# Patient Record
Sex: FEMALE | Race: WHITE | Employment: OTHER | URBAN - METROPOLITAN AREA
[De-identification: names, ages, dates, MRNs, and addresses within clinical notes are randomized per-mention and may not be internally consistent; named-entity substitution may affect disease eponyms.]

---

## 2017-10-23 PROBLEM — M54.40 BILATERAL LOW BACK PAIN WITH SCIATICA: Status: ACTIVE | Noted: 2017-10-23

## 2017-11-02 ENCOUNTER — HOSPITAL ENCOUNTER (OUTPATIENT)
Dept: PHYSICAL THERAPY | Age: 57
Discharge: HOME | End: 2017-11-02
Payer: COMMERCIAL

## 2017-11-02 DIAGNOSIS — M54.40 BILATERAL LOW BACK PAIN WITH SCIATICA, SCIATICA LATERALITY UNSPECIFIED, UNSPECIFIED CHRONICITY: ICD-10-CM

## 2017-11-02 PROCEDURE — 97110 THERAPEUTIC EXERCISES: CPT

## 2017-11-02 PROCEDURE — 97162 PT EVAL MOD COMPLEX 30 MIN: CPT

## 2017-11-02 NOTE — PROGRESS NOTES
Ambulatory/Rehab Services H2 Model Falls Risk Assessment    Risk Factor Pts. ·   Confusion/Disorientation/Impulsivity  []    4 ·   Symptomatic Depression  []   2 ·   Altered Elimination  []   1 ·   Dizziness/Vertigo  []   1 ·   Gender (Male)  []   1 ·   Any administered antiepileptics (anticonvulsants):  []   2 ·   Any administered benzodiazepines:  []   1 ·   Visual Impairment (specify):  []   1 ·   Portable Oxygen Use  []   1 ·   Orthostatic ? BP  []   1 ·   History of Recent Falls (within 3 mos.)  []   5     Ability to Rise from Chair (choose one) Pts. ·   Ability to rise in a single movement  [x]   0 ·   Pushes up, successful in one attempt  []   1 ·   Multiple attempts, but successful  []   3 ·   Unable to rise without assistance  []   4   Total: (5 or greater = High Risk) 0     Falls Prevention Plan:   []                Physical Limitations to Exercise (specify):   []                Mobility Assistance Device (type):   []                Exercise/Equipment Adaptation (specify):    ©2010 [facility] [address] [place] [address] Patent #[id number].  Federal Law prohibits the replication, distribution or use without written permission from [facility] [employer]

## 2017-11-02 NOTE — PROGRESS NOTES
[facility] [place] [address] [facility]   [address] [place] [address], [address]  [phone]   [phone]    Brock Prader  : 1960       OUTPATIENT PHYSICAL THERAPY:Initial Assessment 2017    ICD-10: Treatment Diagnosis: back pain M54.5  Precautions/Allergies:   Biaxin [clarithromycin] and Cortisone   Fall Risk Score: 0 (? 5 = High Risk)  MD Orders: Eval and treat MEDICAL/REFERRING DIAGNOSIS:  Bilateral low back pain with sciatica, sciatica laterality unspecified, unspecified chronicity [M54.40]   DATE OF ONSET: 10/21/17  REFERRING PHYSICIAN: Lluvia Guadalupe MD  RETURN PHYSICIAN APPOINTMENT: 17     INITIAL ASSESSMENT:  Ms. Lars Serrano presents with increased lower back and bilateral leg pain that began on 10/21/17. She can not recall any specific injury, but began to have increased pain and it traveled down both of her legs. She had increased difficulty with sitting and lying down. She was placed on medications and is now feeling better overall, but there is still some lingering pain. She has continued soreness on the lower left side at [place] [address] region and area of sciatic nerve. Pt has not returned to work yet due to her pain. She will be seeing MD next week for possible return to work. PROBLEM LIST (Impacting functional limitations):  1. Decreased Strength  2. Decreased ADL/Functional Activities  3. Increased Pain  4. Decreased Activity Tolerance  5. Decreased Flexibility/Joint Mobility INTERVENTIONS PLANNED:  1. Electrical Stimulation  2. Heat  3. Home Exercise Program (HEP)  4. Manual Therapy  5. Range of Motion (ROM)  6. Therapeutic Exercise/Strengthening  7. Ultrasound (US)   TREATMENT PLAN:  Effective Dates: 17 TO 18. Frequency/Duration: 2 times a week for 10 weeks and upon reassessment,  will adjust frequency and duration as progress indicates.       GOALS: (Goals have been discussed and agreed upon with patient.)  Short-Term Functional Goals: Time Frame: 5weeks  1. Establish independent HEP with no cueing. 2. Pt will be able to return to work. 3. Pt will be able to report no increased pain with walking or sitting for any length of time. Discharge Goals: Time Frame: 10 weeks  1. Improve score on Oswestry by at least 5 points for improved ADL function. 2. Pt will exhibit proper lifting mechanics in order to avoid stressing her lower back at work. 3. Pt will be able to report return to full work and home activities. Rehabilitation Potential For Stated Goals: Good  Regarding Brooke Mcmahan's therapy, I certify that the treatment plan above will be carried out by a therapist or under their direction. Thank you for this referral,  Jhon Newton PT     Referring Physician Signature: Toi David MD              Date                    The information in this section was collected on 11/2/17 (except where otherwise noted). HISTORY:   History of Present Injury/Illness (Reason for Referral):  Ms. Elias Coley, a 59year old female, presents with increased lower back and bilateral leg pain that began on 10/21/17. She can not recall any specific injury, but began to have increased pain and it traveled down both of her legs. She had increased difficulty with sitting and lying down. She was placed on medications and is now feeling better overall, but there is still some lingering pain. She has continued soreness on the lower left side at [facility] region and area of sciatic nerve. Pt has not returned to work yet due to her pain. She will be seeing MD next week for possible return to work. Past Medical History/Comorbidities:   Ms. Elias Coley  has a past medical history of Diabetes ([facility], [address] and Environmental allergies (9/12/2013). Ms. Elias Coley  has a past surgical history that includes cholecystectomy.   Social History/Living Environment:     pt lives alone with son very close by  Prior Level of Function/Work/Activity:  Pt works at a manufacturing plant and must sit, stand and lift at times  Dominant Side:         RIGHT    Current Medications:       Current Outpatient Prescriptions:     predniSONE (STERAPRED) 5 mg dose pack, See administration instruction per 5mg dose pack, Disp: 21 Tab, Rfl: 0    tiZANidine (ZANAFLEX) 4 mg capsule, Take 1 Cap by mouth three (3) times daily. Indications: Muscle Spasm, Disp: 30 Cap, Rfl: 2    traMADol (ULTRAM) 50 mg tablet, Take 1 Tab by mouth every six (6) hours as needed for Pain. Max Daily Amount: 200 mg., Disp: 30 Tab, Rfl: 2    atorvastatin (LIPITOR) 20 mg tablet, Take 1 Tab by mouth daily. , Disp: 90 Tab, Rfl: 1    insulin glargine (LANTUS SOLOSTAR) 100 unit/mL (3 mL) inpn, 24 u qhs  Indications: type 2 diabetes mellitus, Disp: 10 Pen, Rfl: 1    metFORMIN (GLUCOPHAGE) 500 mg tablet, Take 1 Tab by mouth two (2) times daily (with meals). , Disp: 60 Tab, Rfl: 5    glucose blood VI test strips (ACCU-CHEK LILLY PLUS TEST STRP) strip, Use twice daily for management of Type 2 DM, Uncontrolled (250.02), Disp: 200 Strip, Rfl: 3    Lancets (ACCU-CHEK SOFTCLIX LANCETS) misc, Use twice daily for management of Type 2 DM uncontrolled (250.02), Disp: 200 Each, Rfl: 3    Insulin Needles, Disposable, 32 gauge x 1/4\" ndle, 1 Each by Does Not Apply route daily. , Disp: 100 Pen Needle, Rfl: 3    Blood-Glucose Meter (ACCU-CHEK LILLY PLUS METER) misc, 1 Kit by Does Not Apply route two (2) times a day.  For management of Type 2 DM Uncontrolled (250.02), Disp: 1 Each, Rfl: 3    ACETAMINOPHEN (TYLENOL 8 HOUR PO), Take  by mouth.  , Disp: , Rfl:    Date Last Reviewed:  11/2/2017     Number of Personal Factors/Comorbidities that affect the Plan of Care: 1-2: MODERATE COMPLEXITY   EXAMINATION:     ROM:     bilateral lower back ROM WFL                                       Strength:     bilaterally LE 4+/5  Core strength is fair                     Special Tests: negative SLR, negative LLD, mild pain noted with prone hip ER and IR  Neurological Screen: pt has not had any numbness or tingling  Balance:  good   Body Structures Involved:  1. Nerves  2. Bones  3. Joints  4. Muscles  5. Ligaments Body Functions Affected:  1. Sensory/Pain  2. Neuromusculoskeletal  3. Movement Related Activities and Participation Affected:  1. Mobility  2. Self Care  3. Domestic Life  4. Interpersonal Interactions and Relationships  5. Community, Social and Oral [place]   Number of elements (examined above) that affect the Plan of Care: 4+: HIGH COMPLEXITY   CLINICAL PRESENTATION:   Presentation: Evolving clinical presentation with changing clinical characteristics: MODERATE COMPLEXITY   CLINICAL DECISION MAKING:   Outcome Measure: Tool Used: Modified Oswestry Low Back Pain Questionnaire  Score:  Initial: 12/50  Most Recent: X/50 (Date: -- )   Interpretation of Score: Each section is scored on a 0-5 scale, 5 representing the greatest disability. The scores of each section are added together for a total score of 50. Score 0 1-10 11-20 21-30 31-40 41-49 50   Modifier CH CI CJ CK CL CM CN     ? Changing and Maintaining Body Position:     - CURRENT STATUS: CJ - 20%-39% impaired, limited or restricted    - GOAL STATUS: CI - 1%-19% impaired, limited or restricted    - D/C STATUS:  ---------------To be determined---------------      Medical Necessity:   · Patient is expected to demonstrate progress in strength to improve her overall ability to lift objects and return to work without difficulty. Reason for Services/Other Comments:  · Patient continues to require skilled intervention due to weakened core and increased lower back pain preventing her from standing, lifting and walking.    Use of outcome tool(s) and clinical judgement create a POC that gives a: Questionable prediction of patient's progress: MODERATE COMPLEXITY            TREATMENT:   (In addition to Assessment/Re-Assessment sessions the following treatments were rendered)  Pre-treatment Symptoms/Complaints:  Pt reporting some mild left lower back pain around SI joint. Pain: Initial: Pain Intensity 1: 6  Post Session:  6/10     THERAPEUTIC EXERCISE: (25 minutes):  Exercises per grid below to improve mobility and strength. Required minimal verbal cues to promote proper body mechanics. Progressed resistance, range and repetitions as indicated. Nu-step seat 8 x 8 min  Bridging x 10  LTR x 10  Piriformis stretching manually with contract relax x 5 hold 10 sec  Bilateral hip extension 37.5# x 20  Evaluation (  xx   ):    Manual Therapy (  na    ): na    Therapeutic Modalities:na    HEP: As above; handouts given to patient for all exercises. Treatment/Session Assessment:    · Response to Treatment:  Pt responded well to treatment today. She is off prednisone, but is still taking Tramadol. She still has very low level pain noted on the lower left side of her back and she reports she is not ready to begin lifting objects as she still feels the pain. She will return for core strengthening as well stretching and any modalities as needed. · Compliance with Program/Exercises: Will assess as treatment progresses. · Recommendations/Intent for next treatment session: \"Next visit will focus on advancements to more challenging activities\".   Total Treatment Duration: 60 min  PT Patient Time In/Time Out  Time In: 0900  Time Out: 1000  Treatment number 1901 Pauline Tesfaye PT

## 2017-11-06 ENCOUNTER — HOSPITAL ENCOUNTER (OUTPATIENT)
Dept: PHYSICAL THERAPY | Age: 57
Discharge: HOME | End: 2017-11-06
Payer: COMMERCIAL

## 2017-11-06 PROCEDURE — 97110 THERAPEUTIC EXERCISES: CPT

## 2017-11-06 NOTE — PROGRESS NOTES
[facility]rapy Center at [facility] Therapy   [address], [address]  [phone]   [phone]    Kita Graves  : 1960       OUTPATIENT PHYSICAL THERAPY:Daily Note 2017    ICD-10: Treatment Diagnosis: back pain M54.5  Precautions/Allergies:   Biaxin [clarithromycin] and Cortisone   Fall Risk Score: 0 (? 5 = High Risk)  MD Orders: Eval and treat MEDICAL/REFERRING DIAGNOSIS:  Lumbago with sciatica, unspecified side [M54.40]   DATE OF ONSET: 10/21/17  REFERRING PHYSICIAN: Sima Russo MD  RETURN PHYSICIAN APPOINTMENT: 17     INITIAL ASSESSMENT:  Ms. Antionette Ortiz presents with increased lower back and bilateral leg pain that began on 10/21/17. She can not recall any specific injury, but began to have increased pain and it traveled down both of her legs. She had increased difficulty with sitting and lying down. She was placed on medications and is now feeling better overall, but there is still some lingering pain. She has continued soreness on the lower left side at [place] [address] region and area of sciatic nerve. Pt has not returned to work yet due to her pain. She will be seeing MD next week for possible return to work. PROBLEM LIST (Impacting functional limitations):  1. Decreased Strength  2. Decreased ADL/Functional Activities  3. Increased Pain  4. Decreased Activity Tolerance  5. Decreased Flexibility/Joint Mobility INTERVENTIONS PLANNED:  1. Electrical Stimulation  2. Heat  3. Home Exercise Program (HEP)  4. Manual Therapy  5. Range of Motion (ROM)  6. Therapeutic Exercise/Strengthening  7. Ultrasound (US)   TREATMENT PLAN:  Effective Dates: 17 TO 18. Frequency/Duration: 2 times a week for 10 weeks and upon reassessment,  will adjust frequency and duration as progress indicates. GOALS: (Goals have been discussed and agreed upon with patient.)  Short-Term Functional Goals: Time Frame: 5weeks  1.  Establish independent HEP with no cueing. 2. Pt will be able to return to work. 3. Pt will be able to report no increased pain with walking or sitting for any length of time. Discharge Goals: Time Frame: 10 weeks  1. Improve score on Oswestry by at least 5 points for improved ADL function. 2. Pt will exhibit proper lifting mechanics in order to avoid stressing her lower back at work. 3. Pt will be able to report return to full work and home activities. Rehabilitation Potential For Stated Goals: Good  Regarding Brooke Wallaceurston's therapy, I certify that the treatment plan above will be carried out by a therapist or under their direction. Thank you for this referral,              The information in this section was collected on 11/2/17 (except where otherwise noted). HISTORY:   History of Present Injury/Illness (Reason for Referral):  Ms. Nemo Bonds, a 59year old female, presents with increased lower back and bilateral leg pain that began on 10/21/17. She can not recall any specific injury, but began to have increased pain and it traveled down both of her legs. She had increased difficulty with sitting and lying down. She was placed on medications and is now feeling better overall, but there is still some lingering pain. She has continued soreness on the lower left side at [facility] region and area of sciatic nerve. Pt has not returned to work yet due to her pain. She will be seeing MD next week for possible return to work. Past Medical History/Comorbidities:   Ms. Nemo Bonds  has a past medical history of Diabetes ([address] and Environmental allergies (9/12/2013). Ms. Nemo Bonds  has a past surgical history that includes cholecystectomy.   Social History/Living Environment:     pt lives alone with son very close by  Prior Level of Function/Work/Activity:  Pt works at a manufacturing plant and must sit, stand and lift at times  Dominant Side:         RIGHT    Current Medications:       Current Outpatient Prescriptions:     predniSONE (STERAPRED) 5 mg dose pack, See administration instruction per 5mg dose pack, Disp: 21 Tab, Rfl: 0    tiZANidine (ZANAFLEX) 4 mg capsule, Take 1 Cap by mouth three (3) times daily. Indications: Muscle Spasm, Disp: 30 Cap, Rfl: 2    traMADol (ULTRAM) 50 mg tablet, Take 1 Tab by mouth every six (6) hours as needed for Pain. Max Daily Amount: 200 mg., Disp: 30 Tab, Rfl: 2    atorvastatin (LIPITOR) 20 mg tablet, Take 1 Tab by mouth daily. , Disp: 90 Tab, Rfl: 1    insulin glargine (LANTUS SOLOSTAR) 100 unit/mL (3 mL) inpn, 24 u qhs  Indications: type 2 diabetes mellitus, Disp: 10 Pen, Rfl: 1    metFORMIN (GLUCOPHAGE) 500 mg tablet, Take 1 Tab by mouth two (2) times daily (with meals). , Disp: 60 Tab, Rfl: 5    glucose blood VI test strips (ACCU-CHEK LILLY PLUS TEST STRP) strip, Use twice daily for management of Type 2 DM, Uncontrolled (250.02), Disp: 200 Strip, Rfl: 3    Lancets (ACCU-CHEK SOFTCLIX LANCETS) misc, Use twice daily for management of Type 2 DM uncontrolled (250.02), Disp: 200 Each, Rfl: 3    Insulin Needles, Disposable, 32 gauge x 1/4\" ndle, 1 Each by Does Not Apply route daily. , Disp: 100 Pen Needle, Rfl: 3    Blood-Glucose Meter (ACCU-CHEK LILLY PLUS METER) misc, 1 Kit by Does Not Apply route two (2) times a day. For management of Type 2 DM Uncontrolled (250.02), Disp: 1 Each, Rfl: 3    ACETAMINOPHEN (TYLENOL 8 HOUR PO), Take  by mouth.  , Disp: , Rfl:    Date Last Reviewed:  11/6/2017     Number of Personal Factors/Comorbidities that affect the Plan of Care: 1-2: MODERATE COMPLEXITY   EXAMINATION:     ROM:     bilateral lower back ROM WFL                                       Strength:     bilaterally LE 4+/5  Core strength is fair                     Special Tests: negative SLR, negative LLD, mild pain noted with prone hip ER and IR  Neurological Screen: pt has not had any numbness or tingling  Balance:  good   Body Structures Involved:  1. Nerves  2. Bones  3. Joints  4. Muscles  5.  Ligaments Body Functions Affected:  1. Sensory/Pain  2. Neuromusculoskeletal  3. Movement Related Activities and Participation Affected:  1. Mobility  2. Self Care  3. Domestic Life  4. Interpersonal Interactions and Relationships  5. Community, Social and Distant [place]   Number of elements (examined above) that affect the Plan of Care: 4+: HIGH COMPLEXITY   CLINICAL PRESENTATION:   Presentation: Evolving clinical presentation with changing clinical characteristics: MODERATE COMPLEXITY   CLINICAL DECISION MAKING:   Outcome Measure: Tool Used: Modified Oswestry Low Back Pain Questionnaire  Score:  Initial: 12/50  Most Recent: X/50 (Date: -- )   Interpretation of Score: Each section is scored on a 0-5 scale, 5 representing the greatest disability. The scores of each section are added together for a total score of 50. Score 0 1-10 11-20 21-30 31-40 41-49 50   Modifier CH CI CJ CK CL CM CN     ? Changing and Maintaining Body Position:     - CURRENT STATUS: CJ - 20%-39% impaired, limited or restricted    - GOAL STATUS: CI - 1%-19% impaired, limited or restricted    - D/C STATUS:  ---------------To be determined---------------      Medical Necessity:   · Patient is expected to demonstrate progress in strength to improve her overall ability to lift objects and return to work without difficulty. Reason for Services/Other Comments:  · Patient continues to require skilled intervention due to weakened core and increased lower back pain preventing her from standing, lifting and walking. Use of outcome tool(s) and clinical judgement create a POC that gives a: Questionable prediction of patient's progress: MODERATE COMPLEXITY            TREATMENT:   (In addition to Assessment/Re-Assessment sessions the following treatments were rendered)  Pre-treatment Symptoms/Complaints:  Pt reports back feels a little better, but most of her pain now in her glut and down the outside of her leg.   Pain: Initial: Pain Intensity 1: 4  Post Session: 3/10     THERAPEUTIC EXERCISE: (60  minutes):  Exercises per grid below to improve mobility and strength. Required minimal verbal cues to promote proper body mechanics. Progressed resistance, range and repetitions as indicated. Nu-step seat 8 x 10 min  Bridging 2 x 10  LTR x 10  Piriformis stretching manually with contract relax x 5 hold 10 sec  Bilateral hip extension 37.5# x 20  Clams with red t band 2 x 10  Hip abd side lying 2 x 10  piriformis stretching   Hamstring stretching   SLR 2 x 10  Knee to chest   Standing trunk ext 2 x 10        Evaluation :    Manual Therapy (  na    ): na    Therapeutic Modalities:na    HEP: As directed. Treatment/Session Assessment:    · Response to Treatment:  Pt responded well to treatment today. Patient seemed pleased that her pain level has decreased tremendously and that she is able to do all of the exercises with out much difficulty. Patient indicates she would like to join a gym once she completes therapy. Patient needs to continue to work on core strength. Good compliance with home exercise. · Compliance with Program/Exercises: Will assess as treatment progresses. · Recommendations/Intent for next treatment session: \"Next visit will focus on advancements to more challenging activities\".   Total Treatment Duration: 60 min  PT Patient Time In/Time Out  Time In: 1100  Time Out: 1200  Treatment number Mesfin Hoyt PTA

## 2017-11-09 ENCOUNTER — HOSPITAL ENCOUNTER (OUTPATIENT)
Dept: PHYSICAL THERAPY | Age: 57
Discharge: HOME | End: 2017-11-09
Payer: COMMERCIAL

## 2017-11-10 NOTE — PROGRESS NOTES
Saqib Ely  : 1960  Primary: [employer]  Secondary:  [address] at [facility] Therapy  [address], [facility] [address]  [phone]   [phone]      OUTPATIENT DAILY NOTE    NAME/AGE/GENDER: Saqib Ely is a 59 y.o. female. DATE: 11/10/2017    Patient cancelled  appointment today due to therapist sickness. Will plan to follow up on next scheduled visit.     Gloria Garcia, PTA

## 2017-11-13 ENCOUNTER — HOSPITAL ENCOUNTER (OUTPATIENT)
Dept: PHYSICAL THERAPY | Age: 57
Discharge: HOME | End: 2017-11-13
Payer: COMMERCIAL

## 2017-11-13 PROCEDURE — 97110 THERAPEUTIC EXERCISES: CPT

## 2017-11-13 NOTE — PROGRESS NOTES
[facility]   [address], [address]  [phone]   [phone]    Angelic Waldrop  : 1960       OUTPATIENT PHYSICAL THERAPY:Daily Note 2017    ICD-10: Treatment Diagnosis: back pain M54.5  Precautions/Allergies:   Biaxin [clarithromycin] and Cortisone   Fall Risk Score: 0 (? 5 = High Risk)  MD Orders: Eval and treat MEDICAL/REFERRING DIAGNOSIS:  Lumbago with sciatica, unspecified side [M54.40]   DATE OF ONSET: 10/21/17  REFERRING PHYSICIAN: Mercy Mckinley MD  RETURN PHYSICIAN APPOINTMENT: 17     INITIAL ASSESSMENT:  Ms. Vj Bell presents with increased lower back and bilateral leg pain that began on 10/21/17. She can not recall any specific injury, but began to have increased pain and it traveled down both of her legs. She had increased difficulty with sitting and lying down. She was placed on medications and is now feeling better overall, but there is still some lingering pain. She has continued soreness on the lower left side at [facility] region and area of sciatic nerve. Pt has not returned to work yet due to her pain. She will be seeing MD next week for possible return to work. PROBLEM LIST (Impacting functional limitations):  1. Decreased Strength  2. Decreased ADL/Functional Activities  3. Increased Pain  4. Decreased Activity Tolerance  5. Decreased Flexibility/Joint Mobility INTERVENTIONS PLANNED:  1. Electrical Stimulation  2. Heat  3. Home Exercise Program (HEP)  4. Manual Therapy  5. Range of Motion (ROM)  6. Therapeutic Exercise/Strengthening  7. Ultrasound (US)   TREATMENT PLAN:  Effective Dates: 17 TO 18. Frequency/Duration: 2 times a week for 10 weeks and upon reassessment,  will adjust frequency and duration as progress indicates. GOALS: (Goals have been discussed and agreed upon with patient.)  Short-Term Functional Goals: Time Frame: 5weeks  1.  Establish independent HEP with no cueing. 2. Pt will be able to return to work. 3. Pt will be able to report no increased pain with walking or sitting for any length of time. Discharge Goals: Time Frame: 10 weeks  1. Improve score on Oswestry by at least 5 points for improved ADL function. 2. Pt will exhibit proper lifting mechanics in order to avoid stressing her lower back at work. 3. Pt will be able to report return to full work and home activities. Rehabilitation Potential For Stated Goals: Good  Regarding Brooke Mcmahan's therapy, I certify that the treatment plan above will be carried out by a therapist or under their direction. Thank you for this referral,              The information in this section was collected on 11/2/17 (except where otherwise noted). HISTORY:   History of Present Injury/Illness (Reason for Referral):  Ms. Vj Bell, a 59year old female, presents with increased lower back and bilateral leg pain that began on 10/21/17. She can not recall any specific injury, but began to have increased pain and it traveled down both of her legs. She had increased difficulty with sitting and lying down. She was placed on medications and is now feeling better overall, but there is still some lingering pain. She has continued soreness on the lower left side at [facility] region and area of sciatic nerve. Pt has not returned to work yet due to her pain. She will be seeing MD next week for possible return to work. Past Medical History/Comorbidities:   Ms. Vj Bell  has a past medical history of Diabetes ([address] and Environmental allergies (9/12/2013). Ms. Vj Bell  has a past surgical history that includes cholecystectomy.   Social History/Living Environment:     pt lives alone with son very close by  Prior Level of Function/Work/Activity:  Pt works at a manufacturing plant and must sit, stand and lift at times  Dominant Side:         RIGHT    Current Medications:       Current Outpatient Prescriptions:     predniSONE (STERAPRED) 5 mg dose pack, See administration instruction per 5mg dose pack, Disp: 21 Tab, Rfl: 0    tiZANidine (ZANAFLEX) 4 mg capsule, Take 1 Cap by mouth three (3) times daily. Indications: Muscle Spasm, Disp: 30 Cap, Rfl: 2    traMADol (ULTRAM) 50 mg tablet, Take 1 Tab by mouth every six (6) hours as needed for Pain. Max Daily Amount: 200 mg., Disp: 30 Tab, Rfl: 2    atorvastatin (LIPITOR) 20 mg tablet, Take 1 Tab by mouth daily. , Disp: 90 Tab, Rfl: 1    insulin glargine (LANTUS SOLOSTAR) 100 unit/mL (3 mL) inpn, 24 u qhs  Indications: type 2 diabetes mellitus, Disp: 10 Pen, Rfl: 1    metFORMIN (GLUCOPHAGE) 500 mg tablet, Take 1 Tab by mouth two (2) times daily (with meals). , Disp: 60 Tab, Rfl: 5    glucose blood VI test strips (ACCU-CHEK LILLY PLUS TEST STRP) strip, Use twice daily for management of Type 2 DM, Uncontrolled (250.02), Disp: 200 Strip, Rfl: 3    Lancets (ACCU-CHEK SOFTCLIX LANCETS) misc, Use twice daily for management of Type 2 DM uncontrolled (250.02), Disp: 200 Each, Rfl: 3    Insulin Needles, Disposable, 32 gauge x 1/4\" ndle, 1 Each by Does Not Apply route daily. , Disp: 100 Pen Needle, Rfl: 3    Blood-Glucose Meter (ACCU-CHEK LILLY PLUS METER) misc, 1 Kit by Does Not Apply route two (2) times a day. For management of Type 2 DM Uncontrolled (250.02), Disp: 1 Each, Rfl: 3    ACETAMINOPHEN (TYLENOL 8 HOUR PO), Take  by mouth.  , Disp: , Rfl:    Date Last Reviewed:  11/13/2017     Number of Personal Factors/Comorbidities that affect the Plan of Care: 1-2: MODERATE COMPLEXITY   EXAMINATION:     ROM:     bilateral lower back ROM WFL                                       Strength:     bilaterally LE 4+/5  Core strength is fair                     Special Tests: negative SLR, negative LLD, mild pain noted with prone hip ER and IR  Neurological Screen: pt has not had any numbness or tingling  Balance:  good   Body Structures Involved:  1. Nerves  2. Bones  3. Joints  4. Muscles  5.  Ligaments Body Functions Affected:  1. Sensory/Pain  2. Neuromusculoskeletal  3. Movement Related Activities and Participation Affected:  1. Mobility  2. Self Care  3. Domestic Life  4. Interpersonal Interactions and Relationships  5. Community, Social and [place]   Number of elements (examined above) that affect the Plan of Care: 4+: HIGH COMPLEXITY   CLINICAL PRESENTATION:   Presentation: Evolving clinical presentation with changing clinical characteristics: MODERATE COMPLEXITY   CLINICAL DECISION MAKING:   Outcome Measure: Tool Used: Modified Oswestry Low Back Pain Questionnaire  Score:  Initial: 12/50  Most Recent: X/50 (Date: -- )   Interpretation of Score: Each section is scored on a 0-5 scale, 5 representing the greatest disability. The scores of each section are added together for a total score of 50. Score 0 1-10 11-20 21-30 31-40 41-49 50   Modifier CH CI CJ CK CL CM CN     ? Changing and Maintaining Body Position:     - CURRENT STATUS: CJ - 20%-39% impaired, limited or restricted    - GOAL STATUS: CI - 1%-19% impaired, limited or restricted    - D/C STATUS:  ---------------To be determined---------------      Medical Necessity:   · Patient is expected to demonstrate progress in strength to improve her overall ability to lift objects and return to work without difficulty. Reason for Services/Other Comments:  · Patient continues to require skilled intervention due to weakened core and increased lower back pain preventing her from standing, lifting and walking. Use of outcome tool(s) and clinical judgement create a POC that gives a: Questionable prediction of patient's progress: MODERATE COMPLEXITY            TREATMENT:   (In addition to Assessment/Re-Assessment sessions the following treatments were rendered)  Pre-treatment Symptoms/Complaints:  Pt reports back and leg feel better, but she still has once spot in her glut that still hurts with certain movements.   Pain: Initial: Pain Intensity 1: 3  Post Session:  2/10     THERAPEUTIC EXERCISE: (60  minutes):  Exercises per grid below to improve mobility and strength. Required minimal verbal cues to promote proper body mechanics. Progressed resistance, range and repetitions as indicated. Nu-step seat 8 x 10 min  Bridging 2 x 10  LTR x 10  Piriformis stretching manually with contract relax x 5 hold 10 sec  Bilateral hip extension 37.5# x 20  Clams with red t band 2 x 10  Hip abd side lying 2 x 10  piriformis stretching   Hamstring stretching   SLR 2 x 10  Knee to chest   Standing trunk ext 2 x 10  Step ups on 6 inch step x20      Evaluation :    Manual Therapy (  na    ): na    Therapeutic Modalities:na    HEP: As directed. Treatment/Session Assessment:    · Response to Treatment:  Pt responded well to treatment today. Patient continues to gain good relief following treatment and hopes that she will be able to join the gym in a couple of weeks. Patient needs to continue to work on core strength. Good compliance with home exercise and instructed to increase stretching to twice daily to help decrease glut discomfort. · Compliance with Program/Exercises: Will assess as treatment progresses. · Recommendations/Intent for next treatment session: \"Next visit will focus on advancements to more challenging activities\".   Total Treatment Duration: 60 min  PT Patient Time In/Time Out  Time In: 0400  Time Out: 0500  Treatment number [place], [place]

## 2017-11-16 ENCOUNTER — HOSPITAL ENCOUNTER (OUTPATIENT)
Dept: PHYSICAL THERAPY | Age: 57
Discharge: HOME | End: 2017-11-16
Payer: COMMERCIAL

## 2017-11-16 PROCEDURE — 97110 THERAPEUTIC EXERCISES: CPT

## 2017-11-16 NOTE — PROGRESS NOTES
[facility] Therapy   [address], [address]  [phone]   [phone]    Daysi Mcdaniel  : 1960       OUTPATIENT PHYSICAL THERAPY:Daily Note 2017    ICD-10: Treatment Diagnosis: back pain M54.5  Precautions/Allergies:   Biaxin [clarithromycin] and Cortisone   Fall Risk Score: 0 (? 5 = High Risk)  MD Orders: Eval and treat MEDICAL/REFERRING DIAGNOSIS:  Lumbago with sciatica, unspecified side [M54.40]   DATE OF ONSET: 10/21/17  REFERRING PHYSICIAN: Munira Gamboa MD  RETURN PHYSICIAN APPOINTMENT: 17     INITIAL ASSESSMENT:  Ms. Claudean Keen presents with increased lower back and bilateral leg pain that began on 10/21/17. She can not recall any specific injury, but began to have increased pain and it traveled down both of her legs. She had increased difficulty with sitting and lying down. She was placed on medications and is now feeling better overall, but there is still some lingering pain. She has continued soreness on the lower left side at [facility] region and area of sciatic nerve. Pt has not returned to work yet due to her pain. She will be seeing MD next week for possible return to work. PROBLEM LIST (Impacting functional limitations):  1. Decreased Strength  2. Decreased ADL/Functional Activities  3. Increased Pain  4. Decreased Activity Tolerance  5. Decreased Flexibility/Joint Mobility INTERVENTIONS PLANNED:  1. Electrical Stimulation  2. Heat  3. Home Exercise Program (HEP)  4. Manual Therapy  5. Range of Motion (ROM)  6. Therapeutic Exercise/Strengthening  7. Ultrasound (US)   TREATMENT PLAN:  Effective Dates: 17 TO 18. Frequency/Duration: 2 times a week for 10 weeks and upon reassessment,  will adjust frequency and duration as progress indicates. GOALS: (Goals have been discussed and agreed upon with patient.)  Short-Term Functional Goals: Time Frame: 5weeks  1.  Establish independent HEP with no cueing. 2. Pt will be able to return to work. 3. Pt will be able to report no increased pain with walking or sitting for any length of time. Discharge Goals: Time Frame: 10 weeks  1. Improve score on Oswestry by at least 5 points for improved ADL function. 2. Pt will exhibit proper lifting mechanics in order to avoid stressing her lower back at work. 3. Pt will be able to report return to full work and home activities. Rehabilitation Potential For Stated Goals: Good  Regarding Brooke Mcmahan's therapy, I certify that the treatment plan above will be carried out by a therapist or under their direction. Thank you for this referral,              The information in this section was collected on 11/2/17 (except where otherwise noted). HISTORY:   History of Present Injury/Illness (Reason for Referral):  Ms. Lars Serrano, a 59year old female, presents with increased lower back and bilateral leg pain that began on 10/21/17. She can not recall any specific injury, but began to have increased pain and it traveled down both of her legs. She had increased difficulty with sitting and lying down. She was placed on medications and is now feeling better overall, but there is still some lingering pain. She has continued soreness on the lower left side at [facility] region and area of sciatic nerve. Pt has not returned to work yet due to her pain. She will be seeing MD next week for possible return to work. Past Medical History/Comorbidities:   Ms. Lars Serrano  has a past medical history of Diabetes ([address] and Environmental allergies (9/12/2013). Ms. Lars Serrano  has a past surgical history that includes cholecystectomy.   Social History/Living Environment:     pt lives alone with son very close by  Prior Level of Function/Work/Activity:  Pt works at a manufacturing plant and must sit, stand and lift at times  Dominant Side:         RIGHT    Current Medications:       Current Outpatient Prescriptions:     predniSONE (STERAPRED) 5 mg dose pack, See administration instruction per 5mg dose pack, Disp: 21 Tab, Rfl: 0    tiZANidine (ZANAFLEX) 4 mg capsule, Take 1 Cap by mouth three (3) times daily. Indications: Muscle Spasm, Disp: 30 Cap, Rfl: 2    traMADol (ULTRAM) 50 mg tablet, Take 1 Tab by mouth every six (6) hours as needed for Pain. Max Daily Amount: 200 mg., Disp: 30 Tab, Rfl: 2    atorvastatin (LIPITOR) 20 mg tablet, Take 1 Tab by mouth daily. , Disp: 90 Tab, Rfl: 1    insulin glargine (LANTUS SOLOSTAR) 100 unit/mL (3 mL) inpn, 24 u qhs  Indications: type 2 diabetes mellitus, Disp: 10 Pen, Rfl: 1    metFORMIN (GLUCOPHAGE) 500 mg tablet, Take 1 Tab by mouth two (2) times daily (with meals). , Disp: 60 Tab, Rfl: 5    glucose blood VI test strips (ACCU-CHEK LILLY PLUS TEST STRP) strip, Use twice daily for management of Type 2 DM, Uncontrolled (250.02), Disp: 200 Strip, Rfl: 3    Lancets (ACCU-CHEK SOFTCLIX LANCETS) misc, Use twice daily for management of Type 2 DM uncontrolled (250.02), Disp: 200 Each, Rfl: 3    Insulin Needles, Disposable, 32 gauge x 1/4\" ndle, 1 Each by Does Not Apply route daily. , Disp: 100 Pen Needle, Rfl: 3    Blood-Glucose Meter (ACCU-CHEK LILLY PLUS METER) misc, 1 Kit by Does Not Apply route two (2) times a day. For management of Type 2 DM Uncontrolled (250.02), Disp: 1 Each, Rfl: 3    ACETAMINOPHEN (TYLENOL 8 HOUR PO), Take  by mouth.  , Disp: , Rfl:    Date Last Reviewed:  11/16/2017     Number of Personal Factors/Comorbidities that affect the Plan of Care: 1-2: MODERATE COMPLEXITY   EXAMINATION:     ROM:     bilateral lower back ROM WFL                                       Strength:     bilaterally LE 4+/5  Core strength is fair                     Special Tests: negative SLR, negative LLD, mild pain noted with prone hip ER and IR  Neurological Screen: pt has not had any numbness or tingling  Balance:  good   Body Structures Involved:  1. Nerves  2. Bones  3. Joints  4. Muscles  5.  Ligaments Body Functions Affected:  1. Sensory/Pain  2. Neuromusculoskeletal  3. Movement Related Activities and Participation Affected:  1. Mobility  2. Self Care  3. Domestic Life  4. Interpersonal Interactions and Relationships  5. Community, Social and [place]   Number of elements (examined above) that affect the Plan of Care: 4+: HIGH COMPLEXITY   CLINICAL PRESENTATION:   Presentation: Evolving clinical presentation with changing clinical characteristics: MODERATE COMPLEXITY   CLINICAL DECISION MAKING:   Outcome Measure: Tool Used: Modified Oswestry Low Back Pain Questionnaire  Score:  Initial: 12/50  Most Recent: X/50 (Date: -- )   Interpretation of Score: Each section is scored on a 0-5 scale, 5 representing the greatest disability. The scores of each section are added together for a total score of 50. Score 0 1-10 11-20 21-30 31-40 41-49 50   Modifier CH CI CJ CK CL CM CN     ? Changing and Maintaining Body Position:     - CURRENT STATUS: CJ - 20%-39% impaired, limited or restricted    - GOAL STATUS: CI - 1%-19% impaired, limited or restricted    - D/C STATUS:  ---------------To be determined---------------      Medical Necessity:   · Patient is expected to demonstrate progress in strength to improve her overall ability to lift objects and return to work without difficulty. Reason for Services/Other Comments:  · Patient continues to require skilled intervention due to weakened core and increased lower back pain preventing her from standing, lifting and walking. Use of outcome tool(s) and clinical judgement create a POC that gives a: Questionable prediction of patient's progress: MODERATE COMPLEXITY            TREATMENT:   (In addition to Assessment/Re-Assessment sessions the following treatments were rendered)  Pre-treatment Symptoms/Complaints:  Pt reports feeling a lot better since starting therapy.    Pain: Initial: Pain Intensity 1: 2  Post Session:  1/10     THERAPEUTIC EXERCISE: (60  minutes): Exercises per grid below to improve mobility and strength. Required minimal verbal cues to promote proper body mechanics. Progressed resistance, range and repetitions as indicated. Nu-step seat 8 x 10 min  Bridging 2 x 10  LTR x 10  Piriformis stretching manually with contract relax x 5 hold 10 sec  Bilateral hip extension 37.5# x 20  Clams with red t band 2 x 10  Hip abd side lying 2 x 10  piriformis stretching   Hamstring stretching   SLR 2 x 10  Knee to chest   Standing trunk ext 2 x 10  Step ups on 6 inch step x20  3 way hip machine # 15 flex/ abd x 20 ext #25 x 20    Evaluation :    Manual Therapy (  na    ): na    Therapeutic Modalities:na    HEP: As directed. Treatment/Session Assessment:    · Response to Treatment:  Pt responded well to treatment today. Patient continues to gain good relief following treatment and was pleased with her doctor's visit. Patient is scheduled to return to work Monday. Patient needs to continue to work on core strength. Good compliance with home exercise and instructed to increase stretching to twice daily to help decrease glut discomfort. · Compliance with Program/Exercises: Will assess as treatment progresses. · Recommendations/Intent for next treatment session: \"Next visit will focus on advancements to more challenging activities\".   Total Treatment Duration: 60 min  PT Patient Time In/Time Out  Time In: 0400  Time Out: 0500  Treatment number [address] [place] [address]

## 2017-11-20 ENCOUNTER — HOSPITAL ENCOUNTER (OUTPATIENT)
Dept: PHYSICAL THERAPY | Age: 57
End: 2017-11-20
Payer: COMMERCIAL

## 2018-02-09 NOTE — PROGRESS NOTES
[facility] at [facility] Therapy   [address], [address]  [phone]   [phone]    Orirma Nett  : 1960       OUTPATIENT PHYSICAL THERAPY:Discontinuation Summary 2018    ICD-10: Treatment Diagnosis: back pain M54.5  Precautions/Allergies:   Biaxin [clarithromycin] and Cortisone   Fall Risk Score: 0 (? 5 = High Risk)  MD Orders: Eval and treat MEDICAL/REFERRING DIAGNOSIS:  Lumbago with sciatica, unspecified side [M54.40]   DATE OF ONSET: 10/21/17  REFERRING PHYSICIAN: Fredy Carmen MD  RETURN PHYSICIAN APPOINTMENT: 17     INITIAL ASSESSMENT:  Ms. Peterson Mack presents with increased lower back and bilateral leg pain that began on 10/21/17. She can not recall any specific injury, but began to have increased pain and it traveled down both of her legs. She had increased difficulty with sitting and lying down. She was placed on medications and is now feeling better overall, but there is still some lingering pain. She has continued soreness on the lower left side at [facility] region and area of sciatic nerve. Pt has not returned to work yet due to her pain. She will be seeing MD next week for possible return to work. PROBLEM LIST (Impacting functional limitations):  1. Decreased Strength  2. Decreased ADL/Functional Activities  3. Increased Pain  4. Decreased Activity Tolerance  5. Decreased Flexibility/Joint Mobility INTERVENTIONS PLANNED:  1. Electrical Stimulation  2. Heat  3. Home Exercise Program (HEP)  4. Manual Therapy  5. Range of Motion (ROM)  6. Therapeutic Exercise/Strengthening  7. Ultrasound (US)   TREATMENT PLAN:  Effective Dates: 17 TO 18. Frequency/Duration: 2 times a week for 10 weeks and upon reassessment,  will adjust frequency and duration as progress indicates. GOALS: (Goals have been discussed and agreed upon with patient.)  Short-Term Functional Goals: Time Frame: 5weeks  1.  Establish independent HEP with no cueing.-met  2. Pt will be able to return to work.-met  3. Pt will be able to report no increased pain with walking or sitting for any length of time.-in progress  Discharge Goals: Time Frame: 10 weeks  1. Improve score on Oswestry by at least 5 points for improved ADL function.-in progress  2. Pt will exhibit proper lifting mechanics in order to avoid stressing her lower back at work.-in progress  3. Pt will be able to report return to full work and home activities.-in progress  Rehabilitation Potential For Stated Goals: [address] [facility] therapy, I certify that the treatment plan above will be carried out by a therapist or under their direction. Thank you for this referral,              Michelle Iniguez has been seen in physical therapy from 11/2/17 to 11/16/17 for four visits. Treatment has been discontinued at this time due to patient failing to return for additional treatment. She was able to meet return to work and independence with HEP. She was working towards all other goals set at evaluation. Pt reported feeling better overall with therapy. It is my assumption, she returned to work and was unable to make therapy appointments.    Thank you for this referral.       Shlomo White DPT

## 2018-02-21 PROBLEM — Z79.4 CONTROLLED TYPE 2 DIABETES MELLITUS WITHOUT COMPLICATION, WITH LONG-TERM CURRENT USE OF INSULIN (HCC): Status: ACTIVE | Noted: 2018-02-21

## 2018-02-21 PROBLEM — E11.9 CONTROLLED TYPE 2 DIABETES MELLITUS WITHOUT COMPLICATION, WITH LONG-TERM CURRENT USE OF INSULIN (HCC): Status: ACTIVE | Noted: 2018-02-21

## 2018-02-21 PROBLEM — E11.40 TYPE 2 DIABETES MELLITUS WITH DIABETIC NEUROPATHY (HCC): Status: ACTIVE | Noted: 2018-02-21

## 2018-03-26 ENCOUNTER — APPOINTMENT (OUTPATIENT)
Dept: CT IMAGING | Age: 58
End: 2018-03-26
Attending: EMERGENCY MEDICINE
Payer: COMMERCIAL

## 2018-03-26 ENCOUNTER — HOSPITAL ENCOUNTER (INPATIENT)
Dept: INTERVENTIONAL RADIOLOGY/VASCULAR | Age: 58
LOS: 4 days | Discharge: HOME | DRG: 176 | End: 2018-03-30
Attending: INTERNAL MEDICINE | Admitting: INTERNAL MEDICINE
Payer: COMMERCIAL

## 2018-03-26 ENCOUNTER — HOSPITAL ENCOUNTER (EMERGENCY)
Age: 58
Discharge: HOME | End: 2018-03-26
Attending: EMERGENCY MEDICINE
Payer: COMMERCIAL

## 2018-03-26 ENCOUNTER — APPOINTMENT (OUTPATIENT)
Dept: GENERAL RADIOLOGY | Age: 58
End: 2018-03-26
Attending: EMERGENCY MEDICINE
Payer: COMMERCIAL

## 2018-03-26 VITALS
OXYGEN SATURATION: 96 % | WEIGHT: 230 LBS | HEART RATE: 144 BPM | RESPIRATION RATE: 20 BRPM | TEMPERATURE: 97.4 F | DIASTOLIC BLOOD PRESSURE: 95 MMHG | HEIGHT: 63 IN | BODY MASS INDEX: 40.75 KG/M2 | SYSTOLIC BLOOD PRESSURE: 222 MMHG

## 2018-03-26 DIAGNOSIS — I26.99 PE (PULMONARY THROMBOEMBOLISM) (HCC): ICD-10-CM

## 2018-03-26 DIAGNOSIS — K86.89 PANCREATIC MASS: ICD-10-CM

## 2018-03-26 DIAGNOSIS — Z79.4 TYPE 2 DIABETES MELLITUS WITH DIABETIC NEUROPATHY, WITH LONG-TERM CURRENT USE OF INSULIN (HCC): ICD-10-CM

## 2018-03-26 DIAGNOSIS — R22.31 AXILLARY MASS, RIGHT: ICD-10-CM

## 2018-03-26 DIAGNOSIS — E11.40 TYPE 2 DIABETES MELLITUS WITH DIABETIC NEUROPATHY, WITH LONG-TERM CURRENT USE OF INSULIN (HCC): ICD-10-CM

## 2018-03-26 DIAGNOSIS — I26.09 OTHER ACUTE PULMONARY EMBOLISM WITH ACUTE COR PULMONALE (HCC): Primary | ICD-10-CM

## 2018-03-26 DIAGNOSIS — Z79.4 CONTROLLED TYPE 2 DIABETES MELLITUS WITHOUT COMPLICATION, WITH LONG-TERM CURRENT USE OF INSULIN (HCC): ICD-10-CM

## 2018-03-26 DIAGNOSIS — I26.99 PULMONARY EMBOLISM (HCC): ICD-10-CM

## 2018-03-26 DIAGNOSIS — I26.99 OTHER PULMONARY EMBOLISM WITHOUT ACUTE COR PULMONALE, UNSPECIFIED CHRONICITY (HCC): ICD-10-CM

## 2018-03-26 DIAGNOSIS — E11.9 CONTROLLED TYPE 2 DIABETES MELLITUS WITHOUT COMPLICATION, WITH LONG-TERM CURRENT USE OF INSULIN (HCC): ICD-10-CM

## 2018-03-26 LAB
ALBUMIN SERPL-MCNC: 3.2 G/DL (ref 3.5–5)
ALBUMIN/GLOB SERPL: 0.8 {RATIO} (ref 1.2–3.5)
ALP SERPL-CCNC: 110 U/L (ref 50–136)
ALT SERPL-CCNC: 28 U/L (ref 12–65)
ANION GAP SERPL CALC-SCNC: 11 MMOL/L (ref 7–16)
APTT PPP: 26.4 SEC (ref 23.2–35.3)
AST SERPL-CCNC: 31 U/L (ref 15–37)
ATRIAL RATE: 143 BPM
BASOPHILS # BLD: 0 K/UL (ref 0–0.2)
BASOPHILS NFR BLD: 0 % (ref 0–2)
BILIRUB SERPL-MCNC: 0.8 MG/DL (ref 0.2–1.1)
BNP SERPL-MCNC: 305 PG/ML
BUN SERPL-MCNC: 15 MG/DL (ref 6–23)
CALCIUM SERPL-MCNC: 9 MG/DL (ref 8.3–10.4)
CALCULATED P AXIS, ECG09: 62 DEGREES
CALCULATED R AXIS, ECG10: 64 DEGREES
CALCULATED T AXIS, ECG11: 31 DEGREES
CHLORIDE SERPL-SCNC: 104 MMOL/L (ref 98–107)
CO2 SERPL-SCNC: 25 MMOL/L (ref 21–32)
CREAT SERPL-MCNC: 0.96 MG/DL (ref 0.6–1)
D DIMER PPP FEU-MCNC: 17.19 UG/ML(FEU)
DIAGNOSIS, 93000: NORMAL
DIFFERENTIAL METHOD BLD: ABNORMAL
EOSINOPHIL # BLD: 0 K/UL (ref 0–0.8)
EOSINOPHIL NFR BLD: 0 % (ref 0.5–7.8)
ERYTHROCYTE [DISTWIDTH] IN BLOOD BY AUTOMATED COUNT: 16 % (ref 11.9–14.6)
ERYTHROCYTE [DISTWIDTH] IN BLOOD BY AUTOMATED COUNT: 16.4 % (ref 11.9–14.6)
FIBRINOGEN PPP-MCNC: 502 MG/DL (ref 190–501)
GLOBULIN SER CALC-MCNC: 3.8 G/DL (ref 2.3–3.5)
GLUCOSE BLD STRIP.AUTO-MCNC: 108 MG/DL (ref 65–100)
GLUCOSE SERPL-MCNC: 260 MG/DL (ref 65–100)
HCT VFR BLD AUTO: 34 % (ref 35.8–46.3)
HCT VFR BLD AUTO: 36.7 % (ref 35.8–46.3)
HGB BLD-MCNC: 11.4 G/DL (ref 11.7–15.4)
HGB BLD-MCNC: 12 G/DL (ref 11.7–15.4)
IMM GRANULOCYTES # BLD: 0 K/UL (ref 0–0.5)
IMM GRANULOCYTES NFR BLD AUTO: 0 % (ref 0–5)
INR PPP: 1
LYMPHOCYTES # BLD: 1.8 K/UL (ref 0.5–4.6)
LYMPHOCYTES NFR BLD: 10 % (ref 13–44)
MCH RBC QN AUTO: 27.6 PG (ref 26.1–32.9)
MCH RBC QN AUTO: 28.5 PG (ref 26.1–32.9)
MCHC RBC AUTO-ENTMCNC: 32.7 G/DL (ref 31.4–35)
MCHC RBC AUTO-ENTMCNC: 33.5 G/DL (ref 31.4–35)
MCV RBC AUTO: 84.4 FL (ref 79.6–97.8)
MCV RBC AUTO: 85 FL (ref 79.6–97.8)
MONOCYTES # BLD: 1.2 K/UL (ref 0.1–1.3)
MONOCYTES NFR BLD: 7 % (ref 4–12)
NEUTS SEG # BLD: 15.2 K/UL (ref 1.7–8.2)
NEUTS SEG NFR BLD: 83 % (ref 43–78)
P-R INTERVAL, ECG05: 130 MS
PLATELET # BLD AUTO: 249 K/UL (ref 150–450)
PLATELET # BLD AUTO: 294 K/UL (ref 150–450)
PMV BLD AUTO: 10.1 FL (ref 10.8–14.1)
PMV BLD AUTO: 10.4 FL (ref 10.8–14.1)
POTASSIUM SERPL-SCNC: 3.4 MMOL/L (ref 3.5–5.1)
PROT SERPL-MCNC: 7 G/DL (ref 6.3–8.2)
PROTHROMBIN TIME: 12.8 SEC (ref 11.5–14.5)
Q-T INTERVAL, ECG07: 278 MS
QRS DURATION, ECG06: 82 MS
QTC CALCULATION (BEZET), ECG08: 429 MS
RBC # BLD AUTO: 4 M/UL (ref 4.05–5.25)
RBC # BLD AUTO: 4.35 M/UL (ref 4.05–5.25)
SODIUM SERPL-SCNC: 140 MMOL/L (ref 136–145)
TROPONIN I BLD-MCNC: 0.58 NG/ML (ref 0.02–0.05)
TROPONIN I SERPL-MCNC: 0.88 NG/ML (ref 0.02–0.05)
VENTRICULAR RATE, ECG03: 143 BPM
WBC # BLD AUTO: 12.2 K/UL (ref 4.3–11.1)
WBC # BLD AUTO: 18.2 K/UL (ref 4.3–11.1)

## 2018-03-26 PROCEDURE — 74011250637 HC RX REV CODE- 250/637: Performed by: RADIOLOGY

## 2018-03-26 PROCEDURE — 82962 GLUCOSE BLOOD TEST: CPT

## 2018-03-26 PROCEDURE — 77010033678 HC OXYGEN DAILY

## 2018-03-26 PROCEDURE — 83880 ASSAY OF NATRIURETIC PEPTIDE: CPT | Performed by: EMERGENCY MEDICINE

## 2018-03-26 PROCEDURE — 96365 THER/PROPH/DIAG IV INF INIT: CPT | Performed by: EMERGENCY MEDICINE

## 2018-03-26 PROCEDURE — 36014 PLACE CATHETER IN ARTERY: CPT

## 2018-03-26 PROCEDURE — C1894 INTRO/SHEATH, NON-LASER: HCPCS

## 2018-03-26 PROCEDURE — C1769 GUIDE WIRE: HCPCS

## 2018-03-26 PROCEDURE — 74011250636 HC RX REV CODE- 250/636

## 2018-03-26 PROCEDURE — C1751 CATH, INF, PER/CENT/MIDLINE: HCPCS

## 2018-03-26 PROCEDURE — 77030004521 HC CATH ANGI DX COOK -B

## 2018-03-26 PROCEDURE — 75820 VEIN X-RAY ARM/LEG: CPT

## 2018-03-26 PROCEDURE — 85025 COMPLETE CBC W/AUTO DIFF WBC: CPT | Performed by: EMERGENCY MEDICINE

## 2018-03-26 PROCEDURE — 99223 1ST HOSP IP/OBS HIGH 75: CPT | Performed by: INTERNAL MEDICINE

## 2018-03-26 PROCEDURE — 75825 VEIN X-RAY TRUNK: CPT

## 2018-03-26 PROCEDURE — 77030019605

## 2018-03-26 PROCEDURE — 75746 ARTERY X-RAYS LUNG: CPT

## 2018-03-26 PROCEDURE — 77030004566 HC CATH ANGI DX TORCON COOK -B

## 2018-03-26 PROCEDURE — 74011636320 HC RX REV CODE- 636/320: Performed by: RADIOLOGY

## 2018-03-26 PROCEDURE — 71046 X-RAY EXAM CHEST 2 VIEWS: CPT

## 2018-03-26 PROCEDURE — 76937 US GUIDE VASCULAR ACCESS: CPT

## 2018-03-26 PROCEDURE — 85730 THROMBOPLASTIN TIME PARTIAL: CPT | Performed by: EMERGENCY MEDICINE

## 2018-03-26 PROCEDURE — 74011636320 HC RX REV CODE- 636/320: Performed by: EMERGENCY MEDICINE

## 2018-03-26 PROCEDURE — 85027 COMPLETE CBC AUTOMATED: CPT | Performed by: RADIOLOGY

## 2018-03-26 PROCEDURE — 85384 FIBRINOGEN ACTIVITY: CPT | Performed by: RADIOLOGY

## 2018-03-26 PROCEDURE — 74011000258 HC RX REV CODE- 258: Performed by: EMERGENCY MEDICINE

## 2018-03-26 PROCEDURE — 77030002996 HC SUT SLK J&J -A

## 2018-03-26 PROCEDURE — 80053 COMPREHEN METABOLIC PANEL: CPT | Performed by: EMERGENCY MEDICINE

## 2018-03-26 PROCEDURE — 74011250636 HC RX REV CODE- 250/636: Performed by: EMERGENCY MEDICINE

## 2018-03-26 PROCEDURE — 93005 ELECTROCARDIOGRAM TRACING: CPT | Performed by: EMERGENCY MEDICINE

## 2018-03-26 PROCEDURE — 99285 EMERGENCY DEPT VISIT HI MDM: CPT | Performed by: EMERGENCY MEDICINE

## 2018-03-26 PROCEDURE — 77030021668 HC NEB PREFIL KT VYRM -A

## 2018-03-26 PROCEDURE — 74011000250 HC RX REV CODE- 250: Performed by: RADIOLOGY

## 2018-03-26 PROCEDURE — 77030013794 HC KT TRNSDUC BLD EDWD -B

## 2018-03-26 PROCEDURE — 85610 PROTHROMBIN TIME: CPT | Performed by: EMERGENCY MEDICINE

## 2018-03-26 PROCEDURE — 85379 FIBRIN DEGRADATION QUANT: CPT | Performed by: EMERGENCY MEDICINE

## 2018-03-26 PROCEDURE — 71260 CT THORAX DX C+: CPT

## 2018-03-26 PROCEDURE — 96366 THER/PROPH/DIAG IV INF ADDON: CPT | Performed by: EMERGENCY MEDICINE

## 2018-03-26 PROCEDURE — 84484 ASSAY OF TROPONIN QUANT: CPT | Performed by: EMERGENCY MEDICINE

## 2018-03-26 PROCEDURE — 74011250636 HC RX REV CODE- 250/636: Performed by: RADIOLOGY

## 2018-03-26 PROCEDURE — 3E05317 INTRODUCTION OF OTHER THROMBOLYTIC INTO PERIPHERAL ARTERY, PERCUTANEOUS APPROACH: ICD-10-PCS | Performed by: RADIOLOGY

## 2018-03-26 PROCEDURE — 65610000001 HC ROOM ICU GENERAL

## 2018-03-26 RX ORDER — LIDOCAINE HYDROCHLORIDE 20 MG/ML
20-200 INJECTION, SOLUTION INFILTRATION; PERINEURAL ONCE
Status: COMPLETED | OUTPATIENT
Start: 2018-03-26 | End: 2018-03-26

## 2018-03-26 RX ORDER — HYDROCODONE BITARTRATE AND ACETAMINOPHEN 7.5; 325 MG/1; MG/1
1 TABLET ORAL
Status: DISCONTINUED | OUTPATIENT
Start: 2018-03-26 | End: 2018-03-30 | Stop reason: HOSPADM

## 2018-03-26 RX ORDER — HEPARIN SODIUM 200 [USP'U]/100ML
1000 INJECTION, SOLUTION INTRAVENOUS ONCE
Status: DISCONTINUED | OUTPATIENT
Start: 2018-03-26 | End: 2018-03-26 | Stop reason: ALTCHOICE

## 2018-03-26 RX ORDER — FACIAL-BODY WIPES
10 EACH TOPICAL DAILY PRN
Status: DISCONTINUED | OUTPATIENT
Start: 2018-03-26 | End: 2018-03-30 | Stop reason: HOSPADM

## 2018-03-26 RX ORDER — HEPARIN SODIUM 5000 [USP'U]/100ML
18-36 INJECTION, SOLUTION INTRAVENOUS
Status: DISCONTINUED | OUTPATIENT
Start: 2018-03-26 | End: 2018-03-26 | Stop reason: HOSPADM

## 2018-03-26 RX ORDER — FENTANYL CITRATE 50 UG/ML
12.5-1 INJECTION, SOLUTION INTRAMUSCULAR; INTRAVENOUS
Status: DISCONTINUED | OUTPATIENT
Start: 2018-03-26 | End: 2018-03-26 | Stop reason: ALTCHOICE

## 2018-03-26 RX ORDER — DIPHENHYDRAMINE HYDROCHLORIDE 50 MG/ML
25-50 INJECTION, SOLUTION INTRAMUSCULAR; INTRAVENOUS ONCE
Status: DISCONTINUED | OUTPATIENT
Start: 2018-03-26 | End: 2018-03-26 | Stop reason: ALTCHOICE

## 2018-03-26 RX ORDER — HEPARIN SODIUM 5000 [USP'U]/100ML
400 INJECTION, SOLUTION INTRAVENOUS CONTINUOUS
Status: DISCONTINUED | OUTPATIENT
Start: 2018-03-26 | End: 2018-03-27 | Stop reason: ALTCHOICE

## 2018-03-26 RX ORDER — SODIUM CHLORIDE 0.9 % (FLUSH) 0.9 %
5-10 SYRINGE (ML) INJECTION EVERY 8 HOURS
Status: DISCONTINUED | OUTPATIENT
Start: 2018-03-26 | End: 2018-03-30 | Stop reason: HOSPADM

## 2018-03-26 RX ORDER — MIDAZOLAM HYDROCHLORIDE 1 MG/ML
.5-2 INJECTION, SOLUTION INTRAMUSCULAR; INTRAVENOUS
Status: DISCONTINUED | OUTPATIENT
Start: 2018-03-26 | End: 2018-03-26 | Stop reason: ALTCHOICE

## 2018-03-26 RX ORDER — NALOXONE HYDROCHLORIDE 0.4 MG/ML
0.4 INJECTION, SOLUTION INTRAMUSCULAR; INTRAVENOUS; SUBCUTANEOUS AS NEEDED
Status: DISCONTINUED | OUTPATIENT
Start: 2018-03-26 | End: 2018-03-28

## 2018-03-26 RX ORDER — SODIUM CHLORIDE 9 MG/ML
35 INJECTION, SOLUTION INTRAVENOUS CONTINUOUS
Status: DISCONTINUED | OUTPATIENT
Start: 2018-03-26 | End: 2018-03-30 | Stop reason: HOSPADM

## 2018-03-26 RX ORDER — SODIUM CHLORIDE 0.9 % (FLUSH) 0.9 %
10 SYRINGE (ML) INJECTION
Status: COMPLETED | OUTPATIENT
Start: 2018-03-26 | End: 2018-03-26

## 2018-03-26 RX ORDER — SODIUM CHLORIDE 9 MG/ML
35 INJECTION, SOLUTION INTRAVENOUS CONTINUOUS
Status: DISCONTINUED | OUTPATIENT
Start: 2018-03-26 | End: 2018-03-27 | Stop reason: ALTCHOICE

## 2018-03-26 RX ORDER — ONDANSETRON 2 MG/ML
4 INJECTION INTRAMUSCULAR; INTRAVENOUS
Status: DISCONTINUED | OUTPATIENT
Start: 2018-03-26 | End: 2018-03-30 | Stop reason: HOSPADM

## 2018-03-26 RX ORDER — SODIUM CHLORIDE 0.9 % (FLUSH) 0.9 %
5-10 SYRINGE (ML) INJECTION AS NEEDED
Status: DISCONTINUED | OUTPATIENT
Start: 2018-03-26 | End: 2018-03-30 | Stop reason: HOSPADM

## 2018-03-26 RX ORDER — HEPARIN SODIUM 200 [USP'U]/100ML
1000 INJECTION, SOLUTION INTRAVENOUS CONTINUOUS
Status: DISCONTINUED | OUTPATIENT
Start: 2018-03-26 | End: 2018-03-28

## 2018-03-26 RX ORDER — HEPARIN SODIUM 5000 [USP'U]/ML
80 INJECTION, SOLUTION INTRAVENOUS; SUBCUTANEOUS ONCE
Status: COMPLETED | OUTPATIENT
Start: 2018-03-26 | End: 2018-03-26

## 2018-03-26 RX ORDER — INSULIN LISPRO 100 [IU]/ML
INJECTION, SOLUTION INTRAVENOUS; SUBCUTANEOUS 4 TIMES DAILY
Status: DISCONTINUED | OUTPATIENT
Start: 2018-03-26 | End: 2018-03-27

## 2018-03-26 RX ORDER — HYDROCODONE BITARTRATE AND ACETAMINOPHEN 5; 325 MG/1; MG/1
1 TABLET ORAL
Status: DISCONTINUED | OUTPATIENT
Start: 2018-03-26 | End: 2018-03-30 | Stop reason: HOSPADM

## 2018-03-26 RX ORDER — HEPARIN SODIUM 200 [USP'U]/100ML
1000 INJECTION, SOLUTION INTRAVENOUS
Status: DISCONTINUED | OUTPATIENT
Start: 2018-03-26 | End: 2018-03-26 | Stop reason: ALTCHOICE

## 2018-03-26 RX ORDER — SODIUM CHLORIDE 9 MG/ML
25 INJECTION, SOLUTION INTRAVENOUS ONCE
Status: DISCONTINUED | OUTPATIENT
Start: 2018-03-26 | End: 2018-03-26 | Stop reason: ALTCHOICE

## 2018-03-26 RX ADMIN — HEPARIN SODIUM AND DEXTROSE 18 UNITS/KG/HR: 5000; 5 INJECTION INTRAVENOUS at 10:02

## 2018-03-26 RX ADMIN — IOPAMIDOL 20 ML: 612 INJECTION, SOLUTION INTRAVENOUS at 15:10

## 2018-03-26 RX ADMIN — IOPAMIDOL 100 ML: 755 INJECTION, SOLUTION INTRAVENOUS at 10:13

## 2018-03-26 RX ADMIN — HYDROCODONE BITARTRATE AND ACETAMINOPHEN 1 TABLET: 5; 325 TABLET ORAL at 22:21

## 2018-03-26 RX ADMIN — LIDOCAINE HYDROCHLORIDE 200 MG: 20 INJECTION, SOLUTION INFILTRATION; PERINEURAL at 14:50

## 2018-03-26 RX ADMIN — SODIUM CHLORIDE 35 ML/HR: 900 INJECTION, SOLUTION INTRAVENOUS at 15:54

## 2018-03-26 RX ADMIN — Medication 10 ML: at 22:21

## 2018-03-26 RX ADMIN — HEPARIN SODIUM 1000 UNITS: 200 INJECTION, SOLUTION INTRAVENOUS at 15:26

## 2018-03-26 RX ADMIN — HEPARIN SODIUM 8350 UNITS: 5000 INJECTION, SOLUTION INTRAVENOUS; SUBCUTANEOUS at 10:02

## 2018-03-26 RX ADMIN — ALTEPLASE 0.5 MG/HR: KIT at 15:53

## 2018-03-26 RX ADMIN — ALTEPLASE 0.5 MG/HR: KIT at 15:54

## 2018-03-26 RX ADMIN — HEPARIN SODIUM 1000 UNITS: 200 INJECTION, SOLUTION INTRAVENOUS at 14:55

## 2018-03-26 RX ADMIN — SODIUM CHLORIDE 35 ML/HR: 900 INJECTION, SOLUTION INTRAVENOUS at 15:55

## 2018-03-26 RX ADMIN — HEPARIN SODIUM 1000 UNITS: 200 INJECTION, SOLUTION INTRAVENOUS at 15:00

## 2018-03-26 RX ADMIN — SODIUM BICARBONATE 2 ML: 0.2 INJECTION, SOLUTION INTRAVENOUS at 14:50

## 2018-03-26 RX ADMIN — SODIUM CHLORIDE 100 ML: 900 INJECTION, SOLUTION INTRAVENOUS at 10:13

## 2018-03-26 RX ADMIN — HEPARIN SODIUM 1000 UNITS: 200 INJECTION, SOLUTION INTRAVENOUS at 16:06

## 2018-03-26 RX ADMIN — HEPARIN SODIUM AND DEXTROSE 400 UNITS/HR: 5000; 5 INJECTION INTRAVENOUS at 15:04

## 2018-03-26 RX ADMIN — Medication 10 ML: at 10:13

## 2018-03-26 NOTE — H&P
HISTORY AND PHYSICAL      [facility]    [facility]    Date of Admission:  3/26/2018    The patient's chart is reviewed and the patient is discussed with the staff. Subjective: The patient is a 59 y.o.  female with a hx of DM and a prior cholecystectomy who presented to the [facility] ER with dyspnea and RLE swelling. Evaluation revealed a high D-dimer, BNP and a CT revealed extensive thrombosis involving both lung with a very large clot burden with evidence of R heart strain. She was also noted to have abnormal density in the R axilla and a mass lesion involving the head of the pancreas of unknown etiology. She was transferred to IR from the ER and underwent EKOS catheter thrombolysis by Dr. Marry Cardenas. The pt reports having had a R axillary fullness for some time. 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 She reports the axillary process has not changed and has not been evaluated with a biopsy at any point. She also has had back pain for several months and was evaluated by Dr. Michelle Jenkins at [facility]. She had an MRI done at [address] [place] [address] and was told she had 4 pinched nerves but she was never told of any pancreatic lesion. She was subsequently sent for a pain injection about 3 weeks ago with some improvement in the pain. Today she became acutely dyspneic and lightheaded attempting to go to work and was seen at the [facility] ER as above. She is now s/p EKOS and is breathing comfortably. She remains tachycardic at rest and is getting TPA through bilateral PA catheters and heparin via a peripheral IV.        Review of Systems    Denies: fevers, chills, sweats, fatigue, malaise, anorexia, weight loss   Denies: blurry vision, loss of vision, eye pain, photophobia  Denies: hearing loss, ringing in the ears, earache, epistaxis  Denies: chest pain, palpitations, syncope, orthopnea, paroxysmal nocturnal dyspnea, claudication  Denies: dysphagia, odynophagia, nausea, vomiting, diarrhea, constipation, abdominal pain, jaundice, melena   Denies: frequency, dysuria, nocturia, urinary incontinence, stones, hematuria  Denies: polydipsia/polyuria, skin changes, temperature intolerance, unexpected weight gain  Denies: back pain, joint pain, joint swelling, muscle pain, muscle weakness  Denies: bleeding problems, blood transfusions, bruising, pallor, swollen lymph nodes  Denies: headache, dysarthria, blurred vision, diplopia,seizure, focal deficits. Admits to: dyspnea, weight loss, chronic R axillary mass and recent RLE edema        Patient Active Problem List   Diagnosis Code    Environmental allergies Z91.09    Bilateral low back pain with sciatica M54.40    Class 2 obesity due to excess calories with serious comorbidity in adult E66.09    Type 2 diabetes mellitus with diabetic neuropathy ([facility]) E11.40    Controlled type 2 diabetes mellitus without complication, with long-term current use of insulin ([facility]) E11.9, Z79.4    PE [facility]pulmonary throm[facility]embolism) [facility] I26.99    Axillary mass, right R22.31    Pancreatic mass K86.9       Prior to Admission Medications   Prescriptions Last Dose Informant Patient Reported? Taking? ACETAMINOPHEN (TYLENOL 8 HOUR PO) 2018 at Unknown time  Yes Yes   Sig: Take  by mouth. Blood-Glucose Meter (ACCU-CHEK LILLY PLUS METER) misc   No No   Si Kit by Does Not Apply route two (2) times a day. For management of Type 2 DM Uncontrolled (250.02)   Insulin Needles, Disposable, 32 gauge x 1/4\" ndle   No No   Si Each by Does Not Apply route daily. Lancets (ACCU-CHEK SOFTCLIX LANCETS) misc   No No   Sig: Use twice daily for management of Type 2 DM uncontrolled (250.02)   atorvastatin (LIPITOR) 20 mg tablet Not Taking at Unknown time  No No   Sig: Take 1 Tab by mouth daily.    glucose blood VI test strips (ACCU-CHEK LILLY PLUS TEST STRP) strip   No No   Sig: Use twice daily for management of Type 2 DM, Uncontrolled (250.02)   insulin glargine (LANTUS SOLOSTAR U-100 INSULIN) 100 unit/mL (3 mL) inpn 3/25/2018 at Unknown time  No Yes   Si u qhs  Indications: type 2 diabetes mellitus   metFORMIN (GLUCOPHAGE) 500 mg tablet 3/26/2018 at Unknown time  No Yes   Sig: Take 1 Tab by mouth two (2) times daily (with meals). pregabalin (LYRICA) 50 mg capsule Not Taking at Unknown time  No No   Sig: Take 1 Cap by mouth three (3) times daily. Max Daily Amount: 150 mg.   tiZANidine (ZANAFLEX) 4 mg capsule Not Taking at Unknown time  No No   Sig: Take 1 Cap by mouth three (3) times daily. Indications: Muscle Spasm   traMADol (ULTRAM) 50 mg tablet Not Taking at Unknown time  No No   Sig: Take 1 Tab by mouth every six (6) hours as needed for Pain. Max Daily Amount: 200 mg. Facility-Administered Medications: None       Past Medical History:   Diagnosis Date    Diabetes ([address]     Environmental allergies 2013     Past Surgical History:   Procedure Laterality Date    HX CHOLECYSTECTOMY       Social History     Social History    Marital status: SINGLE     Spouse name: N/A    Number of children: N/A    Years of education: N/A     Occupational History    Not on file. Social History Main Topics    Smoking status: Never Smoker    Smokeless tobacco: Never Used    Alcohol use No    Drug use: Not on file    Sexual activity: Not Currently     Other Topics Concern    Not on file     Social History Narrative     Family History   Problem Relation Age of Onset    No Known Problems Mother     Dementia Father     Heart Disease Father      Allergies   Allergen Reactions    Biaxin [Clarithromycin] Unknown (comments)    Cortisone Other (comments)     Per pt, leg numbness.        Current Facility-Administered Medications   Medication Dose Route Frequency    heparin 25,000 units in dextrose 500 mL infusion  400 Units/hr IntraVENous CONTINUOUS    alteplase (ACTIVASE) 25 mg in 0.9% sodium chloride 500 mL infusion  0.5 mg/hr IntraCATHeter- ARTerial CONTINUOUS    alteplase (ACTIVASE) 25 mg in 0.9% sodium chloride 500 mL infusion  0.5 mg/hr IntraCATHeter- ARTerial CONTINUOUS    0.9% sodium chloride infusion  35 mL/hr IntraCATHeter- VENous CONTINUOUS    0.9% sodium chloride infusion  35 mL/hr IntraCATHeter- VENous CONTINUOUS    heparinized saline 2 units/mL infusion 1,000 Units  1,000 Units IntraSHEAth CONTINUOUS    HYDROcodone-acetaminophen (NORCO) 5-325 mg per tablet 1 Tab  1 Tab Oral Q4H PRN    ondansetron (ZOFRAN) injection 4 mg  4 mg IntraVENous Q6H PRN           Objective:     Vitals:    03/26/18 1740 03/26/18 1802 03/26/18 1809 03/26/18 1816   BP: (!) 128/116 99/58  91/70   Pulse: (!) 126 (!) 125  (!) 124   Resp: 26 23 22   Temp:       SpO2: 97% 97% 97% 98%   Weight:       Height:           PHYSICAL EXAM     Constitutional:  the patient is obese and in no acute distress on 4L NC  EENMT:  Sclera clear, pupils equal, oral mucosa moist  Respiratory: clear bilaterally  Cardiovascular:  Tachy RRR without M,G,R  Gastrointestinal: soft and non-tender; with positive bowel sounds. Musculoskeletal: warm without cyanosis. There is trace R lower leg edema. Skin:  no jaundice or rashes  Neurologic: no gross neuro deficits     Psychiatric:  alert and oriented x 3    Chest CT: IMPRESSION:    1. Extensive bilateral pulmonary emboli as described above.      2. Abnormal changes in the right axilla. Specifically, there are asymmetrically  enlarged lymph nodes and abnormal density. A neoplastic process cannot be  excluded. Further evaluation with clinical exam, bilateral diagnostic mammogram,  and diagnostic breast ultrasound if indicated would be recommended.     3. 4.5 cm x 3.8 cm cystic mass arising from the pancreatic tail. This is  incompletely characterized on this exam with cystic pancreatic neoplasm not  excluded. This would be best further assessed with a pancreatic protocol MRI or  CT.       Recent Labs      03/26/18   1654  03/26/18   0853   WBC 12.2*  18.2*   HGB  11.4*  12.0   HCT  34.0*  36.7   PLT  249  294   INR   --   1.0     Recent Labs      03/26/18   0853   NA  140   K  3.4*   CL  104   GLU  260*   CO2  25   BUN  15   CREA  0.96   CA  9.0   TROIQ  0.88*   ALB  3.2*   TBILI  0.8   ALT  28   SGOT  31     No results for input(s): PH, PCO2, PO2, HCO3 in the last 72 hours. No results for input(s): LCAD, LAC in the last 72 hours. Assessment:  (Medical Decision Making)     Hospital Problems  Date Reviewed: 3/26/2018          Codes Class Noted POA    Class 2 obesity due to excess calories with serious comorbidity in adult ICD-10-CM: E66.09  ICD-9-CM: 278.00  3/26/2018 Yes    Major weight loss needed. Controlled type 2 diabetes mellitus without complication, with long-term current use of insulin ([facility] ICD-10-CM: E11.9, Z79.4  ICD-9-CM: 250.00, V58.67  3/26/2018 Yes    On insulin at home. Resume at lower dose and supplement with SSI. * (Principal)PE (pulmonary thromboembolism) ([facility] ICD-10-CM: I26.99  ICD-9-CM: 415.19  3/26/2018 Unknown    Massive embolism and now s/p EKOS. Bilateral TPA infusions persist with ongoing tachycardia. No overt dyspnea at present. Axillary mass, right ICD-10-CM: R22.31  ICD-9-CM: 782.2  3/26/2018 Unknown    Etiology unclear. Will need further eval with a likely biopsy when safe from pulmonary embolism standpoint. Pancreatic mass ICD-10-CM: K86.9  ICD-9-CM: 577.9  3/26/2018 Unknown    Etiology unclear. Will attempt to get images from MRI of back from [address] Plan:  (Medical Decision Making)     --Will admit for further medical management  --Supplemental O2 as needed. --Continue TPA infusions per IR. --Heparin per IR   --Radiology recommending mammograms when able. --Obtain images from [address]. Family can  disc. --Repeat angiography planned for tomorrow. --Likely will require biopsies of R axillary mass and pancreatic mass at some point.        More than 50% of the time documented was spent in face-to-face contact with the patient and in the care of the patient on the floor/unit where the patient is located.     Anna Sommer MD

## 2018-03-26 NOTE — PROGRESS NOTES
TRANSFER - OUT REPORT:    Verbal report given to BETH Chambers (name) on [place] [facility]  being transferred to ICU (unit) for routine progression of care       Report consisted of patients Situation, Background, Assessment and   Recommendations(SBAR). Information from the following report(s) Procedure Summary and MAR was reviewed with the receiving nurse. Lines:       Opportunity for questions and clarification was provided.       Patient transported with:   Monitor  O2 @ 4 liters  Registered Nurse

## 2018-03-26 NOTE — PROGRESS NOTES
Bedside shift report from Tremayne Girard, [facility]. Gtts verified. Right access site assessed and c/d/i with no hematoma. Will continue to monitor.

## 2018-03-26 NOTE — ED NOTES
TRANSFER - OUT REPORT:    Verbal report given to BETH Toledo(name) on Joy Lewis  being transferred to IR(unit) for routine progression of care       Report consisted of patients Situation, Background, Assessment and   Recommendations(SBAR). Information from the following report(s) ED Summary was reviewed with the receiving nurse. Lines:   Peripheral IV 03/26/18 Right Antecubital (Active)        Opportunity for questions and clarification was provided.       Patient transported with:   Monitor/IV/O2

## 2018-03-26 NOTE — PROGRESS NOTES
Dual skin assessment performed with Jennifer Vargas RN. Pt with right groin venous sheath. No bleeding, hematoma. Dressing clean, dry, intact. Sacrum intact. Allevyn in place. Skin warm, dry, intact. Pulses palpable in all extremities. Breath sounds diminished. S1S2. Abdomen soft, intact, active bowel sounds. Pt alert and oriented. Pt denies pain. Pt with SOB when talking but states \"so much better. \" Pt on 4L NC. Gtts verified with RN nurse.

## 2018-03-26 NOTE — PROGRESS NOTES
TRANSFER - IN REPORT:    Verbal report received from Jose Daniel RN(name) on [facility]  being received from IR(unit) for routine progression of care      Report consisted of patients Situation, Background, Assessment and   Recommendations(SBAR). Information from the following report(s) SBAR, Kardex, ED Summary, Procedure Summary, Intake/Output, MAR, Recent Results and Cardiac Rhythm ST was reviewed with the receiving nurse. Opportunity for questions and clarification was provided. Assessment completed upon patients arrival to unit and care assumed.

## 2018-03-26 NOTE — ED TRIAGE NOTES
Pt c/o shortness of breath primarily with exertion. Pt states this has been worsening for over a week.

## 2018-03-26 NOTE — ED NOTES
TRANSFER - OUT REPORT:    Verbal report given to BETH Shrestha(name) on Joy Lewis  being transferred to IR and ED(unit) for routine progression of care       Report consisted of patients Situation, Background, Assessment and   Recommendations(SBAR). Information from the following report(s) ED Summary was reviewed with the receiving nurse. Lines:   Peripheral IV 03/26/18 Right Antecubital (Active)        Opportunity for questions and clarification was provided.       Patient transported with:  EMS/Monitor/O2/IV

## 2018-03-26 NOTE — IP AVS SNAPSHOT
[address] 
[phone] Patient: Clover Salazar MRN: [id number] TLA:1/0/7901 A check cisco indicates which time of day the medication should be taken. My Medications START taking these medications Instructions Each Dose to Equal  
 Morning Noon Evening Bedtime  
 apixaban 5 mg tablet Commonly known as:  Jules Guerra Your last dose was:  03/30/18 am  
Your next dose is:  03/30/18 9pm  
   
 Take 2 Tabs by mouth every twelve (12) hours. Then begin 5mg twice a day on 4/3/18 with evening dose. 10 mg CONTINUE taking these medications Instructions Each Dose to Equal  
 Morning Noon Evening Bedtime  
 atorvastatin 20 mg tablet Commonly known as:  LIPITOR Take 1 Tab by mouth daily. 20 mg Blood-Glucose Meter Misc Commonly known as:  ACCU-CHEK LILLY PLUS METER  
   
 1 Kit by Does Not Apply route two (2) times a day. For management of Type 2 DM Uncontrolled (250.02) 1 Kit  
    
   
   
   
  
 glucose blood VI test strips strip Commonly known as:  ACCU-CHEK LILLY PLUS TEST STRP Use twice daily for management of Type 2 DM, Uncontrolled (250.02)  
     
   
   
   
  
 insulin glargine 100 unit/mL (3 mL) Inpn Commonly known as:  LANTUS SOLOSTAR U-100 INSULIN Your next dose is:  Resume home schedule 24 u qhs  Indications: type 2 diabetes mellitus Insulin Needles (Disposable) 32 gauge x 1/4\" Ndle 1 Each by Does Not Apply route daily. 1 Each Lancets Misc Commonly known as:  ACCU-CHEK SOFTCLIX LANCETS Use twice daily for management of Type 2 DM uncontrolled (250.02)  
     
   
   
   
  
 metFORMIN 500 mg tablet Commonly known as:  GLUCOPHAGE Your next dose is:  Resume home schedule Take 1 Tab by mouth two (2) times daily (with meals). 500 mg  
    
  
   
   
  
   
  
 pregabalin 50 mg capsule Commonly known as:  Laz Peres Your next dose is:  Resume home schedule Take 1 Cap by mouth three (3) times daily. Max Daily Amount: 150 mg.  
 50 mg  
    
  
   
   
  
   
  
  
 tiZANidine 4 mg capsule Commonly known as:  Laurel Regalado Your next dose is:  Resume home schedule Take 1 Cap by mouth three (3) times daily. Indications: Muscle Spasm 4 mg  
    
  
   
   
  
   
  
  
 traMADol 50 mg tablet Commonly known as:  ULTRAM  
Your next dose is:  Per as needed schedule Take 1 Tab by mouth every six (6) hours as needed for Pain. Max Daily Amount: 200 mg.  
 50 mg  
    
   
   
   
  
 TYLENOL 8 HOUR PO Take  by mouth. Where to Get Your Medications Information on where to get these meds will be given to you by the nurse or doctor. ! Ask your nurse or doctor about these medications  
  apixaban 5 mg tablet

## 2018-03-26 NOTE — ED NOTES
Patient being transported at this time to IR at [facility] by [employer] ambulance

## 2018-03-26 NOTE — PROGRESS NOTES
Continuous hepain gtt infusing from [facility] [address] [place] [address] was placed on hold at [phone] for procedure.

## 2018-03-26 NOTE — IP AVS SNAPSHOT
303 [facility] 
 
 
 145 [facility] 322 [facility] 
[phone] Patient: Luisana Rome MRN: [id number] DFI:[birth year] About your hospitalization You were admitted on:  March 26, 2018 You last received care in the:  [facility] 8 MED SURG You were discharged on:  March 30, 2018 Why you were hospitalized Your primary diagnosis was:  Pe (Pulmonary Thromboembolism) (Hcc) Your diagnoses also included:  Axillary Mass, Right, Pancreatic Mass, Controlled Type 2 Diabetes Mellitus Without Complication, With Long-Term Current Use Of Insulin (Hcc), Class 2 Obesity Due To Excess Calories With Serious Comorbidity In Adult Follow-up Information Follow up With Details Comments Contact Info Janet Estevez MD On 4/6/2018 9:45  The [address] [facility] [address] 
[phone] SHIV Johansen On 4/27/2018 8:50 AM 550[address] [facility] 
[address] [facility] [address] [phone] Renee Kaye MD On 4/13/2018 1:30 PM [address] [facility] [id number] [phone] Ashlee Terry MD  Office will call with follow up information 1101 [facility] [address] [facility] [address] [phone] Your Scheduled Appointments Friday April 06, 2018 10:00 AM EDT Office Visit with Janet Estevez MD  
[facility] ([facility] 85 [facility] 650 [facility] [address] [phone] Friday April 13, 2018  1:35 PM EDT  
LAB with [address]  
2230 [facility] OUTREACH INSURANCE [address]) Katia Fernandez [address] [place] [address]  
[phone] Friday April 13, 2018  1:45 PM EDT New Patient with MD Winnie Anguiano Hematology and Oncology [facility]) ABDIRASHID/ Alex Wilson 33 [facility] [address]  
[phone] Friday April 27, 2018  9:20 AM EDT  
(Arrive by 8:50 AM) HOSPITAL with [facility] ([facility]) [address] [facility]  
[phone] Discharge Orders None A check cisco indicates which time of day the medication should be taken. My Medications START taking these medications Instructions Each Dose to Equal  
 Morning Noon Evening Bedtime  
 apixaban 5 mg tablet Commonly known as:  Sofie Estimable Your last dose was:  03/30/18 am  
Your next dose is:  03/30/18 9pm  
   
 Take 2 Tabs by mouth every twelve (12) hours. Then begin 5mg twice a day on 4/3/18 with evening dose. 10 mg CONTINUE taking these medications Instructions Each Dose to Equal  
 Morning Noon Evening Bedtime  
 atorvastatin 20 mg tablet Commonly known as:  LIPITOR Take 1 Tab by mouth daily. 20 mg Blood-Glucose Meter Misc Commonly known as:  ACCU-CHEK LILLY PLUS METER  
   
 1 Kit by Does Not Apply route two (2) times a day. For management of Type 2 DM Uncontrolled (250.02) 1 Kit  
    
   
   
   
  
 glucose blood VI test strips strip Commonly known as:  ACCU-CHEK LILLY PLUS TEST STRP Use twice daily for management of Type 2 DM, Uncontrolled (250.02)  
     
   
   
   
  
 insulin glargine 100 unit/mL (3 mL) Inpn Commonly known as:  LANTUS SOLOSTAR U-100 INSULIN Your next dose is:  Resume home schedule 24 u qhs  Indications: type 2 diabetes mellitus Insulin Needles (Disposable) 32 gauge x 1/4\" Ndle 1 Each by Does Not Apply route daily. 1 Each Lancets Misc Commonly known as:  ACCU-CHEK SOFTCLIX LANCETS Use twice daily for management of Type 2 DM uncontrolled (250.02)  
     
   
   
   
  
 metFORMIN 500 mg tablet Commonly known as:  GLUCOPHAGE Your next dose is:  Resume home schedule Take 1 Tab by mouth two (2) times daily (with meals). 500 mg  
    
  
   
   
  
   
  
 pregabalin 50 mg capsule Commonly known as:  Griselda Alvarez Your next dose is:  Resume home schedule Take 1 Cap by mouth three (3) times daily. Max Daily Amount: 150 mg.  
 50 mg  
    
  
   
   
  
   
  
  
 tiZANidine 4 mg capsule Commonly known as:  Bryant Perez Your next dose is:  Resume home schedule Take 1 Cap by mouth three (3) times daily. Indications: Muscle Spasm 4 mg  
    
  
   
   
  
   
  
  
 traMADol 50 mg tablet Commonly known as:  ULTRAM  
Your next dose is:  Per as needed schedule Take 1 Tab by mouth every six (6) hours as needed for Pain. Max Daily Amount: 200 mg.  
 50 mg  
    
   
   
   
  
 TYLENOL 8 HOUR PO Take  by mouth. Where to Get Your Medications Information on where to get these meds will be given to you by the nurse or doctor. ! Ask your nurse or doctor about these medications  
  apixaban 5 mg tablet Opioid Education Prescription Opioids: What You Need to Know: 
 
Prescription opioids can be used to help relieve moderate-to-severe pain and are often prescribed following a surgery or injury, or for certain health conditions. These medications can be an important part of treatment but also come with serious risks. Opioids are strong pain medicines. Examples include hydrocodone, oxycodone, fentanyl, and morphine. Heroin is an example of an illegal opioid. It is important to work with your health care provider to make sure you are getting the safest, most effective care. WHAT ARE THE RISKS AND SIDE EFFECTS OF OPIOID USE? Prescription opioids carry serious risks of addiction and overdose, especially with prolonged use. An opioid overdose, often marked by slow breathing, can cause sudden death. The use of prescription opioids can have a number of side effects as well, even when taken as directed. · Tolerance-meaning you might need to take more of a medication for the same pain relief · Physical dependence-meaning you have symptoms of withdrawal when the medication is stopped. Withdrawal symptoms can include nausea, sweating, chills, diarrhea, stomach cramps, and muscle aches. Withdrawal can last up to several weeks, depending on which drug you took and how long you took it. · Increased sensitivity to pain · Constipation · Nausea, vomiting, and dry mouth · Sleepiness and dizziness · Confusion · Depression · Low levels of testosterone that can result in lower sex drive, energy, and strength · Itching and sweating RISKS ARE GREATER WITH:      
· History of drug misuse, substance use disorder, or overdose · Mental health conditions (such as depression or anxiety) · Sleep apnea · Older age (72 years or older) · Pregnancy Avoid alcohol while taking prescription opioids. Also, unless specifically advised by your health care provider, medications to avoid include: · Benzodiazepines (such as Xanax or Valium) · Muscle relaxants (such as Soma or Flexeril) · Hypnotics (such as Ambien or Lunesta) · Other prescription opioids KNOW YOUR OPTIONS Talk to your health care provider about ways to manage your pain that don't involve prescription opioids. Some of these options may actually work better and have fewer risks and side effects. Options may include: 
· Pain relievers such as acetaminophen, ibuprofen, and naproxen · Some medications that are also used for depression or seizures · Physical therapy and exercise · Counseling to help patients learn how to cope better with triggers of pain and stress. · Application of heat or cold compress · Massage therapy · Relaxation techniques Be Informed Make sure you know the name of your medication, how much and how often to take it, and its potential risks & side effects.  
 
IF YOU ARE PRESCRIBED OPIOIDS FOR PAIN: 
 · Never take opioids in greater amounts or more often than prescribed. Remember the goal is not to be pain-free but to manage your pain at a tolerable level. · Follow up with your primary care provider to: · Work together to create a plan on how to manage your pain. · Talk about ways to help manage your pain that don't involve prescription opioids. · Talk about any and all concerns and side effects. · Help prevent misuse and abuse. · Never sell or share prescription opioids · Help prevent misuse and abuse. · Store prescription opioids in a secure place and out of reach of others (this may include visitors, children, friends, and family). · Safely dispose of unused/unwanted prescription opioids: Find your community drug take-back program or your pharmacy mail-back program, or flush them down the toilet, following guidance from the Food and Drug Administration (www.fda.gov/Drugs/ResourcesForYou). · Visit www.cdc.gov/drugoverdose to learn about the risks of opioid abuse and overdose. · If you believe you may be struggling with addiction, tell your health care provider and ask for guidance or call [address] [place], [place] [address]dotSyntax at 2-737-758-MMPN. Discharge Instructions DISCHARGE SUMMARY from Nurse PATIENT INSTRUCTIONS: 
 
After general anesthesia or intravenous sedation, for 24 hours or while taking prescription Narcotics: · Limit your activities · Do not drive and operate hazardous machinery · Do not make important personal or business decisions · Do  not drink alcoholic beverages · If you have not urinated within 8 hours after discharge, please contact your surgeon on call. Report the following to your surgeon: 
· Excessive pain, swelling, redness or odor of or around the surgical area · Temperature over 100.5 · Nausea and vomiting lasting longer than 4 hours or if unable to take medications · Any signs of decreased circulation or nerve impairment to extremity: change in color, persistent  numbness, tingling, coldness or increase pain · Any questions What to do at Home: 
Recommended activity: Activity as tolerated. If you experience any of the following symptoms temp > 101.5, unrelieved pain, nausea or vomiting, shortness of breath or fatigue not relieved with rest, please follow up with MD. 
 
*  Please give a list of your current medications to your Primary Care Provider. *  Please update this list whenever your medications are discontinued, doses are 
    changed, or new medications (including over-the-counter products) are added. *  Please carry medication information at all times in case of emergency situations. These are general instructions for a healthy lifestyle: No smoking/ No tobacco products/ Avoid exposure to second hand smoke Surgeon General's Warning:  Quitting smoking now greatly reduces serious risk to your health. Obesity, smoking, and sedentary lifestyle greatly increases your risk for illness A healthy diet, regular physical exercise & weight monitoring are important for maintaining a healthy lifestyle You may be retaining fluid if you have a history of heart failure or if you experience any of the following symptoms:  Weight gain of 3 pounds or more overnight or 5 pounds in a week, increased swelling in our hands or feet or shortness of breath while lying flat in bed. Please call your doctor as soon as you notice any of these symptoms; do not wait until your next office visit. Recognize signs and symptoms of STROKE: 
 
F-face looks uneven A-arms unable to move or move unevenly S-speech slurred or non-existent T-time-call 911 as soon as signs and symptoms begin-DO NOT go Back to bed or wait to see if you get better-TIME IS BRAIN. Warning Signs of HEART ATTACK Call 911 if you have these symptoms: ? Chest discomfort. Most heart attacks involve discomfort in the center of the chest that lasts more than a few minutes, or that goes away and comes back. It can feel like uncomfortable pressure, squeezing, fullness, or pain. ? Discomfort in other areas of the upper body. Symptoms can include pain or discomfort in one or both arms, the back, neck, jaw, or stomach. ? Shortness of breath with or without chest discomfort. ? Other signs may include breaking out in a cold sweat, nausea, or lightheadedness. Don't wait more than five minutes to call [address]! Fast action can save your life. Calling 911 is almost always the fastest way to get lifesaving treatment. Emergency Medical Services staff can begin treatment when they arrive  up to an hour sooner than if someone gets to the hospital by car. The discharge information has been reviewed with the patient. The patient verbalized understanding. Discharge medications reviewed with the patient and appropriate educational materials and side effects teaching were provided. Pulmonary Embolism: Care Instructions Your Care Instructions Pulmonary embolism is the sudden blockage of an artery in the lung. Blood clots in the deep veins of the leg or pelvis (deep vein thrombosis, or DVT) are the most common cause. These blood clots can travel to the lungs. Pulmonary embolism can be very serious. Because you have had one pulmonary embolism, you are at greater risk for having another one. But you can take steps to prevent another pulmonary embolism by following your doctor's instructions. You will probably take a prescription blood-thinning medicine to prevent blood clots. A blood thinner can stop a blood clot from growing larger and prevent new clots from forming. Follow-up care is a key part of your treatment and safety.  Be sure to make and go to all appointments, and call your doctor if you are having problems. It's also a good idea to know your test results and keep a list of the medicines you take. How can you care for yourself at home? · Take your medicines exactly as prescribed. Call your doctor if you think you are having a problem with your medicine. You will get more details on the specific medicines your doctor prescribes. · If you are taking a blood thinner, be sure you get instructions about how to take your medicine safely. Blood thinners can cause serious bleeding problems. Preventing future pulmonary embolisms · Exercise. Keep blood moving in your legs to keep clots from forming. If you are traveling by car, stop every hour or so. Get out and walk around for a few minutes. If you are traveling by bus, train, or plane, get out of your seat and walk up and down the aisles every hour or so. You also can do leg exercises while you are seated. Pump your feet up and down by pulling your toes up toward your knees then pointing them down. · Get up out of bed as soon as possible after an illness or surgery. · Do not smoke. If you need help quitting, talk to your doctor about stop-smoking programs and medicines. These can increase your chances of quitting for good. · Check with your doctor before taking hormone or birth control pills. These may increase your risk of blot clots. · Ask your doctor about wearing compression stockings to help prevent blood clots in your legs. There are different types of stockings, and they need to fit right. So your doctor will recommend what you need. When should you call for help? Call 911 anytime you think you may need emergency care. For example, call if: 
? · You have shortness of breath. ? · You have chest pain. ? · You passed out (lost consciousness). ? · You cough up blood. ?Call your doctor now or seek immediate medical care if: 
? · You have new or worsening pain or swelling in your leg. ?Watch closely for changes in your health, and be sure to contact your doctor if: 
? · You do not get better as expected. Where can you learn more? Go to http://shaila-stuart.info/. Enter C677 in the search box to learn more about \"Pulmonary Embolism: Care Instructions. \" Current as of: March 20, 2017 Content Version: 11.4 © 0755-8126 [employer]. Care instructions adapted under license by [employer] (which disclaims liability or warranty for this information). If you have questions about a medical condition or this instruction, always ask your healthcare professional. [address] any warranty or liability for your use of this information. ___________________________________________________________________________________________________________________________________ POPSUGARhart Announcement We are excited to announce that we are making your provider's discharge notes available to you in Syncapse. You will see these notes when they are completed and signed by the physician that discharged you from your recent hospital stay. If you have any questions or concerns about any information you see in Syncapse, please call the Health Information Department where you were seen or reach out to your Primary Care Provider for more information about your plan of care. Introducing [facility]! Arley Unger introduces Syncapse patient portal. Now you can access parts of your medical record, email your doctor's office, and request medication refills online. 1. In your internet browser, go to [email or web address] 2. Click on the First Time User? Click Here link in the Sign In box. You will see the New Member Sign Up page. 3. Enter your Syncapse Access Code exactly as it appears below. You will not need to use this code after youve completed the sign-up process.  If you do not sign up before the expiration date, you must request a new code. · [employer] Access Code: [email or web address] Expires: 6/24/2018  8:33 AM 
 
4. Enter the last four digits of your Social Security Number (xxxx) and Date of Birth (mm/dd/yyyy) as indicated and click Submit. You will be taken to the next sign-up page. 5. Create a [employer] ID. This will be your [employer] login ID and cannot be changed, so think of one that is secure and easy to remember. 6. Create a [employer] password. You can change your password at any time. 7. Enter your Password Reset Question and Answer. This can be used at a later time if you forget your password. 8. Enter your e-mail address. You will receive e-mail notification when new information is available in [address] 9. Click Sign Up. You can now view and download portions of your medical record. 10. Click the Download Summary menu link to download a portable copy of your medical information. If you have questions, please visit the Frequently Asked Questions section of the [employer] website. Remember, [employer] is NOT to be used for urgent needs. For medical emergencies, dial 911. Now available from your iPhone and Android! Introducing Parmjit Milligan As a [facility] patient, I wanted to make you aware of our electronic visit tool called Parmjit Milligan. [employer] 24/7 allows you to connect within minutes with a medical provider 24 hours a day, seven days a week via a mobile device or tablet or logging into a secure website from your computer. You can access Parmjit Milligan from anywhere in the [place].  
 
A virtual visit might be right for you when you have a simple condition and feel like you just dont want to get out of bed, or cant get away from work for an appointment, when your regular [employer] provider is not available (evenings, weekends or holidays), or when youre out of town and need minor care. Electronic visits cost only $49 and if the [employer] provider determines a prescription is needed to treat your condition, one can be electronically transmitted to a nearby pharmacy*. Please take a moment to enroll today if you have not already done so. The enrollment process is free and takes just a few minutes. To enroll, please download the [employer] sukumar to your tablet or phone, or visit [email or web address] to enroll on your computer. And, as an [address] [place] [address] patient with a Videostrip account, the results of your visits will be scanned into your electronic medical record and your primary care provider will be able to view the scanned results. We urge you to continue to see your [facility] for your ongoing medical care. And while your primary care provider may not be the one available when you seek a Up [employer] Net virtual visit, the peace of mind you get from getting a real diagnosis real time can be priceless. For more information on [employer] Net, view our Frequently Asked Questions (FAQs) at [email or web address]. Sincerely, 
 
Laurence Young MD 
Chief Medical Officer Christos8 Tiera Gottlieb *:  certain medications cannot be prescribed via Up [employer] Net Unresulted Labs-Please follow up with your PCP about these lab tests Order Current Status IGG SUBCLASS 4 In process MTHFR MUTATION DETECTION In process FACTOR V LEIDEN MUTATION Preliminary result PROTHROMBIN [id number] MUTATION Preliminary result Providers Seen During Your Hospitalization Provider Specialty Primary office phone Minda Ng MD Pulmonary Disease [phone] Your Primary Care Physician (PCP) Primary Care Physician Office Phone Office Fax [address] [facility] 320 [phone] You are allergic to the following Allergen Reactions Biaxin (Clarithromycin) Unknown (comments) Cortisone Other (comments) Per pt, leg numbness. Recent Documentation Height Weight BMI OB Status Smoking Status 1.6 m 104.7 kg 40.9 kg/m2 Postmenopausal Never Smoker Emergency Contacts Name Discharge Info Relation Home Work Mobile Kirsten Jones  Child [2] [phone] Chiara Mcmahan  Other Relative [6]   [phone] Patient Belongings The following personal items are in your possession at time of discharge: 
  Dental Appliances: None         Home Medications: None   Jewelry: Earrings  Clothing: None    Other Valuables: None Please provide this summary of care documentation to your next provider. Signatures-by signing, you are acknowledging that this After Visit Summary has been reviewed with you and you have received a copy. Patient Signature:  ____________________________________________________________ Date:  ____________________________________________________________  
  
[facility] Payor Provider Signature:  ____________________________________________________________ Date:  ____________________________________________________________

## 2018-03-26 NOTE — H&P
Department of Interventional Radiology  [phone]    History and Physical    Patient:  Alessia Botello MRN:  [id number]  SSN:  [id number]    YOB: 1960  Age:  59 y.o. Sex:  female      Primary Care Provider:  Augustin Lee MD  Referring Physician:  Michelle Nix MD    Subjective:     Chief Complaint: PE    History of the Present Illness: The patient is a 59 y.o. female who [facility] for PA thrombolysis. Pt presented to the ED earlier today with c/o SOB. CT scan reveals extensive bilateral PE and evidence of right heart strain. Also with tachycardia (>155 with any activity), JASSO, elevated cardiac enzymes. Right calf pain and swelling x 1 week. No prior hx DVT. No bleeding history. She is here with her daughter in law. Son is on the telephone. Heparin is infusing. Past Medical History:   Diagnosis Date    Diabetes ([address]     Environmental allergies 9/12/2013     Past Surgical History:   Procedure Laterality Date    HX CHOLECYSTECTOMY          Review of Systems:    Pertinent items are noted in the History of Present Illness. Current Outpatient Prescriptions   Medication Sig    atorvastatin (LIPITOR) 20 mg tablet Take 1 Tab by mouth daily.  insulin glargine (LANTUS SOLOSTAR U-100 INSULIN) 100 unit/mL (3 mL) inpn 24 u qhs  Indications: type 2 diabetes mellitus    metFORMIN (GLUCOPHAGE) 500 mg tablet Take 1 Tab by mouth two (2) times daily (with meals).  traMADol (ULTRAM) 50 mg tablet Take 1 Tab by mouth every six (6) hours as needed for Pain. Max Daily Amount: 200 mg.  tiZANidine (ZANAFLEX) 4 mg capsule Take 1 Cap by mouth three (3) times daily. Indications: Muscle Spasm    pregabalin (LYRICA) 50 mg capsule Take 1 Cap by mouth three (3) times daily.  Max Daily Amount: 150 mg.    glucose blood VI test strips (ACCU-CHEK LILLY PLUS TEST STRP) strip Use twice daily for management of Type 2 DM, Uncontrolled (250.02)    Lancets (ACCU-CHEK SOFTCLIX LANCETS) misc Use twice daily for management of Type 2 DM uncontrolled (250.02)    Insulin Needles, Disposable, 32 gauge x 1/4\" ndle 1 Each by Does Not Apply route daily.  Blood-Glucose Meter (ACCU-CHEK LILLY PLUS METER) misc 1 Kit by Does Not Apply route two (2) times a day. For management of Type 2 DM Uncontrolled (250.02)    ACETAMINOPHEN (TYLENOL 8 HOUR PO) Take  by mouth. Current Facility-Administered Medications   Medication Dose Route Frequency    sodium bicarbonate (4%) (NEUT) injection 1-2 mL  1-2 mL SubCUTAneous ONCE    lidocaine (XYLOCAINE) 20 mg/mL (2 %) injection  mg   mg SubCUTAneous ONCE    0.9% sodium chloride infusion  25 mL/hr IntraVENous ONCE    fentaNYL citrate (PF) injection 12.5-100 mcg  12.5-100 mcg IntraVENous Multiple    diphenhydrAMINE (BENADRYL) injection 25-50 mg  25-50 mg IntraVENous ONCE    midazolam (VERSED) injection 0.5-2 mg  0.5-2 mg IntraVENous Multiple    alteplase (CATHFLO) 10 mg in 0.9% sodium chloride 500 mL infusion  0.5-2 mg/hr IntraCATHeter- ARTerial CONTINUOUS    heparin 25,000 units in dextrose 500 mL infusion  400 Units/hr IntraVENous CONTINUOUS    heparin (PF) 2 units/ml in NS infusion 2,000 Units  1,000 mL Irrigation ONCE        Allergies   Allergen Reactions    Biaxin [Clarithromycin] Unknown (comments)    Cortisone Other (comments)     Per pt, leg numbness.        Family History   Problem Relation Age of Onset    No Known Problems Mother     Dementia Father     Heart Disease Father      Social History   Substance Use Topics    Smoking status: Never Smoker    Smokeless tobacco: Never Used    Alcohol use No        Objective:       Physical Examination:    Vitals:    03/26/18 1330 03/26/18 1416 03/26/18 1433 03/26/18 1438   BP: 115/87 140/80 148/64 119/85   Pulse: (!) 132 (!) 134 (!) 128 (!) 126   Resp: 20 24 16 19   Temp:       SpO2: 98% 97% 99% 99%   Weight:       Height:         Gen: NAD  Cor: regular, tachy  Lungs: clr  Abd: soft, obese  Ext: warm, obese, RLE slightly larger    Laboratory:     Lab Results   Component Value Date/Time    Sodium 140 03/26/2018 08:53 AM    Sodium 141 02/21/2018 12:35 PM    Potassium 3.4 (L) 03/26/2018 08:53 AM    Potassium 4.2 02/21/2018 12:35 PM    Chloride 104 03/26/2018 08:53 AM    Chloride 100 02/21/2018 12:35 PM    CO2 25 03/26/2018 08:53 AM    CO2 22 02/21/2018 12:35 PM    Anion gap 11 03/26/2018 08:53 AM    Glucose 260 (H) 03/26/2018 08:53 AM    Glucose 110 (H) 02/21/2018 12:35 PM    BUN 15 03/26/2018 08:53 AM    BUN 16 02/21/2018 12:35 PM    Creatinine 0.96 03/26/2018 08:53 AM    Creatinine 0.67 02/21/2018 12:35 PM    GFR est AA >60 03/26/2018 08:53 AM    GFR est  02/21/2018 12:35 PM    GFR est non-AA >60 03/26/2018 08:53 AM    GFR est non-AA 97 02/21/2018 12:35 PM    Calcium 9.0 03/26/2018 08:53 AM    Calcium 9.8 02/21/2018 12:35 PM    Albumin 3.2 (L) 03/26/2018 08:53 AM    Albumin 4.2 02/21/2018 12:35 PM    Protein, total 7.0 03/26/2018 08:53 AM    Protein, total 7.1 02/21/2018 12:35 PM    Globulin 3.8 (H) 03/26/2018 08:53 AM    A-G Ratio 0.8 (L) 03/26/2018 08:53 AM    A-G Ratio 1.4 02/21/2018 12:35 PM    AST (SGOT) 31 03/26/2018 08:53 AM    AST (SGOT) 22 02/21/2018 12:35 PM    ALT (SGPT) 28 03/26/2018 08:53 AM    ALT (SGPT) 23 02/21/2018 12:35 PM     Lab Results   Component Value Date/Time    WBC 18.2 (H) 03/26/2018 08:53 AM    HGB 12.0 03/26/2018 08:53 AM    HCT 36.7 03/26/2018 08:53 AM    PLATELET 354 41/80/9234 08:53 AM     Lab Results   Component Value Date/Time    aPTT 26.4 03/26/2018 08:53 AM    Prothrombin time 12.8 03/26/2018 08:53 AM    INR 1.0 03/26/2018 08:53 AM       Assessment:     Extensive bilateral PE with SOB, evidence of right heart strain    Plan:     Planned Procedure:  PA thrombolysis. Dr. Rhoda Velázquez had a long discussion with the patient, her son (via telephone) and daughter in law regarding the risks, benefits and alternatives.   She wishes to proceed    Risks, benefits, and alternatives reviewed with patient and she agrees to proceed with the procedure.       Signed By: Marie Crowell PA-C     March 26, 2018

## 2018-03-26 NOTE — PROCEDURES
Date of Procedure: 3/26/2018    Pre-Procedure Diagnosis: Acute pulmonary embolus    Post-Procedure Diagnosis: SAME    Procedure(s): Pulmonary artery catheterization and catheter directed thrombolysis    Brief Description of Procedure: Same    Performed By: Alexy Call MD     Assistants: None    Anesthesia: Local    Estimated Blood Loss: Minimal    Specimens: None    Implants: None    Findings: Elevated pulmonary arterial pressure    Complications: None    Recommendations: Overnight catheter directed thrombolysis bilaterally with intensive care unit monitoring    Follow Up: Tomorrow

## 2018-03-26 NOTE — ED PROVIDER NOTES
Patient is a 59 y.o. female presenting with shortness of breath. The history is provided by the patient. Shortness of Breath   This is a new problem. The problem occurs intermittently. The problem has been gradually worsening. Associated symptoms include leg swelling. Pertinent negatives include no fever, no headaches, no coryza, no rhinorrhea, no sore throat, no cough, no chest pain, no vomiting, no abdominal pain and no leg pain. Associated medical issues do not include COPD, PE, CAD or DVT. Past Medical History:   Diagnosis Date    Diabetes ([address]     Environmental allergies 9/12/2013       Past Surgical History:   Procedure Laterality Date    HX CHOLECYSTECTOMY           Family History:   Problem Relation Age of Onset    No Known Problems Mother     Dementia Father     Heart Disease Father        Social History     Social History    Marital status: SINGLE     Spouse name: N/A    Number of children: N/A    Years of education: N/A     Occupational History    Not on file. Social History Main Topics    Smoking status: Never Smoker    Smokeless tobacco: Never Used    Alcohol use No    Drug use: Not on file    Sexual activity: Not Currently     Other Topics Concern    Not on file     Social History Narrative         ALLERGIES: Biaxin [clarithromycin] and Cortisone    Review of Systems   Constitutional: Negative for chills and fever. HENT: Negative for congestion, rhinorrhea and sore throat. Eyes: Negative for photophobia and redness. Respiratory: Positive for shortness of breath. Negative for cough. Cardiovascular: Positive for leg swelling. Negative for chest pain. Gastrointestinal: Negative for abdominal pain, diarrhea, nausea and vomiting. Endocrine: Negative for polydipsia and polyuria. Genitourinary: Negative for dysuria. Musculoskeletal: Negative for back pain and myalgias. Neurological: Negative for weakness, numbness and headaches.        Vitals:    03/26/18 0830 BP: 163/79   Pulse: (!) 140   Resp: 28   Temp: 97.4 °F (36.3 °C)   SpO2: (!) 88%   Weight: 104.3 kg (230 lb)   Height: 5' 3\" (1.6 m)            Physical Exam   Constitutional: She is oriented to person, place, and time. She appears well-developed and well-nourished. Eyes: Conjunctivae are normal. Pupils are equal, round, and reactive to light. Neck: Normal range of motion. Neck supple. Cardiovascular: Normal rate, regular rhythm and normal heart sounds. No murmur heard. Pulmonary/Chest: Breath sounds normal. No respiratory distress. She has no wheezes. She has no rales. Abdominal: Soft. She exhibits no distension. There is no tenderness. There is no rebound and no guarding. Musculoskeletal: Normal range of motion. She exhibits edema (right lower leg below the knee is swollen compared to the left. No warmth erythema or tenderness. ). She exhibits no tenderness. Neurological: She is alert and oriented to person, place, and time. She has normal strength. No sensory deficit. Skin: Skin is warm and dry. Nursing note and vitals reviewed. MDM  Number of Diagnoses or Management Options  Other acute pulmonary embolism with acute cor pulmonale [facility]):   Diagnosis management comments: Patient with unilateral leg swelling and shortness of breath with hypoxia and sinus tachycardia. EKG to evaluate for arrhythmia versus sinus tachycardia. Begin heparin for suspected pulmonary embolism. No recent surgery or inciting event to explain DVT and PE.    10:50 AM  CT confirms bilateral pulmonary emboli and a near saddle embolism with near complete occlusion of the pulmonary artery on the right. There is evidence of right heart strain on CT and she has an elevated BNP and troponin as well also confirming this. Heparin previously started. Discussed with Dr. Kei Cee pulmonologist who will admit the patient to the ICU.   I will discuss with the on-call interventional radiologist regarding intravascular TPA or ECO.  Patient reexamined and there is a mass in the right axilla. No breast mass on palpation and patient reports a normal mammogram 6 months ago. The mass under her right arm has been present for quite some time and unchanged. She had a lipoma removed removed from her right arm in the past and was monitoring the right axilla area. 11:41 AM  Patient has a leukocytosis from blood clot in the lungs. There is no sign of pneumonia or infection on the chest x-ray or CT. Patient has had no symptoms of infection. There is no sign of sepsis clinically.        Amount and/or Complexity of Data Reviewed  Clinical lab tests: ordered and reviewed (Results for orders placed or performed during the hospital encounter of 03/26/18  -CBC WITH AUTOMATED DIFF       Result                                            Value                         Ref Range                       WBC                                               18.2 (H)                      4.3 - 11.1 K/uL                 RBC                                               4.35                          4.05 - 5.25 M/uL                HGB                                               12.0                          11.7 - 15.4 g/dL                HCT                                               36.7                          35.8 - 46.3 %                   MCV                                               84.4                          79.6 - 97.8 FL                  MCH                                               27.6                          26.1 - 32.9 PG                  MCHC                                              32.7                          31.4 - 35.0 g/dL                RDW                                               16.0 (H)                      11.9 - 14.6 %                   PLATELET                                          294                           150 - 450 K/uL                  MPV                                               10.1 (L) 10.8 - 14.1 FL                  DF                                                AUTOMATED                                                     NEUTROPHILS                                       83 (H)                        43 - 78 %                       LYMPHOCYTES                                       10 (L)                        13 - 44 %                       MONOCYTES                                         7                             4.0 - 12.0 %                    EOSINOPHILS                                       0 (L)                         0.5 - 7.8 %                     BASOPHILS                                         0                             0.0 - 2.0 %                     IMMATURE GRANULOCYTES                             0                             0.0 - 5.0 %                     ABS. NEUTROPHILS                                  15.2 (H)                      1.7 - 8.2 K/UL                  ABS. LYMPHOCYTES                                  1.8                           0.5 - 4.6 K/UL                  ABS. MONOCYTES                                    1.2                           0.1 - 1.3 K/UL                  ABS. EOSINOPHILS                                  0.0                           0.0 - 0.8 K/UL                  ABS. BASOPHILS                                    0.0                           0.0 - 0.2 K/UL                  ABS. IMM.  GRANS.                                  0.0                           0.0 - 0.5 K/UL             -METABOLIC PANEL, COMPREHENSIVE       Result                                            Value                         Ref Range                       Sodium                                            140                           136 - 145 mmol/L                Potassium                                         3.4 (L)                       3.5 - 5.1 mmol/L                Chloride                                          104 98 - 107 mmol/L                 CO2                                               25                            21 - 32 mmol/L                  Anion gap                                         11                            7 - 16 mmol/L                   Glucose                                           260 (H)                       65 - 100 mg/dL                  BUN                                               15                            6 - 23 MG/DL                    Creatinine                                        0.96                          0.6 - 1.0 MG/DL                 GFR est AA                                        >60                           >60 ml/min/1.73m2               GFR est non-AA                                    >60                           >60 ml/min/1.73m2               Calcium                                           9.0                           8.3 - 10.4 MG/DL                Bilirubin, total                                  0.8                           0.2 - 1.1 MG/DL                 ALT (SGPT)                                        28                            12 - 65 U/L                     AST (SGOT)                                        31                            15 - 37 U/L                     Alk. phosphatase                                  110                           50 - 136 U/L                    Protein, total                                    7.0                           6.3 - 8.2 g/dL                  Albumin                                           3.2 (L)                       3.5 - 5.0 g/dL                  Globulin                                          3.8 (H)                       2.3 - 3.5 g/dL                  A-G Ratio                                         0.8 (L)                       1.2 - 3.5                  -D DIMER       Result                                            Value                         Ref Range                       D DIMER                                           17.19 ()                    <0.56 ug/ml(FEU)           -BNP       Result                                            Value                         Ref Range                       BNP                                               305                           pg/mL                      -TROPONIN I       Result                                            Value                         Ref Range                       Troponin-I, Qt.                                   0.88 ()                     0.02 - 0.05 NG/ML          -PROTHROMBIN TIME + INR       Result                                            Value                         Ref Range                       Prothrombin time                                  12.8                          11.5 - 14.5 sec                 INR                                               1.0                                                      -PTT       Result                                            Value                         Ref Range                       aPTT                                              26.4                          23.2 - 35.3 SEC            -POC TROPONIN-I       Result                                            Value                         Ref Range                       Troponin-I (POC)                                  0.58 (H)                      0.02 - 0.05 ng/ml          -EKG, 12 LEAD, INITIAL       Result                                            Value                         Ref Range                       Ventricular Rate                                  143                           BPM                             Atrial Rate                                       143                           BPM                             P-R Interval                                      130                           ms                              QRS Duration                                      82 ms                              Q-T Interval                                      278                           ms                              QTC Calculation (Bezet)                           429                           ms                              Calculated P Axis                                 62                            degrees                         Calculated R Axis                                 64                            degrees                         Calculated T Axis                                 31                            degrees                         Diagnosis                                                                                                   !! AGE AND GENDER SPECIFIC ECG ANALYSIS !! Sinus tachycardia   Cannot rule out Anterior infarct , age undetermined   Abnormal ECG   When compared with ECG of 17-NOV-2014 22:11,   Vent. rate has increased BY  67 BPM   Questionable change in QRS axis   ST now depressed in Inferior leads     )  Tests in the radiology section of CPT®: ordered and reviewed (Xr Chest Pa Lat    Result Date: 3/26/2018  History: dyspnea, severe shortness of breath, one day duration Two views chest Findings: The lungs are well expanded and clear. The cardiac silhouette, and mediastinal contour, and hilar shadows are normal. Degenerative change of the thoracic spine noted. Impression: Unremarkable two-view chest.     Ct Chest W Cont    Result Date: 3/26/2018  CT OF THE CHEST WITH INTRAVENOUS CONTRAST, 3/26/2018 Indication: Shortness of breath primarily with exertion worsening over the past week. Comparison: None Technique:   2.5 mm axial scans from above the aortic arch to the lung bases following the uneventful administration of 70 mL of Isovue-370. Intravenous contrast was given to evaluate for pulmonary embolism.  All CT scans performed at this facility use one or all of the following: Automated exposure control, adjustment of the mA and/or kVp according to patient's size, iterative reconstruction. Findings: The base of the neck is unremarkable in appearance. Prominent right axillary lymph nodes are seen measuring up to 11 mm short axis. In addition, abnormal density is seen in the right axillary tail best appreciated on image 36 where an irregular density measuring 2.9 cm x 1.5 cm in size is seen. These changes are incompletely characterized by CT with a potential neoplastic process not excluded. The thoracic aorta is normal in caliber. Opacification of the pulmonary arteries is adequate. Extensive bilateral pulmonary bullae are seen. These are seen in the right main pulmonary artery extending into the central right upper, middle and lower lobe pulmonary arteries. On the left, this extends from the most distal left pulmonary artery into the central left upper and lower lobe pulmonary arteries. The right ventricle appears at least equal if not larger than the left ventricle. There is flattening of the interventricular septum with mild paradoxical bowing best appreciated on image 69. These can be an indicators for right heart strain. Evaluation with lung windows demonstrates no acute infiltrates. No pleural effusion is seen. Lungs are expanded without evidence for pneumothorax. No acute osseous abnormality is seen. Limited evaluation of the upper abdomen demonstrates an abnormal cystic mass which appears to arise from the pancreatic tail seen on image 69 measuring 4.5 cm x 3.8 cm in size. The patient is status post cholecystectomy. IMPRESSION:  1. Extensive bilateral pulmonary emboli as described above. 2.  Abnormal changes in the right axilla. Specifically, there are asymmetrically enlarged lymph nodes and abnormal density. A neoplastic process cannot be excluded. Further evaluation with clinical exam, bilateral diagnostic mammogram, and diagnostic breast ultrasound if indicated would be recommended.  3. 4.5 cm x 3.8 cm cystic mass arising from the pancreatic tail. This is incompletely characterized on this exam with cystic pancreatic neoplasm not excluded. This would be best further assessed with a pancreatic protocol MRI or CT. The critical finding of bilateral pulmonary emboli was discussed with Dr. Han Minaya by myself personally at 10:30 AM on 3/26/2018. DC5    )  Discuss the patient with other providers: yes    Critical Care  Total time providing critical care: (===================================================================  This patient is critically ill and there is a high probability of of imminent or life threatening deterioration in the patient's condition without immediate management. The nature of the patient's clinical problem is: pulmonary embolism    I have spent 30 minutes in direct patient care, documentation, review of labs/xrays/old records, discussion with Staff, patient, family . The time involved in the performance of separately reportable procedures was not counted toward critical care time.      Oleg Burch MD; 3/26/2018 @10:52 AM  ===================================================================    )        ED Course       Procedures

## 2018-03-26 NOTE — PROGRESS NOTES
[facility] notified of admission and pt HR. No orders received. MD notified of pt being diabetic. Orders for QID sliding scale.

## 2018-03-27 ENCOUNTER — APPOINTMENT (OUTPATIENT)
Dept: INTERVENTIONAL RADIOLOGY/VASCULAR | Age: 58
DRG: 176 | End: 2018-03-27
Attending: INTERNAL MEDICINE
Payer: COMMERCIAL

## 2018-03-27 ENCOUNTER — APPOINTMENT (OUTPATIENT)
Dept: GENERAL RADIOLOGY | Age: 58
DRG: 176 | End: 2018-03-27
Attending: INTERNAL MEDICINE
Payer: COMMERCIAL

## 2018-03-27 LAB
CANCER AG19-9 SERPL-ACNC: 4.7 U/ML (ref 2–37)
CEA SERPL-MCNC: 0.5 NG/ML (ref 0–3)
ERYTHROCYTE [DISTWIDTH] IN BLOOD BY AUTOMATED COUNT: 16.4 % (ref 11.9–14.6)
ERYTHROCYTE [DISTWIDTH] IN BLOOD BY AUTOMATED COUNT: 16.7 % (ref 11.9–14.6)
ERYTHROCYTE [DISTWIDTH] IN BLOOD BY AUTOMATED COUNT: 16.8 % (ref 11.9–14.6)
FIBRINOGEN PPP-MCNC: 361 MG/DL (ref 190–501)
FIBRINOGEN PPP-MCNC: 400 MG/DL (ref 190–501)
FIBRINOGEN PPP-MCNC: 451 MG/DL (ref 190–501)
GLUCOSE BLD STRIP.AUTO-MCNC: 113 MG/DL (ref 65–100)
GLUCOSE BLD STRIP.AUTO-MCNC: 122 MG/DL (ref 65–100)
GLUCOSE BLD STRIP.AUTO-MCNC: 128 MG/DL (ref 65–100)
GLUCOSE BLD STRIP.AUTO-MCNC: 145 MG/DL (ref 65–100)
HCT VFR BLD AUTO: 32.9 % (ref 35.8–46.3)
HCT VFR BLD AUTO: 33 % (ref 35.8–46.3)
HCT VFR BLD AUTO: 33.2 % (ref 35.8–46.3)
HGB BLD-MCNC: 10.7 G/DL (ref 11.7–15.4)
HGB BLD-MCNC: 10.8 G/DL (ref 11.7–15.4)
HGB BLD-MCNC: 10.9 G/DL (ref 11.7–15.4)
INR PPP: 1.2
MCH RBC QN AUTO: 27.5 PG (ref 26.1–32.9)
MCH RBC QN AUTO: 27.9 PG (ref 26.1–32.9)
MCH RBC QN AUTO: 28.1 PG (ref 26.1–32.9)
MCHC RBC AUTO-ENTMCNC: 32.2 G/DL (ref 31.4–35)
MCHC RBC AUTO-ENTMCNC: 32.7 G/DL (ref 31.4–35)
MCHC RBC AUTO-ENTMCNC: 33.1 G/DL (ref 31.4–35)
MCV RBC AUTO: 84.8 FL (ref 79.6–97.8)
MCV RBC AUTO: 85.3 FL (ref 79.6–97.8)
MCV RBC AUTO: 85.3 FL (ref 79.6–97.8)
PLATELET # BLD AUTO: 165 K/UL (ref 150–450)
PLATELET # BLD AUTO: 177 K/UL (ref 150–450)
PLATELET # BLD AUTO: 195 K/UL (ref 150–450)
PMV BLD AUTO: 10.2 FL (ref 10.8–14.1)
PMV BLD AUTO: 10.3 FL (ref 10.8–14.1)
PMV BLD AUTO: 9.9 FL (ref 10.8–14.1)
PROTHROMBIN TIME: 14.7 SEC (ref 11.5–14.5)
RBC # BLD AUTO: 3.87 M/UL (ref 4.05–5.25)
RBC # BLD AUTO: 3.88 M/UL (ref 4.05–5.25)
RBC # BLD AUTO: 3.89 M/UL (ref 4.05–5.25)
TROPONIN I SERPL-MCNC: 0.42 NG/ML (ref 0.02–0.05)
TROPONIN I SERPL-MCNC: 0.59 NG/ML (ref 0.02–0.05)
WBC # BLD AUTO: 12.7 K/UL (ref 4.3–11.1)
WBC # BLD AUTO: 8.3 K/UL (ref 4.3–11.1)
WBC # BLD AUTO: 9.3 K/UL (ref 4.3–11.1)

## 2018-03-27 PROCEDURE — 74011250637 HC RX REV CODE- 250/637: Performed by: INTERNAL MEDICINE

## 2018-03-27 PROCEDURE — 99233 SBSQ HOSP IP/OBS HIGH 50: CPT | Performed by: INTERNAL MEDICINE

## 2018-03-27 PROCEDURE — C1769 GUIDE WIRE: HCPCS

## 2018-03-27 PROCEDURE — 74011250637 HC RX REV CODE- 250/637: Performed by: RADIOLOGY

## 2018-03-27 PROCEDURE — 86301 IMMUNOASSAY TUMOR CA 19-9: CPT | Performed by: NURSE PRACTITIONER

## 2018-03-27 PROCEDURE — 85384 FIBRINOGEN ACTIVITY: CPT | Performed by: INTERNAL MEDICINE

## 2018-03-27 PROCEDURE — 82378 CARCINOEMBRYONIC ANTIGEN: CPT | Performed by: NURSE PRACTITIONER

## 2018-03-27 PROCEDURE — B31T1ZZ FLUOROSCOPY OF LEFT PULMONARY ARTERY USING LOW OSMOLAR CONTRAST: ICD-10-PCS | Performed by: RADIOLOGY

## 2018-03-27 PROCEDURE — 85027 COMPLETE CBC AUTOMATED: CPT | Performed by: INTERNAL MEDICINE

## 2018-03-27 PROCEDURE — B31S1ZZ FLUOROSCOPY OF RIGHT PULMONARY ARTERY USING LOW OSMOLAR CONTRAST: ICD-10-PCS | Performed by: RADIOLOGY

## 2018-03-27 PROCEDURE — 74011250636 HC RX REV CODE- 250/636

## 2018-03-27 PROCEDURE — 77030027138 HC INCENT SPIROMETER -A

## 2018-03-27 PROCEDURE — 65610000001 HC ROOM ICU GENERAL

## 2018-03-27 PROCEDURE — 74011250636 HC RX REV CODE- 250/636: Performed by: RADIOLOGY

## 2018-03-27 PROCEDURE — 82962 GLUCOSE BLOOD TEST: CPT

## 2018-03-27 PROCEDURE — 74011000250 HC RX REV CODE- 250: Performed by: RADIOLOGY

## 2018-03-27 PROCEDURE — 77030013794 HC KT TRNSDUC BLD EDWD -B

## 2018-03-27 PROCEDURE — 84484 ASSAY OF TROPONIN QUANT: CPT | Performed by: INTERNAL MEDICINE

## 2018-03-27 PROCEDURE — 36415 COLL VENOUS BLD VENIPUNCTURE: CPT | Performed by: INTERNAL MEDICINE

## 2018-03-27 PROCEDURE — 77030025126 HC CATH ANGI DX SUPR TORQ CARD -B

## 2018-03-27 PROCEDURE — 71045 X-RAY EXAM CHEST 1 VIEW: CPT

## 2018-03-27 PROCEDURE — 37214 CESSJ THERAPY CATH REMOVAL: CPT

## 2018-03-27 PROCEDURE — 85610 PROTHROMBIN TIME: CPT | Performed by: INTERNAL MEDICINE

## 2018-03-27 RX ORDER — ACETAMINOPHEN 325 MG/1
650 TABLET ORAL
Status: DISCONTINUED | OUTPATIENT
Start: 2018-03-27 | End: 2018-03-30 | Stop reason: HOSPADM

## 2018-03-27 RX ORDER — SODIUM CHLORIDE 9 MG/ML
500 INJECTION, SOLUTION INTRAVENOUS CONTINUOUS
Status: DISCONTINUED | OUTPATIENT
Start: 2018-03-27 | End: 2018-03-27 | Stop reason: ALTCHOICE

## 2018-03-27 RX ORDER — INSULIN LISPRO 100 [IU]/ML
INJECTION, SOLUTION INTRAVENOUS; SUBCUTANEOUS
Status: DISCONTINUED | OUTPATIENT
Start: 2018-03-27 | End: 2018-03-30 | Stop reason: HOSPADM

## 2018-03-27 RX ORDER — POTASSIUM CHLORIDE 20MEQ/15ML
40 LIQUID (ML) ORAL 2 TIMES DAILY
Status: COMPLETED | OUTPATIENT
Start: 2018-03-27 | End: 2018-03-27

## 2018-03-27 RX ORDER — MIDAZOLAM HYDROCHLORIDE 1 MG/ML
.5-2 INJECTION, SOLUTION INTRAMUSCULAR; INTRAVENOUS
Status: DISCONTINUED | OUTPATIENT
Start: 2018-03-27 | End: 2018-03-27 | Stop reason: ALTCHOICE

## 2018-03-27 RX ORDER — LIDOCAINE HYDROCHLORIDE 20 MG/ML
20-200 INJECTION, SOLUTION INFILTRATION; PERINEURAL ONCE
Status: COMPLETED | OUTPATIENT
Start: 2018-03-27 | End: 2018-03-27

## 2018-03-27 RX ORDER — FENTANYL CITRATE 50 UG/ML
25-50 INJECTION, SOLUTION INTRAMUSCULAR; INTRAVENOUS
Status: DISCONTINUED | OUTPATIENT
Start: 2018-03-27 | End: 2018-03-27 | Stop reason: ALTCHOICE

## 2018-03-27 RX ORDER — HEPARIN SODIUM 200 [USP'U]/100ML
1000 INJECTION, SOLUTION INTRAVENOUS ONCE
Status: DISCONTINUED | OUTPATIENT
Start: 2018-03-27 | End: 2018-03-27 | Stop reason: HOSPADM

## 2018-03-27 RX ADMIN — MIDAZOLAM HYDROCHLORIDE 1 MG: 1 INJECTION, SOLUTION INTRAMUSCULAR; INTRAVENOUS at 15:58

## 2018-03-27 RX ADMIN — Medication 10 ML: at 21:56

## 2018-03-27 RX ADMIN — APIXABAN 10 MG: 5 TABLET, FILM COATED ORAL at 20:37

## 2018-03-27 RX ADMIN — FENTANYL CITRATE 50 MCG: 50 INJECTION, SOLUTION INTRAMUSCULAR; INTRAVENOUS at 16:14

## 2018-03-27 RX ADMIN — LIDOCAINE HYDROCHLORIDE 100 MG: 20 INJECTION, SOLUTION INFILTRATION; PERINEURAL at 16:00

## 2018-03-27 RX ADMIN — Medication 10 ML: at 13:32

## 2018-03-27 RX ADMIN — FENTANYL CITRATE 50 MCG: 50 INJECTION, SOLUTION INTRAMUSCULAR; INTRAVENOUS at 15:58

## 2018-03-27 RX ADMIN — Medication 10 ML: at 06:17

## 2018-03-27 RX ADMIN — SODIUM CHLORIDE 500 ML: 900 INJECTION, SOLUTION INTRAVENOUS at 16:00

## 2018-03-27 RX ADMIN — MIDAZOLAM HYDROCHLORIDE 1 MG: 1 INJECTION, SOLUTION INTRAMUSCULAR; INTRAVENOUS at 16:14

## 2018-03-27 RX ADMIN — HYDROCODONE BITARTRATE AND ACETAMINOPHEN 1 TABLET: 5; 325 TABLET ORAL at 09:31

## 2018-03-27 RX ADMIN — POTASSIUM CHLORIDE 40 MEQ: 20 SOLUTION ORAL at 11:08

## 2018-03-27 RX ADMIN — POTASSIUM CHLORIDE 40 MEQ: 20 SOLUTION ORAL at 18:10

## 2018-03-27 NOTE — PROGRESS NOTES
Critical Care Daily Progress Note: 3/27/2018    Diego Mata   Admission Date: 3/26/2018         The patient's chart is reviewed and the patient is discussed with the staff. The patient is a 59 y.o.  female with a hx of DM and a prior cholecystectomy who presented to the [facility] ER with dyspnea and RLE swelling. Evaluation revealed a high D-dimer, BNP and a CT revealed extensive thrombosis involving both lung with a very large clot burden with evidence of R heart strain. She was also noted to have abnormal density in the R axilla and a mass lesion involving the head of the pancreas of unknown etiology. She was transferred to IR from the ER and underwent EKOS catheter thrombolysis by Dr. Stef Santillan.      The pt reports having had a R axillary fullness for some time. 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 She reports the axillary process has not changed and has not been evaluated with a biopsy at any point. She also has had back pain for several months and was evaluated by Dr. Rajinder Eaton at [facility]. She had an MRI done at [address] [place] [address] and was told she had 4 pinched nerves but she was never told of any pancreatic lesion. She was subsequently sent for a pain injection about 3 weeks ago with some improvement in the pain.      Today she became acutely dyspneic and lightheaded attempting to go to work and was seen at the [facility] ER as above. She is now s/p EKOS and is breathing comfortably. She remains tachycardic at rest and is getting TPA through bilateral PA catheters and heparin via a peripheral IV. Subjective:     Feels better.   Less SOB    Current Facility-Administered Medications   Medication Dose Route Frequency    heparin 25,000 units in dextrose 500 mL infusion  400 Units/hr IntraVENous CONTINUOUS    alteplase (ACTIVASE) 25 mg in 0.9% sodium chloride 500 mL infusion  0.5 mg/hr IntraCATHeter- ARTerial CONTINUOUS    alteplase (ACTIVASE) 25 mg in 0.9% sodium chloride 500 mL infusion  0.5 mg/hr IntraCATHeter- ARTerial CONTINUOUS    0.9% sodium chloride infusion  35 mL/hr IntraCATHeter- VENous CONTINUOUS    0.9% sodium chloride infusion  35 mL/hr IntraCATHeter- VENous CONTINUOUS    heparinized saline 2 units/mL infusion 1,000 Units  1,000 Units IntraSHEAth CONTINUOUS    HYDROcodone-acetaminophen (NORCO) 5-325 mg per tablet 1 Tab  1 Tab Oral Q4H PRN    ondansetron (ZOFRAN) injection 4 mg  4 mg IntraVENous Q6H PRN    insulin lispro (HUMALOG) injection   SubCUTAneous QID    sodium chloride (NS) flush 5-10 mL  5-10 mL IntraVENous Q8H    sodium chloride (NS) flush 5-10 mL  5-10 mL IntraVENous PRN    naloxone (NARCAN) injection 0.4 mg  0.4 mg IntraVENous PRN    HYDROcodone-acetaminophen (NORCO) 7.5-325 mg per tablet 1 Tab  1 Tab Oral Q4H PRN    bisacodyl (DULCOLAX) suppository 10 mg  10 mg Rectal DAILY PRN       Objective:     Vitals:    03/27/18 0747 03/27/18 0810 03/27/18 0829 03/27/18 0856   BP: 135/80 151/59 111/68 117/68   Pulse: (!) 115 (!) 117 (!) 115 (!) 114   Resp: 13 30 27 30   Temp:       SpO2: 98% 98% 98% 98%   Weight:       Height:           Intake and Output:   03/25 1901 - 03/27 0700  In: 1406 [I.V.:1827]  Out: 1050 [Urine:1050]       Physical Exam:          Gen:  the patient is well developed and in no acute distress  HEENT:  Sclera clear, pupils equal, oral mucosa moist  Lungs: CTA  Heart:  RRR without M,G,R  Abd: soft and non-tender; with positive bowel sounds. Ext: warm without cyanosis. There is R>L MIMI.   Skin:  no jaundice or rashes, no wounds   Neuro: no gross neuro deficits     Psychiatric:  alert and oriented x 3        DRIPS:   TPA with EKOS    CHEST XRAY:             LAB  Recent Labs      03/27/18   0434  03/27/18   0015  03/26/18   1654  03/26/18   0853   WBC  9.3  12.7*  12.2*  18.2*   HGB  10.7*  10.9*  11.4*  12.0   HCT  33.2*  32.9*  34.0*  36.7   PLT  177  195  249  294   INR  1.2   --    --   1.0     Recent Labs      03/26/18   0853   NA  140   K 3.4*   CL  104   CO2  25   GLU  260*   BUN  15   CREA  0.96   CA  9.0   ALB  3.2*   SGOT  31     No results for input(s): PH, PCO2, PO2, HCO3 in the last 72 hours. No results for input(s): LCAD, LAC in the last 72 hours. Assessment:  (Medical Decision Making)     Patient Active Problem List   Diagnosis Code    Environmental allergies Z91.09    Bilateral low back pain with sciatica M54.40    Class 2 obesity due to excess calories with serious comorbidity in adult E66.09    Type 2 diabetes mellitus with diabetic neuropathy (HCC) E11.40    Controlled type 2 diabetes mellitus without complication, with long-term current use of insulin (HCC) E11.9, Z79.4    PE (pulmonary thromboembolism) ([facility]) I26.99    Axillary mass, right R22.31    Pancreatic mass K86.9    Pulmonary embolism (HCC) I26.99         Plan:  (Medical Decision Making)     Hospital Problems  Date Reviewed: 3/26/2018          Codes Class Noted POA    Class 2 obesity due to excess calories with serious comorbidity in adult ICD-10-CM: E66.09  ICD-9-CM: 278.00  3/26/2018 Yes        Controlled type 2 diabetes mellitus without complication, with long-term current use of insulin (HCC) ICD-10-CM: E11.9, Z79.4  ICD-9-CM: 250.00, V58.67  3/26/2018 Yes        * (Principal)[facility]embol[facility] ICD-10-CM: I26.99  ICD-9-CM: 415.19  3/26/2018 Unknown    On EKOS directed thrombolysis- for CT later today    Axillary mass, right ICD-10-CM: R22.31  ICD-9-CM: 782.2  3/26/2018 Unknown    May need biopsy, could be a lipoma, once PE addressed will likely need bilateral mammogram and may need biopsy      Pancreatic mass ICD-10-CM: K86.9  ICD-9-CM: 577.9  3/26/2018 Unknown    Ca19-9 ordered- will need pancreatic protocol MRI once PE dealt with. Pulmonary embolism [facility]) ICD-10-CM: I26.99  ICD-9-CM: 415.19  3/26/2018 Unknown    Will need to transition to oral [facility] after thrombolysis.               More than 50% of time documented was spent in face-to-face contact with the patient and in the care of the patient on the floor/unit where the patient is located. Zari Roman MD

## 2018-03-27 NOTE — PROCEDURES
Interventional Radiology Brief Procedure Note    Patient: iJllian Laughlin MRN: [id number]  SSN: [id number]    YOB: 1960  Age: 59 y.o. Sex: female      Date of Procedure: 3/27/2018    Pre-Procedure Diagnosis: Pulmonary embolus     Post-Procedure Diagnosis: SAME    Procedure(s): Pulmonary angiogram     Brief Description of Procedure: angiogram and pressure measurement. Performed By: Juany Hayden MD     Assistants: None    Anesthesia: Moderate Sedation    Estimated Blood Loss: None    Specimens: None    Implants: None    Findings: No main Pulmonary clot identified. PA pressure still elevated. Complications: None    Recommendations: Begin anticoagulation. Follow Up: With pulmonary medicine.      Signed By: Juany Hayden MD     March 27, 2018

## 2018-03-27 NOTE — PROGRESS NOTES
Bedside shift change report given to Lashaun Candelaria RN (oncoming nurse) by Duane Bosch, RN (offgoing nurse). Report included the following information SBAR, Kardex, ED Summary, Procedure Summary, Intake/Output, MAR, Recent Results and Cardiac Rhythm Sinus Tach. Skin assessed. Right groin site oozing, dsg changed, no s/s of hematoma noted.

## 2018-03-27 NOTE — INTERDISCIPLINARY ROUNDS
Interdisciplinary team rounds were held 3/27/2018 with the following team members:Care Management, Nursing, Nurse Practitioner, Nutrition, Palliative Care, Pastoral Care, Pharmacy, Physical Therapy, Physician, Respiratory Therapy and Clinical Coordinator. Plan of care discussed. See clinical pathway and/or care plan for interventions and desired outcomes.

## 2018-03-27 NOTE — PROGRESS NOTES
TRANSFER - OUT REPORT:    Bedside report given to Rocío Valdez RN (name) on Tammy Carson  being transferred to IR (unit) for ordered procedure       Report consisted of patients Situation, Background, Assessment and   Recommendations(SBAR). Information from the following report(s) SBAR, Kardex, ED Summary, Intake/Output, MAR, Recent Results and Cardiac Rhythm Sinus Tach was reviewed with the receiving nurse. Lines:   Peripheral IV 03/26/18 Right Antecubital (Active)   Site Assessment Clean, dry, & intact 3/27/2018 11:25 AM   Phlebitis Assessment 0 3/27/2018 11:25 AM   Infiltration Assessment 0 3/27/2018 11:25 AM   Dressing Status Clean, dry, & intact 3/27/2018 11:25 AM   Dressing Type Tape;Transparent 3/27/2018 11:25 AM   Hub Color/Line Status Infusing 3/27/2018 11:25 AM   Alcohol Cap Used No 3/27/2018 11:25 AM        Opportunity for questions and clarification was provided.       Patient transported with:   Monitor  O2 @ 3 liters  Registered Nurse

## 2018-03-27 NOTE — PROGRESS NOTES
[facility] Pt resting quietly, oriented x4. Follows commands appropriately. T 101.6, multiple blankets removed. ST 110s, normotensive, O2 sat 98%. Dyspnea on exertion. Pt using IS. Denies discomfort at this time. Pt on bedrest until 0000. R groin site from sheath with some oozing. No hematoma noted, palpable pulses RLE.

## 2018-03-27 NOTE — PROGRESS NOTES
Per Dr. Mills Talia do not start back systemic Heparin gtt. Start Eliquis 10 mg PO every 12 hours to start at [address]. Sheath to be pulled at 1800. Bedrest x 6 hours after sheath pulled then activity as tolerated.

## 2018-03-27 NOTE — PROGRESS NOTES
Shift assessment complete, chart reviewed. Patient is A/O x 4, follows commands. Sinus Tach on monitor, BP stable. Breath sounds clear, diminished in bases, tachypnea (high 20s-30s), dyspnea with exertion noted, SpO2 95% on NC at 3 L. IS given to patient and instructed on use, able to achieve 1000 with some SOB, will continue to encourage. Abdomen is soft, intact, bowel sounds active, passing flatus, NPO at this time for IR. Chandler catheter in place, draining clear, yellow urine. Moves all extremities, pulses palpable and regular, +1 non-pitting edema to RLE. Right groin sheath in place, TPA, Heparin, and Coolant infusing, no s/s of bleeding or hematoma noted. No c/o pain at this time. Stiffness from bedrest and being flat in bed. VSS. NAD.

## 2018-03-27 NOTE — ROUTINE PROCESS
TRANSFER - OUT REPORT:    Verbal report given to Justin Sanchez RN on Al  being transferred to [facility] for routine post - op       Report consisted of patients Situation, Background, Assessment and   Recommendations(SBAR). Information from the following report(s) SBAR, Procedure Summary and MAR was reviewed with the receiving nurse. Opportunity for questions and clarification was provided. Conscious Sedation:   100 Mcg of Fentanyl administered  2   Mg of Versed administered    Pt tolerated procedure well  .      Visit Vitals    /87    Pulse (!) 116    Temp 99.3 °F (37.4 °C)    Resp 23    Ht 5' 3\" (1.6 m)    Wt 105.4 kg (232 lb 4.8 oz)    SpO2 95%    BMI 41.15 kg/m2     Past Medical History:   Diagnosis Date    Diabetes ([facility]     Environmental allergies 9/12/2013     Peripheral IV 03/26/18 Right Antecubital (Active)   Site Assessment Clean, dry, & intact 3/27/2018 11:25 AM   Phlebitis Assessment 0 3/27/2018 11:25 AM   Infiltration Assessment 0 3/27/2018 11:25 AM   Dressing Status Clean, dry, & intact 3/27/2018 11:25 AM   Dressing Type Tape;Transparent 3/27/2018 11:25 AM   Hub Color/Line Status Infusing 3/27/2018 11:25 AM   Alcohol Cap Used No 3/27/2018 11:25 AM           Sheath 03/26/18 (Active)   Site Assessment Clean, dry, & intact 3/27/2018 11:25 AM   Dressing Status Clean, dry, & intact 3/27/2018 11:25 AM   Dressing Type 4 X 4;Transparent 3/27/2018 11:25 AM   Hub Color/Line Status Infusing 3/27/2018 11:25 AM   Hemostasis  Dressing applied during procedure 3/27/2018 11:25 AM

## 2018-03-27 NOTE — PROGRESS NOTES
TRANSFER - IN REPORT:    Verbal report received from Miranda peterson, [facility] (name) on Екатерина Epperson  being received from IR (unit) for routine progression of care      Report consisted of patients Situation, Background, Assessment and   Recommendations(SBAR). Information from the following report(s) Procedure Summary and Cardiac Rhythm Sinus tach was reviewed with the receiving nurse. Opportunity for questions and clarification was provided. Assessment completed upon patients arrival to unit and care assumed.

## 2018-03-27 NOTE — PROGRESS NOTES
Bedside shift change report given to Janina Pino RN (oncoming nurse) by Estefani Miranda RN (offgoing nurse). Report included the following information SBAR, Kardex, ED Summary, Procedure Summary, Intake/Output, MAR, Recent Results and Cardiac Rhythm Sinus Tach. TPA/Heparin rate verified. Right groin site assessed. No bleeding or s/s of hematoma noted. Vascular checks WDL. Skin assessed. Patient turned.

## 2018-03-27 NOTE — PROGRESS NOTES
Right groin sheath removed per order at 1800. Manual pressure held, hemostasis achieved. 4x4 and tegaderm applied. No bleeding or s/s of hematoma noted. Vascular checks WDL.

## 2018-03-27 NOTE — PROGRESS NOTES
Physical Therapy Note:    Participated in interdisciplinary rounds including Melbeta, Wound Care, Palliative Care NP, RN staff, MD, Respiratory therapy, and Nutrition. Patient at this time is desaturating with rolling and not appropriate for therapy. Will continue to participate in rounds to determine appropriateness of PT/OT.     Thank you,  Howie Chandler, PT, DPT

## 2018-03-27 NOTE — PROGRESS NOTES
Problem: Falls - Risk of  Goal: *Absence of Falls  Document Tamiko Fall Risk and appropriate interventions in the flowsheet.    Outcome: Progressing Towards Goal  Fall Risk Interventions:  Mobility Interventions: Assess mobility with egress test, Bed/chair exit alarm, Communicate number of staff needed for ambulation/transfer, Strengthening exercises (ROM-active/passive)         Medication Interventions: Bed/chair exit alarm, Evaluate medications/consider consulting pharmacy    Elimination Interventions: Call light in reach, Patient to call for help with toileting needs

## 2018-03-28 ENCOUNTER — APPOINTMENT (OUTPATIENT)
Dept: CT IMAGING | Age: 58
DRG: 176 | End: 2018-03-28
Attending: NURSE PRACTITIONER
Payer: COMMERCIAL

## 2018-03-28 LAB
AMYLASE SERPL-CCNC: 39 U/L (ref 25–115)
ANION GAP SERPL CALC-SCNC: 8 MMOL/L (ref 7–16)
ATRIAL RATE: 118 BPM
BUN SERPL-MCNC: 11 MG/DL (ref 6–23)
CALCIUM SERPL-MCNC: 8.8 MG/DL (ref 8.3–10.4)
CALCULATED P AXIS, ECG09: 48 DEGREES
CALCULATED R AXIS, ECG10: 4 DEGREES
CALCULATED T AXIS, ECG11: 20 DEGREES
CANCER AG15-3 SERPL-ACNC: 4.4 U/ML (ref 1–35)
CHLORIDE SERPL-SCNC: 111 MMOL/L (ref 98–107)
CO2 SERPL-SCNC: 23 MMOL/L (ref 21–32)
CREAT SERPL-MCNC: 0.64 MG/DL (ref 0.6–1)
DIAGNOSIS, 93000: NORMAL
ERYTHROCYTE [DISTWIDTH] IN BLOOD BY AUTOMATED COUNT: 17.2 % (ref 11.9–14.6)
FERRITIN SERPL-MCNC: 90 NG/ML (ref 8–388)
GLUCOSE BLD STRIP.AUTO-MCNC: 119 MG/DL (ref 65–100)
GLUCOSE BLD STRIP.AUTO-MCNC: 135 MG/DL (ref 65–100)
GLUCOSE BLD STRIP.AUTO-MCNC: 147 MG/DL (ref 65–100)
GLUCOSE BLD STRIP.AUTO-MCNC: 160 MG/DL (ref 65–100)
GLUCOSE SERPL-MCNC: 126 MG/DL (ref 65–100)
HCT VFR BLD AUTO: 33.2 % (ref 35.8–46.3)
HGB BLD-MCNC: 10.7 G/DL (ref 11.7–15.4)
IRON SATN MFR SERPL: 7 %
IRON SERPL-MCNC: 21 UG/DL (ref 35–150)
LIPASE SERPL-CCNC: 104 U/L (ref 73–393)
MAGNESIUM SERPL-MCNC: 2.1 MG/DL (ref 1.8–2.4)
MCH RBC QN AUTO: 27.8 PG (ref 26.1–32.9)
MCHC RBC AUTO-ENTMCNC: 32.2 G/DL (ref 31.4–35)
MCV RBC AUTO: 86.2 FL (ref 79.6–97.8)
P-R INTERVAL, ECG05: 128 MS
PHOSPHATE SERPL-MCNC: 2.3 MG/DL (ref 2.5–4.5)
PLATELET # BLD AUTO: 135 K/UL (ref 150–450)
PMV BLD AUTO: 10.3 FL (ref 10.8–14.1)
POTASSIUM SERPL-SCNC: 4 MMOL/L (ref 3.5–5.1)
Q-T INTERVAL, ECG07: 308 MS
QRS DURATION, ECG06: 94 MS
QTC CALCULATION (BEZET), ECG08: 431 MS
RBC # BLD AUTO: 3.85 M/UL (ref 4.05–5.25)
SODIUM SERPL-SCNC: 142 MMOL/L (ref 136–145)
TIBC SERPL-MCNC: 283 UG/DL (ref 250–450)
VENTRICULAR RATE, ECG03: 118 BPM
WBC # BLD AUTO: 7.9 K/UL (ref 4.3–11.1)

## 2018-03-28 PROCEDURE — 93005 ELECTROCARDIOGRAM TRACING: CPT | Performed by: NURSE PRACTITIONER

## 2018-03-28 PROCEDURE — 83540 ASSAY OF IRON: CPT | Performed by: NURSE PRACTITIONER

## 2018-03-28 PROCEDURE — 82150 ASSAY OF AMYLASE: CPT | Performed by: INTERNAL MEDICINE

## 2018-03-28 PROCEDURE — 74011250637 HC RX REV CODE- 250/637: Performed by: RADIOLOGY

## 2018-03-28 PROCEDURE — 86300 IMMUNOASSAY TUMOR CA 15-3: CPT | Performed by: NURSE PRACTITIONER

## 2018-03-28 PROCEDURE — 84100 ASSAY OF PHOSPHORUS: CPT | Performed by: INTERNAL MEDICINE

## 2018-03-28 PROCEDURE — 82728 ASSAY OF FERRITIN: CPT | Performed by: NURSE PRACTITIONER

## 2018-03-28 PROCEDURE — 74177 CT ABD & PELVIS W/CONTRAST: CPT

## 2018-03-28 PROCEDURE — 99232 SBSQ HOSP IP/OBS MODERATE 35: CPT | Performed by: INTERNAL MEDICINE

## 2018-03-28 PROCEDURE — 83690 ASSAY OF LIPASE: CPT | Performed by: INTERNAL MEDICINE

## 2018-03-28 PROCEDURE — 36415 COLL VENOUS BLD VENIPUNCTURE: CPT | Performed by: INTERNAL MEDICINE

## 2018-03-28 PROCEDURE — 81240 F2 GENE: CPT | Performed by: INTERNAL MEDICINE

## 2018-03-28 PROCEDURE — 82787 IGG 1 2 3 OR 4 EACH: CPT | Performed by: INTERNAL MEDICINE

## 2018-03-28 PROCEDURE — 86147 CARDIOLIPIN ANTIBODY EA IG: CPT | Performed by: INTERNAL MEDICINE

## 2018-03-28 PROCEDURE — 82962 GLUCOSE BLOOD TEST: CPT

## 2018-03-28 PROCEDURE — 65270000029 HC RM PRIVATE

## 2018-03-28 PROCEDURE — 74011000258 HC RX REV CODE- 258: Performed by: INTERNAL MEDICINE

## 2018-03-28 PROCEDURE — 83735 ASSAY OF MAGNESIUM: CPT | Performed by: INTERNAL MEDICINE

## 2018-03-28 PROCEDURE — 80048 BASIC METABOLIC PNL TOTAL CA: CPT | Performed by: INTERNAL MEDICINE

## 2018-03-28 PROCEDURE — 85027 COMPLETE CBC AUTOMATED: CPT | Performed by: INTERNAL MEDICINE

## 2018-03-28 PROCEDURE — 74011636320 HC RX REV CODE- 636/320: Performed by: INTERNAL MEDICINE

## 2018-03-28 PROCEDURE — 81241 F5 GENE: CPT | Performed by: INTERNAL MEDICINE

## 2018-03-28 PROCEDURE — 99255 IP/OBS CONSLTJ NEW/EST HI 80: CPT | Performed by: INTERNAL MEDICINE

## 2018-03-28 PROCEDURE — 81291 MTHFR GENE: CPT | Performed by: INTERNAL MEDICINE

## 2018-03-28 PROCEDURE — 74011636637 HC RX REV CODE- 636/637: Performed by: INTERNAL MEDICINE

## 2018-03-28 PROCEDURE — 83090 ASSAY OF HOMOCYSTEINE: CPT | Performed by: INTERNAL MEDICINE

## 2018-03-28 PROCEDURE — 97161 PT EVAL LOW COMPLEX 20 MIN: CPT

## 2018-03-28 RX ORDER — SODIUM CHLORIDE 0.9 % (FLUSH) 0.9 %
10 SYRINGE (ML) INJECTION
Status: COMPLETED | OUTPATIENT
Start: 2018-03-28 | End: 2018-03-28

## 2018-03-28 RX ADMIN — APIXABAN 10 MG: 5 TABLET, FILM COATED ORAL at 08:47

## 2018-03-28 RX ADMIN — Medication 10 ML: at 05:44

## 2018-03-28 RX ADMIN — IOPAMIDOL 100 ML: 755 INJECTION, SOLUTION INTRAVENOUS at 16:46

## 2018-03-28 RX ADMIN — Medication 10 ML: at 17:18

## 2018-03-28 RX ADMIN — Medication 10 ML: at 16:46

## 2018-03-28 RX ADMIN — APIXABAN 10 MG: 5 TABLET, FILM COATED ORAL at 22:31

## 2018-03-28 RX ADMIN — INSULIN LISPRO 2 UNITS: 100 INJECTION, SOLUTION INTRAVENOUS; SUBCUTANEOUS at 17:18

## 2018-03-28 RX ADMIN — Medication 5 ML: at 22:31

## 2018-03-28 RX ADMIN — SODIUM CHLORIDE 100 ML: 900 INJECTION, SOLUTION INTRAVENOUS at 16:46

## 2018-03-28 NOTE — PROGRESS NOTES
Bedside report received from [place], [facility]. Assessment completed. Pt is sitting in chair at this time. Pt A&O x 4. Respirations even and unlabored. No s/sx of acute pain or distress. Bed in low, locked position. Call light within reach. Pt instructed to call for assistance. Will monitor.

## 2018-03-28 NOTE — PROGRESS NOTES
Problem: Mobility Impaired (Adult and Pediatric)  Goal: *Acute Goals and Plan of Care (Insert Text)  STG:  (1.)Ms. Melissa Lui will move from supine to sit and sit to supine , scoot up and down and roll side to side with SUPERVISION within 3 treatment day(s). (2.)Ms. Melissa Lui will transfer from bed to chair and chair to bed with SUPERVISION using the least restrictive device within 3 treatment day(s). (3.)Ms. Melissa Lui will ambulate with SUPERVISION for 250 feet with the least restrictive device within 3 treatment day(s). (4.)Ms. Melissa Lui will perform standing static and dynamic balance activities x 15 minutes with SUPERVISION to improve safety within 3 day(s). (5.)Ms. Melissa Lui will maintain stable vital signs throughout all functional mobility within 3 days. LTG:  (1.)Ms. Melissa Lui will move from supine to sit and sit to supine , scoot up and down and roll side to side in bed with MODIFIED INDEPENDENCE within 7 treatment day(s). (2.)Ms. Melissa Lui will transfer from bed to chair and chair to bed with MODIFIED INDEPENDENCE using the least restrictive device within 7 treatment day(s). (3.)Ms. Melissa Lui will ambulate with MODIFIED INDEPENDENCE for 500 feet with the least restrictive device within 7 treatment day(s). (4.)Ms. Melissa Lui will perform standing static and dynamic balance activities x 25 minutes with MODIFIED INDEPENDENCE to improve safety within 7 day(s). (5.)Ms. Melissa Lui will ascend and descend 5 stairs using 1-2 hand rail(s) with MODIFIED INDEPENDENCE to improve functional mobility and safety within 7 day(s).   ________________________________________________________________________________________________      PHYSICAL THERAPY: Initial Assessment, AM 3/28/2018  INPATIENT: Hospital Day: 3  Payor: [employer] / Plan[place] [employer] / Product Type: Commerical /      NAME/AGE/GENDER: Sandip Muller is a 59 y.o. female   PRIMARY DIAGNOSIS: Pulmonary embolism (HCC) [I26.99] PE (pulmonary thromboembolism) ([facility] [address] PE (pulmonary thromboembolism) ([facility])     2 Days Post-Op  ICD-10: Treatment Diagnosis:    · Generalized Muscle Weakness (M62.81)  · Difficulty in walking, Not elsewhere classified (R26.2)   Precaution/Allergies:  Biaxin [clarithromycin] and Cortisone      ASSESSMENT:     Ms. Catrachito Jacobson is a 59 y.o. female admitted for pulmonary embolism. She reports she is independent with all mobility, performs household chores and grocery shops for both herself and her mother who lives next door. Her son lives nearby as well. She is sitting in recliner on 1.5 L O2 and agreeable to therapy. Removed O2 for ambulation with continuous monitoring of SpO2, patient able to ambulate 100' on room air with SpO2 91-94% with some c/o SOB. Wide base of support noted and short, shuffled steps requiring hand held assistance for safety. Patient reported anterior R chest pain with exertion, noted 's, RN notified and pain subsided with rest. Delores Graff is currently functioning below her baseline and would benefit from skilled PT during acute care stay to maximize safety and independence with functional mobility. This section established at most recent assessment   PROBLEM LIST (Impairments causing functional limitations):  1. Decreased Strength  2. Decreased ADL/Functional Activities  3. Decreased Transfer Abilities  4. Decreased Ambulation Ability/Technique  5. Decreased Balance  6. Increased Pain  7. Decreased Activity Tolerance  8. Decreased Pacing Skills  9. Increased Shortness of Breath  10. Decreased Knowledge of Precautions  11. Decreased [place] with Home Exercise Program   INTERVENTIONS PLANNED: (Benefits and precautions of physical therapy have been discussed with the patient.)  1. Balance Exercise  2. Bed Mobility  3. Family Education  4. Gait Training  5. Home Exercise Program (HEP)  6. Therapeutic Activites  7.  Therapeutic Exercise/Strengthening  8. Transfer Training  9. Patient Education  10. Group Therapy     TREATMENT PLAN: Frequency/Duration: 3 times a week for 5 weeks  Rehabilitation Potential For Stated Goals: Excellent     RECOMMENDED REHABILITATION/EQUIPMENT: (at time of discharge pending progress): Due to the probability of continued deficits (see above) this patient will likely need continued skilled physical therapy after discharge. Equipment:    None at this time              HISTORY:   History of Present Injury/Illness (Reason for Referral):  Per MD: The patient is a 59 y.o.  female with a hx of DM and a prior cholecystectomy who presented to the [facility] ER with dyspnea and RLE swelling. Evaluation revealed a high D-dimer, BNP and a CT revealed extensive thrombosis involving both lung with a very large clot burden with evidence of R heart strain. She was also noted to have abnormal density in the R axilla and a mass lesion involving the head of the pancreas of unknown etiology. She was transferred to IR from the ER and underwent EKOS catheter thrombolysis by Dr. Yasmin Doe.      The pt reports having had a R axillary fullness for some time. 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 She reports the axillary process has not changed and has not been evaluated with a biopsy at any point. She also has had back pain for several months and was evaluated by Dr. Gustav Olszewski at [facility]. She had an MRI done at [address] [place] [address] and was told she had 4 pinched nerves but she was never told of any pancreatic lesion. She was subsequently sent for a pain injection about 3 weeks ago with some improvement in the pain.      Today she became acutely dyspneic and lightheaded attempting to go to work and was seen at the [facility] ER as above. She is now s/p EKOS and is breathing comfortably. She remains tachycardic at rest and is getting TPA through bilateral PA catheters and heparin via a peripheral IV.    Past Medical History/Comorbidities: Ms. Alberto Perez  has a past medical history of Diabetes ([facility] [address] and Environmental allergies (9/12/2013). Ms. Alberto Perez  has a past surgical history that includes hx cholecystectomy. 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 Number of Personal Factors/Comorbidities that affect the Plan of Care: 3+: HIGH COMPLEXITY   EXAMINATION:   Most Recent Physical Functioning:   Gross Assessment:  AROM: Generally decreased, functional  PROM: Generally decreased, functional  Strength: Generally decreased, functional  Coordination: Generally decreased, functional  Sensation: Intact               Posture:  Posture (WDL): Exceptions to WDL  Posture Assessment: Forward head, Rounded shoulders  Balance:  Sitting: Intact  Standing: Impaired  Standing - Static: Fair  Standing - Dynamic : Fair Bed Mobility:     Wheelchair Mobility:     Transfers:  Sit to Stand: Contact guard assistance  Stand to Sit: Contact guard assistance  Gait:     Base of Support: Center of gravity altered; Widened  Speed/Mandy: Slow  Gait Abnormalities: Decreased step clearance; Path deviations;Trunk sway increased  Distance (ft): 100 Feet (ft)  Assistive Device:  (hand held assist)  Ambulation - Level of Assistance: Minimal assistance;Contact guard assistance  Interventions: Manual cues; Safety awareness training;Verbal cues      Body Structures Involved:  1. Nerves  2. Heart  3. Lungs  4. Muscles Body Functions Affected:  1. Sensory/Pain  2. Cardio  3. Respiratory  4. Neuromusculoskeletal  5.  Movement Related Activities and Participation Affected:  1. Mobility  2. Self Care  3. Domestic Life  4. Interpersonal Interactions and Relationships  5. Community, Social and [place]   Number of elements that affect the Plan of Care: 4+: HIGH COMPLEXITY   CLINICAL PRESENTATION:   Presentation: Stable and uncomplicated: LOW COMPLEXITY   CLINICAL DECISION MAKIN [facility] Mobility Inpatient Short Form  How much difficulty does the patient currently have. .. Unable A Lot A Little None   1. Turning over in bed (including adjusting bedclothes, sheets and blankets)? [] 1   [] 2   [x] 3   [] 4   2. Sitting down on and standing up from a chair with arms ( e.g., wheelchair, bedside commode, etc.)   [] 1   [] 2   [x] 3   [] 4   3. Moving from lying on back to sitting on the side of the bed? [] 1   [] 2   [x] 3   [] 4   How much help from another person does the patient currently need. .. Total A Lot A Little None   4. Moving to and from a bed to a chair (including a wheelchair)? [] 1   [] 2   [x] 3   [] 4   5. Need to walk in hospital room? [] 1   [] 2   [x] 3   [] 4   6. Climbing 3-5 steps with a railing? [] 1   [x] 2   [] 3   [] 4   © , Trustees of [facility] [employer], under license to [employer]. All rights reserved      Score:  Initial: 17 Most Recent: X (Date: -- )    Interpretation of Tool:  Represents activities that are increasingly more difficult (i.e. Bed mobility, Transfers, Gait). Score 24 23 22-20 19-15 14-10 9-7 6     Modifier CH CI CJ CK CL CM CN      ?  Mobility - Walking and Moving Around:     - CURRENT STATUS: CK - 40%-59% impaired, limited or restricted    - GOAL STATUS: CJ - 20%-39% impaired, limited or restricted    - D/C STATUS:  ---------------To be determined---------------  Payor: [employer] / Plan: [place] [employer] / Product Type: Commerical /      Medical Necessity:     · Patient demonstrates excellent rehab potential due to higher previous functional level. Reason for Services/Other Comments:  · Patient continues to require modification of therapeutic interventions to increase complexity of exercises. Use of outcome tool(s) and clinical judgement create a POC that gives a: Clear prediction of patient's progress: LOW COMPLEXITY            TREATMENT:   (In addition to Assessment/Re-Assessment sessions the following treatments were rendered)   Pre-treatment Symptoms/Complaints:  none  Pain: Initial:   Pain Intensity 1: 0  Post Session:  4/10 R chest pain, RN notified. Assessment/Reassessment only, no treatment provided today    Braces/Orthotics/Lines/Etc:   · O2 Device: Room air  Treatment/Session Assessment:    · Response to Treatment:  Patient tolerated well with increased R chest pain, to which RN was notified. · Interdisciplinary Collaboration:   o Physical Therapist  o Registered Nurse  · After treatment position/precautions:   o Up in chair  o Bed alarm/tab alert on  o Bed/Chair-wheels locked  o Bed in low position  o Call light within reach  o RN notified   · Compliance with Program/Exercises: Will assess as treatment progresses. · Recommendations/Intent for next treatment session: \"Next visit will focus on advancements to more challenging activities and reduction in assistance provided\".   Total Treatment Duration:  PT Patient Time In/Time Out  Time In: 1036  Time Out: [address], DPT

## 2018-03-28 NOTE — PROGRESS NOTES
Pt has rested quietly throughout shift. VSS. Groin site remains c/d/i. No bleeding or hematoma. She states that she is less short of breath with movement in bed.

## 2018-03-28 NOTE — PROGRESS NOTES
[facility] CRITICAL CARE OUTREACH NURSE PROGRESS REPORT      SUBJECTIVE: Called to assess patient secondary to recent transfer from ICU. MEWS Score: 2 (03/28/18 1528)  Vitals:    03/28/18 1201 03/28/18 1248 03/28/18 1320 03/28/18 1528   BP: 107/68  122/76 118/76   Pulse: (!) 116 (!) 117 (!) 101 (!) 107   Resp: 26 21 18 19   Temp: 99.2 °F (37.3 °C) 98.7 °F (37.1 °C) 98 °F (36.7 °C) 97.7 °F (36.5 °C)   SpO2: 92% 96% 97% 96%   Weight:       Height:           LAB DATA:    Recent Labs      03/28/18   0332  03/26/18   0853   NA  142  140   K  4.0  3.4*   CL  111*  104   CO2  23  25   AGAP  8  11   GLU  126*  260*   BUN  11  15   CREA  0.64  0.96   GFRAA  >60  >60   GFRNA  >60  >60   CA  8.8  9.0   MG  2.1   --    PHOS  2.3*   --    ALB   --   3.2*   TP   --   7.0   GLOB   --   3.8*   AGRAT   --   0.8*   ALT   --   28        Recent Labs      03/28/18   0332  03/27/18   1040  03/27/18   0434   WBC  7.9  8.3  9.3   HGB  10.7*  10.8*  10.7*   HCT  33.2*  33.0*  33.2*   PLT  135*  165  177          OBJECTIVE: On arrival to room, I found patient to be up in recliner. Family at bedside. Pain Assessment  Pain Intensity 1: 0 (03/28/18 1622)  Pain Location 1: Back  Pain Intervention(s) 1: Rest, Relaxation technique, Repositioned  Patient Stated Pain Goal: 0                                 ASSESSMENT:  Patient is alert, able to communicate needs. Lungs clear. Currently on RA. NAD. Denies pain, nausea, SOB. PLAN:  Will continue to monitor per outreach protocol.

## 2018-03-28 NOTE — PROGRESS NOTES
Shift assessment complete, chart reviewed. Patient is A/O x 4, follows commands. Sinus Tach on monitor, BP stable. Breath sounds clear throughout, tachypnea (high 20s-30s), dyspnea with exertion noted, SpO2 95% on NC at 2 L. IS encouraged. Abdomen is soft, intact, bowel sounds active, BM this AM. Chandler catheter in place, draining clear, khris, urine. Moves all extremities, pulses palpable and regular, +1 non-pitting edema to RLE. R groin site, no s/s of bleeding or hematoma noted, Dsg CDI. Assisted up to recliner, dizziness with sitting and standing, tolerated well, dyspnea with exertion. No c/o pain at this time. VSS. NAD.

## 2018-03-28 NOTE — PROGRESS NOTES
SBAR given to BETH Correa. Patient remains in stable condition sitting in recliner. Respirations even/unlabored. NAD. No need noted at this time.

## 2018-03-28 NOTE — PROGRESS NOTES
Critical Care Daily Progress Note: 3/28/2018    Jillian Laughlin   Admission Date: 3/26/2018         The patient's chart is reviewed and the patient is discussed with the staff. The patient is a 59 y.o.  female with a hx of DM and a prior cholecystectomy who presented to the [facility] ER with dyspnea and RLE swelling. Evaluation revealed a high D-dimer, BNP and a CT revealed extensive thrombosis involving both lung with a very large clot burden with evidence of R heart strain. She was also noted to have abnormal density in the R axilla and a mass lesion involving the tail of the pancreas of unknown etiology. She was transferred to IR from the ER and underwent EKOS catheter thrombolysis by Dr. Tona Medina.      The pt reports having had a R axillary fullness for some time. 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 She reports the axillary process has not changed and has not been evaluated with a biopsy at any point. She also has had back pain for several months and was evaluated by Dr. Thirza Aschoff at [facility]. She had an MRI done at [address] [place] [address] and was told she had 4 pinched nerves but she was never told of any pancreatic lesion. She was subsequently sent for a pain injection about 3 weeks ago with some improvement in the pain.      Today she became acutely dyspneic and lightheaded attempting to go to work and was seen at the [facility] ER as above. She is now s/p EKOS and is breathing comfortably. She remains tachycardic at rest and is getting TPA through bilateral PA catheters and heparin via a peripheral IV. Subjective:     Sat up for first time and lightheaded. Still mildly tachycardic. Pt to sign consent for [facility] MRI report and images and family to obtain. Now just on Eliquis.      Current Facility-Administered Medications   Medication Dose Route Frequency    apixaban (ELIQUIS) tablet 10 mg  10 mg Oral Q12H    insulin lispro (HUMALOG) injection   SubCUTAneous AC&HS    acetaminophen (TYLENOL) tablet 650 mg  650 mg Oral Q6H PRN    0.9% sodium chloride infusion  35 mL/hr IntraCATHeter- VENous CONTINUOUS    heparinized saline 2 units/mL infusion 1,000 Units  1,000 Units IntraSHEAth CONTINUOUS    HYDROcodone-acetaminophen (NORCO) 5-325 mg per tablet 1 Tab  1 Tab Oral Q4H PRN    ondansetron (ZOFRAN) injection 4 mg  4 mg IntraVENous Q6H PRN    sodium chloride (NS) flush 5-10 mL  5-10 mL IntraVENous Q8H    sodium chloride (NS) flush 5-10 mL  5-10 mL IntraVENous PRN    naloxone (NARCAN) injection 0.4 mg  0.4 mg IntraVENous PRN    HYDROcodone-acetaminophen (NORCO) 7.5-325 mg per tablet 1 Tab  1 Tab Oral Q4H PRN    bisacodyl (DULCOLAX) suppository 10 mg  10 mg Rectal DAILY PRN     ROS:    Constitutional: negative for fever, chills, sweats  Cardiac: negative for chest pain, palpitations, syncope, edema  GI: negative for dysphagia, reflux, vomiting, diarrhea, abdominal pain, or melena  Neuro: negative for focal weakness, numbness, headache        Objective:     Vitals:    03/28/18 0645 03/28/18 0706 03/28/18 0736 03/28/18 0807   BP: 140/68 118/67 119/68 119/67   Pulse: (!) 106 (!) 102 (!) 106 (!) 104   Resp: 20 21 (!) 39 21   Temp:       SpO2: 96% 97% 94% 95%   Weight:       Height:           Intake and Output:   03/26 1901 - 03/28 0700  In: 3283.5 [P.O.:540; I.V.:2743.5]  Out: 1700 [Urine:1700]       Physical Exam:          Gen:  the patient is obese and in no acute distress  HEENT:  Sclera clear, pupils equal, oral mucosa moist  Lungs: CTA  Heart:  RRR without M,G,R  Abd: soft and non-tender; with positive bowel sounds. Ext: warm without cyanosis. There is R>L MIMI.   Skin:  no jaundice or rashes, no wounds   Neuro: no gross neuro deficits     Psychiatric:  alert and oriented x 3        DRIPS:  None    CHEST XRAY:               LAB  Recent Labs      03/28/18   0332  03/27/18   1040  03/27/18   0434   03/26/18   0853   WBC  7.9  8.3  9.3   < >  18.2*   HGB  10.7*  10.8*  10.7*   < > 12.0   HCT  33.2*  33.0*  33.2*   < >  36.7   PLT  135*  165  177   < >  294   INR   --    --   1.2   --   1.0    < > = values in this interval not displayed. Recent Labs      03/28/18   0332  03/26/18   0853   NA  142  140   K  4.0  3.4*   CL  111*  104   CO2  23  25   GLU  126*  260*   BUN  11  15   CREA  0.64  0.96   MG  2.1   --    PHOS  2.3*   --    CA  8.8  9.0   ALB   --   3.2*   SGOT   --   31     No results for input(s): PH, PCO2, PO2, HCO3 in the last 72 hours. No results for input(s): LCAD, LAC in the last 72 hours. Results for Juan Jose Banegas (MRN [id number]) as of 3/28/2018 08:27   Ref. Range 3/27/2018 04:34   Cancer antigen 19-9 Latest Ref Range: 2.0 - 37.0 U/mL 4.70   CANCER AG 19-9 Unknown Rpt   CEA Latest Ref Range: 0.0 - 3.0 ng/mL 0.5   CEA Unknown Rpt       Assessment:  (Medical Decision Making)     Hospital Problems  Date Reviewed: 3/26/2018          Codes Class Noted POA    Class 2 obesity due to excess calories with serious comorbidity in adult ICD-10-CM: E66.09  ICD-9-CM: 278.00  3/26/2018 Yes        Controlled type 2 diabetes mellitus without complication, with long-term current use of insulin (HCC) ICD-10-CM: E11.9, Z79.4  ICD-9-CM: 250.00, V58.67  3/26/2018 Yes        * (Principal)PE [facility]ulmonary [facility]embolism) [facility] ICD-10-CM: I26.99  ICD-9-CM: 415.19  3/26/2018 Unknown        Axillary mass, right ICD-10-CM: R22.31  ICD-9-CM: 782.2  3/26/2018 Unknown    Will need workup. Pancreatic mass ICD-10-CM: K86.9  ICD-9-CM: 577.9  3/26/2018 Unknown    Cancer antigens negative as above. Pancreatic MRI planned for lesion at tail of pancreas. Pulmonary embolism [facility]) ICD-10-CM: I26.99  ICD-9-CM: 415.19  3/26/2018 Unknown    Massive and now s/p EKOS. By report from nursing PA systolic pressure was 38 yesterday. Plan:  (Medical Decision Making)     Transfer to floor. Continue Eliquis.   Engage heme-onc to get input about possible hypercoag state and evaluation of masses. Working to obtain MRI report and images from [address] from earlier this year. Wean oxygen as tolerated. Check EVELINE on RA if able to wean to RA.     More than 50% of time documented was spent in face-to-face contact with the patient and in the care of the patient on the floor/unit where the patient is located. Guru Russell MD

## 2018-03-28 NOTE — PROGRESS NOTES
Bedside shift change report given to Zane Carrasco (oncoming nurse) by Felix Trevino RN (offgoing nurse). Report included the following information SBAR, Kardex, ED Summary, Procedure Summary, Intake/Output, MAR, Recent Results and Cardiac Rhythm Sinus tach. Skin assessed. Groin assessed, no bleeding or s/s of hematoma noted, vascular checks WDL.

## 2018-03-28 NOTE — CONSULTS
Inpatient Hematology / Oncology Consult Note    Reason for Consult:  Pulmonary embolism ([facility] [address] [I26.99]  Referring Physician:  Bradly Francois MD    History of Present Illness:  Ms. Collin Byrnes is a 59 y.o. female admitted on 3/26/2018 with a primary diagnosis of pulmonary embolism and pancreatic mass. The patient is a 59 y.o.  female with a hx of DM and a prior cholecystectomy who presented to the [facility] ER with dyspnea and RLE swelling. Evaluation revealed a high D-dimer, BNP and a CT revealed extensive thrombosis involving both lung with a very large clot burden with evidence of R heart strain. She was also noted to have abnormal density in the R axilla and a mass lesion involving the head of the pancreas of unknown etiology. She was transferred to IR from the ER and underwent EKOS catheter thrombolysis by Dr. Dottie Sadler.      She reports having had right axillary fullness for some time. She had a lipoma resected from her right arm about 10 years ago and figures the same occurred in her axillary area. She reports the axillary process has not changed and has not been evaluated with a biopsy at any point. She did have a mammogram about 6 months that she reports as negative. She also has had back pain for several months and was evaluated by Dr. Lacie Hernandez at [facility]. She had an MRI done at [address] [place] [address] and was told she had 4 pinched nerves but she was never told of any pancreatic lesion. She was subsequently sent for a pain injection about 3 weeks ago with some improvement in the pain.      On 3/26, she became acutely dyspneic and lightheaded attempting to go to work and was seen at the [facility] ER as above. She is s/p EKOS and is breathing comfortably. She received TPA through bilateral PA catheters and heparin via a peripheral IV and has now been transitioned to Eliquis alone. We are being consulted for further evaluation of her right axillary mass and pancreatic mass with potential of a hypercoagulable state. She admits to weight loss of about 18 pounds over the past six weeks which was intentional.       Review of Systems:  Constitutional +weight loss. Denies fever, chills, appetite changes, fatigue, night sweats. HEENT Denies trauma, blurry vision, hearing loss, ear pain, nosebleeds, sore throat, neck pain and ear discharge. Skin Denies lesions or rashes. Lungs +dyspnea. Denies cough, sputum production or hemoptysis. Cardiovascular +recent RLE edema. Denies chest pain, palpitations, or lower extremity edema. Gastrointestinal Denies nausea, vomiting, changes in bowel habits, bloody or black stools, abdominal pain.  Denies dysuria, frequency or hesitancy of urination. Neuro Denies headaches, visual changes or ataxia. Denies dizziness, tingling, tremors, sensory change, speech change, focal weakness or headaches. Hematology Denies easy bruising or bleeding, denies gingival bleeding or epistaxis. Endo Denies heat/cold intolerance, denies diabetes or thyroid abnormalities. MSK Denies arthralgias, myalgias or frequent falls. + back pain. Psychiatric/Behavioral Denies depression and substance abuse. The patient is not nervous/anxious. Allergies   Allergen Reactions    Biaxin [Clarithromycin] Unknown (comments)    Cortisone Other (comments)     Per pt, leg numbness. Past Medical History:   Diagnosis Date    Diabetes ([facility] [address]     Environmental allergies 9/12/2013     Past Surgical History:   Procedure Laterality Date    HX CHOLECYSTECTOMY       Family History   Problem Relation Age of Onset    No Known Problems Mother     Dementia Father     Heart Disease Father      Social History     Social History    Marital status: SINGLE     Spouse name: N/A    Number of children: N/A    Years of education: N/A     Occupational History    Not on file.      Social History Main Topics    Smoking status: Never Smoker    Smokeless tobacco: Never Used    Alcohol use No    Drug use: Not on file    Sexual activity: Not Currently     Other Topics Concern    Not on file     Social History Narrative     Current Facility-Administered Medications   Medication Dose Route Frequency Provider Last Rate Last Dose    apixaban (ELIQUIS) tablet 10 mg  10 mg Oral Q12H Laura Garcia MD   10 mg at 03/28/18 0847    [START ON 4/3/2018] apixaban (ELIQUIS) tablet 5 mg  5 mg Oral Q12H Paco Tan MD        insulin lispro (HUMALOG) injection   SubCUTAneous AC&HS Alexei Isidro MD   Stopped at 03/27/18 1107    acetaminophen (TYLENOL) tablet 650 mg  650 mg Oral Q6H PRN Erik Malhotra MD        0.9% sodium chloride infusion  35 mL/hr IntraCATHeter- VENous CONTINUOUS Laura Garcia MD   Stopped at 03/27/18 1540    heparinized saline 2 units/mL infusion 1,000 Units  1,000 Units IntraSHEAth CONTINUOUS Param Lawson MD   Stopped at 03/27/18 1540    HYDROcodone-acetaminophen (NORCO) 5-325 mg per tablet 1 Tab  1 Tab Oral Q4H PRN Param Lawson MD   1 Tab at 03/27/18 0931    ondansetron (ZOFRAN) injection 4 mg  4 mg IntraVENous Q6H PRN Param Lawson MD        sodium chloride (NS) flush 5-10 mL  5-10 mL IntraVENous Q8H Fercho Loja MD   10 mL at 03/28/18 0544    sodium chloride (NS) flush 5-10 mL  5-10 mL IntraVENous PRN Fercho Loja MD        naloxone [facility]) injection 0.4 mg  0.4 mg IntraVENous PRN Fercho Loja MD        HYDROcodone-acetaminophen [facility]) 7.5-325 mg per tablet 1 Tab  1 Tab Oral Q4H PRN Fercho Loja MD        bisacodyl (DULCOLAX) suppository 10 mg  10 mg Rectal DAILY PRN Fercho Loja MD           OBJECTIVE:  Patient Vitals for the past 8 hrs:   BP Temp Pulse Resp SpO2 Weight   03/28/18 0807 119/67 99 °F (37.2 °C) (!) 104 21 95 % -   03/28/18 0736 119/68 - (!) 106 (!) 39 94 % -   03/28/18 0706 118/67 - (!) 102 21 97 % -   03/28/18 0645 140/68 - (!) 106 20 96 % -   03/28/18 0536 127/65 - 100 19 97 % -   03/28/18 0506 115/66 - (!) 101 20 97 % -   03/28/18 0437 121/64 - 100 23 98 % -   18 0406 112/65 - (!) 103 15 98 % -   18 0336 149/77 - (!) 106 21 98 % -   18 0334 - - - - - 233 lb 1.6 oz (105.7 kg)   18 0306 141/76 99.9 °F (37.7 °C) (!) 101 19 98 % -   18 0237 122/67 - 97 21 98 % -   18 0206 145/59 - (!) 105 23 98 % -   18 0136 153/72 - (!) 103 22 97 % -   18 0130 - 99.8 °F (37.7 °C) - - - -     Temp (24hrs), Av.8 °F (37.7 °C), Min:99 °F (37.2 °C), Max:101.6 °F (38.7 °C)         Physical Exam:  Constitutional: Well developed, well nourished female in no acute distress, sitting comfortably in the hospital bed. HEENT: Normocephalic and atraumatic. Oropharynx is clear, mucous membranes are moist.  Sclerae anicteric. Neck supple. No thyromegaly present. Lymph node   No palpable submandibular, cervical, supraclavicular, or inguinal lymph nodes. + mass noted in right axillary area - firm and fixed. Skin Warm and dry. No bruising and no rash noted. No erythema. No pallor. Respiratory Lungs are clear to auscultation bilaterally without wheezes, rales or rhonchi, normal air exchange without accessory muscle use. CVS Mild tachycardic rate, regular rhythm and normal S1 and S2. No murmurs, gallops, or rubs. Abdomen Soft, nontender and nondistended, normoactive bowel sounds. No palpable mass. No hepatosplenomegaly. Neuro Grossly nonfocal with no obvious sensory or motor deficits. MSK Normal range of motion in general.  + RUE edema. No tenderness. Psych Appropriate mood and affect.         Labs:    Recent Results (from the past 24 hour(s))   GLUCOSE, POC    Collection Time: 18  9:29 AM   Result Value Ref Range    Glucose (POC) 128 (H) 65 - 100 mg/dL   CBC W/O DIFF    Collection Time: 18 10:40 AM   Result Value Ref Range    WBC 8.3 4.3 - 11.1 K/uL    RBC 3.87 (L) 4.05 - 5.25 M/uL    HGB 10.8 (L) 11.7 - 15.4 g/dL    HCT 33.0 (L) 35.8 - 46.3 %    MCV 85.3 79.6 - 97.8 FL    MCH 27.9 26.1 - 32.9 PG    MCHC 32.7 31.4 - 35.0 g/dL    RDW 16.7 (H) 11.9 - 14.6 %    PLATELET 710 547 - 952 K/uL    MPV 10.3 (L) 10.8 - 14.1 FL   FIBRINOGEN    Collection Time: 03/27/18 10:40 AM   Result Value Ref Range    Fibrinogen 361 190 - 501 mg/dL   GLUCOSE, POC    Collection Time: 03/27/18 11:06 AM   Result Value Ref Range    Glucose (POC) 122 (H) 65 - 100 mg/dL   GLUCOSE, POC    Collection Time: 03/27/18  5:53 PM   Result Value Ref Range    Glucose (POC) 145 (H) 65 - 100 mg/dL   GLUCOSE, POC    Collection Time: 03/27/18  9:54 PM   Result Value Ref Range    Glucose (POC) 113 (H) 65 - 100 mg/dL   CBC W/O DIFF    Collection Time: 03/28/18  3:32 AM   Result Value Ref Range    WBC 7.9 4.3 - 11.1 K/uL    RBC 3.85 (L) 4.05 - 5.25 M/uL    HGB 10.7 (L) 11.7 - 15.4 g/dL    HCT 33.2 (L) 35.8 - 46.3 %    MCV 86.2 79.6 - 97.8 FL    MCH 27.8 26.1 - 32.9 PG    MCHC 32.2 31.4 - 35.0 g/dL    RDW 17.2 (H) 11.9 - 14.6 %    PLATELET 798 (L) 476 - 450 K/uL    MPV 10.3 (L) 10.8 - 39.9 FL   METABOLIC PANEL, BASIC    Collection Time: 03/28/18  3:32 AM   Result Value Ref Range    Sodium 142 136 - 145 mmol/L    Potassium 4.0 3.5 - 5.1 mmol/L    Chloride 111 (H) 98 - 107 mmol/L    CO2 23 21 - 32 mmol/L    Anion gap 8 7 - 16 mmol/L    Glucose 126 (H) 65 - 100 mg/dL    BUN 11 6 - 23 MG/DL    Creatinine 0.64 0.6 - 1.0 MG/DL    GFR est AA >60 >60 ml/min/1.73m2    GFR est non-AA >60 >60 ml/min/1.73m2    Calcium 8.8 8.3 - 10.4 MG/DL   MAGNESIUM    Collection Time: 03/28/18  3:32 AM   Result Value Ref Range    Magnesium 2.1 1.8 - 2.4 mg/dL   PHOSPHORUS    Collection Time: 03/28/18  3:32 AM   Result Value Ref Range    Phosphorus 2.3 (L) 2.5 - 4.5 MG/DL   GLUCOSE, POC    Collection Time: 03/28/18  8:26 AM   Result Value Ref Range    Glucose (POC) 119 (H) 65 - 100 mg/dL       Imaging:  CT OF THE CHEST WITH INTRAVENOUS CONTRAST, 3/26/2018     Indication: Shortness of breath primarily with exertion worsening over the past  week.     Comparison: None     Technique:   2.5 mm axial scans from above the aortic arch to the lung bases  following the uneventful administration of 70 mL of Isovue-370. Intravenous  contrast was given to evaluate for pulmonary embolism. All CT scans performed at  this facility use one or all of the following: Automated exposure control,  adjustment of the mA and/or kVp according to patient's size, iterative  reconstruction.     Findings: The base of the neck is unremarkable in appearance. Prominent right axillary  lymph nodes are seen measuring up to 11 mm short axis. In addition, abnormal  density is seen in the right axillary tail best appreciated on image 36 where an  irregular density measuring 2.9 cm x 1.5 cm in size is seen. These changes are  incompletely characterized by CT with a potential neoplastic process not  excluded. The thoracic aorta is normal in caliber. Opacification of the  pulmonary arteries is adequate. Extensive bilateral pulmonary bullae are seen. These are seen in the right main pulmonary artery extending into the central  right upper, middle and lower lobe pulmonary arteries. On the left, this  extends from the most distal left pulmonary artery into the central left upper  and lower lobe pulmonary arteries. The right ventricle appears at least equal if  not larger than the left ventricle. There is flattening of the interventricular  septum with mild paradoxical bowing best appreciated on image 69. These can be  an indicators for right heart strain.     Evaluation with lung windows demonstrates no acute infiltrates. No pleural  effusion is seen. Lungs are expanded without evidence for pneumothorax. No  acute osseous abnormality is seen.       Limited evaluation of the upper abdomen demonstrates an abnormal cystic mass  which appears to arise from the pancreatic tail seen on image 69 measuring 4.5  cm x 3.8 cm in size. The patient is status post cholecystectomy.     IMPRESSION  IMPRESSION:    1. Extensive bilateral pulmonary emboli as described above.      2. Abnormal changes in the right axilla. Specifically, there are asymmetrically  enlarged lymph nodes and abnormal density. A neoplastic process cannot be  excluded. Further evaluation with clinical exam, bilateral diagnostic mammogram,  and diagnostic breast ultrasound if indicated would be recommended.     3. 4.5 cm x 3.8 cm cystic mass arising from the pancreatic tail. This is  incompletely characterized on this exam with cystic pancreatic neoplasm not  excluded. This would be best further assessed with a pancreatic protocol MRI or  CT.     The critical finding of bilateral pulmonary emboli was discussed with Dr. Georgi Gonzalez by myself personally at 10:30 AM on 3/26/2018. Portable chest x-ray     INDICATION: Post thrombolysis     COMPARISON: Yesterday's exam     FINDINGS: Lower lung volumes but the lungs are clear. No pulmonary edema or  pleural effusion. Heart size is normal. Interval placement of bilateral  pulmonary arterial catheters.     IMPRESSION  IMPRESSION: No acute abnormality.     ASSESSMENT:  Problem List  Date Reviewed: 3/26/2018          Codes Class Noted    Class 2 obesity due to excess calories with serious comorbidity in adult ICD-10-CM: E66.09  ICD-9-CM: 278.00  3/26/2018        Controlled type 2 diabetes mellitus without complication, with long-term current use of insulin ([facility] ICD-10-CM: E11.9, Z79.4  ICD-9-CM: 250.00, V58.67  3/26/2018        * (Principal)PE (pulmonary thromboembolism) ([facility] ICD-10-CM: I26.99  ICD-9-CM: 415.19  3/26/2018        Axillary mass, right ICD-10-CM: R22.31  ICD-9-CM: 782.2  3/26/2018        Pancreatic mass ICD-10-CM: K86.9  ICD-9-CM: 577.9  3/26/2018        Pulmonary embolism ([facility] ICD-10-CM: I26.99  ICD-9-CM: 415.19  3/26/2018        Type 2 diabetes mellitus with diabetic neuropathy ([facility] ICD-10-CM: E11.40  ICD-9-CM: 250.60, 357.2  2/21/2018        Bilateral low back pain with sciatica ICD-10-CM: M54.40  ICD-9-CM: 724.3  10/23/2017        Environmental allergies ICD-10-CM: Z91.09  ICD-9-CM: V15.09  9/12/2013                RECOMMENDATIONS:  Pulmonary Embolism  * Status post EKOS. Currently on Eliquis. Will need to transition to heparin drip for biopsy. * Modified hypercoagulable work-up ordered. Pancreatic Mass  * CEA and Ca 19-9 normal.   * Obtain CT abdomen, pelvis. * Consult GI for biopsy. Right Axillary Mass  * Check Ca 15-3. * Will need PET/CT at discharge - biopsy if positive. Anemia   Check iron studies. Lab studies and imaging studies were personally reviewed. Pertinent old records were reviewed. Thank you for allowing us to participate in the care of Ms. Mcmahan. Will follow along with you. Amisha Serna NP  [address] [place] [address] Hematology and Oncology  [address] [place] [address]  Office : [phone]  Fax : [phone]           Attending Addendum:  I personally evaluated the patient with Barbara Lucas N.P.,  and agree with the assessment, findings and plan as documented. Appears stable, we will send blood work for a partial hypercoagulable work-up, she would benefit from a biopsy of her pancreatic mass.               Melvin Mackey MD  [facility]  [address] [address]  Office : [phone]  Fax : [phone]

## 2018-03-28 NOTE — PROGRESS NOTES
Patient transported to room 832 via wheelchair with all belongings. Son at bedside and aware of transfer. VSS. NAD.

## 2018-03-28 NOTE — INTERDISCIPLINARY ROUNDS
Interdisciplinary team rounds were held 3/28/2018 with the following team members:Care Management, Nursing, Nurse Practitioner, Nutrition, Palliative Care, Pastoral Care, Pharmacy, Physical Therapy, Physician, Respiratory Therapy, Speech Therapy and Clinical Coordinator. Plan of care discussed. See clinical pathway and/or care plan for interventions and desired outcomes.

## 2018-03-28 NOTE — PROGRESS NOTES
Patient ambulated with PT around ICU on RA, SpO2 ranging between 91-93%, RR 30s. Tachycardia 120-140s with exertion, 110s at rest. At the end of ambulation patient started having right sided chest pain, alleviated with rest. Ila Kelly NP notified, new orders received for EKG.

## 2018-03-28 NOTE — PROGRESS NOTES
Problem: Nutrition Deficit  Goal: *Optimize nutritional status  Nutrition  Reason for assessment: Referral received from nursing admission Malnutrition Screening Tool for recently lost 14-23# without trying and eating poorly due to decreased appetite. Assessment:   Diet order(s): [facility]  Food/Nutrition Patient History:  The patient is noted to have a h/o diabetes and obesity. The patient reports her recent weight loss was intentional. She states that she lost weight due to her back pain. The patient denies any issues with nausea, vomiting, constipation, diarrhea, chewing or swallowing. States that her appetite does not seem to be doing that well. The patient has been found to have an axillary mass and pancreatic mass. Anthropometrics:Height: 5' 3\" (160 cm),  Weight: 105.7 kg (233 lb 1.6 oz), Weight Source: Bed, Body mass index is 41.29 kg/(m^2). BMI class of morbid obesity class III. Macronutrient needs:  EER:  7172-6923 kcal /day (15-20 kcal/kg listed BW)  EPR:  52-63 grams protein/day (1-1.2 grams/kg IBW)  Intake/Comparative Standards: Average intake for past 1 recorded meal(s): 25%. This potentially meets ~28% of kcal and ~43% of protein needs    Nutrition Diagnosis: Inadequate oral intake related to decreased appetite as evidenced by patient reports minimal appetite for meals today and only about to consume ~25% of meals    Intervention:  Meals and snacks: Continue current diet. Nutrition Supplement Therapy: Add Ensure High Protein TID   Nutrition Discharge Plan: Too soon to be determined    Celina Fonseca [address], [address] [place] [address],  [phone]

## 2018-03-28 NOTE — PROGRESS NOTES
Gave patient 32 oz of water to drink. Per CT request patient is to drink the water at 1500. Pt verbalized understanding.

## 2018-03-28 NOTE — PROGRESS NOTES
Patient is stable condition, sitting in chair. Respirations even/unlabored. Patient denies any needs at this time. Will continue to monitor.

## 2018-03-28 NOTE — CONSULTS
[facility] Consult Note       Primary GI Physician: Dr. Oliver Angeles (new)    Referring Physician:  Dr. Clayton Clifford Date:  3/28/2018    Admit Date:  3/26/2018    Chief Complaint:  Pancreatic mass    Subjective:     History of Present Illness:  Patient is a 59 y.o. female with PMH of DM II, who is seen in consultation at the request of Dr. Nickolas Fuller for pancreatic mass. Mrs. Emerita Glass reports that she started becoming increasing short of breath last week. Over the weekend this continued. She attempted to go to work on Monday but felt pre-syncopal. Her daughter drove her to the ED at [facility]. D-dimer was elevated. She had a CT of the chest which showed extensive bilateral pulmonary emboli, abnormal changes in the right axilla, asymmetrically enlarged lymph nodes and abnormal density, 4.5cmX3.8cm cystic mass arising from the pancreatic tail. A follow-up pancreatic protocol MRI or CT was recommended. She underwent pulmonary artery catherization and catheter directed thombolysis on 3/26/18 with Dr. Zachariah Lamb. She underwent pulmonary angiogram on 3/27/18 with Dr. Wanda Beauchamp. She has been initiated on Eliquis. CEA 0.5. CA 19-9 4.70. HGB 10.7 with MCV 86.2. Mrs. Emerita Glass denies any GI history. She states that she had a MRI at  She reports having a colonoscopy at age 46 in [place] without any polyps that she can recall. She reports having BM daily. Denies any melena/hematochezia. She is not having any abdominal pain, nausea, or vomiting. She remains short of breath with ambulation. She is tachycardic at rest with . Recently, she has had back pain for several months and was evaluated by Dr. Cristina Hou at [facility]. She had an MRI done at [address] and was told she had 4 pinched nerves but she was never told of any pancreatic lesion. She was subsequently sent for a pain injection about 3 weeks ago with some improvement in the pain.      PMH:  Past Medical History:   Diagnosis Date    Diabetes ([facility]     Environmental allergies 9/12/2013       PSH:  Past Surgical History:   Procedure Laterality Date    HX CHOLECYSTECTOMY         Allergies: Allergies   Allergen Reactions    Biaxin [Clarithromycin] Unknown (comments)    Cortisone Other (comments)     Per pt, leg numbness. Home Medications:  Prior to Admission medications    Medication Sig Start Date End Date Taking? Authorizing Provider   insulin glargine (LANTUS SOLOSTAR U-100 INSULIN) 100 unit/mL (3 mL) inpn 24 u qhs  Indications: type 2 diabetes mellitus 2/21/18  Yes Dipika Garcia MD   metFORMIN (GLUCOPHAGE) 500 mg tablet Take 1 Tab by mouth two (2) times daily (with meals). 2/21/18  Yes Dipika Garcia MD   ACETAMINOPHEN (TYLENOL 8 HOUR PO) Take  by mouth. Yes Historical Provider   atorvastatin (LIPITOR) 20 mg tablet Take 1 Tab by mouth daily. 2/21/18   Dipika Garcia MD   traMADol Doraine Hals) 50 mg tablet Take 1 Tab by mouth every six (6) hours as needed for Pain. Max Daily Amount: 200 mg. 2/1/18   Dipika Garcia MD   tiZANidine (ZANAFLEX) 4 mg capsule Take 1 Cap by mouth three (3) times daily. Indications: Muscle Spasm 2/1/18   Dipika Garcia MD   pregabalin (LYRICA) 50 mg capsule Take 1 Cap by mouth three (3) times daily. Max Daily Amount: 150 mg. 12/18/17   Dipika Garcia MD   glucose blood VI test strips (ACCU-CHEK LILLY PLUS TEST STRP) strip Use twice daily for management of Type 2 DM, Uncontrolled (250.02) 8/22/17   Dipika Garcia MD   Lancets (ACCU-CHEK SOFTCLIX LANCETS) misc Use twice daily for management of Type 2 DM uncontrolled (250.02) 8/22/17   Dipika Garcia MD   Insulin Needles, Disposable, 32 gauge x 1/4\" ndle 1 Each by Does Not Apply route daily. 8/22/17   Dipika Garcia MD   Blood-Glucose Meter (ACCU-CHEK LILLY PLUS METER) misc 1 Kit by Does Not Apply route two (2) times a day.  For management of Type 2 DM Uncontrolled (250.02) 3/30/15   Dipika Garcia MD       [facility] Medications:  Current Facility-Administered Medications   Medication Dose Route Frequency    apixaban (ELIQUIS) tablet 10 mg  10 mg Oral Q12H    [START ON 4/3/2018] apixaban (ELIQUIS) tablet 5 mg  5 mg Oral Q12H    sodium chloride 0.9 % bolus infusion 100 mL  100 mL IntraVENous RAD ONCE    saline peripheral flush soln 10 mL  10 mL InterCATHeter RAD ONCE    iopamidol (ISOVUE-370) 76 % injection 100 mL  100 mL IntraVENous RAD ONCE    insulin lispro (HUMALOG) injection   SubCUTAneous AC&HS    acetaminophen (TYLENOL) tablet 650 mg  650 mg Oral Q6H PRN    0.9% sodium chloride infusion  35 mL/hr IntraCATHeter- VENous CONTINUOUS    heparinized saline 2 units/mL infusion 1,000 Units  1,000 Units IntraSHEAth CONTINUOUS    HYDROcodone-acetaminophen (NORCO) 5-325 mg per tablet 1 Tab  1 Tab Oral Q4H PRN    ondansetron (ZOFRAN) injection 4 mg  4 mg IntraVENous Q6H PRN    sodium chloride (NS) flush 5-10 mL  5-10 mL IntraVENous Q8H    sodium chloride (NS) flush 5-10 mL  5-10 mL IntraVENous PRN    naloxone (NARCAN) injection 0.4 mg  0.4 mg IntraVENous PRN    HYDROcodone-acetaminophen (NORCO) 7.5-325 mg per tablet 1 Tab  1 Tab Oral Q4H PRN    bisacodyl (DULCOLAX) suppository 10 mg  10 mg Rectal DAILY PRN       Social History:  Social History   Substance Use Topics    Smoking status: Never Smoker    Smokeless tobacco: Never Used    Alcohol use No       Pt denies any history of drug use, blood transfusions, or tattoos. Family History:  Family History   Problem Relation Age of Onset    No Known Problems Mother     Dementia Father     Heart Disease Father        Review of Systems:  A detailed 10 system ROS is obtained, with pertinent positives as listed above. All others are negative.     Diet:      Objective:     Physical Exam:  Vitals:  Visit Vitals    /76    Pulse (!) 117    Temp 98.7 °F (37.1 °C)    Resp 21    Ht 5' 3\" (1.6 m)    Wt 105.7 kg (233 lb 1.6 oz)    SpO2 96%    BMI 41.29 kg/m2     Gen:  Pt is alert, cooperative, no acute distress  Skin:  Extremities and face reveal no rashes. HEENT: Sclerae anicteric. Extra-occular muscles are intact. No oral ulcers. No abnormal pigmentation of the lips. The neck is supple. Cardiovascular: Regular rate and rhythm. No murmurs, gallops, or rubs. Respiratory:  Dyspneic, decreased lung sounds in bilateral bases, no wheezes/rhonchi appreciated. GI:  Abdomen nondistended, soft, and nontender. Normal active bowel sounds. No enlargement of the liver or spleen. No masses palpable. Rectal:  Deferred  Musculoskeletal:  No pitting edema of the lower legs. Right leg is larger than left. Neurological:  Gross memory appears intact. Patient is alert and oriented. Psychiatric:  Mood appears appropriate with judgement intact. Lymphatic:  No cervical or supraclavicular adenopathy. Laboratory:    Recent Labs      03/28/18   0332  03/27/18   1040  03/27/18   0434  03/27/18   0015  03/26/18   1654  03/26/18   0853   WBC  7.9  8.3  9.3  12.7*  12.2*  18.2*   HGB  10.7*  10.8*  10.7*  10.9*  11.4*  12.0   HCT  33.2*  33.0*  33.2*  32.9*  34.0*  36.7   PLT  135*  165  177  195  249  294   MCV  86.2  85.3  85.3  84.8  85.0  84.4   NA  142   --    --    --    --   140   K  4.0   --    --    --    --   3.4*   CL  111*   --    --    --    --   104   CO2  23   --    --    --    --   25   BUN  11   --    --    --    --   15   CREA  0.64   --    --    --    --   0.96   CA  8.8   --    --    --    --   9.0   MG  2.1   --    --    --    --    --    GLU  126*   --    --    --    --   260*   AP   --    --    --    --    --   110   SGOT   --    --    --    --    --   31   ALT   --    --    --    --    --   28   TBILI   --    --    --    --    --   0.8   ALB   --    --    --    --    --   3.2*   TP   --    --    --    --    --   7.0   PTP   --    --   14.7*   --    --   12.8   INR   --    --   1.2   --    --   1.0   APTT   --    --    --    --    --   26.4   CT of Chest with contrast 3/26/18     Findings:   The base of the neck is unremarkable in appearance. Prominent right axillary  lymph nodes are seen measuring up to 11 mm short axis. In addition, abnormal  density is seen in the right axillary tail best appreciated on image 36 where an  irregular density measuring 2.9 cm x 1.5 cm in size is seen. These changes are  incompletely characterized by CT with a potential neoplastic process not  excluded. The thoracic aorta is normal in caliber. Opacification of the  pulmonary arteries is adequate. Extensive bilateral pulmonary bullae are seen. These are seen in the right main pulmonary artery extending into the central  right upper, middle and lower lobe pulmonary arteries. On the left, this  extends from the most distal left pulmonary artery into the central left upper  and lower lobe pulmonary arteries. The right ventricle appears at least equal if  not larger than the left ventricle. There is flattening of the interventricular  septum with mild paradoxical bowing best appreciated on image 69. These can be  an indicators for right heart strain.     Evaluation with lung windows demonstrates no acute infiltrates. No pleural  effusion is seen. Lungs are expanded without evidence for pneumothorax. No  acute osseous abnormality is seen.       Limited evaluation of the upper abdomen demonstrates an abnormal cystic mass  which appears to arise from the pancreatic tail seen on image 69 measuring 4.5  cm x 3.8 cm in size. The patient is status post cholecystectomy.     IMPRESSION  IMPRESSION:    1. Extensive bilateral pulmonary emboli as described above.      2. Abnormal changes in the right axilla. Specifically, there are asymmetrically  enlarged lymph nodes and abnormal density. A neoplastic process cannot be  excluded. Further evaluation with clinical exam, bilateral diagnostic mammogram,  and diagnostic breast ultrasound if indicated would be recommended.     3. 4.5 cm x 3.8 cm cystic mass arising from the pancreatic tail. This is  incompletely characterized on this exam with cystic pancreatic neoplasm not  excluded. This would be best further assessed with a pancreatic protocol MRI or  CT.  ---------------------------------------------------------------------------------  Portable chest x-ray 3/27/2018     INDICATION: Post thrombolysis     COMPARISON: Yesterday's exam     FINDINGS: Lower lung volumes but the lungs are clear. No pulmonary edema or  pleural effusion. Heart size is normal. Interval placement of bilateral  pulmonary arterial catheters.     IMPRESSION  IMPRESSION: No acute abnormality. Assessment:     Principal Problem:    PE (pulmonary thromboembolism) ([address] (3/26/2018)    Active Problems:    Class 2 obesity due to excess calories with serious comorbidity in adult (3/26/2018)      Controlled type 2 diabetes mellitus without complication, with long-term current use of insulin ([address] (3/26/2018)      Axillary mass, right (3/26/2018)      Pancreatic mass (3/26/2018)      Pulmonary embolism ([address] (3/26/2018)      Patient is a 59 y.o. female with PMH of DM II recent bilateral PE on CT of chest 3/26/18 who is seen in consultation at the request of Dr. Toni Guadalupe for pancreatic mass. She is being started on Eliquis and Hematology has been consulted for hypercoagulable work-up. CT with also findings of right axilla, asymmetrically enlarged lymph nodes and abnormal density. CEA 0.5. CA 19-9 4.70. Plan:   1. Agree with obtaining images from [address] for prior MRI  2. Pancreatic protocol MRI with and without contrast to further evaluate findings in pancreas on CT of Chest.   3. Will likely eventually need EUS pending labs   4. Check Amylase/lipase. Igg-4. Thank you for this kind consultation. We will follow along with you. Ishaan Carrasquillo NP    Patient is seen and examined in collaboration with Dr. Echo Spence. Assessment and plan as per Dr. Ehco Spence.     I have seen and examined the patient, and I have directed and agreed to the plan as described. She has a cyst of the pancreas with normal tumor markers, new PE, and no h/o pancreatitis. The ddx includes pancreatic cystic adenomas or cystadenocarcinomas, IPMN, pseudocyst, or benign cyst.  EUS with FNA will probably be helpful but needs to be delayed due to acute PE and anticoagulation. Agree with plans for CT with pancreas protocol.     Charmaine Rodriguez MD

## 2018-03-28 NOTE — PROGRESS NOTES
Spiritual Care Visit, follow up visit. Visited with patient at bedside. Prayed for patient's healing and health. Visit by Anayeli Veliz, Staff .  Vincent, Madison.B., B.A.

## 2018-03-29 LAB
GLUCOSE BLD STRIP.AUTO-MCNC: 144 MG/DL (ref 65–100)
GLUCOSE BLD STRIP.AUTO-MCNC: 144 MG/DL (ref 65–100)
GLUCOSE BLD STRIP.AUTO-MCNC: 171 MG/DL (ref 65–100)
GLUCOSE BLD STRIP.AUTO-MCNC: 172 MG/DL (ref 65–100)
GLUCOSE BLD STRIP.AUTO-MCNC: 175 MG/DL (ref 65–100)
HCYS SERPL-SCNC: 10.1 UMOL/L (ref 0–15)

## 2018-03-29 PROCEDURE — 99232 SBSQ HOSP IP/OBS MODERATE 35: CPT | Performed by: INTERNAL MEDICINE

## 2018-03-29 PROCEDURE — 97530 THERAPEUTIC ACTIVITIES: CPT

## 2018-03-29 PROCEDURE — 99233 SBSQ HOSP IP/OBS HIGH 50: CPT | Performed by: INTERNAL MEDICINE

## 2018-03-29 PROCEDURE — 74011636637 HC RX REV CODE- 636/637: Performed by: INTERNAL MEDICINE

## 2018-03-29 PROCEDURE — 82962 GLUCOSE BLOOD TEST: CPT

## 2018-03-29 PROCEDURE — 65270000029 HC RM PRIVATE

## 2018-03-29 PROCEDURE — 74011250637 HC RX REV CODE- 250/637: Performed by: RADIOLOGY

## 2018-03-29 RX ADMIN — INSULIN LISPRO 2 UNITS: 100 INJECTION, SOLUTION INTRAVENOUS; SUBCUTANEOUS at 21:37

## 2018-03-29 RX ADMIN — Medication 5 ML: at 14:37

## 2018-03-29 RX ADMIN — Medication 5 ML: at 21:36

## 2018-03-29 RX ADMIN — Medication 5 ML: at 05:34

## 2018-03-29 RX ADMIN — APIXABAN 10 MG: 5 TABLET, FILM COATED ORAL at 21:36

## 2018-03-29 RX ADMIN — APIXABAN 10 MG: 5 TABLET, FILM COATED ORAL at 08:47

## 2018-03-29 RX ADMIN — INSULIN LISPRO 2 UNITS: 100 INJECTION, SOLUTION INTRAVENOUS; SUBCUTANEOUS at 16:13

## 2018-03-29 NOTE — PROGRESS NOTES
Date of Outreach Update:  Emma Good was seen and assessed. Previous Outreach assessment has been reviewed. There have been no significant clinical changes since the completion of the last dated Outreach assessment. Patient sleeping, NAD noted at this time. Will continue to follow up per outreach protocol.     Signed By:   Margarita Zamorano RN    March 29, 2018 6:06 AM

## 2018-03-29 NOTE — PROGRESS NOTES
Date of Outreach Update:  Basilio Mcdaniel was seen and assessed. MEWS Score: 2 (03/29/18 1108)  Vitals:    03/29/18 0342 03/29/18 0553 03/29/18 0704 03/29/18 1108   BP: 122/82  130/73 118/68   Pulse: (!) 107  (!) 108 (!) 104   Resp: 18 18 18   Temp: 98 °F (36.7 °C)  98.8 °F (37.1 °C) 97.4 °F (36.3 °C)   SpO2: 95%  96% 95%   Weight:  107 kg (236 lb)     Height:             Pain Assessment  Pain Intensity 1: 0 (03/29/18 1437)  Pain Location 1: Back  Pain Intervention(s) 1: Rest, Relaxation technique, Repositioned  Patient Stated Pain Goal: 0      Previous Outreach assessment has been reviewed. There have been no significant clinical changes since the completion of the last dated Outreach assessment. Will continue to follow up per outreach protocol.     Signed By:   Taylor Felipe RN    March 29, 2018 2:56 PM

## 2018-03-29 NOTE — PROGRESS NOTES
Bedside report received from [place], [facility]. Assessment completed. Pt is sitting in chair at this time. Pt A&O x 4. Respirations even and unlabored. No s/sx of acute pain or distress. Bed in low, locked position. Call light within reach. Pt instructed to call for assistance. Will monitor.

## 2018-03-29 NOTE — PROGRESS NOTES
GI DAILY PROGRESS NOTE    Admit Date:  3/26/2018    Today's Date:  3/29/2018    CC:  Pancreatic mass on CT    Subjective:     Patient sitting in chair this morning. She reports improved breathing. She tolerated breakfast. Denies any GI complaints. Medications:   Current Facility-Administered Medications   Medication Dose Route Frequency    apixaban (ELIQUIS) tablet 10 mg  10 mg Oral Q12H    [START ON 4/3/2018] apixaban (ELIQUIS) tablet 5 mg  5 mg Oral Q12H    insulin lispro (HUMALOG) injection   SubCUTAneous AC&HS    acetaminophen (TYLENOL) tablet 650 mg  650 mg Oral Q6H PRN    0.9% sodium chloride infusion  35 mL/hr IntraCATHeter- VENous CONTINUOUS    HYDROcodone-acetaminophen (NORCO) 5-325 mg per tablet 1 Tab  1 Tab Oral Q4H PRN    ondansetron (ZOFRAN) injection 4 mg  4 mg IntraVENous Q6H PRN    sodium chloride (NS) flush 5-10 mL  5-10 mL IntraVENous Q8H    sodium chloride (NS) flush 5-10 mL  5-10 mL IntraVENous PRN    HYDROcodone-acetaminophen (NORCO) 7.5-325 mg per tablet 1 Tab  1 Tab Oral Q4H PRN    bisacodyl (DULCOLAX) suppository 10 mg  10 mg Rectal DAILY PRN       Review of Systems:  ROS was obtained, with pertinent positives as listed above. No chest pain or SOB. Diet:      Objective:   Vitals:  Visit Vitals    /73 (BP 1 Location: Right arm, BP Patient Position: At rest)    Pulse (!) 108    Temp 98.8 °F (37.1 °C)    Resp 18    Ht 5' 3\" (1.6 m)    Wt 107 kg (236 lb)    SpO2 96%    BMI 41.81 kg/m2     Intake/Output:     03/27 1901 - 03/29 0700  In: 600 [P.O.:590; I.V.:10]  Out: 1029 [Urine:1029]  Exam:  General appearance: alert, cooperative, no distress  Lungs: clear to auscultation bilaterally anteriorly  Heart: regular rate and rhythm  Abdomen: soft, non-tender.  Bowel sounds normal. No masses, no organomegaly  Extremities: extremities normal, atraumatic, no cyanosis or edema  Neuro:  alert and oriented    Data Review (Labs):    Recent Labs      03/28/18   1455  03/28/18 6888  03/27/18   1040  03/27/18   0434  03/27/18   0015  03/26/18   1654  03/26/18   0853   WBC   --   7.9  8.3  9.3  12.7*  12.2*  18.2*   HGB   --   10.7*  10.8*  10.7*  10.9*  11.4*  12.0   HCT   --   33.2*  33.0*  33.2*  32.9*  34.0*  36.7   PLT   --   135*  165  177  195  249  294   MCV   --   86.2  85.3  85.3  84.8  85.0  84.4   NA   --   142   --    --    --    --   140   K   --   4.0   --    --    --    --   3.4*   CL   --   111*   --    --    --    --   104   CO2   --   23   --    --    --    --   25   BUN   --   11   --    --    --    --   15   CREA   --   0.64   --    --    --    --   0.96   CA   --   8.8   --    --    --    --   9.0   MG   --   2.1   --    --    --    --    --    GLU   --   126*   --    --    --    --   260*   AP   --    --    --    --    --    --   110   SGOT   --    --    --    --    --    --   31   ALT   --    --    --    --    --    --   28   TBILI   --    --    --    --    --    --   0.8   ALB   --    --    --    --    --    --   3.2*   TP   --    --    --    --    --    --   7.0   AML  39   --    --    --    --    --    --    LPSE  104   --    --    --    --    --    --    PTP   --    --    --   14.7*   --    --   12.8   INR   --    --    --   1.2   --    --   1.0   APTT   --    --    --    --    --    --   26.4     CT of Chest with contrast 3/26/18      Findings: The base of the neck is unremarkable in appearance.  Prominent right axillary  lymph nodes are seen measuring up to 11 mm short axis. In addition, abnormal  density is seen in the right axillary tail best appreciated on image 36 where an  irregular density measuring 2.9 cm x 1.5 cm in size is seen. These changes are  incompletely characterized by CT with a potential neoplastic process not  excluded.  The thoracic aorta is normal in caliber. Opacification of the  pulmonary arteries is adequate.  Extensive bilateral pulmonary bullae are seen.   These are seen in the right main pulmonary artery extending into the central  right upper, middle and lower lobe pulmonary arteries.  On the left, this  extends from the most distal left pulmonary artery into the central left upper  and lower lobe pulmonary arteries. The right ventricle appears at least equal if  not larger than the left ventricle. There is flattening of the interventricular  septum with mild paradoxical bowing best appreciated on image 69. These can be  an indicators for right heart strain.      Evaluation with lung windows demonstrates no acute infiltrates.  No pleural  effusion is seen.  Lungs are expanded without evidence for pneumothorax.  No  acute osseous abnormality is seen.        Limited evaluation of the upper abdomen demonstrates an abnormal cystic mass  which appears to arise from the pancreatic tail seen on image 69 measuring 4.5  cm x 3.8 cm in size. The patient is status post cholecystectomy.      IMPRESSION  IMPRESSION:    1.  Extensive bilateral pulmonary emboli as described above.       2.  Abnormal changes in the right axilla. Specifically, there are asymmetrically  enlarged lymph nodes and abnormal density. A neoplastic process cannot be  excluded. Further evaluation with clinical exam, bilateral diagnostic mammogram,  and diagnostic breast ultrasound if indicated would be recommended.      3. 4.5 cm x 3.8 cm cystic mass arising from the pancreatic tail. This is  incompletely characterized on this exam with cystic pancreatic neoplasm not  excluded. This would be best further assessed with a pancreatic protocol MRI or  CT.  ---------------------------------------------------------------------------------  Portable chest x-ray 3/27/2018      INDICATION: Post thrombolysis      COMPARISON: Yesterday's exam      FINDINGS: Lower lung volumes but the lungs are clear. No pulmonary edema or  pleural effusion. Heart size is normal. Interval placement of bilateral  pulmonary arterial catheters.      IMPRESSION  IMPRESSION: No acute abnormality.   Assessment: Principal Problem:    PE (pulmonary thromboembolism) ([address] (3/26/2018)    Active Problems:    Class 2 obesity due to excess calories with serious comorbidity in adult (3/26/2018)      Controlled type 2 diabetes mellitus without complication, with long-term current use of insulin ([address] (3/26/2018)      Axillary mass, right (3/26/2018)      Pancreatic mass (3/26/2018)      Pulmonary embolism ([address] (3/26/2018)      Patient is a 59 y.o. female with PMH of DM II recent bilateral PE on CT of chest 3/26/18 who is seen in consultation at the request of Dr. Jabari Bryant for pancreatic mass. She is being started on Eliquis and Hematology has been consulted for hypercoagulable work-up. CT with also findings of right axilla, asymmetrically enlarged lymph nodes and abnormal density. CEA 0.5. CA 19-9 4.70, CA 15-3: 4.40. [place] Amylase 39, Lipase 104. Plan:     Await reading of CT of A/P with pancreatic protocol yesterday. IGG subclass 4 pending  EUS with FNA will likely be needed at some point. However, needs to be delayed due to acute PE and anticoagulation. Sridevi Donnelly NP    Patient is seen and examined in collaboration with Dr. Bruce Butt. Assessment and plan as per Dr. Bruce Butt. I have seen and examined the patient, and I have directed and agreed to the plan as described. We have reviewed the CT report with the patient and her daughter. This is a cyst and is unlikely to be a cancer. She needs EUS with FNA, but this is not safe in the setting of acute PE. We will defer this a few weeks. She will most likely need a Lovenox bridge at that time because of need for anticoagulation with the recent clot. All of this can readily be arranged as an outpatient. From a GI perspective, she may go home at the discretion of the primary team.  Please contact us directly with questions or concerns.       Dio Reddy MD

## 2018-03-29 NOTE — PROGRESS NOTES
[facility] CRITICAL CARE OUTREACH NURSE PROGRESS REPORT      SUBJECTIVE: Called to assess patient secondary to recent transfer from ICU. MEWS Score: 2 (03/29/18 0704)  Vitals:    03/28/18 2350 03/29/18 0342 03/29/18 0553 03/29/18 0704   BP: 97/63 122/82  130/73   Pulse: (!) 108 (!) 107  (!) 108   Resp: 20 18 18   Temp: 99.2 °F (37.3 °C) 98 °F (36.7 °C)  98.8 °F (37.1 °C)   SpO2: 94% 95%  96%   Weight:   107 kg (236 lb)    Height:              LAB DATA:    Recent Labs      03/28/18   0332   NA  142   K  4.0   CL  111*   CO2  23   AGAP  8   GLU  126*   BUN  11   CREA  0.64   GFRAA  >60   GFRNA  >60   CA  8.8   MG  2.1   PHOS  2.3*        Recent Labs      03/28/18   0332  03/27/18   1040  03/27/18   0434   WBC  7.9  8.3  9.3   HGB  10.7*  10.8*  10.7*   HCT  33.2*  33.0*  33.2*   PLT  135*  165  177          OBJECTIVE: On arrival to room, I found patient to be up in recliner. Pain Assessment  Pain Intensity 1: 0 (03/29/18 0853)  Pain Location 1: Back  Pain Intervention(s) 1: Rest, Relaxation technique, Repositioned  Patient Stated Pain Goal: 0                                 ASSESSMENT:  Patient is alert, able to communicate needs. Lungs clear. Currently on RA. Denies SOB, pain, nausea. NAD. PLAN:  Will continue to monitor per outreach protocol.

## 2018-03-29 NOTE — PROGRESS NOTES
Care Management Interventions  PCP Verified by CM: Yes  Physical Therapy Consult: Yes  Occupational Therapy Consult: No  Current Support Network: Lives Alone  Confirm Follow Up Transport: Self  Plan discussed with Pt/Family/Caregiver: Yes  Freedom of Choice Offered: Yes  Discharge Location  Discharge Placement: Home  Pt lives alone. Her son and daughter in law live behind her. Independent with ambulation and ADLs. Drives. No history of HH. PT consulted. CM following for dc needs.

## 2018-03-29 NOTE — PROGRESS NOTES
[facility] Hematology & Oncology        Inpatient Hematology / Oncology Progress Note      Admission Date: 3/26/2018  4:35 PM  Reason for Admission/Hospital Course: Pulmonary embolism (HCC) [I26.99]      24 Hour Events: On eliquis  Transferred to floor  On room air, dyspnea improved  In good spirits  No complaints    ROS:  Constitutional: Positive for fatigue; negative for fever, chills, weakness, malaise, fatigue. CV: Negative for chest pain, palpitations, edema. Respiratory: Negative for dyspnea, cough, wheezing. GI: Negative for nausea, abdominal pain, diarrhea. 10 point review of systems is otherwise negative with the exception of the elements mentioned above in the HPI. Allergies   Allergen Reactions    Biaxin [Clarithromycin] Unknown (comments)    Cortisone Other (comments)     Per pt, leg numbness. OBJECTIVE:  Patient Vitals for the past 8 hrs:   BP Temp Pulse Resp SpO2   18 1503 115/70 98.1 °F (36.7 °C) (!) 101 18 98 %   18 1108 118/68 97.4 °F (36.3 °C) (!) 104 18 95 %     Temp (24hrs), Av.4 °F (36.9 °C), Min:97.4 °F (36.3 °C), Max:99.2 °F (37.3 °C)     0701 -  1900  In: 480 [P.O.:480]  Out: -     Physical Exam:  Constitutional: Well developed, well nourished female in no acute distress, sitting comfortably in the hospital bed. HEENT: Normocephalic and atraumatic. Oropharynx is clear, mucous membranes are moist.  Neck supple    Lymph node   Deferred   Skin Warm and dry. No bruising and no rash noted. No erythema. No pallor. Respiratory Lungs are clear to auscultation bilaterally without wheezes, rales or rhonchi, normal air exchange without accessory muscle use. CVS Normal rate, regular rhythm and normal S1 and S2. No murmurs, gallops, or rubs. Abdomen Soft, nontender and nondistended, normoactive bowel sounds. No palpable mass. No hepatosplenomegaly. Neuro Grossly nonfocal with no obvious sensory or motor deficits.    MSK Normal range of motion in general.  No edema and no tenderness. Psych Appropriate mood and affect. Labs:    Recent Labs      03/28/18   0332  03/27/18   1040  03/27/18   0434   WBC  7.9  8.3  9.3   RBC  3.85*  3.87*  3.89*   HGB  10.7*  10.8*  10.7*   HCT  33.2*  33.0*  33.2*   MCV  86.2  85.3  85.3   MCH  27.8  27.9  27.5   MCHC  32.2  32.7  32.2   RDW  17.2*  16.7*  16.8*   PLT  135*  165  177      Recent Labs      03/28/18   0332   NA  142   K  4.0   CL  111*   CO2  23   AGAP  8   GLU  126*   BUN  11   CREA  0.64   GFRAA  >60   GFRNA  >60   CA  8.8   MG  2.1   PHOS  2.3*     Imaging:  CT ABD PELV W CONT [[id number]] Collected: 03/29/18 1530      Order Status: Completed Updated: 03/29/18 1542     Narrative:       CT abdomen and pelvis with contrast     Comparison:  Chest CT 3/26/2018. Indication:  Pancreatic cyst evaluation. Technique:  CT imaging was performed of the abdomen and pelvis following the  uncomplicated administration of intravenous contrast (Isovue 370, 100 mL). Abdomen portion of the study was performed using pancreas protocol. Iodinated  contrast was used due to the indications for the examination.   If IV contrast  material had not been administered, the likelihood of detecting abnormalities  relevant to the patients condition would have been substantially decreased. Radiation dose reduction techniques were used for this study:  Our CT scanners  use one or all of the following: Automated exposure control, adjustment of the  mA and/or kVp according to patient's size, iterative reconstruction. Findings:   ABDOMEN CT:  Limited views of the lung bases demonstrate no significant pulmonary opacities. There are no pleural effusions. The liver is normal in appearance, with no focal abnormalities. Gallbladder  surgically absent. Abutting the pancreatic tail, there is a low density  rim-enhancing ovoid pancreatic cyst measuring 4.9 x 3.6 cm without nodular  enhancement or discrete septation. Minimal adjacent calcification present. Remainder the pancreas normal. No pseudocyst.    The spleen, adrenal glands, and kidneys are normal.  There is no intra or  extrahepatic biliary ductal dilatation. PELVIS CT:  The bowel is normal in caliber, and there is no focal or diffuse bowel wall  thickening. There is no free air or free fluid in the abdomen or pelvis.      There is no significant retroperitoneal, pelvic, mesenteric, or inguinal  lymphadenopathy.  The bladder is unremarkable. There are no aggressive appearing osseous lesions.       Impression:       IMPRESSION:  1. Indeterminate cystic lesion abutting the pancreatic tail. Suspect pancreatic  origin. Recommend CT abdomen using pancreatic protocol in 6 months to assure  stability of size. Alternatively, FNA/EUS.             MRI ABD W WO CONT [[id number]]      Order Status: Canceled      IR REMOVE Denita Villela INF Nancy Quant [[id number]] Collected: 03/27/18 1818     Order Status: Completed Updated: 03/27/18 1851     Narrative:       Bilateral pulmonary angiogram and main pulmonary artery and right ventricular  pressure measurements. 3/27/2018. INDICATION: Follow-up of from a pulmonary arterial lysis that was initiated  yesterday. This was done for large volume pulmonary emboli. : Mervin Celestin. Radiation dose: 5693 mGym2 Dose Area Product      MEDICATIONS: IV fentanyl and Versed. Contrast: 20 cc Isovue-300. PROCEDURE AND FINDINGS: Informed consent was first obtained. Benefits risk and  alternatives were discussed prior to the procedure. The patient was placed on the angiographic table in the groins prepped and  draped in a sterile fashion. The right groin was anesthetized with 2% lidocaine. The patient was placed under moderate conscious sedation by the intravenous  administration of Versed and fentanyl for approximately 30 minutes.     Initially the left infusion catheter was accessed and a 7 [employer] multipurpose  catheter placed in the left pulmonary artery. Contrast injection showed the good  flow. No large amount of clot was identified. The    The right pulmonary artery was then selected. Contrast injection does show  relatively good flow but there is some moderate amount of clot seen within the  right upper lobe branch. Pressures were also obtained the main pulmonary artery pressure 67/22 with a  mean of 38 mmHg. The right ventricular pressure was 58/7 with a mean of 32. The catheters were removed. The sheath was left in place. Dr. Tata Culp was contacted. Results were discussed, the decision was made to  place the patient on [facility] close at this time. The patient tolerated the procedure well.       Impression:       IMPRESSION: Relatively decreased amount of clot within the pulmonary arteries. The pulmonary artery pressures are still elevated.     IR THROMBOLYSIS TRANSCATH NON CORONARY OR INTRACRANIAL [[id number]]      Order Status: Canceled      XR CHEST PORT [[id number]] Collected: 03/27/18 0604     Order Status: Completed Updated: 03/27/18 0607     Narrative:       Portable chest x-ray    INDICATION: Post thrombolysis    COMPARISON: Yesterday's exam    FINDINGS: Lower lung volumes but the lungs are clear. No pulmonary edema or  pleural effusion. Heart size is normal. Interval placement of bilateral  pulmonary arterial catheters.       Impression:       IMPRESSION: No acute abnormality.     IR THER TXCATH VEIN INF Charlene Mcallister IMAGE [[id number]] Collected: 03/26/18 1606     Order Status: Completed Updated: 03/26/18 1616     Narrative:       History: Acute pulmonary embolus with shortness of breath and elevated troponin. PROCEDURE:    Consent obtained after discussion of risks and benefits with the patient, her  son, and her daughter in law. They're questions were addressed. In the routine  fashion ultrasound was performed of the right common femoral vein and this  demonstrated to be patent.  The left common femoral vein was also examined and  found to be patent with no thrombosis. With ultrasound and fluoroscopic guidance  a right common femoral vein was punctured. A venogram of the right iliac vein  and inferior vena cava was performed and demonstrates to be widely patent. A  guidewire was then advanced through the sheath. A pulmonary arterial catheter  was then utilized to selectively catheterize the left pulmonary artery. Pressure  measurements were obtained yielding a pressure of 47/12 with a mean of 27 mmHg. Hand injection of a small amount contrast demonstrated satisfactory position. This was followed by placement of a multiside hole ultrasound assisted  thrombolysis infusion catheter into the left pulmonary artery. Attention was  turned to the right side, utilizing the dual femoral vein sheath, over a  guidewire the pulmonary arterial catheter was then advanced into the right  pulmonary artery. This was exchanged for a 5 [place] catheter which was utilized  to advance the guidewire into the lower lobe branches of the right pulmonary  artery. Over the guidewire, the ultrasound assisted thrombolysis infusion  catheter was then positioned into the right pulmonary artery. The sheath was  secured at the skin at the groin site. Tissue plasminogen activator infusion  prescribed at 0.5 mg per hour per catheter. This provides a total of 1 mg of TPA  per hour. Heparin 400 units per hour infusion via a peripheral IV. Tolerated  well with no immediate complications. Total fluoroscopy of 2918.6 uGym2 Dose  Area Product.       Impression:       IMPRESSION:    Bilateral pulmonary artery catheterization from a right femoral vein approach  with ultrasound assisted multiside hole infusion catheters bilaterally.     Plan return tomorrow for follow-up on the pulmonary arterial pressures and  removal of the infusion catheters.             ASSESSMENT:    Problem List  Date Reviewed: 3/29/2018          Codes Class Noted    Class 2 obesity due to excess calories with serious comorbidity in adult ICD-10-CM: E66.09  ICD-9-CM: 278.00  3/26/2018        Controlled type 2 diabetes mellitus without complication, with long-term current use of insulin (HCC) ICD-10-CM: E11.9, Z79.4  ICD-9-CM: 250.00, V58.67  3/26/2018        * (Principal)PE (pulmonary thromboembolism) ([facility] ICD-10-CM: I26.99  ICD-9-CM: 415.19  3/26/2018        Axillary mass, right ICD-10-CM: R22.31  ICD-9-CM: 782.2  3/26/2018        Pancreatic mass ICD-10-CM: K86.9  ICD-9-CM: 577.9  3/26/2018        Type 2 diabetes mellitus with diabetic neuropathy ([facility].) ICD-10-CM: E11.40  ICD-9-CM: 250.60, 357.2  2/21/2018        Bilateral low back pain with sciatica ICD-10-CM: M54.40  ICD-9-CM: 724.3  10/23/2017        Environmental allergies ICD-10-CM: Z91.09  ICD-9-CM: V15.09  9/12/2013            Ms. Nava Pina is a 59 y.o. female admitted on 3/26/2018 with a primary diagnosis of pulmonary embolism and pancreatic mass.      The patient is a 59 y.o.  female with a hx of DM and a prior cholecystectomy who presented to the [facility] ER with dyspnea and RLE swelling. Evaluation revealed a high D-dimer, BNP and a CT revealed extensive thrombosis involving both lung with a very large clot burden with evidence of R heart strain. She was also noted to have abnormal density in the R axilla and a mass lesion involving the head of the pancreas of unknown etiology. She was transferred to IR from the ER and underwent EKOS catheter thrombolysis by Dr. Tona Medina.   Eloisa Carr  She reports having had right axillary fullness for some time. She had a lipoma resected from her right arm about 10 years ago and figures the same occurred in her axillary area. She reports the axillary process has not changed and has not been evaluated with a biopsy at any point. She did have a mammogram about 6 months that she reports as negative. She also has had back pain for several months and was evaluated by Dr. Thirza Aschoff at [facility].  She had an MRI done at [address] and was told she had 4 pinched nerves but she was never told of any pancreatic lesion. She was subsequently sent for a pain injection about 3 weeks ago with some improvement in the pain.       On 3/26, she became acutely dyspneic and lightheaded attempting to go to work and was seen at the [facility] ER as above. She is s/p EKOS and is breathing comfortably. She received TPA through bilateral PA catheters and heparin via a peripheral IV and has now been transitioned to Eliquis alone. We are being consulted for further evaluation of her right axillary mass and pancreatic mass with potential of a hypercoagulable state.      She admits to weight loss of about 18 pounds over the past six weeks which was intentional.        PLAN:  Pulmonary Embolism  * Status post EKOS. Currently on Eliquis. Will need to transition to heparin drip for biopsy. * Modified hypercoagulable work-up ordered. 3/29 S/p EKOG with decrease in clot burden. On room air with improved dyspnea. Transitioned to eliquis. Continue outpatient     Pancreatic Mass  * CEA and Ca 19-9 normal.   * Obtain CT abdomen, pelvis. * Consult GI for biopsy. 3/29 Pancreatic protocol CT reviewed with GI. Appears to be a cyst. GI planning to arrange EUS outpatient in a few weeks (only perform procedure on Wednesdays and prefer to be more stable from cardio-pulmonary standpoint prior to procedure).      Right Axillary Mass  * Check Ca 15-3: normal 4.5  * Will need PET/CT at discharge - biopsy if positive.       Anemia   Check iron studies  3/29 No clear iron deficiency. Due for repeat screening colonscopy - last done at age 46.  Can be done outpatient to eval for any occult blood loss                   Deanne Amos NP   [facility] Hematology & Oncology  [address] [address]  Office : [phone]  Fax : [phone]         Attending Addendum:  I personally evaluated the patient with Rajesh Mckoy N.P.,  and agree with the assessment, findings and plan as documented. Appears stable, we will arrange for her to follow-up with Dr. Willam Ireland as an outpatient.               Guillermina Mccabe MD  [address] [place] [address]  Office : [phone]  Fax : [phone]

## 2018-03-29 NOTE — PROGRESS NOTES
Patient remains in stable condition, sitting in recliner. Respirations are even/unlabored. No acute distress noted. Patient denies any needs at this time. Will continue to monitor and hand off to oncoming shift.

## 2018-03-29 NOTE — PROGRESS NOTES
MARYAMAR received from [address]. Patient remains in stable condition. Respirations even/unlabored. No acute distress noted and no needs voiced. Will continue to monitor.

## 2018-03-29 NOTE — PROGRESS NOTES
Problem: Mobility Impaired (Adult and Pediatric)  Goal: *Acute Goals and Plan of Care (Insert Text)  STG:  (1.)Ms. Laquita Quinteros will move from supine to sit and sit to supine , scoot up and down and roll side to side with SUPERVISION within 3 treatment day(s). (2.)Ms. Laquita Quinteros will transfer from bed to chair and chair to bed with SUPERVISION using the least restrictive device within 3 treatment day(s). (3.)Ms. Laquita Quinteros will ambulate with SUPERVISION for 250 feet with the least restrictive device within 3 treatment day(s). (4.)Ms. Laquita Quinteros will perform standing static and dynamic balance activities x 15 minutes with SUPERVISION to improve safety within 3 day(s). (5.)Ms. Laquita Quinteros will maintain stable vital signs throughout all functional mobility within 3 days. LTG:  (1.)Ms. Lauqita Quinteros will move from supine to sit and sit to supine , scoot up and down and roll side to side in bed with MODIFIED INDEPENDENCE within 7 treatment day(s). (2.)Ms. Laquita Quinteros will transfer from bed to chair and chair to bed with MODIFIED INDEPENDENCE using the least restrictive device within 7 treatment day(s). (3.)Ms. Laquita Quinteros will ambulate with MODIFIED INDEPENDENCE for 500 feet with the least restrictive device within 7 treatment day(s). (4.)Ms. Laquita Quinteros will perform standing static and dynamic balance activities x 25 minutes with MODIFIED INDEPENDENCE to improve safety within 7 day(s). (5.)Ms. Laquita Quinteros will ascend and descend 5 stairs using 1-2 hand rail(s) with MODIFIED INDEPENDENCE to improve functional mobility and safety within 7 day(s).   ________________________________________________________________________________________________      PHYSICAL THERAPY: Daily Note, Treatment Day: 1st, AM 3/29/2018  INPATIENT: Hospital Day: 4  Payor: [employer] / Plan: [place] [employer] / Product Type: Commerical /      NAME/AGE/GENDER: Luisana Rome is a 59 y.o. female   PRIMARY DIAGNOSIS: Pulmonary embolism (HCC) [I26.99] PE (pulmonary thromboembolism) ([facility] [address] PE (pulmonary thromboembolism) ([facility])     3 Days Post-Op  ICD-10: Treatment Diagnosis:    · Generalized Muscle Weakness (M62.81)  · Difficulty in walking, Not elsewhere classified (R26.2)   Precaution/Allergies:  Biaxin [clarithromycin] and Cortisone      ASSESSMENT:     Ms. Nelda Lopes is a 59 y.o. female admitted for pulmonary embolism. She reports she is independent with all mobility, performs household chores and grocery shops for both herself and her mother who lives next door. Her son lives nearby as well. Sitting in recliner and agreeable to therapy. Able to increase ambulation distance to 500' with rolling walker for initial 250', no assistive device and stand by assist required for last 250'. SpO2 >92% throughout ambulation on room air, no c/o chest pain. Good progress with physical therapy with increased ambulation distance, improved activity tolerance. Basilio Mcdaniel is currently functioning below her baseline and would benefit from skilled PT during acute care stay to maximize safety and independence with functional mobility. This section established at most recent assessment   PROBLEM LIST (Impairments causing functional limitations):  1. Decreased Strength  2. Decreased ADL/Functional Activities  3. Decreased Transfer Abilities  4. Decreased Ambulation Ability/Technique  5. Decreased Balance  6. Increased Pain  7. Decreased Activity Tolerance  8. Decreased Pacing Skills  9. Increased Shortness of Breath  10. Decreased Knowledge of Precautions  11. Decreased Craig with Home Exercise Program   INTERVENTIONS PLANNED: (Benefits and precautions of physical therapy have been discussed with the patient.)  1. Balance Exercise  2. Bed Mobility  3. Family Education  4. Gait Training  5. Home Exercise Program (HEP)  6. Therapeutic Activites  7. Therapeutic Exercise/Strengthening  8. Transfer Training  9.  Patient Education  10. Group Therapy     TREATMENT PLAN: Frequency/Duration: 3 times a week for 5 weeks  Rehabilitation Potential For Stated Goals: Excellent     RECOMMENDED REHABILITATION/EQUIPMENT: (at time of discharge pending progress): Due to the probability of continued deficits (see above) this patient will likely need continued skilled physical therapy after discharge. Equipment:    None at this time              HISTORY:   History of Present Injury/Illness (Reason for Referral):  Per MD: The patient is a 59 y.o.  female with a hx of DM and a prior cholecystectomy who presented to the [facility] ER with dyspnea and RLE swelling. Evaluation revealed a high D-dimer, BNP and a CT revealed extensive thrombosis involving both lung with a very large clot burden with evidence of R heart strain. She was also noted to have abnormal density in the R axilla and a mass lesion involving the head of the pancreas of unknown etiology. She was transferred to IR from the ER and underwent EKOS catheter thrombolysis by Dr. Destin Mello.      The pt reports having had a R axillary fullness for some time. 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 She reports the axillary process has not changed and has not been evaluated with a biopsy at any point. She also has had back pain for several months and was evaluated by Dr. Saint Pastor at [facility]. She had an MRI done at [address] [place] [address] and was told she had 4 pinched nerves but she was never told of any pancreatic lesion. She was subsequently sent for a pain injection about 3 weeks ago with some improvement in the pain.      Today she became acutely dyspneic and lightheaded attempting to go to work and was seen at the [facility] ER as above. She is now s/p EKOS and is breathing comfortably. She remains tachycardic at rest and is getting TPA through bilateral PA catheters and heparin via a peripheral IV.    Past Medical History/Comorbidities:   Ms. Elena Ruiz  has a past medical history of Diabetes [facility]) and Environmental allergies (9/12/2013). Ms. Gregorio Ryan  has a past surgical history that includes hx cholecystectomy. 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 Number of Personal Factors/Comorbidities that affect the Plan of Care: 3+: HIGH COMPLEXITY   EXAMINATION:   Most Recent Physical Functioning:   Gross Assessment:  AROM: Generally decreased, functional  PROM: Generally decreased, functional  Strength: Generally decreased, functional  Coordination: Generally decreased, functional  Sensation: Intact               Posture:  Posture (WDL): Exceptions to WDL  Posture Assessment: Forward head, Rounded shoulders  Balance:  Sitting: Intact  Standing: Impaired  Standing - Static: Good  Standing - Dynamic : Fair Bed Mobility:     Wheelchair Mobility:     Transfers:  Sit to Stand: Stand-by assistance  Stand to Sit: Stand-by assistance  Interventions: Safety awareness training;Verbal cues  Gait:     Base of Support: Widened  Speed/Mandy: Slow  Gait Abnormalities: Decreased step clearance  Distance (ft): 500 Feet (ft)  Assistive Device: Walker, rolling  Ambulation - Level of Assistance: Stand-by assistance  Interventions: Safety awareness training;Verbal cues      Body Structures Involved:  1. Nerves  2. Heart  3. Lungs  4. Muscles Body Functions Affected:  1. Sensory/Pain  2. Cardio  3. Respiratory  4. Neuromusculoskeletal  5. Movement Related Activities and Participation Affected:  1. Mobility  2. Self Care  3. Domestic Life  4. Interpersonal Interactions and Relationships  5.  Community, Social and [place]   Number of elements that affect the Plan of Care: 4+: HIGH COMPLEXITY   CLINICAL PRESENTATION:   Presentation: Stable and uncomplicated: LOW COMPLEXITY   CLINICAL DECISION MAKIN [facility] Mobility Inpatient Short Form  How much difficulty does the patient currently have. .. Unable A Lot A Little None   1. Turning over in bed (including adjusting bedclothes, sheets and blankets)? [] 1   [] 2   [x] 3   [] 4   2. Sitting down on and standing up from a chair with arms ( e.g., wheelchair, bedside commode, etc.)   [] 1   [] 2   [x] 3   [] 4   3. Moving from lying on back to sitting on the side of the bed? [] 1   [] 2   [x] 3   [] 4   How much help from another person does the patient currently need. .. Total A Lot A Little None   4. Moving to and from a bed to a chair (including a wheelchair)? [] 1   [] 2   [x] 3   [] 4   5. Need to walk in hospital room? [] 1   [] 2   [x] 3   [] 4   6. Climbing 3-5 steps with a railing? [] 1   [x] 2   [] 3   [] 4   © , Trustees of [address] [place] [address], under license to [employer]. All rights reserved      Score:  Initial: 17 Most Recent: X (Date: -- )    Interpretation of Tool:  Represents activities that are increasingly more difficult (i.e. Bed mobility, Transfers, Gait). Score 24 23 22-20 19-15 14-10 9-7 6     Modifier CH CI CJ CK CL CM CN      ? Mobility - Walking and Moving Around:     - CURRENT STATUS: CK - 40%-59% impaired, limited or restricted    - GOAL STATUS: CJ - 20%-39% impaired, limited or restricted    - D/C STATUS:  ---------------To be determined---------------  Payor: [employer] / Plan: [place] [employer] / Product Type: Commerical /      Medical Necessity:     · Patient demonstrates excellent rehab potential due to higher previous functional level.   Reason for Services/Other Comments:  · Patient continues to require modification of therapeutic interventions to increase complexity of exercises. Use of outcome tool(s) and clinical judgement create a POC that gives a: Clear prediction of patient's progress: LOW COMPLEXITY            TREATMENT:   (In addition to Assessment/Re-Assessment sessions the following treatments were rendered)   Pre-treatment Symptoms/Complaints:  none  Pain: Initial:   Pain Intensity 1: 0  Post Session:  4/10 R chest pain, RN notified. Therapeutic Activity: (    24 minutes): Therapeutic activities including Chair transfers and Ambulation on level ground to improve mobility, strength, balance and activity tolerance. Required minimal Safety awareness training;Verbal cues to promote static and dynamic balance in standing. Braces/Orthotics/Lines/Etc:   · O2 Device: Room air  Treatment/Session Assessment:    · Response to Treatment:  Patient tolerated well. · Interdisciplinary Collaboration:   o Physical Therapist  o Registered Nurse  · After treatment position/precautions:   o Up in chair  o Bed alarm/tab alert on  o Bed/Chair-wheels locked  o Bed in low position  o Call light within reach  o RN notified  o Family at bedside   · Compliance with Program/Exercises: Will assess as treatment progresses. · Recommendations/Intent for next treatment session: \"Next visit will focus on advancements to more challenging activities and reduction in assistance provided\".   Total Treatment Duration:  PT Patient Time In/Time Out  Time In: 1118  Time Out: [address] DPT

## 2018-03-29 NOTE — PROGRESS NOTES
[facility] CRITICAL CARE OUTREACH NURSE PROGRESS REPORT      SUBJECTIVE: Called to assess patient secondary to recent transfer from ICU. MEWS Score: 3 (03/28/18 1930)  Vitals:    03/28/18 1320 03/28/18 1528 03/28/18 1930 03/28/18 2112   BP: 122/76 118/76 113/67    Pulse: (!) 101 (!) 107 (!) 121 (!) 115   Resp: 18 19 20    Temp: 98 °F (36.7 °C) 97.7 °F (36.5 °C) 99 °F (37.2 °C)    SpO2: 97% 96% 94% 95%   Weight:       Height:            LAB DATA:    Recent Labs      03/28/18   0332  03/26/18   0853   NA  142  140   K  4.0  3.4*   CL  111*  104   CO2  23  25   AGAP  8  11   GLU  126*  260*   BUN  11  15   CREA  0.64  0.96   GFRAA  >60  >60   GFRNA  >60  >60   CA  8.8  9.0   MG  2.1   --    PHOS  2.3*   --    ALB   --   3.2*   TP   --   7.0   GLOB   --   3.8*   AGRAT   --   0.8*   ALT   --   28        Recent Labs      03/28/18   0332  03/27/18   1040  03/27/18   0434   WBC  7.9  8.3  9.3   HGB  10.7*  10.8*  10.7*   HCT  33.2*  33.0*  33.2*   PLT  135*  165  177          OBJECTIVE: On arrival to room, I found patient to be sitting in recliner, legs elevated. Pain Assessment  Pain Intensity 1: 0 (03/28/18 1900)  Pain Location 1: Back  Pain Intervention(s) 1: Rest, Relaxation technique, Repositioned  Patient Stated Pain Goal: 0       ASSESSMENT: Patient is alert and oriented x4, , O2 95% on room air. States back pain improved after getting out of bed to recliner. Groin site intact, no bleeding or hematoma. Patient states that she remains with SOB with exertion, but has improved greatly compared to when she came to the hospital. NAD noted at this time, no complaints. PLAN:  Will continue to monitor per outreach protocol.

## 2018-03-29 NOTE — PROGRESS NOTES
Prince Henao  Admission Date: 3/26/2018             Daily Progress Note: 3/29/2018    The patient's chart is reviewed and the patient is discussed with the staff.    59 y.o. CF with a hx of DM and a prior cholecystectomy. She presented to E-ER with dyspnea and RLE swelling. D-dimer elevated 17.19, . Chest CT revealed extensive thrombosis involving both lungs with a very large clot burden with evidence of R heart strain. She was also noted to have abnormal density in the R axilla and a mass lesion involving the tail of the pancreas of unknown etiology. She was transferred to IR from the ER and underwent EKOS catheter thrombolysis by Dr. Raj Samaniego.   Subhash Hernandes  The pt reports having had a R axillary fullness for some time. 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 She reports the axillary process has not changed and has not been evaluated with a biopsy at any point. She also has had back pain for several months and was evaluated by Dr. Tiffani Wade at [facility]. She had an MRI done at [address] [place] [address] and was told she had 4 pinched nerves but she was never told of any pancreatic lesion. She was subsequently sent for a pain injection about 3 weeks ago with some improvement in the pain. Subjective:     Sitting up in chair, on room air and denies shortness of breath at rest.  Has some exertional shortness of breath. No sputum production and no hemoptysis. Daughter at the bedside.     Current Facility-Administered Medications   Medication Dose Route Frequency    apixaban (ELIQUIS) tablet 10 mg  10 mg Oral Q12H    [START ON 4/3/2018] apixaban (ELIQUIS) tablet 5 mg  5 mg Oral Q12H    insulin lispro (HUMALOG) injection   SubCUTAneous AC&HS    acetaminophen (TYLENOL) tablet 650 mg  650 mg Oral Q6H PRN    0.9% sodium chloride infusion  35 mL/hr IntraCATHeter- VENous CONTINUOUS    HYDROcodone-acetaminophen (NORCO) 5-325 mg per tablet 1 Tab  1 Tab Oral Q4H PRN    ondansetron (ZOFRAN) injection 4 mg  4 mg IntraVENous Q6H PRN    sodium chloride (NS) flush 5-10 mL  5-10 mL IntraVENous Q8H    sodium chloride (NS) flush 5-10 mL  5-10 mL IntraVENous PRN    HYDROcodone-acetaminophen (NORCO) 7.5-325 mg per tablet 1 Tab  1 Tab Oral Q4H PRN    bisacodyl (DULCOLAX) suppository 10 mg  10 mg Rectal DAILY PRN       Review of Systems  Constitutional: negative for fever, chills, sweats  Cardiovascular: trace LE edema, negative for chest pain, palpitations, syncope  Gastrointestinal:  negative for dysphagia, reflux, vomiting, diarrhea, abdominal pain, or melena  Neurologic:  negative for focal weakness, numbness, headache    Objective:     Vitals:    03/28/18 2350 03/29/18 0342 03/29/18 0553 03/29/18 0704   BP: 97/63 122/82  130/73   Pulse: (!) 108 (!) 107  (!) 108   Resp: 20 18 18   Temp: 99.2 °F (37.3 °C) 98 °F (36.7 °C)  98.8 °F (37.1 °C)   SpO2: 94% 95%  96%   Weight:   236 lb (107 kg)    Height:         Intake and Output:   03/27 1901 - 03/29 0700  In: 600 [P.O.:590; I.V.:10]  Out: 1029 [Urine:1029]       Physical Exam:   Constitution:  the patient is well developed and in no acute distress, room air sat 96%  EENMT:  Sclera clear, pupils equal, oral mucosa moist  Respiratory: clear anterior, no wheezing, no cough or sputum production  Cardiovascular:  RRR without M,G,R  Gastrointestinal: soft and non-tender; with positive bowel sounds. Musculoskeletal: warm without cyanosis. There is trace lower leg edema.   Skin:  no jaundice or rashes, no open wounds   Neurologic: no gross neuro deficits     Psychiatric:  alert and oriented x 3    CXR: none today    CT abd/pelvis:  pending      LAB  Recent Labs      03/29/18   0531  03/28/18   2125  03/28/18   1531  03/28/18   1159  03/28/18   0826   GLUCPOC  144*  147*  160*  135*  119*      Recent Labs      03/28/18   0332  03/27/18   1040  03/27/18   0434  03/27/18   0015   WBC  7.9  8.3  9.3  12.7*   HGB  10.7*  10.8*  10.7*  10.9* HCT  33.2*  33.0*  33.2*  32.9*   PLT  135*  165  177  195   INR   --    --   1.2   --      Recent Labs      03/28/18   0332  03/27/18   0434  03/27/18   0015   NA  142   --    --    K  4.0   --    --    CL  111*   --    --    CO2  23   --    --    GLU  126*   --    --    BUN  11   --    --    CREA  0.64   --    --    MG  2.1   --    --    CA  8.8   --    --    PHOS  2.3*   --    --    TROIQ   --   0.42*  0.59*     No results for input(s): PH, PCO2, PO2, HCO3 in the last 72 hours. No results for input(s): LCAD, LAC in the last 72 hours. Assessment:  (Medical Decision Making)     Hospital Problems  Date Reviewed: 3/29/2018          Codes Class Noted POA    Class 2 obesity due to excess calories with serious comorbidity in adult ICD-10-CM: E66.09  ICD-9-CM: 278.00  3/26/2018 Yes    chronic    Controlled type 2 diabetes mellitus without complication, with long-term current use of insulin (HCC) ICD-10-CM: E11.9, Z79.4  ICD-9-CM: 250.00, V58.67  3/26/2018 Yes    Chronic--ranges 119-160    * (Principal[facility] ICD-10-CM: I26.99  ICD-9-CM: 415.19  3/26/2018 Yes    On Eliquis--S/P EKOS    Axillary mass, right ICD-10-CM: R22.31  ICD-9-CM: 782.2  3/26/2018 Yes    Oncology work up in progress    Pancreatic mass ICD-10-CM: K86.9  ICD-9-CM: 577.9  3/26/2018 Yes    Planning biopsy in the future          Plan:  (Medical Decision Making)     --Cleopatra Proctor reports are not on the chart--will ask again for them to send. --Eliquis  --Heme/Oncology consulted:  hypercoag work up in progress. Would benefit from biopsy of pancreatic mass and GI consulted for work up when over acute PE episode.   --Was weaned to room air and EVELINE with no desat.   --CT abdomen/pelvis:  pending today  --Possible discharge this weekend if OK with consultants    More than 50% of the time documented was spent in face-to-face contact with the patient and in the care of the patient on the floor/unit where the patient is located. Raul Guevara NP    Lungs:  clear  Heart:  RRR with no Murmur/Rubs/Gallops    Additional Comments:  Check ct of abd results- will probably need bx at some point     I have spoken with and examined the patient. I agree with the above assessment and plan as documented.     Susan Heck MD

## 2018-03-29 NOTE — PROGRESS NOTES
MD at the bedside discussing CT results and treatment options. MD states plan for patient to go home on 3/30. Patient remains in stable condition, good spirits. Will continue to [facility].

## 2018-03-29 NOTE — PROGRESS NOTES
Date of Outreach Update:  Joel Siddiqi was seen and assessed. Previous Outreach assessment has been reviewed. There have been no significant clinical changes since the completion of the last dated Outreach assessment. Patient alert, resting in bed, O2 sat 95% on continuous monitoring on room air, . Respirations even and unlabored at this time. Will continue to follow up per outreach protocol.     Signed By:   Anai Mcfarland RN    March 29, 2018 2:33 AM

## 2018-03-30 VITALS
TEMPERATURE: 98.2 F | BODY MASS INDEX: 40.91 KG/M2 | HEART RATE: 100 BPM | SYSTOLIC BLOOD PRESSURE: 128 MMHG | WEIGHT: 230.9 LBS | HEIGHT: 63 IN | RESPIRATION RATE: 20 BRPM | OXYGEN SATURATION: 95 % | DIASTOLIC BLOOD PRESSURE: 85 MMHG

## 2018-03-30 LAB
CARDIOLIPIN IGA SER IA-ACNC: <9 APL U/ML (ref 0–11)
CARDIOLIPIN IGG SER IA-ACNC: <9 GPL U/ML (ref 0–14)
CARDIOLIPIN IGM SER IA-ACNC: <9 MPL U/ML (ref 0–12)
GLUCOSE BLD STRIP.AUTO-MCNC: 156 MG/DL (ref 65–100)
IGG4 SER-MCNC: 8 MG/DL (ref 2–96)

## 2018-03-30 PROCEDURE — 74011636637 HC RX REV CODE- 636/637: Performed by: INTERNAL MEDICINE

## 2018-03-30 PROCEDURE — 74011250637 HC RX REV CODE- 250/637: Performed by: RADIOLOGY

## 2018-03-30 PROCEDURE — 82962 GLUCOSE BLOOD TEST: CPT

## 2018-03-30 PROCEDURE — 99239 HOSP IP/OBS DSCHRG MGMT >30: CPT | Performed by: INTERNAL MEDICINE

## 2018-03-30 RX ADMIN — INSULIN LISPRO 2 UNITS: 100 INJECTION, SOLUTION INTRAVENOUS; SUBCUTANEOUS at 05:44

## 2018-03-30 RX ADMIN — Medication 5 ML: at 08:27

## 2018-03-30 RX ADMIN — APIXABAN 10 MG: 5 TABLET, FILM COATED ORAL at 08:22

## 2018-03-30 NOTE — PROGRESS NOTES
Discharge instructions, follow up information, medication list, prescriptions, and medication side effects sheet provided and explained to the pt. IV removed by primary RN, no remote telemetry on. Opportunity for questions provided. Patient has called ride. Instructed to call once ready to leave.

## 2018-03-30 NOTE — PROGRESS NOTES
GI DAILY PROGRESS NOTE    Admit Date:  3/26/2018    Today's Date:  3/30/2018    CC: Abnormal CT of Pancreas    Subjective:     Patient sitting in chair this morning. She is wanting to go home. Denies any abdominal pain, nausea, or vomiting. Tolerating PO intake. Medications:   Current Facility-Administered Medications   Medication Dose Route Frequency    apixaban (ELIQUIS) tablet 10 mg  10 mg Oral Q12H    [START ON 4/3/2018] apixaban (ELIQUIS) tablet 5 mg  5 mg Oral Q12H    insulin lispro (HUMALOG) injection   SubCUTAneous AC&HS    acetaminophen (TYLENOL) tablet 650 mg  650 mg Oral Q6H PRN    0.9% sodium chloride infusion  35 mL/hr IntraCATHeter- VENous CONTINUOUS    HYDROcodone-acetaminophen (NORCO) 5-325 mg per tablet 1 Tab  1 Tab Oral Q4H PRN    ondansetron (ZOFRAN) injection 4 mg  4 mg IntraVENous Q6H PRN    sodium chloride (NS) flush 5-10 mL  5-10 mL IntraVENous Q8H    sodium chloride (NS) flush 5-10 mL  5-10 mL IntraVENous PRN    HYDROcodone-acetaminophen (NORCO) 7.5-325 mg per tablet 1 Tab  1 Tab Oral Q4H PRN    bisacodyl (DULCOLAX) suppository 10 mg  10 mg Rectal DAILY PRN       Review of Systems:  ROS was obtained, with pertinent positives as listed above. No chest pain or SOB. Diet:      Objective:   Vitals:  Visit Vitals    /85 (BP 1 Location: Right arm, BP Patient Position: Sitting)    Pulse 100    Temp 98.2 °F (36.8 °C)    Resp 20    Ht 5' 3\" (1.6 m)    Wt 104.7 kg (230 lb 14.4 oz)    SpO2 95%    BMI 40.9 kg/m2     Intake/Output:  03/30 0701 - 03/30 1900  In: 360 [P.O.:360]  Out: -   03/28 1901 - 03/30 0700  In: 800 [P.O.:780; I.V.:20]  Out: -   Exam:  General appearance: alert, cooperative, no distress  Lungs: clear to auscultation bilaterally anteriorly  Heart: regular rate and rhythm  Abdomen: soft, non-tender.  Bowel sounds normal. No masses, no organomegaly  Extremities: extremities normal, atraumatic, no cyanosis or edema  Neuro:  alert and oriented    Data Review (Labs):    Recent Labs      03/28/18   1455  03/28/18   0332  03/27/18   1040   WBC   --   7.9  8.3   HGB   --   10.7*  10.8*   HCT   --   33.2*  33.0*   PLT   --   135*  165   MCV   --   86.2  85.3   NA   --   142   --    K   --   4.0   --    CL   --   111*   --    CO2   --   23   --    BUN   --   11   --    CREA   --   0.64   --    CA   --   8.8   --    MG   --   2.1   --    GLU   --   126*   --    AML  39   --    --    LPSE  104   --    --      CT of Chest with contrast 3/26/18       Findings: The base of the neck is unremarkable in appearance.  Prominent right axillary  lymph nodes are seen measuring up to 11 mm short axis. In addition, abnormal  density is seen in the right axillary tail best appreciated on image 36 where an  irregular density measuring 2.9 cm x 1.5 cm in size is seen. These changes are  incompletely characterized by CT with a potential neoplastic process not  excluded.  The thoracic aorta is normal in caliber. Opacification of the  pulmonary arteries is adequate.  Extensive bilateral pulmonary bullae are seen. These are seen in the right main pulmonary artery extending into the central  right upper, middle and lower lobe pulmonary arteries.  On the left, this  extends from the most distal left pulmonary artery into the central left upper  and lower lobe pulmonary arteries. The right ventricle appears at least equal if  not larger than the left ventricle. There is flattening of the interventricular  septum with mild paradoxical bowing best appreciated on image 69. These can be  an indicators for right heart strain.      Evaluation with lung windows demonstrates no acute infiltrates.  No pleural  effusion is seen.  Lungs are expanded without evidence for pneumothorax.  No  acute osseous abnormality is seen.        Limited evaluation of the upper abdomen demonstrates an abnormal cystic mass  which appears to arise from the pancreatic tail seen on image 69 measuring 4.5  cm x 3.8 cm in size.  The patient is status post cholecystectomy.      IMPRESSION  IMPRESSION:    1.  Extensive bilateral pulmonary emboli as described above.       2.  Abnormal changes in the right axilla. Specifically, there are asymmetrically  enlarged lymph nodes and abnormal density. A neoplastic process cannot be  excluded. Further evaluation with clinical exam, bilateral diagnostic mammogram,  and diagnostic breast ultrasound if indicated would be recommended.      3. 4.5 cm x 3.8 cm cystic mass arising from the pancreatic tail. This is  incompletely characterized on this exam with cystic pancreatic neoplasm not  excluded. This would be best further assessed with a pancreatic protocol MRI or  CT.  -----------------------------------------------------------------------    Findings:   ABDOMEN CT:  Limited views of the lung bases demonstrate no significant pulmonary opacities. There are no pleural effusions.     The liver is normal in appearance, with no focal abnormalities. Gallbladder  surgically absent. Abutting the pancreatic tail, there is a low density  rim-enhancing ovoid pancreatic cyst measuring 4.9 x 3.6 cm without nodular  enhancement or discrete septation. Minimal adjacent calcification present. Remainder the pancreas normal. No pseudocyst.     The spleen, adrenal glands, and kidneys are normal.  There is no intra or  extrahepatic biliary ductal dilatation.     PELVIS CT:  The bowel is normal in caliber, and there is no focal or diffuse bowel wall  thickening.     There is no free air or free fluid in the abdomen or pelvis.       There is no significant retroperitoneal, pelvic, mesenteric, or inguinal  lymphadenopathy. The bladder is unremarkable.      There are no aggressive appearing osseous lesions.     IMPRESSION  IMPRESSION:  1. Indeterminate cystic lesion abutting the pancreatic tail. Suspect pancreatic  origin. Recommend CT abdomen using pancreatic protocol in 6 months to assure  stability of size.  Alternatively, FNA/EUS. Assessment:     Principal Problem:    PE (pulmonary thromboembolism) ([address] (3/26/2018)    Active Problems:    Class 2 obesity due to excess calories with serious comorbidity in adult (3/26/2018)      Controlled type 2 diabetes mellitus without complication, with long-term current use of insulin ([address] (3/26/2018)      Axillary mass, right (3/26/2018)      Pancreatic mass (3/26/2018)         Patient is a 59 y.o. female with PMH of DM II recent bilateral PE on CT of chest 3/26/18 who is seen in consultation at the request of Dr. Fazal Anand pancreatic mass. She is being started on Eliquis and Hematology has been consulted for hypercoagulable work-up. CT with also findings of right axilla, asymmetrically enlarged lymph nodes and abnormal density. CEA 0.5. CA 19-9 4.70, CA 15-3: 4.40. Talia Glee Amylase 39, Lipase 104. CT with Panc Protocol with findings of likely pancreatic cyst.   Plan:     IGG subclass 4 pending    This is a cyst and is unlikely to be a cancer. She needs EUS with FNA, but this is not safe in the setting of acute PE. We will defer this a few weeks. She will most likely need a Lovenox bridge at that time because of need for anticoagulation with the recent clot. All of this can readily be arranged as an outpatient. We will arrange follow-up in our office and EUS as outpatient. I will confirm timing of this with Dr. Kadeem Orellana and our office will take care of this.        Sarai Davis NP    Patient is seen and examined in collaboration with Dr. Kadeem Orellana. Assessment and plan as per Dr. Kadeem Orellana. ADDENDUM: I discussed timing of EUS with Dr. Kadeem Orellana. He would like this to be done in 2-4 weeks. Our office will contact patient to arrange this. Her Eliquis will need to be held for 48 hours. She will likely need to be bridged with Lovenox. This will need to be arranged with Dr. Quynh Rose office. She will also need an outpatient follow-up as well. Her last colonoscopy was normal at age 46.  She will be coming up due for screening. I have reviewed the above assessment and plan with NP and agree. We will arrange outpatient EUS at [facility] in 2-4 weeks in order to allow for remodeling of emboli with reduction of pulmonary arterial pressure, descreasing the risks of cardiopulmonary complications of anesthesia. We will also coordinate a Lovenox bridge. Screening colonoscopy will be arrange after the EUS and may be deferred until the end of anticoagulant treatment if this will be temporary, e.g. 6 months.     Yohana Marrero MD

## 2018-03-30 NOTE — PROGRESS NOTES
Patient sitting in recliner with no complaints at this time. Patient is alert and orientated with no distress noted. IV intact and patent with no s/s of infection noted. Respirations even and unlabored with heart rate regular. Patient able to ambulate independently without assistance. Bed in low locked position with call light within reach. Patient instructed to call if assistance is needed. Will continue to monitor.

## 2018-03-30 NOTE — DISCHARGE SUMMARY
Discharge Note    Basilio Mcdaniel  Admission date:  3/26/2018  Discharge date:  3/30/2018     Admitting Diagnosis:  Pulmonary embolism [facility]) [I26.99]    Discharge Diagnoses:   Hospital Problems  Date Reviewed: 3/30/2018          Codes Class Noted POA    Class 2 obesity due to excess calories with serious comorbidity in adult ICD-10-CM: E66.09  ICD-9-CM: 278.00  3/26/2018 Yes        Controlled type 2 diabetes mellitus without complication, with long-term current use of insulin (HCC) ICD-10-CM: E11.9, Z79.4  ICD-9-CM: 250.00, V58.67  3/26/2018 Yes        * (Principal)PE (pulmonary thromboembolism) [address] [address] ICD-10-CM: I26.99  ICD-9-CM: 415.19  3/26/2018 Yes        Axillary mass, right ICD-10-CM: R22.31  ICD-9-CM: 782.2  3/26/2018 Yes        Pancreatic mass ICD-10-CM: K86.9  ICD-9-CM: 577.9  3/26/2018 Yes              Consultants:  IR  GI  Heme/onc  PT    Studies/Procedures:  Chest CT 3/26/18:  1. Extensive bilateral pulmonary emboli as described above. 2.  Abnormal changes in the right axilla. Specifically, there are asymmetrically enlarged lymph nodes and abnormal density. A neoplastic process cannot be excluded. Further evaluation with clinical exam, bilateral diagnostic mammogram, and diagnostic breast ultrasound if indicated would be recommended. 3. 4.5 cm x 3.8 cm cystic mass arising from the pancreatic tail. This is incompletely characterized on this exam with cystic pancreatic neoplasm not excluded. This would be best further assessed with a pancreatic protocol MRI or CT. EKOS 3/26/18    CT abdomen/pelvis 3/28/18:    1. Indeterminate cystic lesion abutting the pancreatic tail. Suspect pancreatic origin. Recommend CT abdomen using pancreatic protocol in 6 months to assure stability of size. Alternatively, FNA/EUS. Condition on Discharge:  Stable    Disposition:  Home with home health/PT      Presenting Illness:  59 y.o. CF with a hx of DM and a prior cholecystectomy.   She presented to [facility] with dyspnea and RLE swelling. D-dimer elevated 17.19, . Chest CT revealed extensive thrombosis involving both lungs with a very large clot burden with evidence of R heart strain. She was also noted to have abnormal density in the R axilla and a mass lesion involving the tail of the pancreas of unknown etiology. She was transferred to [facility] ER and then to IR from the ER for EKOS catheter thrombolysis. Hospital course:  EKOS performed in IR 3/26 and was transferred to ICU post cathter insertion. Was taken back to IR 3/27 for catheter removal.  Chest CT had revealed R axillary fullness. 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 She reports the axillary process has not changed and has not been evaluated with a biopsy at any point. She also has had back pain for several months and was evaluated by Dr. Von Booker at [facility]. She had an MRI done at [address] [place] [address] and was told she had 4 pinched nerves but she was never told of any pancreatic lesion. She was subsequently sent for a pain injection about 3 weeks ago with some improvement in the pain. Shortness of breath improved and was comfortable on NC. Was moved to the floor. Heme/onc was consulted for hypercoag work up and evaluation of right axillary mass and pancreatic mass and tumor markers were normal.  GI was consulted regarding pancreatic mass and recommending EUS with FNA if felt indicated but would need to be delayed based on anticoagulation and acute PE.  CT abdomen/pelvis with 4.9 x 3.6 cm pancreatic cystis lesion abutting the pancreatic tail. Overnight oximetry performed on room air with no desat and was weaned to room air during the day. Was continued on Eliquis for anticoagulation. She is ready for discharge home and will follow up with our office in 1 month, with GI in 2 weeks and with heme/onc.         Physical Exam:   Constitution:  the patient is well developed and in no acute distress, room air sat 96%  EENMT:  Sclera clear, pupils equal, oral mucosa moist  Respiratory: clear anterior, no wheezing, no cough or sputum production  Cardiovascular:  RRR without M,G,R  Gastrointestinal: soft and non-tender; with positive bowel sounds. Musculoskeletal: warm without cyanosis. There is trace lower leg edema. Skin:  no jaundice or rashes, no open wounds   Neurologic: no gross neuro deficits     Psychiatric:  alert and oriented x 3      LAB  Recent Labs      03/28/18   0332  03/27/18   1040   WBC  7.9  8.3   HGB  10.7*  10.8*   HCT  33.2*  33.0*   PLT  135*  165     Recent Labs      03/28/18   0332   NA  142   K  4.0   CL  111*   CO2  23   BUN  11   CREA  0.64   MG  2.1   PHOS  2.3*     No results for input(s): PH, PCO2, PO2, HCO3 in the last 72 hours. Discharge Medications:   Current Discharge Medication List      START taking these medications    Details   apixaban (ELIQUIS) 5 mg tablet Take 2 Tabs by mouth every twelve (12) hours. Then begin 5mg twice a day on 4/3/18 with evening dose. Qty: 60 Tab, Refills: 7         CONTINUE these medications which have NOT CHANGED    Details   insulin glargine (LANTUS SOLOSTAR U-100 INSULIN) 100 unit/mL (3 mL) inpn 24 u qhs  Indications: type 2 diabetes mellitus  Qty: 10 Pen, Refills: 1    Associated Diagnoses: Controlled type 2 diabetes mellitus without complication, with long-term current use of insulin ([facility])      metFORMIN (GLUCOPHAGE) 500 mg tablet Take 1 Tab by mouth two (2) times daily (with meals). Qty: 60 Tab, Refills: 5    Associated Diagnoses: Controlled type 2 diabetes mellitus without complication, with long-term current use of insulin ([facility])      ACETAMINOPHEN (TYLENOL 8 HOUR PO) Take  by mouth. atorvastatin (LIPITOR) 20 mg tablet Take 1 Tab by mouth daily. Qty: 90 Tab, Refills: 1    Associated Diagnoses: Controlled type 2 diabetes mellitus without complication, with long-term current use of insulin ([facility] [address]  Hyperlipidemia associated with type 2 diabetes mellitus (HCC)      traMADol (ULTRAM) 50 mg tablet Take 1 Tab by mouth every six (6) hours as needed for Pain. Max Daily Amount: 200 mg. Qty: 30 Tab, Refills: 2    Associated Diagnoses: Bilateral low back pain with sciatica, sciatica laterality unspecified, unspecified chronicity      tiZANidine (ZANAFLEX) 4 mg capsule Take 1 Cap by mouth three (3) times daily. Indications: Muscle Spasm  Qty: 30 Cap, Refills: 2    Associated Diagnoses: Bilateral low back pain with sciatica, sciatica laterality unspecified, unspecified chronicity      pregabalin (LYRICA) 50 mg capsule Take 1 Cap by mouth three (3) times daily. Max Daily Amount: 150 mg.  Qty: 42 Cap, Refills: 0    Associated Diagnoses: Bilateral low back pain with sciatica, sciatica laterality unspecified, unspecified chronicity      glucose blood VI test strips (ACCU-CHEK LILLY PLUS TEST STRP) strip Use twice daily for management of Type 2 DM, Uncontrolled (250.02)  Qty: 200 Strip, Refills: 3    Associated Diagnoses: Type 2 diabetes mellitus without complication, with long-term current use of insulin ([address] Controlled type 2 diabetes mellitus without complication, with long-term current use of insulin (HCC)      Lancets (ACCU-CHEK SOFTCLIX LANCETS) misc Use twice daily for management of Type 2 DM uncontrolled (250.02)  Qty: 200 Each, Refills: 3    Associated Diagnoses: Controlled type 2 diabetes mellitus without complication, with long-term current use of insulin ([address] Type 2 diabetes mellitus without complication, with long-term current use of insulin (HCC)      Insulin Needles, Disposable, 32 gauge x 1/4\" ndle 1 Each by Does Not Apply route daily. Qty: 100 Pen Needle, Refills: 3    Associated Diagnoses: Controlled type 2 diabetes mellitus without complication, with long-term current use of insulin ([address]  Type 2 diabetes mellitus without complication, with long-term current use of insulin (HCC)      Blood-Glucose Meter (ACCU-CHEK LILLY PLUS METER) misc 1 Kit by Does Not Apply route two (2) times a day. For management of Type 2 DM Uncontrolled (250.02)  Qty: 1 Each, Refills: 3    Associated Diagnoses: Type II or unspecified type diabetes mellitus without mention of complication, uncontrolled               Followup/Outpt Studies:  --Follow up appointment with [facility] in 4 weeks. --Continue Eliquis 10mg BID for 7 day total and then begin Eliquis 5mg BID for minimum 6 months. --Follow up with GI in about 2 weeks and scheduling for EUS with FNA. Would use Lovenox bridge while off Eliquis for procedure. --Follow up with heme/onc. Arranging appointment with Dr. [place]. GI planning EUS with FNA--consider PET scan if it would be helpful with treatment. --Will need follow up CT abdomen using pancreatic protocol in 6 months. --Total discharge greater than 30 minutes in duration. More than 50% of the time documented was spent in face-to-face contact with the patient and in the care of the patient on the floor/unit where the patient is located. Ovi Vitale NP  Lungs:  clear  Heart:  RRR with no Murmur/Rubs/Gallops    Additional Comments: Follow up in office, probably unprovoked pe,  Just noticed ultrasound of legs was not done  -may need this later if considering to stop anticoagulants    I have spoken with and examined the patient. I agree with the above assessment and plan as documented.     Brock Doe MD

## 2018-03-30 NOTE — DISCHARGE INSTRUCTIONS
DISCHARGE SUMMARY from Nurse    PATIENT INSTRUCTIONS:    After general anesthesia or intravenous sedation, for 24 hours or while taking prescription Narcotics:  · Limit your activities  · Do not drive and operate hazardous machinery  · Do not make important personal or business decisions  · Do  not drink alcoholic beverages  · If you have not urinated within 8 hours after discharge, please contact your surgeon on call. Report the following to your surgeon:  · Excessive pain, swelling, redness or odor of or around the surgical area  · Temperature over 100.5  · Nausea and vomiting lasting longer than 4 hours or if unable to take medications  · Any signs of decreased circulation or nerve impairment to extremity: change in color, persistent  numbness, tingling, coldness or increase pain  · Any questions    What to do at Home:  Recommended activity: Activity as tolerated. If you experience any of the following symptoms temp > 101.5, unrelieved pain, nausea or vomiting, shortness of breath or fatigue not relieved with rest, please follow up with MD.    *  Please give a list of your current medications to your Primary Care Provider. *  Please update this list whenever your medications are discontinued, doses are      changed, or new medications (including over-the-counter products) are added. *  Please carry medication information at all times in case of emergency situations. These are general instructions for a healthy lifestyle:    No smoking/ No tobacco products/ Avoid exposure to second hand smoke  Surgeon General's Warning:  Quitting smoking now greatly reduces serious risk to your health.     Obesity, smoking, and sedentary lifestyle greatly increases your risk for illness    A healthy diet, regular physical exercise & weight monitoring are important for maintaining a healthy lifestyle    You may be retaining fluid if you have a history of heart failure or if you experience any of the following symptoms:  Weight gain of 3 pounds or more overnight or 5 pounds in a week, increased swelling in our hands or feet or shortness of breath while lying flat in bed. Please call your doctor as soon as you notice any of these symptoms; do not wait until your next office visit. Recognize signs and symptoms of STROKE:    F-face looks uneven    A-arms unable to move or move unevenly    S-speech slurred or non-existent    T-time-call 911 as soon as signs and symptoms begin-DO NOT go       Back to bed or wait to see if you get better-TIME IS BRAIN. Warning Signs of HEART ATTACK     Call 911 if you have these symptoms:   Chest discomfort. Most heart attacks involve discomfort in the center of the chest that lasts more than a few minutes, or that goes away and comes back. It can feel like uncomfortable pressure, squeezing, fullness, or pain.  Discomfort in other areas of the upper body. Symptoms can include pain or discomfort in one or both arms, the back, neck, jaw, or stomach.  Shortness of breath with or without chest discomfort.  Other signs may include breaking out in a cold sweat, nausea, or lightheadedness. Don't wait more than five minutes to call 911 - MINUTES MATTER! Fast action can save your life. Calling 911 is almost always the fastest way to get lifesaving treatment. Emergency Medical Services staff can begin treatment when they arrive -- up to an hour sooner than if someone gets to the hospital by car. The discharge information has been reviewed with the patient. The patient verbalized understanding. Discharge medications reviewed with the patient and appropriate educational materials and side effects teaching were provided. Pulmonary Embolism: Care Instructions  Your Care Instructions    Pulmonary embolism is the sudden blockage of an artery in the lung. Blood clots in the deep veins of the leg or pelvis (deep vein thrombosis, or DVT) are the most common cause.  These blood clots can travel to the lungs. Pulmonary embolism can be very serious. Because you have had one pulmonary embolism, you are at greater risk for having another one. But you can take steps to prevent another pulmonary embolism by following your doctor's instructions. You will probably take a prescription blood-thinning medicine to prevent blood clots. A blood thinner can stop a blood clot from growing larger and prevent new clots from forming. Follow-up care is a key part of your treatment and safety. Be sure to make and go to all appointments, and call your doctor if you are having problems. It's also a good idea to know your test results and keep a list of the medicines you take. How can you care for yourself at home? · Take your medicines exactly as prescribed. Call your doctor if you think you are having a problem with your medicine. You will get more details on the specific medicines your doctor prescribes. · If you are taking a blood thinner, be sure you get instructions about how to take your medicine safely. Blood thinners can cause serious bleeding problems. Preventing future pulmonary embolisms  · Exercise. Keep blood moving in your legs to keep clots from forming. If you are traveling by car, stop every hour or so. Get out and walk around for a few minutes. If you are traveling by bus, train, or plane, get out of your seat and walk up and down the aisles every hour or so. You also can do leg exercises while you are seated. Pump your feet up and down by pulling your toes up toward your knees then pointing them down. · Get up out of bed as soon as possible after an illness or surgery. · Do not smoke. If you need help quitting, talk to your doctor about stop-smoking programs and medicines. These can increase your chances of quitting for good. · Check with your doctor before taking hormone or birth control pills. These may increase your risk of blot clots.   · Ask your doctor about wearing compression stockings to help prevent blood clots in your legs. There are different types of stockings, and they need to fit right. So your doctor will recommend what you need. When should you call for help? Call 911 anytime you think you may need emergency care. For example, call if:  ? · You have shortness of breath. ? · You have chest pain. ? · You passed out (lost consciousness). ? · You cough up blood. ?Call your doctor now or seek immediate medical care if:  ? · You have new or worsening pain or swelling in your leg. ? Watch closely for changes in your health, and be sure to contact your doctor if:  ? · You do not get better as expected. Where can you learn more? Go to http://shaila-stuart.info/. Enter H136 in the search box to learn more about \"Pulmonary Embolism: Care Instructions. \"  Current as of: March 20, 2017  Content Version: 11.4  © 7126-5570 [employer]. Care instructions adapted under license by [employer] (which disclaims liability or warranty for this information). If you have questions about a medical condition or this instruction, always ask your healthcare professional. [address] any warranty or liability for your use of this information.     ___________________________________________________________________________________________________________________________________

## 2018-03-30 NOTE — PROGRESS NOTES
Date of Outreach Update:  Clover Salazar was seen and assessed. Previous Outreach assessment has been reviewed. There have been no significant clinical changes since the completion of the last dated Outreach assessment. Will continue to follow up per outreach protocol.     Signed By:   Yoan Del Valle RN    March 30, 2018 6:06 AM

## 2018-03-30 NOTE — ROUTINE PROCESS
[facility] CRITICAL CARE OUTREACH NURSE PROGRESS REPORT      SUBJECTIVE: Called to assess patient secondary to recent transfer from ICU. MEWS Score: 1 (03/30/18 0357)  Vitals:    03/29/18 1503 03/29/18 1930 03/29/18 2305 03/30/18 0357   BP: 115/70 137/61 128/62 120/59   Pulse: (!) 101 (!) 114 100 97   Resp: 18 20 20 20   Temp: 98.1 °F (36.7 °C) 97.7 °F (36.5 °C) 98.7 °F (37.1 °C) 99.1 °F (37.3 °C)   SpO2: 98% 96% 93% 93%   Weight:       Height:          LAB DATA:    Recent Labs      03/28/18 0332   NA  142   K  4.0   CL  111*   CO2  23   AGAP  8   GLU  126*   BUN  11   CREA  0.64   GFRAA  >60   GFRNA  >60   CA  8.8   MG  2.1   PHOS  2.3*        Recent Labs      03/28/18   0332  03/27/18   1040   WBC  7.9  8.3   HGB  10.7*  10.8*   HCT  33.2*  33.0*   PLT  135*  165          OBJECTIVE: On arrival to room, I found patient to be resting in bed. Pain Assessment  Pain Intensity 1: 0 (03/30/18 0255)  Pain Location 1: Back  Pain Intervention(s) 1: Rest, Relaxation technique, Repositioned  Patient Stated Pain Goal: 0    ASSESSMENT:  Patient resting quietly in bed. Respirations even and unlabored. Awakens to voice, alert and oriented x4. Denies needs at this time. PLAN:  Will continue to follow per outreach protocol.

## 2018-04-02 LAB
FACTOR V LEIDEN MUTATION, XMF5LT: NORMAL %
PROTHROMBIN G2021A MUTATION, XMPTMT: NORMAL

## 2018-04-04 LAB — MTHFR GENE MUT ANL BLD/T: NORMAL

## 2018-04-05 PROBLEM — E66.01 OBESITY, MORBID (HCC): Status: ACTIVE | Noted: 2018-04-05

## 2018-04-13 ENCOUNTER — HOSPITAL ENCOUNTER (OUTPATIENT)
Dept: LAB | Age: 58
Discharge: HOME | End: 2018-04-13
Payer: COMMERCIAL

## 2018-04-13 DIAGNOSIS — I26.99 PE (PULMONARY THROMBOEMBOLISM) (HCC): ICD-10-CM

## 2018-04-13 LAB
BASOPHILS # BLD: 0.1 K/UL (ref 0–0.2)
BASOPHILS NFR BLD: 1 % (ref 0–2)
DIFFERENTIAL METHOD BLD: ABNORMAL
EOSINOPHIL # BLD: 0.2 K/UL (ref 0–0.8)
EOSINOPHIL NFR BLD: 2 % (ref 0.5–7.8)
ERYTHROCYTE [DISTWIDTH] IN BLOOD BY AUTOMATED COUNT: 15.6 % (ref 11.9–14.6)
HCT VFR BLD AUTO: 31.9 % (ref 35.8–46.3)
HGB BLD-MCNC: 10.2 G/DL (ref 11.7–15.4)
LYMPHOCYTES # BLD: 2.6 K/UL (ref 0.5–4.6)
LYMPHOCYTES NFR BLD: 31 % (ref 13–44)
MCH RBC QN AUTO: 27.3 PG (ref 26.1–32.9)
MCHC RBC AUTO-ENTMCNC: 32 G/DL (ref 31.4–35)
MCV RBC AUTO: 85.5 FL (ref 79.6–97.8)
MONOCYTES # BLD: 0.8 K/UL (ref 0.1–1.3)
MONOCYTES NFR BLD: 9 % (ref 4–12)
NEUTS SEG # BLD: 4.7 K/UL (ref 1.7–8.2)
NEUTS SEG NFR BLD: 57 % (ref 43–78)
NRBC # BLD: 0 K/UL (ref 0–0.2)
PLATELET # BLD AUTO: 409 K/UL (ref 150–450)
PMV BLD AUTO: 9.7 FL (ref 10.8–14.1)
RBC # BLD AUTO: 3.73 M/UL (ref 4.05–5.25)
WBC # BLD AUTO: 8.3 K/UL (ref 4.3–11.1)

## 2018-04-13 PROCEDURE — 85025 COMPLETE CBC W/AUTO DIFF WBC: CPT | Performed by: INTERNAL MEDICINE

## 2018-04-13 PROCEDURE — 36415 COLL VENOUS BLD VENIPUNCTURE: CPT | Performed by: INTERNAL MEDICINE

## 2018-05-09 ENCOUNTER — HOSPITAL ENCOUNTER (OUTPATIENT)
Age: 58
Setting detail: OUTPATIENT SURGERY
Discharge: HOME | End: 2018-05-09
Attending: INTERNAL MEDICINE | Admitting: INTERNAL MEDICINE
Payer: COMMERCIAL

## 2018-05-09 ENCOUNTER — ANESTHESIA (OUTPATIENT)
Dept: ENDOSCOPY | Age: 58
End: 2018-05-09
Payer: COMMERCIAL

## 2018-05-09 ENCOUNTER — ANESTHESIA EVENT (OUTPATIENT)
Dept: ENDOSCOPY | Age: 58
End: 2018-05-09
Payer: COMMERCIAL

## 2018-05-09 VITALS
BODY MASS INDEX: 40.57 KG/M2 | WEIGHT: 229 LBS | DIASTOLIC BLOOD PRESSURE: 58 MMHG | HEIGHT: 63 IN | RESPIRATION RATE: 18 BRPM | OXYGEN SATURATION: 100 % | TEMPERATURE: 98.3 F | HEART RATE: 93 BPM | SYSTOLIC BLOOD PRESSURE: 118 MMHG

## 2018-05-09 LAB — GLUCOSE BLD STRIP.AUTO-MCNC: 107 MG/DL (ref 65–100)

## 2018-05-09 PROCEDURE — 82378 CARCINOEMBRYONIC ANTIGEN: CPT

## 2018-05-09 PROCEDURE — 76040000025: Performed by: INTERNAL MEDICINE

## 2018-05-09 PROCEDURE — 88305 TISSUE EXAM BY PATHOLOGIST: CPT | Performed by: INTERNAL MEDICINE

## 2018-05-09 PROCEDURE — 77030038984 HC NDL ASPIR ULTRSND BSC -F: Performed by: INTERNAL MEDICINE

## 2018-05-09 PROCEDURE — 82150 ASSAY OF AMYLASE: CPT

## 2018-05-09 PROCEDURE — 88173 CYTOPATH EVAL FNA REPORT: CPT | Performed by: INTERNAL MEDICINE

## 2018-05-09 PROCEDURE — 82962 GLUCOSE BLOOD TEST: CPT

## 2018-05-09 PROCEDURE — 74011250636 HC RX REV CODE- 250/636

## 2018-05-09 PROCEDURE — 74011250636 HC RX REV CODE- 250/636: Performed by: INTERNAL MEDICINE

## 2018-05-09 PROCEDURE — 76060000032 HC ANESTHESIA 0.5 TO 1 HR: Performed by: INTERNAL MEDICINE

## 2018-05-09 PROCEDURE — 77030008969: Performed by: INTERNAL MEDICINE

## 2018-05-09 RX ORDER — PROPOFOL 10 MG/ML
INJECTION, EMULSION INTRAVENOUS
Status: DISCONTINUED | OUTPATIENT
Start: 2018-05-09 | End: 2018-05-09 | Stop reason: HOSPADM

## 2018-05-09 RX ORDER — PROPOFOL 10 MG/ML
INJECTION, EMULSION INTRAVENOUS AS NEEDED
Status: DISCONTINUED | OUTPATIENT
Start: 2018-05-09 | End: 2018-05-09 | Stop reason: HOSPADM

## 2018-05-09 RX ORDER — LEVOFLOXACIN 5 MG/ML
500 INJECTION, SOLUTION INTRAVENOUS
Status: COMPLETED | OUTPATIENT
Start: 2018-05-09 | End: 2018-05-09

## 2018-05-09 RX ORDER — SODIUM CHLORIDE, SODIUM LACTATE, POTASSIUM CHLORIDE, CALCIUM CHLORIDE 600; 310; 30; 20 MG/100ML; MG/100ML; MG/100ML; MG/100ML
1000 INJECTION, SOLUTION INTRAVENOUS CONTINUOUS
Status: DISCONTINUED | OUTPATIENT
Start: 2018-05-09 | End: 2018-05-09 | Stop reason: HOSPADM

## 2018-05-09 RX ADMIN — PROPOFOL 30 MG: 10 INJECTION, EMULSION INTRAVENOUS at 14:06

## 2018-05-09 RX ADMIN — PROPOFOL 160 MCG/KG/MIN: 10 INJECTION, EMULSION INTRAVENOUS at 14:06

## 2018-05-09 RX ADMIN — LEVOFLOXACIN 500 MG: 5 INJECTION, SOLUTION INTRAVENOUS at 15:00

## 2018-05-09 RX ADMIN — SODIUM CHLORIDE, SODIUM LACTATE, POTASSIUM CHLORIDE, AND CALCIUM CHLORIDE 1000 ML: 600; 310; 30; 20 INJECTION, SOLUTION INTRAVENOUS at 13:32

## 2018-05-09 NOTE — H&P
History and Physical for Endoscopic Ultrasound             Date: 5/9/2018     History of Present Illness:  Patient presents to undergo endoscopic ultrasound for evaluation of a pancreatic cyst. Patient denies any dysphagia, diarrhea, constipation, tenesmus, or GI bleeding. Past Medical History:   Diagnosis Date    Diabetes ([address]     Environmental allergies 9/12/2013     Past Surgical History:   Procedure Laterality Date    HX CHOLECYSTECTOMY        Family History   Problem Relation Age of Onset    No Known Problems Mother     Dementia Father     Heart Disease Father      Social History   Substance Use Topics    Smoking status: Never Smoker    Smokeless tobacco: Never Used    Alcohol use No        Allergies   Allergen Reactions    Biaxin [Clarithromycin] Unknown (comments)    Cortisone Other (comments)     Per pt, leg numbness. No current facility-administered medications for this encounter. Review of Systems:  A detailed 10 organ review of systems is obtained with pertinent positives as listed in the History of Present Illness. All others are negative. Objective:     Physical Exam:  There were no vitals taken for this visit. General:  Alert and oriented. Heart: Regular rate and rhythm  Lungs:  Clear to auscultation bilaterally  Abdomen: Soft, nontender, nondistended    Impression/Plan:     Proceed with EUS as planned. I have discussed with the patient the technique, benefits, alternatives, and risks of the procedure, including medication reaction, immediate or delayed bleeding, or perforation of the gastrointestinal tract.     Signed By: Afsaneh Valle MD     May 9, 2018

## 2018-05-09 NOTE — PROGRESS NOTES
Per Dr. Emily Rowley, restart Eliquis in 2 days. Discharge instructions and discharge med list given to pt's friend Anai Fishman, voiced understanding.

## 2018-05-09 NOTE — IP AVS SNAPSHOT
[facility] 
 
 
 [address] [facility] [address] [facility] 
[phone] Patient: Jie Neil MRN: [id number] [id number] About your hospitalization You were admitted on:  May 9, 2018 You last received care in the:  [facility] You were discharged on:  May 9, 2018 Why you were hospitalized Your primary diagnosis was:  Not on File Follow-up Information None Your Scheduled Appointments Monday May 14, 2018  3:45 PM EDT Follow Up with Tanisha Longoria MD  
[facility] Hematology and Oncology [facility] ABDIRASHID/ Alex Wilson [address] [facility] [address]  
[phone] Wednesday May 30, 2018  3:30 PM EDT  
ECHOCARDIOGRAM with ECHO [address] [facility] CARDIOLOGY [place] OFFICE ([address] [facility]) [address] 
[address]  
[phone] Discharge Orders None A check cisco indicates which time of day the medication should be taken. My Medications ASK your doctor about these medications Instructions Each Dose to Equal  
 Morning Noon Evening Bedtime  
 apixaban 5 mg tablet Commonly known as:  Gwenetta [place] Your last dose was: Your next dose is: Take 2 Tabs by mouth every twelve (12) hours. Then begin 5mg twice a day on 4/3/18 with evening dose. 10 mg  
    
   
   
   
  
 atorvastatin 20 mg tablet Commonly known as:  LIPITOR Your last dose was: Your next dose is: Take 1 Tab by mouth daily. 20 mg Blood-Glucose Meter Misc Commonly known as:  ACCU-CHEK LILLY PLUS METER Your last dose was: Your next dose is:    
   
   
 1 Kit by Does Not Apply route two (2) times a day. For management of Type 2 DM Uncontrolled (250.02) 1 Kit  
    
   
   
   
  
 glucose blood VI test strips strip Commonly known as:  ACCU-CHEK LILLY PLUS TEST STRP  
   
 Your last dose was: Your next dose is:    
   
   
 Use twice daily for management of Type 2 DM, Uncontrolled (250.02)  
     
   
   
   
  
 insulin glargine 100 unit/mL (3 mL) Inpn Commonly known as:  LANTUS SOLOSTAR U-100 INSULIN Your last dose was: Your next dose is:    
   
   
 24 u qhs  Indications: type 2 diabetes mellitus Insulin Needles (Disposable) 32 gauge x 1/4\" Ndle Your last dose was: Your next dose is:    
   
   
 1 Each by Does Not Apply route daily. 1 Each Lancets Misc Commonly known as:  ACCU-CHEK SOFTCLIX LANCETS Your last dose was: Your next dose is:    
   
   
 Use twice daily for management of Type 2 DM uncontrolled (250.02)  
     
   
   
   
  
 metFORMIN 500 mg tablet Commonly known as:  GLUCOPHAGE Your last dose was: Your next dose is: Take 1 Tab by mouth two (2) times daily (with meals). 500 mg  
    
   
   
   
  
 pregabalin 50 mg capsule Commonly known as:  Lorrene Shenaini Your last dose was: Your next dose is: Take 1 Cap by mouth three (3) times daily. Max Daily Amount: 150 mg.  
 50 mg  
    
   
   
   
  
 tiZANidine 4 mg capsule Commonly known as:  Issa Tee Your last dose was: Your next dose is: Take 1 Cap by mouth three (3) times daily. Indications: Muscle Spasm 4 mg  
    
   
   
   
  
 traMADol 50 mg tablet Commonly known as:  ULTRAM  
   
Your last dose was: Your next dose is: Take 1 Tab by mouth every six (6) hours as needed for Pain. Max Daily Amount: 200 mg.  
 50 mg  
    
   
   
   
  
 TYLENOL 8 HOUR PO Your last dose was: Your next dose is: Take  by mouth. Opioid Education  Prescription Opioids: What You Need to Know: 
 
Prescription opioids can be used to help relieve moderate-to-severe pain and are often prescribed following a surgery or injury, or for certain health conditions. These medications can be an important part of treatment but also come with serious risks. Opioids are strong pain medicines. Examples include hydrocodone, oxycodone, fentanyl, and morphine. Heroin is an example of an illegal opioid. It is important to work with your health care provider to make sure you are getting the safest, most effective care. WHAT ARE THE RISKS AND SIDE EFFECTS OF OPIOID USE? Prescription opioids carry serious risks of addiction and overdose, especially with prolonged use. An opioid overdose, often marked by slow breathing, can cause sudden death. The use of prescription opioids can have a number of side effects as well, even when taken as directed. · Tolerance-meaning you might need to take more of a medication for the same pain relief · Physical dependence-meaning you have symptoms of withdrawal when the medication is stopped. Withdrawal symptoms can include nausea, sweating, chills, diarrhea, stomach cramps, and muscle aches. Withdrawal can last up to several weeks, depending on which drug you took and how long you took it. · Increased sensitivity to pain · Constipation · Nausea, vomiting, and dry mouth · Sleepiness and dizziness · Confusion · Depression · Low levels of testosterone that can result in lower sex drive, energy, and strength · Itching and sweating RISKS ARE GREATER WITH:      
· History of drug misuse, substance use disorder, or overdose · Mental health conditions (such as depression or anxiety) · Sleep apnea · Older age (72 years or older) · Pregnancy Avoid alcohol while taking prescription opioids. Also, unless specifically advised by your health care provider, medications to avoid include: · Benzodiazepines (such as Xanax or Valium) · Muscle relaxants (such as Soma or Flexeril) · Hypnotics (such as Ambien or Lunesta) · Other prescription opioids KNOW YOUR OPTIONS Talk to your health care provider about ways to manage your pain that don't involve prescription opioids. Some of these options may actually work better and have fewer risks and side effects. Options may include: 
· Pain relievers such as acetaminophen, ibuprofen, and naproxen · Some medications that are also used for depression or seizures · Physical therapy and exercise · Counseling to help patients learn how to cope better with triggers of pain and stress. · Application of heat or cold compress · Massage therapy · Relaxation techniques Be Informed Make sure you know the name of your medication, how much and how often to take it, and its potential risks & side effects. IF YOU ARE PRESCRIBED OPIOIDS FOR PAIN: 
· Never take opioids in greater amounts or more often than prescribed. Remember the goal is not to be pain-free but to manage your pain at a tolerable level. · Follow up with your primary care provider to: · Work together to create a plan on how to manage your pain. · Talk about ways to help manage your pain that don't involve prescription opioids. · Talk about any and all concerns and side effects. · Help prevent misuse and abuse. · Never sell or share prescription opioids · Help prevent misuse and abuse. · Store prescription opioids in a secure place and out of reach of others (this may include visitors, children, friends, and family). · Safely dispose of unused/unwanted prescription opioids: Find your community drug take-back program or your pharmacy mail-back program, or flush them down the toilet, following guidance from the Food and Drug Administration (www.fda.gov/Drugs/ResourcesForYou). · Visit www.cdc.gov/drugoverdose to learn about the risks of opioid abuse and overdose. · If you believe you may be struggling with addiction, tell your health care provider and ask for guidance or call [facility] at 7-504-481-RFMN. Discharge Instructions Gastrointestinal Esophagogastroduodenoscopy (EGD) - Upper Exam Discharge Instructions EUS-Endoscopic Ultrasound 1. Call Dr. Sebastian Solares at [phone] for any problems or questions. 2. Contact the doctor's office for follow up appointment as directed. 3. Medication may cause drowsiness for several hours, therefore, do not drive or operate machinery for remainder of the day. 4. No alcohol today. 5. Ordinarily, you may resume regular diet and activity after exam unless otherwise specified by your physician. 6. For mild soreness in your throat you may use Cepacol throat lozenges or warm salt-water gargles as needed. Any additional instructions:   
 
Plan: 1. Follow up results of cytology and biochemical analysis. 2. Avoid NSAIDs for 48 hours. 3. Further recommendations will be based on pathology results. Instructions given to Luz Maria Medicus and other family members. Instructions given by:  Margit Scheuermann, RN Introducing [facility] & HEALTH SERVICES! [facility] introduces [employer] patient portal. Now you can access parts of your medical record, email your doctor's office, and request medication refills online. 1. In your internet browser, go to [email or web address] 2. Click on the First Time User? Click Here link in the Sign In box. You will see the New Member Sign Up page. 3. Enter your [employer] Access Code exactly as it appears below. You will not need to use this code after youve completed the sign-up process. If you do not sign up before the expiration date, you must request a new code. · [employer] Access Code: [email or web address] Expires: 6/24/2018  8:33 AM 
 
4. Enter the last four digits of your Social Security Number (xxxx) and Date of Birth (mm/dd/yyyy) as indicated and click Submit. You will be taken to the next sign-up page. 5. Create a Pcsso ID. This will be your [employer] login ID and cannot be changed, so think of one that is secure and easy to remember. 6. Create a [employer] password. You can change your password at any time. 7. Enter your Password Reset Question and Answer. This can be used at a later time if you forget your password. 8. Enter your e-mail address. You will receive e-mail notification when new information is available in [facility] 9. Click Sign Up. You can now view and download portions of your medical record. 10. Click the Download Summary menu link to download a portable copy of your medical information. If you have questions, please visit the Frequently Asked Questions section of the [employer] website. Remember, [employer] is NOT to be used for urgent needs. For medical emergencies, dial 911. Now available from your iPhone and Android! Introducing Parmjit Milligan As a [facility] patient, I wanted to make you aware of our electronic visit tool called Parmjit Elíasclaudiaamol. [facility] 24/7 allows you to connect within minutes with a medical provider 24 hours a day, seven days a week via a mobile device or tablet or logging into a secure website from your computer. You can access Parmjit Milligan from anywhere in the [place]. A virtual visit might be right for you when you have a simple condition and feel like you just dont want to get out of bed, or cant get away from work for an appointment, when your regular [facility] provider is not available (evenings, weekends or holidays), or when youre out of town and need minor care. Electronic visits cost only $49 and if the [facility] 24/7 provider determines a prescription is needed to treat your condition, one can be electronically transmitted to a nearby pharmacy*. Please take a moment to enroll today if you have not already done so. The enrollment process is free and takes just a few minutes.   To enroll, please download the [employer] 24/7 sukumar to your tablet or phone, or visit [email or web address] to enroll on your computer. And, as an [address] [place] [address] patient with a [employer] account, the results of your visits will be scanned into your electronic medical record and your primary care provider will be able to view the scanned results. We urge you to continue to see your regular [employer] provider for your ongoing medical care. And while your primary care provider may not be the one available when you seek a Simple Admit virtual visit, the peace of mind you get from getting a real diagnosis real time can be priceless. For more information on Simple Admit, view our Frequently Asked Questions (FAQs) at [email or web address]. Sincerely, 
 
Alex Bhagat MD 
Chief Medical Officer [facility] Tiera Bull *:  certain medications cannot be prescribed via Simple Admit Providers Seen During Your Hospitalization Provider Specialty Primary office phone Lynetta Buerger, MD Gastroenterology [phone] Your Primary Care Physician (PCP) Primary Care Physician Office Phone Office Fax [address] [facility] [address] [phone] You are allergic to the following Allergen Reactions Biaxin (Clarithromycin) Unknown (comments) Cortisone Other (comments) Per pt, leg numbness. Recent Documentation Height Weight BMI OB Status Smoking Status 1.6 m 103.9 kg 40.57 kg/m2 Postmenopausal Never Smoker Emergency Contacts Name Discharge Info Relation Home Work Mobile Jon Tillman  Child [2] [phone] Chiara Mcmahan  Other Relative [6]   [phone] Patient Belongings The following personal items are in your possession at time of discharge: 
  Dental Appliances: None  Visual Aid: Magnifying glass Please provide this summary of care documentation to your next provider. Signatures-by signing, you are acknowledging that this After Visit Summary has been reviewed with you and you have received a copy. Patient Signature:  ____________________________________________________________ Date:  ____________________________________________________________  
  
[facility] Provider Signature:  ____________________________________________________________ Date:  ____________________________________________________________

## 2018-05-09 NOTE — OP NOTES
Gastroenterology Procedure Note              Procedure:  Endoscopic ultrasound with Doppler and Fine needle aspiration    Date of Procedure:  5/9/2018    Patient:  Renata Alvarado     1960    Indication:  Pancreatic cyst    Sedation:  MAC    Pre-Procedure Physical Exam:    Mental status:  alert and oriented  Airway:  normal oropharyngeal airway and neck mobility  CV:  regular rate and rhythm  Respiratory:  clear to auscultation    Procedure:  A History and Physical has been performed, and patient medication allergies have been  reviewed. Risks of perforation, hemorrhage, adverse drug reaction, and aspiration were discussed. Informed consent was obtained for the procedure, including sedation. The patient was placed in the left lateral decubitus position. The heart rate, oxygen saturations, blood pressure, and response to care were monitored throughout the procedure. The linear echoendoscope was passed through the mouth and advanced under direct vision to the distal duodenum. As the scope was slowly withdrawn, detailed endoscopic images were obtained from the surrounding organs. Fine needle aspiration was performed using a transgastric approach. The patient tolerated the procedure well. Findings:     ENDOSCOPIC FINDINGS:  Limited views of the mucosa with the echoendoscope reveals no gross abnormalities of the stomach or proximal duodenum. The ampulla is well-visualized endoscopically and appears normal.    PANCREAS:  The pancreas is well-visualized from head to tail. There is no evidence of chronic pancreatitis. There is a simple cyst adjacent to the tail of the pancreas measuring 44 x 35 mm. It is round, with a thin wall, and without septations or mural nodules. There is clear communication with the pancreatic duct. After IV Levaquin was given, fine needle was performed using a 19-gauge [employer] needle. A single pass was performed, yielding 18 ml clear yellow fluid.  A second smaller [id number] cyst measuring 9 x 9 mm is seen in the uncinate process. The main pancreatic duct is of normal caliber with a smooth, regular course, measuring up to 2 mm in the head, 2 mm in the body and 1 mm in the tail. Pancreas divisum is not seen. BILIARY TREE: The gallbladder is absent. The common bile duct is well-visualized from its insertion in the ampulla to the bifurcation of right and left hepatic ducts. It is non-dilated, measuring up to 7 mm maximally with a gradual taper down to the level of the ampulla. There are no intraductal stones, sludge or debris. The ampulla appears normal endosonographically. OTHER ORGANS:  Views of the left lobe of the liver demonstrate no solid mass lesions. There is no ascites in the upper abdomen. The left adrenal gland appears normal. The major vascular structures including the portal vein, splenic vessels and celiac artery appear unremarkable. There are no pathologically enlarged posterior mediastinal or upper abdominal lymph nodes. Specimen:  Yes    Estimated Blood Loss:  3 ml    Implant:  None           Impression:    1. Pancreatic cysts. These are suspicious for pseudocysts. FNA was performed. Plan:  1. Follow up results of cytology and biochemical analysis. 2. Avoid NSAIDs for 48 hours. 3. Further recommendations will be based on pathology results.     Signed:  Aster Cooper MD  5/9/2018  2:25 PM

## 2018-05-09 NOTE — ANESTHESIA PREPROCEDURE EVALUATION
Anesthetic History   No history of anesthetic complications            Review of Systems / Medical History  Patient summary reviewed and pertinent labs reviewed    Pulmonary  Within defined limits              Comments: DVT to PE 3/18   Neuro/Psych   Within defined limits           Cardiovascular                  Exercise tolerance: <4 METS     GI/Hepatic/Renal                Endo/Other    Diabetes: type 2, using insulin    Morbid obesity     Other Findings   Comments: Apparent pancreatic mass  On Lovenox bridge at present previously on Eliquis maintenance         Physical Exam    Airway  Mallampati: II  TM Distance: > 6 cm  Neck ROM: normal range of motion   Mouth opening: Normal     Cardiovascular    Rhythm: regular           Dental         Pulmonary                 Abdominal  GI exam deferred       Other Findings            Anesthetic Plan    ASA: 3  Anesthesia type: total IV anesthesia          Induction: Intravenous  Anesthetic plan and risks discussed with: Patient

## 2018-05-09 NOTE — ANESTHESIA POSTPROCEDURE EVALUATION
Post-Anesthesia Evaluation and Assessment    Patient: Jensen Means MRN: [id number]  SSN: [id number]    YOB: 1960  Age: 59 y.o. Sex: female       Cardiovascular Function/Vital Signs  Visit Vitals    BP 90/60    Pulse 93    Temp 36.8 °C (98.3 °F)    Resp 16    Ht 5' 3\" (1.6 m)    Wt 103.9 kg (229 lb)    SpO2 97%    BMI 40.57 kg/m2       Patient is status post total IV anesthesia anesthesia for Procedure(s):  ENDOSCOPIC ULTRASOUND (EUS)  BMI 34  FINE NEEDLE ASPIRATION. Nausea/Vomiting: None    Postoperative hydration reviewed and adequate. Pain:  Pain Scale 1: Visual (05/09/18 1429)  Pain Intensity 1: 0 (05/09/18 1429)   Managed    Neurological Status: At baseline    Mental Status and Level of Consciousness: Arousable    Pulmonary Status:   O2 Device: CO2 nasal cannula (05/09/18 1422)   Adequate oxygenation and airway patent    Complications related to anesthesia: None    Post-anesthesia assessment completed.  No concerns    Signed By: Yadiel Winston MD     May 9, 2018

## 2018-05-09 NOTE — DISCHARGE INSTRUCTIONS
Gastrointestinal Esophagogastroduodenoscopy (EGD) - Upper Exam Discharge Instructions  EUS-Endoscopic Ultrasound  1. Call Dr. Emily Rowley at [phone] for any problems or questions. 2. Contact the doctor's office for follow up appointment as directed. 3. Medication may cause drowsiness for several hours, therefore, do not drive or operate machinery for remainder of the day. 4. No alcohol today. 5. Ordinarily, you may resume regular diet and activity after exam unless otherwise specified by your physician. 6. For mild soreness in your throat you may use Cepacol throat lozenges or warm salt-water gargles as needed. Any additional instructions:    Restart Eliquis in 2 days. Plan:  1. Follow up results of cytology and biochemical analysis. 2. Avoid NSAIDs for 48 hours. 3. Further recommendations will be based on pathology results. Instructions given to Elliott Madrid and other family members.   Instructions given by:  Jarvis Resendiz RN

## 2018-05-21 LAB
Lab: NORMAL
REFERENCE LAB,REFLB: NORMAL
TEST DESCRIPTION:,ATST: NORMAL

## 2018-08-29 ENCOUNTER — HOSPITAL ENCOUNTER (OUTPATIENT)
Dept: CT IMAGING | Age: 58
Discharge: HOME | End: 2018-08-29
Attending: INTERNAL MEDICINE
Payer: COMMERCIAL

## 2018-08-29 DIAGNOSIS — K86.3 PANCREATIC PSEUDOCYST: ICD-10-CM

## 2018-08-29 LAB — CREAT BLD-MCNC: 0.6 MG/DL (ref 0.8–1.5)

## 2018-08-29 PROCEDURE — 74160 CT ABDOMEN W/CONTRAST: CPT

## 2018-08-29 PROCEDURE — 74011636320 HC RX REV CODE- 636/320: Performed by: INTERNAL MEDICINE

## 2018-08-29 PROCEDURE — 82565 ASSAY OF CREATININE: CPT

## 2018-08-29 PROCEDURE — 74011000258 HC RX REV CODE- 258: Performed by: INTERNAL MEDICINE

## 2018-08-29 RX ORDER — SODIUM CHLORIDE 0.9 % (FLUSH) 0.9 %
10 SYRINGE (ML) INJECTION
Status: COMPLETED | OUTPATIENT
Start: 2018-08-29 | End: 2018-08-29

## 2018-08-29 RX ADMIN — Medication 10 ML: at 10:59

## 2018-08-29 RX ADMIN — SODIUM CHLORIDE 100 ML: 900 INJECTION, SOLUTION INTRAVENOUS at 10:59

## 2018-08-29 RX ADMIN — IOPAMIDOL 100 ML: 755 INJECTION, SOLUTION INTRAVENOUS at 10:59

## 2018-09-07 ENCOUNTER — HOSPITAL ENCOUNTER (OUTPATIENT)
Dept: INFUSION THERAPY | Age: 58
Discharge: HOME | End: 2018-09-07
Payer: COMMERCIAL

## 2018-09-07 VITALS
SYSTOLIC BLOOD PRESSURE: 136 MMHG | DIASTOLIC BLOOD PRESSURE: 66 MMHG | HEART RATE: 93 BPM | RESPIRATION RATE: 17 BRPM | TEMPERATURE: 98.5 F | OXYGEN SATURATION: 98 %

## 2018-09-07 PROCEDURE — P9016 RBC LEUKOCYTES REDUCED: HCPCS

## 2018-09-07 PROCEDURE — 86901 BLOOD TYPING SEROLOGIC RH(D): CPT

## 2018-09-07 PROCEDURE — 86923 COMPATIBILITY TEST ELECTRIC: CPT

## 2018-09-07 PROCEDURE — 36430 TRANSFUSION BLD/BLD COMPNT: CPT

## 2018-09-07 PROCEDURE — 74011250636 HC RX REV CODE- 250/636: Performed by: NURSE PRACTITIONER

## 2018-09-07 PROCEDURE — 77030018667 ADMN ST IV BLD FENW -A

## 2018-09-07 PROCEDURE — 74011250637 HC RX REV CODE- 250/637: Performed by: NURSE PRACTITIONER

## 2018-09-07 RX ORDER — SODIUM CHLORIDE 0.9 % (FLUSH) 0.9 %
10 SYRINGE (ML) INJECTION AS NEEDED
Status: DISCONTINUED | OUTPATIENT
Start: 2018-09-07 | End: 2018-09-11 | Stop reason: HOSPADM

## 2018-09-07 RX ORDER — DIPHENHYDRAMINE HCL 25 MG
25 CAPSULE ORAL ONCE
Status: COMPLETED | OUTPATIENT
Start: 2018-09-07 | End: 2018-09-07

## 2018-09-07 RX ORDER — SODIUM CHLORIDE 9 MG/ML
250 INJECTION, SOLUTION INTRAVENOUS AS NEEDED
Status: DISCONTINUED | OUTPATIENT
Start: 2018-09-07 | End: 2018-09-11 | Stop reason: HOSPADM

## 2018-09-07 RX ORDER — ACETAMINOPHEN 325 MG/1
650 TABLET ORAL ONCE
Status: COMPLETED | OUTPATIENT
Start: 2018-09-07 | End: 2018-09-07

## 2018-09-07 RX ADMIN — Medication 10 ML: at 07:10

## 2018-09-07 RX ADMIN — ACETAMINOPHEN 650 MG: 325 TABLET, FILM COATED ORAL at 09:28

## 2018-09-07 RX ADMIN — DIPHENHYDRAMINE HYDROCHLORIDE 25 MG: 25 CAPSULE ORAL at 09:28

## 2018-09-07 RX ADMIN — SODIUM CHLORIDE 250 ML: 900 INJECTION, SOLUTION INTRAVENOUS at 09:40

## 2018-09-07 RX ADMIN — Medication 10 ML: at 13:21

## 2018-09-07 NOTE — PROGRESS NOTES
Arrived to the [facility]. Assessment complete. Two units of PRBC's completed. Patient tolerated without problems. Any issues or concerns during appointment: None. Patient to follow up with MD.  Discharged ambulatory.

## 2018-09-08 LAB
ABO + RH BLD: NORMAL
BLD PROD TYP BPU: NORMAL
BLD PROD TYP BPU: NORMAL
BLOOD GROUP ANTIBODIES SERPL: NORMAL
BPU ID: NORMAL
BPU ID: NORMAL
CROSSMATCH RESULT,%XM: NORMAL
CROSSMATCH RESULT,%XM: NORMAL
SPECIMEN EXP DATE BLD: NORMAL
STATUS OF UNIT,%ST: NORMAL
STATUS OF UNIT,%ST: NORMAL
UNIT DIVISION, %UDIV: 0
UNIT DIVISION, %UDIV: 0

## 2018-09-10 ENCOUNTER — HOSPITAL ENCOUNTER (OUTPATIENT)
Dept: LAB | Age: 58
Discharge: HOME | End: 2018-09-10

## 2018-09-10 PROCEDURE — 88305 TISSUE EXAM BY PATHOLOGIST: CPT

## 2018-09-10 PROCEDURE — 88312 SPECIAL STAINS GROUP 1: CPT

## 2018-09-18 PROBLEM — C18.3 MALIGNANT NEOPLASM OF HEPATIC FLEXURE (HCC): Status: ACTIVE | Noted: 2018-09-18

## 2018-09-18 PROBLEM — Z79.01 CURRENT USE OF ANTICOAGULANT THERAPY: Status: ACTIVE | Noted: 2018-09-18

## 2018-09-21 ENCOUNTER — HOSPITAL ENCOUNTER (OUTPATIENT)
Dept: LAB | Age: 58
Discharge: HOME | End: 2018-09-21
Payer: COMMERCIAL

## 2018-09-21 DIAGNOSIS — Z86.718 HISTORY OF BLOOD CLOTS: ICD-10-CM

## 2018-09-21 LAB
ANION GAP SERPL CALC-SCNC: 7 MMOL/L (ref 7–16)
APTT PPP: 28.1 SEC (ref 23.2–35.3)
BUN SERPL-MCNC: 15 MG/DL (ref 8–23)
CALCIUM SERPL-MCNC: 9 MG/DL (ref 8.3–10.4)
CHLORIDE SERPL-SCNC: 106 MMOL/L (ref 98–107)
CO2 SERPL-SCNC: 27 MMOL/L (ref 21–32)
CREAT SERPL-MCNC: 0.69 MG/DL (ref 0.6–1)
GLUCOSE SERPL-MCNC: 141 MG/DL (ref 65–100)
INR PPP: 1
POTASSIUM SERPL-SCNC: 4.2 MMOL/L (ref 3.5–5.1)
PROTHROMBIN TIME: 13 SEC (ref 11.5–14.5)
SODIUM SERPL-SCNC: 140 MMOL/L (ref 136–145)

## 2018-09-21 PROCEDURE — 36415 COLL VENOUS BLD VENIPUNCTURE: CPT

## 2018-09-21 PROCEDURE — 85730 THROMBOPLASTIN TIME PARTIAL: CPT

## 2018-09-21 PROCEDURE — 80048 BASIC METABOLIC PNL TOTAL CA: CPT

## 2018-09-21 PROCEDURE — 85610 PROTHROMBIN TIME: CPT

## 2018-09-27 ENCOUNTER — HOSPITAL ENCOUNTER (OUTPATIENT)
Dept: INTERVENTIONAL RADIOLOGY/VASCULAR | Age: 58
Discharge: HOME | End: 2018-09-27
Attending: PHYSICIAN ASSISTANT
Payer: COMMERCIAL

## 2018-09-27 VITALS
SYSTOLIC BLOOD PRESSURE: 138 MMHG | DIASTOLIC BLOOD PRESSURE: 55 MMHG | HEART RATE: 79 BPM | OXYGEN SATURATION: 98 % | RESPIRATION RATE: 18 BRPM | TEMPERATURE: 98.4 F

## 2018-09-27 DIAGNOSIS — I27.82 OTHER CHRONIC PULMONARY EMBOLISM WITHOUT ACUTE COR PULMONALE (HCC): ICD-10-CM

## 2018-09-27 LAB — GLUCOSE BLD STRIP.AUTO-MCNC: 118 MG/DL (ref 65–100)

## 2018-09-27 PROCEDURE — 74011636320 HC RX REV CODE- 636/320: Performed by: RADIOLOGY

## 2018-09-27 PROCEDURE — C1769 GUIDE WIRE: HCPCS

## 2018-09-27 PROCEDURE — 77030004629 HC CATH ANGI SZ AUR COOK -B

## 2018-09-27 PROCEDURE — 74011000250 HC RX REV CODE- 250: Performed by: RADIOLOGY

## 2018-09-27 PROCEDURE — C1894 INTRO/SHEATH, NON-LASER: HCPCS

## 2018-09-27 PROCEDURE — 82962 GLUCOSE BLOOD TEST: CPT

## 2018-09-27 PROCEDURE — 74011250636 HC RX REV CODE- 250/636

## 2018-09-27 PROCEDURE — 99152 MOD SED SAME PHYS/QHP 5/>YRS: CPT

## 2018-09-27 PROCEDURE — C1880 VENA CAVA FILTER: HCPCS

## 2018-09-27 PROCEDURE — 74011250636 HC RX REV CODE- 250/636: Performed by: RADIOLOGY

## 2018-09-27 RX ORDER — SODIUM CHLORIDE 9 MG/ML
25 INJECTION, SOLUTION INTRAVENOUS ONCE
Status: ACTIVE | OUTPATIENT
Start: 2018-09-27 | End: 2018-09-28

## 2018-09-27 RX ORDER — HEPARIN SODIUM 200 [USP'U]/100ML
1000 INJECTION, SOLUTION INTRAVENOUS CONTINUOUS
Status: DISCONTINUED | OUTPATIENT
Start: 2018-09-27 | End: 2018-10-01 | Stop reason: HOSPADM

## 2018-09-27 RX ORDER — FENTANYL CITRATE 50 UG/ML
25-50 INJECTION, SOLUTION INTRAMUSCULAR; INTRAVENOUS
Status: DISCONTINUED | OUTPATIENT
Start: 2018-09-27 | End: 2018-10-01 | Stop reason: HOSPADM

## 2018-09-27 RX ORDER — MIDAZOLAM HYDROCHLORIDE 1 MG/ML
.5-2 INJECTION, SOLUTION INTRAMUSCULAR; INTRAVENOUS
Status: DISCONTINUED | OUTPATIENT
Start: 2018-09-27 | End: 2018-10-01 | Stop reason: HOSPADM

## 2018-09-27 RX ORDER — LIDOCAINE HYDROCHLORIDE 10 MG/ML
2-10 INJECTION INFILTRATION; PERINEURAL ONCE
Status: COMPLETED | OUTPATIENT
Start: 2018-09-27 | End: 2018-09-27

## 2018-09-27 RX ADMIN — LIDOCAINE HYDROCHLORIDE 8 ML: 10 INJECTION, SOLUTION INFILTRATION; PERINEURAL at 15:48

## 2018-09-27 RX ADMIN — MIDAZOLAM HYDROCHLORIDE 1 MG: 1 INJECTION, SOLUTION INTRAMUSCULAR; INTRAVENOUS at 15:37

## 2018-09-27 RX ADMIN — MIDAZOLAM HYDROCHLORIDE 1 MG: 1 INJECTION, SOLUTION INTRAMUSCULAR; INTRAVENOUS at 15:41

## 2018-09-27 RX ADMIN — FENTANYL CITRATE 50 MCG: 50 INJECTION, SOLUTION INTRAMUSCULAR; INTRAVENOUS at 15:41

## 2018-09-27 RX ADMIN — SODIUM BICARBONATE 2 ML: 0.2 INJECTION, SOLUTION INTRAVENOUS at 15:48

## 2018-09-27 RX ADMIN — IOPAMIDOL 32 ML: 612 INJECTION, SOLUTION INTRAVENOUS at 15:57

## 2018-09-27 RX ADMIN — FENTANYL CITRATE 50 MCG: 50 INJECTION, SOLUTION INTRAMUSCULAR; INTRAVENOUS at 15:37

## 2018-09-27 NOTE — H&P
Department of Interventional Radiology  [phone]    History and Physical    Patient:  Adela Harrell MRN:  [id number]  SSN:  [id number]    YOB: 1960  Age:  59 y.o. Sex:  female      Primary Care Provider:  Reta Alex MD  Referring Physician:  SHIV Martin    Subjective:     Chief Complaint: DVT    History of the Present Illness: The patient is a 59 y.o. female who presents for IVC filter placement. Hx significant for RLE DVT and extensive PE 4/2018. PE was treated with thrombolysis. She has been maintained on Eliquis since discharge. She will be scheduled in the near future for colon resection for hepatic flexure malignancy and will be on Lovenox perioperatively. Eliquis has been held. NPO. RLE swelling has resolved. Feels well. Past Medical History:   Diagnosis Date    Diabetes ([facility] [address]     Environmental allergies 9/12/2013    Thromboembolus ([facility] [address] 04/2018    right leg     Past Surgical History:   Procedure Laterality Date    HX CHOLECYSTECTOMY      HX OTHER SURGICAL      filter placed to right groin        Review of Systems:    Pertinent items are noted in the History of Present Illness. Current Outpatient Prescriptions   Medication Sig    ascorbic acid, vitamin C, 500 mg cpER Take 500 mg by mouth daily.  ferrous sulfate (IRON) 325 mg (65 mg iron) EC tablet Take 65 mg by mouth two (2) times a day.  insulin glargine (LANTUS SOLOSTAR U-100 INSULIN) 100 unit/mL (3 mL) inpn 24 u qhs  Indications: type 2 diabetes mellitus    metFORMIN (GLUCOPHAGE) 500 mg tablet Take 1 Tab by mouth two (2) times daily (with meals).  apixaban (ELIQUIS) 5 mg tablet Take 1 Tab by mouth two (2) times a day. Then begin 5mg twice a day on 4/3/18 with evening dose.  atorvastatin (LIPITOR) 20 mg tablet Take 1 Tab by mouth daily.  traMADol (ULTRAM) 50 mg tablet Take 1 Tab by mouth every six (6) hours as needed for Pain.  Max Daily Amount: 200 mg.   Laci tiZANidine (ZANAFLEX) 4 mg capsule Take 1 Cap by mouth three (3) times daily. Indications: Muscle Spasm    glucose blood VI test strips (ACCU-CHEK LILLY PLUS TEST STRP) strip Use twice daily for management of Type 2 DM, Uncontrolled (250.02)    Lancets (ACCU-CHEK SOFTCLIX LANCETS) misc Use twice daily for management of Type 2 DM uncontrolled (250.02)    Insulin Needles, Disposable, 32 gauge x 1/4\" ndle 1 Each by Does Not Apply route daily.  Blood-Glucose Meter (ACCU-CHEK LILLY PLUS METER) misc 1 Kit by Does Not Apply route two (2) times a day. For management of Type 2 DM Uncontrolled (250.02)    ACETAMINOPHEN (TYLENOL 8 HOUR PO) Take  by mouth. No current facility-administered medications for this encounter. Allergies   Allergen Reactions    Biaxin [Clarithromycin] Unknown (comments)    Cortisone Other (comments)     Per pt, leg numbness. Family History   Problem Relation Age of Onset    No Known Problems Mother     Dementia Father     Heart Disease Father      Social History   Substance Use Topics    Smoking status: Never Smoker    Smokeless tobacco: Never Used    Alcohol use No        Objective:       Physical Examination:    Vitals:    09/27/18 1400   BP: 161/72   Pulse: 93   Resp: 22   Temp: 98.4 °F (36.9 °C)   SpO2: 100%     Blood pressure 161/72, pulse 93, temperature 98.4 °F (36.9 °C), resp. rate 22, SpO2 100 %, not currently breastfeeding.   HEART: regular rate and rhythm  LUNG: clear to auscultation bilaterally  ABDOMEN: normal findings: soft, non-tender  EXTREMITIES: normal strength, tone, and muscle mass, no deformities, no erythema, induration, or nodules    Laboratory:     Lab Results   Component Value Date/Time    Sodium 140 09/21/2018 09:28 AM    Sodium 138 08/29/2018 04:30 PM    Potassium 4.2 09/21/2018 09:28 AM    Potassium 4.0 08/29/2018 04:30 PM    Chloride 106 09/21/2018 09:28 AM    Chloride 101 08/29/2018 04:30 PM    CO2 27 09/21/2018 09:28 AM    CO2 19 (L) 08/29/2018 04:30 PM    Anion gap 7 09/21/2018 09:28 AM    Anion gap 8 03/28/2018 03:32 AM    Glucose 141 (H) 09/21/2018 09:28 AM    Glucose 131 (H) 08/29/2018 04:30 PM    BUN 15 09/21/2018 09:28 AM    BUN 12 08/29/2018 04:30 PM    Creatinine 0.69 09/21/2018 09:28 AM    Creatinine 0.79 08/29/2018 04:30 PM    GFR est AA >60 09/21/2018 09:28 AM    GFR est AA 94 08/29/2018 04:30 PM    GFR est non-AA >60 09/21/2018 09:28 AM    GFR est non-AA 82 08/29/2018 04:30 PM    Calcium 9.0 09/21/2018 09:28 AM    Calcium 8.8 08/29/2018 04:30 PM    Magnesium 2.1 03/28/2018 03:32 AM    Phosphorus 2.3 (L) 03/28/2018 03:32 AM    Albumin 3.4 (L) 08/29/2018 04:30 PM    Albumin 3.2 (L) 03/26/2018 08:53 AM    Protein, total 5.6 (L) 08/29/2018 04:30 PM    Protein, total 7.0 03/26/2018 08:53 AM    Globulin 3.8 (H) 03/26/2018 08:53 AM    A-G Ratio 1.5 08/29/2018 04:30 PM    A-G Ratio 0.8 (L) 03/26/2018 08:53 AM    AST (SGOT) 18 08/29/2018 04:30 PM    AST (SGOT) 31 03/26/2018 08:53 AM    ALT (SGPT) 18 08/29/2018 04:30 PM    ALT (SGPT) 28 03/26/2018 08:53 AM     Lab Results   Component Value Date/Time    WBC 10.0 09/13/2018 03:05 PM    WBC 8.9 08/29/2018 04:30 PM    HGB 9.4 (L) 09/13/2018 03:05 PM    HGB 6.1 (LL) 08/29/2018 04:30 PM    HCT 32.1 (L) 09/13/2018 03:05 PM    HCT 22.2 (L) 08/29/2018 04:30 PM    PLATELET 356 (H) 33/28/5587 03:05 PM    PLATELET 330 (H) 88/11/9611 04:30 PM     Lab Results   Component Value Date/Time    aPTT 28.1 09/21/2018 09:28 AM    aPTT 26.4 03/26/2018 08:53 AM    Prothrombin time 13.0 09/21/2018 09:28 AM    Prothrombin time 14.7 (H) 03/27/2018 04:34 AM    INR 1.0 09/21/2018 09:28 AM    INR 1.2 03/27/2018 04:34 AM       Assessment:     DVT/PE, colon cancer    Plan:     Planned Procedure:  IVC filter placement. Plan to retrieve postoperatively    Risks, benefits, and alternatives reviewed with patient and she agrees to proceed with the procedure.       Signed By: Lexus Wilkerson PA-C     September 27, 2018

## 2018-09-27 NOTE — PROGRESS NOTES
TRANSFER - OUT REPORT:    Verbal report given to Gretta Monreal on Yonny's  being transferred to IR Recovery for routine post - op       Report consisted of patients Situation, Background, Assessment and   Recommendations(SBAR). Information from the following report(s) SBAR, Kardex, Procedure Summary and MAR was reviewed with the receiving nurse. Lines:   Peripheral IV 09/27/18 Left Wrist (Active)        Opportunity for questions and clarification was provided.

## 2018-09-27 NOTE — IP AVS SNAPSHOT
Alexy Pock 
 
 
 [address]  [address] [facility] 
[phone] Patient: Jhonathan Zambrano MRN: [id number] DII:[birth year] About your hospitalization You were admitted on:  September 27, 2018 You last received care in the:  [facility] Radiology Specials You were discharged on:  September 27, 2018 Why you were hospitalized Your primary diagnosis was:  Not on File Follow-up Information None Your Scheduled Appointments Friday September 28, 2018  9:30 AM EDT Follow Up with Patricia Fortune MD  
[facility] Kevin Ashton [address] [facility]  
[phone] Discharge Orders None A check cisco indicates which time of day the medication should be taken. My Medications ASK your doctor about these medications Instructions Each Dose to Equal  
 Morning Noon Evening Bedtime  
 apixaban 5 mg tablet Commonly known as:  Wojciech Vasquez Your last dose was: Your next dose is: Take 1 Tab by mouth two (2) times a day. Then begin 5mg twice a day on 4/3/18 with evening dose. 5 mg  
    
   
   
   
  
 ascorbic acid (vitamin C) 500 mg Cper Your last dose was: Your next dose is: Take 500 mg by mouth daily. 500 mg  
    
   
   
   
  
 atorvastatin 20 mg tablet Commonly known as:  LIPITOR Your last dose was: Your next dose is: Take 1 Tab by mouth daily. 20 mg Blood-Glucose Meter Misc Commonly known as:  ACCU-CHEK LILLY PLUS METER Your last dose was: Your next dose is:    
   
   
 1 Kit by Does Not Apply route two (2) times a day. For management of Type 2 DM Uncontrolled (250.02) 1 Kit  
    
   
   
   
  
 ferrous sulfate 325 mg (65 mg iron) EC tablet Commonly known as:  IRON Your last dose was: Your next dose is: Take 65 mg by mouth two (2) times a day. 65 mg  
    
   
   
   
  
 glucose blood VI test strips strip Commonly known as:  ACCU-CHEK LILLY PLUS TEST STRP Your last dose was: Your next dose is:    
   
   
 Use twice daily for management of Type 2 DM, Uncontrolled (250.02)  
     
   
   
   
  
 insulin glargine 100 unit/mL (3 mL) Inpn Commonly known as:  LANTUS SOLOSTAR U-100 INSULIN Your last dose was: Your next dose is:    
   
   
 24 u qhs  Indications: type 2 diabetes mellitus Insulin Needles (Disposable) 32 gauge x 1/4\" Ndle Your last dose was: Your next dose is:    
   
   
 1 Each by Does Not Apply route daily. 1 Each Lancets Misc Commonly known as:  ACCU-CHEK SOFTCLIX LANCETS Your last dose was: Your next dose is:    
   
   
 Use twice daily for management of Type 2 DM uncontrolled (250.02)  
     
   
   
   
  
 metFORMIN 500 mg tablet Commonly known as:  GLUCOPHAGE Your last dose was: Your next dose is: Take 1 Tab by mouth two (2) times daily (with meals). 500 mg  
    
   
   
   
  
 tiZANidine 4 mg capsule Commonly known as:  [place] Edwin Your last dose was: Your next dose is: Take 1 Cap by mouth three (3) times daily. Indications: Muscle Spasm 4 mg  
    
   
   
   
  
 traMADol 50 mg tablet Commonly known as:  ULTRAM  
   
Your last dose was: Your next dose is: Take 1 Tab by mouth every six (6) hours as needed for Pain. Max Daily Amount: 200 mg.  
 50 mg  
    
   
   
   
  
 TYLENOL 8 HOUR PO Your last dose was: Your next dose is: Take  by mouth. Opioid Education  Prescription Opioids: What You Need to Know: 
 
Prescription opioids can be used to help relieve moderate-to-severe pain and are often prescribed following a surgery or injury, or for certain health conditions. These medications can be an important part of treatment but also come with serious risks. Opioids are strong pain medicines. Examples include hydrocodone, oxycodone, fentanyl, and morphine. Heroin is an example of an illegal opioid. It is important to work with your health care provider to make sure you are getting the safest, most effective care. WHAT ARE THE RISKS AND SIDE EFFECTS OF OPIOID USE? Prescription opioids carry serious risks of addiction and overdose, especially with prolonged use. An opioid overdose, often marked by slow breathing, can cause sudden death. The use of prescription opioids can have a number of side effects as well, even when taken as directed. · Tolerance-meaning you might need to take more of a medication for the same pain relief · Physical dependence-meaning you have symptoms of withdrawal when the medication is stopped. Withdrawal symptoms can include nausea, sweating, chills, diarrhea, stomach cramps, and muscle aches. Withdrawal can last up to several weeks, depending on which drug you took and how long you took it. · Increased sensitivity to pain · Constipation · Nausea, vomiting, and dry mouth · Sleepiness and dizziness · Confusion · Depression · Low levels of testosterone that can result in lower sex drive, energy, and strength · Itching and sweating RISKS ARE GREATER WITH:      
· History of drug misuse, substance use disorder, or overdose · Mental health conditions (such as depression or anxiety) · Sleep apnea · Older age (72 years or older) · Pregnancy Avoid alcohol while taking prescription opioids. Also, unless specifically advised by your health care provider, medications to avoid include: · Benzodiazepines (such as Xanax or Valium) · Muscle relaxants (such as Soma or Flexeril) · Hypnotics (such as Ambien or Lunesta) · Other prescription opioids KNOW YOUR OPTIONS Talk to your health care provider about ways to manage your pain that don't involve prescription opioids. Some of these options may actually work better and have fewer risks and side effects. Consult your physician before adding or stopping any medications, treatments, or physical activity. Options may include: 
· Pain relievers such as acetaminophen, ibuprofen, and naproxen · Some medications that are also used for depression or seizures · Physical therapy and exercise · Counseling to help patients learn how to cope better with triggers of pain and stress. · Application of heat or cold compress · Massage therapy · Relaxation techniques Be Informed Make sure you know the name of your medication, how much and how often to take it, and its potential risks & side effects. IF YOU ARE PRESCRIBED OPIOIDS FOR PAIN: 
· Never take opioids in greater amounts or more often than prescribed. Remember the goal is not to be pain-free but to manage your pain at a tolerable level. · Follow up with your primary care provider to: · Work together to create a plan on how to manage your pain. · Talk about ways to help manage your pain that don't involve prescription opioids. · Talk about any and all concerns and side effects. · Help prevent misuse and abuse. · Never sell or share prescription opioids · Help prevent misuse and abuse. · Store prescription opioids in a secure place and out of reach of others (this may include visitors, children, friends, and family). · Safely dispose of unused/unwanted prescription opioids: Find your community drug take-back program or your pharmacy mail-back program, or flush them down the toilet, following guidance from the Food and Drug Administration (www.fda.gov/Drugs/ResourcesForYou). · Visit www.cdc.gov/drugoverdose to learn about the risks of opioid abuse and overdose.  
· If you believe you may be struggling with addiction, tell your health care provider and ask for guidance or call Ericka Osorio at [phone]. Discharge Instructions [address] [facility] Department of Interventional Radiology [address] [place] [address] [address] [facility] 
[phone] Office 
[phone] Fax INFERIOR VENA CAVA FILTER DISCHARGE INSTRUCTIONS General Information: 
    Your doctor has asked us to put a filter in your inferior vena cava (the largest vein in your abdomen) to catch blood clots from moving from your legs into your lungs, heart, and brain. The filters are fixed in the vessel, and in most cases are permanent. Your doctor may also put you on blood thinners to prevent the formation of blood clots. This filter is sometimes placed prior to a major surgery if you have a history of blood clots. Home Care Instructions: You can resume your regular diet and medication regimen. Do not drink alcohol, drive, or make any important legal decisions in the next 24 hours. Do not lift anything heavier than a gallon of milk, or do anything strenuous for the next 24 hours. You will notice a dressing on your neck or groin. This was the site of the insertion for the procedure. Keep the site covered until the puncture site has totally healed. You make take a shower with the bandage, but do cover it with plastic wrap. Do not immerse yourself in water until the wound has totally healed. After your puncture wound heals, there is no special care that is needed. You will receive a card for your wallet that has information on it about the filter. You will need to show this to any physician that takes care of you. Keep it with your 's license and insurance cards. It is important for your doctor to know what kind of filter you have, as there are several brands. You may resume your normal level of activity slowly, after about one week of light activity. Call If: 
   You should call your Physician and/or the Radiology Nurse if you have any bleeding other than a small spot on your bandage. Call if you have any signs of infection, fever, swelling, or increased pain at the site. Call if you have any questions of how to take care of your wound. Follow-Up Instructions: Please see your ordering doctor as he/she has requested. To Reach Us: If you have any questions about your procedure, please call the Interventional Radiology department at [phone]. After business hours (5pm) and weekends, call the answering service at [phone] and ask for the Radiologist on call to be paged. Si tiene Preguntas acerca del procedimiento, por favor llame al departamento de Radiología Intervencional al [phone]. Después de horas de oficina (5 pm) y los fines de [place], llamar al [facility] de llamadas al [phone] y pregunte por el Radiologo de [place]. Patient Signature: 
Date: 9/27/2018 Discharging Nurse: Alexis Mendosa RN Introducing [facility]! [facility] introduces Vaioni patient portal. Now you can access parts of your medical record, email your doctor's office, and request medication refills online. 1. In your internet browser, go to [email or web address] 2. Click on the First Time User? Click Here link in the Sign In box. You will see the New Member Sign Up page. 3. Enter your Vaioni Access Code exactly as it appears below. You will not need to use this code after youve completed the sign-up process. If you do not sign up before the expiration date, you must request a new code. · Vaioni Access Code: [email or web address] Expires: 9/30/2018  7:58 AM 
 
4. Enter the last four digits of your Social Security Number (xxxx) and Date of Birth (mm/dd/yyyy) as indicated and click Submit. You will be taken to the next sign-up page. 5. Create a Vaioni ID.  This will be your Vaioni login ID and cannot be changed, so think of one that is secure and easy to remember. 6. Create a combionic password. You can change your password at any time. 7. Enter your Password Reset Question and Answer. This can be used at a later time if you forget your password. 8. Enter your e-mail address. You will receive e-mail notification when new information is available in [address] 9. Click Sign Up. You can now view and download portions of your medical record. 10. Click the Download Summary menu link to download a portable copy of your medical information. If you have questions, please visit the Frequently Asked Questions section of the combionic website. Remember, combionic is NOT to be used for urgent needs. For medical emergencies, dial 911. Now available from your iPhone and Android! Introducing Parmjit Milligan As a [employer] [facility] patient, I wanted to make you aware of our electronic visit tool called Parmjit Milligan. [employer] allows you to connect within minutes with a medical provider 24 hours a day, seven days a week via a mobile device or tablet or logging into a secure website from your computer. You can access Parmjit Milligan from anywhere in the [place]. A virtual visit might be right for you when you have a simple condition and feel like you just dont want to get out of bed, or cant get away from work for an appointment, when your regular [facility] [facility] provider is not available (evenings, weekends or holidays), or when youre out of town and need minor care. Electronic visits cost only $49 and if the [employer] provider determines a prescription is needed to treat your condition, one can be electronically transmitted to a nearby pharmacy*. Please take a moment to enroll today if you have not already done so. The enrollment process is free and takes just a few minutes.   To enroll, please download the [place] sukumar to your tablet or phone, or visit [email or web address] to enroll on your computer. And, as an [address] [place] [address] patient with a [employer] account, the results of your visits will be scanned into your electronic medical record and your primary care provider will be able to view the scanned results. We urge you to continue to see your regular [facility] provider for your ongoing medical care. And while your primary care provider may not be the one available when you seek a [employer] virtual visit, the peace of mind you get from getting a real diagnosis real time can be priceless. For more information on [employer], view our Frequently Asked Questions (FAQs) at [email or web address]. Sincerely, 
 
Mya Portillo MD 
Chief Medical Officer [facility] Tiera Bull *:  certain medications cannot be prescribed via [employer] Providers Seen During Your Hospitalization Provider Specialty Primary office phone Ryley Haji, [address] Pulmonary Disease [phone] Your Primary Care Physician (PCP) Primary Care Physician Office Phone Office Fax [address] [address] [phone] You are allergic to the following Allergen Reactions Biaxin (Clarithromycin) Unknown (comments) Cortisone Other (comments) Per pt, leg numbness. Recent Documentation Breastfeeding? OB Status Smoking Status No Postmenopausal Never Smoker Emergency Contacts Name Discharge Info Relation Home Work Mobile [address] CAREGIVER [3] Son [22] [phone] Chiara Mcmahan DISCHARGE CAREGIVER [3] Other Relative [6] [phone] Patient Belongings The following personal items are in your possession at time of discharge: 
     Visual Aid: None Please provide this summary of care documentation to your next provider. Signatures-by signing, you are acknowledging that this After Visit Summary has been reviewed with you and you have received a copy. Patient Signature:  ____________________________________________________________ Date:  ____________________________________________________________  
  
Eugenio Meuse Provider Signature:  ____________________________________________________________ Date:  ____________________________________________________________

## 2018-09-27 NOTE — DISCHARGE INSTRUCTIONS
[address]  Department of Interventional Radiology  [facility]  [phone] Office  [phone] Fax    INFERIOR VENA CAVA FILTER DISCHARGE INSTRUCTIONS    General Information:      Your doctor has asked us to put a filter in your inferior vena cava (the largest vein in your abdomen) to catch blood clots from moving from your legs into your lungs, heart, and brain. The filters are fixed in the vessel, and in most cases are permanent. Your doctor may also put you on blood thinners to prevent the formation of blood clots. This filter is sometimes placed prior to a major surgery if you have a history of blood clots. Home Care Instructions: You can resume your regular diet and medication regimen. Do not drink alcohol, drive, or make any important legal decisions in the next 24 hours. Do not lift anything heavier than a gallon of milk, or do anything strenuous for the next 24 hours. You will notice a dressing on your neck or groin. This was the site of the insertion for the procedure. Keep the site covered until the puncture site has totally healed. You make take a shower with the bandage, but do cover it with plastic wrap. Do not immerse yourself in water until the wound has totally healed. After your puncture wound heals, there is no special care that is needed. You will receive a card for your wallet that has information on it about the filter. You will need to show this to any physician that takes care of you. Keep it with your 's license and insurance cards. It is important for your doctor to know what kind of filter you have, as there are several brands. You may resume your normal level of activity slowly, after about one week of light activity. Call If:     You should call your Physician and/or the Radiology Nurse if you have any bleeding other than a small spot on your bandage.  Call if you have any signs of infection, fever, swelling, or increased pain at the site. Call if you have any questions of how to take care of your wound. Follow-Up Instructions: Please see your ordering doctor as he/she has requested. To Reach Us: If you have any questions about your procedure, please call the Interventional Radiology department at [phone]. After business hours (5pm) and weekends, call the answering service at [phone] and ask for the Radiologist on call to be paged. Si tiene Preguntas acerca del procedimiento, por favor llame al departamento de Radiología Intervencional al [phone]. Después de horas de oficina (5 pm) y los fines de [place], llamar al [facility] de llamadas al [phone] y pregunte por el Radiologo de [facility].        Patient Signature:  Date: 9/27/2018  Discharging Nurse: Liset Beebe RN

## 2018-09-27 NOTE — PROCEDURES
Department of Interventional Radiology  [phone]        Interventional Radiology Brief Procedure Note    Patient: Portillo Cronin MRN: [id number]  SSN: [id number]    YOB: 1960  Age: 59 y.o. Sex: female      Date of Procedure: 9/27/2018    Pre-Procedure Diagnosis: DVT and PE    Post-Procedure Diagnosis: SAME    Procedure(s): IVC filter placement    Brief Description of Procedure: Image guided IVC filter placement    Performed By: Mary Cueto MD     Assistants: None    Anesthesia:Moderate Sedation    Estimated Blood Loss: Less than 10ml    Specimens: None    Implants: Celect IVC filter    Findings: No caval thrombus.  Celect filter placed below the renals    Complications: None    Recommendations: Routine post care     Follow Up: Removal after post op period    Signed By: Mary Cueto MD     September 27, 2018

## 2018-09-28 PROBLEM — C18.2 MALIGNANT NEOPLASM OF ASCENDING COLON (HCC): Status: ACTIVE | Noted: 2018-09-28

## 2018-09-28 PROBLEM — C18.3 MALIGNANT NEOPLASM OF HEPATIC FLEXURE (HCC): Status: RESOLVED | Noted: 2018-09-18 | Resolved: 2018-09-28

## 2018-10-01 NOTE — PERIOP NOTES
Spoke with patient by phone. States she was dx'd with diabetes approx 1.5 years ago. She is a non-smoker and is fairly active, working in the yard 2-3 times a week. Overview of the ERAS program discussed. Patient eager to participate in this program.  She is waiting to hear from [address] for instructions on her Lovenox bridge. If she has not heard from [address] by this afternoon, she will call their office in the morning.

## 2018-10-02 ENCOUNTER — HOSPITAL ENCOUNTER (OUTPATIENT)
Dept: SURGERY | Age: 58
Discharge: HOME | End: 2018-10-02
Payer: COMMERCIAL

## 2018-10-02 VITALS
BODY MASS INDEX: 37.27 KG/M2 | TEMPERATURE: 98.2 F | OXYGEN SATURATION: 100 % | RESPIRATION RATE: 18 BRPM | SYSTOLIC BLOOD PRESSURE: 147 MMHG | DIASTOLIC BLOOD PRESSURE: 77 MMHG | WEIGHT: 218.3 LBS | HEIGHT: 64 IN | HEART RATE: 95 BPM

## 2018-10-02 LAB
ATRIAL RATE: 67 BPM
CALCULATED P AXIS, ECG09: 47 DEGREES
CALCULATED R AXIS, ECG10: 16 DEGREES
CALCULATED T AXIS, ECG11: 27 DEGREES
DIAGNOSIS, 93000: NORMAL
ERYTHROCYTE [DISTWIDTH] IN BLOOD BY AUTOMATED COUNT: 28.4 %
GLUCOSE BLD STRIP.AUTO-MCNC: 141 MG/DL (ref 65–100)
HCT VFR BLD AUTO: 34.3 % (ref 35.8–46.3)
HGB BLD-MCNC: 10.1 G/DL (ref 11.7–15.4)
MCH RBC QN AUTO: 23.5 PG (ref 26.1–32.9)
MCHC RBC AUTO-ENTMCNC: 29.4 G/DL (ref 31.4–35)
MCV RBC AUTO: 79.8 FL (ref 79.6–97.8)
NRBC # BLD: 0 K/UL (ref 0–0.2)
P-R INTERVAL, ECG05: 132 MS
PLATELET # BLD AUTO: 333 K/UL (ref 150–450)
PMV BLD AUTO: 10.1 FL (ref 9.4–12.3)
Q-T INTERVAL, ECG07: 410 MS
QRS DURATION, ECG06: 88 MS
QTC CALCULATION (BEZET), ECG08: 433 MS
RBC # BLD AUTO: 4.3 M/UL (ref 4.05–5.2)
VENTRICULAR RATE, ECG03: 67 BPM
WBC # BLD AUTO: 8.6 K/UL (ref 4.3–11.1)

## 2018-10-02 PROCEDURE — 82962 GLUCOSE BLOOD TEST: CPT

## 2018-10-02 PROCEDURE — 85027 COMPLETE CBC AUTOMATED: CPT

## 2018-10-02 PROCEDURE — 93005 ELECTROCARDIOGRAM TRACING: CPT | Performed by: ANESTHESIOLOGY

## 2018-10-02 NOTE — PERIOP NOTES
Recent Results (from the past 12 hour(s))   CBC W/O DIFF    Collection Time: 10/02/18 10:28 AM   Result Value Ref Range    WBC 8.6 4.3 - 11.1 K/uL    RBC 4.30 4.05 - 5.2 M/uL    HGB 10.1 (L) 11.7 - 15.4 g/dL    HCT 34.3 (L) 35.8 - 46.3 %    MCV 79.8 79.6 - 97.8 FL    MCH 23.5 (L) 26.1 - 32.9 PG    MCHC 29.4 (L) 31.4 - 35.0 g/dL    RDW 28.4 %    PLATELET 090 163 - 959 K/uL    MPV 10.1 9.4 - 12.3 FL    ABSOLUTE NRBC 0.00 0.0 - 0.2 K/uL   GLUCOSE, POC    Collection Time: 10/02/18 10:39 AM   Result Value Ref Range    Glucose (POC) 141 (H) 65 - 100 mg/dL     Labs reviewed. No new action required.

## 2018-10-02 NOTE — PROGRESS NOTES
Type 3 surgery,  assessment complete. Labs per surgeon: none  Labs per anesthesia protocol: CBC (abnormal 9/13/2018) redrawn today, BMP 9/21/18 WDL, T&S DOS signed and held  EKG: 10/2/2018 acceptable per anesthesia protocol  Glucose: 141    Dr Ysabel Augustin notified ALEXSANDER patient in PAT. No new orders received. Pt to be seen SHEILA. Aimee Taveras, RN ERAS nurse navigator in to provide patient on preop instructions per Dr. Li Barraza. Provided patient with written instructions including clear liquids 2 days prior to procedure along with medication instructions provided by Dr. Li Barraza. Patient verbalizes understanding to call Aimee Taveras with any pre-op questions. Received call from Freddie Sanchez RN (nurse navigator for Dr. Li Barraza) with the following instructions for patient.:   -Stop Eliquis 72 hrs(3 days) prior to surgery                   *take last dose on Friday, Oct 5, 2018  Start Lovenox 40 mg daily on 6-8 Oct 2018                   -Do not take lovenox the day of surgery (Last dose must be taken at least 24 hour before surgery- No lovenox after 0730 am on 8 Oct 2018)      Hibiclens and instructions given per hospital policy. Patient provided with and instructed on educational handouts including Guide to Surgery, Pain Management, Hand Hygiene, Blood Transfusion Education, and [place] [facility] Brochure. Patient answered medical/surgical history questions at their best of ability. All prior to admission medications documented in [facility]. Patient medication list completed from patient memory. Patient verbalizes understanding to call this RN back with any changes. Copy of med rec provided to the patient. Patient instructed to hold all vitamins 7 days prior to surgery and NSAIDS 5 days prior to surgery, patient verbalized understanding.  Medications to be held: Eliquis 3 days prior with Lovenox bridge    Patient instructed to continue previous medications as prescribed prior to surgery and to take the following medications the day of surgery according to anesthesia guidelines with a small sip of water: none. Patient verbalizes understanding to take 19 units of Lantus (80% of normal dose) 10/8/18 pm dose per anesthesia protocol. Patient instructed on the following:  Arrive at [facility], time of arrival to be called the day before by 1700  NPO after midnight including gum, mints, and ice chips. Responsible adult must drive patient to the hospital, stay during surgery, and patient will require supervision 24 hours after anesthesia. Use hibiclens in shower the night before surgery and on the morning of surgery. Leave all valuables (money and jewelry) at home but bring insurance card and ID on       DOS. Do not wear make-up, nail polish, lotions, cologne, perfumes, powders, or oil on skin. Patient teach back successful and patient demonstrates knowledge of instruction.

## 2018-10-08 NOTE — H&P
[facility]   [address] . [address]  [phone]    H&P Note   Benjaman Fabry   MRN: [id number]     : 1960        HPI: Benjaman Fabry is a 59 y.o. female who returns the office for continued planning for her newly diagnosed right-sided colon cancer. Since her last visit, she was evaluated by pulmonary who would like to keep her anticoagulated. She will have her Eliquis held an appropriate time preoperatively and maintained on a Lovenox bridge until the day prior to surgery. She was also set up for vena cava filter which was placed on . She did well with this procedure. Review of her CT scan with radiology confirmed my finding of the mass being visible on CT though not mentioned in the report from [facility]. The mass is in the mid a sending colon. This will be amenable to right hemicolectomy. She does have a history of open cholecystectomy via right subcostal incision. Her right leg swelling has almost completely resolved. She was referred by Dr. Luc Rubio for hepatic flexure colon cancer. Patient had an EGD and colonoscopy on 9/10/2018 for evaluation of anemia. Her hemoglobin had dropped to 6.1. She had been on Eliquis since April after diagnosis of bilateral pulmonary embolus with right heart strain. This started with a right lower extremity DVT. She was treated with catheter thrombolysis. She had been doing well but became short of breath and was noted to be anemic when her workup moved to endoscopy. A large hepatic flexure lesion was identified. Distal tattoo was placed. No other polyps were identified. On biopsy, this was found to be adenocarcinoma. She is referred for surgical management. She has been receiving blood transfusions and iron replacement. She remains on Eliquis. She is followed [facility]. She has also been seen by Dr. Soniya Haque for hematology workup.   She has no family history of colon cancer. She had not had a prior colonoscopy. She had not seen any blood per rectum. No significant findings were found at EGD. She is also being followed for a pancreatic tail lesion. This has been extensively evaluated including EUS with biopsy prior to her PE. This is felt to be a benign lesion and is being followed up. It is most likely to be considered a pseudocyst though CT at [facility] as part of her anemia workup felt it could represent a mucinous neoplasm. She has a history of open cholecystectomy in 1982 via subcostal incision. She reports a significant illness at that time. Past Medical History:   Diagnosis Date    Anemia 08/2018    Cancer of ascending colon ([facility] 2018    Diabetes ([facility]     insulin and oral agents; Last A1C 6.9 (8/29/2018); avg     Environmental allergies 9/12/2013    Thromboembolus ([facility] 04/2018    right leg     Past Surgical History:   Procedure Laterality Date    HX CHOLECYSTECTOMY  1980's    HX LIPOMA RESECTION Right 1980's    HX OTHER SURGICAL  09/27/2018    filter placed to right groin     No current facility-administered medications for this encounter. Current Outpatient Prescriptions   Medication Sig    metoclopramide HCl (REGLAN) 10 mg tablet 1 tab by mouth at 11AM, 5PM and 10PM on day prior to surgery  Indications: ERAS    enoxaparin (LOVENOX) 40 mg/0.4 mL 0.4 mL by SubCUTAneous route daily for 3 days. Start Lovenox on October 6, 2018. Last dose should be at least 24 hour before surgery    ascorbic acid, vitamin C, 500 mg cpER Take 500 mg by mouth daily.  ferrous sulfate (IRON) 325 mg (65 mg iron) EC tablet Take 65 mg by mouth two (2) times a day.  apixaban (ELIQUIS) 5 mg tablet Take 1 Tab by mouth two (2) times a day. Then begin 5mg twice a day on 4/3/18 with evening dose. (Patient taking differently: Take 5 mg by mouth two (2) times a day.  Hold 3 days prior to surgery (last dose10/5/18)--Lovenox bridge begin10/6/18)    insulin glargine (LANTUS SOLOSTAR U-100 INSULIN) 100 unit/mL (3 mL) inpn 24 u qhs  Indications: type 2 diabetes mellitus    metFORMIN (GLUCOPHAGE) 500 mg tablet Take 1 Tab by mouth two (2) times daily (with meals).  glucose blood VI test strips (ACCU-CHEK LILLY PLUS TEST STRP) strip Use twice daily for management of Type 2 DM, Uncontrolled (250.02)    Lancets (ACCU-CHEK SOFTCLIX LANCETS) misc Use twice daily for management of Type 2 DM uncontrolled (250.02)    Insulin Needles, Disposable, 32 gauge x 1/4\" ndle 1 Each by Does Not Apply route daily.  Blood-Glucose Meter (ACCU-CHEK LILLY PLUS METER) misc 1 Kit by Does Not Apply route two (2) times a day. For management of Type 2 DM Uncontrolled (250.02)    ACETAMINOPHEN (TYLENOL 8 HOUR PO) Take  by mouth. ALLERGIES:  Biaxin [clarithromycin] and Cortisone    Social History     Social History    Marital status: SINGLE     Spouse name: N/A    Number of children: N/A    Years of education: N/A     Social History Main Topics    Smoking status: Never Smoker    Smokeless tobacco: Never Used    Alcohol use No    Drug use: No    Sexual activity: Not Currently     Other Topics Concern    Not on file     Social History Narrative    Pt lives alone. Her son lives in a house behind hers. She works as a  . She has one indoor dog. History   Smoking Status    Never Smoker   Smokeless Tobacco    Never Used     Family History   Problem Relation Age of Onset    No Known Problems Mother     Dementia Father     Heart Disease Father        ROS: The patient has no difficulty with chest pain or shortness of breath. No fever or chills. The patient denies any family history of abnormal clotting or bleeding. Comprehensive review of systems was otherwise unremarkable except as noted above. Physical Exam:   Constitutional: Alert oriented cooperative patient in no acute distress.    Visit Vitals    /80 (BP 1 Location: Left arm, BP Patient Position: Sitting)    Pulse (!) 112    Resp 19    Ht 5' 1.5\" (1.562 m)    Wt 215 lb 8 oz (97.8 kg)    SpO2 99%    BMI 40.06 kg/m2       Eyes:Sclera are clear without icterus. ENMT: no obvious neck masses, no ear or lip lesions  CV: RRR. Normal perfusion  Resp: No JVD. Breathing is  non-labored. GI: obese, well healed R subcostal scar, soft and non-distended     Musculoskeletal: unremarkable with normal function. Neuro:  No obvious focal deficits  Psychiatric: normal affect and mood, no memory impairment    Lab Results   Component Value Date/Time    WBC 8.6 10/02/2018 10:28 AM    HGB 10.1 (L) 10/02/2018 10:28 AM    HCT 34.3 (L) 10/02/2018 10:28 AM    PLATELET 283 10/59/2307 10:28 AM    MCV 79.8 10/02/2018 10:28 AM       Lab Results   Component Value Date/Time    Sodium 140 09/21/2018 09:28 AM    Potassium 4.2 09/21/2018 09:28 AM    Chloride 106 09/21/2018 09:28 AM    CO2 27 09/21/2018 09:28 AM    BUN 15 09/21/2018 09:28 AM    Creatinine 0.69 09/21/2018 09:28 AM    Glucose 141 (H) 09/21/2018 09:28 AM    INR 1.0 09/21/2018 09:28 AM    aPTT 28.1 09/21/2018 09:28 AM    Bilirubin, total 0.4 08/29/2018 04:30 PM    AST (SGOT) 18 08/29/2018 04:30 PM    Alk. phosphatase 75 08/29/2018 04:30 PM    Amylase 39 03/28/2018 02:55 PM    Lipase 104 03/28/2018 02:55 PM     Lab Results   Component Value Date/Time    ALT (SGPT) 18 08/29/2018 04:30 PM     Lab Results   Component Value Date/Time    CEA 0.5 03/27/2018 04:34 AM       CT CHEST ABDOMEN PELVIS: 09/11/2018  COMPARISON: None  HISTORY:  Iron deficiency anemia  TECHNIQUE:  Axial images were obtained through the chest abdomen pelvis using oral and intravenous contrast with a dose of 100 mL Omnipaque 350. Janet Gracia Coronal re-formations were performed. MIP re-formations were also performed through the lungs. Janet Gracia     FINDINGS:   Questionable low density area in the right side of the thyroid may be artifactual.  Does appear to be smaller than 1.5 cm  No abnormalities seen in the lungs.  Mediastinum is unremarkable. A 2 cm rounded node is present in the right axilla and there is extensive surrounding inflammatory fat stranding. To other nodular densities present which may represent low density nodes versus small pockets of fluid or even developing abscess. There also appears to be some degree of myositis and adjacent chest wall muscles. Clinical correlation for symptoms in this area is recommended. Abdomen:  Liver and spleen are unremarkable. Gallbladder clips are noted. A 4.4 x 3.7 cm cystic area is present in the left upper quadrant. This abuts the superior aspect of the pancreatic tail and posterior wall of the stomach. It elevates and displaces the splenic artery and therefore most likely arises from the pancreas. There is a small peripheral calcification which is nodular in appearance. Overall the appearance is most suggestive of a mucinous cystic neoplasm. A nonobstructing calcification is seen in the right kidney. Kidneys are otherwise unremarkable. Both adrenals are within normal limits. There is dehiscence of the rectus sheath. No bowel findings seen. The appendix is normal.  In the pelvis no mass or adenopathy is seen. There is no free fluid. Considerable degenerative changes noted in the spine. IMPRESSION:  1. Considerable abnormality in the right axilla with lymphadenopathy, extensive inflammatory change and some nodular areas that could represent developing fluid collection or abscess. Clinical correlation is recommended. 2.  Cystic mass in the left upper quadrant which appears to arise from the pancreas and measures up to 4.4 cm. Please see above for description. 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   Further evaluation with MRI or surgical consultation is recommended      My review shows area in ascending colon consistent with apple core lesion    Assessment/Plan:     Vipul Candelaria is a 59 y.o. female who presents with biopsy confirmed adenocarcinoma in the region of the hepatic flexure. CT of the chest abdomen and pelvis is negative for metastatic disease. The lesion was not identified by the evaluating radiologist, but on my review I can identify a lesion in the ascending colon that is consistent with the colon mass. This was confirmed with our radiologist.  This provides additional comfort for localization at the time of surgery. I was able to review these images after her visit and this provides more clarity that her resection will be a right hemicolectomy. Unfortunately, she has significant history of thromboembolic disease. Her PE and DVT from this past spring are being treated with Eliquis. [facility] pulmonary would like to maintain anticoagulation with Lovenox bridging preoperatively and resumption of anticoagulation as soon as possible postoperatively. On 9/27 she had placement of preoperative IVC filter. We discussed proceeding with laparoscopic possible open right hemicolectomy with anastomosis. We will enroll her in our ERAS protocol which will begin with some pre-rehabilitation. I will not do a full bowel prep. I will have her on liquids for the 2 days prior to surgery and will prescribe oral antibiotics on the day prior to surgery. Pulmonary pulmonary will set up her anticoagulation bridging. We discussed her increased morbidities related to her obesity, diabetes, anticoagulation needs, and history of DVT/PE. We discussed in light of those morbidities that we would not address any biopsies of her pancreatic lesion. She also requests no additional biopsy of the lesion. She also requests no further evaluation of her right axilla as it has been stable for greater than 20 years. I discussed the patient's condition and treatment options with the patient.   I discussed risks of surgery in language the patient could understand including bleeding, infection, need for further surgery, abscess, fistula, SBO, DVT, PE, heart attack, stroke, renal failure, respiratory failure, ventilatory dependence, and death. The patient voiced understanding of all this and all questions were answered. Alternatives to surgery were discussed also and risks of the alternatives. The patient requested that we proceed with surgery. Informed consent was obtained.        Problem List  Date Reviewed: 9/28/2018          Codes Class Noted    Malignant neoplasm of ascending colon [facility]) ICD-10-CM: C18.2  ICD-9-CM: 153.6  9/28/2018        Current use of anticoagulant therapy ICD-10-CM: Z79.01  ICD-9-CM: V58.61  9/18/2018        Obesity, morbid ([facility] ICD-10-CM: E66.01  ICD-9-CM: 278.01  4/5/2018        Controlled type 2 diabetes mellitus without complication, with long-term current use of insulin ([facility] ICD-10-CM: E11.9, Z79.4  ICD-9-CM: 250.00, V58.67  3/26/2018        PE (pulmonary thromboembolism) ([facility] ICD-10-CM: I26.99  ICD-9-CM: 415.19  3/26/2018        Axillary mass, right ICD-10-CM: R22.31  ICD-9-CM: 782.2  3/26/2018        Pancreatic mass ICD-10-CM: K86.9  ICD-9-CM: 577.9  3/26/2018        Type 2 diabetes mellitus with diabetic neuropathy ([facility] ICD-10-CM: E11.40  ICD-9-CM: 250.60, 357.2  2/21/2018        Bilateral low back pain with sciatica ICD-10-CM: M54.40  ICD-9-CM: 724.3  10/23/2017        Environmental allergies ICD-10-CM: Z91.09  ICD-9-CM: V15.09  9/12/2013                Rachid Lozano MD,  FACS

## 2018-10-08 NOTE — PERIOP NOTES
Your arrival time for surgery is 0530. You should have stopped drinking the glucerna  after your second bottle yesterday. IF you were given the Pre-Surgical Drink at your Pre-Assessment appointment, drink two bottles tonight before going to bed. You may drink clear liquids until midnight. When you are in route to the hospital, drink one bottle of the Pre-Surgery drink over 5-10 minutes when you are close to arriving to the hospital.  Nothing by mouth after you complete the Pre-Surgery drink the morning of surgery. If you were instructed to take in medication the morning of surgery, do so before or with your Pre-Surgical drink.

## 2018-10-09 ENCOUNTER — HOSPITAL ENCOUNTER (INPATIENT)
Age: 58
LOS: 3 days | Discharge: HOME | DRG: 330 | End: 2018-10-12
Attending: SURGERY | Admitting: SURGERY
Payer: COMMERCIAL

## 2018-10-09 ENCOUNTER — ANESTHESIA (OUTPATIENT)
Dept: SURGERY | Age: 58
DRG: 330 | End: 2018-10-09
Payer: COMMERCIAL

## 2018-10-09 ENCOUNTER — ANESTHESIA EVENT (OUTPATIENT)
Dept: SURGERY | Age: 58
DRG: 330 | End: 2018-10-09
Payer: COMMERCIAL

## 2018-10-09 PROBLEM — C18.2 PRIMARY ADENOCARCINOMA OF ASCENDING COLON (HCC): Status: ACTIVE | Noted: 2018-10-09

## 2018-10-09 LAB
ABO + RH BLD: NORMAL
BLOOD GROUP ANTIBODIES SERPL: NORMAL
GLUCOSE BLD STRIP.AUTO-MCNC: 138 MG/DL (ref 65–100)
GLUCOSE BLD STRIP.AUTO-MCNC: 202 MG/DL (ref 65–100)
GLUCOSE BLD STRIP.AUTO-MCNC: 210 MG/DL (ref 65–100)
GLUCOSE BLD STRIP.AUTO-MCNC: 212 MG/DL (ref 65–100)
GLUCOSE BLD STRIP.AUTO-MCNC: 239 MG/DL (ref 65–100)
SPECIMEN EXP DATE BLD: NORMAL

## 2018-10-09 PROCEDURE — 77030034850: Performed by: SURGERY

## 2018-10-09 PROCEDURE — 0DTF4ZZ RESECTION OF RIGHT LARGE INTESTINE, PERCUTANEOUS ENDOSCOPIC APPROACH: ICD-10-PCS | Performed by: SURGERY

## 2018-10-09 PROCEDURE — 77030000038 HC TIP SCIS LAPSCP SURI -B: Performed by: SURGERY

## 2018-10-09 PROCEDURE — 77030010296 HC STPLR INT COVD -B: Performed by: SURGERY

## 2018-10-09 PROCEDURE — 77030035220 HC TRCR ENDOSC BLNTPRT ANCHR COVD -B: Performed by: SURGERY

## 2018-10-09 PROCEDURE — 77030019940 HC BLNKT HYPOTHRM STRY -B: Performed by: NURSE ANESTHETIST, CERTIFIED REGISTERED

## 2018-10-09 PROCEDURE — 77030008703 HC TU ET UNCUF COVD -A: Performed by: NURSE ANESTHETIST, CERTIFIED REGISTERED

## 2018-10-09 PROCEDURE — 77010033678 HC OXYGEN DAILY

## 2018-10-09 PROCEDURE — 74011250636 HC RX REV CODE- 250/636: Performed by: SURGERY

## 2018-10-09 PROCEDURE — 77030020782 HC GWN BAIR PAWS FLX 3M -B: Performed by: NURSE ANESTHETIST, CERTIFIED REGISTERED

## 2018-10-09 PROCEDURE — 77030020255 HC SOL INJ LR 1000ML BG

## 2018-10-09 PROCEDURE — 77030035048 HC TRCR ENDOSC OPTCL COVD -B: Performed by: SURGERY

## 2018-10-09 PROCEDURE — 74011250637 HC RX REV CODE- 250/637: Performed by: SURGERY

## 2018-10-09 PROCEDURE — 76060000037 HC ANESTHESIA 3 TO 3.5 HR: Performed by: SURGERY

## 2018-10-09 PROCEDURE — 77030008771 HC TU NG SALEM SUMP -A: Performed by: SURGERY

## 2018-10-09 PROCEDURE — 65270000029 HC RM PRIVATE

## 2018-10-09 PROCEDURE — 77030012935 HC DRSG AQUACEL BMS -B: Performed by: SURGERY

## 2018-10-09 PROCEDURE — 74011000250 HC RX REV CODE- 250

## 2018-10-09 PROCEDURE — 77030008522 HC TBNG INSUF LAPRO STRY -B: Performed by: SURGERY

## 2018-10-09 PROCEDURE — 74011250637 HC RX REV CODE- 250/637: Performed by: ANESTHESIOLOGY

## 2018-10-09 PROCEDURE — 77030020407 HC IV BLD WRMR ST 3M -A: Performed by: NURSE ANESTHETIST, CERTIFIED REGISTERED

## 2018-10-09 PROCEDURE — 77030003602 HC NDL NRV BLK BBMI -B: Performed by: ANESTHESIOLOGY

## 2018-10-09 PROCEDURE — 77030039425 HC BLD LARYNG TRULITE DISP TELE -A: Performed by: NURSE ANESTHETIST, CERTIFIED REGISTERED

## 2018-10-09 PROCEDURE — 77030018673: Performed by: SURGERY

## 2018-10-09 PROCEDURE — 77030008756 HC TU IRR SUC STRY -B: Performed by: SURGERY

## 2018-10-09 PROCEDURE — 88307 TISSUE EXAM BY PATHOLOGIST: CPT

## 2018-10-09 PROCEDURE — 77030002996 HC SUT SLK J&J -A: Performed by: SURGERY

## 2018-10-09 PROCEDURE — 74011636637 HC RX REV CODE- 636/637: Performed by: ANESTHESIOLOGY

## 2018-10-09 PROCEDURE — 77030035051: Performed by: SURGERY

## 2018-10-09 PROCEDURE — 82962 GLUCOSE BLOOD TEST: CPT

## 2018-10-09 PROCEDURE — 77030016151 HC PROTCTR LNS DFOG COVD -B: Performed by: SURGERY

## 2018-10-09 PROCEDURE — 77030018836 HC SOL IRR NACL ICUM -A: Performed by: SURGERY

## 2018-10-09 PROCEDURE — 77030008467 HC STPLR SKN COVD -B: Performed by: SURGERY

## 2018-10-09 PROCEDURE — 77030018521 HC STPLR ENDOSCOPIC J&J -C: Performed by: SURGERY

## 2018-10-09 PROCEDURE — 77030011278 HC ELECTRD LIG IMPT COVD -F: Performed by: SURGERY

## 2018-10-09 PROCEDURE — 76010000173 HC OR TIME 3 TO 3.5 HR INTENSV-TIER 1: Performed by: SURGERY

## 2018-10-09 PROCEDURE — 76210000006 HC OR PH I REC 0.5 TO 1 HR: Performed by: SURGERY

## 2018-10-09 PROCEDURE — 74011250636 HC RX REV CODE- 250/636

## 2018-10-09 PROCEDURE — 77030022703 HC LIGASURE  BLNT LAPSCP SEAL COVD -E: Performed by: SURGERY

## 2018-10-09 PROCEDURE — 77030019908 HC STETH ESOPH SIMS -A: Performed by: NURSE ANESTHETIST, CERTIFIED REGISTERED

## 2018-10-09 PROCEDURE — 74011250636 HC RX REV CODE- 250/636: Performed by: ANESTHESIOLOGY

## 2018-10-09 PROCEDURE — 77030009527 HC GEL PRT SYS AMR -E: Performed by: SURGERY

## 2018-10-09 PROCEDURE — 77030032490 HC SLV COMPR SCD KNE COVD -B: Performed by: SURGERY

## 2018-10-09 PROCEDURE — 77030031139 HC SUT VCRL2 J&J -A: Performed by: SURGERY

## 2018-10-09 PROCEDURE — 74011636637 HC RX REV CODE- 636/637: Performed by: SURGERY

## 2018-10-09 PROCEDURE — 77030002966 HC SUT PDS J&J -A: Performed by: SURGERY

## 2018-10-09 PROCEDURE — 86901 BLOOD TYPING SEROLOGIC RH(D): CPT

## 2018-10-09 PROCEDURE — 74011000250 HC RX REV CODE- 250: Performed by: SURGERY

## 2018-10-09 PROCEDURE — 77030008477 HC STYL SATN SLP COVD -A: Performed by: NURSE ANESTHETIST, CERTIFIED REGISTERED

## 2018-10-09 PROCEDURE — 77030009978 HC RELD STPLR TCR J&J -B: Performed by: SURGERY

## 2018-10-09 RX ORDER — ACETAMINOPHEN 500 MG
1000 TABLET ORAL EVERY 6 HOURS
Status: DISCONTINUED | OUTPATIENT
Start: 2018-10-09 | End: 2018-10-12 | Stop reason: HOSPADM

## 2018-10-09 RX ORDER — KETAMINE HYDROCHLORIDE 100 MG/ML
INJECTION, SOLUTION INTRAMUSCULAR; INTRAVENOUS AS NEEDED
Status: DISCONTINUED | OUTPATIENT
Start: 2018-10-09 | End: 2018-10-09 | Stop reason: HOSPADM

## 2018-10-09 RX ORDER — NALOXONE HYDROCHLORIDE 0.4 MG/ML
0.1 INJECTION, SOLUTION INTRAMUSCULAR; INTRAVENOUS; SUBCUTANEOUS AS NEEDED
Status: DISCONTINUED | OUTPATIENT
Start: 2018-10-09 | End: 2018-10-09 | Stop reason: HOSPADM

## 2018-10-09 RX ORDER — PROPOFOL 10 MG/ML
INJECTION, EMULSION INTRAVENOUS AS NEEDED
Status: DISCONTINUED | OUTPATIENT
Start: 2018-10-09 | End: 2018-10-09 | Stop reason: HOSPADM

## 2018-10-09 RX ORDER — ONDANSETRON 2 MG/ML
INJECTION INTRAMUSCULAR; INTRAVENOUS AS NEEDED
Status: DISCONTINUED | OUTPATIENT
Start: 2018-10-09 | End: 2018-10-09 | Stop reason: HOSPADM

## 2018-10-09 RX ORDER — SODIUM CHLORIDE, SODIUM LACTATE, POTASSIUM CHLORIDE, CALCIUM CHLORIDE 600; 310; 30; 20 MG/100ML; MG/100ML; MG/100ML; MG/100ML
25 INJECTION, SOLUTION INTRAVENOUS CONTINUOUS
Status: DISPENSED | OUTPATIENT
Start: 2018-10-09 | End: 2018-10-10

## 2018-10-09 RX ORDER — FAMOTIDINE 10 MG/ML
20 INJECTION INTRAVENOUS EVERY 12 HOURS
Status: DISCONTINUED | OUTPATIENT
Start: 2018-10-09 | End: 2018-10-09

## 2018-10-09 RX ORDER — CEFAZOLIN SODIUM/WATER 2 G/20 ML
2 SYRINGE (ML) INTRAVENOUS ONCE
Status: COMPLETED | OUTPATIENT
Start: 2018-10-09 | End: 2018-10-09

## 2018-10-09 RX ORDER — NALOXONE HYDROCHLORIDE 0.4 MG/ML
0.4 INJECTION, SOLUTION INTRAMUSCULAR; INTRAVENOUS; SUBCUTANEOUS AS NEEDED
Status: DISCONTINUED | OUTPATIENT
Start: 2018-10-09 | End: 2018-10-12 | Stop reason: HOSPADM

## 2018-10-09 RX ORDER — LIDOCAINE HYDROCHLORIDE ANHYDROUS AND DEXTROSE MONOHYDRATE .4; 5 G/100ML; G/100ML
1.5 INJECTION, SOLUTION INTRAVENOUS CONTINUOUS
Status: DISCONTINUED | OUTPATIENT
Start: 2018-10-09 | End: 2018-10-10

## 2018-10-09 RX ORDER — SODIUM CHLORIDE 0.9 % (FLUSH) 0.9 %
5-10 SYRINGE (ML) INJECTION AS NEEDED
Status: DISCONTINUED | OUTPATIENT
Start: 2018-10-09 | End: 2018-10-09 | Stop reason: HOSPADM

## 2018-10-09 RX ORDER — CELECOXIB 100 MG/1
100 CAPSULE ORAL 2 TIMES DAILY
Status: DISCONTINUED | OUTPATIENT
Start: 2018-10-10 | End: 2018-10-12 | Stop reason: HOSPADM

## 2018-10-09 RX ORDER — METRONIDAZOLE 500 MG/100ML
500 INJECTION, SOLUTION INTRAVENOUS
Status: COMPLETED | OUTPATIENT
Start: 2018-10-09 | End: 2018-10-09

## 2018-10-09 RX ORDER — EPHEDRINE SULFATE 50 MG/ML
INJECTION, SOLUTION INTRAVENOUS AS NEEDED
Status: DISCONTINUED | OUTPATIENT
Start: 2018-10-09 | End: 2018-10-09 | Stop reason: HOSPADM

## 2018-10-09 RX ORDER — OXYCODONE HYDROCHLORIDE 5 MG/1
5 TABLET ORAL
Status: DISCONTINUED | OUTPATIENT
Start: 2018-10-09 | End: 2018-10-12 | Stop reason: HOSPADM

## 2018-10-09 RX ORDER — SCOLOPAMINE TRANSDERMAL SYSTEM 1 MG/1
1 PATCH, EXTENDED RELEASE TRANSDERMAL ONCE
Status: COMPLETED | OUTPATIENT
Start: 2018-10-09 | End: 2018-10-12

## 2018-10-09 RX ORDER — MIDAZOLAM HYDROCHLORIDE 1 MG/ML
2 INJECTION, SOLUTION INTRAMUSCULAR; INTRAVENOUS
Status: COMPLETED | OUTPATIENT
Start: 2018-10-09 | End: 2018-10-09

## 2018-10-09 RX ORDER — ONDANSETRON 4 MG/1
4 TABLET, ORALLY DISINTEGRATING ORAL
Status: DISCONTINUED | OUTPATIENT
Start: 2018-10-09 | End: 2018-10-12 | Stop reason: HOSPADM

## 2018-10-09 RX ORDER — LIDOCAINE HYDROCHLORIDE 10 MG/ML
0.1 INJECTION INFILTRATION; PERINEURAL AS NEEDED
Status: DISCONTINUED | OUTPATIENT
Start: 2018-10-09 | End: 2018-10-09 | Stop reason: HOSPADM

## 2018-10-09 RX ORDER — HYDROMORPHONE HYDROCHLORIDE 1 MG/ML
0.5 INJECTION, SOLUTION INTRAMUSCULAR; INTRAVENOUS; SUBCUTANEOUS
Status: DISCONTINUED | OUTPATIENT
Start: 2018-10-09 | End: 2018-10-12 | Stop reason: HOSPADM

## 2018-10-09 RX ORDER — GLYCOPYRROLATE 0.2 MG/ML
INJECTION INTRAMUSCULAR; INTRAVENOUS AS NEEDED
Status: DISCONTINUED | OUTPATIENT
Start: 2018-10-09 | End: 2018-10-09 | Stop reason: HOSPADM

## 2018-10-09 RX ORDER — BUPIVACAINE HYDROCHLORIDE AND EPINEPHRINE 2.5; 5 MG/ML; UG/ML
INJECTION, SOLUTION EPIDURAL; INFILTRATION; INTRACAUDAL; PERINEURAL AS NEEDED
Status: DISCONTINUED | OUTPATIENT
Start: 2018-10-09 | End: 2018-10-09 | Stop reason: HOSPADM

## 2018-10-09 RX ORDER — ALVIMOPAN 12 MG/1
12 CAPSULE ORAL 2 TIMES DAILY
Status: DISCONTINUED | OUTPATIENT
Start: 2018-10-10 | End: 2018-10-12 | Stop reason: HOSPADM

## 2018-10-09 RX ORDER — LIDOCAINE HYDROCHLORIDE ANHYDROUS AND DEXTROSE MONOHYDRATE .4; 5 G/100ML; G/100ML
1 INJECTION, SOLUTION INTRAVENOUS CONTINUOUS
Status: DISCONTINUED | OUTPATIENT
Start: 2018-10-09 | End: 2018-10-09

## 2018-10-09 RX ORDER — GABAPENTIN 300 MG/1
300 CAPSULE ORAL 3 TIMES DAILY
Status: DISCONTINUED | OUTPATIENT
Start: 2018-10-10 | End: 2018-10-10

## 2018-10-09 RX ORDER — ENOXAPARIN SODIUM 100 MG/ML
40 INJECTION SUBCUTANEOUS EVERY 24 HOURS
Status: DISCONTINUED | OUTPATIENT
Start: 2018-10-10 | End: 2018-10-12 | Stop reason: HOSPADM

## 2018-10-09 RX ORDER — ACETAMINOPHEN 500 MG
1000 TABLET ORAL ONCE
Status: COMPLETED | OUTPATIENT
Start: 2018-10-09 | End: 2018-10-09

## 2018-10-09 RX ORDER — ROCURONIUM BROMIDE 10 MG/ML
INJECTION, SOLUTION INTRAVENOUS AS NEEDED
Status: DISCONTINUED | OUTPATIENT
Start: 2018-10-09 | End: 2018-10-09 | Stop reason: HOSPADM

## 2018-10-09 RX ORDER — ALVIMOPAN 12 MG/1
12 CAPSULE ORAL
Status: COMPLETED | OUTPATIENT
Start: 2018-10-09 | End: 2018-10-09

## 2018-10-09 RX ORDER — KETOROLAC TROMETHAMINE 30 MG/ML
30 INJECTION, SOLUTION INTRAMUSCULAR; INTRAVENOUS AS NEEDED
Status: DISCONTINUED | OUTPATIENT
Start: 2018-10-09 | End: 2018-10-09 | Stop reason: HOSPADM

## 2018-10-09 RX ORDER — NEOSTIGMINE METHYLSULFATE 1 MG/ML
INJECTION INTRAVENOUS AS NEEDED
Status: DISCONTINUED | OUTPATIENT
Start: 2018-10-09 | End: 2018-10-09 | Stop reason: HOSPADM

## 2018-10-09 RX ORDER — SODIUM CHLORIDE 0.9 % (FLUSH) 0.9 %
5-10 SYRINGE (ML) INJECTION EVERY 8 HOURS
Status: DISCONTINUED | OUTPATIENT
Start: 2018-10-09 | End: 2018-10-09 | Stop reason: HOSPADM

## 2018-10-09 RX ORDER — SODIUM CHLORIDE, SODIUM LACTATE, POTASSIUM CHLORIDE, CALCIUM CHLORIDE 600; 310; 30; 20 MG/100ML; MG/100ML; MG/100ML; MG/100ML
INJECTION, SOLUTION INTRAVENOUS
Status: DISCONTINUED | OUTPATIENT
Start: 2018-10-09 | End: 2018-10-09 | Stop reason: HOSPADM

## 2018-10-09 RX ORDER — LIDOCAINE HYDROCHLORIDE ANHYDROUS AND DEXTROSE MONOHYDRATE .4; 5 G/100ML; G/100ML
INJECTION, SOLUTION INTRAVENOUS
Status: DISCONTINUED | OUTPATIENT
Start: 2018-10-09 | End: 2018-10-09 | Stop reason: HOSPADM

## 2018-10-09 RX ORDER — LIDOCAINE HYDROCHLORIDE 20 MG/ML
INJECTION, SOLUTION EPIDURAL; INFILTRATION; INTRACAUDAL; PERINEURAL AS NEEDED
Status: DISCONTINUED | OUTPATIENT
Start: 2018-10-09 | End: 2018-10-09 | Stop reason: HOSPADM

## 2018-10-09 RX ORDER — KETOROLAC TROMETHAMINE 15 MG/ML
15 INJECTION, SOLUTION INTRAMUSCULAR; INTRAVENOUS EVERY 6 HOURS
Status: COMPLETED | OUTPATIENT
Start: 2018-10-09 | End: 2018-10-10

## 2018-10-09 RX ORDER — SODIUM CHLORIDE, SODIUM LACTATE, POTASSIUM CHLORIDE, CALCIUM CHLORIDE 600; 310; 30; 20 MG/100ML; MG/100ML; MG/100ML; MG/100ML
100 INJECTION, SOLUTION INTRAVENOUS CONTINUOUS
Status: DISCONTINUED | OUTPATIENT
Start: 2018-10-09 | End: 2018-10-09 | Stop reason: HOSPADM

## 2018-10-09 RX ORDER — GABAPENTIN 300 MG/1
600 CAPSULE ORAL ONCE
Status: COMPLETED | OUTPATIENT
Start: 2018-10-09 | End: 2018-10-09

## 2018-10-09 RX ORDER — SODIUM CHLORIDE, SODIUM LACTATE, POTASSIUM CHLORIDE, CALCIUM CHLORIDE 600; 310; 30; 20 MG/100ML; MG/100ML; MG/100ML; MG/100ML
125 INJECTION, SOLUTION INTRAVENOUS CONTINUOUS
Status: DISCONTINUED | OUTPATIENT
Start: 2018-10-09 | End: 2018-10-09 | Stop reason: HOSPADM

## 2018-10-09 RX ORDER — DIPHENHYDRAMINE HCL 25 MG
25 CAPSULE ORAL
Status: DISCONTINUED | OUTPATIENT
Start: 2018-10-09 | End: 2018-10-12 | Stop reason: HOSPADM

## 2018-10-09 RX ADMIN — ROCURONIUM BROMIDE 20 MG: 10 INJECTION, SOLUTION INTRAVENOUS at 08:08

## 2018-10-09 RX ADMIN — INSULIN HUMAN 5 UNITS: 100 INJECTION, SOLUTION PARENTERAL at 10:47

## 2018-10-09 RX ADMIN — PROPOFOL 160 MG: 10 INJECTION, EMULSION INTRAVENOUS at 07:26

## 2018-10-09 RX ADMIN — BUPIVACAINE HYDROCHLORIDE AND EPINEPHRINE 30 ML: 2.5; 5 INJECTION, SOLUTION EPIDURAL; INFILTRATION; INTRACAUDAL; PERINEURAL at 09:55

## 2018-10-09 RX ADMIN — KETAMINE HYDROCHLORIDE 29.1 MG: 100 INJECTION, SOLUTION INTRAMUSCULAR; INTRAVENOUS at 09:29

## 2018-10-09 RX ADMIN — ONDANSETRON 4 MG: 2 INJECTION INTRAMUSCULAR; INTRAVENOUS at 10:00

## 2018-10-09 RX ADMIN — NEOSTIGMINE METHYLSULFATE 3 MG: 1 INJECTION INTRAVENOUS at 10:00

## 2018-10-09 RX ADMIN — ROCURONIUM BROMIDE 10 MG: 10 INJECTION, SOLUTION INTRAVENOUS at 08:50

## 2018-10-09 RX ADMIN — ROCURONIUM BROMIDE 50 MG: 10 INJECTION, SOLUTION INTRAVENOUS at 07:26

## 2018-10-09 RX ADMIN — ACETAMINOPHEN 1000 MG: 500 TABLET, FILM COATED ORAL at 18:32

## 2018-10-09 RX ADMIN — ROCURONIUM BROMIDE 10 MG: 10 INJECTION, SOLUTION INTRAVENOUS at 09:00

## 2018-10-09 RX ADMIN — SODIUM CHLORIDE, SODIUM LACTATE, POTASSIUM CHLORIDE, CALCIUM CHLORIDE: 600; 310; 30; 20 INJECTION, SOLUTION INTRAVENOUS at 07:34

## 2018-10-09 RX ADMIN — GLYCOPYRROLATE 0.4 MG: 0.2 INJECTION INTRAMUSCULAR; INTRAVENOUS at 10:00

## 2018-10-09 RX ADMIN — FAMOTIDINE 20 MG: 10 INJECTION, SOLUTION INTRAVENOUS at 13:56

## 2018-10-09 RX ADMIN — MIDAZOLAM HYDROCHLORIDE 2 MG: 2 INJECTION, SOLUTION INTRAMUSCULAR; INTRAVENOUS at 06:58

## 2018-10-09 RX ADMIN — LIDOCAINE HYDROCHLORIDE 80 MG: 20 INJECTION, SOLUTION EPIDURAL; INFILTRATION; INTRACAUDAL; PERINEURAL at 07:26

## 2018-10-09 RX ADMIN — EPHEDRINE SULFATE 5 MG: 50 INJECTION, SOLUTION INTRAVENOUS at 08:04

## 2018-10-09 RX ADMIN — KETOROLAC TROMETHAMINE 15 MG: 15 INJECTION, SOLUTION INTRAMUSCULAR; INTRAVENOUS at 18:32

## 2018-10-09 RX ADMIN — KETAMINE HYDROCHLORIDE 4.37 MG: 100 INJECTION, SOLUTION INTRAMUSCULAR; INTRAVENOUS at 09:39

## 2018-10-09 RX ADMIN — ALVIMOPAN 12 MG: 12 CAPSULE ORAL at 06:21

## 2018-10-09 RX ADMIN — INSULIN HUMAN 3 UNITS: 100 INJECTION, SOLUTION PARENTERAL at 21:51

## 2018-10-09 RX ADMIN — ACETAMINOPHEN 1000 MG: 500 TABLET, FILM COATED ORAL at 06:21

## 2018-10-09 RX ADMIN — SODIUM CHLORIDE, SODIUM LACTATE, POTASSIUM CHLORIDE, AND CALCIUM CHLORIDE 500 ML: 600; 310; 30; 20 INJECTION, SOLUTION INTRAVENOUS at 20:10

## 2018-10-09 RX ADMIN — BUPIVACAINE HYDROCHLORIDE AND EPINEPHRINE 30 ML: 2.5; 5 INJECTION, SOLUTION EPIDURAL; INFILTRATION; INTRACAUDAL; PERINEURAL at 10:01

## 2018-10-09 RX ADMIN — SODIUM CHLORIDE, SODIUM LACTATE, POTASSIUM CHLORIDE, AND CALCIUM CHLORIDE 25 ML/HR: 600; 310; 30; 20 INJECTION, SOLUTION INTRAVENOUS at 06:04

## 2018-10-09 RX ADMIN — KETAMINE HYDROCHLORIDE 50 MG: 100 INJECTION, SOLUTION INTRAMUSCULAR; INTRAVENOUS at 07:26

## 2018-10-09 RX ADMIN — LIDOCAINE HYDROCHLORIDE 1.5 MG/MIN: 4 INJECTION, SOLUTION INTRAVENOUS at 10:20

## 2018-10-09 RX ADMIN — KETOROLAC TROMETHAMINE 15 MG: 15 INJECTION, SOLUTION INTRAMUSCULAR; INTRAVENOUS at 13:56

## 2018-10-09 RX ADMIN — SODIUM CHLORIDE, SODIUM LACTATE, POTASSIUM CHLORIDE, AND CALCIUM CHLORIDE 25 ML/HR: 600; 310; 30; 20 INJECTION, SOLUTION INTRAVENOUS at 13:56

## 2018-10-09 RX ADMIN — METRONIDAZOLE 500 MG: 500 INJECTION, SOLUTION INTRAVENOUS at 06:28

## 2018-10-09 RX ADMIN — EPHEDRINE SULFATE 10 MG: 50 INJECTION, SOLUTION INTRAVENOUS at 08:02

## 2018-10-09 RX ADMIN — FAMOTIDINE 20 MG: 10 INJECTION, SOLUTION INTRAVENOUS at 20:10

## 2018-10-09 RX ADMIN — KETAMINE HYDROCHLORIDE 29.1 MG: 100 INJECTION, SOLUTION INTRAMUSCULAR; INTRAVENOUS at 08:30

## 2018-10-09 RX ADMIN — LIDOCAINE HYDROCHLORIDE ANHYDROUS AND DEXTROSE MONOHYDRATE 1.5 MG/MIN: .4; 5 INJECTION, SOLUTION INTRAVENOUS at 07:31

## 2018-10-09 RX ADMIN — GABAPENTIN 600 MG: 300 CAPSULE ORAL at 06:21

## 2018-10-09 RX ADMIN — Medication 2 G: at 07:38

## 2018-10-09 NOTE — PROGRESS NOTES
Pt experiencing no signs of lidocaine toxicity. Lidocaine dosage checked and set at 1.5mg/min. Pump locked. Reported off to nightshift RN.

## 2018-10-09 NOTE — PROGRESS NOTES
10/09/18 1230   Dual Skin Pressure Injury Assessment   Dual Skin Pressure Injury Assessment WDL  (mid abd incision with surgical mepilex.  2 trochar sites w/dr)   Second Care Provider (Based on [address] [place] [address]) Malgorzata Rodgers RN

## 2018-10-09 NOTE — PROGRESS NOTES
Patient finally waking up. Drowsy but making accurate conversation and answering appropriately. Denies any pain. Daughter at bedside.

## 2018-10-09 NOTE — IP AVS SNAPSHOT
Oralia Jude 
 
 
 [address] 
[phone] Patient: Tomas Perez MRN: [id number] OIQ:[id number][id number] About your hospitalization You were admitted on:  October 9, 2018 You last received care in the:  [facility] 2 SURGICAL You were discharged on:  October 12, 2018 Why you were hospitalized Your primary diagnosis was:  Primary Adenocarcinoma Of Ascending Colon (Hcc) Your diagnoses also included: Malignant Neoplasm Of Ascending Colon (Hcc), Type 2 Diabetes Mellitus With Diabetic Neuropathy (Hcc), Current Use Of Anticoagulant Therapy Follow-up Information Follow up With Details Comments Contact Info Ketty Olivia MD   [address] [place] [id number] [phone] Víctor Garcia MD On 10/26/2018 10:15 [address] [address] 
[phone] Your Scheduled Appointments Friday October 26, 2018 10:15 AM EDT Global Post Op with Víctor Garcia MD  
[facility] ([facility] [address]  
[phone] Discharge Orders None A check cisco indicates which time of day the medication should be taken. My Medications START taking these medications Instructions Each Dose to Equal  
 Morning Noon Evening Bedtime  
 celecoxib 100 mg capsule Commonly known as:  CELEBREX Your last dose was: Your next dose is: Take 1 Cap by mouth two (2) times a day for 14 days. 100 mg CHANGE how you take these medications Instructions Each Dose to Equal  
 Morning Noon Evening Bedtime  
 apixaban 5 mg tablet Commonly known as:  Mirian Rivera What changed:  additional instructions Your last dose was: Your next dose is: Take 1 Tab by mouth two (2) times a day. Then begin 5mg twice a day on 4/3/18 with evening dose. 5 mg enoxaparin 40 mg/0.4 mL Commonly known as:  LOVENOX What changed:   
- when to take this 
- additional instructions Your last dose was: Your next dose is: 0.4 mL by SubCUTAneous route every twenty-four (24) hours for 5 days. 40 mg CONTINUE taking these medications Instructions Each Dose to Equal  
 Morning Noon Evening Bedtime  
 ascorbic acid (vitamin C) 500 mg Cper Your last dose was: Your next dose is: Take 500 mg by mouth daily. 500 mg Blood-Glucose Meter Misc Commonly known as:  ACCU-CHEK LILLY PLUS METER Your last dose was: Your next dose is:    
   
   
 1 Kit by Does Not Apply route two (2) times a day. For management of Type 2 DM Uncontrolled (250.02) 1 Kit  
    
   
   
   
  
 ferrous sulfate 325 mg (65 mg iron) EC tablet Commonly known as:  IRON Your last dose was: Your next dose is: Take 65 mg by mouth two (2) times a day. 65 mg  
    
   
   
   
  
 glucose blood VI test strips strip Commonly known as:  ACCU-CHEK LILLY PLUS TEST STRP Your last dose was: Your next dose is:    
   
   
 Use twice daily for management of Type 2 DM, Uncontrolled (250.02)  
     
   
   
   
  
 insulin glargine 100 unit/mL (3 mL) Inpn Commonly known as:  LANTUS SOLOSTAR U-100 INSULIN Your last dose was: Your next dose is:    
   
   
 24 u qhs  Indications: type 2 diabetes mellitus Insulin Needles (Disposable) 32 gauge x 1/4\" Ndle Your last dose was: Your next dose is:    
   
   
 1 Each by Does Not Apply route daily. 1 Each Lancets Misc Commonly known as:  ACCU-CHEK SOFTCLIX LANCETS Your last dose was: Your next dose is:    
   
   
 Use twice daily for management of Type 2 DM uncontrolled (250.02) metFORMIN 500 mg tablet Commonly known as:  GLUCOPHAGE Your last dose was: Your next dose is: Take 1 Tab by mouth two (2) times daily (with meals). 500 mg  
    
   
   
   
  
 metoclopramide HCl 10 mg tablet Commonly known as:  REGLAN Your last dose was: Your next dose is:    
   
   
 1 tab by mouth at 11AM, 5PM and 10PM on day prior to surgery  Indications: ERAS  
     
   
   
   
  
 TYLENOL 8 HOUR PO Your last dose was: Your next dose is: Take  by mouth. Where to Get Your Medications Information on where to get these meds will be given to you by the nurse or doctor. ! Ask your nurse or doctor about these medications  
  celecoxib 100 mg capsule  
 enoxaparin 40 mg/0.4 mL Discharge Instructions DISCHARGE SUMMARY from Nurse PATIENT INSTRUCTIONS: 
 
After general anesthesia or intravenous sedation, for 24 hours or while taking prescription Narcotics: · Limit your activities · Do not drive and operate hazardous machinery · Do not make important personal or business decisions · Do  not drink alcoholic beverages · If you have not urinated within 8 hours after discharge, please contact your surgeon on call. Report the following to your surgeon: 
· Excessive pain, swelling, redness or odor of or around the surgical area · Temperature over 100.5 · Nausea and vomiting lasting longer than 4 hours or if unable to take medications · Any signs of decreased circulation or nerve impairment to extremity: change in color, persistent  numbness, tingling, coldness or increase pain · Any questions What to do at Home: 
Recommended activity: Activity as tolerated, no lifting more than 10 pounds.  
 
If you experience any of the following symptoms Fever greater than 101,pain unrelieved by pain medication,  please follow up with  
 
 *  Please give a list of your current medications to your Primary Care Provider. *  Please update this list whenever your medications are discontinued, doses are 
    changed, or new medications (including over-the-counter products) are added. *  Please carry medication information at all times in case of emergency situations. These are general instructions for a healthy lifestyle: No smoking/ No tobacco products/ Avoid exposure to second hand smoke Surgeon General's Warning:  Quitting smoking now greatly reduces serious risk to your health. Obesity, smoking, and sedentary lifestyle greatly increases your risk for illness A healthy diet, regular physical exercise & weight monitoring are important for maintaining a healthy lifestyle You may be retaining fluid if you have a history of heart failure or if you experience any of the following symptoms:  Weight gain of 3 pounds or more overnight or 5 pounds in a week, increased swelling in our hands or feet or shortness of breath while lying flat in bed. Please call your doctor as soon as you notice any of these symptoms; do not wait until your next office visit. Recognize signs and symptoms of STROKE: 
 
F-face looks uneven A-arms unable to move or move unevenly S-speech slurred or non-existent T-time-call 911 as soon as signs and symptoms begin-DO NOT go Back to bed or wait to see if you get better-TIME IS BRAIN. Warning Signs of HEART ATTACK Call 911 if you have these symptoms: 
? Chest discomfort. Most heart attacks involve discomfort in the center of the chest that lasts more than a few minutes, or that goes away and comes back. It can feel like uncomfortable pressure, squeezing, fullness, or pain. ? Discomfort in other areas of the upper body. Symptoms can include pain or discomfort in one or both arms, the back, neck, jaw, or stomach. ? Shortness of breath with or without chest discomfort. ? Other signs may include breaking out in a cold sweat, nausea, or lightheadedness. Don't wait more than five minutes to call [address]! Fast action can save your life. Calling 911 is almost always the fastest way to get lifesaving treatment. Emergency Medical Services staff can begin treatment when they arrive  up to an hour sooner than if someone gets to the hospital by car. The discharge information has been reviewed with the patient. The patient verbalized understanding. Discharge medications reviewed with the patient and appropriate educational materials and side effects teaching were provided. ___________________________________________________________________________________________________________________________________ NERIhart Announcement We are excited to announce that we are making your provider's discharge notes available to you in Spendji. You will see these notes when they are completed and signed by the physician that discharged you from your recent hospital stay. If you have any questions or concerns about any information you see in Spendji, please call the Health Information Department where you were seen or reach out to your Primary Care Provider for more information about your plan of care. Introducing [facility]! [facility] introduces Spendji patient portal. Now you can access parts of your medical record, email your doctor's office, and request medication refills online. 1. In your internet browser, go to [email or web address] 2. Click on the First Time User? Click Here link in the Sign In box. You will see the New Member Sign Up page. 3. Enter your Spendji Access Code exactly as it appears below. You will not need to use this code after youve completed the sign-up process. If you do not sign up before the expiration date, you must request a new code. · Spendji Access Code: [email or web address] Expires: 12/30/2018  9:43 AM 
 
4. Enter the last four digits of your Social Security Number (xxxx) and Date of Birth (mm/dd/yyyy) as indicated and click Submit. You will be taken to the next sign-up page. 5. Create a iCopyrightt ID. This will be your Spotie login ID and cannot be changed, so think of one that is secure and easy to remember. 6. Create a Spotie password. You can change your password at any time. 7. Enter your Password Reset Question and Answer. This can be used at a later time if you forget your password. 8. Enter your e-mail address. You will receive e-mail notification when new information is available in [address] 9. Click Sign Up. You can now view and download portions of your medical record. 10. Click the Download Summary menu link to download a portable copy of your medical information. If you have questions, please visit the Frequently Asked Questions section of the Spotie website. Remember, Spotie is NOT to be used for urgent needs. For medical emergencies, dial 911. Now available from your iPhone and Android! Introducing Parmjit Milligan As a [facility] patient, I wanted to make you aware of our electronic visit tool called Parmjit Milligan. [facility] 24/7 allows you to connect within minutes with a medical provider 24 hours a day, seven days a week via a mobile device or tablet or logging into a secure website from your computer. You can access Parmjit Milligan from anywhere in the [place]. A virtual visit might be right for you when you have a simple condition and feel like you just dont want to get out of bed, or cant get away from work for an appointment, when your regular [facility] provider is not available (evenings, weekends or holidays), or when youre out of town and need minor care.   Electronic visits cost only $49 and if the Parmjit Milligan provider determines a prescription is needed to treat your condition, one can be electronically transmitted to a nearby pharmacy*. Please take a moment to enroll today if you have not already done so. The enrollment process is free and takes just a few minutes. To enroll, please download the [employer] 24/7 sukumar to your tablet or phone, or visit [email or web address] to enroll on your computer. And, as an [address] [place] [address] patient with a Shoptimise account, the results of your visits will be scanned into your electronic medical record and your primary care provider will be able to view the scanned results. We urge you to continue to see your regular [employer] provider for your ongoing medical care. And while your primary care provider may not be the one available when you seek a [facility] virtual visit, the peace of mind you get from getting a real diagnosis real time can be priceless. For more information on [facility], view our Frequently Asked Questions (FAQs) at [email or web address]. Sincerely, 
 
Connor Arthur MD 
Chief Medical Officer [employer] *:  certain medications cannot be prescribed via [facility] Providers Seen During Your Hospitalization Provider Specialty Primary office phone Víctor Garcia MD General Surgery [phone] Immunizations Administered for This Admission Name Date Influenza Vaccine (Quad) PF  Deferred () Your Primary Care Physician (PCP) Primary Care Physician Office Phone Office Fax [address] [facility] 320 [phone] You are allergic to the following Allergen Reactions Biaxin (Clarithromycin) Unknown (comments) Cortisone Other (comments) NUMBNESS IN THE LEG (SITE OF INJECTION) Per pt, leg numbness. Recent Documentation Height Weight BMI OB Status Smoking Status 1.613 m 96.8 kg 37.2 kg/m2 Postmenopausal Never Smoker Emergency Contacts Name Discharge Info Relation Home Work Mobile [address] CAREGIVER [3] Son [22] [phone] Chiara Mcmahan DISCHARGE CAREGIVER [3] Other Relative [6] [phone] Patient Belongings The following personal items are in your possession at time of discharge: 
  Dental Appliances: None  Visual Aid: Glasses, With patient          Jewelry: Earrings  Clothing: Footwear, Shirt, Undergarments, Pants    Other Valuables: Eyeglasses Discharge Instructions Attachments/References BOWEL RESECTION: OPEN: POST-OP (ENGLISH) Patient Handouts Open Bowel Resection: What to Expect at Home Your Recovery You are likely to have pain that comes and goes for the next few days after bowel surgery. You may have bowel cramps, and your cut (incision) may hurt. You may also feel like you have the flu. You may have a low fever and feel tired and nauseated. This is common. You should feel better after a week and will probably be back to normal in 2 to 3 weeks. This care sheet gives you a general idea about how long it will take for you to recover. But each person recovers at a different pace. Follow the steps below to get better as quickly as possible. How can you care for yourself at home? Activity 
  · Rest when you feel tired. Getting enough sleep will help you recover.  
  · Try to walk each day. Start by walking a little more than you did the day before. Bit by bit, increase the amount you walk. Walking boosts blood flow and helps prevent pneumonia and constipation.  
  · Avoid strenuous activities, such as biking, jogging, weight lifting, or aerobic exercise, until your doctor says it is okay.  
  · Ask your doctor when you can drive again.  
  · You will probably need to take 3 to 4 weeks off from work. It depends on the type of work you do and how you feel. You may need to take off 4 to 6 weeks if you lift heavy objects in your job.   · You may shower 24 to 48 hours after surgery, if your doctor says it is okay. Pat the cut (incision) dry. Do not take a bath for the first 2 weeks, or until your doctor tells you it is okay.  
  · Ask your doctor when it is okay for you to have sex. Diet 
  · You may not have much appetite after the surgery. But try to eat a healthy diet. Your doctor will tell you about any foods you should not eat.  
  · Eat a low-fiber diet for several weeks after surgery. Eat many small meals throughout the day. Add high-fiber foods a little at a time.  
  · Eat yogurt. It puts good bacteria into your colon and helps prevent diarrhea.  
  · Try to avoid nuts, seeds, and corn for a while. They may be hard to digest.  
  · You may need to take vitamins that contain sodium and potassium. Ask your doctor.  
  · Drink plenty of fluids to avoid becoming dehydrated. Medicines 
  · Your doctor will tell you if and when you can restart your medicines. He or she will also give you instructions about taking any new medicines.  
  · If you take blood thinners, such as warfarin (Coumadin), clopidogrel (Plavix), or aspirin, be sure to talk to your doctor. He or she will tell you if and when to start taking those medicines again. Make sure that you understand exactly what your doctor wants you to do.  
  · Take pain medicines exactly as directed. ¨ If the doctor gave you a prescription medicine for pain, take it as prescribed. ¨ If you are not taking a prescription pain medicine, ask your doctor if you can take an over-the-counter medicine. ¨ Do not take two or more pain medicines at the same time unless the doctor told you to. Many pain medicines have acetaminophen, which is Tylenol. Too much acetaminophen (Tylenol) can be harmful.  
  · If you think your pain medicine is making you sick to your stomach: 
¨ Take your medicine after meals (unless your doctor tells you not to). ¨ Ask your doctor for a different pain medicine.   · If your doctor prescribed antibiotics, take them as directed. Do not stop taking them just because you feel better. You need to take the full course of antibiotics.  
  · You may need to take some medicines in a different form. You will be told whether to crush pills or take a liquid form of the medicine.  
  · If your doctor gives you a stool softener, take it as directed. Incision care 
  · If you have strips of tape on the incision, leave the tape on for a week or until it falls off.  
  · Wash the area daily with warm, soapy water, and pat it dry. Follow-up care is a key part of your treatment and safety. Be sure to make and go to all appointments, and call your doctor if you are having problems. It's also a good idea to know your test results and keep a list of the medicines you take. When should you call for help? Call 911 anytime you think you may need emergency care. For example, call if: 
  · You passed out (lost consciousness).  
  · You are short of breath.  
 Call your doctor now or seek immediate medical care if: 
  · You are sick to your stomach and cannot drink fluids or keep them down.  
  · You have signs of a blood clot in your leg (called a deep vein thrombosis), such as: 
¨ Pain in your calf, back of the knee, thigh, or groin. ¨ Redness and swelling in your leg or groin.  
  · You have signs of infection, such as: 
¨ Increased pain, swelling, warmth, or redness. ¨ Red streaks leading from the incision. ¨ Pus draining from the incision. ¨ A fever.  
  · You have pain that does not get better after you take pain medicine.  
  · You have loose stitches, or your incision comes open.  
  · Bright red blood has soaked through the bandage.  
  · You cannot pass stools or gas.  
 Watch closely for any changes in your health, and be sure to contact your doctor if you have any problems. Where can you learn more? Go to http://shaila-stuart.info/. Enter 739 [id number] in the search box to learn more about \"Open Bowel Resection: What to Expect at Home. \" Current as of: March 28, 2018 Content Version: 11.8 © 2006-2018 Healthwise, Incorporated. Care instructions adapted under license by [employer] (which disclaims liability or warranty for this information). If you have questions about a medical condition or this instruction, always ask your healthcare professional. [address] any warranty or liability for your use of this information. Please provide this summary of care documentation to your next provider. Signatures-by signing, you are acknowledging that this After Visit Summary has been reviewed with you and you have received a copy. Patient Signature:  ____________________________________________________________ Date:  ____________________________________________________________  
  
[facility] Provider Signature:  ____________________________________________________________ Date:  ____________________________________________________________

## 2018-10-09 NOTE — PROGRESS NOTES
Assisted patient to side of the bed. Patient reports severe lightheadeness and diziness and is unable to stand still. Left to rest in that position for about 15 minutes and when assisted to chair she reported same symptoms. Denies any numbness or tingling to tongue, mouth or lips, denies any visual disturbances, alert and oriented x4 and was able to tolerate clear liquids dinner. Chele Parham NP called and was notified of patient status. No orders received but to continue current care and to continue monitor for lidocaine toxicity symptoms.

## 2018-10-09 NOTE — ANESTHESIA PROCEDURE NOTES
Peripheral Block Start time: 10/9/2018 9:55 AM 
End time: 10/9/2018 9:58 AM 
Performed by: Nora Benjamin Authorized by: Nora Benjamin Pre-procedure: Indications: at surgeon's request, post-op pain management and procedure for pain Preanesthetic Checklist: patient identified, risks and benefits discussed, site marked, timeout performed, anesthesia consent given and patient being monitored Timeout Time: 09:55 Block Type:  
Block Type:  TAP Laterality:  Left Monitoring:  Standard ASA monitoring, continuous pulse ox, frequent vital sign checks, heart rate, responsive to questions and oxygen Injection Technique:  Single shot Procedures: ultrasound guided Patient Position: supine Prep: chlorhexidine Location:  Abdominal 
Needle Type:  Stimuplex Needle Gauge:  21 G Needle Localization:  Ultrasound guidance and anatomical landmarks Medication Injected:  0.25% 
bupivacaine Adds:  Epi 1:200K Volume (mL):  30 
 
Assessment: 
Number of attempts:  1 Injection Assessment:  Incremental injection every 5 mL, local visualized surrounding nerve on ultrasound, negative aspiration for blood, no paresthesia, no intravascular symptoms and ultrasound image on chart Patient tolerance:  Patient tolerated the procedure well with no immediate complications

## 2018-10-09 NOTE — ANESTHESIA POSTPROCEDURE EVALUATION
Post-Anesthesia Evaluation and Assessment Patient: Benjamin Amaya MRN: [id number]  SSN: [id number] YOB: 1960  Age: 59 y.o. Sex: female Cardiovascular Function/Vital Signs Visit Vitals  /58  Pulse (!) 102  Temp 37.1 °C (98.8 °F)  Resp 16  
 Ht 5' 3.5\" (1.613 m)  Wt 96.8 kg (213 lb 6 oz)  SpO2 98%  BMI 37.2 kg/m2 Patient is status post general anesthesia for Procedure(s): LAPAROSCOPIC RIGHT KENN COLECTOMY. Nausea/Vomiting: None Postoperative hydration reviewed and adequate. Pain: 
Pain Scale 1: Numeric (0 - 10) (10/09/18 1101) Pain Intensity 1: 0 (10/09/18 1101) Managed Neurological Status:  
Neuro (WDL): Within Defined Limits (10/09/18 1101) At baseline Mental Status and Level of Consciousness: Arousable Pulmonary Status:  
O2 Device: Nasal cannula (10/09/18 1101) Adequate oxygenation and airway patent Complications related to anesthesia: None Post-anesthesia assessment completed. No concerns Signed By: Xuan Nielsen MD   
 October 9, 2018

## 2018-10-09 NOTE — PERIOP NOTES
TRANSFER - OUT REPORT:    Verbal report given to [facility] RN on Al  being transferred to [facility] for routine post - op       Report consisted of patients Situation, Background, Assessment and   Recommendations(SBAR). Information from the following report(s) SBAR, OR Summary, Procedure Summary, Intake/Output and MAR was reviewed with the receiving nurse. Lines:   Peripheral IV 10/09/18 Left Hand (Active)   Site Assessment Clean, dry, & intact 10/9/2018 11:01 AM   Phlebitis Assessment 0 10/9/2018 11:01 AM   Infiltration Assessment 0 10/9/2018 11:01 AM   Dressing Status Clean, dry, & intact; Occlusive 10/9/2018 11:01 AM   Dressing Type Transparent;Tape 10/9/2018 11:01 AM   Hub Color/Line Status Pink; Infusing 10/9/2018 11:01 AM   Alcohol Cap Used No 10/9/2018 11:01 AM       Peripheral IV 10/09/18 Right Hand (Active)   Site Assessment Clean, dry, & intact 10/9/2018 11:01 AM   Phlebitis Assessment 0 10/9/2018 11:01 AM   Infiltration Assessment 0 10/9/2018 11:01 AM   Dressing Status Clean, dry, & intact; Occlusive 10/9/2018 11:01 AM   Dressing Type Transparent;Tape 10/9/2018 11:01 AM   Hub Color/Line Status Green; Infusing 10/9/2018 11:01 AM   Alcohol Cap Used No 10/9/2018 11:01 AM        Opportunity for questions and clarification was provided. Patient transported with:   O2 @ 3 liters    VTE prophylaxis orders have been written for Al. Patient and family given floor number and nurses name. Family updated re: pt status after security code verified.

## 2018-10-09 NOTE — ANESTHESIA PROCEDURE NOTES
Peripheral Block Start time: 10/9/2018 9:59 AM 
End time: 10/9/2018 10:01 AM 
Performed by: Solange Celestin Authorized by: Solange Celestin Pre-procedure: Indications: at surgeon's request, post-op pain management and procedure for pain Preanesthetic Checklist: patient identified, risks and benefits discussed, site marked, timeout performed, anesthesia consent given and patient being monitored Timeout Time: 09:59 Block Type:  
Block Type:  TAP Laterality:  Right Monitoring:  Standard ASA monitoring, continuous pulse ox, frequent vital sign checks, heart rate, responsive to questions and oxygen Injection Technique:  Single shot Procedures: ultrasound guided Patient Position: supine Prep: chlorhexidine Location:  Abdominal 
Needle Type:  Stimuplex Needle Gauge:  21 G Needle Localization:  Ultrasound guidance and anatomical landmarks Medication Injected:  0.25% 
bupivacaine Adds:  Epi 1:200K Volume (mL):  30 
 
Assessment: 
Number of attempts:  1 Injection Assessment:  Incremental injection every 5 mL, local visualized surrounding nerve on ultrasound, negative aspiration for blood, no paresthesia, no intravascular symptoms and ultrasound image on chart Patient tolerance:  Patient tolerated the procedure well with no immediate complications

## 2018-10-09 NOTE — PROGRESS NOTES
TRANSFER - IN REPORT:    Verbal report received from Stephanie Boothe RN(name) on Al  being received from PACU(unit) for routine progression of care      Report consisted of patients Situation, Background, Assessment and  Recommendations(SBAR). Information from the following report(s) SBAR, Kardex, OR Summary, Intake/Output and MAR was reviewed with the receiving nurse. Opportunity for questions and clarification was provided. Assessment completed upon patients arrival to unit and care assumed.

## 2018-10-09 NOTE — PROGRESS NOTES
Patient resting in bed with eyes closed. Woke up and mumble words but too sedated to stay awake. Family at bedside. abd incision with mepilex and 2 puncture sites with dry dressing and tegaderm. Lidocaine drip set at 1.5mg/min @ 22.5 rate.

## 2018-10-09 NOTE — ANESTHESIA PREPROCEDURE EVALUATION
Anesthetic History Review of Systems / Medical History Patient summary reviewed, nursing notes reviewed and pertinent labs reviewed Pulmonary Neuro/Psych Cardiovascular Exercise tolerance: >4 METS Comments: 6 mos s/p DVT; IVC filter placed 2 wks ago GI/Hepatic/Renal 
  
 
 
 
 
 
 Endo/Other Diabetes: well controlled, type 2, using insulin Obesity and cancer (colon) Other Findings Physical Exam 
 
Airway Mallampati: I 
TM Distance: 4 - 6 cm Neck ROM: normal range of motion Mouth opening: Normal 
 
 Cardiovascular Regular rate and rhythm,  S1 and S2 normal,  no murmur, click, rub, or gallop Dental 
No notable dental hx Pulmonary Breath sounds clear to auscultation Abdominal 
GI exam deferred Other Findings Anesthetic Plan ASA: 3 Anesthesia type: general 
 
 
 
 
 
Anesthetic plan and risks discussed with: Patient Daughter present ERAS protocol deployed

## 2018-10-09 NOTE — PROGRESS NOTES
made initial visit. Pt was alert and verbal.  Pt appeared to be comfortable with no pain level expressed or observed.  welcomed pt to SFDT and shared information about Spiritual Care.  provided spiritual care through presence, pastoral conversation, and assurance of prayer.

## 2018-10-09 NOTE — BRIEF OP NOTE
BRIEF OPERATIVE NOTE    Date of Procedure: 10/9/2018   Preoperative Diagnosis: Cancer of ascending colon ([facility] [C18.2]  Postoperative Diagnosis: Cancer of ascending colon ([facility] [C18.2]  ERAS protocol patient   Procedure(s):  LAPAROSCOPIC RIGHT KENN COLECTOMY  Surgeon(s) and Role:     * Amandeep Graham MD - Primary         Surgical Assistant: Lanette Barnhart    Surgical Staff:  Circ-1: Vamsi Lewis RN  Circ-Relief: Nahomy Bernal RN  Scrub Tech-1: Lanette Curiel  Scrub Tech-2: Idalia Acosta  Event Time In   Incision Start 0058   Incision Close 5384     Anesthesia: General   Estimated Blood Loss: <60 cc  Specimens:   ID Type Source Tests Collected by Time Destination   1 : right hemicolectomy Fresh Colon, Right  Amandeep Graham MD 10/9/2018 4199 Pathology      Findings: hand port + 2 x 5 mm trocars, tattoo evident, colon mass with signs of extension through colon wall, no peritoneal mets or ascites, adhesiolysis performed in RUQ secondary to prior scotty, mobile cecum and TI, freed attachments and medialized, extracorporeal division of distal ileum and T colon and mesentery, area of tumor invasion into T colon appendiceal epiploica divide with Ligasure, side-to-side ileotransverse colostomy. Anesthesia placed TAP and rectus sheath blocks. ERAS protocol.   Complications: none apparent  Implants: * No implants in log *    Amandeep Graham MD

## 2018-10-10 LAB
ANION GAP SERPL CALC-SCNC: 8 MMOL/L (ref 7–16)
BUN SERPL-MCNC: 10 MG/DL (ref 8–23)
CALCIUM SERPL-MCNC: 8.4 MG/DL (ref 8.3–10.4)
CHLORIDE SERPL-SCNC: 106 MMOL/L (ref 98–107)
CO2 SERPL-SCNC: 26 MMOL/L (ref 21–32)
CREAT SERPL-MCNC: 0.88 MG/DL (ref 0.6–1)
GLUCOSE BLD STRIP.AUTO-MCNC: 114 MG/DL (ref 65–100)
GLUCOSE BLD STRIP.AUTO-MCNC: 118 MG/DL (ref 65–100)
GLUCOSE BLD STRIP.AUTO-MCNC: 120 MG/DL (ref 65–100)
GLUCOSE BLD STRIP.AUTO-MCNC: 141 MG/DL (ref 65–100)
GLUCOSE BLD STRIP.AUTO-MCNC: 149 MG/DL (ref 65–100)
GLUCOSE SERPL-MCNC: 121 MG/DL (ref 65–100)
HCT VFR BLD AUTO: 30.3 % (ref 35.8–46.3)
HGB BLD-MCNC: 9 G/DL (ref 11.7–15.4)
MAGNESIUM SERPL-MCNC: 2.4 MG/DL (ref 1.8–2.4)
MCH RBC QN AUTO: 24.6 PG (ref 26.1–32.9)
MCHC RBC AUTO-ENTMCNC: 29.7 G/DL (ref 31.4–35)
MCV RBC AUTO: 82.8 FL (ref 79.6–97.8)
NRBC # BLD: 0 K/UL (ref 0–0.2)
PHOSPHATE SERPL-MCNC: 3.5 MG/DL (ref 2.3–3.7)
PLATELET # BLD AUTO: 259 K/UL (ref 150–450)
PMV BLD AUTO: 9.7 FL (ref 9.4–12.3)
POTASSIUM SERPL-SCNC: 3.3 MMOL/L (ref 3.5–5.1)
RBC # BLD AUTO: 3.66 M/UL (ref 4.05–5.2)
SODIUM SERPL-SCNC: 140 MMOL/L (ref 136–145)
WBC # BLD AUTO: 9.1 K/UL (ref 4.3–11.1)

## 2018-10-10 PROCEDURE — 74011250636 HC RX REV CODE- 250/636: Performed by: ANESTHESIOLOGY

## 2018-10-10 PROCEDURE — 74011000250 HC RX REV CODE- 250: Performed by: SURGERY

## 2018-10-10 PROCEDURE — 83735 ASSAY OF MAGNESIUM: CPT

## 2018-10-10 PROCEDURE — 82962 GLUCOSE BLOOD TEST: CPT

## 2018-10-10 PROCEDURE — 74011250636 HC RX REV CODE- 250/636: Performed by: SURGERY

## 2018-10-10 PROCEDURE — 84100 ASSAY OF PHOSPHORUS: CPT

## 2018-10-10 PROCEDURE — 74011250637 HC RX REV CODE- 250/637: Performed by: SURGERY

## 2018-10-10 PROCEDURE — 36415 COLL VENOUS BLD VENIPUNCTURE: CPT

## 2018-10-10 PROCEDURE — 65270000029 HC RM PRIVATE

## 2018-10-10 PROCEDURE — 80048 BASIC METABOLIC PNL TOTAL CA: CPT

## 2018-10-10 PROCEDURE — 85027 COMPLETE CBC AUTOMATED: CPT

## 2018-10-10 RX ORDER — POTASSIUM CHLORIDE 1.5 G/1.77G
40 POWDER, FOR SOLUTION ORAL
Status: COMPLETED | OUTPATIENT
Start: 2018-10-10 | End: 2018-10-10

## 2018-10-10 RX ADMIN — KETOROLAC TROMETHAMINE 15 MG: 15 INJECTION, SOLUTION INTRAMUSCULAR; INTRAVENOUS at 00:56

## 2018-10-10 RX ADMIN — ACETAMINOPHEN 1000 MG: 500 TABLET, FILM COATED ORAL at 19:59

## 2018-10-10 RX ADMIN — ACETAMINOPHEN 1000 MG: 500 TABLET, FILM COATED ORAL at 00:55

## 2018-10-10 RX ADMIN — KETOROLAC TROMETHAMINE 15 MG: 15 INJECTION, SOLUTION INTRAMUSCULAR; INTRAVENOUS at 06:26

## 2018-10-10 RX ADMIN — SODIUM CHLORIDE, SODIUM LACTATE, POTASSIUM CHLORIDE, AND CALCIUM CHLORIDE 500 ML: 600; 310; 30; 20 INJECTION, SOLUTION INTRAVENOUS at 13:48

## 2018-10-10 RX ADMIN — CELECOXIB 100 MG: 100 CAPSULE ORAL at 08:08

## 2018-10-10 RX ADMIN — ALVIMOPAN 12 MG: 12 CAPSULE ORAL at 18:00

## 2018-10-10 RX ADMIN — POTASSIUM CHLORIDE 40 MEQ: 1.5 POWDER, FOR SOLUTION ORAL at 08:08

## 2018-10-10 RX ADMIN — CELECOXIB 100 MG: 100 CAPSULE ORAL at 17:59

## 2018-10-10 RX ADMIN — ALVIMOPAN 12 MG: 12 CAPSULE ORAL at 08:21

## 2018-10-10 RX ADMIN — ENOXAPARIN SODIUM 40 MG: 40 INJECTION, SOLUTION INTRAVENOUS; SUBCUTANEOUS at 11:37

## 2018-10-10 RX ADMIN — POTASSIUM CHLORIDE 40 MEQ: 1.5 POWDER, FOR SOLUTION ORAL at 17:59

## 2018-10-10 RX ADMIN — FAMOTIDINE 20 MG: 10 INJECTION, SOLUTION INTRAVENOUS at 22:13

## 2018-10-10 RX ADMIN — ACETAMINOPHEN 1000 MG: 500 TABLET, FILM COATED ORAL at 14:33

## 2018-10-10 RX ADMIN — GABAPENTIN 300 MG: 300 CAPSULE ORAL at 08:08

## 2018-10-10 RX ADMIN — ACETAMINOPHEN 1000 MG: 500 TABLET, FILM COATED ORAL at 06:26

## 2018-10-10 RX ADMIN — POTASSIUM CHLORIDE 40 MEQ: 1.5 POWDER, FOR SOLUTION ORAL at 11:37

## 2018-10-10 RX ADMIN — FAMOTIDINE 20 MG: 10 INJECTION, SOLUTION INTRAVENOUS at 08:07

## 2018-10-10 RX ADMIN — LIDOCAINE HYDROCHLORIDE 1.5 MG/MIN: 4 INJECTION, SOLUTION INTRAVENOUS at 06:29

## 2018-10-10 NOTE — PROGRESS NOTES
Pt experiencing no s/sx of lidocaine toxicity. Lidocaine dosage chaecked and is set at 1.5mg/kg.   Pt up in  with no needs at this time

## 2018-10-10 NOTE — PROGRESS NOTES
Urinary output since 1645 has only been 60 ml cloudy khris urine. Placed call to Dr. Xuan Pop, on call for Dr. Bharti Edwards. Orders received for 500 ml LR bolus.

## 2018-10-10 NOTE — PROGRESS NOTES
Assisted back to bed during bedside report with Anitra Sun RN. Reports sitting in chair for 2 hours. Up with assist without difficulty. Reports that lightheadedness felt earlier when initially got up out of bed to chair has gone away. Denies tongue numbness, peripheral numbness/tingling, metalic tases Arabella-oral numbness and tinnitus. Denies visual and auditory disturbances. Is alert and oriented x 4. Denies pain. Will continue to monitor and assess per ERAS protocol.

## 2018-10-10 NOTE — PROGRESS NOTES
Up to ambulate in hallway. Very unsteady on feet. Spoke with Kalyani Smart NP.   PT ordered and scopolamine patch removed

## 2018-10-10 NOTE — PROGRESS NOTES
END OF SHIFT NOTE:    NO SIGNS OF LIDOCAINE TOXICITY     INTAKE/OUTPUT  10/09 0701 - 10/10 0700  In: 6506 [I.V.:1339]  Out: 2381 [Urine:1360]  Voiding: YES  Catheter: NO  DISCONTINUED AT 0600:             Flatus: Patient does not have flatus present. Stool:  0 occurrences. Characteristics:       Emesis: 0 occurrences. Characteristics:        VITAL SIGNS  Patient Vitals for the past 12 hrs:   Temp Pulse Resp BP SpO2   10/10/18 0342 97.8 °F (36.6 °C) 82 16 102/59 100 %   10/09/18 2345 98.2 °F (36.8 °C) 83 16 116/77 100 %   10/09/18 1945 97.8 °F (36.6 °C) 84 16 108/73 100 %       Pain Assessment  Pain Intensity 1: 0 (10/10/18 0000)     Pain Intervention(s) 1: Emotional support  Patient Stated Pain Goal: 2    Ambulating YES  HAS BEEN UP IN CHAIR TWICE FOR TOTAL OF 5 HOURS AND HAS WALKED IN ROOM. DENIED PAIN THROUGHOUT NIGHT. PAIN CONTROLLED WITH ERAS PATIENT PROTOCOLS  Shift report given to oncoming nurse at the bedside.     Dc Champion RN

## 2018-10-10 NOTE — PROGRESS NOTES
[facility] [address]  [phone]    H&P Note   Vipul Candelaria   MRN: [id number]     : 1960        HPI: Vipul Candelaria is a 59 y.o. female who returns the office for continued planning for her newly diagnosed right-sided colon cancer. Since her last visit, she was evaluated by pulmonary who would like to keep her anticoagulated. She will have her Eliquis held an appropriate time preoperatively and maintained on a Lovenox bridge until the day prior to surgery. She was also set up for vena cava filter which was placed on . She did well with this procedure. Review of her CT scan with radiology confirmed my finding of the mass being visible on CT though not mentioned in the report from [facility]. The mass is in the mid a sending colon. This will be amenable to right hemicolectomy. She does have a history of open cholecystectomy via right subcostal incision. Her right leg swelling has almost completely resolved. She was referred by Dr. Twin Spann for hepatic flexure colon cancer. Patient had an EGD and colonoscopy on 9/10/2018 for evaluation of anemia. Her hemoglobin had dropped to 6.1. She had been on Eliquis since April after diagnosis of bilateral pulmonary embolus with right heart strain. This started with a right lower extremity DVT. She was treated with catheter thrombolysis. She had been doing well but became short of breath and was noted to be anemic when her workup moved to endoscopy. A large hepatic flexure lesion was identified. Distal tattoo was placed. No other polyps were identified. On biopsy, this was found to be adenocarcinoma. She is referred for surgical management. She has been receiving blood transfusions and iron replacement. She remains on Eliquis. She is followed by [facility]. She has also been seen by Dr. Dwight Zaidi for hematology workup.   She has no family history of colon cancer. She had not had a prior colonoscopy. She had not seen any blood per rectum. No significant findings were found at EGD. She is also being followed for a pancreatic tail lesion. This has been extensively evaluated including EUS with biopsy prior to her PE. This is felt to be a benign lesion and is being followed up. It is most likely to be considered a pseudocyst though CT at [facility] as part of her anemia workup felt it could represent a mucinous neoplasm. She has a history of open cholecystectomy in 1982 via subcostal incision. She reports a significant illness at that time. 10/10/18:  POD 1 s/p right hemicolectomy, pain controlled, has been up oob, K+3.3, H./H 9.0/30.3, AF, VSS, -flatus. [place] out. Past Medical History:   Diagnosis Date    Anemia 08/2018    Cancer of ascending colon ([facility] 2018    Diabetes ([facility]     insulin and oral agents; Last A1C 6.9 (8/29/2018); avg     Environmental allergies 9/12/2013    Thromboembolus ([facility] 04/2018    right leg     Past Surgical History:   Procedure Laterality Date    HX CHOLECYSTECTOMY  1980's    HX LIPOMA RESECTION Right 1980's    HX OTHER SURGICAL  09/27/2018    filter placed to right groin     Current Facility-Administered Medications   Medication Dose Route Frequency    potassium chloride (KLOR-CON) packet 40 mEq  40 mEq Oral TID WITH MEALS    scopolamine (TRANSDERM-SCOP) 1 mg over 3 days 1 Patch  1 Patch TransDERmal ONCE    lactated Ringers infusion  25 mL/hr IntraVENous CONTINUOUS    acetaminophen (TYLENOL) tablet 1,000 mg  1,000 mg Oral Q6H    alvimopan (ENTEREG) capsule 12 mg  12 mg Oral BID    celecoxib (CELEBREX) capsule 100 mg  100 mg Oral BID    naloxone (NARCAN) injection 0.4 mg  0.4 mg IntraVENous PRN    oxyCODONE IR (ROXICODONE) tablet 5 mg  5 mg Oral Q4H PRN    ondansetron (ZOFRAN ODT) tablet 4 mg  4 mg Oral Q4H PRN    diphenhydrAMINE (BENADRYL) capsule 25 mg  25 mg Oral Q6H PRN    HYDROmorphone (PF) (DILAUDID) injection 0.5 mg  0.5 mg IntraVENous Q4H PRN    gabapentin (NEURONTIN) capsule 300 mg  300 mg Oral TID    enoxaparin (LOVENOX) injection 40 mg  40 mg SubCUTAneous Q24H    insulin regular (NOVOLIN R, HUMULIN R) injection   SubCUTAneous AC&HS    lidocaine in D5W 4 mg/mL (0.4 %) infusion  1.5 mg/min IntraVENous CONTINUOUS    influenza vaccine 2018-19 (6 mos+)(PF) (FLUARIX QUAD/FLULAVAL QUAD) injection 0.5 mL  0.5 mL IntraMUSCular PRIOR TO DISCHARGE    famotidine (PF) (PEPCID) 20 mg in sodium chloride 0.9% 10 mL injection  20 mg IntraVENous Q12H     ALLERGIES:  Biaxin [clarithromycin] and Cortisone    Social History     Social History    Marital status: SINGLE     Spouse name: N/A    Number of children: N/A    Years of education: N/A     Social History Main Topics    Smoking status: Never Smoker    Smokeless tobacco: Never Used    Alcohol use No    Drug use: No    Sexual activity: Not Currently     Other Topics Concern    None     Social History Narrative    Pt lives alone. Her son lives in a house behind hers. She works as a  . She has one indoor dog. History   Smoking Status    Never Smoker   Smokeless Tobacco    Never Used     Family History   Problem Relation Age of Onset    No Known Problems Mother     Dementia Father     Heart Disease Father        ROS: The patient has no difficulty with chest pain or shortness of breath. No fever or chills. The patient denies any family history of abnormal clotting or bleeding. Comprehensive review of systems was otherwise unremarkable except as noted above. Physical Exam:   Constitutional: Alert oriented cooperative patient in no acute distress. Visit Vitals    /80 (BP 1 Location: Left arm, BP Patient Position: Sitting)    Pulse (!) 112    Resp 19    Ht 5' 1.5\" (1.562 m)    Wt 215 lb 8 oz (97.8 kg)    SpO2 99%    BMI 40.06 kg/m2       Eyes:Sclera are clear without icterus.    ENMT: no obvious neck masses, no ear or lip lesions  CV: RRR. Normal perfusion  Resp: No JVD. Breathing is  non-labored. GI: obese, well healed R subcostal scar, soft and non-distended, surgical dressings c/d/i    Musculoskeletal: unremarkable with normal function. Neuro:  No obvious focal deficits  Psychiatric: normal affect and mood, no memory impairment    Lab Results   Component Value Date/Time    WBC 9.1 10/10/2018 03:51 AM    HGB 9.0 (L) 10/10/2018 03:51 AM    HCT 30.3 (L) 10/10/2018 03:51 AM    PLATELET 673 30/10/3690 03:51 AM    MCV 82.8 10/10/2018 03:51 AM       Lab Results   Component Value Date/Time    Sodium 140 10/10/2018 03:51 AM    Potassium 3.3 (L) 10/10/2018 03:51 AM    Chloride 106 10/10/2018 03:51 AM    CO2 26 10/10/2018 03:51 AM    BUN 10 10/10/2018 03:51 AM    Creatinine 0.88 10/10/2018 03:51 AM    Glucose 121 (H) 10/10/2018 03:51 AM    INR 1.0 09/21/2018 09:28 AM    aPTT 28.1 09/21/2018 09:28 AM    Bilirubin, total 0.4 08/29/2018 04:30 PM    AST (SGOT) 18 08/29/2018 04:30 PM    Alk. phosphatase 75 08/29/2018 04:30 PM    Amylase 39 03/28/2018 02:55 PM    Lipase 104 03/28/2018 02:55 PM     Lab Results   Component Value Date/Time    ALT (SGPT) 18 08/29/2018 04:30 PM     Lab Results   Component Value Date/Time    CEA 0.5 03/27/2018 04:34 AM       CT CHEST ABDOMEN PELVIS: 09/11/2018  COMPARISON: None  HISTORY:  Iron deficiency anemia  TECHNIQUE:  Axial images were obtained through the chest abdomen pelvis using oral and intravenous contrast with a dose of 100 mL Omnipaque 350. [employer] Coronal re-formations were performed. MIP re-formations were also performed through the lungs. [employer] FINDINGS:   Questionable low density area in the right side of the thyroid may be artifactual.  Does appear to be smaller than 1.5 cm  No abnormalities seen in the lungs. Mediastinum is unremarkable. A 2 cm rounded node is present in the right axilla and there is extensive surrounding inflammatory fat stranding.   To other nodular densities present which may represent low density nodes versus small pockets of fluid or even developing abscess. There also appears to be some degree of myositis and adjacent chest wall muscles. Clinical correlation for symptoms in this area is recommended. Abdomen:  Liver and spleen are unremarkable. Gallbladder clips are noted. A 4.4 x 3.7 cm cystic area is present in the left upper quadrant. This abuts the superior aspect of the pancreatic tail and posterior wall of the stomach. It elevates and displaces the splenic artery and therefore most likely arises from the pancreas. There is a small peripheral calcification which is nodular in appearance. Overall the appearance is most suggestive of a mucinous cystic neoplasm. A nonobstructing calcification is seen in the right kidney. Kidneys are otherwise unremarkable. Both adrenals are within normal limits. There is dehiscence of the rectus sheath. No bowel findings seen. The appendix is normal.  In the pelvis no mass or adenopathy is seen. There is no free fluid. Considerable degenerative changes noted in the spine. IMPRESSION:  1. Considerable abnormality in the right axilla with lymphadenopathy, extensive inflammatory change and some nodular areas that could represent developing fluid collection or abscess. Clinical correlation is recommended. 2.  Cystic mass in the left upper quadrant which appears to arise from the pancreas and measures up to 4.4 cm. Please see above for description. 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 Further evaluation with MRI or surgical consultation is recommended      My review shows area in ascending colon consistent with apple core lesion    Assessment/Plan:     Kavin Lo is a 59 y.o. female who presents with biopsy confirmed adenocarcinoma in the region of the hepatic flexure. CT of the chest abdomen and pelvis is negative for metastatic disease.   The lesion was not identified by the evaluating radiologist, but on my review I can identify a lesion in the ascending colon that is consistent with the colon mass. This was confirmed with our radiologist.  This provides additional comfort for localization at the time of surgery. I was able to review these images after her visit and this provides more clarity that her resection will be a right hemicolectomy. Unfortunately, she has significant history of thromboembolic disease. Her PE and DVT from this past spring are being treated with Eliquis. [facility] would like to maintain anticoagulation with Lovenox bridging preoperatively and resumption of anticoagulation as soon as possible postoperatively. On 9/27 she had placement of preoperative IVC filter. We discussed proceeding with laparoscopic possible open right hemicolectomy with anastomosis. We will enroll her in our ERAS protocol which will begin with some pre-rehabilitation. I will not do a full bowel prep. I will have her on liquids for the 2 days prior to surgery and will prescribe oral antibiotics on the day prior to surgery. Pulmonary pulmonary will set up her anticoagulation bridging. We discussed her increased morbidities related to her obesity, diabetes, anticoagulation needs, and history of DVT/PE. We discussed in light of those morbidities that we would not address any biopsies of her pancreatic lesion. She also requests no additional biopsy of the lesion. She also requests no further evaluation of her right axilla as it has been stable for greater than 20 years. I discussed the patient's condition and treatment options with the patient. I discussed risks of surgery in language the patient could understand including bleeding, infection, need for further surgery, abscess, fistula, SBO, DVT, PE, heart attack, stroke, renal failure, respiratory failure, ventilatory dependence, and death. The patient voiced understanding of all this and all questions were answered. Alternatives to surgery were discussed also and risks of the alternatives. The patient requested that we proceed with surgery. Informed consent was obtained.        Problem List  Date Reviewed: 9/28/2018          Codes Class Noted    * (Principal)Primary adenocarcinoma of ascending colon ([facility] ICD-10-CM: C18.2  ICD-9-CM: 153.6  10/9/2018        Malignant neoplasm of ascending colon ([facility] ICD-10-CM: C18.2  ICD-9-CM: 153.6  9/28/2018        Current use of anticoagulant therapy ICD-10-CM: Z79.01  ICD-9-CM: V58.61  9/18/2018        Obesity, morbid ([facility] ICD-10-CM: E66.01  ICD-9-CM: 278.01  4/5/2018        Controlled type 2 diabetes mellitus without complication, with long-term current use of insulin ([facility] ICD-10-CM: E11.9, Z79.4  ICD-9-CM: 250.00, V58.67  3/26/2018        PE (pulmonary thromboembolism) ([facility] ICD-10-CM: I26.99  ICD-9-CM: 415.19  3/26/2018        Axillary mass, right ICD-10-CM: R22.31  ICD-9-CM: 782.2  3/26/2018        Pancreatic mass ICD-10-CM: K86.9  ICD-9-CM: 577.9  3/26/2018        Type 2 diabetes mellitus with diabetic neuropathy ([facility] ICD-10-CM: E11.40  ICD-9-CM: 250.60, 357.2  2/21/2018        Bilateral low back pain with sciatica ICD-10-CM: M54.40  ICD-9-CM: 724.3  10/23/2017        Environmental allergies ICD-10-CM: Z91.09  ICD-9-CM: V15.09  9/12/2013            Ambulate 50 ft TID  OOB for all meals  Daily weight  D/C kyle  SCD/I.S./Pepcid/Lovenox  Diet Clear liquids  Nutritional supplements  CBC, BMP in AM  Mag/Phos in AM  [email or web address]  Scheduled Tylenol/Celebrex/Neurontin  Toradol every 6 hours for 24 hours  Entereg daily for 7 days  PRN narcotics (use sparingly)  Lidocaine per protocol  Nausea control  Replace K+      Laquita Howell, NP

## 2018-10-10 NOTE — PROGRESS NOTES
Spoke to Ms. Yadi Mayorga in room 222 about Case Management and discharge planning. She lives in [place], [place] by herself, but her son lives behind her and her mother next door. Ms. Yadi Mayorga was independent with ADLs, and works at [employer], with plans to return to work later November 2018. No discharge needs anticipated. Care Management Interventions  Plan discussed with Pt/Family/Caregiver:  Yes

## 2018-10-10 NOTE — PROGRESS NOTES
Pt assisted to BR. Very lightheaded and dizzy. Voided 100cc of dark yellow urine. BP= 126/83 sitting 125/80 standing. BS= 141. Lidocaine drip stopped. Denies any other symptoms. Dr Gage Ross called.   Lidocaine drip d/c'd and LR bolus ordered

## 2018-10-10 NOTE — PROGRESS NOTES
Pt experiencing no sign of Lidocaine toxicity. Lidocaine dosage checked and is set at 1.5mg/kg.   Pt remains up in recliner with no needs at this time

## 2018-10-10 NOTE — PROGRESS NOTES
ERAS follow up:  Patient sitting up in chair drinking clear liquids. A & O x 3. Denies any pain or nausea. States she walked in her room several times and was up to chair twice last night.

## 2018-10-10 NOTE — PROGRESS NOTES
Reassessment. Eating lunch and tolerating well. Has not voided since catheter removed. Informed we would get her up to the BR by 1330 to attempt to void.   Verbalized understanding

## 2018-10-10 NOTE — PROGRESS NOTES
Up to ambulate in hallway. Very unsteady on feet. Spoke with Arthur Servin NP. PT ordered and scopolamine patch removed

## 2018-10-10 NOTE — PROGRESS NOTES
After spending some time in conversation with pt, pt noted to have some situational confusion. Remains up in chair. Call light within reach. No needs at this time.   Instructed to call with needs

## 2018-10-10 NOTE — PROGRESS NOTES
Assisted up from bed to sit in recliner without difficulty. Denies any symptoms of Lidocaine toxicity. Call light within reach.

## 2018-10-10 NOTE — PROGRESS NOTES
Updated by primary RN  Patient with continued dizziness. Lidocaine gtt stopped and Lr bolus ordered by anesthesia. I stopped scop patch this AM.  Will stop Neurontin to see if symptoms improve. VSS.     Melissa Chowdary NP

## 2018-10-11 LAB
ANION GAP SERPL CALC-SCNC: 8 MMOL/L (ref 7–16)
BUN SERPL-MCNC: 7 MG/DL (ref 8–23)
CALCIUM SERPL-MCNC: 8.6 MG/DL (ref 8.3–10.4)
CHLORIDE SERPL-SCNC: 111 MMOL/L (ref 98–107)
CO2 SERPL-SCNC: 24 MMOL/L (ref 21–32)
CREAT SERPL-MCNC: 0.64 MG/DL (ref 0.6–1)
GLUCOSE BLD STRIP.AUTO-MCNC: 131 MG/DL (ref 65–100)
GLUCOSE BLD STRIP.AUTO-MCNC: 139 MG/DL (ref 65–100)
GLUCOSE BLD STRIP.AUTO-MCNC: 93 MG/DL (ref 65–100)
GLUCOSE BLD STRIP.AUTO-MCNC: 99 MG/DL (ref 65–100)
GLUCOSE SERPL-MCNC: 113 MG/DL (ref 65–100)
HCT VFR BLD AUTO: 29.7 % (ref 35.8–46.3)
HGB BLD-MCNC: 8.9 G/DL (ref 11.7–15.4)
MAGNESIUM SERPL-MCNC: 2.3 MG/DL (ref 1.8–2.4)
MCH RBC QN AUTO: 24.9 PG (ref 26.1–32.9)
MCHC RBC AUTO-ENTMCNC: 30 G/DL (ref 31.4–35)
MCV RBC AUTO: 83 FL (ref 79.6–97.8)
NRBC # BLD: 0 K/UL (ref 0–0.2)
PHOSPHATE SERPL-MCNC: 2.6 MG/DL (ref 2.3–3.7)
PLATELET # BLD AUTO: 264 K/UL (ref 150–450)
PMV BLD AUTO: 10 FL (ref 9.4–12.3)
POTASSIUM SERPL-SCNC: 4 MMOL/L (ref 3.5–5.1)
RBC # BLD AUTO: 3.58 M/UL (ref 4.05–5.2)
SODIUM SERPL-SCNC: 143 MMOL/L (ref 136–145)
WBC # BLD AUTO: 7.6 K/UL (ref 4.3–11.1)

## 2018-10-11 PROCEDURE — 82962 GLUCOSE BLOOD TEST: CPT

## 2018-10-11 PROCEDURE — 83735 ASSAY OF MAGNESIUM: CPT

## 2018-10-11 PROCEDURE — 74011250637 HC RX REV CODE- 250/637: Performed by: SURGERY

## 2018-10-11 PROCEDURE — 65270000029 HC RM PRIVATE

## 2018-10-11 PROCEDURE — 97161 PT EVAL LOW COMPLEX 20 MIN: CPT

## 2018-10-11 PROCEDURE — 74011250636 HC RX REV CODE- 250/636: Performed by: SURGERY

## 2018-10-11 PROCEDURE — 80048 BASIC METABOLIC PNL TOTAL CA: CPT

## 2018-10-11 PROCEDURE — 74011000250 HC RX REV CODE- 250: Performed by: SURGERY

## 2018-10-11 PROCEDURE — 84100 ASSAY OF PHOSPHORUS: CPT

## 2018-10-11 PROCEDURE — 85027 COMPLETE CBC AUTOMATED: CPT

## 2018-10-11 PROCEDURE — 36415 COLL VENOUS BLD VENIPUNCTURE: CPT

## 2018-10-11 RX ADMIN — ACETAMINOPHEN 1000 MG: 500 TABLET, FILM COATED ORAL at 07:33

## 2018-10-11 RX ADMIN — FAMOTIDINE 20 MG: 10 INJECTION, SOLUTION INTRAVENOUS at 08:19

## 2018-10-11 RX ADMIN — ACETAMINOPHEN 1000 MG: 500 TABLET, FILM COATED ORAL at 01:12

## 2018-10-11 RX ADMIN — CELECOXIB 100 MG: 100 CAPSULE ORAL at 17:24

## 2018-10-11 RX ADMIN — FAMOTIDINE 20 MG: 10 INJECTION, SOLUTION INTRAVENOUS at 22:39

## 2018-10-11 RX ADMIN — ALVIMOPAN 12 MG: 12 CAPSULE ORAL at 12:25

## 2018-10-11 RX ADMIN — ALVIMOPAN 12 MG: 12 CAPSULE ORAL at 17:24

## 2018-10-11 RX ADMIN — ENOXAPARIN SODIUM 40 MG: 40 INJECTION, SOLUTION INTRAVENOUS; SUBCUTANEOUS at 11:39

## 2018-10-11 RX ADMIN — CELECOXIB 100 MG: 100 CAPSULE ORAL at 08:18

## 2018-10-11 NOTE — PROGRESS NOTES
Problem: Falls - Risk of  Goal: *Absence of Falls  Document Tamiko Fall Risk and appropriate interventions in the flowsheet.    Outcome: Progressing Towards Goal  Fall Risk Interventions:  Mobility Interventions: Communicate number of staff needed for ambulation/transfer, Patient to call before getting OOB         Medication Interventions: Patient to call before getting OOB, Teach patient to arise slowly    Elimination Interventions: Call light in reach, Patient to call for help with toileting needs

## 2018-10-11 NOTE — PROGRESS NOTES
END OF SHIFT NOTE:    No confusion during night, no dizziness nor unsteady gait. No signs of lidocaine toxicity    INTAKE/OUTPUT  10/10 0701 - 10/11 0700  In: 490 [P.O.:490]  Out: 2050 [Urine:2050]  Voiding: YES  Catheter: NO  Drain:              Flatus: Patient does have flatus present. Stool:  0 occurrences. Characteristics:       Emesis: 0 occurrences. Characteristics:        VITAL SIGNS  Patient Vitals for the past 12 hrs:   Temp Pulse Resp BP SpO2   10/11/18 0353 97.7 °F (36.5 °C) 74 17 104/67 96 %   10/10/18 2351 97.6 °F (36.4 °C) 76 17 118/63 99 %   10/10/18 1928 97.6 °F (36.4 °C) 98 17 133/51 98 %       Pain Assessment  Pain Intensity 1: 0 (10/11/18 0115)     Pain Intervention(s) 1: Emotional support  Patient Stated Pain Goal: 0    Ambulating  Yes    Shift report given to oncoming nurse at the bedside.     Pushpa Barbosa RN

## 2018-10-11 NOTE — PROGRESS NOTES
[facility]   3 . [address]  [phone]    H&P Note   Kirit Ewing   MRN: [id number]     : 1960        HPI: Kirit Ewing is a 59 y.o. female who returns the office for continued planning for her newly diagnosed right-sided colon cancer. Since her last visit, she was evaluated by pulmonary who would like to keep her anticoagulated. She will have her Eliquis held an appropriate time preoperatively and maintained on a Lovenox bridge until the day prior to surgery. She was also set up for vena cava filter which was placed on . She did well with this procedure. Review of her CT scan with radiology confirmed my finding of the mass being visible on CT though not mentioned in the report from [address]. The mass is in the mid a sending colon. This will be amenable to right hemicolectomy. She does have a history of open cholecystectomy via right subcostal incision. Her right leg swelling has almost completely resolved. She was referred by Dr. Mirtha Greene for hepatic flexure colon cancer. Patient had an EGD and colonoscopy on 9/10/2018 for evaluation of anemia. Her hemoglobin had dropped to 6.1. She had been on Eliquis since April after diagnosis of bilateral pulmonary embolus with right heart strain. This started with a right lower extremity DVT. She was treated with catheter thrombolysis. She had been doing well but became short of breath and was noted to be anemic when her workup moved to endoscopy. A large hepatic flexure lesion was identified. Distal tattoo was placed. No other polyps were identified. On biopsy, this was found to be adenocarcinoma. She is referred for surgical management. She has been receiving blood transfusions and iron replacement. She remains on Eliquis. She is followed by [facility]. She has also been seen by Dr. Alex Griffin for hematology workup.   She has no family history of colon cancer. She had not had a prior colonoscopy. She had not seen any blood per rectum. No significant findings were found at EGD. She is also being followed for a pancreatic tail lesion. This has been extensively evaluated including EUS with biopsy prior to her PE. This is felt to be a benign lesion and is being followed up. It is most likely to be considered a pseudocyst though CT at [facility] as part of her anemia workup felt it could represent a mucinous neoplasm. She has a history of open cholecystectomy in 1982 via subcostal incision. She reports a significant illness at that time. 10/10/18:  POD 1 s/p right hemicolectomy, pain controlled, has been up oob, K+3.3, H./H 9.0/30.3, AF, VSS, -flatus. [place] out. 10/11/18:  POD 2 s/p right hemicolectomy, pain controlled, has been up oob, labs stable, AF, VSS, +flatus. Voiding without difficulty. Past Medical History:   Diagnosis Date    Anemia 08/2018    Cancer of ascending colon ([address] 2018    Diabetes ([address]     insulin and oral agents; Last A1C 6.9 (8/29/2018); avg     Environmental allergies 9/12/2013    [facility]emb[facility] 04/2018    right leg     Past Surgical History:   Procedure Laterality Date    HX CHOLECYSTECTOMY  1980's    HX LIPOMA RESECTION Right 1980's    HX OTHER SURGICAL  09/27/2018    filter placed to right groin     Current Facility-Administered Medications   Medication Dose Route Frequency    scopolamine (TRANSDERM-SCOP) 1 mg over 3 days 1 Patch  1 Patch TransDERmal ONCE    acetaminophen (TYLENOL) tablet 1,000 mg  1,000 mg Oral Q6H    alvimopan (ENTEREG) capsule 12 mg  12 mg Oral BID    celecoxib (CELEBREX) capsule 100 mg  100 mg Oral BID    naloxone (NARCAN) injection 0.4 mg  0.4 mg IntraVENous PRN    oxyCODONE IR (ROXICODONE) tablet 5 mg  5 mg Oral Q4H PRN    ondansetron (ZOFRAN ODT) tablet 4 mg  4 mg Oral Q4H PRN    diphenhydrAMINE (BENADRYL) capsule 25 mg  25 mg Oral Q6H PRN    HYDROmorphone (PF) (DILAUDID) injection 0.5 mg  0.5 mg IntraVENous Q4H PRN    enoxaparin (LOVENOX) injection 40 mg  40 mg SubCUTAneous Q24H    insulin regular (NOVOLIN R, HUMULIN R) injection   SubCUTAneous AC&HS    influenza vaccine 2018-19 (6 mos+)(PF) (FLUARIX QUAD/FLULAVAL QUAD) injection 0.5 mL  0.5 mL IntraMUSCular PRIOR TO DISCHARGE    famotidine (PF) (PEPCID) 20 mg in sodium chloride 0.9% 10 mL injection  20 mg IntraVENous Q12H     ALLERGIES:  Biaxin [clarithromycin] and Cortisone    Social History     Social History    Marital status: SINGLE     Spouse name: N/A    Number of children: N/A    Years of education: N/A     Social History Main Topics    Smoking status: Never Smoker    Smokeless tobacco: Never Used    Alcohol use No    Drug use: No    Sexual activity: Not Currently     Other Topics Concern    None     Social History Narrative    Pt lives alone. Her son lives in a house behind hers. She works as a  . She has one indoor dog. History   Smoking Status    Never Smoker   Smokeless Tobacco    Never Used     Family History   Problem Relation Age of Onset    No Known Problems Mother     Dementia Father     Heart Disease Father        ROS: The patient has no difficulty with chest pain or shortness of breath. No fever or chills. The patient denies any family history of abnormal clotting or bleeding. Comprehensive review of systems was otherwise unremarkable except as noted above. Physical Exam:   Constitutional: Alert oriented cooperative patient in no acute distress. Visit Vitals    /80 (BP 1 Location: Left arm, BP Patient Position: Sitting)    Pulse (!) 112    Resp 19    Ht 5' 1.5\" (1.562 m)    Wt 215 lb 8 oz (97.8 kg)    SpO2 99%    BMI 40.06 kg/m2       Eyes:Sclera are clear without icterus. ENMT: no obvious neck masses, no ear or lip lesions  CV: RRR. Normal perfusion  Resp: No JVD. Breathing is  non-labored.     GI: obese, well healed R subcostal scar, soft and non-distended, surgical dressings c/d/i    Musculoskeletal: unremarkable with normal function. Neuro:  No obvious focal deficits  Psychiatric: normal affect and mood, no memory impairment    Lab Results   Component Value Date/Time    WBC 7.6 10/11/2018 04:30 AM    HGB 8.9 (L) 10/11/2018 04:30 AM    HCT 29.7 (L) 10/11/2018 04:30 AM    PLATELET 268 62/23/8407 04:30 AM    MCV 83.0 10/11/2018 04:30 AM       Lab Results   Component Value Date/Time    Sodium 143 10/11/2018 04:30 AM    Potassium 4.0 10/11/2018 04:30 AM    Chloride 111 (H) 10/11/2018 04:30 AM    CO2 24 10/11/2018 04:30 AM    BUN 7 (L) 10/11/2018 04:30 AM    Creatinine 0.64 10/11/2018 04:30 AM    Glucose 113 (H) 10/11/2018 04:30 AM    INR 1.0 09/21/2018 09:28 AM    aPTT 28.1 09/21/2018 09:28 AM    Bilirubin, total 0.4 08/29/2018 04:30 PM    AST (SGOT) 18 08/29/2018 04:30 PM    Alk. phosphatase 75 08/29/2018 04:30 PM    Amylase 39 03/28/2018 02:55 PM    Lipase 104 03/28/2018 02:55 PM     Lab Results   Component Value Date/Time    ALT (SGPT) 18 08/29/2018 04:30 PM     Lab Results   Component Value Date/Time    CEA 0.5 03/27/2018 04:34 AM       CT CHEST ABDOMEN PELVIS: 09/11/2018  COMPARISON: None  HISTORY:  Iron deficiency anemia  TECHNIQUE:  Axial images were obtained through the chest abdomen pelvis using oral and intravenous contrast with a dose of 100 mL Omnipaque 350. Isaac Quiet Coronal re-formations were performed. MIP re-formations were also performed through the lungs. Isaac Quiet FINDINGS:   Questionable low density area in the right side of the thyroid may be artifactual.  Does appear to be smaller than 1.5 cm  No abnormalities seen in the lungs. Mediastinum is unremarkable. A 2 cm rounded node is present in the right axilla and there is extensive surrounding inflammatory fat stranding. To other nodular densities present which may represent low density nodes versus small pockets of fluid or even developing abscess.   There also appears to be some degree of myositis and adjacent chest wall muscles. Clinical correlation for symptoms in this area is recommended. Abdomen:  Liver and spleen are unremarkable. Gallbladder clips are noted. A 4.4 x 3.7 cm cystic area is present in the left upper quadrant. This abuts the superior aspect of the pancreatic tail and posterior wall of the stomach. It elevates and displaces the splenic artery and therefore most likely arises from the pancreas. There is a small peripheral calcification which is nodular in appearance. Overall the appearance is most suggestive of a mucinous cystic neoplasm. A nonobstructing calcification is seen in the right kidney. Kidneys are otherwise unremarkable. Both adrenals are within normal limits. There is dehiscence of the rectus sheath. No bowel findings seen. The appendix is normal.  In the pelvis no mass or adenopathy is seen. There is no free fluid. Considerable degenerative changes noted in the spine. IMPRESSION:  1. Considerable abnormality in the right axilla with lymphadenopathy, extensive inflammatory change and some nodular areas that could represent developing fluid collection or abscess. Clinical correlation is recommended. 2.  Cystic mass in the left upper quadrant which appears to arise from the pancreas and measures up to 4.4 cm. Please see above for description. 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 Further evaluation with MRI or surgical consultation is recommended      My review shows area in ascending colon consistent with apple core lesion    Assessment/Plan:     Basim Benitez is a 59 y.o. female who presents with biopsy confirmed adenocarcinoma in the region of the hepatic flexure. CT of the chest abdomen and pelvis is negative for metastatic disease. The lesion was not identified by the evaluating radiologist, but on my review I can identify a lesion in the ascending colon that is consistent with the colon mass.   This was confirmed with our radiologist.  This provides additional comfort for localization at the time of surgery. I was able to review these images after her visit and this provides more clarity that her resection will be a right hemicolectomy. Unfortunately, she has significant history of thromboembolic disease. Her PE and DVT from this past spring are being treated with Eliquis. [facility] would like to maintain anticoagulation with Lovenox bridging preoperatively and resumption of anticoagulation as soon as possible postoperatively. On 9/27 she had placement of preoperative IVC filter. We discussed proceeding with laparoscopic possible open right hemicolectomy with anastomosis. We will enroll her in our ERAS protocol which will begin with some pre-rehabilitation. I will not do a full bowel prep. I will have her on liquids for the 2 days prior to surgery and will prescribe oral antibiotics on the day prior to surgery. Pulmonary pulmonary will set up her anticoagulation bridging. We discussed her increased morbidities related to her obesity, diabetes, anticoagulation needs, and history of DVT/PE. We discussed in light of those morbidities that we would not address any biopsies of her pancreatic lesion. She also requests no additional biopsy of the lesion. She also requests no further evaluation of her right axilla as it has been stable for greater than 20 years. I discussed the patient's condition and treatment options with the patient. I discussed risks of surgery in language the patient could understand including bleeding, infection, need for further surgery, abscess, fistula, SBO, DVT, PE, heart attack, stroke, renal failure, respiratory failure, ventilatory dependence, and death. The patient voiced understanding of all this and all questions were answered. Alternatives to surgery were discussed also and risks of the alternatives. The patient requested that we proceed with surgery.   Informed consent was obtained.        Problem List  Date Reviewed: 9/28/2018          Codes Class Noted    * (Principal)Primary adenocarcinoma of ascending colon ([place] [id number] ICD-10-CM: C18.2  ICD-9-CM: 153.6  10/9/2018        Malignant neoplasm of ascending colon ([place] [id number] ICD-10-CM: C18.2  ICD-9-CM: 153.6  9/28/2018        Current use of anticoagulant therapy ICD-10-CM: Z79.01  ICD-9-CM: V58.61  9/18/2018        Obesity, morbid ([place] [id number] ICD-10-CM: E66.01  ICD-9-CM: 278.01  4/5/2018        Controlled type 2 diabetes mellitus without complication, with long-term current use of insulin ([place] [id number] ICD-10-CM: E11.9, Z79.4  ICD-9-CM: 250.00, V58.67  3/26/2018        PE (pulmonary thromboembolism) ([place] [id number] ICD-10-CM: I26.99  ICD-9-CM: 415.19  3/26/2018        Axillary mass, right ICD-10-CM: R22.31  ICD-9-CM: 782.2  3/26/2018        Pancreatic mass ICD-10-CM: K86.9  ICD-9-CM: 577.9  3/26/2018        Type 2 diabetes mellitus with diabetic neuropathy ([place] [id number] ICD-10-CM: E11.40  ICD-9-CM: 250.60, 357.2  2/21/2018        Bilateral low back pain with sciatica ICD-10-CM: M54.40  ICD-9-CM: 724.3  10/23/2017        Environmental allergies ICD-10-CM: Z91.09  ICD-9-CM: V15.09  9/12/2013            Ambulate 50 ft TID  OOB for all meals  Daily weight  D/C kyle  SCD/I.S./Pepcid/Lovenox  Diet Clear liquids  Nutritional supplements  CBC, BMP in AM  Mag/Phos in AM  [email or web address]  Scheduled Tylenol/Celebrex  Neurontin stopped  Toradol every 6 hours for 24 hours  Entereg daily for 7 days  PRN narcotics (use sparingly)  Lidocaine is off  Nausea control  Stop Scopolamine patch      Jeff Callejas, NP

## 2018-10-12 VITALS
OXYGEN SATURATION: 98 % | BODY MASS INDEX: 36.43 KG/M2 | HEIGHT: 64 IN | SYSTOLIC BLOOD PRESSURE: 120 MMHG | WEIGHT: 213.38 LBS | TEMPERATURE: 97.7 F | DIASTOLIC BLOOD PRESSURE: 77 MMHG | HEART RATE: 101 BPM | RESPIRATION RATE: 18 BRPM

## 2018-10-12 LAB
ANION GAP SERPL CALC-SCNC: 8 MMOL/L (ref 7–16)
BUN SERPL-MCNC: 5 MG/DL (ref 8–23)
CALCIUM SERPL-MCNC: 8.3 MG/DL (ref 8.3–10.4)
CHLORIDE SERPL-SCNC: 112 MMOL/L (ref 98–107)
CO2 SERPL-SCNC: 25 MMOL/L (ref 21–32)
CREAT SERPL-MCNC: 0.53 MG/DL (ref 0.6–1)
GLUCOSE BLD STRIP.AUTO-MCNC: 137 MG/DL (ref 65–100)
GLUCOSE BLD STRIP.AUTO-MCNC: 144 MG/DL (ref 65–100)
GLUCOSE SERPL-MCNC: 111 MG/DL (ref 65–100)
HCT VFR BLD AUTO: 30.7 % (ref 35.8–46.3)
HGB BLD-MCNC: 9.2 G/DL (ref 11.7–15.4)
MCH RBC QN AUTO: 24.9 PG (ref 26.1–32.9)
MCHC RBC AUTO-ENTMCNC: 30 G/DL (ref 31.4–35)
MCV RBC AUTO: 83.2 FL (ref 79.6–97.8)
NRBC # BLD: 0 K/UL (ref 0–0.2)
PLATELET # BLD AUTO: 267 K/UL (ref 150–450)
PMV BLD AUTO: 9.8 FL (ref 9.4–12.3)
POTASSIUM SERPL-SCNC: 3.6 MMOL/L (ref 3.5–5.1)
RBC # BLD AUTO: 3.69 M/UL (ref 4.05–5.2)
SODIUM SERPL-SCNC: 145 MMOL/L (ref 136–145)
WBC # BLD AUTO: 6.1 K/UL (ref 4.3–11.1)

## 2018-10-12 PROCEDURE — 82962 GLUCOSE BLOOD TEST: CPT

## 2018-10-12 PROCEDURE — 85027 COMPLETE CBC AUTOMATED: CPT

## 2018-10-12 PROCEDURE — 36415 COLL VENOUS BLD VENIPUNCTURE: CPT

## 2018-10-12 PROCEDURE — 74011250636 HC RX REV CODE- 250/636: Performed by: SURGERY

## 2018-10-12 PROCEDURE — 80048 BASIC METABOLIC PNL TOTAL CA: CPT

## 2018-10-12 PROCEDURE — 74011250637 HC RX REV CODE- 250/637: Performed by: SURGERY

## 2018-10-12 PROCEDURE — 74011000250 HC RX REV CODE- 250: Performed by: SURGERY

## 2018-10-12 RX ORDER — CELECOXIB 100 MG/1
100 CAPSULE ORAL 2 TIMES DAILY
Qty: 28 CAP | Refills: 0 | Status: SHIPPED | OUTPATIENT
Start: 2018-10-12 | End: 2018-10-26

## 2018-10-12 RX ORDER — ENOXAPARIN SODIUM 100 MG/ML
40 INJECTION SUBCUTANEOUS EVERY 24 HOURS
Qty: 5 SYRINGE | Refills: 0 | Status: SHIPPED | OUTPATIENT
Start: 2018-10-12 | End: 2018-10-17

## 2018-10-12 RX ADMIN — CELECOXIB 100 MG: 100 CAPSULE ORAL at 08:22

## 2018-10-12 RX ADMIN — ALVIMOPAN 12 MG: 12 CAPSULE ORAL at 08:22

## 2018-10-12 RX ADMIN — FAMOTIDINE 20 MG: 10 INJECTION, SOLUTION INTRAVENOUS at 08:22

## 2018-10-12 RX ADMIN — ENOXAPARIN SODIUM 40 MG: 40 INJECTION, SOLUTION INTRAVENOUS; SUBCUTANEOUS at 10:54

## 2018-10-12 NOTE — DISCHARGE SUMMARY
Physician Discharge Summary     Patient ID:  Lisandro Munguia  [id number]  59 y.o.  1960    Allergies: Biaxin [clarithromycin] and Cortisone    HPI: Lisandro Munguia is a 59 y.o. female who returns the office for continued planning for her newly diagnosed right-sided colon cancer. Since her last visit, she was evaluated by pulmonary who would like to keep her anticoagulated. She will have her Eliquis held an appropriate time preoperatively and maintained on a Lovenox bridge until the day prior to surgery. She was also set up for vena cava filter which was placed on 9/27. She did well with this procedure. Review of her CT scan with radiology confirmed my finding of the mass being visible on CT though not mentioned in the report from [facility]. The mass is in the mid a sending colon. This will be amenable to right hemicolectomy. She does have a history of open cholecystectomy via right subcostal incision. Her right leg swelling has almost completely resolved.     She was referred by Dr. Indigo Newton for hepatic flexure colon cancer. Patient had an EGD and colonoscopy on 9/10/2018 for evaluation of anemia. Her hemoglobin had dropped to 6.1. She had been on Eliquis since April after diagnosis of bilateral pulmonary embolus with right heart strain. This started with a right lower extremity DVT. She was treated with catheter thrombolysis. She had been doing well but became short of breath and was noted to be anemic when her workup moved to endoscopy. A large hepatic flexure lesion was identified. Distal tattoo was placed. No other polyps were identified. On biopsy, this was found to be adenocarcinoma. She is referred for surgical management. She has been receiving blood transfusions and iron replacement. She remains on Eliquis. She is followed by [facility]. She has also been seen by Dr. Gloria Barroso for hematology workup. She has no family history of colon cancer.   She had not had a prior colonoscopy. She had not seen any blood per rectum. No significant findings were found at EGD. She is also being followed for a pancreatic tail lesion. This has been extensively evaluated including EUS with biopsy prior to her PE. This is felt to be a benign lesion and is being followed up. It is most likely to be considered a pseudocyst though CT at [facility] as part of her anemia workup felt it could represent a mucinous neoplasm. Admit Date: 10/9/2018    Discharge Date: 10/12/2018    * Admission Diagnoses: Cancer of ascending colon ([facility] [C18.2]    * Discharge Diagnoses:    Hospital Problems as of 10/12/2018  Date Reviewed: 9/28/2018          Codes Class Noted - Resolved POA    * (Principal)Primary adenocarcinoma of ascending colon ([facility] ICD-10-CM: C18.2  ICD-9-CM: 153.6  10/9/2018 - Present Yes        Malignant neoplasm of ascending colon ([facility] ICD-10-CM: C18.2  ICD-9-CM: 153.6  9/28/2018 - Present Yes        Current use of anticoagulant therapy ICD-10-CM: Z79.01  ICD-9-CM: V58.61  9/18/2018 - Present Yes        Type 2 diabetes mellitus with diabetic neuropathy ([facility] ICD-10-CM: E11.40  ICD-9-CM: 250.60, 357.2  2/21/2018 - Present Yes               Admission Condition: Stable    * Discharge Condition: good    * Procedures: Procedure(s):  LAPAROSCOPIC RIGHT KENN COLECTOMY    * Hospital Course:   Normal hospital course for this procedure. Consults: None    Significant Diagnostic Studies: labs and radiology    * Disposition: Home    Discharge Medications:   Current Discharge Medication List      START taking these medications    Details   celecoxib (CELEBREX) 100 mg capsule Take 1 Cap by mouth two (2) times a day for 14 days. Qty: 28 Cap, Refills: 0         CONTINUE these medications which have CHANGED    Details   enoxaparin (LOVENOX) 40 mg/0.4 mL 0.4 mL by SubCUTAneous route every twenty-four (24) hours for 5 days.   Qty: 5 Syringe, Refills: 0         CONTINUE these medications which have NOT CHANGED Details   metoclopramide HCl (REGLAN) 10 mg tablet 1 tab by mouth at 11AM, 5PM and 10PM on day prior to surgery  Indications: ERAS  Qty: 3 Tab, Refills: 0      ascorbic acid, vitamin C, 500 mg cpER Take 500 mg by mouth daily. ferrous sulfate (IRON) 325 mg (65 mg iron) EC tablet Take 65 mg by mouth two (2) times a day. apixaban (ELIQUIS) 5 mg tablet Take 1 Tab by mouth two (2) times a day. Then begin 5mg twice a day on 4/3/18 with evening dose. Qty: 60 Tab, Refills: 11      insulin glargine (LANTUS SOLOSTAR U-100 INSULIN) 100 unit/mL (3 mL) inpn 24 u qhs  Indications: type 2 diabetes mellitus  Qty: 10 Pen, Refills: 1    Associated Diagnoses: Controlled type 2 diabetes mellitus without complication, with long-term current use of insulin ([facility])      metFORMIN (GLUCOPHAGE) 500 mg tablet Take 1 Tab by mouth two (2) times daily (with meals). Qty: 60 Tab, Refills: 5    Associated Diagnoses: Controlled type 2 diabetes mellitus without complication, with long-term current use of insulin ([facility])      glucose blood VI test strips (ACCU-CHEK LILLY PLUS TEST STRP) strip Use twice daily for management of Type 2 DM, Uncontrolled (250.02)  Qty: 200 Strip, Refills: 3    Associated Diagnoses: Controlled type 2 diabetes mellitus without complication, with long-term current use of insulin ([facility])      Lancets (ACCU-CHEK SOFTCLIX LANCETS) misc Use twice daily for management of Type 2 DM uncontrolled (250.02)  Qty: 200 Each, Refills: 3    Associated Diagnoses: Controlled type 2 diabetes mellitus without complication, with long-term current use of insulin ([facility])      Insulin Needles, Disposable, 32 gauge x 1/4\" ndle 1 Each by Does Not Apply route daily. Qty: 100 Pen Needle, Refills: 3    Associated Diagnoses: Controlled type 2 diabetes mellitus without complication, with long-term current use of insulin ([facility]  Type 2 diabetes mellitus without complication, with long-term current use of insulin ([facility])      Blood-Glucose Meter (ACCU-CHEK LILLY PLUS METER) misc 1 Kit by Does Not Apply route two (2) times a day. For management of Type 2 DM Uncontrolled (250.02)  Qty: 1 Each, Refills: 3    Associated Diagnoses: Type II or unspecified type diabetes mellitus without mention of complication, uncontrolled      ACETAMINOPHEN (TYLENOL 8 HOUR PO) Take  by mouth. * Follow-up Care/Patient Instructions: Activity: Activity as tolerated, no heavy lifting  Diet: Soft Diet  Wound Care: Keep wound clean and dry    Follow-up Information     Follow up With Details Comments Allison Cody MD   [id number] [facility]  [phone]          Discharge Instructions/Follow-up Plans:   MD Instructions:     Follow-up with Dr. Ruby Rodriguez in office on 10/26  Keep incisions clean and dry, may remain uncovered. Do not apply lotions, creams or ointments to incisions.     Diet - as tolerated - Soft foods diet. Activity - ambulate - as tolerated - no heavy lifting >10lb. May shower - no tub baths or soaking/submerging.     Lovenox 40 mg daily - Last dose on 10/16/18  Resume Eliquis, Vit C, and Iron 10/17/18  Follow up in office with Dr. Ruby Rodriguez 10/26/18.   Celebrex and Tylenol for pain    Resume other home medications.      If problems or questions arise, please call our office at [phone].     Greater than 30 minutes were spent discharging the patient          Signed:  SUDHIR Hull  10/12/2018  9:08 AM

## 2018-10-12 NOTE — OP NOTES
MARCOlee annmelinda 33, [address] [facility]  [phone]    OPERATVE REPORT    Name: Harsh Soliman     Date of Surgery: 10/9/2018  Med Record Number: [id number]   Age: 59 y.o. Sex: female   Preoperative Diagnosis: Cancer of ascending colon ([facility] [address] [C18.2]  Postoperative Diagnosis: Cancer of ascending colon ([facility] [address] [C18.2]  ERAS protocol patient   Procedure(s):  LAPAROSCOPIC RIGHT KENN COLECTOMY  Surgeon(s) and Role:     * Tiburcio Downs MD - Primary      Surgical Assistant: Sebastián Allen     Surgical Staff:  Circ-1: Jaimee Handley RN  Circ-Relief: Yoli Lorenzo RN  Scrub Tech-1: Alexis Rader  Scrub Tech-2: Jeny Chowdary  Event Time In   Incision Start 4620   Incision Close 6460      Anesthesia: General   Estimated Blood Loss: <60 cc  Specimens:   ID Type Source Tests Collected by Time Destination   1 : right hemicolectomy Fresh Colon, Right   Tiburcio Downs MD 10/9/2018 0907 Pathology       Findings: hand port + 2 x 5 mm trocars, tattoo evident, colon mass with signs of extension through colon wall, no peritoneal mets or ascites, adhesiolysis performed in RUQ secondary to prior scotty, mobile cecum and TI, freed attachments and medialized, extracorporeal division of distal ileum and T colon and mesentery, area of tumor invasion into T colon appendiceal epiploica divided with Ligasure, side-to-side ileotransverse colostomy. Anesthesia placed TAP and rectus sheath blocks. ERAS protocol. Complications: none apparent  Implants: * No implants in log *    Procedure Description:   The risks, benefits, potential complications, treatment options, and expected outcomes were discussed with the patient pre-operatively. The patient voiced understanding and gave informed consent preoperatively. The patient was taken to the Operating Room, and the [facility] time-out protocol checklist was followed.      After the induction of adequate anesthesia, the abdomen was prepped and draped in the usual sterile fashion. Preoperative antibiotics were given. The patient's left arm had been tucked. A supraumbilical incision for the hand port was created. The large wall of subcutaneous fat was divided using the monopolar electrosurgical energy device. Midline fascia was opened under direct vision and the peritoneal cavity was entered sharply under direct vision. A quick exploration revealed spattered black dots throughout the peritoneal cavity consistent with [place] ink. This was from the tattooing event. The mass in the ascending colon was visualized with a distal tattoo also evident. A laparotomy pad was placed in the abdomen and the hand port was placed. A 12 mm port was placed in the GelPort and pneumoperitoneum was insufflated to 50 mmHg. The 5 mm 30 degree laparoscope was used. Initial inspection revealed no evidence of ascites or obvious peritoneal disease. The only peritoneal abnormality was previously mentioned [place] ink spatter. A wall of omental adhesions were present in the right upper quadrant. 2 additional trochars were placed typical to accessory trochars for appendectomy. One was in the suprapubic region and one in the left lower quadrant lateral abdomen. Adhesion lysis of the omental adhesions in the right upper quadrant was carried out using the LigaSure device. The adhesions were only omentum and no bowel was involved. These were easily lysed. Additional adhesions were lysed between the transverse mesocolon and the gallbladder fossa and inferior surface of the liver. Attention was redirected to the cecum. The cecum was mobile and the cecum and terminal ileum had minimal lateral wall attachments. The appendix was grasped and the mesoappendix was ligated and divided using the 5 mm LigaSure device. Mobilization of the right colon continued along the lateral attachments.   This continued around the hepatic flexure with those attachments also being ligated and divided using LigaSure device. The inferior peritoneum of the terminal ileum was also divided to help medialize the right colon. The hepatic colic ligament along the proximal transverse colon was also divided to further free up the hepatic flexure to be medialized. Dissection continued to expose the duodenum and the mesocolon was  from the duodenum. There was excellent hemostasis. The right colon was now mobile and and was placed to the [facility]. The laparotomy pad was used to grab the right colon and the GelPort was removed allowing the specimen to be exteriorized. Laparoscopic instrumentation was placed on standby. The hand port Mariam Pineda was used as a wound protector. Additional towels were used to drape the area as well. With the right colon exteriorized, the omentum was released from the proximal transverse colon and the site for division of the transverse colon was selected. This area was just to the right of the middle colic vessels. The tumor was clearly full-thickness and extended through the wall of the colon. A tumor adhesion was present to the appendices epiploica of the transverse colon. This was divided several centimeters from the adhesion using LigaSure impact. A site in the terminal ileum was also identified and encircled. The SUMA 75 stapler was used to divide the ileum with the apex at the antimesenteric border. A second fire of the SUMA stapler was used to transect the transverse colon with the apex of the staple line at the tinea that would be used for the anastomosis. The ileum and transverse colon were aligned and a 3-0 silk suture was placed to prevent any twisting of the mesentery. The mesentery was then ligated and divided using the LigaSure impact device. The transection was carried high in the mesentery to perform a formal right hemicolectomy with adequate lymph node sampling/removal.  The ileocolic branch was skeletonized and doubly ligated using the LigaSure impact. Transection of the mesentery continued in a similar double ligation was performed on the right colic vessels. The specimen was then removed from the field. Gloves were changed. The abdomen was again explored. Nasogastric tube had been placed and was now in good position decompressing the stomach. It would be removed at the conclusion of the case. There is no evidence of metastatic disease to the surface of the liver or the diaphragms. The remaining small bowel was without abnormality. A side-to-side ileal transverse colon anastomosis was performed in the standard fashion. The antimesenteric apex of the ileum was aligned with the apex tinea of the transverse colon and a SUMA stapler used to perform the anastomosis. The staple line was hemostatic. The defect was then closed using the TA 60 stapler with blue cartridge. The apex of the staple line was reinforced with 3-0 silk sutures x2.  3-0 silk sutures were used at a few sites to obtain hemostasis. The defect in the mesentery was then closed with several 2-0 Vicryl running sutures. The omentum was draped over both the staple lines and the anastomosis was returned to the right upper quadrant. The pneumoperitoneum was re-instituted. Inspection revealed no evidence of bleeding or spillage. The trochars were removed. There is no evidence of bleeding. The GelPort was removed and then the Jocy portion of the GelPort was removed. The midline fascia was then closed using running 0 looped PDS suture. Subcutaneous tissues were copiously irrigated. The skin was closed using skin staples at the 2 trocar sites and the midline port site. An Aquacel Ag/DuoDERM combo dressing was used on the midline portion. 2 x 2's and Tegaderms were placed on the trocar sites. All sponge, instruments, and needle counts were correct. Anesthesia then placed bilateral abdominal wall blocks. The patient was taken to recovery in good condition.     Tk Jacobs MD, FACS

## 2018-10-12 NOTE — PROGRESS NOTES
Care Management Interventions  PCP Verified by CM:  Renata Toth MD)  Mode of Transport at Discharge:  (family)  Transition of Care Consult (CM Consult): Discharge Planning  Discharge Durable Medical Equipment: No  Physical Therapy Consult: Yes (per PT eval no follow up needed)  Occupational Therapy Consult: No  Speech Therapy Consult: No  Current Support Network: Own Home, Family Lives [place] (Son and DIL live next door. Pt is normally independent with ADL's.   Employed and insured with pharmacy benefits.  )  Confirm Follow Up Transport: Family  Plan discussed with Pt/Family/Caregiver: Yes  Discharge Location  Discharge Placement: Home

## 2018-10-12 NOTE — DISCHARGE INSTRUCTIONS
Discharge Instructions/Follow-up Plans:   MD Instructions:      Follow-up with Dr. Rambo Rogers in office on 10/26  Keep incisions clean and dry, may remain uncovered. Do not apply lotions, creams or ointments to incisions.      Diet - as tolerated - Soft foods diet. Activity - ambulate - as tolerated - no heavy lifting >10lb. May shower - no tub baths or soaking/submerging.      Lovenox 40 mg daily - Last dose on 10/16/18  Resume Eliquis, Vit C, and Iron 10/17/18  Follow up in office with Dr. Rambo Rogers 10/26/18. Celebrex and Tylenol for pain     Resume other home medications.       If problems or questions arise, please call our office at [phone]. DISCHARGE SUMMARY from Nurse    PATIENT INSTRUCTIONS:    After general anesthesia or intravenous sedation, for 24 hours or while taking prescription Narcotics:  · Limit your activities  · Do not drive and operate hazardous machinery  · Do not make important personal or business decisions  · Do  not drink alcoholic beverages  · If you have not urinated within 8 hours after discharge, please contact your surgeon on call. Report the following to your surgeon:  · Excessive pain, swelling, redness or odor of or around the surgical area  · Temperature over 100.5  · Nausea and vomiting lasting longer than 4 hours or if unable to take medications  · Any signs of decreased circulation or nerve impairment to extremity: change in color, persistent  numbness, tingling, coldness or increase pain  · Any questions    What to do at Home:  Recommended activity: Activity as tolerated, no lifting more than 10 pounds. If you experience any of the following symptoms Fever greater than 101,pain unrelieved by pain medication,  please follow up with Dr. Mary Snyder  Please give a list of your current medications to your Primary Care Provider.     *  Please update this list whenever your medications are discontinued, doses are      changed, or new medications (including over-the-counter products) are added. *  Please carry medication information at all times in case of emergency situations. These are general instructions for a healthy lifestyle:    No smoking/ No tobacco products/ Avoid exposure to second hand smoke  Surgeon General's Warning:  Quitting smoking now greatly reduces serious risk to your health. Obesity, smoking, and sedentary lifestyle greatly increases your risk for illness    A healthy diet, regular physical exercise & weight monitoring are important for maintaining a healthy lifestyle    You may be retaining fluid if you have a history of heart failure or if you experience any of the following symptoms:  Weight gain of 3 pounds or more overnight or 5 pounds in a week, increased swelling in our hands or feet or shortness of breath while lying flat in bed. Please call your doctor as soon as you notice any of these symptoms; do not wait until your next office visit. Recognize signs and symptoms of STROKE:    F-face looks uneven    A-arms unable to move or move unevenly    S-speech slurred or non-existent    T-time-call 911 as soon as signs and symptoms begin-DO NOT go       Back to bed or wait to see if you get better-TIME IS BRAIN. Warning Signs of HEART ATTACK     Call 911 if you have these symptoms:   Chest discomfort. Most heart attacks involve discomfort in the center of the chest that lasts more than a few minutes, or that goes away and comes back. It can feel like uncomfortable pressure, squeezing, fullness, or pain.  Discomfort in other areas of the upper body. Symptoms can include pain or discomfort in one or both arms, the back, neck, jaw, or stomach.  Shortness of breath with or without chest discomfort.  Other signs may include breaking out in a cold sweat, nausea, or lightheadedness. Don't wait more than five minutes to call 911 - MINUTES MATTER! Fast action can save your life.  Calling 911 is almost always the fastest way to get lifesaving treatment. Emergency Medical Services staff can begin treatment when they arrive -- up to an hour sooner than if someone gets to the hospital by car. The discharge information has been reviewed with the patient. The patient verbalized understanding. Discharge medications reviewed with the patient and appropriate educational materials and side effects teaching were provided.   ___________________________________________________________________________________________________________________________________

## 2018-10-12 NOTE — PROGRESS NOTES
END OF SHIFT NOTE:    INTAKE/OUTPUT  10/11 0701 - 10/12 0700  In: 500 [P.O.:500]  Out: 800 [Urine:800]  Voiding: YES  Catheter: NO  Drain:              Flatus: Patient does have flatus present. Stool:  1 occurrences. Characteristics:       Emesis: 0 occurrences. Characteristics:        VITAL SIGNS  Patient Vitals for the past 12 hrs:   Temp Pulse Resp BP SpO2   10/12/18 0652 97.7 °F (36.5 °C) (!) 106 17 143/82 98 %   10/12/18 0352 97.6 °F (36.4 °C) 75 16 111/72 97 %   10/12/18 0000 97.6 °F (36.4 °C) 76 16 135/76 98 %       Pain Assessment  Pain Intensity 1: 0 (10/11/18 2000)     Pain Intervention(s) 1: Emotional support  Patient Stated Pain Goal: 0    Ambulating  Yes    Shift report given to oncoming nurse at the bedside.     [address]

## 2018-10-12 NOTE — PROGRESS NOTES
Discharge instruction given to patient, verbalized understanding of instruction, gave two prescriptions, IV removed

## 2018-10-12 NOTE — PROGRESS NOTES
JOSE BARRIENTOS [address]  [phone]    H&P Note   Kathryn Velásquez   MRN: [id number]     : 1960        HPI: Kathryn Velásquez is a 59 y.o. female who returns the office for continued planning for her newly diagnosed right-sided colon cancer. Since her last visit, she was evaluated by pulmonary who would like to keep her anticoagulated. She will have her Eliquis held an appropriate time preoperatively and maintained on a Lovenox bridge until the day prior to surgery. She was also set up for vena cava filter which was placed on . She did well with this procedure. Review of her CT scan with radiology confirmed my finding of the mass being visible on CT though not mentioned in the report from [facility]. The mass is in the mid a sending colon. This will be amenable to right hemicolectomy. She does have a history of open cholecystectomy via right subcostal incision. Her right leg swelling has almost completely resolved. She was referred by Dr. Winifred Grover for hepatic flexure colon cancer. Patient had an EGD and colonoscopy on 9/10/2018 for evaluation of anemia. Her hemoglobin had dropped to 6.1. She had been on Eliquis since April after diagnosis of bilateral pulmonary embolus with right heart strain. This started with a right lower extremity DVT. She was treated with catheter thrombolysis. She had been doing well but became short of breath and was noted to be anemic when her workup moved to endoscopy. A large hepatic flexure lesion was identified. Distal tattoo was placed. No other polyps were identified. On biopsy, this was found to be adenocarcinoma. She is referred for surgical management. She has been receiving blood transfusions and iron replacement. She remains on Eliquis. She is followed by [facility]. She has also been seen by Dr. Cait Meeks for hematology workup.   She has no family history of colon cancer. She had not had a prior colonoscopy. She had not seen any blood per rectum. No significant findings were found at EGD. She is also being followed for a pancreatic tail lesion. This has been extensively evaluated including EUS with biopsy prior to her PE. This is felt to be a benign lesion and is being followed up. It is most likely to be considered a pseudocyst though CT at [facility] as part of her anemia workup felt it could represent a mucinous neoplasm. She has a history of open cholecystectomy in 1982 via subcostal incision. She reports a significant illness at that time. 10/10/18:  POD 1 s/p right hemicolectomy, pain controlled, has been up oob, K+3.3, H./H 9.0/30.3, AF, VSS, -flatus. [place] out. 10/11/18:  POD 2 s/p right hemicolectomy, pain controlled, has been up oob, labs stable, AF, VSS, +flatus. Voiding without difficulty. 10/12/18:  POD 3 s/p right hemicolectomy, pain controlled, has been up oob, labs stable, AF, VSS, +flatus/+BM (multiple). Voiding without difficulty. Past Medical History:   Diagnosis Date    Anemia 08/2018    Cancer of ascending colon ([address] 2018    Diabetes ([facility]     insulin and oral agents; Last A1C 6.9 (8/29/2018); avg     Environmental allergies 9/12/2013    Thromboembolus ([facility] 04/2018    right leg     Past Surgical History:   Procedure Laterality Date    HX CHOLECYSTECTOMY  1980's    HX LIPOMA RESECTION Right 1980's    HX OTHER SURGICAL  09/27/2018    filter placed to right groin     Current Facility-Administered Medications   Medication Dose Route Frequency    acetaminophen (TYLENOL) tablet 1,000 mg  1,000 mg Oral Q6H    alvimopan (ENTEREG) capsule 12 mg  12 mg Oral BID    celecoxib (CELEBREX) capsule 100 mg  100 mg Oral BID    naloxone (NARCAN) injection 0.4 mg  0.4 mg IntraVENous PRN    oxyCODONE IR (ROXICODONE) tablet 5 mg  5 mg Oral Q4H PRN    ondansetron (ZOFRAN ODT) tablet 4 mg  4 mg Oral Q4H PRN    diphenhydrAMINE (BENADRYL) capsule 25 mg  25 mg Oral Q6H PRN    HYDROmorphone (PF) (DILAUDID) injection 0.5 mg  0.5 mg IntraVENous Q4H PRN    enoxaparin (LOVENOX) injection 40 mg  40 mg SubCUTAneous Q24H    insulin regular (NOVOLIN R, HUMULIN R) injection   SubCUTAneous AC&HS    influenza vaccine 2018-19 (6 mos+)(PF) (FLUARIX QUAD/FLULAVAL QUAD) injection 0.5 mL  0.5 mL IntraMUSCular PRIOR TO DISCHARGE    famotidine (PF) (PEPCID) 20 mg in sodium chloride 0.9% 10 mL injection  20 mg IntraVENous Q12H     ALLERGIES:  Biaxin [clarithromycin] and Cortisone    Social History     Social History    Marital status: SINGLE     Spouse name: N/A    Number of children: N/A    Years of education: N/A     Social History Main Topics    Smoking status: Never Smoker    Smokeless tobacco: Never Used    Alcohol use No    Drug use: No    Sexual activity: Not Currently     Other Topics Concern    None     Social History Narrative    Pt lives alone. Her son lives in a house behind hers. She works as a  . She has one indoor dog. History   Smoking Status    Never Smoker   Smokeless Tobacco    Never Used     Family History   Problem Relation Age of Onset    No Known Problems Mother     Dementia Father     Heart Disease Father        ROS: The patient has no difficulty with chest pain or shortness of breath. No fever or chills. The patient denies any family history of abnormal clotting or bleeding. Comprehensive review of systems was otherwise unremarkable except as noted above. Physical Exam:   Constitutional: Alert oriented cooperative patient in no acute distress. Visit Vitals    /80 (BP 1 Location: Left arm, BP Patient Position: Sitting)    Pulse (!) 112    Resp 19    Ht 5' 1.5\" (1.562 m)    Wt 215 lb 8 oz (97.8 kg)    SpO2 99%    BMI 40.06 kg/m2       Eyes:Sclera are clear without icterus. ENMT: no obvious neck masses, no ear or lip lesions  CV: RRR. Normal perfusion  Resp: No JVD.   Breathing is non-labored. GI: obese, well healed R subcostal scar, soft and non-distended, surgical dressings removed. [place] c/d/i    Musculoskeletal: unremarkable with normal function. Neuro:  No obvious focal deficits  Psychiatric: normal affect and mood, no memory impairment    Lab Results   Component Value Date/Time    WBC 6.1 10/12/2018 04:03 AM    HGB 9.2 (L) 10/12/2018 04:03 AM    HCT 30.7 (L) 10/12/2018 04:03 AM    PLATELET 813 02/39/0051 04:03 AM    MCV 83.2 10/12/2018 04:03 AM       Lab Results   Component Value Date/Time    Sodium 145 10/12/2018 04:03 AM    Potassium 3.6 10/12/2018 04:03 AM    Chloride 112 (H) 10/12/2018 04:03 AM    CO2 25 10/12/2018 04:03 AM    BUN 5 (L) 10/12/2018 04:03 AM    Creatinine 0.53 (L) 10/12/2018 04:03 AM    Glucose 111 (H) 10/12/2018 04:03 AM    INR 1.0 09/21/2018 09:28 AM    aPTT 28.1 09/21/2018 09:28 AM    Bilirubin, total 0.4 08/29/2018 04:30 PM    AST (SGOT) 18 08/29/2018 04:30 PM    Alk. phosphatase 75 08/29/2018 04:30 PM    Amylase 39 03/28/2018 02:55 PM    Lipase 104 03/28/2018 02:55 PM     Lab Results   Component Value Date/Time    ALT (SGPT) 18 08/29/2018 04:30 PM     Lab Results   Component Value Date/Time    CEA 0.5 03/27/2018 04:34 AM       CT CHEST ABDOMEN PELVIS: 09/11/2018  COMPARISON: None  HISTORY:  Iron deficiency anemia  TECHNIQUE:  Axial images were obtained through the chest abdomen pelvis using oral and intravenous contrast with a dose of 100 mL Omnipaque 350. Elver Earing Coronal re-formations were performed. MIP re-formations were also performed through the lungs. Elver Earing FINDINGS:   Questionable low density area in the right side of the thyroid may be artifactual.  Does appear to be smaller than 1.5 cm  No abnormalities seen in the lungs. Mediastinum is unremarkable. A 2 cm rounded node is present in the right axilla and there is extensive surrounding inflammatory fat stranding.   To other nodular densities present which may represent low density nodes versus small pockets of fluid or even developing abscess. There also appears to be some degree of myositis and adjacent chest wall muscles. Clinical correlation for symptoms in this area is recommended. Abdomen:  Liver and spleen are unremarkable. Gallbladder clips are noted. A 4.4 x 3.7 cm cystic area is present in the left upper quadrant. This abuts the superior aspect of the pancreatic tail and posterior wall of the stomach. It elevates and displaces the splenic artery and therefore most likely arises from the pancreas. There is a small peripheral calcification which is nodular in appearance. Overall the appearance is most suggestive of a mucinous cystic neoplasm. A nonobstructing calcification is seen in the right kidney. Kidneys are otherwise unremarkable. Both adrenals are within normal limits. There is dehiscence of the rectus sheath. No bowel findings seen. The appendix is normal.  In the pelvis no mass or adenopathy is seen. There is no free fluid. Considerable degenerative changes noted in the spine. IMPRESSION:  1. Considerable abnormality in the right axilla with lymphadenopathy, extensive inflammatory change and some nodular areas that could represent developing fluid collection or abscess. Clinical correlation is recommended. 2.  Cystic mass in the left upper quadrant which appears to arise from the pancreas and measures up to 4.4 cm. Please see above for description. 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 Further evaluation with MRI or surgical consultation is recommended      My review shows area in ascending colon consistent with apple core lesion    Assessment/Plan:     Kathryn Velásquez is a 59 y.o. female who presents with biopsy confirmed adenocarcinoma in the region of the hepatic flexure. CT of the chest abdomen and pelvis is negative for metastatic disease.   The lesion was not identified by the evaluating radiologist, but on my review I can identify a lesion in the ascending colon that is consistent with the colon mass. This was confirmed with our radiologist.  This provides additional comfort for localization at the time of surgery. I was able to review these images after her visit and this provides more clarity that her resection will be a right hemicolectomy. Unfortunately, she has significant history of thromboembolic disease. Her PE and DVT from this past spring are being treated with Eliquis. [place] [facility] would like to maintain anticoagulation with Lovenox bridging preoperatively and resumption of anticoagulation as soon as possible postoperatively. On 9/27 she had placement of preoperative IVC filter. We discussed proceeding with laparoscopic possible open right hemicolectomy with anastomosis. We will enroll her in our ERAS protocol which will begin with some pre-rehabilitation. I will not do a full bowel prep. I will have her on liquids for the 2 days prior to surgery and will prescribe oral antibiotics on the day prior to surgery. Pulmonary pulmonary will set up her anticoagulation bridging. We discussed her increased morbidities related to her obesity, diabetes, anticoagulation needs, and history of DVT/PE. We discussed in light of those morbidities that we would not address any biopsies of her pancreatic lesion. She also requests no additional biopsy of the lesion. She also requests no further evaluation of her right axilla as it has been stable for greater than 20 years. I discussed the patient's condition and treatment options with the patient. I discussed risks of surgery in language the patient could understand including bleeding, infection, need for further surgery, abscess, fistula, SBO, DVT, PE, heart attack, stroke, renal failure, respiratory failure, ventilatory dependence, and death. The patient voiced understanding of all this and all questions were answered.   Alternatives to surgery were discussed also and risks of the alternatives. The patient requested that we proceed with surgery. Informed consent was obtained. Problem List  Date Reviewed: 9/28/2018          Codes Class Noted    * (Principal)Primary adenocarcinoma of ascending colon ([facility] ICD-10-CM: C18.2  ICD-9-CM: 153.6  10/9/2018        Malignant neoplasm of ascending colon ([facility] ICD-10-CM: C18.2  ICD-9-CM: 153.6  9/28/2018        Current use of anticoagulant therapy ICD-10-CM: Z79.01  ICD-9-CM: V58.61  9/18/2018        Obesity, morbid ([facility] ICD-10-CM: E66.01  ICD-9-CM: 278.01  4/5/2018        Controlled type 2 diabetes mellitus without complication, with long-term current use of insulin ([facility] ICD-10-CM: E11.9, Z79.4  ICD-9-CM: 250.00, V58.67  3/26/2018        PE (pulmonary thromboembolism) ([facility] ICD-10-CM: I26.99  ICD-9-CM: 415.19  3/26/2018        Axillary mass, right ICD-10-CM: R22.31  ICD-9-CM: 782.2  3/26/2018        Pancreatic mass ICD-10-CM: K86.9  ICD-9-CM: 577.9  3/26/2018        Type 2 diabetes mellitus with diabetic neuropathy ([facility] ICD-10-CM: E11.40  ICD-9-CM: 250.60, 357.2  2/21/2018        Bilateral low back pain with sciatica ICD-10-CM: M54.40  ICD-9-CM: 724.3  10/23/2017        Environmental allergies ICD-10-CM: Z91.09  ICD-9-CM: V15.09  9/12/2013            Plan:  GI Soft Diet  DC Home today  Lovenox end on 10/16/18  Resume Eliquis, Vit C, and Iron 10/17/18  Follow up in office with Dr. Martina Morales 10/26/18.   Celebrex and Tylenol for pain    Signed: ROBERTO Yen-BC

## 2018-11-02 ENCOUNTER — HOSPITAL ENCOUNTER (OUTPATIENT)
Dept: LAB | Age: 58
Discharge: HOME | End: 2018-11-02
Payer: COMMERCIAL

## 2018-11-02 DIAGNOSIS — C18.2 MALIGNANT NEOPLASM OF ASCENDING COLON (HCC): ICD-10-CM

## 2018-11-02 LAB
ALBUMIN SERPL-MCNC: 3.4 G/DL (ref 3.2–4.6)
ALBUMIN/GLOB SERPL: 0.8 {RATIO} (ref 1.2–3.5)
ALP SERPL-CCNC: 105 U/L (ref 50–136)
ALT SERPL-CCNC: 28 U/L (ref 12–65)
ANION GAP SERPL CALC-SCNC: ABNORMAL MMOL/L (ref 7–16)
AST SERPL-CCNC: 17 U/L (ref 15–37)
BASOPHILS # BLD: 0.1 K/UL (ref 0–0.2)
BASOPHILS NFR BLD: 1 % (ref 0–2)
BILIRUB SERPL-MCNC: 0.5 MG/DL (ref 0.2–1.1)
BUN SERPL-MCNC: 10 MG/DL (ref 8–23)
CALCIUM SERPL-MCNC: 9.3 MG/DL (ref 8.3–10.4)
CEA SERPL-MCNC: 1 NG/ML (ref 0–3)
CO2 SERPL-SCNC: 28 MMOL/L (ref 21–32)
CREAT SERPL-MCNC: 0.8 MG/DL (ref 0.6–1)
DIFFERENTIAL METHOD BLD: ABNORMAL
EOSINOPHIL # BLD: 0.2 K/UL (ref 0–0.8)
EOSINOPHIL NFR BLD: 2 % (ref 0.5–7.8)
ERYTHROCYTE [DISTWIDTH] IN BLOOD BY AUTOMATED COUNT: 23.1 % (ref 11.9–14.6)
GLOBULIN SER CALC-MCNC: 4.1 G/DL (ref 2.3–3.5)
GLUCOSE SERPL-MCNC: 152 MG/DL (ref 65–100)
HCT VFR BLD AUTO: 32.4 % (ref 35.8–46.3)
HGB BLD-MCNC: 10 G/DL (ref 11.7–15.4)
IMM GRANULOCYTES # BLD: 0 K/UL (ref 0–0.5)
IMM GRANULOCYTES NFR BLD AUTO: 0 % (ref 0–5)
LYMPHOCYTES # BLD: 2.5 K/UL (ref 0.5–4.6)
LYMPHOCYTES NFR BLD: 30 % (ref 13–44)
MCH RBC QN AUTO: 25.3 PG (ref 26.1–32.9)
MCHC RBC AUTO-ENTMCNC: 30.9 G/DL (ref 31.4–35)
MCV RBC AUTO: 81.8 FL (ref 79.6–97.8)
MONOCYTES # BLD: 0.8 K/UL (ref 0.1–1.3)
MONOCYTES NFR BLD: 9 % (ref 4–12)
NEUTS SEG # BLD: 4.8 K/UL (ref 1.7–8.2)
NEUTS SEG NFR BLD: 57 % (ref 43–78)
NRBC # BLD: 0 K/UL (ref 0–0.2)
PLATELET # BLD AUTO: 400 K/UL (ref 150–450)
PMV BLD AUTO: 10 FL (ref 9.4–12.3)
PROT SERPL-MCNC: 7.5 G/DL (ref 6.3–8.2)
RBC # BLD AUTO: 3.96 M/UL (ref 4.05–5.25)
WBC # BLD AUTO: 8.4 K/UL (ref 4.3–11.1)

## 2018-11-02 PROCEDURE — 85025 COMPLETE CBC W/AUTO DIFF WBC: CPT

## 2018-11-02 PROCEDURE — 80053 COMPREHEN METABOLIC PANEL: CPT

## 2018-11-02 PROCEDURE — 82378 CARCINOEMBRYONIC ANTIGEN: CPT

## 2018-11-02 PROCEDURE — 36415 COLL VENOUS BLD VENIPUNCTURE: CPT

## 2018-11-05 ENCOUNTER — PATIENT OUTREACH (OUTPATIENT)
Dept: CASE MANAGEMENT | Age: 58
End: 2018-11-05

## 2018-11-06 NOTE — PROGRESS NOTES
Ordered Oncotype colon assay with MMR testing and [employer] stated order had already been submitted on this patient and accession [id number].

## 2018-11-16 ENCOUNTER — HOSPITAL ENCOUNTER (OUTPATIENT)
Dept: CT IMAGING | Age: 58
Discharge: HOME | End: 2018-11-16
Attending: INTERNAL MEDICINE
Payer: COMMERCIAL

## 2018-11-16 DIAGNOSIS — C18.2 MALIGNANT NEOPLASM OF ASCENDING COLON (HCC): ICD-10-CM

## 2018-11-16 PROCEDURE — 74011636320 HC RX REV CODE- 636/320: Performed by: INTERNAL MEDICINE

## 2018-11-16 PROCEDURE — 74011000258 HC RX REV CODE- 258: Performed by: INTERNAL MEDICINE

## 2018-11-16 PROCEDURE — 71260 CT THORAX DX C+: CPT

## 2018-11-16 RX ORDER — SODIUM CHLORIDE 0.9 % (FLUSH) 0.9 %
10 SYRINGE (ML) INJECTION
Status: COMPLETED | OUTPATIENT
Start: 2018-11-16 | End: 2018-11-16

## 2018-11-16 RX ADMIN — SODIUM CHLORIDE 100 ML: 900 INJECTION, SOLUTION INTRAVENOUS at 08:09

## 2018-11-16 RX ADMIN — Medication 10 ML: at 08:09

## 2018-11-16 RX ADMIN — IOPAMIDOL 100 ML: 755 INJECTION, SOLUTION INTRAVENOUS at 08:09

## 2018-11-20 ENCOUNTER — HOSPITAL ENCOUNTER (OUTPATIENT)
Dept: LAB | Age: 58
Discharge: HOME | End: 2018-11-20
Payer: COMMERCIAL

## 2018-11-20 LAB
ANION GAP SERPL CALC-SCNC: 5 MMOL/L (ref 7–16)
APTT PPP: 26 SEC (ref 23.2–35.3)
BUN SERPL-MCNC: 13 MG/DL (ref 8–23)
CALCIUM SERPL-MCNC: 8.8 MG/DL (ref 8.3–10.4)
CHLORIDE SERPL-SCNC: 110 MMOL/L (ref 98–107)
CO2 SERPL-SCNC: 27 MMOL/L (ref 21–32)
CREAT SERPL-MCNC: 0.68 MG/DL (ref 0.6–1)
ERYTHROCYTE [DISTWIDTH] IN BLOOD BY AUTOMATED COUNT: 19.8 %
GLUCOSE SERPL-MCNC: 132 MG/DL (ref 65–100)
HCT VFR BLD AUTO: 31.8 % (ref 35.8–46.3)
HGB BLD-MCNC: 9.5 G/DL (ref 11.7–15.4)
INR PPP: 1
MCH RBC QN AUTO: 25.8 PG (ref 26.1–32.9)
MCHC RBC AUTO-ENTMCNC: 29.9 G/DL (ref 31.4–35)
MCV RBC AUTO: 86.4 FL (ref 79.6–97.8)
NRBC # BLD: 0 K/UL (ref 0–0.2)
PLATELET # BLD AUTO: 336 K/UL (ref 150–450)
PMV BLD AUTO: 10 FL (ref 9.4–12.3)
POTASSIUM SERPL-SCNC: 4.5 MMOL/L (ref 3.5–5.1)
PROTHROMBIN TIME: 12.5 SEC (ref 11.5–14.5)
RBC # BLD AUTO: 3.68 M/UL (ref 4.05–5.2)
SODIUM SERPL-SCNC: 142 MMOL/L (ref 136–145)
WBC # BLD AUTO: 7.4 K/UL (ref 4.3–11.1)

## 2018-11-20 PROCEDURE — 85730 THROMBOPLASTIN TIME PARTIAL: CPT

## 2018-11-20 PROCEDURE — 80048 BASIC METABOLIC PNL TOTAL CA: CPT

## 2018-11-20 PROCEDURE — 85027 COMPLETE CBC AUTOMATED: CPT

## 2018-11-20 PROCEDURE — 36415 COLL VENOUS BLD VENIPUNCTURE: CPT

## 2018-11-20 PROCEDURE — 85610 PROTHROMBIN TIME: CPT

## 2018-11-29 ENCOUNTER — HOSPITAL ENCOUNTER (OUTPATIENT)
Dept: INTERVENTIONAL RADIOLOGY/VASCULAR | Age: 58
Discharge: HOME | End: 2018-11-29
Attending: RADIOLOGY
Payer: COMMERCIAL

## 2018-11-29 VITALS
OXYGEN SATURATION: 100 % | HEIGHT: 64 IN | SYSTOLIC BLOOD PRESSURE: 129 MMHG | TEMPERATURE: 97.9 F | RESPIRATION RATE: 16 BRPM | DIASTOLIC BLOOD PRESSURE: 60 MMHG | BODY MASS INDEX: 35.85 KG/M2 | HEART RATE: 71 BPM | WEIGHT: 210 LBS

## 2018-11-29 DIAGNOSIS — Z95.828 S/P INSERTION OF IVC (INFERIOR VENA CAVAL) FILTER: ICD-10-CM

## 2018-11-29 PROCEDURE — C1773 RET DEV, INSERTABLE: HCPCS

## 2018-11-29 PROCEDURE — 74011250636 HC RX REV CODE- 250/636

## 2018-11-29 PROCEDURE — C1769 GUIDE WIRE: HCPCS

## 2018-11-29 PROCEDURE — C1894 INTRO/SHEATH, NON-LASER: HCPCS

## 2018-11-29 PROCEDURE — 74011250636 HC RX REV CODE- 250/636: Performed by: RADIOLOGY

## 2018-11-29 PROCEDURE — 77030004629 HC CATH ANGI SZ AUR COOK -B

## 2018-11-29 PROCEDURE — 99152 MOD SED SAME PHYS/QHP 5/>YRS: CPT

## 2018-11-29 RX ORDER — LIDOCAINE HYDROCHLORIDE 20 MG/ML
2-20 INJECTION, SOLUTION EPIDURAL; INFILTRATION; INTRACAUDAL; PERINEURAL ONCE
Status: COMPLETED | OUTPATIENT
Start: 2018-11-29 | End: 2018-11-29

## 2018-11-29 RX ORDER — FENTANYL CITRATE 50 UG/ML
25-50 INJECTION, SOLUTION INTRAMUSCULAR; INTRAVENOUS
Status: DISCONTINUED | OUTPATIENT
Start: 2018-11-29 | End: 2018-12-03 | Stop reason: HOSPADM

## 2018-11-29 RX ORDER — HEPARIN SODIUM 200 [USP'U]/100ML
1000 INJECTION, SOLUTION INTRAVENOUS AS NEEDED
Status: DISCONTINUED | OUTPATIENT
Start: 2018-11-29 | End: 2018-12-03 | Stop reason: HOSPADM

## 2018-11-29 RX ORDER — SODIUM CHLORIDE 9 MG/ML
25 INJECTION, SOLUTION INTRAVENOUS CONTINUOUS
Status: DISCONTINUED | OUTPATIENT
Start: 2018-11-29 | End: 2018-12-03 | Stop reason: HOSPADM

## 2018-11-29 RX ORDER — MIDAZOLAM HYDROCHLORIDE 1 MG/ML
.5-2 INJECTION, SOLUTION INTRAMUSCULAR; INTRAVENOUS
Status: DISCONTINUED | OUTPATIENT
Start: 2018-11-29 | End: 2018-12-03 | Stop reason: HOSPADM

## 2018-11-29 RX ADMIN — MIDAZOLAM HYDROCHLORIDE 0.5 MG: 1 INJECTION, SOLUTION INTRAMUSCULAR; INTRAVENOUS at 08:11

## 2018-11-29 RX ADMIN — FENTANYL CITRATE 25 MCG: 50 INJECTION, SOLUTION INTRAMUSCULAR; INTRAVENOUS at 08:18

## 2018-11-29 RX ADMIN — MIDAZOLAM HYDROCHLORIDE 0.5 MG: 1 INJECTION, SOLUTION INTRAMUSCULAR; INTRAVENOUS at 08:17

## 2018-11-29 RX ADMIN — HEPARIN SODIUM 2000 UNITS: 5000 INJECTION, SOLUTION INTRAVENOUS; SUBCUTANEOUS at 08:15

## 2018-11-29 RX ADMIN — MIDAZOLAM HYDROCHLORIDE 1 MG: 1 INJECTION, SOLUTION INTRAMUSCULAR; INTRAVENOUS at 08:21

## 2018-11-29 RX ADMIN — FENTANYL CITRATE 25 MCG: 50 INJECTION, SOLUTION INTRAMUSCULAR; INTRAVENOUS at 08:27

## 2018-11-29 RX ADMIN — FENTANYL CITRATE 25 MCG: 50 INJECTION, SOLUTION INTRAMUSCULAR; INTRAVENOUS at 08:11

## 2018-11-29 RX ADMIN — FENTANYL CITRATE 50 MCG: 50 INJECTION, SOLUTION INTRAMUSCULAR; INTRAVENOUS at 08:21

## 2018-11-29 RX ADMIN — SODIUM CHLORIDE 25 ML/HR: 900 INJECTION, SOLUTION INTRAVENOUS at 08:05

## 2018-11-29 RX ADMIN — LIDOCAINE HYDROCHLORIDE 200 MG: 20 INJECTION, SOLUTION EPIDURAL; INFILTRATION; INTRACAUDAL; PERINEURAL at 08:13

## 2018-11-29 RX ADMIN — MIDAZOLAM HYDROCHLORIDE 0.5 MG: 1 INJECTION, SOLUTION INTRAMUSCULAR; INTRAVENOUS at 08:27

## 2018-11-29 NOTE — PROCEDURES
Department of Interventional Radiology  [phone]        Interventional Radiology Brief Procedure Note    Patient: Anthony Benitez MRN: [id number]  SSN: [id number]    YOB: 1960  Age: 59 y.o. Sex: female      Date of Procedure: 11/29/2018    Pre-Procedure Diagnosis: DVT, PE    Post-Procedure Diagnosis: SAME    Procedure(s): Filter retrieval    Brief Description of Procedure: IVC venogram with filter retrieval    Performed By: Susan Perez MD     Assistants: None    Anesthesia:Moderate Sedation    Estimated Blood Loss: Less than 10ml    Specimens:  Celect IVC filter    Implants:  None    Findings: smalll amount of clot in filter apex, removed without difficulty    Complications: None    Recommendations: routine post care     Follow Up: continue eloquis.   With primary MD    Signed By: Susan Perez MD     November 29, 2018

## 2018-11-29 NOTE — H&P
Department of Interventional Radiology  [phone]    History and Physical    Patient:  Joni Calvillo MRN:  [id number]  SSN:  [id number]    YOB: 1960  Age:  59 y.o. Sex:  female      Primary Care Provider:  Agus Najera MD  Referring Physician:  Jesusita Davalos MD    Subjective:     Chief Complaint: filter retrieval    History of the Present Illness: The patient is a 59 y.o. female who presents for IVC filter retrieval. Hx DVT/PE 2/2018 on Eliquis. PE treated with thrombolysis as well. Pt had to be off anticoagulation for colon resection for CA. She was just told she is cancer free. No leg swelling. Last dx of Eliquis yesterday. NPO. Past Medical History:   Diagnosis Date    Anemia 08/2018    Cancer of ascending colon ([facility] 2018    Diabetes ([facility] [facility]     insulin and oral agents; Last A1C 6.9 (8/29/2018); avg     Environmental allergies 9/12/2013    Thromboembolus ([facility] 04/2018    right leg     Past Surgical History:   Procedure Laterality Date    HX CHOLECYSTECTOMY  1980's    HX LIPOMA RESECTION Right 1980's    HX OTHER SURGICAL  09/27/2018    filter placed to right groin        Review of Systems:    Pertinent items are noted in the History of Present Illness. Current Outpatient Medications   Medication Sig    ascorbic acid, vitamin C, 500 mg cpER Take 500 mg by mouth daily.  ferrous sulfate (IRON) 325 mg (65 mg iron) EC tablet Take 65 mg by mouth two (2) times a day.  apixaban (ELIQUIS) 5 mg tablet Take 1 Tab by mouth two (2) times a day. Then begin 5mg twice a day on 4/3/18 with evening dose. (Patient taking differently: Take 5 mg by mouth two (2) times a day. Hold 3 days prior to surgery (last dose10/5/18)--Lovenox bridge begin10/6/18)    metFORMIN (GLUCOPHAGE) 500 mg tablet Take 1 Tab by mouth two (2) times daily (with meals).     glucose blood VI test strips (ACCU-CHEK LILLY PLUS TEST STRP) strip Use twice daily for management of Type 2 DM, Uncontrolled (250.02)    Lancets (ACCU-CHEK SOFTCLIX LANCETS) misc Use twice daily for management of Type 2 DM uncontrolled (250.02)    Insulin Needles, Disposable, 32 gauge x 1/4\" ndle 1 Each by Does Not Apply route daily.  Blood-Glucose Meter (ACCU-CHEK LILLY PLUS METER) misc 1 Kit by Does Not Apply route two (2) times a day. For management of Type 2 DM Uncontrolled (250.02)    ACETAMINOPHEN (TYLENOL 8 HOUR PO) Take  by mouth. Current Facility-Administered Medications   Medication Dose Route Frequency    lidocaine (PF) (XYLOCAINE) 20 mg/mL (2 %) injection  mg  2-20 mL IntraDERMal ONCE    fentaNYL citrate (PF) injection 25-50 mcg  25-50 mcg IntraVENous Multiple    midazolam (VERSED) injection 0.5-2 mg  0.5-2 mg IntraVENous Multiple    heparin (PF) 2 units/ml in NS infusion 2,000 Units  1,000 mL Irrigation PRN    0.9% sodium chloride infusion  25 mL/hr IntraVENous CONTINUOUS        Allergies   Allergen Reactions    Biaxin [Clarithromycin] Unknown (comments)    Cortisone Other (comments)     NUMBNESS IN THE LEG (SITE OF INJECTION)  Per pt, leg numbness. Family History   Problem Relation Age of Onset    No Known Problems Mother     Dementia Father     Heart Disease Father      Social History     Tobacco Use    Smoking status: Never Smoker    Smokeless tobacco: Never Used   Substance Use Topics    Alcohol use: No        Objective:       Physical Examination:    Vitals:    11/29/18 0732 11/29/18 0737   BP:  136/67   Pulse:  82   Resp:  16   Temp:  97.9 °F (36.6 °C)   SpO2:  99%   Weight: 95.3 kg (210 lb)    Height: 5' 4\" (1.626 m)      Blood pressure 136/67, pulse 82, temperature 97.9 °F (36.6 °C), resp. rate 16, height 5' 4\" (1.626 m), weight 95.3 kg (210 lb), SpO2 99 %, not currently breastfeeding.   HEART: regular rate and rhythm  LUNG: clear to auscultation bilaterally  ABDOMEN: normal findings: soft, non-tender  EXTREMITIES: normal strength, tone, and muscle mass, no deformities, no erythema, induration, or nodules    Laboratory:     Lab Results   Component Value Date/Time    Sodium 142 11/20/2018 01:09 PM    Sodium 145 10/12/2018 04:03 AM    Potassium 4.5 11/20/2018 01:09 PM    Potassium 3.6 10/12/2018 04:03 AM    Chloride 110 (H) 11/20/2018 01:09 PM    Chloride 112 (H) 10/12/2018 04:03 AM    CO2 27 11/20/2018 01:09 PM    CO2 28 11/02/2018 02:53 PM    Anion gap 5 (L) 11/20/2018 01:09 PM    Anion gap Cannot be calculated 11/02/2018 02:53 PM    Glucose 132 (H) 11/20/2018 01:09 PM    Glucose 152 (H) 11/02/2018 02:53 PM    BUN 13 11/20/2018 01:09 PM    BUN 10 11/02/2018 02:53 PM    Creatinine 0.68 11/20/2018 01:09 PM    Creatinine 0.80 11/02/2018 02:53 PM    GFR est AA >60 11/20/2018 01:09 PM    GFR est AA >60 11/02/2018 02:53 PM    GFR est non-AA >60 11/20/2018 01:09 PM    GFR est non-AA >60 11/02/2018 02:53 PM    Calcium 8.8 11/20/2018 01:09 PM    Calcium 9.3 11/02/2018 02:53 PM    Magnesium 2.3 10/11/2018 04:30 AM    Magnesium 2.4 10/10/2018 03:51 AM    Phosphorus 2.6 10/11/2018 04:30 AM    Phosphorus 3.5 10/10/2018 03:51 AM    Albumin 3.4 11/02/2018 02:53 PM    Albumin 3.4 (L) 08/29/2018 04:30 PM    Protein, total 7.5 11/02/2018 02:53 PM    Protein, total 5.6 (L) 08/29/2018 04:30 PM    Globulin 4.1 (H) 11/02/2018 02:53 PM    Globulin 3.8 (H) 03/26/2018 08:53 AM    A-G Ratio 0.8 (L) 11/02/2018 02:53 PM    A-G Ratio 1.5 08/29/2018 04:30 PM    AST (SGOT) 17 11/02/2018 02:53 PM    AST (SGOT) 18 08/29/2018 04:30 PM    ALT (SGPT) 28 11/02/2018 02:53 PM    ALT (SGPT) 18 08/29/2018 04:30 PM     Lab Results   Component Value Date/Time    WBC 7.4 11/20/2018 01:09 PM    WBC 8.4 11/02/2018 02:53 PM    HGB 9.5 (L) 11/20/2018 01:09 PM    HGB 10.0 (L) 11/02/2018 02:53 PM    HCT 31.8 (L) 11/20/2018 01:09 PM    HCT 32.4 (L) 11/02/2018 02:53 PM    PLATELET 523 01/55/1882 01:09 PM    PLATELET 132 88/15/9643 02:53 PM     Lab Results   Component Value Date/Time    aPTT 26.0 11/20/2018 01:09 PM aPTT 28.1 09/21/2018 09:28 AM    Prothrombin time 12.5 11/20/2018 01:09 PM    Prothrombin time 13.0 09/21/2018 09:28 AM    INR 1.0 11/20/2018 01:09 PM    INR 1.0 09/21/2018 09:28 AM       Assessment:     Colon cancer, DVT-both treated. Plan:     Planned Procedure:  IVC filter retrieval.  She will discuss need for continuing Eliquis at her f/u with [facility] at her f/u in February. .    Risks, benefits, and alternatives reviewed with patient and she agrees to proceed with the procedure.       Signed By: Selvin Mo PA-C     November 29, 2018

## 2018-11-29 NOTE — PROGRESS NOTES
TRANSFER - OUT REPORT:    Verbal report given to [facility] on Alesha Tubbs  being transferred to IR prep room 1 for routine post - op       Report consisted of patients Situation, Background, Assessment and   Recommendations(SBAR). Information from the following report(s) SBAR, Kardex, Procedure Summary and MAR was reviewed with the receiving nurse. Puncture site to right IJ dry and intact with surgical glue present. No drainage noted.

## 2018-11-29 NOTE — DISCHARGE INSTRUCTIONS
[address]  Department of Interventional Radiology  [facility]  [phone] Office  [phone] Fax       Inferior Vena Cava Filter Removal Discharge Instructions    General Information:   Today you had an inferior vena cava filter removed. This was removed because it was determined that you no longer needed this filter. Home Care Instructions: You can resume your regular diet and medication regimen. Do not drink alcohol, drive, or make any important legal decisions in the next 24 hours. Do not lift anything heavier than a gallon of milk, or do anything strenuous for the next 24 hours. You will notice a dressing on your neck or groin. This was the insertion site for the retrieval of the device. Keep the site covered until the puncture site has totally healed. You make take a shower with the bandage, but do cover it with plastic wrap. Do not immerse yourself in water until the wound has totally healed. After your puncture wound heals, there is no special care that is needed. You may resume your normal level of activity slowly, after about one week of light activity. Call If:   You should call your Physician and/or the Radiology Nurse if you have any bleeding other than a small spot on your bandage. Please call if you have any signs of infection; fever, swelling, or increased pain at the site. Call if you have any questions of how to take care of your wound. Follow-Up Instructions: Please see your ordering doctor as he/she has requested. To Reach Us: If you have any questions about your procedure, please call the Interventional Radiology department at [phone]. After business hours (5pm) and weekends, call the answering service at [phone] and ask for the Radiologist on call to be paged.           Interventional Radiology General Nurse Discharge    After general anesthesia or intravenous sedation, for 24 hours or while taking prescription Narcotics:  · Limit your activities  · Do not drive and operate hazardous machinery  · Do not make important personal or business decisions  · Do  not drink alcoholic beverages  · If you have not urinated within 8 hours after discharge, please contact your surgeon on call. * Please give a list of your current medications to your Primary Care Provider. * Please update this list whenever your medications are discontinued, doses are     changed, or new medications (including over-the-counter products) are added. * Please carry medication information at all times in case of emergency situations. These are general instructions for a healthy lifestyle:    No smoking/ No tobacco products/ Avoid exposure to second hand smoke  Surgeon General's Warning:  Quitting smoking now greatly reduces serious risk to your health. Obesity, smoking, and sedentary lifestyle greatly increases your risk for illness  A healthy diet, regular physical exercise & weight monitoring are important for maintaining a healthy lifestyle    You may be retaining fluid if you have a history of heart failure or if you experience any of the following symptoms:  Weight gain of 3 pounds or more overnight or 5 pounds in a week, increased swelling in our hands or feet or shortness of breath while lying flat in bed. Please call your doctor as soon as you notice any of these symptoms; do not wait until your next office visit. Recognize signs and symptoms of STROKE:  F-face looks uneven    A-arms unable to move or move unevenly    S-speech slurred or non-existent    T-time-call 911 as soon as signs and symptoms begin-DO NOT go       Back to bed or wait to see if you get better-TIME IS BRAIN.       Patient Signature:  Date: 11/29/2018  Discharging Nurse: Sukhwinder Yoo RN

## 2018-11-29 NOTE — PROGRESS NOTES
11/29/2018      RE: Vipul Candelaria      To Whom it May Concern: This is to certify that Vipul Candelaria had a procedure on 11/29/18 and may may return to work on 11/30/18 with no heavy lifting. Please feel free to contact my office if you have any questions or concerns. Thank you for your assistance in this matter. Sincerely,      Vijaya Cole RN.

## 2018-11-29 NOTE — PROGRESS NOTES
IR Nurse Pre-Procedure Checklist Part 2          Consent signed: Yes    H&P complete:  Yes    Antibiotics: Not applicable    Airway/Mallampati Done: Yes    Shaved:  No    Pregnancy Form:No    Patient Position: Yes    MD Side: Yes     Biopsy Worksheet: Not applicable    Specimen Medium: Not applicable

## 2018-11-29 NOTE — PROGRESS NOTES
Discharged per MD orders. Right IJ site C,D,I. Alert, oriented mahnaz ll spheres. Verbalized understanding for discharge orders. Transported via wheelchair with aunt to drive home.

## 2018-12-02 ENCOUNTER — PATIENT OUTREACH (OUTPATIENT)
Dept: CASE MANAGEMENT | Age: 58
End: 2018-12-02

## 2018-12-02 NOTE — PROGRESS NOTES
Pt was seen by Dr Cheryl Delgado on 11-27-18. She received Oncotype results today and she will not be receiving chemotherapy. Navigation will not be following but will be here if needed in future.

## 2019-02-20 ENCOUNTER — PATIENT OUTREACH (OUTPATIENT)
Dept: CASE MANAGEMENT | Age: 59
End: 2019-02-20

## 2019-02-21 ENCOUNTER — PATIENT OUTREACH (OUTPATIENT)
Dept: CASE MANAGEMENT | Age: 59
End: 2019-02-21

## 2019-02-21 NOTE — PROGRESS NOTES
Called patient and left a message on her personal voicemail moving appt to 3-7-19 at 10:40am. Requested if she had questions to call office at [phone]. This was concerning Eliquis administration.

## 2019-02-21 NOTE — PROGRESS NOTES
Dr Keerthi Gloria reviewed patient chart at the request of Kavin Maxwell from [facility] and stated it is ok at this time for pt to remain on Eliquis as long as pt does not have active bleeding issues. Dr Keerthi Gloria did recommend pt appt be moved from 5/2019 to 3/2019.

## 2019-03-11 ENCOUNTER — HOSPITAL ENCOUNTER (OUTPATIENT)
Dept: LAB | Age: 59
Discharge: HOME | End: 2019-03-11
Payer: COMMERCIAL

## 2019-03-11 DIAGNOSIS — C18.2 MALIGNANT NEOPLASM OF ASCENDING COLON (HCC): ICD-10-CM

## 2019-03-11 DIAGNOSIS — D50.9 IRON DEFICIENCY ANEMIA, UNSPECIFIED IRON DEFICIENCY ANEMIA TYPE: ICD-10-CM

## 2019-03-11 LAB
ALBUMIN SERPL-MCNC: 3.4 G/DL (ref 3.2–4.6)
ALBUMIN/GLOB SERPL: 0.9 {RATIO} (ref 1.2–3.5)
ALP SERPL-CCNC: 101 U/L (ref 50–136)
ALT SERPL-CCNC: 22 U/L (ref 12–65)
ANION GAP SERPL CALC-SCNC: 5 MMOL/L (ref 7–16)
AST SERPL-CCNC: 11 U/L (ref 15–37)
BASOPHILS # BLD: 0.1 K/UL (ref 0–0.2)
BASOPHILS NFR BLD: 1 % (ref 0–2)
BILIRUB SERPL-MCNC: 0.5 MG/DL (ref 0.2–1.1)
BUN SERPL-MCNC: 11 MG/DL (ref 8–23)
CALCIUM SERPL-MCNC: 8.7 MG/DL (ref 8.3–10.4)
CEA SERPL-MCNC: 1.1 NG/ML (ref 0–3)
CHLORIDE SERPL-SCNC: 109 MMOL/L (ref 98–107)
CO2 SERPL-SCNC: 27 MMOL/L (ref 21–32)
CREAT SERPL-MCNC: 0.79 MG/DL (ref 0.6–1)
DIFFERENTIAL METHOD BLD: ABNORMAL
EOSINOPHIL # BLD: 0.2 K/UL (ref 0–0.8)
EOSINOPHIL NFR BLD: 2 % (ref 0.5–7.8)
ERYTHROCYTE [DISTWIDTH] IN BLOOD BY AUTOMATED COUNT: 17.1 % (ref 11.9–14.6)
FERRITIN SERPL-MCNC: 6 NG/ML (ref 8–388)
GLOBULIN SER CALC-MCNC: 3.8 G/DL (ref 2.3–3.5)
GLUCOSE SERPL-MCNC: 113 MG/DL (ref 65–100)
HCT VFR BLD AUTO: 31.8 % (ref 35.8–46.3)
HGB BLD-MCNC: 10 G/DL (ref 11.7–15.4)
IMM GRANULOCYTES # BLD AUTO: 0 K/UL (ref 0–0.5)
IMM GRANULOCYTES NFR BLD AUTO: 0 % (ref 0–5)
IRON SATN MFR SERPL: 5 %
IRON SERPL-MCNC: 17 UG/DL (ref 35–150)
LYMPHOCYTES # BLD: 2.9 K/UL (ref 0.5–4.6)
LYMPHOCYTES NFR BLD: 33 % (ref 13–44)
MCH RBC QN AUTO: 25.9 PG (ref 26.1–32.9)
MCHC RBC AUTO-ENTMCNC: 31.4 G/DL (ref 31.4–35)
MCV RBC AUTO: 82.4 FL (ref 79.6–97.8)
MONOCYTES # BLD: 0.8 K/UL (ref 0.1–1.3)
MONOCYTES NFR BLD: 9 % (ref 4–12)
NEUTS SEG # BLD: 4.9 K/UL (ref 1.7–8.2)
NEUTS SEG NFR BLD: 56 % (ref 43–78)
NRBC # BLD: 0 K/UL (ref 0–0.2)
PLATELET # BLD AUTO: 312 K/UL (ref 150–450)
PMV BLD AUTO: 8.9 FL (ref 9.4–12.3)
POTASSIUM SERPL-SCNC: 3.2 MMOL/L (ref 3.5–5.1)
PROT SERPL-MCNC: 7.2 G/DL (ref 6.3–8.2)
RBC # BLD AUTO: 3.86 M/UL (ref 4.05–5.25)
SODIUM SERPL-SCNC: 141 MMOL/L (ref 136–145)
TIBC SERPL-MCNC: 363 UG/DL (ref 250–450)
WBC # BLD AUTO: 8.8 K/UL (ref 4.3–11.1)

## 2019-03-11 PROCEDURE — 82378 CARCINOEMBRYONIC ANTIGEN: CPT

## 2019-03-11 PROCEDURE — 36415 COLL VENOUS BLD VENIPUNCTURE: CPT

## 2019-03-11 PROCEDURE — 80053 COMPREHEN METABOLIC PANEL: CPT

## 2019-03-11 PROCEDURE — 83540 ASSAY OF IRON: CPT

## 2019-03-11 PROCEDURE — 85025 COMPLETE CBC W/AUTO DIFF WBC: CPT

## 2019-03-11 PROCEDURE — 82728 ASSAY OF FERRITIN: CPT

## 2019-08-27 PROBLEM — E78.5 HYPERLIPIDEMIA ASSOCIATED WITH TYPE 2 DIABETES MELLITUS (HCC): Status: ACTIVE | Noted: 2019-08-27

## 2019-08-27 PROBLEM — E66.09 OBESITY DUE TO EXCESS CALORIES: Status: ACTIVE | Noted: 2018-03-26

## 2019-08-27 PROBLEM — D50.9 IRON DEFICIENCY ANEMIA: Status: ACTIVE | Noted: 2019-08-27

## 2019-08-27 PROBLEM — E11.69 HYPERLIPIDEMIA ASSOCIATED WITH TYPE 2 DIABETES MELLITUS (HCC): Status: ACTIVE | Noted: 2019-08-27

## 2019-09-09 PROBLEM — I26.99 PE (PULMONARY THROMBOEMBOLISM) (HCC): Status: RESOLVED | Noted: 2018-03-26 | Resolved: 2019-09-09

## 2019-09-09 PROBLEM — R30.0 DYSURIA: Status: ACTIVE | Noted: 2019-09-09

## 2019-09-10 ENCOUNTER — HOSPITAL ENCOUNTER (EMERGENCY)
Age: 59
Discharge: HOME | End: 2019-09-10
Payer: COMMERCIAL

## 2019-09-10 ENCOUNTER — APPOINTMENT (OUTPATIENT)
Dept: CT IMAGING | Age: 59
End: 2019-09-10
Payer: COMMERCIAL

## 2019-09-10 VITALS
WEIGHT: 195 LBS | DIASTOLIC BLOOD PRESSURE: 78 MMHG | BODY MASS INDEX: 33.29 KG/M2 | HEIGHT: 64 IN | RESPIRATION RATE: 16 BRPM | SYSTOLIC BLOOD PRESSURE: 171 MMHG | OXYGEN SATURATION: 99 % | TEMPERATURE: 97.9 F | HEART RATE: 87 BPM

## 2019-09-10 DIAGNOSIS — N20.1 CALCULUS OF URETER: ICD-10-CM

## 2019-09-10 DIAGNOSIS — R31.9 URINARY TRACT INFECTION WITH HEMATURIA, SITE UNSPECIFIED: Primary | ICD-10-CM

## 2019-09-10 DIAGNOSIS — N39.0 URINARY TRACT INFECTION WITH HEMATURIA, SITE UNSPECIFIED: Primary | ICD-10-CM

## 2019-09-10 LAB
ALBUMIN SERPL-MCNC: 3.3 G/DL (ref 3.2–4.6)
ALBUMIN/GLOB SERPL: 0.8 {RATIO} (ref 1.2–3.5)
ALP SERPL-CCNC: 114 U/L (ref 50–136)
ALT SERPL-CCNC: 25 U/L (ref 12–65)
ANION GAP SERPL CALC-SCNC: 8 MMOL/L (ref 7–16)
APPEARANCE UR: CLEAR
AST SERPL-CCNC: 11 U/L (ref 15–37)
BACTERIA URNS QL MICRO: 0 /HPF
BASOPHILS # BLD: 0 K/UL (ref 0–0.2)
BASOPHILS NFR BLD: 0 % (ref 0–2)
BILIRUB SERPL-MCNC: 1.2 MG/DL (ref 0.2–1.1)
BILIRUB UR QL: NEGATIVE
BUN SERPL-MCNC: 19 MG/DL (ref 8–23)
CALCIUM SERPL-MCNC: 9 MG/DL (ref 8.3–10.4)
CASTS URNS QL MICRO: ABNORMAL /LPF
CHLORIDE SERPL-SCNC: 103 MMOL/L (ref 98–107)
CO2 SERPL-SCNC: 25 MMOL/L (ref 21–32)
COLOR UR: YELLOW
CREAT SERPL-MCNC: 1.52 MG/DL (ref 0.6–1)
DIFFERENTIAL METHOD BLD: ABNORMAL
EOSINOPHIL # BLD: 0.1 K/UL (ref 0–0.8)
EOSINOPHIL NFR BLD: 1 % (ref 0.5–7.8)
EPI CELLS #/AREA URNS HPF: ABNORMAL /HPF
ERYTHROCYTE [DISTWIDTH] IN BLOOD BY AUTOMATED COUNT: 13.2 % (ref 11.9–14.6)
GLOBULIN SER CALC-MCNC: 4.1 G/DL (ref 2.3–3.5)
GLUCOSE SERPL-MCNC: 348 MG/DL (ref 65–100)
GLUCOSE UR STRIP.AUTO-MCNC: >1000 MG/DL
HCT VFR BLD AUTO: 41.4 % (ref 35.8–46.3)
HGB BLD-MCNC: 13.8 G/DL (ref 11.7–15.4)
HGB UR QL STRIP: ABNORMAL
IMM GRANULOCYTES # BLD AUTO: 0 K/UL (ref 0–0.5)
IMM GRANULOCYTES NFR BLD AUTO: 0 % (ref 0–5)
KETONES UR QL STRIP.AUTO: 15 MG/DL
LEUKOCYTE ESTERASE UR QL STRIP.AUTO: ABNORMAL
LYMPHOCYTES # BLD: 1.8 K/UL (ref 0.5–4.6)
LYMPHOCYTES NFR BLD: 16 % (ref 13–44)
MCH RBC QN AUTO: 31.1 PG (ref 26.1–32.9)
MCHC RBC AUTO-ENTMCNC: 33.3 G/DL (ref 31.4–35)
MCV RBC AUTO: 93.2 FL (ref 79.6–97.8)
MONOCYTES # BLD: 1 K/UL (ref 0.1–1.3)
MONOCYTES NFR BLD: 9 % (ref 4–12)
NEUTS SEG # BLD: 8.3 K/UL (ref 1.7–8.2)
NEUTS SEG NFR BLD: 74 % (ref 43–78)
NITRITE UR QL STRIP.AUTO: NEGATIVE
NRBC # BLD: 0 K/UL (ref 0–0.2)
PH UR STRIP: 5.5 [PH] (ref 5–9)
PLATELET # BLD AUTO: 262 K/UL (ref 150–450)
PMV BLD AUTO: 10.5 FL (ref 9.4–12.3)
POTASSIUM SERPL-SCNC: 3.5 MMOL/L (ref 3.5–5.1)
PROT SERPL-MCNC: 7.4 G/DL (ref 6.3–8.2)
PROT UR STRIP-MCNC: ABNORMAL MG/DL
RBC # BLD AUTO: 4.44 M/UL (ref 4.05–5.2)
RBC #/AREA URNS HPF: ABNORMAL /HPF
SODIUM SERPL-SCNC: 136 MMOL/L (ref 136–145)
SP GR UR REFRACTOMETRY: 1.04 (ref 1–1.02)
UROBILINOGEN UR QL STRIP.AUTO: 0.2 EU/DL (ref 0.2–1)
WBC # BLD AUTO: 11.3 K/UL (ref 4.3–11.1)
WBC URNS QL MICRO: ABNORMAL /HPF

## 2019-09-10 PROCEDURE — 96375 TX/PRO/DX INJ NEW DRUG ADDON: CPT

## 2019-09-10 PROCEDURE — 74176 CT ABD & PELVIS W/O CONTRAST: CPT

## 2019-09-10 PROCEDURE — 85025 COMPLETE CBC W/AUTO DIFF WBC: CPT

## 2019-09-10 PROCEDURE — 99283 EMERGENCY DEPT VISIT LOW MDM: CPT

## 2019-09-10 PROCEDURE — 74011250637 HC RX REV CODE- 250/637

## 2019-09-10 PROCEDURE — 80053 COMPREHEN METABOLIC PANEL: CPT

## 2019-09-10 PROCEDURE — 81001 URINALYSIS AUTO W/SCOPE: CPT

## 2019-09-10 PROCEDURE — 74011250636 HC RX REV CODE- 250/636

## 2019-09-10 PROCEDURE — 96374 THER/PROPH/DIAG INJ IV PUSH: CPT

## 2019-09-10 RX ORDER — KETOROLAC TROMETHAMINE 30 MG/ML
30 INJECTION, SOLUTION INTRAMUSCULAR; INTRAVENOUS
Status: COMPLETED | OUTPATIENT
Start: 2019-09-10 | End: 2019-09-10

## 2019-09-10 RX ORDER — SODIUM CHLORIDE 9 MG/ML
1000 INJECTION, SOLUTION INTRAVENOUS ONCE
Status: COMPLETED | OUTPATIENT
Start: 2019-09-10 | End: 2019-09-10

## 2019-09-10 RX ORDER — PHENAZOPYRIDINE HYDROCHLORIDE 95 MG/1
200 TABLET ORAL
Status: COMPLETED | OUTPATIENT
Start: 2019-09-10 | End: 2019-09-10

## 2019-09-10 RX ORDER — MORPHINE SULFATE 4 MG/ML
4 INJECTION, SOLUTION INTRAMUSCULAR; INTRAVENOUS ONCE
Status: DISCONTINUED | OUTPATIENT
Start: 2019-09-10 | End: 2019-09-10 | Stop reason: SDUPTHER

## 2019-09-10 RX ORDER — ONDANSETRON 2 MG/ML
4 INJECTION INTRAMUSCULAR; INTRAVENOUS
Status: COMPLETED | OUTPATIENT
Start: 2019-09-10 | End: 2019-09-10

## 2019-09-10 RX ORDER — MORPHINE SULFATE 4 MG/ML
4 INJECTION INTRAVENOUS ONCE
Status: COMPLETED | OUTPATIENT
Start: 2019-09-10 | End: 2019-09-10

## 2019-09-10 RX ORDER — TAMSULOSIN HYDROCHLORIDE 0.4 MG/1
0.4 CAPSULE ORAL DAILY
Qty: 15 CAP | Refills: 0 | Status: SHIPPED | OUTPATIENT
Start: 2019-09-10 | End: 2019-09-11

## 2019-09-10 RX ORDER — SODIUM CHLORIDE 9 MG/ML
1000 INJECTION, SOLUTION INTRAVENOUS ONCE
Status: DISCONTINUED | OUTPATIENT
Start: 2019-09-10 | End: 2019-09-10 | Stop reason: HOSPADM

## 2019-09-10 RX ORDER — HYDROCODONE BITARTRATE AND ACETAMINOPHEN 7.5; 325 MG/1; MG/1
1 TABLET ORAL 2 TIMES DAILY
Qty: 10 TAB | Refills: 0 | Status: SHIPPED | OUTPATIENT
Start: 2019-09-10 | End: 2019-09-11 | Stop reason: SDUPTHER

## 2019-09-10 RX ADMIN — ONDANSETRON 4 MG: 2 INJECTION INTRAMUSCULAR; INTRAVENOUS at 05:28

## 2019-09-10 RX ADMIN — URINARY PAIN RELIEF 190 MG: 95 TABLET ORAL at 03:36

## 2019-09-10 RX ADMIN — SODIUM CHLORIDE 1000 ML: 900 INJECTION, SOLUTION INTRAVENOUS at 03:36

## 2019-09-10 RX ADMIN — MORPHINE SULFATE 4 MG: 4 INJECTION INTRAVENOUS at 05:28

## 2019-09-10 RX ADMIN — KETOROLAC TROMETHAMINE 30 MG: 30 INJECTION, SOLUTION INTRAMUSCULAR at 05:28

## 2019-09-10 NOTE — DISCHARGE INSTRUCTIONS
Patient Education        Kidney Stone: Care Instructions  Your Care Instructions    Kidney stones are formed when salts, minerals, and other substances normally found in the urine clump together. They can be as small as grains of sand or, rarely, as large as golf balls. While the stone is traveling through the ureter, which is the tube that carries urine from the kidney to the bladder, you will probably feel pain. The pain may be mild or very severe. You may also have some blood in your urine. As soon as the stone reaches the bladder, any intense pain should go away. If a stone is too large to pass on its own, you may need a medical procedure to help you pass the stone. The doctor has checked you carefully, but problems can develop later. If you notice any problems or new symptoms, get medical treatment right away. Follow-up care is a key part of your treatment and safety. Be sure to make and go to all appointments, and call your doctor if you are having problems. It's also a good idea to know your test results and keep a list of the medicines you take. How can you care for yourself at home? · Drink plenty of fluids, enough so that your urine is light yellow or clear like water. If you have kidney, heart, or liver disease and have to limit fluids, talk with your doctor before you increase the amount of fluids you drink. · Take pain medicines exactly as directed. Call your doctor if you think you are having a problem with your medicine. ? If the doctor gave you a prescription medicine for pain, take it as prescribed. ? If you are not taking a prescription pain medicine, ask your doctor if you can take an over-the-counter medicine. Read and follow all instructions on the label. · Your doctor may ask you to strain your urine so that you can collect your kidney stone when it passes. You can use a kitchen strainer or a tea strainer to catch the stone.  Store it in a plastic bag until you see your doctor again.  Preventing future kidney stones  Some changes in your diet may help prevent kidney stones. Depending on the cause of your stones, your doctor may recommend that you:  · Drink plenty of fluids, enough so that your urine is light yellow or clear like water. If you have kidney, heart, or liver disease and have to limit fluids, talk with your doctor before you increase the amount of fluids you drink. · Limit coffee, tea, and alcohol. Also avoid grapefruit juice. · Do not take more than the recommended daily dose of vitamins C and D.  · Avoid antacids such as Gaviscon, Maalox, Mylanta, or Tums. · Limit the amount of salt (sodium) in your diet. · Eat a balanced diet that is not too high in protein. · Limit foods that are high in a substance called oxalate, which can cause kidney stones. These foods include dark green vegetables, rhubarb, chocolate, wheat bran, nuts, cranberries, and beans. When should you call for help? Call your doctor now or seek immediate medical care if:    · You cannot keep down fluids.     · Your pain gets worse.     · You have a fever or chills.     · You have new or worse pain in your back just below your rib cage (the flank area).     · You have new or more blood in your urine.    Watch closely for changes in your health, and be sure to contact your doctor if:    · You do not get better as expected. Where can you learn more? Go to http://shaila-stuart.info/. Enter P322 in the search box to learn more about \"Kidney Stone: Care Instructions. \"  Current as of: October 31, 2018  Content Version: 12.1  © 8537-0290 [employer]. Care instructions adapted under license by [employer] (which disclaims liability or warranty for this information).  If you have questions about a medical condition or this instruction, always ask your healthcare professional. [address] any warranty or liability for your use of this information. Patient Education        Learning About Diet for Kidney Stone Prevention  What are kidney stones? Kidney stones are made of salts and minerals in the urine that form small \"clarence. \" Stones can form in the kidneys and the ureters (the tubes that lead from the kidneys to the bladder). They can also form in the bladder. Stones may not cause a problem as long as they stay in the kidneys. But they can cause sudden, severe pain. Pain is most likely when the stones travel from the kidneys to the bladder. Kidney stones can cause bloody urine. Kidney stones often run in families. You are more likely to get them if you don't drink enough fluids, mainly water. Certain foods and drinks and some dietary supplements may also increase your risk for kidney stones if you consume too much of them. What can you do to prevent kidney stones? Changing what you eat may not prevent all types of kidney stones. But for people who have a history of certain kinds of kidney stones, some changes in diet may help. A dietitian can help you set up a meal plan that includes healthy, low-oxalate choices. Here are some general guidelines to get you started. Plan your meals and snacks around foods that are low in oxalate. These foods include:  · Corn, kale, parsnips, and squash,. · Beef, chicken, pork, turkey, and fish. · Milk, butter, cheese, and yogurt. You can eat certain foods that are medium-high in oxalate, but eat them only once in a while. These foods include:  · Bread. · Brown rice. · English muffins. · Figs. · Popcorn. · String beans. · Tomatoes. Limit very high-oxalate foods, including:  · Black tea. · Coffee. · Chocolate. · Dark green vegetables. · Nuts. Here are some other things you can do to help prevent kidney stones. · Drink plenty of fluids. If you have kidney, heart, or liver disease and have to limit fluids, talk with your doctor before you increase the amount of fluids you drink.   · Do not take more than the recommended daily dose of vitamins C and D.  · Limit the salt in your diet. · Eat a balanced diet that is not too high in protein. Follow-up care is a key part of your treatment and safety. Be sure to make and go to all appointments, and call your doctor if you are having problems. It's also a good idea to know your test results and keep a list of the medicines you take. Where can you learn more? Go to http://shaila-stuart.info/. Enter C138 in the search box to learn more about \"Learning About Diet for Kidney Stone Prevention. \"  Current as of: November 7, 2018  Content Version: 12.1  © 0047-1891 [employer]. Care instructions adapted under license by [employer] (which disclaims liability or warranty for this information). If you have questions about a medical condition or this instruction, always ask your healthcare professional. [address] any warranty or liability for your use of this information.

## 2019-09-10 NOTE — ED TRIAGE NOTES
Pt c/o urinary  frequency and left sided flank pain, pt was started on Cipro yesterday and has only taken 1 dose

## 2019-09-10 NOTE — ED PROVIDER NOTES
68-year-old female complaining of right flank pain and urinary frequency. She was seen by primary care physician yesterday and started on ciprofloxacin she is taken 1 pill. She comes in tonight because she still not feeling better. The history is provided by the patient. Urinary Frequency    This is a new problem. The current episode started yesterday. The problem occurs every urination. The problem has not changed since onset. The quality of the pain is described as burning. The pain is at a severity of 3/10. The pain is mild. There has been no fever. Associated symptoms include frequency and flank pain. She has tried antibiotics for the symptoms. Flank Pain           Past Medical History:   Diagnosis Date    Anemia 08/2018    Cancer of ascending colon ([facility] [address] 2018    Diabetes ([facility] [address]     insulin and oral agents; Last A1C 6.9 (8/29/2018); avg     Environmental allergies 9/12/2013    Thromboembolus ([facility] [address] 04/2018    right leg       Past Surgical History:   Procedure Laterality Date    HX CHOLECYSTECTOMY  1980's    HX LIPOMA RESECTION Right 1980's    HX OTHER SURGICAL  09/27/2018    filter placed to right groin         Family History:   Problem Relation Age of Onset    No Known Problems Mother     Dementia Father     Heart Disease Father        Social History     Socioeconomic History    Marital status:      Spouse name: Not on file    Number of children: Not on file    Years of education: Not on file    Highest education level: Not on file   Occupational History    Not on file   Social Needs    Financial resource strain: Not on file    Food insecurity:     Worry: Not on file     Inability: Not on file    Transportation needs:     Medical: Not on file     Non-medical: Not on file   Tobacco Use    Smoking status: Never Smoker    Smokeless tobacco: Never Used   Substance and Sexual Activity    Alcohol use: No    Drug use: No    Sexual activity: Not Currently   Lifestyle    Physical activity:     Days per week: Not on file     Minutes per session: Not on file    Stress: Not on file   Relationships    Social connections:     Talks on phone: Not on file     Gets together: Not on file     Attends Orthodox service: Not on file     Active member of club or organization: Not on file     Attends meetings of clubs or organizations: Not on file     Relationship status: Not on file    Intimate partner violence:     Fear of current or ex partner: Not on file     Emotionally abused: Not on file     Physically abused: Not on file     Forced sexual activity: Not on file   Other Topics Concern    Not on file   Social History Narrative    Pt lives alone. Her son lives in a house behind hers. She works as a  . She has one indoor dog. ALLERGIES: Biaxin [clarithromycin] and Cortisone    Review of Systems   Constitutional: Negative. Negative for activity change. HENT: Negative. Eyes: Negative. Respiratory: Negative. Cardiovascular: Negative. Gastrointestinal: Negative. Genitourinary: Positive for flank pain and frequency. Skin: Negative. Neurological: Negative. Psychiatric/Behavioral: Negative. All other systems reviewed and are negative. Vitals:    09/10/19 0214   BP: 128/68   Pulse: (!) 103   Resp: 16   Temp: 97.8 °F (36.6 °C)   SpO2: 98%   Weight: 88.5 kg (195 lb)   Height: 5' 3.5\" (1.613 m)            Physical Exam   Constitutional: She is oriented to person, place, and time. She appears well-developed and well-nourished. No distress. HENT:   Head: Normocephalic and atraumatic. Right Ear: External ear normal.   Left Ear: External ear normal.   Nose: Nose normal.   Mouth/Throat: Oropharynx is clear and moist. No oropharyngeal exudate. Eyes: Pupils are equal, round, and reactive to light. Conjunctivae and EOM are normal. Right eye exhibits no discharge. Left eye exhibits no discharge. No scleral icterus.    Neck: Normal range of motion. Neck supple. No JVD present. No tracheal deviation present. Cardiovascular: Normal rate, regular rhythm and intact distal pulses. Pulmonary/Chest: Effort normal and breath sounds normal. No stridor. No respiratory distress. She has no wheezes. She exhibits no tenderness. Abdominal: Soft. Bowel sounds are normal. She exhibits no distension and no mass. There is no tenderness. Musculoskeletal: Normal range of motion. She exhibits no edema or tenderness. Neurological: She is alert and oriented to person, place, and time. No cranial nerve deficit. Skin: Skin is warm and dry. No rash noted. She is not diaphoretic. No erythema. No pallor. Psychiatric: She has a normal mood and affect. Her behavior is normal. Thought content normal.   Nursing note and vitals reviewed. MDM  Number of Diagnoses or Management Options  Calculus of ureter: minor  Urinary tract infection with hematuria, site unspecified: minor  Diagnosis management comments: Patient has small punctate stones in her right kidney is some hydronephrosis on that side presumptive kidney stone in the ureter awaiting CT report. We will continue Cipro since she is only had one dose.        Amount and/or Complexity of Data Reviewed  Clinical lab tests: ordered and reviewed  Tests in the radiology section of CPT®: ordered and reviewed  Tests in the medicine section of CPT®: reviewed and ordered    Risk of Complications, Morbidity, and/or Mortality  Presenting problems: moderate  Diagnostic procedures: moderate  Management options: moderate           Procedures

## 2019-09-10 NOTE — LETTER
[address] [address] EMERGENCY DEPT 
[address] 
[facility] [address] [phone] Work/School Note Date: 9/10/2019 To Whom It May concern: 
 
Moris Toro was seen and treated today in the emergency room by the following provider(s): 
Attending Provider: Bay Morales MD.   
 
Moris Toro may return to work on 9/12/2019.  
 
Sincerely, 
 
 
 
 
Litzy Solis RN

## 2019-09-11 PROBLEM — E11.21 TYPE 2 DIABETES WITH NEPHROPATHY (HCC): Status: ACTIVE | Noted: 2019-09-11

## 2019-09-11 NOTE — H&P (VIEW-ONLY)
[facility]  [address]  [facility], [address]   [phone]    Wilfrid Onofre  : 1960    Chief Complaint   Patient presents with    New Patient    Kidney Stone          HPI     Wilfrid Onofre is a 62 y.o. female    Patient here today with complaints of new onset right flank pain. She reports the pain actually began over the weekend but came excruciating early yesterday morning. She had seen her primary care physician earlier in the week and they gave her an antibiotic for possible infection. She presented to the ER around 2 AM.  While in the ER she had a CT scan. CT revealed    IMPRESSION:  1. Right hydronephrosis, due to a 5 mm calculus at the ureteropelvic junction. 2. Negative for colitis, diverticulitis or bowel obstruction. 3. Fatty liver. Cholecystectomy. 4. Stable cystic lesion involving the pancreatic tail. She is here today to discuss CT findings and also intervention for the stone. She tells me she has not had previous stones. There is no family history of stone disease either. Looking over her medication she tells me she has been taking high doses of vitamin C and wonders if this might be the issue with her stones. She denies fever chills or gross hematuria. Her urine today is Pyridium stained. Past Medical History:   Diagnosis Date    Anemia 2018    Cancer of ascending colon ([address] 2018    Diabetes ([address]     insulin and oral agents; Last A1C 6.9 (2018); avg     Environmental allergies 2013    Thromboembolus ([address] 2018    right leg     Past Surgical History:   Procedure Laterality Date    HX CHOLECYSTECTOMY      HX LIPOMA RESECTION Right     HX OTHER SURGICAL  2018    filter placed to right groin    PART REMOVAL COLON W ANASTOMOSIS       Current Outpatient Medications   Medication Sig Dispense Refill    HYDROcodone-acetaminophen (NORCO) 7.5-325 mg per tablet Take 1 Tab by mouth two (2) times a day for 3 days. Max Daily Amount: 2 Tabs. 10 Tab 0    tamsulosin (FLOMAX) 0.4 mg capsule Take 1 Cap by mouth daily for 15 days. 15 Cap 0    ciprofloxacin HCl (CIPRO) 500 mg tablet Take 1 Tab by mouth two (2) times a day for 10 days. 20 Tab 0    metFORMIN (GLUCOPHAGE) 1,000 mg tablet Take 0.5 Tabs by mouth two (2) times daily (with meals). 60 Tab 2    glucose blood VI test strips (ACCU-CHEK LILLY PLUS TEST STRP) strip Use twice daily for management of Type 2 DM, Uncontrolled (250.02) 200 Strip 3    lancets (ACCU-CHEK SOFTCLIX LANCETS) misc Use twice daily for management of Type 2 DM uncontrolled (250.02) 200 Each 3    ascorbic acid, vitamin C, 500 mg cpER Take 500 mg by mouth daily.  ferrous sulfate (IRON) 325 mg (65 mg iron) EC tablet Take 325 mg by mouth daily. Indications: pt takes 1 tab PO daily      Insulin Needles, Disposable, 32 gauge x 1/4\" ndle 1 Each by Does Not Apply route daily. 100 Pen Needle 3    Blood-Glucose Meter (ACCU-CHEK LILLY PLUS METER) misc 1 Kit by Does Not Apply route two (2) times a day. For management of Type 2 DM Uncontrolled (250.02) 1 Each 3    ACETAMINOPHEN (TYLENOL 8 HOUR PO) Take  by mouth. Allergies   Allergen Reactions    Biaxin [Clarithromycin] Unknown (comments)    Cortisone Other (comments)     NUMBNESS IN THE LEG (SITE OF INJECTION)  Per pt, leg numbness.      Social History     Socioeconomic History    Marital status:      Spouse name: Not on file    Number of children: Not on file    Years of education: Not on file    Highest education level: Not on file   Occupational History    Not on file   Social Needs    Financial resource strain: Not on file    Food insecurity:     Worry: Not on file     Inability: Not on file    Transportation needs:     Medical: Not on file     Non-medical: Not on file   Tobacco Use    Smoking status: Never Smoker    Smokeless tobacco: Never Used   Substance and Sexual Activity    Alcohol use: No    Drug use: No    Sexual activity: Not Currently   Lifestyle    Physical activity:     Days per week: Not on file     Minutes per session: Not on file    Stress: Not on file   Relationships    Social connections:     Talks on phone: Not on file     Gets together: Not on file     Attends Mandaen service: Not on file     Active member of club or organization: Not on file     Attends meetings of clubs or organizations: Not on file     Relationship status: Not on file    Intimate partner violence:     Fear of current or ex partner: Not on file     Emotionally abused: Not on file     Physically abused: Not on file     Forced sexual activity: Not on file   Other Topics Concern    Not on file   Social History Narrative    Pt lives alone. Her son lives in a house behind hers. She works as a  . She has one indoor dog. Family History   Problem Relation Age of Onset    No Known Problems Mother     Dementia Father     Heart Disease Father     Hypertension Father     Kidney Disease Father        Review of Systems  Constitutional: Positive for appetite change and malaise/fatigue. Negative for fever, chills, headaches and weight loss. Skin:  Negative for skin lesions, rash and itching. Eyes:  Negative for visual disturbance, eye pain and eye discharge. ENT:  Negative for difficulty articulating words, pain swallowing, high frequency hearing loss and dry mouth. Respiratory:  Negative for cough, blood in sputum, shortness of breath and wheezing. Cardiovascular:  Negative for chest pain, hypertension, irregular heartbeat, leg pain, leg swelling, regular rate and rhythm and varicose veins. GI: Positive for nausea. Negative for vomiting, abdominal pain, blood in stool, constipation, diarrhea, indigestion and heartburn. Genitourinary: Positive for urinary burning, hematuria, flank pain, recurrent UTIs, straining and urgency.  Negative for history of urolithiasis, nocturia, slower stream, leakage w/ urge, frequent urination, incomplete emptying, sexually transmitted disease, menstrual problem, endometriosis, vaginal pain and hysterectomy. Number of pregnancies: 1. Number of births: 1. Musculoskeletal: Positive for back pain. Negative for bone pain, arthralgias, tenderness, muscle weakness and neck pain. Neurological:  Negative for dizziness, focal weakness, numbness, seizures and tremors. Psychological:  Negative for depression and psychiatric problem. Endocrine: Positive for excessive urination and fatigue. Negative for cold intolerance, thirst and heat intolerance. Hem/Lymphatic: Positive for frequent infections. Negative for easy bleeding and easy bruising. Urinalysis  UA - Dipstick  Results for orders placed or performed in visit on 09/11/19   AMB POC URINALYSIS DIP STICK AUTO W/O MICRO (PGU)     Status: None   Result Value Ref Range Status    Glucose (UA POC) 250  Negative mg/dL Final    Bilirubin (UA POC) Negative Negative Final    Ketones (UA POC) Negative Negative Final    Specific gravity (UA POC) 1.030 1.001 - 1.035 Final    Blood (UA POC) Moderate Negative Final    pH (UA POC) 5.0 4.6 - 8.0 Final    Protein (UA POC) 30  Negative Final    Urobilinogen (POC) 0.2 mg/dL  Final    Nitrites (UA POC) Positive Negative Final    Leukocyte esterase (UA POC) Small Negative Final       UA - Micro  WBC - 0  RBC - 0  Bacteria - 0  Epith - 0    PHYSICAL EXAM      Visit Vitals  /81   Pulse (!) 131   Temp 98.8 °F (37.1 °C) (Oral)   Ht 5' 3.5\" (1.613 m)   Wt 232 lb 6.4 oz (105.4 kg)   BMI 40.52 kg/m²       General appearance - normal,  in no distress  Mental status - alert, oriented to person, place, and time  Chest/Lung-  Heart sounds: regular rate and rhythm. Lungs clear to auscultation and normal respiratory effort. Abdomen - soft, nontender with out any rebound tenderness, BS present,  no masses. Musculoskeletal - normal gait and station.      Assessment and Plan    ICD-10-CM ICD-9-CM 1. Kidney stone N20.0 592.0 AMB POC URINALYSIS DIP STICK AUTO W/O MICRO (PGU)      AMB POC XRAY ABDOMEN 1 VIEW       KUB: Normal gas pattern. There are no calcifications seen within either kidney. There is a 5 to 6 mm calcification seen just below the right UPJ. Plan  KUB was discussed in detail. Patient would like to proceed with treatment options. We will schedule right ESWL next available this week. Treatment options with risk and benefits were discussed with patient in detail. Treatment options discussed including watchful waiting in hopes that the stone will pass, ESWL and ureteroscopy with laser lithotripsy. Plan is for right ESWL. Risk include but are not limited to bleeding, infection, renal injury requiring repair, risk of anesthesia and risk of incomplete fragmentation of the stone requiring further treatments such as ureteroscopy or repeat ESWL. Orders Placed This Encounter    AMB POC XRAY ABDOMEN 1 VIEW     Order Specific Question:   Reason for Exam     Answer:   kidney stones     Order Specific Question:   Is Patient Allergic to Contrast Dye? Answer:   Unknown    AMB POC URINALYSIS DIP STICK AUTO W/O MICRO (PGU)    HYDROcodone-acetaminophen (NORCO) 7.5-325 mg per tablet     Sig: Take 1 Tab by mouth every six (6) hours as needed for Pain for up to 5 days. Max Daily Amount: 4 Tabs. Dispense:  20 Tab     Refill:  0       SHAYNA Brooks Dr. is supervising physician today and he approves plan of care. Follow-up and Dispositions    · Return for after procedure. Elements of this note have been dictated using speech recognition software. Although reviewed, errors of speech recognition may have occurred.

## 2019-09-12 ENCOUNTER — ANESTHESIA EVENT (OUTPATIENT)
Dept: SURGERY | Age: 59
End: 2019-09-12
Payer: COMMERCIAL

## 2019-09-13 ENCOUNTER — ANESTHESIA (OUTPATIENT)
Dept: SURGERY | Age: 59
End: 2019-09-13
Payer: COMMERCIAL

## 2019-09-13 ENCOUNTER — HOSPITAL ENCOUNTER (OUTPATIENT)
Age: 59
Setting detail: OUTPATIENT SURGERY
Discharge: HOME | End: 2019-09-13
Attending: UROLOGY | Admitting: UROLOGY
Payer: COMMERCIAL

## 2019-09-13 VITALS
SYSTOLIC BLOOD PRESSURE: 130 MMHG | OXYGEN SATURATION: 95 % | RESPIRATION RATE: 14 BRPM | WEIGHT: 229 LBS | HEART RATE: 85 BPM | TEMPERATURE: 98 F | BODY MASS INDEX: 38.11 KG/M2 | DIASTOLIC BLOOD PRESSURE: 66 MMHG

## 2019-09-13 LAB
ANION GAP SERPL CALC-SCNC: 8 MMOL/L (ref 7–16)
BUN SERPL-MCNC: 15 MG/DL (ref 8–23)
CALCIUM SERPL-MCNC: 9 MG/DL (ref 8.3–10.4)
CHLORIDE SERPL-SCNC: 108 MMOL/L (ref 98–107)
CO2 SERPL-SCNC: 26 MMOL/L (ref 21–32)
CREAT SERPL-MCNC: 1.33 MG/DL (ref 0.6–1)
ERYTHROCYTE [DISTWIDTH] IN BLOOD BY AUTOMATED COUNT: 13.1 % (ref 11.9–14.6)
GLUCOSE BLD STRIP.AUTO-MCNC: 191 MG/DL (ref 65–100)
GLUCOSE SERPL-MCNC: 192 MG/DL (ref 65–100)
HCT VFR BLD AUTO: 36.2 % (ref 35.8–46.3)
HGB BLD-MCNC: 12.2 G/DL (ref 11.7–15.4)
MCH RBC QN AUTO: 31.2 PG (ref 26.1–32.9)
MCHC RBC AUTO-ENTMCNC: 33.7 G/DL (ref 31.4–35)
MCV RBC AUTO: 92.6 FL (ref 79.6–97.8)
NRBC # BLD: 0 K/UL (ref 0–0.2)
PLATELET # BLD AUTO: 245 K/UL (ref 150–450)
PMV BLD AUTO: 10.6 FL (ref 9.4–12.3)
POTASSIUM SERPL-SCNC: 3.5 MMOL/L (ref 3.5–5.1)
RBC # BLD AUTO: 3.91 M/UL (ref 4.05–5.2)
SODIUM SERPL-SCNC: 142 MMOL/L (ref 136–145)
WBC # BLD AUTO: 8.2 K/UL (ref 4.3–11.1)

## 2019-09-13 PROCEDURE — 80048 BASIC METABOLIC PNL TOTAL CA: CPT

## 2019-09-13 PROCEDURE — 77030010509 HC AIRWY LMA MSK TELE -A: Performed by: REGISTERED NURSE

## 2019-09-13 PROCEDURE — 74011250637 HC RX REV CODE- 250/637: Performed by: ANESTHESIOLOGY

## 2019-09-13 PROCEDURE — 82962 GLUCOSE BLOOD TEST: CPT

## 2019-09-13 PROCEDURE — 74011250636 HC RX REV CODE- 250/636: Performed by: UROLOGY

## 2019-09-13 PROCEDURE — 76060000033 HC ANESTHESIA 1 TO 1.5 HR: Performed by: UROLOGY

## 2019-09-13 PROCEDURE — 85027 COMPLETE CBC AUTOMATED: CPT

## 2019-09-13 PROCEDURE — 77030040361 HC SLV COMPR DVT MDII -B: Performed by: UROLOGY

## 2019-09-13 PROCEDURE — 74011250636 HC RX REV CODE- 250/636

## 2019-09-13 PROCEDURE — 50590 FRAGMENTING OF KIDNEY STONE: CPT | Performed by: UROLOGY

## 2019-09-13 PROCEDURE — 74011250636 HC RX REV CODE- 250/636: Performed by: ANESTHESIOLOGY

## 2019-09-13 PROCEDURE — 76210000063 HC OR PH I REC FIRST 0.5 HR: Performed by: UROLOGY

## 2019-09-13 PROCEDURE — 77030019908 HC STETH ESOPH SIMS -A: Performed by: REGISTERED NURSE

## 2019-09-13 PROCEDURE — 76010000149 HC OR TIME 1 TO 1.5 HR: Performed by: UROLOGY

## 2019-09-13 PROCEDURE — 76210000020 HC REC RM PH II FIRST 0.5 HR: Performed by: UROLOGY

## 2019-09-13 PROCEDURE — 74011000250 HC RX REV CODE- 250

## 2019-09-13 RX ORDER — MIDAZOLAM HYDROCHLORIDE 1 MG/ML
2 INJECTION, SOLUTION INTRAMUSCULAR; INTRAVENOUS ONCE
Status: DISCONTINUED | OUTPATIENT
Start: 2019-09-13 | End: 2019-09-13 | Stop reason: HOSPADM

## 2019-09-13 RX ORDER — SODIUM CHLORIDE, SODIUM LACTATE, POTASSIUM CHLORIDE, CALCIUM CHLORIDE 600; 310; 30; 20 MG/100ML; MG/100ML; MG/100ML; MG/100ML
75 INJECTION, SOLUTION INTRAVENOUS CONTINUOUS
Status: DISCONTINUED | OUTPATIENT
Start: 2019-09-13 | End: 2019-09-13 | Stop reason: HOSPADM

## 2019-09-13 RX ORDER — OXYCODONE HYDROCHLORIDE 5 MG/1
10 TABLET ORAL
Status: DISCONTINUED | OUTPATIENT
Start: 2019-09-13 | End: 2019-09-13 | Stop reason: HOSPADM

## 2019-09-13 RX ORDER — FENTANYL CITRATE 50 UG/ML
100 INJECTION, SOLUTION INTRAMUSCULAR; INTRAVENOUS ONCE
Status: DISCONTINUED | OUTPATIENT
Start: 2019-09-13 | End: 2019-09-13 | Stop reason: HOSPADM

## 2019-09-13 RX ORDER — OXYCODONE HYDROCHLORIDE 5 MG/1
5 TABLET ORAL
Status: DISCONTINUED | OUTPATIENT
Start: 2019-09-13 | End: 2019-09-13 | Stop reason: HOSPADM

## 2019-09-13 RX ORDER — LIDOCAINE HYDROCHLORIDE 20 MG/ML
INJECTION, SOLUTION EPIDURAL; INFILTRATION; INTRACAUDAL; PERINEURAL AS NEEDED
Status: DISCONTINUED | OUTPATIENT
Start: 2019-09-13 | End: 2019-09-13 | Stop reason: HOSPADM

## 2019-09-13 RX ORDER — ONDANSETRON 2 MG/ML
INJECTION INTRAMUSCULAR; INTRAVENOUS AS NEEDED
Status: DISCONTINUED | OUTPATIENT
Start: 2019-09-13 | End: 2019-09-13 | Stop reason: HOSPADM

## 2019-09-13 RX ORDER — CIPROFLOXACIN 2 MG/ML
400 INJECTION, SOLUTION INTRAVENOUS
Status: COMPLETED | OUTPATIENT
Start: 2019-09-13 | End: 2019-09-13

## 2019-09-13 RX ORDER — PROPOFOL 10 MG/ML
INJECTION, EMULSION INTRAVENOUS AS NEEDED
Status: DISCONTINUED | OUTPATIENT
Start: 2019-09-13 | End: 2019-09-13 | Stop reason: HOSPADM

## 2019-09-13 RX ORDER — HYDROMORPHONE HYDROCHLORIDE 2 MG/ML
0.5 INJECTION, SOLUTION INTRAMUSCULAR; INTRAVENOUS; SUBCUTANEOUS
Status: DISCONTINUED | OUTPATIENT
Start: 2019-09-13 | End: 2019-09-13 | Stop reason: HOSPADM

## 2019-09-13 RX ORDER — EPHEDRINE SULFATE 50 MG/ML
INJECTION, SOLUTION INTRAVENOUS AS NEEDED
Status: DISCONTINUED | OUTPATIENT
Start: 2019-09-13 | End: 2019-09-13 | Stop reason: HOSPADM

## 2019-09-13 RX ORDER — LIDOCAINE HYDROCHLORIDE 10 MG/ML
0.1 INJECTION INFILTRATION; PERINEURAL AS NEEDED
Status: DISCONTINUED | OUTPATIENT
Start: 2019-09-13 | End: 2019-09-13 | Stop reason: HOSPADM

## 2019-09-13 RX ORDER — FAMOTIDINE 20 MG/1
20 TABLET, FILM COATED ORAL ONCE
Status: COMPLETED | OUTPATIENT
Start: 2019-09-13 | End: 2019-09-13

## 2019-09-13 RX ORDER — DEXAMETHASONE SODIUM PHOSPHATE 4 MG/ML
INJECTION, SOLUTION INTRA-ARTICULAR; INTRALESIONAL; INTRAMUSCULAR; INTRAVENOUS; SOFT TISSUE AS NEEDED
Status: DISCONTINUED | OUTPATIENT
Start: 2019-09-13 | End: 2019-09-13 | Stop reason: HOSPADM

## 2019-09-13 RX ADMIN — EPHEDRINE SULFATE 10 MG: 50 INJECTION, SOLUTION INTRAVENOUS at 10:30

## 2019-09-13 RX ADMIN — ONDANSETRON 4 MG: 2 INJECTION INTRAMUSCULAR; INTRAVENOUS at 11:03

## 2019-09-13 RX ADMIN — CIPROFLOXACIN 400 MG: 2 INJECTION, SOLUTION INTRAVENOUS at 10:24

## 2019-09-13 RX ADMIN — LIDOCAINE HYDROCHLORIDE 100 MG: 20 INJECTION, SOLUTION EPIDURAL; INFILTRATION; INTRACAUDAL; PERINEURAL at 10:20

## 2019-09-13 RX ADMIN — FAMOTIDINE 20 MG: 20 TABLET ORAL at 09:29

## 2019-09-13 RX ADMIN — DEXAMETHASONE SODIUM PHOSPHATE 4 MG: 4 INJECTION, SOLUTION INTRA-ARTICULAR; INTRALESIONAL; INTRAMUSCULAR; INTRAVENOUS; SOFT TISSUE at 11:03

## 2019-09-13 RX ADMIN — EPHEDRINE SULFATE 10 MG: 50 INJECTION, SOLUTION INTRAVENOUS at 10:49

## 2019-09-13 RX ADMIN — SODIUM CHLORIDE, SODIUM LACTATE, POTASSIUM CHLORIDE, AND CALCIUM CHLORIDE 75 ML/HR: 600; 310; 30; 20 INJECTION, SOLUTION INTRAVENOUS at 09:30

## 2019-09-13 RX ADMIN — PROPOFOL 200 MG: 10 INJECTION, EMULSION INTRAVENOUS at 10:20

## 2019-09-13 NOTE — DISCHARGE INSTRUCTIONS
ACTIVITY  · As tolerated and as directed by your doctor. · Walking and mild exercise help to pass the stone fragments. DIET  · Clear liquids until no nausea and vomiting; then resume light diet for the first day  · Advance to regular diet on second day, unless your doctor orders otherwise. · If nauseated and/ or vomiting, call your doctor. · Drink at least 8 glasses of water a day (avoid caffeinated beverages). PAIN  · Take pain medication as directed. · Call your doctor if pain is NOT relieved by medication. · DO NOT take aspirin or blood thinners until directed by your doctor. CALL YOUR DOCTOR IF   · Expect blood-tinged urine. Call your doctor if it lasts more than 72 hours or if you cannot see through the urine. · Aches, chills, or fever of 101 degree F or above    Lithotripsy does not make your stone disappear. The treatment is meant to crush your stone so that the fragments can be passed. Strain your urine, save any fragments, and take them to your doctor. The fragments may not begin to pass for up to 1-2 months. You may experience slight bruising at the treated site. After general anesthesia or intravenous sedation, for 24 hours or while taking prescription Narcotics:  · Limit your activities  · A responsible adult needs to be with you for the next 24 hours  · Do not drive and operate hazardous machinery  · Do not make important personal or business decisions  · Do  not drink alcoholic beverages  · If you have not urinated within 8 hours after discharge, please contact your surgeon on call. · If you have sleep apnea and have a CPAP machine, please use it for all naps and sleeping. · Please use caution when taking narcotics and any of your home medications that may cause drowsiness. *  Please give a list of your current medications to your Primary Care Provider.     *  Please update this list whenever your medications are discontinued, doses are      changed, or new medications (including over-the-counter products) are added. *  Please carry medication information at all times in case of emergency situations. These are general instructions for a healthy lifestyle:    No smoking/ No tobacco products/ Avoid exposure to second hand smoke    Surgeon General's Warning:  Quitting smoking now greatly reduces serious risk to your health. Obesity, smoking, and sedentary lifestyle greatly increases your risk for illness    A healthy diet, regular physical exercise & weight monitoring are important for maintaining a healthy lifestyle    You may be retaining fluid if you have a history of heart failure or if you experience any of the following symptoms:  Weight gain of 3 pounds or more overnight or 5 pounds in a week, increased swelling in our hands or feet or shortness of breath while lying flat in bed. Please call your doctor as soon as you notice any of these symptoms; do not wait until your next office visit. Recognize signs and symptoms of STROKE:    F-face looks uneven    A-arms unable to move or move unevenly    S-speech slurred or non-existent    T-time-call 911 as soon as signs and symptoms begin-DO NOT go       Back to bed or wait to see if you get better-TIME IS BRAIN.

## 2019-09-13 NOTE — PERIOP NOTES
Preoperative flank skin condition: warm /dry/ intact  Lead shield: Yes  Patient ear protection: Yes   Gel applied to patient's flank and Lithotripsy head: Yes   Starting power level: 3  Shock start time (first  fluoroscopy): 1028  Shock stop time (last lithotripsy shock): 1114  Ending power level: 11  Total shocks: 4000  Total fluoroscopy time: 1.54 min  Postoperative flank skin condition: warm/ dry/ intact red

## 2019-09-13 NOTE — ANESTHESIA PREPROCEDURE EVALUATION
Anesthetic History               Review of Systems / Medical History  Patient summary reviewed, nursing notes reviewed and pertinent labs reviewed    Pulmonary                   Neuro/Psych              Cardiovascular                  Exercise tolerance: >4 METS     GI/Hepatic/Renal                Endo/Other    Diabetes: well controlled, type 2, using insulin    Obesity and cancer (colon)     Other Findings   Comments: Hx of DVT           Physical Exam    Airway  Mallampati: I  TM Distance: 4 - 6 cm  Neck ROM: normal range of motion   Mouth opening: Normal     Cardiovascular  Regular rate and rhythm,  S1 and S2 normal,  no murmur, click, rub, or gallop             Dental  No notable dental hx       Pulmonary  Breath sounds clear to auscultation               Abdominal  GI exam deferred       Other Findings            Anesthetic Plan    ASA: 3  Anesthesia type: general            Anesthetic plan and risks discussed with: Patient    Daughter present

## 2019-09-13 NOTE — ANESTHESIA POSTPROCEDURE EVALUATION
Procedure(s):  RIGHT LITHOTRIPSY EXTRACORPOREAL SHOCKWAVE ESWL. general    Anesthesia Post Evaluation      Multimodal analgesia: multimodal analgesia not used between 6 hours prior to anesthesia start to PACU discharge  Patient location during evaluation: PACU  Patient participation: complete - patient participated  Level of consciousness: awake and alert  Pain management: adequate  Airway patency: patent  Anesthetic complications: no  Cardiovascular status: hemodynamically stable  Respiratory status: acceptable  Hydration status: acceptable        Vitals Value Taken Time   /67 9/13/2019 11:39 AM   Temp 36.7 °C (98.1 °F) 9/13/2019 11:23 AM   Pulse 86 9/13/2019 11:40 AM   Resp 14 9/13/2019 11:39 AM   SpO2 94 % 9/13/2019 11:40 AM   Vitals shown include unvalidated device data.

## 2019-09-13 NOTE — OP NOTES
Operative Note                Patient: Allan Lundy, [id number]    Date of Surgery: 09/13/19    Preoperative Diagnosis: 5 mm R mid ureteral stone    Postoperative Diagnosis:  same    Surgeon(s) and Role:     * Nick Collier MD - Primary     Anesthesia:  General     Procedure: Procedure(s):  Right EXTRACORPORAL SHOCKWAVE LITHOTRIPSY (ESWL)     Indications:     Discussed the risk of surgery including infection, hematoma, bleeding, recurrence or persistence of symptoms or stone, and the risks of general anesthetic. The patient understands the risks, any and all questions were answered to the patient's satisfaction and they freely signed the consent for operation. Procedure in Detail:  Patient was taken to the lithotripsy suite. Anesthesia was induced via the anesthesiology service. Using flouroscopy for guidance, a total of 3000 shocks were delivered in a non-gaited fashion. No cardiac ectopy was experienced. Shock rate was begun at 60 shocks per minute and then increased to 90 shocks per minute. Energy level was begun at 3 and gradually increased to a maximum of 11. Findings: Patient tolerated the procedure well. At the end of the procedure, the stone appeared to have fractured.       Estimated Blood Loss:  none    Specimens: * No specimens in log *             Drains: none                 Implants: * No implants in log *           Signed By: Joe Fontaine MD

## 2019-09-13 NOTE — INTERVAL H&P NOTE
H&P Update:  Suman Ortiz was seen and examined. History and physical has been reviewed. The patient has been examined.  There have been no significant clinical changes since the completion of the originally dated History and Physical.

## 2019-09-24 ENCOUNTER — APPOINTMENT (OUTPATIENT)
Dept: ULTRASOUND IMAGING | Age: 59
End: 2019-09-24
Attending: EMERGENCY MEDICINE
Payer: COMMERCIAL

## 2019-09-24 ENCOUNTER — HOSPITAL ENCOUNTER (EMERGENCY)
Age: 59
Discharge: HOME | End: 2019-09-24
Attending: EMERGENCY MEDICINE
Payer: COMMERCIAL

## 2019-09-24 VITALS
OXYGEN SATURATION: 100 % | WEIGHT: 229 LBS | DIASTOLIC BLOOD PRESSURE: 85 MMHG | HEIGHT: 64 IN | TEMPERATURE: 98 F | BODY MASS INDEX: 39.09 KG/M2 | HEART RATE: 82 BPM | SYSTOLIC BLOOD PRESSURE: 142 MMHG | RESPIRATION RATE: 16 BRPM

## 2019-09-24 DIAGNOSIS — I82.4Y2 ACUTE DEEP VEIN THROMBOSIS (DVT) OF PROXIMAL VEIN OF LEFT LOWER EXTREMITY (HCC): Primary | ICD-10-CM

## 2019-09-24 LAB
ALBUMIN SERPL-MCNC: 3.4 G/DL (ref 3.2–4.6)
ALBUMIN/GLOB SERPL: 0.7 {RATIO} (ref 1.2–3.5)
ALP SERPL-CCNC: 123 U/L (ref 50–136)
ALT SERPL-CCNC: 31 U/L (ref 12–65)
ANION GAP SERPL CALC-SCNC: 6 MMOL/L (ref 7–16)
AST SERPL-CCNC: 27 U/L (ref 15–37)
BASOPHILS # BLD: 0.1 K/UL (ref 0–0.2)
BASOPHILS NFR BLD: 0 % (ref 0–2)
BILIRUB SERPL-MCNC: 1.1 MG/DL (ref 0.2–1.1)
BUN SERPL-MCNC: 10 MG/DL (ref 8–23)
CALCIUM SERPL-MCNC: 9.4 MG/DL (ref 8.3–10.4)
CHLORIDE SERPL-SCNC: 108 MMOL/L (ref 98–107)
CO2 SERPL-SCNC: 26 MMOL/L (ref 21–32)
CREAT SERPL-MCNC: 0.85 MG/DL (ref 0.6–1)
DIFFERENTIAL METHOD BLD: ABNORMAL
EOSINOPHIL # BLD: 0.2 K/UL (ref 0–0.8)
EOSINOPHIL NFR BLD: 1 % (ref 0.5–7.8)
ERYTHROCYTE [DISTWIDTH] IN BLOOD BY AUTOMATED COUNT: 13 % (ref 11.9–14.6)
GLOBULIN SER CALC-MCNC: 4.6 G/DL (ref 2.3–3.5)
GLUCOSE SERPL-MCNC: 244 MG/DL (ref 65–100)
HCT VFR BLD AUTO: 39.7 % (ref 35.8–46.3)
HGB BLD-MCNC: 13.3 G/DL (ref 11.7–15.4)
IMM GRANULOCYTES # BLD AUTO: 0.1 K/UL (ref 0–0.5)
IMM GRANULOCYTES NFR BLD AUTO: 1 % (ref 0–5)
LYMPHOCYTES # BLD: 2 K/UL (ref 0.5–4.6)
LYMPHOCYTES NFR BLD: 13 % (ref 13–44)
MCH RBC QN AUTO: 31.2 PG (ref 26.1–32.9)
MCHC RBC AUTO-ENTMCNC: 33.5 G/DL (ref 31.4–35)
MCV RBC AUTO: 93.2 FL (ref 79.6–97.8)
MONOCYTES # BLD: 1.2 K/UL (ref 0.1–1.3)
MONOCYTES NFR BLD: 8 % (ref 4–12)
NEUTS SEG # BLD: 11.5 K/UL (ref 1.7–8.2)
NEUTS SEG NFR BLD: 77 % (ref 43–78)
NRBC # BLD: 0 K/UL (ref 0–0.2)
PLATELET # BLD AUTO: 245 K/UL (ref 150–450)
PMV BLD AUTO: 11.3 FL (ref 9.4–12.3)
POTASSIUM SERPL-SCNC: 4.2 MMOL/L (ref 3.5–5.1)
PROT SERPL-MCNC: 8 G/DL (ref 6.3–8.2)
RBC # BLD AUTO: 4.26 M/UL (ref 4.05–5.2)
SODIUM SERPL-SCNC: 140 MMOL/L (ref 136–145)
TSH SERPL DL<=0.005 MIU/L-ACNC: 2.61 UIU/ML (ref 0.36–3.74)
WBC # BLD AUTO: 14.9 K/UL (ref 4.3–11.1)

## 2019-09-24 PROCEDURE — 85025 COMPLETE CBC W/AUTO DIFF WBC: CPT

## 2019-09-24 PROCEDURE — 96361 HYDRATE IV INFUSION ADD-ON: CPT | Performed by: EMERGENCY MEDICINE

## 2019-09-24 PROCEDURE — 80053 COMPREHEN METABOLIC PANEL: CPT

## 2019-09-24 PROCEDURE — 96375 TX/PRO/DX INJ NEW DRUG ADDON: CPT | Performed by: EMERGENCY MEDICINE

## 2019-09-24 PROCEDURE — 74011250637 HC RX REV CODE- 250/637: Performed by: EMERGENCY MEDICINE

## 2019-09-24 PROCEDURE — 99283 EMERGENCY DEPT VISIT LOW MDM: CPT | Performed by: EMERGENCY MEDICINE

## 2019-09-24 PROCEDURE — 74011250636 HC RX REV CODE- 250/636: Performed by: EMERGENCY MEDICINE

## 2019-09-24 PROCEDURE — 96374 THER/PROPH/DIAG INJ IV PUSH: CPT | Performed by: EMERGENCY MEDICINE

## 2019-09-24 PROCEDURE — 93971 EXTREMITY STUDY: CPT

## 2019-09-24 PROCEDURE — 84443 ASSAY THYROID STIM HORMONE: CPT

## 2019-09-24 RX ORDER — TRAMADOL HYDROCHLORIDE 50 MG/1
50 TABLET ORAL
Qty: 12 TAB | Refills: 0 | Status: SHIPPED | OUTPATIENT
Start: 2019-09-24 | End: 2019-09-27

## 2019-09-24 RX ORDER — MORPHINE SULFATE 2 MG/ML
2 INJECTION, SOLUTION INTRAMUSCULAR; INTRAVENOUS
Status: COMPLETED | OUTPATIENT
Start: 2019-09-24 | End: 2019-09-24

## 2019-09-24 RX ORDER — ONDANSETRON 2 MG/ML
4 INJECTION INTRAMUSCULAR; INTRAVENOUS
Status: COMPLETED | OUTPATIENT
Start: 2019-09-24 | End: 2019-09-24

## 2019-09-24 RX ADMIN — RIVAROXABAN 15 MG: 15 TABLET, FILM COATED ORAL at 10:08

## 2019-09-24 RX ADMIN — MORPHINE SULFATE 2 MG: 2 INJECTION, SOLUTION INTRAMUSCULAR; INTRAVENOUS at 08:00

## 2019-09-24 RX ADMIN — SODIUM CHLORIDE 1000 ML: 900 INJECTION, SOLUTION INTRAVENOUS at 07:59

## 2019-09-24 RX ADMIN — ONDANSETRON 4 MG: 2 INJECTION INTRAMUSCULAR; INTRAVENOUS at 07:59

## 2019-09-24 NOTE — ED NOTES
I have reviewed discharge instructions with the patient. The patient verbalized understanding. Patient left ED via Discharge Method: ambulatory to Home with self. Opportunity for questions and clarification provided. Patient given 2 scripts. To continue your aftercare when you leave the hospital, you may receive an automated call from our care team to check in on how you are doing. This is a free service and part of our promise to provide the best care and service to meet your aftercare needs.  If you have questions, or wish to unsubscribe from this service please call [phone]. Thank you for Choosing our [facility] Emergency Department.

## 2019-09-24 NOTE — PROGRESS NOTES
Unable to reach MD or Rn on phone. Prelim ultrasound result is postitive for DVT. Please see final imaging report for details.

## 2019-09-24 NOTE — ED TRIAGE NOTES
Patient reports left calf pain and swelling since yesterday. History of blood clot 1 year ago. Not currently taking blood thinners.  Tachycardic in triage, [phone]

## 2019-09-24 NOTE — ED PROVIDER NOTES
The history is provided by the patient and a friend. Leg Swelling    This is a new problem. The current episode started 12 to 24 hours ago. The problem occurs constantly. The problem has been gradually worsening. The pain is present in the left lower leg. The quality of the pain is described as aching. The pain is moderate. Associated symptoms include limited range of motion and stiffness. Pertinent negatives include no numbness, no tingling, no itching, no back pain and no neck pain. The symptoms are aggravated by palpation and standing. She has tried rest for the symptoms. The treatment provided mild relief. There has been no history of extremity trauma. Past Medical History:   Diagnosis Date    Anemia 08/2018    PO iron daily-no recent infusions    Cancer of ascending colon ([address] 2018    Environmental allergies 9/12/2013    History of colon cancer 2018    History of DVT (deep vein thrombosis) 04/2018    right lower ext.     Kidney stone     Type 2 diabetes mellitus (HCC)     oral agent only/AVG BS:/no s.s of hypoglycemia/Last A1c: not sure       Past Surgical History:   Procedure Laterality Date    HX CHOLECYSTECTOMY  18's    HX COLONOSCOPY      HX LIPOMA RESECTION Right 1980's    HX OTHER SURGICAL  09/27/2018    filter placed to right groin    HX WISDOM TEETH EXTRACTION      PART REMOVAL COLON W ANASTOMOSIS           Family History:   Problem Relation Age of Onset    No Known Problems Mother     Dementia Father     Heart Disease Father     Hypertension Father     Kidney Disease Father        Social History     Socioeconomic History    Marital status:      Spouse name: Not on file    Number of children: Not on file    Years of education: Not on file    Highest education level: Not on file   Occupational History    Not on file   Social Needs    Financial resource strain: Not on file    Food insecurity:     Worry: Not on file     Inability: Not on file   Radha Daugherty Transportation needs:     Medical: Not on file     Non-medical: Not on file   Tobacco Use    Smoking status: Never Smoker    Smokeless tobacco: Never Used   Substance and Sexual Activity    Alcohol use: No    Drug use: No    Sexual activity: Not Currently   Lifestyle    Physical activity:     Days per week: Not on file     Minutes per session: Not on file    Stress: Not on file   Relationships    Social connections:     Talks on phone: Not on file     Gets together: Not on file     Attends [employer] service: Not on file     Active member of club or organization: Not on file     Attends meetings of clubs or organizations: Not on file     Relationship status: Not on file    Intimate partner violence:     Fear of current or ex partner: Not on file     Emotionally abused: Not on file     Physically abused: Not on file     Forced sexual activity: Not on file   Other Topics Concern    Not on file   Social History Narrative    Pt lives alone. Her son lives in a house behind hers. She works as a  . She has one indoor dog. ALLERGIES: Biaxin [clarithromycin] and Cortisone    Review of Systems   Constitutional: Negative for chills and fever. Respiratory: Negative for chest tightness and shortness of breath. Cardiovascular: Positive for leg swelling. Negative for chest pain and palpitations. Gastrointestinal: Negative for abdominal pain, nausea and vomiting. Musculoskeletal: Positive for stiffness. Negative for back pain and neck pain. Skin: Negative for itching. Neurological: Negative for tingling and numbness. All other systems reviewed and are negative. Vitals:    09/24/19 0729   BP: 139/66   Pulse: (!) 119   Resp: 16   Temp: 98.2 °F (36.8 °C)   SpO2: 98%   Weight: 103.9 kg (229 lb)   Height: 5' 4\" (1.626 m)            Physical Exam   Constitutional: She is oriented to person, place, and time. She appears well-developed and well-nourished. No distress.    HENT: Head: Normocephalic and atraumatic. Right Ear: External ear normal.   Left Ear: External ear normal.   Mouth/Throat: Oropharynx is clear and moist.   Eyes: Pupils are equal, round, and reactive to light. Conjunctivae and EOM are normal.   Neck: Normal range of motion. Neck supple. Cardiovascular: Regular rhythm, normal heart sounds and intact distal pulses. Tachycardia present. Exam reveals no gallop and no friction rub. No murmur heard. Pulmonary/Chest: Effort normal and breath sounds normal.   Abdominal: Soft. Bowel sounds are normal. There is no tenderness. Musculoskeletal: She exhibits no edema. Left lower leg: She exhibits tenderness and swelling. She exhibits no edema, no deformity and no laceration. Neurological: She is alert and oriented to person, place, and time. Skin: Skin is warm and dry. Capillary refill takes less than 2 seconds. Psychiatric: She has a normal mood and affect. Nursing note and vitals reviewed. MDM  Number of Diagnoses or Management Options  Acute deep vein thrombosis (DVT) of proximal vein of left lower extremity ([address]): new and requires workup     Amount and/or Complexity of Data Reviewed  Clinical lab tests: ordered and reviewed  Tests in the radiology section of CPT®: ordered and reviewed  Review and summarize past medical records: yes (Palak Ahmadi NP  Nurse Practitioner  General Surgery  Discharge Summary  Attested  Date of Service:  10/12/18 0830          Attestation signed by Raj Ashby MD at 10/12/18 1235     I have seen and examined the patient with the Nurse Practitioner and agree with the above assessment and plan.     Michelle Robertson.  Fred Flores MD              Hide copied text    Hover for details    Physician Discharge Summary     Patient ID:  Anat Pimentel  [id number]  59 y.o.  1960     Allergies: Biaxin (clarithromycin) and Cortisone     HPI: Danial Mcmahan is G 42 y.o. female who returns the office for continued planning for her newly diagnosed right-sided colon cancer.  Since her last visit, she was evaluated by pulmonary who would like to keep her anticoagulated.  She will have her Eliquis held an appropriate time preoperatively and maintained on a Lovenox bridge until the day prior to surgery.  She was also set up for vena cava filter which was placed on 9/27.  She did well with this procedure. Cristal Pfeiffer of her CT scan with radiology confirmed my finding of the mass being visible on CT though not mentioned in the report from [facility].  The mass is in the mid a sending colon.  This will be amenable to right hemicolectomy. Hiren Benitez does have a history of open cholecystectomy via right subcostal incision.  Her right leg swelling has almost completely resolved.     She was referred by Dr. Kely Armenta for hepatic flexure colon cancer. Johnny Evans had an EGD and colonoscopy on 9/10/2018 for evaluation of anemia.  Her hemoglobin had dropped to 6. 1.  She had been on Eliquis since April after diagnosis of bilateral pulmonary embolus with right heart strain.  This started with a right lower extremity DVT.  She was treated with catheter thrombolysis.  She had been doing well but became short of breath and was noted to be anemic when her workup moved to endoscopy.  A large hepatic flexure lesion was identified.  Distal tattoo was placed.  No other polyps were identified.  On biopsy, this was found to be adenocarcinoma. Hiren Benitez is referred for surgical management.  She has been receiving blood transfusions and iron replacement.  She remains on Eliquis.  She is [facility] by [facility] pulmonary.  She has also been seen by Dr. Natacha Winston for hematology workup. Hiren Benitez has no family history of colon cancer.  She had not had a prior colonoscopy.  She had not seen any blood per rectum.  No significant findings were found at EGD. Hiren Benitez is also being followed for a pancreatic tail lesion.  This has been extensively evaluated including EUS with biopsy prior to her PE.  This is felt to be a benign lesion and is being followed up. Leona Fish is most likely to be considered a pseudocyst though CT at [facility] as part of her anemia workup felt it could represent a mucinous neoplasm.     Admit Date: 10/9/2018     Discharge Date: 10/12/2018      )           Procedures    The patient was observed in the ED. unclear whether the patient's DVT is secondary to her cystoscopy with lithotripsy last week, or related to her recent history of colon CA. According the patient she is scheduled for repeat CT in November, but they believe they got the entire cancer. Patient was instructed to follow-up with her hematologist/oncologist for further dosing of Xarelto or if needed/desired switching to Eliquis. Results Reviewed:      Recent Results (from the past 24 hour(s))   CBC WITH AUTOMATED DIFF    Collection Time: 09/24/19  7:33 AM   Result Value Ref Range    WBC 14.9 (H) 4.3 - 11.1 K/uL    RBC 4.26 4.05 - 5.2 M/uL    HGB 13.3 11.7 - 15.4 g/dL    HCT 39.7 35.8 - 46.3 %    MCV 93.2 79.6 - 97.8 FL    MCH 31.2 26.1 - 32.9 PG    MCHC 33.5 31.4 - 35.0 g/dL    RDW 13.0 11.9 - 14.6 %    PLATELET 263 505 - 050 K/uL    MPV 11.3 9.4 - 12.3 FL    ABSOLUTE NRBC 0.00 0.0 - 0.2 K/uL    DF AUTOMATED      NEUTROPHILS 77 43 - 78 %    LYMPHOCYTES 13 13 - 44 %    MONOCYTES 8 4.0 - 12.0 %    EOSINOPHILS 1 0.5 - 7.8 %    BASOPHILS 0 0.0 - 2.0 %    IMMATURE GRANULOCYTES 1 0.0 - 5.0 %    ABS. NEUTROPHILS 11.5 (H) 1.7 - 8.2 K/UL    ABS. LYMPHOCYTES 2.0 0.5 - 4.6 K/UL    ABS. MONOCYTES 1.2 0.1 - 1.3 K/UL    ABS. EOSINOPHILS 0.2 0.0 - 0.8 K/UL    ABS. BASOPHILS 0.1 0.0 - 0.2 K/UL    ABS. IMM.  GRANS. 0.1 0.0 - 0.5 K/UL   METABOLIC PANEL, COMPREHENSIVE    Collection Time: 09/24/19  7:33 AM   Result Value Ref Range    Sodium 140 136 - 145 mmol/L    Potassium 4.2 3.5 - 5.1 mmol/L    Chloride 108 (H) 98 - 107 mmol/L    CO2 26 21 - 32 mmol/L    Anion gap 6 (L) 7 - 16 mmol/L    Glucose 244 (H) 65 - 100 mg/dL    BUN 10 8 - 23 MG/DL    Creatinine 0.85 0.6 - 1.0 MG/DL    GFR est AA >60 >60 ml/min/1.73m2    GFR est non-AA >60 >60 ml/min/1.73m2    Calcium 9.4 8.3 - 10.4 MG/DL    Bilirubin, total 1.1 0.2 - 1.1 MG/DL    ALT (SGPT) 31 12 - 65 U/L    AST (SGOT) 27 15 - 37 U/L    Alk. phosphatase 123 50 - 136 U/L    Protein, total 8.0 6.3 - 8.2 g/dL    Albumin 3.4 3.2 - 4.6 g/dL    Globulin 4.6 (H) 2.3 - 3.5 g/dL    A-G Ratio 0.7 (L) 1.2 - 3.5       DUPLEX LOWER EXT VENOUS LEFT   Final Result   IMPRESSION: Positive left lower extremity DVT present as described. 689          I discussed the results of all labs, procedures, radiographs, and treatments with the patient and available family. Treatment plan is agreed upon and the patient is ready for discharge. All voiced understanding of the discharge plan and medication instructions or changes as appropriate. Questions about treatment in the ED were answered. All were encouraged to return should symptoms worsen or new problems develop.

## 2019-09-24 NOTE — DISCHARGE INSTRUCTIONS
Patient Education        Deep Vein Thrombosis: Care Instructions  Your Care Instructions    A deep vein thrombosis (DVT) is a blood clot in certain veins of the legs, pelvis, or arms. Blood clots in these veins need to be treated because they can get bigger, break loose, and travel through the bloodstream to the lungs. A blood clot in a lung can be life-threatening. The doctor may have given you a blood thinner (anticoagulant). A blood thinner can stop the blood clot from growing larger and prevent new clots from forming. You will need to take a blood thinner for 3 to 6 months or longer. The doctor has checked you carefully, but problems can develop later. If you notice any problems or new symptoms, get medical treatment right away. Follow-up care is a key part of your treatment and safety. Be sure to make and go to all appointments, and call your doctor if you are having problems. It's also a good idea to know your test results and keep a list of the medicines you take. How can you care for yourself at home? · Take your medicines exactly as prescribed. Call your doctor if you think you are having a problem with your medicine. · If you are taking a blood thinner, be sure you get instructions about how to take your medicine safely. Blood thinners can cause serious bleeding problems. · Wear compression stockings if your doctor recommends them. These stockings are tighter at the feet than on the legs. They may reduce pain and swelling in your legs. But there are different types of stockings, and they need to fit right. So your doctor will recommend what you need. · When you sit, use a pillow to raise the arm or leg that has the blood clot. Try to keep it above the level of your heart. When should you call for help? Call 911 anytime you think you may need emergency care.  For example, call if:    · You passed out (lost consciousness).     · You have symptoms of a blood clot in your lung (called a pulmonary embolism). These include:  ? Sudden chest pain. ? Trouble breathing. ? Coughing up blood.    Call your doctor now or seek immediate medical care if:    · You have new or worse trouble breathing.     · You are dizzy or lightheaded, or you feel like you may faint.     · You have symptoms of a blood clot in your arm or leg. These may include:  ? Pain in the arm, calf, back of the knee, thigh, or groin. ? Redness and swelling in the arm, leg, or groin.    Watch closely for changes in your health, and be sure to contact your doctor if:    · You do not get better as expected. Where can you learn more? Go to http://shaila-stuart.info/. Enter Z016 in the search box to learn more about \"Deep Vein Thrombosis: Care Instructions. \"  Current as of: September 26, 2018  Content Version: 12.2  © 7363-9869 [facility]. Care instructions adapted under license by [employer] (which disclaims liability or warranty for this information). If you have questions about a medical condition or this instruction, always ask your healthcare professional. [place] [address] any warranty or liability for your use of this information.

## 2019-12-06 ENCOUNTER — HOSPITAL ENCOUNTER (OUTPATIENT)
Dept: CT IMAGING | Age: 59
Discharge: HOME | End: 2019-12-06
Attending: INTERNAL MEDICINE
Payer: COMMERCIAL

## 2019-12-06 DIAGNOSIS — C18.2 MALIGNANT NEOPLASM OF ASCENDING COLON (HCC): ICD-10-CM

## 2019-12-06 LAB — CREAT BLD-MCNC: 0.7 MG/DL (ref 0.8–1.5)

## 2019-12-06 PROCEDURE — 74011000258 HC RX REV CODE- 258: Performed by: INTERNAL MEDICINE

## 2019-12-06 PROCEDURE — 82565 ASSAY OF CREATININE: CPT

## 2019-12-06 PROCEDURE — 74177 CT ABD & PELVIS W/CONTRAST: CPT

## 2019-12-06 PROCEDURE — 74011636320 HC RX REV CODE- 636/320: Performed by: INTERNAL MEDICINE

## 2019-12-06 RX ORDER — SODIUM CHLORIDE 0.9 % (FLUSH) 0.9 %
10 SYRINGE (ML) INJECTION
Status: COMPLETED | OUTPATIENT
Start: 2019-12-06 | End: 2019-12-06

## 2019-12-06 RX ADMIN — SODIUM CHLORIDE 100 ML: 900 INJECTION, SOLUTION INTRAVENOUS at 14:22

## 2019-12-06 RX ADMIN — DIATRIZOATE MEGLUMINE AND DIATRIZOATE SODIUM 15 ML: 660; 100 LIQUID ORAL; RECTAL at 14:22

## 2019-12-06 RX ADMIN — Medication 10 ML: at 14:22

## 2019-12-06 RX ADMIN — IOPAMIDOL 100 ML: 755 INJECTION, SOLUTION INTRAVENOUS at 14:22

## 2019-12-30 ENCOUNTER — HOSPITAL ENCOUNTER (OUTPATIENT)
Dept: LAB | Age: 59
Discharge: HOME | End: 2019-12-30
Payer: COMMERCIAL

## 2019-12-30 DIAGNOSIS — C18.2 MALIGNANT NEOPLASM OF ASCENDING COLON (HCC): ICD-10-CM

## 2019-12-30 DIAGNOSIS — D50.9 IRON DEFICIENCY ANEMIA, UNSPECIFIED IRON DEFICIENCY ANEMIA TYPE: ICD-10-CM

## 2019-12-30 LAB
ALBUMIN SERPL-MCNC: 3.2 G/DL (ref 3.2–4.6)
ALBUMIN/GLOB SERPL: 0.8 {RATIO} (ref 1.2–3.5)
ALP SERPL-CCNC: 103 U/L (ref 50–136)
ALT SERPL-CCNC: 26 U/L (ref 12–65)
ANION GAP SERPL CALC-SCNC: 7 MMOL/L (ref 7–16)
AST SERPL-CCNC: 14 U/L (ref 15–37)
BASOPHILS # BLD: 0.1 K/UL (ref 0–0.2)
BASOPHILS NFR BLD: 1 % (ref 0–2)
BILIRUB SERPL-MCNC: 0.7 MG/DL (ref 0.2–1.1)
BUN SERPL-MCNC: 13 MG/DL (ref 8–23)
CALCIUM SERPL-MCNC: 9.2 MG/DL (ref 8.3–10.4)
CEA SERPL-MCNC: 2.8 NG/ML (ref 0–3)
CHLORIDE SERPL-SCNC: 103 MMOL/L (ref 98–107)
CO2 SERPL-SCNC: 27 MMOL/L (ref 21–32)
CREAT SERPL-MCNC: 1.08 MG/DL (ref 0.6–1)
DIFFERENTIAL METHOD BLD: NORMAL
EOSINOPHIL # BLD: 0.1 K/UL (ref 0–0.8)
EOSINOPHIL NFR BLD: 1 % (ref 0.5–7.8)
ERYTHROCYTE [DISTWIDTH] IN BLOOD BY AUTOMATED COUNT: 12.7 % (ref 11.9–14.6)
FERRITIN SERPL-MCNC: 22 NG/ML (ref 8–388)
GLOBULIN SER CALC-MCNC: 4 G/DL (ref 2.3–3.5)
GLUCOSE SERPL-MCNC: 447 MG/DL (ref 65–100)
HCT VFR BLD AUTO: 40.3 % (ref 35.8–46.3)
HGB BLD-MCNC: 13.6 G/DL (ref 11.7–15.4)
IMM GRANULOCYTES # BLD AUTO: 0 K/UL (ref 0–0.5)
IMM GRANULOCYTES NFR BLD AUTO: 0 % (ref 0–5)
IRON SATN MFR SERPL: 21 %
IRON SERPL-MCNC: 61 UG/DL (ref 35–150)
LYMPHOCYTES # BLD: 2.4 K/UL (ref 0.5–4.6)
LYMPHOCYTES NFR BLD: 30 % (ref 13–44)
MCH RBC QN AUTO: 31.5 PG (ref 26.1–32.9)
MCHC RBC AUTO-ENTMCNC: 33.7 G/DL (ref 31.4–35)
MCV RBC AUTO: 93.3 FL (ref 79.6–97.8)
MONOCYTES # BLD: 0.7 K/UL (ref 0.1–1.3)
MONOCYTES NFR BLD: 8 % (ref 4–12)
NEUTS SEG # BLD: 4.8 K/UL (ref 1.7–8.2)
NEUTS SEG NFR BLD: 60 % (ref 43–78)
NRBC # BLD: 0 K/UL (ref 0–0.2)
PLATELET # BLD AUTO: 275 K/UL (ref 150–450)
PMV BLD AUTO: 10.5 FL (ref 9.4–12.3)
POTASSIUM SERPL-SCNC: 4.2 MMOL/L (ref 3.5–5.1)
PROT SERPL-MCNC: 7.2 G/DL (ref 6.3–8.2)
RBC # BLD AUTO: 4.32 M/UL (ref 4.05–5.25)
SODIUM SERPL-SCNC: 137 MMOL/L (ref 136–145)
TIBC SERPL-MCNC: 288 UG/DL (ref 250–450)
WBC # BLD AUTO: 8.1 K/UL (ref 4.3–11.1)

## 2019-12-30 PROCEDURE — 85025 COMPLETE CBC W/AUTO DIFF WBC: CPT

## 2019-12-30 PROCEDURE — 80053 COMPREHEN METABOLIC PANEL: CPT

## 2019-12-30 PROCEDURE — 82728 ASSAY OF FERRITIN: CPT

## 2019-12-30 PROCEDURE — 83540 ASSAY OF IRON: CPT

## 2019-12-30 PROCEDURE — 82378 CARCINOEMBRYONIC ANTIGEN: CPT

## 2019-12-30 PROCEDURE — 36415 COLL VENOUS BLD VENIPUNCTURE: CPT

## 2020-09-02 ENCOUNTER — TELEPHONE (OUTPATIENT)
Dept: NUTRITION | Age: 60
End: 2020-09-02

## 2020-09-02 NOTE — TELEPHONE ENCOUNTER
Nutrition Counseling: Contacted pt regarding referral. See notes documented in Nutrition Counseling Referral for details. No further follow-up contact from pt. Will close referral for this office.     [facility]  [phone]

## 2020-11-24 RX ORDER — SODIUM CHLORIDE 0.9 % (FLUSH) 0.9 %
5-40 SYRINGE (ML) INJECTION AS NEEDED
Status: CANCELLED | OUTPATIENT
Start: 2020-11-24

## 2020-11-24 RX ORDER — SODIUM CHLORIDE 0.9 % (FLUSH) 0.9 %
5-40 SYRINGE (ML) INJECTION EVERY 8 HOURS
Status: CANCELLED | OUTPATIENT
Start: 2020-11-24

## 2020-11-25 ENCOUNTER — HOSPITAL ENCOUNTER (OUTPATIENT)
Dept: LAB | Age: 60
Discharge: HOME | End: 2020-11-25
Payer: COMMERCIAL

## 2020-11-25 LAB — HGB BLD-MCNC: 10.8 G/DL (ref 11.7–15.4)

## 2020-11-25 PROCEDURE — 36415 COLL VENOUS BLD VENIPUNCTURE: CPT

## 2020-11-25 PROCEDURE — 85018 HEMOGLOBIN: CPT

## 2020-11-25 NOTE — PERIOP NOTES
Recent Results (from the past 12 hour(s))   HEMOGLOBIN    Collection Time: 11/25/20 10:47 AM   Result Value Ref Range    HGB 10.8 (L) 11.7 - 15.4 g/dL

## 2020-11-29 ENCOUNTER — ANESTHESIA EVENT (OUTPATIENT)
Dept: SURGERY | Age: 60
End: 2020-11-29
Payer: COMMERCIAL

## 2020-11-30 ENCOUNTER — ANESTHESIA (OUTPATIENT)
Dept: SURGERY | Age: 60
End: 2020-11-30
Payer: COMMERCIAL

## 2020-11-30 ENCOUNTER — HOSPITAL ENCOUNTER (OUTPATIENT)
Age: 60
Setting detail: OUTPATIENT SURGERY
Discharge: HOME | End: 2020-11-30
Attending: ORTHOPAEDIC SURGERY | Admitting: ORTHOPAEDIC SURGERY
Payer: COMMERCIAL

## 2020-11-30 VITALS
SYSTOLIC BLOOD PRESSURE: 130 MMHG | DIASTOLIC BLOOD PRESSURE: 61 MMHG | WEIGHT: 202 LBS | BODY MASS INDEX: 32.6 KG/M2 | HEART RATE: 89 BPM | OXYGEN SATURATION: 92 % | RESPIRATION RATE: 16 BRPM | TEMPERATURE: 97.6 F

## 2020-11-30 LAB
GLUCOSE BLD STRIP.AUTO-MCNC: 131 MG/DL (ref 65–100)
GLUCOSE BLD STRIP.AUTO-MCNC: 135 MG/DL (ref 65–100)

## 2020-11-30 PROCEDURE — A4565 SLINGS: HCPCS | Performed by: ORTHOPAEDIC SURGERY

## 2020-11-30 PROCEDURE — 77030040936 HC WND COBLATN S&N -C: Performed by: ORTHOPAEDIC SURGERY

## 2020-11-30 PROCEDURE — 74011250636 HC RX REV CODE- 250/636: Performed by: ORTHOPAEDIC SURGERY

## 2020-11-30 PROCEDURE — 74011000250 HC RX REV CODE- 250: Performed by: NURSE ANESTHETIST, CERTIFIED REGISTERED

## 2020-11-30 PROCEDURE — 82962 GLUCOSE BLOOD TEST: CPT

## 2020-11-30 PROCEDURE — 77030013367: Performed by: ORTHOPAEDIC SURGERY

## 2020-11-30 PROCEDURE — 77030040922 HC BLNKT HYPOTHRM STRY -A: Performed by: ANESTHESIOLOGY

## 2020-11-30 PROCEDURE — 74011250636 HC RX REV CODE- 250/636: Performed by: NURSE ANESTHETIST, CERTIFIED REGISTERED

## 2020-11-30 PROCEDURE — 76210000021 HC REC RM PH II 0.5 TO 1 HR: Performed by: ORTHOPAEDIC SURGERY

## 2020-11-30 PROCEDURE — 76010000161 HC OR TIME 1 TO 1.5 HR INTENSV-TIER 1: Performed by: ORTHOPAEDIC SURGERY

## 2020-11-30 PROCEDURE — 77030040361 HC SLV COMPR DVT MDII -B: Performed by: ORTHOPAEDIC SURGERY

## 2020-11-30 PROCEDURE — 77030006891 HC BLD SHV RESECT STRY -B: Performed by: ORTHOPAEDIC SURGERY

## 2020-11-30 PROCEDURE — 76210000063 HC OR PH I REC FIRST 0.5 HR: Performed by: ORTHOPAEDIC SURGERY

## 2020-11-30 PROCEDURE — 77030003666 HC NDL SPINAL BD -A: Performed by: ORTHOPAEDIC SURGERY

## 2020-11-30 PROCEDURE — 76060000033 HC ANESTHESIA 1 TO 1.5 HR: Performed by: ORTHOPAEDIC SURGERY

## 2020-11-30 PROCEDURE — 77030037088 HC TUBE ENDOTRACH ORAL NSL COVD-A: Performed by: ANESTHESIOLOGY

## 2020-11-30 PROCEDURE — 74011250636 HC RX REV CODE- 250/636: Performed by: ANESTHESIOLOGY

## 2020-11-30 PROCEDURE — 74011250637 HC RX REV CODE- 250/637: Performed by: ANESTHESIOLOGY

## 2020-11-30 PROCEDURE — 2709999900 HC NON-CHARGEABLE SUPPLY: Performed by: ORTHOPAEDIC SURGERY

## 2020-11-30 PROCEDURE — 77030004453 HC BUR SHV STRY -B: Performed by: ORTHOPAEDIC SURGERY

## 2020-11-30 PROCEDURE — 77030039425 HC BLD LARYNG TRULITE DISP TELE -A: Performed by: ANESTHESIOLOGY

## 2020-11-30 PROCEDURE — 77030019605: Performed by: ORTHOPAEDIC SURGERY

## 2020-11-30 PROCEDURE — 77030002933 HC SUT MCRYL J&J -A: Performed by: ORTHOPAEDIC SURGERY

## 2020-11-30 RX ORDER — ROCURONIUM BROMIDE 10 MG/ML
INJECTION, SOLUTION INTRAVENOUS AS NEEDED
Status: DISCONTINUED | OUTPATIENT
Start: 2020-11-30 | End: 2020-11-30 | Stop reason: HOSPADM

## 2020-11-30 RX ORDER — LIDOCAINE HYDROCHLORIDE 20 MG/ML
INJECTION, SOLUTION EPIDURAL; INFILTRATION; INTRACAUDAL; PERINEURAL AS NEEDED
Status: DISCONTINUED | OUTPATIENT
Start: 2020-11-30 | End: 2020-11-30 | Stop reason: HOSPADM

## 2020-11-30 RX ORDER — PROPOFOL 10 MG/ML
INJECTION, EMULSION INTRAVENOUS AS NEEDED
Status: DISCONTINUED | OUTPATIENT
Start: 2020-11-30 | End: 2020-11-30 | Stop reason: HOSPADM

## 2020-11-30 RX ORDER — SODIUM CHLORIDE 0.9 % (FLUSH) 0.9 %
5-40 SYRINGE (ML) INJECTION AS NEEDED
Status: DISCONTINUED | OUTPATIENT
Start: 2020-11-30 | End: 2020-11-30 | Stop reason: HOSPADM

## 2020-11-30 RX ORDER — SODIUM CHLORIDE, SODIUM LACTATE, POTASSIUM CHLORIDE, CALCIUM CHLORIDE 600; 310; 30; 20 MG/100ML; MG/100ML; MG/100ML; MG/100ML
75 INJECTION, SOLUTION INTRAVENOUS CONTINUOUS
Status: DISCONTINUED | OUTPATIENT
Start: 2020-11-30 | End: 2020-11-30 | Stop reason: HOSPADM

## 2020-11-30 RX ORDER — LIDOCAINE HYDROCHLORIDE 10 MG/ML
0.1 INJECTION INFILTRATION; PERINEURAL AS NEEDED
Status: DISCONTINUED | OUTPATIENT
Start: 2020-11-30 | End: 2020-11-30 | Stop reason: HOSPADM

## 2020-11-30 RX ORDER — NALOXONE HYDROCHLORIDE 0.4 MG/ML
0.2 INJECTION, SOLUTION INTRAMUSCULAR; INTRAVENOUS; SUBCUTANEOUS AS NEEDED
Status: DISCONTINUED | OUTPATIENT
Start: 2020-11-30 | End: 2020-11-30 | Stop reason: HOSPADM

## 2020-11-30 RX ORDER — SODIUM CHLORIDE 0.9 % (FLUSH) 0.9 %
5-40 SYRINGE (ML) INJECTION EVERY 8 HOURS
Status: DISCONTINUED | OUTPATIENT
Start: 2020-11-30 | End: 2020-11-30 | Stop reason: HOSPADM

## 2020-11-30 RX ORDER — HYDROMORPHONE HYDROCHLORIDE 1 MG/ML
INJECTION, SOLUTION INTRAMUSCULAR; INTRAVENOUS; SUBCUTANEOUS AS NEEDED
Status: DISCONTINUED | OUTPATIENT
Start: 2020-11-30 | End: 2020-11-30 | Stop reason: HOSPADM

## 2020-11-30 RX ORDER — HYDROMORPHONE HYDROCHLORIDE 2 MG/ML
0.5 INJECTION, SOLUTION INTRAMUSCULAR; INTRAVENOUS; SUBCUTANEOUS
Status: DISCONTINUED | OUTPATIENT
Start: 2020-11-30 | End: 2020-11-30 | Stop reason: HOSPADM

## 2020-11-30 RX ORDER — ONDANSETRON 2 MG/ML
INJECTION INTRAMUSCULAR; INTRAVENOUS AS NEEDED
Status: DISCONTINUED | OUTPATIENT
Start: 2020-11-30 | End: 2020-11-30 | Stop reason: HOSPADM

## 2020-11-30 RX ORDER — OXYCODONE AND ACETAMINOPHEN 5; 325 MG/1; MG/1
1 TABLET ORAL AS NEEDED
Status: DISCONTINUED | OUTPATIENT
Start: 2020-11-30 | End: 2020-11-30 | Stop reason: HOSPADM

## 2020-11-30 RX ORDER — EPINEPHRINE 1 MG/ML
INJECTION INTRAMUSCULAR; INTRAVENOUS; SUBCUTANEOUS AS NEEDED
Status: DISCONTINUED | OUTPATIENT
Start: 2020-11-30 | End: 2020-11-30 | Stop reason: HOSPADM

## 2020-11-30 RX ORDER — CEFAZOLIN SODIUM/WATER 2 G/20 ML
2 SYRINGE (ML) INTRAVENOUS ONCE
Status: COMPLETED | OUTPATIENT
Start: 2020-11-30 | End: 2020-11-30

## 2020-11-30 RX ORDER — FENTANYL CITRATE 50 UG/ML
INJECTION, SOLUTION INTRAMUSCULAR; INTRAVENOUS AS NEEDED
Status: DISCONTINUED | OUTPATIENT
Start: 2020-11-30 | End: 2020-11-30 | Stop reason: HOSPADM

## 2020-11-30 RX ORDER — MIDAZOLAM HYDROCHLORIDE 1 MG/ML
2 INJECTION, SOLUTION INTRAMUSCULAR; INTRAVENOUS
Status: COMPLETED | OUTPATIENT
Start: 2020-11-30 | End: 2020-11-30

## 2020-11-30 RX ADMIN — ROCURONIUM BROMIDE 10 MG: 10 INJECTION, SOLUTION INTRAVENOUS at 10:46

## 2020-11-30 RX ADMIN — Medication 2 G: at 10:40

## 2020-11-30 RX ADMIN — OXYCODONE HYDROCHLORIDE AND ACETAMINOPHEN 1 TABLET: 5; 325 TABLET ORAL at 12:18

## 2020-11-30 RX ADMIN — FENTANYL CITRATE 100 MCG: 50 INJECTION INTRAMUSCULAR; INTRAVENOUS at 10:46

## 2020-11-30 RX ADMIN — ONDANSETRON 4 MG: 2 INJECTION INTRAMUSCULAR; INTRAVENOUS at 11:18

## 2020-11-30 RX ADMIN — LIDOCAINE HYDROCHLORIDE 100 MG: 20 INJECTION, SOLUTION EPIDURAL; INFILTRATION; INTRACAUDAL; PERINEURAL at 10:46

## 2020-11-30 RX ADMIN — PROPOFOL 200 MG: 10 INJECTION, EMULSION INTRAVENOUS at 10:46

## 2020-11-30 RX ADMIN — HYDROMORPHONE HYDROCHLORIDE 0.4 MG: 1 INJECTION, SOLUTION INTRAMUSCULAR; INTRAVENOUS; SUBCUTANEOUS at 11:44

## 2020-11-30 RX ADMIN — HYDROMORPHONE HYDROCHLORIDE 0.8 MG: 1 INJECTION, SOLUTION INTRAMUSCULAR; INTRAVENOUS; SUBCUTANEOUS at 11:38

## 2020-11-30 RX ADMIN — HYDROMORPHONE HYDROCHLORIDE 0.5 MG: 2 INJECTION, SOLUTION INTRAMUSCULAR; INTRAVENOUS; SUBCUTANEOUS at 12:01

## 2020-11-30 RX ADMIN — MIDAZOLAM 2 MG: 1 INJECTION INTRAMUSCULAR; INTRAVENOUS at 09:55

## 2020-11-30 RX ADMIN — ROCURONIUM BROMIDE 20 MG: 10 INJECTION, SOLUTION INTRAVENOUS at 10:54

## 2020-11-30 RX ADMIN — PHENYLEPHRINE HYDROCHLORIDE 100 MCG: 10 INJECTION INTRAVENOUS at 11:09

## 2020-11-30 RX ADMIN — SODIUM CHLORIDE, SODIUM LACTATE, POTASSIUM CHLORIDE, AND CALCIUM CHLORIDE 75 ML/HR: 600; 310; 30; 20 INJECTION, SOLUTION INTRAVENOUS at 08:52

## 2020-11-30 RX ADMIN — PHENYLEPHRINE HYDROCHLORIDE 100 MCG: 10 INJECTION INTRAVENOUS at 11:14

## 2020-11-30 NOTE — OP NOTES
[address] [facility]  OPERATIVE REPORT    Name:  Macey Ballesteros  MR#:  [id number]  :  1960  ACCOUNT #:  [de-identified]  DATE OF SERVICE:  2020    PREOPERATIVE DIAGNOSIS:  Rotator cuff tear of the right shoulder with adhesive capsulitis. POSTOPERATIVE DIAGNOSES:  1.  Massive irreparable tear of the rotator cuff. 2.  Marked adhesive capsulitis of the right shoulder. 3.  Biceps tear. 4.  Acromioclavicular joint arthritis. 5.  Impingement of the right shoulder. PROCEDURE PERFORMED:  1. Arthroscopic resection of distal clavicle, 56937.  2.  Biceps tenotomy, 73606.  3.  Subacromial decompression, 99004.  4.  Capsular release and manipulation of the right shoulder. [id number]    SURGEON:  Ysabel Alexander MD    ASSISTANT:  None. ANESTHESIA:  General.    COMPLICATIONS:  None. SPECIMENS REMOVED:  None. IMPLANTS:  None. ESTIMATED BLOOD LOSS:  Minimal.    PROCEDURE:  After an adequate level of general anesthesia was obtained, the right shoulder was prepped and draped in the usual sterile fashion. She had marked limited range of motion of the shoulder and it was very difficult to achieving any abduction and external rotation. The arthroscope was introduced with a posterior approach after distention of the joint. It was evident she had a massive irreparable tear of the rotator cuff. She had very little capsule anteriorly and she did have a tear on her biceps and subluxation of the biceps and an anterior portal was made in the soft spot. A lateral portal was made as well. It was easy to slip the arthroscope into the subacromial space. She had some hooking of the acromion. A decompression was performed with the shaver and the bur some erosion on the superior part of the humeral head.   A biceps tenotomy was performed to the subluxated torn biceps through the lateral and anterior portal.  She had degenerative changes noted on the end of the distal clavicle with some osteophytes inferiorly and the large osteophytes removed with a bur through the lateral portal after circumferentially removing the soft tissue and sparing the anterior capsule. More of the superior osteophytes were removed on both sides of the joint. The biggest problem was her arthrofibrosis and there was very little anterior capsular release to achieve more external rotation. It was felt that the stiffness was more longstanding and I was only able to manipulate and regain some of the motion. The arthroscope was introduced again and there were no complications. It was felt most likely she may need a reverse shoulder. The wound was then copiously irrigated and Steri-Strips. Sterile dressings were applied. The arm was placed in a sling. She tolerated the procedure well.       Remington Prado MD      [id number]  D:  11/30/2020 11:39  T:  11/30/2020 12:14  JOB #:  [id number]  CC:  [id number] [facility]

## 2020-11-30 NOTE — ANESTHESIA POSTPROCEDURE EVALUATION
Procedure(s):  RIGHT SHOULDER ARTHROSCOPY/ BICEPS TENOTOMY/ DISTAL CLAVICLE RESECTION . general    Anesthesia Post Evaluation      Multimodal analgesia: multimodal analgesia used between 6 hours prior to anesthesia start to PACU discharge  Patient location during evaluation: PACU  Patient participation: complete - patient participated  Level of consciousness: awake and alert  Pain management: adequate  Airway patency: patent  Anesthetic complications: no  Cardiovascular status: acceptable  Respiratory status: acceptable  Hydration status: acceptable  Post anesthesia nausea and vomiting:  none  Final Post Anesthesia Temperature Assessment:  Normothermia (36.0-37.5 degrees C)      INITIAL Post-op Vital signs:   Vitals Value Taken Time   /61 11/30/2020 11:53 AM   Temp     Pulse 85 11/30/2020 11:53 AM   Resp     SpO2 98 % 11/30/2020 11:53 AM   Vitals shown include unvalidated device data.

## 2020-11-30 NOTE — PROGRESS NOTES
Dr. Hoffman Speaks and Dr. Julien Proffer unable to complete nerve block. Pt vss at this time, will continue to monitor pt.

## 2020-11-30 NOTE — DISCHARGE INSTRUCTIONS
Post-Operative Instructions   For  Shoulder Arthroscopy  Phone:  [phone]    1. Apply ice to the shoulder as needed. 2. You may shower after the arthroscopy. Keep dressings in place for the first three days and do not get them wet. After three days, you may remove the dressings and leave the steri-strip bandages in place; they will peel off naturally. 3. You may discontinue use of your sling and begin active range of motion of the elbow as tolerated by pain, unless you are instructed otherwise. Do not lift arm by your side for 4 weeks. 4. Begin therapy as ordered. 5. Use any pain medication as instructed. You should take your pain medication as soon as you feel the anesthetic wearing off. Do not wait until you are in severe pain to begin taking your pain medication. 6. You may have some side effects from your pain medication. If you have nausea, try taking your medication with food. For itching, you may take over the counter Benadryl. 7. You may have been given a prescription for Phenergan. This medication is used for nausea and vomiting. You do not need to get this prescription filled unless you have a problem. 8. If you have a problem, please call [address] [place] [address] at [phone]    [address] [address]         [address] [place] [address] Call your doctor if pain is NOT relieved by medication.   Excessive bleeding that does not stop after holding pressure over the area  · Temperature of 101 degrees F or above  · Excessive redness, swelling or bruising, and/ or green or yellow, smelly discharge from incision      TYPICAL SIDE EFFECTS OF PAIN MEDICATION:     Constipation: Drink lots of fluids. Over the counter stool softener if needed.    Nausea: Take pain medication with food. Call your doctor with persistent nausea. ACTIVITY  · As tolerated and as directed by your doctor. · Bathe or shower as directed by your doctor. DIET  · Day of surgery: Clear liquids until no nausea or vomiting; small portion, light diet [place] foods (ex: baked chicken, plain rice, grits, scrambled eggs, toast). Nothing greasy, fried or spicy today. · Advance to regular diet on second day, unless your doctor orders otherwise. · If nausea and vomiting continues, call your doctor. PAIN  · Take pain medication as directed by your doctor. · DO NOT take aspirin or blood thinners unless directed by your doctor. AFTER ANESTHESIA   · For the first 24 hours: DO NOT Drive, Drink alcoholic beverages, or Make important decisions. · Be aware of dizziness following anesthesia and while taking pain medication. DISCHARGE SUMMARY from Nurse    PATIENT INSTRUCTIONS:    After general anesthesia or intravenous sedation, for 24 hours or while taking prescription Narcotics:  · Limit your activities  · Do not drive and operate hazardous machinery  · Do not make important personal or business decisions  · Do  not drink alcoholic beverages  · If you have not urinated within 8 hours after discharge, please contact your surgeon on call. *  Please give a list of your current medications to your Primary Care Provider. *  Please update this list whenever your medications are discontinued, doses are      changed, or new medications (including over-the-counter products) are added. *  Please carry medication information at all times in case of emergency situations. Preventing Infection at Home  We care about preventing infection and avoiding the spread of germs - not only when you are in the hospital but also when you return home. When you return home from the hospital, its important to take the following steps to help prevent infection and avoid spreading germs that could infect you and others. Ask everyone in your home to follow these guidelines, too.     Clean Your Hands  · Clean your hands whenever your hands are visibly dirty, before you eat, before or after touching your mouth, nose or eyes, and before preparing food. Clean them after contact with body fluids, using the restroom, touching animals or changing diapers. · When washing hands, wet them with warm water and work up a lather. Rub hands for at least 15 seconds, then rinse them and pat them dry with a clean towel or paper towel. · When using hand sanitizers, it should take about 15 seconds to rub your hands dry. If not, you probably didnt apply enough . Cover Your Sneeze or Cough  Germs are released into the air whenever you sneeze or cough. To prevent the spread of infection:  · Turn away from other people before coughing or sneezing. · Cover your mouth or nose with a tissue when you cough or sneeze. Put the tissue in the trash. · If you dont have a tissue, cough or sneeze into your upper sleeve, not your hands. · Always clean your hands after coughing or sneezing. Care for Wounds  Your skin is your bodys first line of defense against germs, but an open wound leaves an easy way for germs to enter your body. To prevent infection:  · Clean your hands before and after changing wound dressings, and wear gloves to change dressings if recommended by your doctor. · Take special care with IV lines or other devices inserted into the body. If you must touch them, clean your hands first.  · Follow any specific instructions from your doctor to care for your wounds. Contact your doctor if you experience any signs of infection, such as fever or increased redness at the surgical or wound site. Keep a Clean Home  · Clean or wipe commonly touched hard surfaces like door handles, sinks, tabletops, phones and TV remotes. · Use products labeled disinfectant to kill harmful bacteria and viruses. · Use a clean cloth or paper towel to clean and dry surfaces. Wiping surfaces with a dirty dishcloth, sponge or towel will only spread germs.   · Never share toothbrushes, marcelo, drinking glasses, utensils, razor blades, face cloths or bath towels to avoid spreading germs. · Be sure that the linens that you sleep on are clean. · Keep pets away from wounds and wash your hands after touching pets, their toys or bedding. We care about you and your health. Remember, preventing infections is a team effort between you, your family, friends and health care providers. These are general instructions for a healthy lifestyle:    No smoking/ No tobacco products/ Avoid exposure to second hand smoke    Surgeon General's Warning:  Quitting smoking now greatly reduces serious risk to your health. Obesity, smoking, and sedentary lifestyle greatly increases your risk for illness    A healthy diet, regular physical exercise & weight monitoring are important for maintaining a healthy lifestyle    You may be retaining fluid if you have a history of heart failure or if you experience any of the following symptoms:  Weight gain of 3 pounds or more overnight or 5 pounds in a week, increased swelling in our hands or feet or shortness of breath while lying flat in bed. Please call your doctor as soon as you notice any of these symptoms; do not wait until your next office visit. Recognize signs and symptoms of STROKE:    F-face looks uneven    A-arms unable to move or move unevenly    S-speech slurred or non-existent    T-time-call 911 as soon as signs and symptoms begin-DO NOT go       Back to bed or wait to see if you get better-TIME IS BRAIN. Advance Care Planning  People with COVID-19 may have no symptoms, mild symptoms, such as fever, cough, and shortness of breath or they may have more severe illness, developing severe and fatal pneumonia.   As a result, Advance Care Planning with attention to naming a health care decision maker (someone you trust to make healthcare decisions for you if you could not speak for yourself) and sharing other health care preferences is important BEFORE a possible health crisis. Please contact your Primary Care Provider to discuss Advance Care Planning. Preventing the Spread of Coronavirus Disease 2019 in Homes and Residential Communities  For the most recent information go to [email or web address]    Prevention steps for People with confirmed or suspected COVID-19 (including persons under investigation) who do not need to be hospitalized  and   People with confirmed COVID-19 who were hospitalized and determined to be medically stable to go home    Your healthcare provider and public health staff will evaluate whether you can be cared for at home. If it is determined that you do not need to be hospitalized and can be isolated at home, you will be monitored by staff from your local or state health department. You should follow the prevention steps below until a healthcare provider or local or state health department says you can return to your normal activities. Stay home except to get medical care  People who are mildly ill with COVID-19 are able to isolate at home during their illness. You should restrict activities outside your home, except for getting medical care. Do not go to work, school, or public areas. Avoid using public transportation, ride-sharing, or taxis. Separate yourself from other people and animals in your home  People: As much as possible, you should stay in a specific room and away from other people in your home. Also, you should use a separate bathroom, if available. Animals: You should restrict contact with pets and other animals while you are sick with COVID-19, just like you would around other people. Although there have not been reports of pets or other animals becoming sick with COVID-19, it is still recommended that people sick with COVID-19 limit contact with animals until more information is known about the virus.  When possible, have another member of your household care for your animals while you are sick. If you are sick with COVID-19, avoid contact with your pet, including petting, snuggling, being kissed or licked, and sharing food. If you must care for your pet or be around animals while you are sick, wash your hands before and after you interact with pets and wear a facemask. Call ahead before visiting your doctor  If you have a medical appointment, call the healthcare provider and tell them that you have or may have COVID-19. This will help the healthcare providers office take steps to keep other people from getting infected or exposed. Wear a facemask  You should wear a facemask when you are around other people (e.g., sharing a room or vehicle) or pets and before you enter a healthcare providers office. If you are not able to wear a facemask (for example, because it causes trouble breathing), then people who live with you should not stay in the same room with you, or they should wear a facemask if they enter your room. Cover your coughs and sneezes  Cover your mouth and nose with a tissue when you cough or sneeze. Throw used tissues in a lined trash can. Immediately wash your hands with soap and water for at least 20 seconds or, if soap and water are not available, clean your hands with an alcohol-based hand  that contains at least 60% alcohol. Clean your hands often  Wash your hands often with soap and water for at least 20 seconds, especially after blowing your nose, coughing, or sneezing; going to the bathroom; and before eating or preparing food. If soap and water are not readily available, use an alcohol-based hand  with at least 60% alcohol, covering all surfaces of your hands and rubbing them together until they feel dry. Soap and water are the best option if hands are visibly dirty. Avoid touching your eyes, nose, and mouth with unwashed hands.   Avoid sharing personal household items  You should not share dishes, drinking glasses, cups, eating utensils, towels, or bedding with other people or pets in your home. After using these items, they should be washed thoroughly with soap and water. Clean all high-touch surfaces everyday  High touch surfaces include counters, tabletops, doorknobs, bathroom fixtures, toilets, phones, keyboards, tablets, and bedside tables. Also, clean any surfaces that may have blood, stool, or body fluids on them. Use a household cleaning spray or wipe, according to the label instructions. Labels contain instructions for safe and effective use of the cleaning product including precautions you should take when applying the product, such as wearing gloves and making sure you have good ventilation during use of the product. Monitor your symptoms  Seek prompt medical attention if your illness is worsening (e.g., difficulty breathing). Before seeking care, call your healthcare provider and tell them that you have, or are being evaluated for, COVID-19. Put on a facemask before you enter the facility. These steps will help the healthcare providers office to keep other people in the office or waiting room from getting infected or exposed. Ask your healthcare provider to call the local or [facility] health department. Persons who are placed under active monitoring or facilitated self-monitoring should follow instructions provided by their local health department or occupational health professionals, as appropriate. When working with your local health department check their available hours. If you have a medical emergency and need to call 911, notify the dispatch personnel that you have, or are being evaluated for COVID-19. If possible, put on a facemask before emergency medical services arrive. Discontinuing home isolation  Patients with confirmed COVID-19 should remain under home isolation precautions until the risk of secondary transmission to others is thought to be low.  The decision to discontinue home isolation precautions should be made on a case-by-case basis, in consultation with healthcare providers and state and local health departments. Oxycodone/Acetaminophen (Percocet, Roxicet) - (By mouth)   Why this medicine is used:   Treats pain. This medicine contains a narcotic pain reliever. Contact a nurse or doctor right away if you have:  · Extreme weakness, shallow breathing, slow heartbeat  · Sweating or cold, clammy skin  · Skin blisters, rash, or peeling     Common side effects:  · Constipation  · Nausea, vomiting  · Tiredness  © 2017 [facility] Information is for End User's use only and may not be sold, redistributed or otherwise used for commercial purposes.

## 2020-11-30 NOTE — H&P
Outpatient Surgery History and Physical:  Guero Porter was seen and examined. CHIEF COMPLAINT:   Rt shoulder. PE:   There were no vitals taken for this visit. Heart:   Regular rhythm      Lungs:  Are clear      Past Medical History:    Patient Active Problem List    Diagnosis    Type 2 diabetes with nephropathy ([address]    Dysuria    Iron deficiency anemia    Hyperlipidemia associated with type 2 diabetes mellitus ([address]    Primary adenocarcinoma of ascending colon ([address]     S/p R hemicolectomy 10/9/2018   DIAGNOSIS   RIGHT HEMICOLECTOMY: MODERATELY TO POORLY DIFFERENTIATED ADENOCARCINOMA, 4.6 CM IN GREATEST DIMENSION, EXTENDING THROUGH THE MUSCULARIS PROPRIA INTO THE PERICOLONIC ADIPOSE TISSUE. MARGINS UNINVOLVED. 28 BENIGN LYMPH NODES, ALL NEGATIVE FOR METASTATIC CARCINOMA (0/28). Electronically signed out on 10/11/2018 10:24 by Kirk Green. Yareddilia Carlos, III,       Malignant neoplasm of ascending colon ([address]    Current use of anticoagulant therapy    Obesity, morbid ([address]    Obesity due to excess calories    Controlled type 2 diabetes mellitus without complication, with long-term current use of insulin (HCC)    Axillary mass, right    Pancreatic mass    Type 2 diabetes mellitus with diabetic neuropathy (HCC)    Bilateral low back pain with sciatica    Environmental allergies       Surgical History:   Past Surgical History:   Procedure Laterality Date    HX CHOLECYSTECTOMY  1980's    HX COLONOSCOPY      HX LIPOMA RESECTION Right 1980's    HX OTHER SURGICAL  09/27/2018    filter placed to right groin; per patient- has been removed     HX WISDOM TEETH EXTRACTION      PART REMOVAL COLON W ANASTOMOSIS         Social History: Patient  reports that she has never smoked. She has never used smokeless tobacco. She reports that she does not drink alcohol or use drugs.     Family History:   Family History   Problem Relation Age of Onset    No Known Problems Mother     Dementia Father  Heart Disease Father     Hypertension Father     Kidney Disease Father        Allergies: Reviewed per EMR  Allergies   Allergen Reactions    Biaxin [Clarithromycin] Unknown (comments)    Cortisone Other (comments)     NUMBNESS IN THE LEG (SITE OF INJECTION)  Per pt, leg numbness. Medications:    No current facility-administered medications on file prior to encounter. Current Outpatient Medications on File Prior to Encounter   Medication Sig    rivaroxaban (Xarelto) 20 mg tab tablet Take 1 Tab by mouth daily (with breakfast). Indications: blood clot in a deep vein of the extremities (Patient taking differently: Take 20 mg by mouth nightly. 11/23/20- pt states has not received instructions from surgeon's office. States she called their office and left message requesting instructions. Office also contacted by nurse. Per patient prescribed by Dr. Bonny Robins. Indications: blood clot in a deep vein of the extremities)    SITagliptin-metFORMIN (Janumet)  mg per tablet Take 1 Tab by mouth two (2) times daily (with meals).  varicella-zoster recombinant, PF, (Shingrix, PF,) 50 mcg/0.5 mL susr injection Shingles prevention: Adults =50 years: IM: 0.5 mL administered as a 2-dose series at 0 and 2 to 6 months  Indications: prevention of shingles    Insulin Needles, Disposable, (BD JIMBO 2ND GEN PEN NEEDLE) 32 gauge x 2/67\" ndle 630 Applicators by Does Not Apply route daily. The surgery is planned for the shoulder. History and physical has been reviewed. The patient has been examined. There have been no significant clinical changes since the completion of the originally dated History and Physical.  Patient identified by surgeon; surgical site was confirmed by patient and surgeon. The patient is here today for outpatient surgery. I have examined the patient, no changes are noted in the patient's medical status.  Necessity for the procedure/care is still present and the history and physical above is current. See the office notes for the full long term history of the problem. Please see the recent office notes for the musculoskeletal examination. Signed By: Tammie Del Rio MD     November 30, 2020 7:01 AM       Outpatient Surgery History and Physical:  Whitney Pinzon was seen and examined. CHIEF COMPLAINT:   Rt shoulder. PE:   There were no vitals taken for this visit. Heart:   Regular rhythm      Lungs:  Are clear      Past Medical History:    Patient Active Problem List    Diagnosis    Type 2 diabetes with nephropathy ([address]    Dysuria    Iron deficiency anemia    Hyperlipidemia associated with type 2 diabetes mellitus ([address]    Primary adenocarcinoma of ascending colon ([address]     S/p R hemicolectomy 10/9/2018   DIAGNOSIS   RIGHT HEMICOLECTOMY: MODERATELY TO POORLY DIFFERENTIATED ADENOCARCINOMA, 4.6 CM IN GREATEST DIMENSION, EXTENDING THROUGH THE MUSCULARIS PROPRIA INTO THE PERICOLONIC ADIPOSE TISSUE. MARGINS UNINVOLVED. 28 BENIGN LYMPH NODES, ALL NEGATIVE FOR METASTATIC CARCINOMA (0/28). Electronically signed out on 10/11/2018 10:24 by Angelica Finley.  Iona Barber, III,       Malignant neoplasm of ascending colon ([address]    Current use of anticoagulant therapy    Obesity, morbid ([address]    Obesity due to excess calories    Controlled type 2 diabetes mellitus without complication, with long-term current use of insulin (HCC)    Axillary mass, right    Pancreatic mass    Type 2 diabetes mellitus with diabetic neuropathy (HCC)    Bilateral low back pain with sciatica    Environmental allergies       Surgical History:   Past Surgical History:   Procedure Laterality Date    HX CHOLECYSTECTOMY  1980's    HX COLONOSCOPY      HX LIPOMA RESECTION Right 1980's    HX OTHER SURGICAL  09/27/2018    filter placed to right groin; per patient- has been removed     HX WISDOM TEETH EXTRACTION      PART REMOVAL COLON W ANASTOMOSIS         Social History: Patient  reports that she has never smoked. She has never used smokeless tobacco. She reports that she does not drink alcohol or use drugs. Family History:   Family History   Problem Relation Age of Onset    No Known Problems Mother     Dementia Father     Heart Disease Father     Hypertension Father     Kidney Disease Father        Allergies: Reviewed per EMR  Allergies   Allergen Reactions    Biaxin [Clarithromycin] Unknown (comments)    Cortisone Other (comments)     NUMBNESS IN THE LEG (SITE OF INJECTION)  Per pt, leg numbness. Medications:    No current facility-administered medications on file prior to encounter. Current Outpatient Medications on File Prior to Encounter   Medication Sig    rivaroxaban (Xarelto) 20 mg tab tablet Take 1 Tab by mouth daily (with breakfast). Indications: blood clot in a deep vein of the extremities (Patient taking differently: Take 20 mg by mouth nightly. 11/23/20- pt states has not received instructions from surgeon's office. States she called their office and left message requesting instructions. Office also contacted by nurse. Per patient prescribed by Dr. Guevara Helm. Indications: blood clot in a deep vein of the extremities)    SITagliptin-metFORMIN (Janumet)  mg per tablet Take 1 Tab by mouth two (2) times daily (with meals).  varicella-zoster recombinant, PF, (Shingrix, PF,) 50 mcg/0.5 mL susr injection Shingles prevention: Adults =50 years: IM: 0.5 mL administered as a 2-dose series at 0 and 2 to 6 months  Indications: prevention of shingles    Insulin Needles, Disposable, (BD JIMBO 2ND GEN PEN NEEDLE) 32 gauge x 4/14\" ndle 562 Applicators by Does Not Apply route daily. The surgery is planned for the shoulder. History and physical has been reviewed. The patient has been examined.  There have been no significant clinical changes since the completion of the originally dated History and Physical.  Patient identified by surgeon; surgical site was confirmed by patient and surgeon. The patient is here today for outpatient surgery. I have examined the patient, no changes are noted in the patient's medical status. Necessity for the procedure/care is still present and the history and physical above is current. See the office notes for the full long term history of the problem. Please see the recent office notes for the musculoskeletal examination.     Signed By: Abdirashid Law MD     November 30, 2020 7:01 AM       History and Physical

## 2020-11-30 NOTE — ANESTHESIA PREPROCEDURE EVALUATION
Relevant Problems   No relevant active problems   Anesthetic History               Review of Systems / Medical History  Patient summary reviewed, nursing notes reviewed and pertinent labs reviewed    Pulmonary                   Neuro/Psych              Cardiovascular                  Exercise tolerance: >4 METS     GI/Hepatic/Renal                Endo/Other    Diabetes: well controlled, type 2, using insulin    Obesity and cancer (colon)     Other Findings   Comments: Hx of DVT           Physical Exam    Airway  Mallampati: I  TM Distance: 4 - 6 cm  Neck ROM: normal range of motion   Mouth opening: Normal     Cardiovascular  Regular rate and rhythm,  S1 and S2 normal,  no murmur, click, rub, or gallop             Dental  No notable dental hx       Pulmonary  Breath sounds clear to auscultation               Abdominal  GI exam deferred       Other Findings            Anesthetic Plan    ASA: 3  Anesthesia type: general            Anesthetic plan and risks discussed with: Patient    Daughter present      Anesthetic History               Review of Systems / Medical History  Patient summary reviewed, nursing notes reviewed and pertinent labs reviewed    Pulmonary                   Neuro/Psych              Cardiovascular                  Exercise tolerance: >4 METS     GI/Hepatic/Renal                Endo/Other    Diabetes: well controlled, type 2, using insulin    Obesity and cancer (colon)     Other Findings   Comments: Hx of DVT           Physical Exam    Airway  Mallampati: I  TM Distance: 4 - 6 cm  Neck ROM: normal range of motion   Mouth opening: Normal     Cardiovascular  Regular rate and rhythm,  S1 and S2 normal,  no murmur, click, rub, or gallop             Dental  No notable dental hx       Pulmonary  Breath sounds clear to auscultation               Abdominal  GI exam deferred       Other Findings            Anesthetic Plan    ASA: 3  Anesthesia type: general            Anesthetic plan and risks discussed with: Patient    Daughter present          Anesthetic History   No history of anesthetic complications            Review of Systems / Medical History  Patient summary reviewed and pertinent labs reviewed    Pulmonary  Within defined limits                 Neuro/Psych   Within defined limits           Cardiovascular                  Exercise tolerance: >4 METS  Comments: H/O DVT/PE, last DVT in 2018, stopped   GI/Hepatic/Renal                Endo/Other    Diabetes (Last A1c: 5.4 per patient ): well controlled, type 2    Obesity and anemia     Other Findings              Physical Exam    Airway  Mallampati: II  TM Distance: 4 - 6 cm  Neck ROM: normal range of motion   Mouth opening: Normal     Cardiovascular    Rhythm: regular           Dental  No notable dental hx       Pulmonary  Breath sounds clear to auscultation               Abdominal  Abdominal exam normal       Other Findings            Anesthetic Plan    ASA: 2  Anesthesia type: general          Induction: Intravenous  Anesthetic plan and risks discussed with: Patient

## 2020-12-01 NOTE — ADDENDUM NOTE
Addendum  created 12/01/20 0751 by Racheal Rosenthal CRNA    Flowsheet accepted, Intraprocedure Flowsheets edited

## 2020-12-09 ENCOUNTER — HOSPITAL ENCOUNTER (OUTPATIENT)
Dept: PHYSICAL THERAPY | Age: 60
Discharge: HOME | End: 2020-12-09
Payer: COMMERCIAL

## 2020-12-09 PROCEDURE — 97161 PT EVAL LOW COMPLEX 20 MIN: CPT

## 2020-12-09 PROCEDURE — 97110 THERAPEUTIC EXERCISES: CPT

## 2020-12-09 NOTE — PROGRESS NOTES
Jose Cruz Brambila  : 1960  Primary: [employer]*  Secondary:  [address] at [facility]  [address], [address]  [phone]   [phone]         OUTPATIENT PHYSICAL THERAPY:Daily Note 2020      TREATMENT:   PT Patient Time In/Time Out  Time In: 930  Time Out: 1030      Total Time: 60min  Visit Count:  1     ICD-10: Treatment Diagnosis: M25.511, M75.0, Z47.89  Medication Last Reviewed: 20      TREATMENT PLAN  Effective Dates: 2020 TO 3/9/2021 (90 days). Frequency/Duration: 2-3 times a week for 90 Day(s)         Subjective: See Evaluation Note dated 2020 for details   Pain:     Objective: See Evaluation Note dated 2020 for details      Therapeutic Exercise: (40min) Done in order to improve strength, ROM and understanding of current condition.     Date:   Date:   Date:   Date:     Activity/Exercise Parameters      Education Discussed examination findings, HEP, plan of care, prognosis, OT consult      Shoulder PROM x12min (Flexion/ER/IR)      Pulleys x10min      Shoulder AAROM x8min (ER w/ dowel)                        Manual Therapy: (0min) Done in order to improve joint and soft tissue mobility,reduce muscle guarding, and decrease muscle tone   Date:   Date:   Date:   Date:     Type Parameters      Joint Mobilization       Soft Tissue Mobilization           Modalities: (-) Done in order to reduce swelling and pain    Assessment: See Evaluation Note dated 2020 for details    Plan: See Evaluation Note dated 2020 for details    Future Appointments   Date Time Provider Alec Lobo   2020  9:15 AM [facility]   2020  2:20 PM [facility] OUTREACH INSURANCE [facility]   2020  2:45 PM Melisa Yuan MD [facility]       Unbilled Time: 20min  Units: 1 eval low/ 3TE      Traci Allison, PT, DPT, OCS

## 2020-12-09 NOTE — THERAPY EVALUATION
Marsha Greene  : 1960  Primary: [employer]*  Secondary:  [address]  at [facility]  [address], [address]  [phone]   [phone]          OUTPATIENT PHYSICAL THERAPY:Initial Assessment 2020   ICD-10: Treatment Diagnosis: M25.511, M75.0, Z47.89  Precautions/Allergies:   Biaxin [clarithromycin] and Cortisone   TREATMENT PLAN:  Effective Dates: 2020 TO 3/9/2021 (90 days). Frequency/Duration: 2-3 times a week for 90 Day(s) MEDICAL/REFERRING DIAGNOSIS:  Strain of muscle(s) and tendon(s) of the rotator cuff of right shoulder, initial encounter [S46.011A]  Strain of muscle, fascia and tendon of other parts of biceps, right arm, initial encounter [O23.244G]   DATE OF ONSET: 2020  REFERRING PHYSICIAN: Kristen Gonzales MD MD Orders: Moris Jordan and treat  Return MD Appointment:      INITIAL ASSESSMENT:  Ms. Declan Garcia presents to physical therapy with MD diagnosis of a R shoulder pain s/p surgical intervention. Pt demonstrated signs and symptoms consistent with this diagnosis. On objective examination, the patient demonstrated deficits in ROM, strength, joint mobility, soft tissue mobility, functional ability. The patient also had increased pain, swelling. The patient is limited in the following activities: using arm, lifting, ADLs, functional tasks, work tasks. The patient has a fair  prognosis for recovery based on the examination findings and may be limited by: N/A. Patient requires skilled physical therapy services in order to return to prior level of function. Patient would benefit from an additional referral to occupational therapy in order to help manage the swelling in her operative arm. PROBLEM LIST (Impacting functional limitations):  1. Decreased Strength  2. Decreased ADL/Functional Activities  3. Increased Pain  4. Decreased Activity Tolerance  5. Decreased Flexibility/Joint Mobility  6.  Decreased Knowledge of Precautions  7. Decreased [place] with Home Exercise Program INTERVENTIONS PLANNED: (Treatment may consist of any combination of the following)  1. Cold  2. Heat  3. Home Exercise Program (HEP)  4. Manual Therapy  5. Neuromuscular Re-education/Strengthening  6. Range of Motion (ROM)  7. Therapeutic Activites  8. Therapeutic Exercise/Strengthening     GOALS: (Goals have been discussed and agreed upon with patient.)  Short-Term Functional Goals: Time Frame: 4 weeks  1. Pt will be compliant with progressive HEP  2. Pt will have shoulder flexion PROM to 75deg  3. Pt will have shoulder ER PROM to 15deg  Discharge Goals: Time Frame: 10 weeks  1. Pt will have shoulder flexion AROM to 80deg  2. Pt will have shoulder IR AROM to L5  3. Pt will improve LIBBY score by 11pts    OUTCOME MEASURE:   OUTCOME: LIBBY Shoulder Score  Initial Score: 26/100 Most Recent: X/100 (Date: XX/XX)   Interpretation of Score: 20 questions each scored on a 3 point scale with 0 representing \"extreme difficulty or unable to perform\" and 3 representing \"no difficulty\", 3 questions each scored from 0-10 about pain, and 1 question scored 0-10 about function of the shoulder  The lower the score, the greater the functional disability. 100/100 represents no disability, pain or loss of function. Minimal clinically important difference is 11.4. Minimal detectable change is 12.1. MEDICAL NECESSITY:   · Patient is expected to demonstrate progress in strength, range of motion, coordination and functional technique to increase independence with ADLs and functional activities. REASON FOR SERVICES/OTHER COMMENTS:  · Pt requires skilled physical therapy in order to return to prior level of function. Total Duration:       Rehabilitation Potential For Stated Goals: [address] therapy, I certify that the treatment plan above will be carried out by a therapist or under their direction.   Thank you for this referral,  Geroge Dailey PT, DPT, OCS  Referring Physician Signature: Alonso Lee MD _______________________________ Date _____________     PAIN/SUBJECTIVE:   Initial:   5/10 Post Session:  4/10   HISTORY:   History of Injury/Illness (Reason for Referral):  Pt is s/p R shoulder manipulation on 11/30. Pt states that her dog pulled her arm really hard which hurt her shoulder, but thinks her job for years had done some wear and tear. Following the surgery the patient states her arm has been hurting and feels stiff. Pain is located in the shoulder, but is better than is was prior to the surgery. Past Medical History/Comorbidities:   Ms. Ct Garcia  has a past medical history of Anemia (08/2018), Cancer of ascending colon ([facility] [address] (2018), Environmental allergies (9/12/2013), History of colon cancer (2018), History of DVT (deep vein thrombosis) (04/2018), Kidney stone, Pulmonary embolism ([address] (~2018), and Type 2 diabetes mellitus ([facility] [address] She also has no past medical history of Difficult intubation, Malignant hyperthermia due to anesthesia, Nausea & vomiting, or Pseudocholinesterase deficiency. Ms. Ct Garcia  has a past surgical history that includes hx cholecystectomy (1980's); hx lipoma resection (Right, 1980's); pr part removal colon w anastomosis; hx colonoscopy; hx wisdom teeth extraction; and hx other surgical (09/27/2018). Social History/Living Environment:     Lives alone  Prior Level of Function/Work/Activity:  Work: manual labor, lifting, fine motor skills, tool use  Personal Factors:          Sex:  female        Age:  56 y.o. Ambulatory/Rehab Services H2 Model Falls Risk Assessment   Risk Factors:       No Risk Factors Identified Ability to Rise from Chair:       (0)  Ability to rise in a single movement   Falls Prevention Plan:       No modifications necessary   Total: (5 or greater = High Risk): 0   ©2010 [facility]. All Rights Reserved. [facility] States Patent #[id number].  [employer] Law prohibits the replication, distribution or use without written permission from [facility] of [facility]   Current Medications:       Current Outpatient Medications:     linaGLIPtin (Tradjenta) 5 mg tablet, Take 5 mg by mouth daily. , Disp: , Rfl:     acetaminophen (TylenoL) 325 mg tablet, Take  by mouth every four (4) hours as needed for Pain., Disp: , Rfl:     rivaroxaban (Xarelto) 20 mg tab tablet, Take 1 Tab by mouth daily (with breakfast). Indications: blood clot in a deep vein of the extremities (Patient taking differently: Take 20 mg by mouth nightly. 11/23/20- pt states has not received instructions from surgeon's office. States she called their office and left message requesting instructions. Office also contacted by nurse. Per patient prescribed by Dr. Jose Carlos Sarkar. Indications: blood clot in a deep vein of the extremities), Disp: 90 Tab, Rfl: 3    SITagliptin-metFORMIN (Janumet)  mg per tablet, Take 1 Tab by mouth two (2) times daily (with meals). , Disp: 180 Tab, Rfl: 3    Insulin Needles, Disposable, (BD JIMBO 2ND GEN PEN NEEDLE) 32 gauge x 8/94\" ndle, 842 Applicators by Does Not Apply route daily. , Disp: 100 Pen Needle, Rfl: 5   Date Last Reviewed:  12/09/20   Number of Personal Factors/Comorbidities that affect the Plan of Care: 1-2: MODERATE COMPLEXITY   EXAMINATION:   *= Painful     WNL= within normal limits     NT= not tested    Observation  Posture: rounded shoulders  Edema: significant edema in the upper arm and forearm on the R side, firm, non-pitting    ROM  Shoulder   Right Left   Flexion 32  136   ABD 15 140   IR Unable T9   ER -3 T2       Strength (measured on MMT scale from 0-5/5)   Right Left   Shoulder         Flexion NT 4+       ABD NT 4+       IR NT 5       ER NT 4   Elbow         Flexion 4 5       Extension 4 5       Joint Mobility/Soft Tissue   Description   Joint Mobility Hypomobile in all directions, painful   Soft Tissue Mobility TTP around shoulder and into upper arm          Body Structures Involved:  1. Bones  2. Joints  3. Muscles  4. Ligaments Body Functions Affected:  1. Sensory/Pain  2. Neuromusculoskeletal  3. Movement Related Activities and Participation Affected:  1. General Tasks and Demands  2. Self Care  3. Domestic Life  4. Interpersonal Interactions and Relationships  5.  Community, Social and [place]   Number of elements (examined above) that affect the Plan of Care: 3: MODERATE COMPLEXITY   CLINICAL PRESENTATION:   Presentation: Stable and uncomplicated: LOW COMPLEXITY   CLINICAL DECISION MAKING:   Use of outcome tool(s) and clinical judgement create a POC that gives a: Clear prediction of patient's progress: LOW COMPLEXITY     Yesy Caceres PT, DPT, OCS

## 2020-12-11 ENCOUNTER — HOSPITAL ENCOUNTER (OUTPATIENT)
Dept: PHYSICAL THERAPY | Age: 60
Discharge: HOME | End: 2020-12-11
Payer: COMMERCIAL

## 2020-12-11 PROCEDURE — 97140 MANUAL THERAPY 1/> REGIONS: CPT

## 2020-12-11 PROCEDURE — 97110 THERAPEUTIC EXERCISES: CPT

## 2020-12-11 NOTE — PROGRESS NOTES
Vivek Johnson  : 1960  Primary: [employer], [employer]*  Secondary:  [address] at [facility]  [address], [address]  [phone]   [phone]         OUTPATIENT PHYSICAL THERAPY:Daily Note 2020      TREATMENT:   PT Patient Time In/Time Out  Time In: 0915  Time Out: 1000      Total Time: 60min  Visit Count:  2     ICD-10: Treatment Diagnosis: M25.511, M75.0, Z47.89  Medication Last Reviewed: 20      TREATMENT PLAN  Effective Dates: 2020 TO 3/9/2021 (90 days). Frequency/Duration: 2-3 times a week for 90 Day(s)         Subjective: Patient reports still having discomfort in shoulder and elbow.  Pain:  /10    Objective: None Today      Therapeutic Exercise: ( 10 min) Done in order to improve strength, ROM and understanding of current condition. Date:   Date:   Date:   Date:     Activity/Exercise Parameters      Education Discussed examination findings, HEP, plan of care, prognosis, OT consult      Shoulder PROM x12min (Flexion/ER/IR)      Pulleys x10min X 10     Shoulder AAROM x8min (ER w/ dowel)                        Manual Therapy: (35min) Done in order to improve joint and soft tissue mobility,reduce muscle guarding, and decrease muscle tone   Date:   Date:   Date:   Date:     Type Parameters      Joint Mobilization  PROM in supine to shoulder and elbow     Soft Tissue Mobilization  Massage to bicep and upper trap to decrease tightness and discomfort         Modalities: (-) Done in order to reduce swelling and pain    Assessment: Patient tolerated treatment with mild discomfort. Patient needs to continue to work on ROM for both shoulder and elbow.     Plan: See Evaluation Note dated 2020 for details    Future Appointments   Date Time Provider Alec Lobo   2020  2:20 PM [facility] OUTREACH INSURANCE [facility]   2020  2:45 PM Rojelio Thomason [facility]   12/15/2020 1:00 PM Marisabel Ray, PT [facility]   12/15/2020  2:00 PM Marie Mcintyre OT [facility]       [employer] Time: 45 min  Units: 1 TE HUMPHREY Woodson, PTA

## 2020-12-15 ENCOUNTER — HOSPITAL ENCOUNTER (OUTPATIENT)
Dept: PHYSICAL THERAPY | Age: 60
Discharge: HOME | End: 2020-12-15
Payer: COMMERCIAL

## 2020-12-15 PROCEDURE — 97140 MANUAL THERAPY 1/> REGIONS: CPT

## 2020-12-15 PROCEDURE — 97110 THERAPEUTIC EXERCISES: CPT

## 2020-12-15 PROCEDURE — 97535 SELF CARE MNGMENT TRAINING: CPT

## 2020-12-15 PROCEDURE — 97165 OT EVAL LOW COMPLEX 30 MIN: CPT

## 2020-12-15 NOTE — THERAPY EVALUATION
Glen Pierce : 1960 Primary: Sc Planned Administrators, In* Secondary:  [facility] [place] [address], [address] [place] [address], [address]  [phone]   [phone] OUTPATIENT OCCUPATIONAL THERAPY: Initial Assessment and Daily Note 12/15/2020 ICD-10: Treatment Diagnosis: I89.0 lymphedema, not elsewhere specified R60.0 localized edema Precautions/Allergies:  
Biaxin [clarithromycin] and Cortisone Fall Risk Score:  
 Ambulatory/Rehab Services H2 Model Falls Risk Assessment Risk Factors: 
     No Risk Factors Identified Ability to Rise from Chair: 
     (1)  Pushes up, successful in one attempt Falls Prevention Plan: No modifications necessary Total: (5 or greater = High Risk): 1  [facility] [address] [place] [address] States Patent #[id number]. Federal Law prohibits the replication, distribution or use without written permission from [facility] [address] [place] [address] MD Orders: eval and treat MEDICAL/REFERRING DIAGNOSIS:  
Lymphedema, not elsewhere classified [I89.0] DATE OF ONSET: 2020 REFERRING PHYSICIAN: Joleen Martínez MD 
RETURN PHYSICIAN APPOINTMENT: to be determined INITIAL ASSESSMENT:  Ms. Martin Edmond was referred to occupational therapy for lymphedema treatment of the RUE. Patient sustained a right rotator cuff/biceps tear approximately 2 months ago following walking her dog and the dog jerking the leash. She had a biceps tendon repair on 11/30/20. Since then she has had persistent swelling of the RUE that has not resolved on its own. She is in PT for R shoulder rehab and it is yet to be determined if she will be a candidate for rotator cuff repair surgery due to the swelling. Patient presents today with pitting edema in the RUE from the palm to axilla. Her RUE is 45cm larger in size than her LUE. She will benefit from lymphedema treatment to decrease RUE limb size to aid in RUE rehab and possibly allow for rotator cuff repair if it's deemed appropriate. Once limb size is reduced and stabilized she will be fitted for a compression garment. PLAN OF CARE:  
PROBLEM LIST: 
1. Decreased Flexibility/Joint Mobility 2. Edema/Girth INTERVENTIONS PLANNED 1. Skin care 2. Compression bandaging 3. Fitting for compression garment(s) 4. Manual therapy/Manual lymph drainage 5. Therapeutic exercise/Therapeutic activities 6. Patient Education 7. Compression pump trial prn 
8.  kinesiotaping prn   
TREATMENT PLAN: 
Effective Dates: 12/15/2020 TO 3/15/2021. Frequency/Duration: 2 times a week for 90 days and upon reassessment will adjust frequency and duration as progress indicates. GOALS: (Goals have been discussed and agreed upon with patient.) Short-Term Functional Goals: Time Frame: 45 days 1. The patient/caregiver will verbalize understanding of lymphedema precautions. 2. Patient will be independent with skin care regimen to decrease risk of cellulitis. 3. The patient/caregiver will be independent at donning and doffing right upper extremity compression bandages. 4. Patient will be independent in lymphatic exercises. Discharge Goals: Time Frame: 90 days 1. Patient's right upper extremity circumferential measurements will decrease on volumetric graph by 15-20cm to maximize functional use in ADL's.   
2. The patient/caregiver will be independent with home management of lymphedema. 3. Patient/caregiver will be independent donning and doffing right upper extremity compression garment. Rehabilitation Potential For Stated Goals: Good Regarding Brooke Mcmahan's therapy, I certify that the treatment plan above will be carried out by a therapist or under their direction. Thank you for this referral, 
Sanchez Cunningham OT Referring Physician Signature: Kelli Taveras MD _________________________  Date _________ The information in this section was collected on 12/15/2020 (except where otherwise noted). OCCUPATIONAL PROFILE & HISTORY:  
History of Present Injury/Illness (Reason for Referral): 
Patient was referred to occupational therapy for lymphedema treatment of the RUE. Patient sustained a right rotator cuff/biceps tear approximately 2 months ago following walking her dog and the dog jerking the leash. She had a biceps tendon repair on 11/30/20. Since then she has had persistent swelling of the RUE that has not resolved on its own. She is in PT for R should rehab and it is yet to be determined if she will be a candidate for rotator cuff repair surgery due to the swelling. Past Medical History/Comorbidities: Ms. Richard Desai  has a past medical history of Anemia (08/2018), Cancer of ascending colon ([facility] (2018), Environmental allergies (9/12/2013), History of colon cancer (2018), History of DVT (deep vein thrombosis) (04/2018), Kidney stone, Pulmonary embolism ([facility] (~2018), and Type 2 diabetes mellitus ([facility] [address] [facility] She also has no past medical history of Difficult intubation, Malignant hyperthermia due to anesthesia, Nausea & vomiting, or Pseudocholinesterase deficiency. Ms. Richard Desai  has a past surgical history that includes hx cholecystectomy (1980's); hx lipoma resection (Right, 1980's); pr part removal colon w anastomosis; hx colonoscopy; hx wisdom teeth extraction; and hx other surgical (09/27/2018). Social History/Living Environment:  
 Patient lives alone Prior Level of Function/Work/Activity: 
Worked in [employer] requiring repetitive motions of BUE's 
Dominant Side:  
      RIGHT Previous Treatment Approaches: In physical therapy for rehab of rotator cuff/biceps tear Current Medications:   
Current Outpatient Medications:  
  linaGLIPtin (Tradjenta) 5 mg tablet, Take 5 mg by mouth daily. , Disp: , Rfl:  
  acetaminophen (TylenoL) 325 mg tablet, Take  by mouth every four (4) hours as needed for Pain., Disp: , Rfl:  
  rivaroxaban (Xarelto) 20 mg tab tablet, Take 1 Tab by mouth daily (with breakfast). Indications: blood clot in a deep vein of the extremities (Patient taking differently: Take 20 mg by mouth nightly. 11/23/20- pt states has not received instructions from surgeon's office. States she called their office and left message requesting instructions. Office also contacted by nurse. Per patient prescribed by Dr. Amina Solis. Indications: blood clot in a deep vein of the extremities), Disp: 90 Tab, Rfl: 3 
  SITagliptin-metFORMIN (Janumet)  mg per tablet, Take 1 Tab by mouth two (2) times daily (with meals). , Disp: 180 Tab, Rfl: 3   Insulin Needles, Disposable, (BD JIMBO 2ND GEN PEN NEEDLE) 32 gauge x 4/58\" ndle, 860 Applicators by Does Not Apply route daily. , Disp: 100 Pen Needle, Rfl: 5 Date Last Reviewed:  12/15/2020 Complexity Level : Expanded review of therapy/medical records (1-2):  MODERATE COMPLEXITY ASSESSMENT OF OCCUPATIONAL PERFORMANCE:  
Palpation:   
      Pitting edema RUE from hand to axilla. RUE is 45cm larger in limb size than LUE 
ROM:   
      Limited RUE secondary to recent injury Skin Integrity:   
      intact Sensation:  intact Functional Mobility:  independent Activities of Daily Living : independent with extra time Edema/Girth:  pitting PRETREATMENT AFFECTED LIMB(s): right upper extremity Date:  12/15/202 Right / Left Axilla [x]      [] 47cm 3 inches  
[x]      [] 49cm Elbow  
[x]      [] 35.5cm 6 inches  
[x]      [] 32.5cm       
 
3 inches  
[x]      [] 25cm Wrist  
[x]      [] 17cm Palm  
[x]      [] 19.8cm Total limb size  
[x]      [] 225.8cm Measurements are taken in centimeters:  2.54 cm = 1 inch Physical Skills Involved: 1. Edema 2. Skin Integrity Cognitive Skills Affected (resulting in the inability to perform in a timely and safe manner): 1. Psychosocial Skills Affected: 1. Habits/Routines Number of elements that affect the Plan of Care: 1-3:  LOW COMPLEXITY CLINICAL DECISION MAKING:  
Outcome Measure: Tool Used: Tool Used: Lymphedema Life Impact Scale Score:  Initial: 42 Most Recent: X (Date: -- ) Interpretation of Score: The Lymphedema Life Impact Scale (LLIS) is a validated instrument that measures the physical, functional, and psychosocial concerns pertinent to patients with extremity lymphedema. The Scale's questionnaire is administered to patients to gauge impairments, activity limitations, and participation restrictions resulting from their lymphedema. Score 0 1-13 14-26 27-40 41-54 55-67 68 Modifier CH CI CJ CK CL CM CN  
 
? Other PT/OT Primary Functional Limitations:  
  - CURRENT STATUS: CL - 60%-79% impaired, limited or restricted  - GOAL STATUS: CK - 40%-59% impaired, limited or restricted  - D/C STATUS:  ---------------To be determined--------------- Medical Necessity:  
· Skilled intervention continues to be required due to RUE lymphedema onset following R rotator cuff/bicep tear. Reason for Services/Other Comments: 
· Patient continues to require skilled intervention due to patient's inability to self manage RUE lymphedema that is impacting ability to recover from recent R rotator cuff/bicep tear. Use of outcome tool(s) and clinical judgement create a POC that gives a: Clear prediction of patient's progress: LOW COMPLEXITY  
TREATMENT:  
(In addition to Assessment/Re-Assessment sessions the following treatments were rendered) Pre-treatment Symptoms/Complaints:  RUE lymphedema onset following R rotator cuff /bicep tear Pain: Initial:  
Pain Intensity 1: 3  Post Session:  1:3 Occupational Therapy Treatments: 
 
OT eval( x ) OT eval was completed on 12/15/2020. Pt received information on lymphedema and risk reduction/self management practices as outlined by the National Lymphedema Network. Therapeutic Exercise ( minutes): HEP:  As above; handouts given to patient for all exercises. Manual Therapy:(30 minutes): patient education of pathophysiology of the lymphatic system and lymphedema treatment guidelines followed by modified MLD in conjunction with trial of compression pump Manual Lymph Drainage:(30 minutes) Lymph Nodes:  
 Cervical Supraclavicular Axillary Abdominal Inguinal Popliteal Antecubital  
RIGHT []     [x]     [x]     []     [x]     []     [] LEFT []     [x]     []     []     []     []     [] Anastamoses: Axillo-axillary Inguino-inguinal Axillo-inguinal Inguino-axillary ANTERIOR []     []     [x]     [] POSTERIOR []           []     []      
RIGHT []     []     [x]     [] LEFT []     []     []     []      
 
Limbs:   [x]    RUE     []    LUE     []    RLE    []    LLE Self Care: (15 minutes): After MLD patient received skin care and multi layer bandaging to the RUE from palm to axilla using 3 short stretch bandages over rosidal foam.  She will keep bandages on until her next appointment unless she experiences in creased pain in the RUE or SOB. Patient instructed to perform wrist/elbow flexion /extension and grasp/release of hand with bandages on to aid in promoting lymphatic flow. Treatment/Session Assessment:   
· Response to Treatment:  Patient tolerated assessment/treatment without complication. She agrees with POC established today and is eager to see improvements in her condition. · Compliance with Program/Exercises: Will assess as treatment progresses. · Recommendations/Intent for next treatment session: \"Next visit will focus on lymphedema treatment guidelines to decrease RUE lymphedema and patient education for self management principles. \". Total Treatment Duration: 60 minutes OT Patient Time In/Time Out Time In: 0200 Time Out: 0300 Garcia Adkins OT

## 2020-12-15 NOTE — PROGRESS NOTES
Doug Noe  : 1960  Primary: [place] [employer], [place]*  Secondary:  [address]  at [facility]  [address], [address]  [phone]   [phone]         OUTPATIENT PHYSICAL THERAPY:Daily Note 12/15/2020      TREATMENT:   PT Patient Time In/Time Out  Time In: 1258  Time Out: 1358      Total Time: 60min  Visit Count:  3     ICD-10: Treatment Diagnosis: M25.511, M75.0, Z47.89  Medication Last Reviewed: 12/15/20      TREATMENT PLAN  Effective Dates: 2020 TO 3/9/2021 (90 days). Frequency/Duration: 2-3 times a week for 90 Day(s)         Subjective: Patient states her shoulder is feeling ok today   Pain: 3 /10    Objective: None Today      Therapeutic Exercise: (60min) Done in order to improve strength, ROM and understanding of current condition.     Date:   Date:   Date:  12/15 Date:     Activity/Exercise Parameters      Education Discussed examination findings, HEP, plan of care, prognosis, OT consult      UBE   x5min    Shoulder PROM x12min (Flexion/ER/IR)  x8min    Pulleys x10min X 10 x10min    Shoulder AAROM x8min (ER w/ dowel)      Elbow PROM   x8min extension stretch w/ weights and heat    Pendulums   x6min w/ 5lbs    Walking   x7min w/ 5lbs    Elbow Flexion Iso   [id number]    Deltoid Iso   [id number]                      Manual Therapy: (0min) Done in order to improve joint and soft tissue mobility,reduce muscle guarding, and decrease muscle tone   Date:   Date:   Date:   Date:     Type Parameters      Joint Mobilization  PROM in supine to shoulder and elbow     Soft Tissue Mobilization  Massage to bicep and upper trap to decrease tightness and discomfort         Modalities: (-) Done in order to reduce swelling and pain    Assessment: Patient improved shoulder flexion AROM from 30deg-55deg and improved elbow extension ROM from -55deg to -45deg    Plan: continue per POC    Future Appointments   Date Time Provider Alec Lobo   1/14/2021  1:50 PM [facility]REACH INSURANCE [address]   1/14/2021  2:30 PM Rocio Vargas MD [facility]       Units: [address]      Lizette Starks PT,

## 2020-12-18 ENCOUNTER — HOSPITAL ENCOUNTER (OUTPATIENT)
Dept: PHYSICAL THERAPY | Age: 60
Discharge: HOME | End: 2020-12-18
Payer: COMMERCIAL

## 2020-12-18 PROCEDURE — 97535 SELF CARE MNGMENT TRAINING: CPT

## 2020-12-18 PROCEDURE — 97140 MANUAL THERAPY 1/> REGIONS: CPT

## 2020-12-18 PROCEDURE — 97110 THERAPEUTIC EXERCISES: CPT

## 2020-12-18 NOTE — PROGRESS NOTES
Charity Downs  : 1960  Primary: [employer], [place]*  Secondary:  [address]  at [address], [address]  [phone]   [id number]              OUTPATIENT OCCUPATIONAL THERAPY: Daily Note 2020    ICD-10: Treatment Diagnosis: I89.0 lymphedema, not elsewhere specified                                                       R60.0 localized edema  Precautions/Allergies:   Biaxin [clarithromycin] and Cortisone   Fall Risk Score:    Ambulatory/Rehab Services H2 Model Falls Risk Assessment    Risk Factors:       No Risk Factors Identified Ability to Rise from Chair:       (1)  Pushes up, successful in one attempt    Falls Prevention Plan:       No modifications necessary   Total: (5 or greater = High Risk): 1     [facility]. All Rights Reserved. [facility] [email or web address] Patent #[id number]. Federal Law prohibits the replication, distribution or use without written permission from [employer]     MD Orders: eval and treat MEDICAL/REFERRING DIAGNOSIS:   Lymphedema, not elsewhere classified [I89.0]   DATE OF ONSET: 2020   REFERRING PHYSICIAN: Lenita Epley, MD  RETURN PHYSICIAN APPOINTMENT: to be determined      INITIAL ASSESSMENT:  Ms. Sindi Smyth was referred to occupational therapy for lymphedema treatment of the RUE. Patient sustained a right rotator cuff/biceps tear approximately 2 months ago following walking her dog and the dog jerking the leash. She had a biceps tendon repair on 20. Since then she has had persistent swelling of the RUE that has not resolved on its own. She is in PT for R shoulder rehab and it is yet to be determined if she will be a candidate for rotator cuff repair surgery due to the swelling. Patient presents today with pitting edema in the RUE from the palm to axilla. Her RUE is 45cm larger in size than her LUE.   She will benefit from lymphedema treatment to decrease RUE limb size to aid in RUE rehab and possibly allow for rotator cuff repair if it's deemed appropriate. Once limb size is reduced and stabilized she will be fitted for a compression garment. PLAN OF CARE:   PROBLEM LIST:  1. Decreased Flexibility/Joint Mobility  2. Edema/Girth INTERVENTIONS PLANNED  1. Skin care  2. Compression bandaging  3. Fitting for compression garment(s)  4. Manual therapy/Manual lymph drainage  5. Therapeutic exercise/Therapeutic activities  6. Patient Education  7. Compression pump trial prn  8.  kinesiotaping prn    TREATMENT PLAN:  Effective Dates: 12/15/2020 TO 3/15/2021. Frequency/Duration: 2 times a week for 90 days and upon reassessment will adjust frequency and duration as progress indicates. GOALS: (Goals have been discussed and agreed upon with patient.)  Short-Term Functional Goals: Time Frame: 45 days  1. The patient/caregiver will verbalize understanding of lymphedema precautions. 2. Patient will be independent with skin care regimen to decrease risk of cellulitis. 3. The patient/caregiver will be independent at donning and doffing right upper extremity compression bandages. 4. Patient will be independent in lymphatic exercises. Discharge Goals: Time Frame: 90 days  1. Patient's right upper extremity circumferential measurements will decrease on volumetric graph by 15-20cm to maximize functional use in ADL's.    2. The patient/caregiver will be independent with home management of lymphedema. 3. Patient/caregiver will be independent donning and doffing right upper extremity compression garment. Rehabilitation Potential For Stated Goals: Good  Regarding Josesito Mcmahan's therapy, I certify that the treatment plan above will be carried out by a therapist or under their direction.   Thank you for this referral,  Erick Cockayne, OT                 The information in this section was collected on 12/15/2020 (except where otherwise noted). OCCUPATIONAL PROFILE & HISTORY:   History of Present Injury/Illness (Reason for Referral):  Patient was referred to occupational therapy for lymphedema treatment of the RUE. Patient sustained a right rotator cuff/biceps tear approximately 2 months ago following walking her dog and the dog jerking the leash. She had a biceps tendon repair on 11/30/20. Since then she has had persistent swelling of the RUE that has not resolved on its own. She is in PT for R should rehab and it is yet to be determined if she will be a candidate for rotator cuff repair surgery due to the swelling. Past Medical History/Comorbidities:   Ms. Deadra Halsted  has a past medical history of Anemia (08/2018), Cancer of ascending colon ([facility] [address] (2018), Environmental allergies (9/12/2013), History of colon cancer (2018), History of DVT (deep vein thrombosis) (04/2018), Kidney stone, Pulmonary embolism ([facility] [address] (~2018), and Type 2 diabetes mellitus ([facility] [address] She also has no past medical history of Difficult intubation, Malignant hyperthermia due to anesthesia, Nausea & vomiting, or Pseudocholinesterase deficiency. Ms. Deadra Halsted  has a past surgical history that includes hx cholecystectomy (1980's); hx lipoma resection (Right, 1980's); pr part removal colon w anastomosis; hx colonoscopy; hx wisdom teeth extraction; and hx other surgical (09/27/2018). Social History/Living Environment:    Patient lives alone  Prior Level of Function/Work/Activity:  Worked in [employer] requiring repetitive motions of BUE's  Dominant Side:         RIGHT  Previous Treatment Approaches: In physical therapy for rehab of rotator cuff/biceps tear  Current Medications:    Current Outpatient Medications:     linaGLIPtin (Tradjenta) 5 mg tablet, Take 5 mg by mouth daily. , Disp: , Rfl:     acetaminophen (TylenoL) 325 mg tablet, Take  by mouth every four (4) hours as needed for Pain., Disp: , Rfl:     rivaroxaban (Xarelto) 20 mg tab tablet, Take 1 Tab by mouth daily (with breakfast). Indications: blood clot in a deep vein of the extremities (Patient taking differently: Take 20 mg by mouth nightly. 11/23/20- pt states has not received instructions from surgeon's office. States she called their office and left message requesting instructions. Office also contacted by nurse. Per patient prescribed by Dr. Harriet Samaniego. Indications: blood clot in a deep vein of the extremities), Disp: 90 Tab, Rfl: 3    SITagliptin-metFORMIN (Janumet)  mg per tablet, Take 1 Tab by mouth two (2) times daily (with meals). , Disp: 180 Tab, Rfl: 3    Insulin Needles, Disposable, (BD JIMBO 2ND GEN PEN NEEDLE) 32 gauge x 1/49\" ndle, 002 Applicators by Does Not Apply route daily. , Disp: 100 Pen Needle, Rfl: 5            Date Last Reviewed:  12/18/2020   Complexity Level : Expanded review of therapy/medical records (1-2):  MODERATE COMPLEXITY   ASSESSMENT OF OCCUPATIONAL PERFORMANCE:   Palpation:          Pitting edema RUE from hand to axilla. RUE is 45cm larger in limb size than LUE  ROM:          Limited RUE secondary to recent injury    Skin Integrity:          intact  Sensation:  intact  Functional Mobility:  independent  Activities of Daily Living : independent with extra time  Edema/Girth:  pitting   PRETREATMENT AFFECTED LIMB(s): right upper extremity      Date:  12/15/202 12/18/20        Right / Left           Axilla   [x]      [] 47cm 46cm         3 inches   [x]      [] 49cm 47cm         Elbow   [x]      [] 35.5cm 35cm         6 inches   [x]      [] 32.5cm 31cm         3 inches   [x]      [] 25cm 23cm         Wrist   [x]      [] 17cm 16.5cm         Palm   [x]      [] 19.8cm 19.5cm         Total limb size   [x]      [] 225.8cm 218       Measurements are taken in centimeters:  2.54 cm = 1 inch          Physical Skills Involved:  1. Edema  2. Skin Integrity Cognitive Skills Affected (resulting in the inability to perform in a timely and safe manner):  1.   Psychosocial Skills Affected:  1. Habits/Routines   Number of elements that affect the Plan of Care: 1-3:  LOW COMPLEXITY   CLINICAL DECISION MAKING:   Outcome Measure: Tool Used: Tool Used: Lymphedema Life Impact Scale   Score:  Initial: 42 Most Recent: X (Date: -- )   Interpretation of Score: The Lymphedema Life Impact Scale (LLIS) is a validated instrument that measures the physical, functional, and psychosocial concerns pertinent to patients with extremity lymphedema. The Scale's questionnaire is administered to patients to gauge impairments, activity limitations, and participation restrictions resulting from their lymphedema. Score 0 1-13 14-26 27-40 41-54 55-67 68   Modifier CH CI CJ CK CL CM CN     ? Other PT/OT Primary Functional Limitations:     - CURRENT STATUS: CL - 60%-79% impaired, limited or restricted    - GOAL STATUS: CK - 40%-59% impaired, limited or restricted    - D/C STATUS:  ---------------To be determined---------------     Medical Necessity:   · Skilled intervention continues to be required due to RUE lymphedema onset following R rotator cuff/bicep tear. Reason for Services/Other Comments:  · Patient continues to require skilled intervention due to patient's inability to self manage RUE lymphedema that is impacting ability to recover from recent R rotator cuff/bicep tear. Use of outcome tool(s) and clinical judgement create a POC that gives a: Clear prediction of patient's progress: LOW COMPLEXITY   TREATMENT:   (In addition to Assessment/Re-Assessment sessions the following treatments were rendered)    Pre-treatment Symptoms/Complaints:  RUE lymphedema onset following R rotator cuff /bicep tear. She tolerated bandages well, but they became very loose. Pain: Initial:   Pain Intensity 1: 3  Post Session:  1:3   Occupational Therapy Treatments:    OT eval( x ) OT eval was completed on 12/15/2020.   Pt received information on lymphedema and risk reduction/self management practices as outlined by the National Lymphedema Network. Therapeutic Exercise ( minutes):     HEP:  As above; handouts given to patient for all exercises. Manual Therapy:(45 minutes): Patient arrived with bandages on and they were very loose. Once removed she received MLD in conjunction with trial of compression pump to RUE. Tissue is softer. RUE measured from the palm to axilla and since therapy began she has lost 7.8cm in RUE limb size. Manual Lymph Drainage:(45 minutes)           Lymph Nodes:    Cervical Supraclavicular Axillary Abdominal Inguinal Popliteal Antecubital   RIGHT []     [x]     [x]     []     [x]     []     []       LEFT []     [x]     []     []     []     []     []          Anastamoses:   Axillo-axillary Inguino-inguinal Axillo-inguinal Inguino-axillary   ANTERIOR []     []     [x]     []       POSTERIOR []           []     []       RIGHT []     []     [x]     []       LEFT []     []     []     []         Limbs:   [x]    RUE     []    LUE     []    RLE    []    LLE   Self Care: (15 minutes): After MLD patient received skin care and multi layer bandaging to the RUE from palm to axilla using 3 short stretch bandages over rosidal foam.  Patient education took place for self bandaging of the RUE - she will change bandages for showering. Patient instructed to perform wrist/elbow flexion /extension and grasp/release of hand with bandages on to aid in promoting lymphatic flow. Treatment/Session Assessment:    · Response to Treatment:  Patient tolerated treatment without complication. She is responding to MLD and multi layer bandaging as evidenced by 7.8cm in RUE limb size. .  · Compliance with Program/Exercises: Will assess as treatment progresses. · Recommendations/Intent for next treatment session: \"Next visit will focus on lymphedema treatment guidelines to decrease RUE lymphedema and patient education for self management principles. \".   Total Treatment Duration: 60 minutes  OT Patient Time In/Time Out  Time In: 1100  Time Out: [address], OT

## 2020-12-18 NOTE — PROGRESS NOTES
Paul Hicks  : 1960  Primary: [employer]*  Secondary:  [address] at [facility]  [address], [facility], [address]  [phone]   [phone]         OUTPATIENT PHYSICAL THERAPY:Daily Note 2020      TREATMENT:   PT Patient Time In/Time Out  Time In: 1200  Time Out: 0100      Total Time: 60min  Visit Count:  4     ICD-10: Treatment Diagnosis: M25.511, M75.0, Z47.89  Medication Last Reviewed: 20      TREATMENT PLAN  Effective Dates: 2020 TO 3/9/2021 (90 days). Frequency/Duration: 2-3 times a week for 90 Day(s)         Subjective: Patient states her shoulder is feeling ok today   Pain: 3 /10    Objective: None Today      Therapeutic Exercise: (45min) Done in order to improve strength, ROM and understanding of current condition. Date:   Date:   Date:  12/15 Date:     Activity/Exercise Parameters      Education Discussed examination findings, HEP, plan of care, prognosis, OT consult      UBE   x5min x5min   Shoulder PROM x12min (Flexion/ER/IR)  x8min x8min   Pulleys x10min X 10 x10min x10min   Shoulder AAROM x8min (ER w/ dowel)      Elbow PROM   x8min extension stretch w/ weights and heat x8min extension stretch w/ weights and hea   Pendulums   x6min w/ 5lbs x6min w/ 5lbs   Walking   x7min w/ 5lbs x7min w/ 5lbs   Elbow Flexion Iso   [id number]    Deltoid Iso   [id number]                      Manual Therapy: (0min) Done in order to improve joint and soft tissue mobility,reduce muscle guarding, and decrease muscle tone   Date:   Date:   Date:   Date:     Type Parameters      Joint Mobilization  PROM in supine to shoulder and elbow     Soft Tissue Mobilization  Massage to bicep and upper trap to decrease tightness and discomfort         Modalities: (-) Done in order to reduce swelling and pain    Assessment: Patient tolerated treatment with mild discomfort.  Patient seems very pleased with both PT and Pt called to check on this. She said there was some confusion as to what the dose should be and she was supposed to call in with what she had. Unfortunately, she lost her bottle and doesn't know what dose she was taking. She just wants to get an Rx so she can take it again, not worried about the exact dose.    OT at this point.      Plan: continue per POC    Future Appointments   Date Time Provider Alec Lobo   12/22/2020  1:45 PM Lark Stapler, PT [facility]   12/22/2020  3:00 PM Lelon Lesser, OT [facility]   12/28/2020  4:00 PM Lark Stapler, PT [facility]   12/30/2020 12:45 PM Sha Bors, OT [facility]   12/30/2020  1:45 PM Lark Stapler, PT [facility]   1/5/2021  2:00 PM Lelon Lesser, OT [facility]   1/5/2021  3:15 PM Lark Stapler, PT [facility]   1/7/2021  1:00 PM Lark Stapler, PT [facility]   1/7/2021  2:00 PM Lelon Lesser, OT [facility]   1/12/2021  1:00 PM Lark Stapler, PT [facility]   1/12/2021  2:00 PM Lelon Lesser, OT [facility]   1/14/2021 12:00 PM Lelon Lesser, OT [facility]   1/14/2021  1:00 PM Lark Stapler, PT [facility]   1/14/2021  1:50 PM [address] [place] [address] [employer]ACH [employer] [facility]   1/14/2021  2:30 PM Renuka Arroyo MD [facility]   1/19/2021  1:00 PM Lark Stapler, PT [facility]   1/19/2021  2:00 PM Lelon Lesser, OT [facility]   1/21/2021  1:00 PM Lark Stapler, PT [facility]   1/21/2021  2:00 PM Lelon Lesser, OT [facility]       Units: [address] [place] [address]

## 2020-12-22 ENCOUNTER — HOSPITAL ENCOUNTER (OUTPATIENT)
Dept: PHYSICAL THERAPY | Age: 60
Discharge: HOME | End: 2020-12-22
Payer: COMMERCIAL

## 2020-12-22 PROCEDURE — 97140 MANUAL THERAPY 1/> REGIONS: CPT

## 2020-12-22 PROCEDURE — 97535 SELF CARE MNGMENT TRAINING: CPT

## 2020-12-22 PROCEDURE — 97110 THERAPEUTIC EXERCISES: CPT

## 2020-12-22 NOTE — PROGRESS NOTES
Sophie Hearing  : 1960  Primary: [employer]*  Secondary:  [address] at [facility]  [address], [address]  [phone]   [phone]         OUTPATIENT PHYSICAL THERAPY:Daily Note 2020      TREATMENT:   PT Patient Time In/Time Out  Time In: 1335  Time Out: 1430      Total Time: 55min  Visit Count:  5     ICD-10: Treatment Diagnosis: M25.511, M75.0, Z47.89  Medication Last Reviewed: 20      TREATMENT PLAN  Effective Dates: 2020 TO 3/9/2021 (90 days). Frequency/Duration: 2-3 times a week for 90 Day(s)         Subjective: Patient states her shoulder is feeling better   Pain: 3 /10    Objective: None Today      Therapeutic Exercise: (45min) Done in order to improve strength, ROM and understanding of current condition.     Date:   Date:  12/15 Date:   Date:     Activity/Exercise       Education       UBE  x5min x5min x8min   Shoulder PROM  x8min x8min x10min   Pulleys X 10 x10min x10min x15min   Shoulder AAROM       Elbow PROM  x8min extension stretch w/ weights and heat x8min extension stretch w/ weights and hea x4min w/ weight   Pendulums  x6min w/ 5lbs x6min w/ 5lbs    Walking  x7min w/ 5lbs x7min w/ 5lbs    Elbow Flexion Iso  [id number]     Deltoid Iso  [id number]  [id number]   ER/IR Iso    [id number]              Manual Therapy: (10min) Done in order to improve joint and soft tissue mobility,reduce muscle guarding, and decrease muscle tone   Date:   Date:   Date:   Date:     Type Parameters      Joint Mobilization  PROM in supine to shoulder and elbow GHJ: A-P grades II-III    Soft Tissue Mobilization  Massage to bicep and upper trap to decrease tightness and discomfort bicep        Modalities: (-) Done in order to reduce swelling and pain    Assessment: Patient tolerated treatment with mild discomfort, continues to make small gains in PROM     Plan: continue per POC    Future Appointments   Date Time Provider Alec Lashell   12/28/2020  4:00 PM Valery Nuno, [place] [facility]   12/30/2020 12:45 PM Gorge Jackson, OT [facility]   12/30/2020  1:45 PM Valery Nuno, PT [facility]   1/5/2021  2:00 PM Carlo Otto, OT [facility]   1/5/2021  3:15 PM Valery Nuno, PT [facility]   1/7/2021  1:00 PM Valery Londonos, PT [facility]   1/7/2021  2:00 PM Carlo [place], OT [facility]   1/12/2021  1:00 PM Valery Nuno, PT [facility]   1/12/2021  2:00 PM Carlo [place], OT [facility]   1/14/2021 12:00 PM Carlo [place], OT [facility]   1/14/2021  1:00 PM Valery Nuno, PT [facility]   1/14/2021  1:50 PM [facility] [employer]ACH INSURANCE [address]   1/14/2021  2:30 PM Melisa Yuan MD [facility]   1/19/2021  1:00 PM Valery Nuno, PT [facility]   1/19/2021  2:00 PM Carlo Otto, OT [facility]   1/21/2021  1:00 PM Valery Nuno, PT [facility]   1/21/2021  2:00 PM Carlo [place], OT [facility]       Units: 3TE/ 1MT      Traci Cooper PT,

## 2020-12-22 NOTE — PROGRESS NOTES
Charity Downs  : 1960  Primary: [employer]*  Secondary:  [address]  at [facility]  [address], [address]  [phone]   [phone]              OUTPATIENT OCCUPATIONAL THERAPY: Daily Note 2020    ICD-10: Treatment Diagnosis: I89.0 lymphedema, not elsewhere specified                                                       R60.0 localized edema  Precautions/Allergies:   Biaxin [clarithromycin] and Cortisone   Fall Risk Score:    Ambulatory/Rehab Services H2 Model Falls Risk Assessment    Risk Factors:       No Risk Factors Identified Ability to Rise from Chair:       (1)  Pushes up, successful in one attempt    Falls Prevention Plan:       No modifications necessary   Total: (5 or greater = High Risk): 1     [facility] [employer]. All Rights Reserved. [facility] Patent #[id number]. Federal Law prohibits the replication, distribution or use without written permission from [facility] [employer]     MD Orders: eval and treat MEDICAL/REFERRING DIAGNOSIS:   Lymphedema, not elsewhere classified [I89.0]   DATE OF ONSET: 2020   REFERRING PHYSICIAN: Lenita Epley, MD  RETURN PHYSICIAN APPOINTMENT: to be determined      INITIAL ASSESSMENT:  Ms. Sindi Smyth was referred to occupational therapy for lymphedema treatment of the RUE. Patient sustained a right rotator cuff/biceps tear approximately 2 months ago following walking her dog and the dog jerking the leash. She had a biceps tendon repair on 20. Since then she has had persistent swelling of the RUE that has not resolved on its own. She is in PT for R shoulder rehab and it is yet to be determined if she will be a candidate for rotator cuff repair surgery due to the swelling. Patient presents today with pitting edema in the RUE from the palm to axilla. Her RUE is 45cm larger in size than her LUE.   She will benefit from lymphedema treatment to decrease RUE limb size to aid in RUE rehab and possibly allow for rotator cuff repair if it's deemed appropriate. Once limb size is reduced and stabilized she will be fitted for a compression garment. PLAN OF CARE:   PROBLEM LIST:  1. Decreased Flexibility/Joint Mobility  2. Edema/Girth INTERVENTIONS PLANNED  1. Skin care  2. Compression bandaging  3. Fitting for compression garment(s)  4. Manual therapy/Manual lymph drainage  5. Therapeutic exercise/Therapeutic activities  6. Patient Education  7. Compression pump trial prn  8.  kinesiotaping prn    TREATMENT PLAN:  Effective Dates: 12/15/2020 TO 3/15/2021. Frequency/Duration: 2 times a week for 90 days and upon reassessment will adjust frequency and duration as progress indicates. GOALS: (Goals have been discussed and agreed upon with patient.)  Short-Term Functional Goals: Time Frame: 45 days  1. The patient/caregiver will verbalize understanding of lymphedema precautions. 2. Patient will be independent with skin care regimen to decrease risk of cellulitis. 3. The patient/caregiver will be independent at donning and doffing right upper extremity compression bandages. 4. Patient will be independent in lymphatic exercises. Discharge Goals: Time Frame: 90 days  1. Patient's right upper extremity circumferential measurements will decrease on volumetric graph by 15-20cm to maximize functional use in ADL's.    2. The patient/caregiver will be independent with home management of lymphedema. 3. Patient/caregiver will be independent donning and doffing right upper extremity compression garment. Rehabilitation Potential For Stated Goals: Good  Regarding Patricia Mcmahan's therapy, I certify that the treatment plan above will be carried out by a therapist or under their direction.   Thank you for this referral,  Aleida Herrera OT                 The information in this section was collected on 12/15/2020 (except where otherwise noted). OCCUPATIONAL PROFILE & HISTORY:   History of Present Injury/Illness (Reason for Referral):  Patient was referred to occupational therapy for lymphedema treatment of the RUE. Patient sustained a right rotator cuff/biceps tear approximately 2 months ago following walking her dog and the dog jerking the leash. She had a biceps tendon repair on 11/30/20. Since then she has had persistent swelling of the RUE that has not resolved on its own. She is in PT for R should rehab and it is yet to be determined if she will be a candidate for rotator cuff repair surgery due to the swelling. Past Medical History/Comorbidities:   Ms. Kristene Schwab  has a past medical history of Anemia (08/2018), Cancer of ascending colon ([facility] [address] (2018), Environmental allergies (9/12/2013), History of colon cancer (2018), History of DVT (deep vein thrombosis) (04/2018), Kidney stone, Pulmonary embolism ([facility] [address] (~2018), and Type 2 diabetes mellitus ([facility] [address] She also has no past medical history of Difficult intubation, Malignant hyperthermia due to anesthesia, Nausea & vomiting, or Pseudocholinesterase deficiency. Ms. Kristene Schwab  has a past surgical history that includes hx cholecystectomy (1980's); hx lipoma resection (Right, 1980's); pr part removal colon w anastomosis; hx colonoscopy; hx wisdom teeth extraction; and hx other surgical (09/27/2018). Social History/Living Environment:    Patient lives alone  Prior Level of Function/Work/Activity:  Worked in [employer] requiring repetitive motions of BUE's  Dominant Side:         RIGHT  Previous Treatment Approaches: In physical therapy for rehab of rotator cuff/biceps tear  Current Medications:    Current Outpatient Medications:     linaGLIPtin (Tradjenta) 5 mg tablet, Take 5 mg by mouth daily. , Disp: , Rfl:     acetaminophen (TylenoL) 325 mg tablet, Take  by mouth every four (4) hours as needed for Pain., Disp: , Rfl:     rivaroxaban (Xarelto) 20 mg tab tablet, Take 1 Tab by mouth daily (with breakfast). Indications: blood clot in a deep vein of the extremities (Patient taking differently: Take 20 mg by mouth nightly. 11/23/20- pt states has not received instructions from surgeon's office. States she called their office and left message requesting instructions. Office also contacted by nurse. Per patient prescribed by Dr. Jered Valero. Indications: blood clot in a deep vein of the extremities), Disp: 90 Tab, Rfl: 3    SITagliptin-metFORMIN (Janumet)  mg per tablet, Take 1 Tab by mouth two (2) times daily (with meals). , Disp: 180 Tab, Rfl: 3    Insulin Needles, Disposable, (BD JIMBO 2ND GEN PEN NEEDLE) 32 gauge x 5/90\" ndle, 504 Applicators by Does Not Apply route daily. , Disp: 100 Pen Needle, Rfl: 5            Date Last Reviewed:  12/22/2020   Complexity Level : Expanded review of therapy/medical records (1-2):  MODERATE COMPLEXITY   ASSESSMENT OF OCCUPATIONAL PERFORMANCE:   Palpation:          Pitting edema RUE from hand to axilla. RUE is 45cm larger in limb size than LUE  ROM:          Limited RUE secondary to recent injury    Skin Integrity:          intact  Sensation:  intact  Functional Mobility:  independent  Activities of Daily Living : independent with extra time  Edema/Girth:  pitting   PRETREATMENT AFFECTED LIMB(s): right upper extremity      Date:  12/15/202 12/18/20 12/22/20       Right / Left           Axilla   [x]      [] 47cm 46cm 46cm        3 inches   [x]      [] 49cm 47cm 47cm        Elbow   [x]      [] 35.5cm 35cm 34cm        6 inches   [x]      [] 32.5cm 31cm 31cm        3 inches   [x]      [] 25cm 23cm 22.5cm        Wrist   [x]      [] 17cm 16.5cm 16.2cm        Palm   [x]      [] 19.8cm 19.5cm 18.8cm        Total limb size   [x]      [] 225.8cm 218 215.5cm      Measurements are taken in centimeters:  2.54 cm = 1 inch          Physical Skills Involved:  1. Edema  2.  Skin Integrity Cognitive Skills Affected (resulting in the inability to perform in a timely and safe manner):  1. Psychosocial Skills Affected:  1. Habits/Routines   Number of elements that affect the Plan of Care: 1-3:  LOW COMPLEXITY   CLINICAL DECISION MAKING:   Outcome Measure: Tool Used: Tool Used: Lymphedema Life Impact Scale   Score:  Initial: 42 Most Recent: X (Date: -- )   Interpretation of Score: The Lymphedema Life Impact Scale (LLIS) is a validated instrument that measures the physical, functional, and psychosocial concerns pertinent to patients with extremity lymphedema. The Scale's questionnaire is administered to patients to gauge impairments, activity limitations, and participation restrictions resulting from their lymphedema. Score 0 1-13 14-26 27-40 41-54 55-67 68   Modifier CH CI CJ CK CL CM CN     ? Other PT/OT Primary Functional Limitations:     - CURRENT STATUS: CL - 60%-79% impaired, limited or restricted    - GOAL STATUS: CK - 40%-59% impaired, limited or restricted    - D/C STATUS:  ---------------To be determined---------------     Medical Necessity:   · Skilled intervention continues to be required due to RUE lymphedema onset following R rotator cuff/bicep tear. Reason for Services/Other Comments:  · Patient continues to require skilled intervention due to patient's inability to self manage RUE lymphedema that is impacting ability to recover from recent R rotator cuff/bicep tear. Use of outcome tool(s) and clinical judgement create a POC that gives a: Clear prediction of patient's progress: LOW COMPLEXITY   TREATMENT:   (In addition to Assessment/Re-Assessment sessions the following treatments were rendered)    Pre-treatment Symptoms/Complaints:  RUE lymphedema onset following R rotator cuff /bicep tear. She was able to self bandage RUE for bathing. Pain: Initial: 1:3     Post Session:  1:3   Occupational Therapy Treatments:    OT eval( x ) OT eval was completed on 12/15/2020.   Pt received information on lymphedema and risk reduction/self management practices as outlined by the National Lymphedema Network. Therapeutic Exercise ( minutes):     HEP:  As above; handouts given to patient for all exercises. Manual Therapy:(45 minutes): Patient arrived with bandages on she had applied using reasonably good technique. Once removed she received MLD in conjunction with trial of compression pump to RUE. Tissue is softer. RUE measured from the palm to axilla and since therapy began she has lost 10.3cm in RUE limb size. Manual Lymph Drainage:(45 minutes)           Lymph Nodes:    Cervical Supraclavicular Axillary Abdominal Inguinal Popliteal Antecubital   RIGHT []     [x]     [x]     []     [x]     []     []       LEFT []     [x]     []     []     []     []     []          Anastamoses:   Axillo-axillary Inguino-inguinal Axillo-inguinal Inguino-axillary   ANTERIOR []     []     [x]     []       POSTERIOR []           []     []       RIGHT []     []     [x]     []       LEFT []     []     []     []         Limbs:   [x]    RUE     []    LUE     []    RLE    []    LLE   Self Care: (15 minutes): After MLD patient received skin care and multi layer bandaging to the RUE from palm to axilla using 3 short stretch bandages over rosidal foam.  Patient education review took place for self bandaging of the RUE - she will change bandages for showering. Patient instructed to perform wrist/elbow flexion /extension and grasp/release of hand with bandages on to aid in promoting lymphatic flow. Treatment/Session Assessment:    · Response to Treatment:  Patient tolerated treatment without complication. She is responding to MLD and multi layer bandaging as evidenced by 10.3cm in RUE limb size. .  · Compliance with Program/Exercises: good to date. · Recommendations/Intent for next treatment session: \"Next visit will focus on lymphedema treatment guidelines to decrease RUE lymphedema and patient education for self management principles. \".   Total Treatment Duration: 60 minutes 3-4pm    Rodriguez Lambert, OT

## 2020-12-28 ENCOUNTER — HOSPITAL ENCOUNTER (OUTPATIENT)
Dept: PHYSICAL THERAPY | Age: 60
Discharge: HOME | End: 2020-12-28
Payer: COMMERCIAL

## 2020-12-28 PROCEDURE — 97110 THERAPEUTIC EXERCISES: CPT

## 2020-12-28 PROCEDURE — 97140 MANUAL THERAPY 1/> REGIONS: CPT

## 2020-12-28 NOTE — PROGRESS NOTES
Katerina Oneill  : 1960  Primary: [employer]  Secondary:  [address] at [facility]  [address], [address]  [phone]   [phone]         OUTPATIENT PHYSICAL THERAPY:Daily Note 2020      TREATMENT:   PT Patient Time In/Time Out  Time In: 1536  Time Out: 1630      Total Time: 55min  Visit Count:  6     ICD-10: Treatment Diagnosis: M25.511, M75.0, Z47.89  Medication Last Reviewed: 20      TREATMENT PLAN  Effective Dates: 2020 TO 3/9/2021 (90 days). Frequency/Duration: 2-3 times a week for 90 Day(s)         Subjective: Patient states her shoulder is feeling better, but still stiff/sore   Pain: 3 /10    Objective: None Today      Therapeutic Exercise: (54min) Done in order to improve strength, ROM and understanding of current condition.     Date:  12/15 Date:   Date:   Date:     Activity/Exercise       Education       UBE x5min x5min x8min x10min   Shoulder PROM x8min x8min x10min x12min   Pulleys x10min x10min x15min x15min   Shoulder AAROM       Elbow PROM x8min extension stretch w/ weights and heat x8min extension stretch w/ weights and hea x4min w/ weight    Pendulums x6min w/ 5lbs x6min w/ 5lbs     Walking x7min w/ 5lbs x7min w/ 5lbs  [id number] 5lbs   Elbow Flexion Iso [id number]      Deltoid Iso [id number]  [id number]    ER/IR Iso   [id number]    Shoulder W    3x10 red TB   Bicep Curls    4x10 5lbs                                   Manual Therapy: (10min) Done in order to improve joint and soft tissue mobility,reduce muscle guarding, and decrease muscle tone   Date:   Date:   Date:   Date:     Type Parameters      Joint Mobilization  PROM in supine to shoulder and elbow GHJ: A-P grades II-III    Soft Tissue Mobilization  Massage to bicep and upper trap to decrease tightness and discomfort bicep Posterior shoulder, subscap, bicep       Modalities: (-) Done in order to reduce swelling and pain    Assessment: Patient would benefit from dry needling. This was discussed with the patient and PT will work on figuring out logistics.  Pt making slight improvements in PROM and AROM, however has significant muscle guarding preventing more improvement    Plan: continue per POC    Future Appointments   Date Time Provider Alec Lashell   12/30/2020 12:45 PM Seb Elkinsee, OT [facility]   12/30/2020  1:45 PM Letty Banister, PT [facility]   1/5/2021  2:00 PM Merrilyn Peal, OT [facility]   1/5/2021  3:15 PM Letty Banister, PT [facility]   1/7/2021  1:00 PM Letty Banister, PT [facility]   1/7/2021  2:00 PM Merrilyn Peal, OT [facility]   1/12/2021  1:00 PM Letty Banister, PT [facility]   1/12/2021  2:00 PM Merrilyn Peal, OT [facility]   1/14/2021 12:00 PM Merrilyn Peal, OT [facility]   1/14/2021  1:00 PM Letty Banister, PT [facility]   1/14/2021  1:50 PM [address] [address] [place] [address]   1/14/2021  2:30 PM Marc Caceres MD [facility]   1/19/2021  1:00 PM Letty Banister, PT [facility]   1/19/2021  2:00 PM Merrilyn Peal, OT [facility]   1/21/2021  1:00 PM Letty Banister, PT [facility]   1/21/2021  2:00 PM Merrilyn Peal, OT [facility]       Units: 3TE/ 1MT      Chris Herrera, PT,

## 2020-12-30 ENCOUNTER — HOSPITAL ENCOUNTER (OUTPATIENT)
Dept: PHYSICAL THERAPY | Age: 60
Discharge: HOME | End: 2020-12-30
Payer: COMMERCIAL

## 2020-12-30 PROCEDURE — 97535 SELF CARE MNGMENT TRAINING: CPT

## 2020-12-30 PROCEDURE — 97140 MANUAL THERAPY 1/> REGIONS: CPT

## 2020-12-30 PROCEDURE — 97110 THERAPEUTIC EXERCISES: CPT

## 2020-12-30 NOTE — PROGRESS NOTES
Berry Crouch  : 1960  Primary: [place] [employer] [place]*  Secondary:  [address] at [facility]  [address], [address]  [phone]   [phone]              OUTPATIENT OCCUPATIONAL THERAPY: Daily Note 2020    ICD-10: Treatment Diagnosis: I89.0 lymphedema, not elsewhere specified                                                       R60.0 localized edema  Precautions/Allergies:   Biaxin [clarithromycin] and Cortisone   Fall Risk Score:    Ambulatory/Rehab Services H2 Model Falls Risk Assessment    Risk Factors:       No Risk Factors Identified Ability to Rise from Chair:       (1)  Pushes up, successful in one attempt    Falls Prevention Plan:       No modifications necessary   Total: (5 or greater = High Risk): 1     [facility] [employer]. All Rights Reserved. [facility] Patent #[id number]. Federal Law prohibits the replication, distribution or use without written permission from [facility] [employer]     MD Orders: eval and treat MEDICAL/REFERRING DIAGNOSIS:   Lymphedema, not elsewhere classified [I89.0]   DATE OF ONSET: 2020   REFERRING PHYSICIAN: Jennifer Laws MD  RETURN PHYSICIAN APPOINTMENT: to be determined      INITIAL ASSESSMENT:  Ms. Malika Guerrero was referred to occupational therapy for lymphedema treatment of the RUE. Patient sustained a right rotator cuff/biceps tear approximately 2 months ago following walking her dog and the dog jerking the leash. She had a biceps tendon repair on 20. Since then she has had persistent swelling of the RUE that has not resolved on its own. She is in PT for R shoulder rehab and it is yet to be determined if she will be a candidate for rotator cuff repair surgery due to the swelling. Patient presents today with pitting edema in the RUE from the palm to axilla. Her RUE is 45cm larger in size than her LUE.   She will benefit from lymphedema treatment to decrease RUE limb size to aid in RUE rehab and possibly allow for rotator cuff repair if it's deemed appropriate. Once limb size is reduced and stabilized she will be fitted for a compression garment. PLAN OF CARE:   PROBLEM LIST:  1. Decreased Flexibility/Joint Mobility  2. Edema/Girth INTERVENTIONS PLANNED  1. Skin care  2. Compression bandaging  3. Fitting for compression garment(s)  4. Manual therapy/Manual lymph drainage  5. Therapeutic exercise/Therapeutic activities  6. Patient Education  7. Compression pump trial prn  8.  kinesiotaping prn    TREATMENT PLAN:  Effective Dates: 12/15/2020 TO 3/15/2021. Frequency/Duration: 2 times a week for 90 days and upon reassessment will adjust frequency and duration as progress indicates. GOALS: (Goals have been discussed and agreed upon with patient.)  Short-Term Functional Goals: Time Frame: 45 days  1. The patient/caregiver will verbalize understanding of lymphedema precautions. 2. Patient will be independent with skin care regimen to decrease risk of cellulitis. 3. The patient/caregiver will be independent at donning and doffing right upper extremity compression bandages. 4. Patient will be independent in lymphatic exercises. Discharge Goals: Time Frame: 90 days  1. Patient's right upper extremity circumferential measurements will decrease on volumetric graph by 15-20cm to maximize functional use in ADL's.    2. The patient/caregiver will be independent with home management of lymphedema. 3. Patient/caregiver will be independent donning and doffing right upper extremity compression garment. Rehabilitation Potential For Stated Goals: Good  Regarding Per Mcmahan's therapy, I certify that the treatment plan above will be carried out by a therapist or under their direction.   Thank you for this referral,  Suly Rizzo OT                 The information in this section was collected on 12/15/2020 (except where otherwise noted). OCCUPATIONAL PROFILE & HISTORY:   History of Present Injury/Illness (Reason for Referral):  Patient was referred to occupational therapy for lymphedema treatment of the RUE. Patient sustained a right rotator cuff/biceps tear approximately 2 months ago following walking her dog and the dog jerking the leash. She had a biceps tendon repair on 11/30/20. Since then she has had persistent swelling of the RUE that has not resolved on its own. She is in PT for R should rehab and it is yet to be determined if she will be a candidate for rotator cuff repair surgery due to the swelling. Past Medical History/Comorbidities:   Ms. Saint Chant  has a past medical history of Anemia (08/2018), Cancer of ascending colon ([address] (2018), Environmental allergies (9/12/2013), History of colon cancer (2018), History of DVT (deep vein thrombosis) (04/2018), Kidney stone, Pulmonary embolism ([address] (~2018), and Type 2 diabetes mellitus ([facility] She also has no past medical history of Difficult intubation, Malignant hyperthermia due to anesthesia, Nausea & vomiting, or Pseudocholinesterase deficiency. Ms. Saint Chant  has a past surgical history that includes hx cholecystectomy (1980's); hx lipoma resection (Right, 1980's); pr part removal colon w anastomosis; hx colonoscopy; hx wisdom teeth extraction; and hx other surgical (09/27/2018). Social History/Living Environment:    Patient lives alone  Prior Level of Function/Work/Activity:  Worked in manufacturing requiring repetitive motions of BUE's  Dominant Side:         RIGHT  Previous Treatment Approaches: In physical therapy for rehab of rotator cuff/biceps tear  Current Medications:    Current Outpatient Medications:     linaGLIPtin (Tradjenta) 5 mg tablet, Take 5 mg by mouth daily. , Disp: , Rfl:     acetaminophen (TylenoL) 325 mg tablet, Take  by mouth every four (4) hours as needed for Pain., Disp: , Rfl:     rivaroxaban (Xarelto) 20 mg tab tablet, Take 1 Tab by mouth daily (with breakfast). Indications: blood clot in a deep vein of the extremities (Patient taking differently: Take 20 mg by mouth nightly. 11/23/20- pt states has not received instructions from surgeon's office. States she called their office and left message requesting instructions. Office also contacted by nurse. Per patient prescribed by Dr. Edi Guillen. Indications: blood clot in a deep vein of the extremities), Disp: 90 Tab, Rfl: 3    SITagliptin-metFORMIN (Janumet)  mg per tablet, Take 1 Tab by mouth two (2) times daily (with meals). , Disp: 180 Tab, Rfl: 3    Insulin Needles, Disposable, (BD JIMBO 2ND GEN PEN NEEDLE) 32 gauge x 7/75\" ndle, 265 Applicators by Does Not Apply route daily. , Disp: 100 Pen Needle, Rfl: 5            Date Last Reviewed:  12/30/2020   Complexity Level : Expanded review of therapy/medical records (1-2):  MODERATE COMPLEXITY   ASSESSMENT OF OCCUPATIONAL PERFORMANCE:   Palpation:          Pitting edema RUE from hand to axilla. RUE is 45cm larger in limb size than LUE  ROM:          Limited RUE secondary to recent injury    Skin Integrity:          intact  Sensation:  intact  Functional Mobility:  independent  Activities of Daily Living : independent with extra time  Edema/Girth:  pitting   PRETREATMENT AFFECTED LIMB(s): right upper extremity      Date:  12/15/202 12/18/20 12/22/20       Right / Left           Axilla   [x]      [] 47cm 46cm 46cm        3 inches   [x]      [] 49cm 47cm 47cm        Elbow   [x]      [] 35.5cm 35cm 34cm        6 inches   [x]      [] 32.5cm 31cm 31cm        3 inches   [x]      [] 25cm 23cm 22.5cm        Wrist   [x]      [] 17cm 16.5cm 16.2cm        Palm   [x]      [] 19.8cm 19.5cm 18.8cm        Total limb size   [x]      [] 225.8cm 218 215.5cm      Measurements are taken in centimeters:  2.54 cm = 1 inch          Physical Skills Involved:  1. Edema  2.  Skin Integrity Cognitive Skills Affected (resulting in the inability to perform in a timely and safe manner):  1. Psychosocial Skills Affected:  1. Habits/Routines   Number of elements that affect the Plan of Care: 1-3:  LOW COMPLEXITY   CLINICAL DECISION MAKING:   Outcome Measure: Tool Used: Tool Used: Lymphedema Life Impact Scale   Score:  Initial: 42 Most Recent: X (Date: -- )   Interpretation of Score: The Lymphedema Life Impact Scale (LLIS) is a validated instrument that measures the physical, functional, and psychosocial concerns pertinent to patients with extremity lymphedema. The Scale's questionnaire is administered to patients to gauge impairments, activity limitations, and participation restrictions resulting from their lymphedema. Score 0 1-13 14-26 27-40 41-54 55-67 68   Modifier CH CI CJ CK CL CM CN     ? Other PT/OT Primary Functional Limitations:     - CURRENT STATUS: CL - 60%-79% impaired, limited or restricted    - GOAL STATUS: CK - 40%-59% impaired, limited or restricted    - D/C STATUS:  ---------------To be determined---------------     Medical Necessity:   · Skilled intervention continues to be required due to RUE lymphedema onset following R rotator cuff/bicep tear. Reason for Services/Other Comments:  · Patient continues to require skilled intervention due to patient's inability to self manage RUE lymphedema that is impacting ability to recover from recent R rotator cuff/bicep tear. Use of outcome tool(s) and clinical judgement create a POC that gives a: Clear prediction of patient's progress: LOW COMPLEXITY   TREATMENT:   (In addition to Assessment/Re-Assessment sessions the following treatments were rendered)    Pre-treatment Symptoms/Complaints: Dense, nonpitting edema in axilla. Pain: Initial: 1:3  Pain Intensity 1: 3  Post Session:  1:3   Occupational Therapy Treatments:    OT eval( x ) OT eval was completed on 12/15/2020.   Pt received information on lymphedema and risk reduction/self management practices as outlined by the National Lymphedema Network. Therapeutic Exercise ( minutes):     HEP:  As above; handouts given to patient for all exercises. Manual Therapy:(45 minutes): Patient arrived with bandages on she had applied using reasonably good technique. Once removed she received MLD in conjunction with trial of compression pump to RUE. Kineseotaped over axilla with two tapes: One with anchor on contralateral axilla with four finger tapes and a second with anchor on lateral posterior inguinal area and two finger tapes to axilla. Tissue is softer. Manual Lymph Drainage:(45 minutes)           Lymph Nodes:    Cervical Supraclavicular Axillary Abdominal Inguinal Popliteal Antecubital   RIGHT []     [x]     [x]     []     [x]     []     []       LEFT []     [x]     []     []     []     []     []          Anastamoses:   Axillo-axillary Inguino-inguinal Axillo-inguinal Inguino-axillary   ANTERIOR [x]     []     [x]     []       POSTERIOR [x]           []     []       RIGHT [x]     []     [x]     []       LEFT [x]     []     []     []         Limbs:   [x]    RUE     []    LUE     []    RLE    []    LLE   Self Care: (15 minutes): After MLD patient received skin care and multi layer bandaging to the RUE from palm to axilla using 3 short stretch bandages over rosidal foam.  Patient education review took place for self bandaging of the RUE - she will change bandages for showering. Patient instructed to perform wrist/elbow flexion /extension and grasp/release of hand with bandages on to aid in promoting lymphatic flow. Treatment/Session Assessment:    · Response to Treatment:  Patient tolerated treatment without complication. She is responding to MLD and multi layer bandaging as evidenced by 10.3cm in RUE limb size. .  · Compliance with Program/Exercises: good to date. · Recommendations/Intent for next treatment session:  \"Next visit will focus on lymphedema treatment guidelines to decrease RUE lymphedema and patient education for self management principles. \".   Total Treatment Duration: 60 minutes  OT Patient Time In/Time Out  Time In: 0100  Time Out: [id number]    Timbo Culp OT

## 2020-12-30 NOTE — PROGRESS NOTES
Ruben Swartz  : 1960  Primary: [employer]  Secondary:  [address] at [facility]  [address], [address]  [phone]   [phone]         OUTPATIENT PHYSICAL THERAPY:Daily Note 2020      TREATMENT:   PT Patient Time In/Time Out  Time In: 4051  Time Out: 1445      Total Time: 46min  Visit Count:  7     ICD-10: Treatment Diagnosis: M25.511, M75.0, Z47.89  Medication Last Reviewed: 20      TREATMENT PLAN  Effective Dates: 2020 TO 3/9/2021 (90 days). Frequency/Duration: 2-3 times a week for 90 Day(s)         Subjective: Patient states her shoulder is doing better   Pain: 3 /10    Objective: Flexion PROM: 80deg      Therapeutic Exercise: (35min) Done in order to improve strength, ROM and understanding of current condition.     Date:   Date:   Date:   Date:     Activity/Exercise       Education       UBE x5min x8min x10min x8min   Shoulder PROM x8min x10min x12min x6min (on side)   Pulleys x10min x15min x15min x10min   Shoulder AAROM       Elbow PROM x8min extension stretch w/ weights and hea x4min w/ weight     Pendulums x6min w/ 5lbs   x5min 10lbs prone hanging   Walking x7min w/ 5lbs  [id number] 5lbs    Elbow Flexion Iso       Deltoid Iso  [id number]     ER/IR Iso  [id number]     Shoulder W   3x10 red TB    Bicep Curls   4x10 5lbs    ER/IR    3x10 red TB   Rows    3x10 red TB                     Manual Therapy: (10min) Done in order to improve joint and soft tissue mobility,reduce muscle guarding, and decrease muscle tone   Date:   Date:   Date:   Date:     Type       Joint Mobilization PROM in supine to shoulder and elbow GHJ: A-P grades II-III  GHJ: A-P grades II-IV   Soft Tissue Mobilization Massage to bicep and upper trap to decrease tightness and discomfort bicep Posterior shoulder, subscap, bicep        Modalities: (-) Done in order to reduce swelling and pain    Assessment: Patient with improved ROM    Plan: continue per POC    Future Appointments   Date Time Provider Alec Lashell   1/4/2021  2:30 PM Alexis Kaur, PT, DPT [facility]   1/5/2021  2:00 PM Lisa Adair, OT [facility]   1/5/2021  3:15 PM Sree Morning [facility]   1/7/2021  1:00 PM Shauna Sarabia, PT [facility]   1/7/2021  2:00 PM Lisa Adair, OT [facility]   1/12/2021  1:00 PM Shauna Sarabia, PT [facility]   1/12/2021  2:00 PM Lisa Adair, OT [facility]   1/14/2021 12:00 PM Lisa Adair, OT [facility]   1/14/2021  1:00 PM Shauna Sarabia, PT [facility]   1/14/2021  1:50 PM [facility] [employer] [facility]   1/14/2021  2:30 PM Sarah Henao MD [facility]   1/19/2021  1:00 PM Shauna Sarabia, PT [facility]   1/19/2021  2:00 PM Lisa Adair, OT [facility]   1/21/2021  1:00 PM Shauna Sarabia, PT [facility]   1/21/2021  2:00 PM Lisa Adair, OT [facility]       Units: 32TE/ 1MT      Khanh Lemos PT,

## 2021-01-01 ENCOUNTER — ANESTHESIA EVENT (OUTPATIENT)
Dept: INTERVENTIONAL RADIOLOGY/VASCULAR | Age: 61
End: 2021-01-01
Payer: COMMERCIAL

## 2021-01-01 ENCOUNTER — PATIENT OUTREACH (OUTPATIENT)
Dept: CASE MANAGEMENT | Age: 61
End: 2021-01-01

## 2021-01-01 ENCOUNTER — HOSPITAL ENCOUNTER (OUTPATIENT)
Dept: PHYSICAL THERAPY | Age: 61
Discharge: HOME | End: 2021-08-02
Payer: COMMERCIAL

## 2021-01-01 ENCOUNTER — HOSPITAL ENCOUNTER (OUTPATIENT)
Dept: PHYSICAL THERAPY | Age: 61
Discharge: HOME | End: 2021-09-02
Payer: COMMERCIAL

## 2021-01-01 ENCOUNTER — HOSPITAL ENCOUNTER (OUTPATIENT)
Dept: LAB | Age: 61
Discharge: HOME | End: 2021-05-18
Payer: COMMERCIAL

## 2021-01-01 ENCOUNTER — DOCUMENTATION ONLY (OUTPATIENT)
Dept: HEMATOLOGY | Age: 61
End: 2021-01-01

## 2021-01-01 ENCOUNTER — HOSPITAL ENCOUNTER (OUTPATIENT)
Dept: PHYSICAL THERAPY | Age: 61
Discharge: HOME | End: 2021-08-19
Payer: COMMERCIAL

## 2021-01-01 ENCOUNTER — ANESTHESIA EVENT (OUTPATIENT)
Dept: ENDOSCOPY | Age: 61
DRG: 824 | End: 2021-01-01
Payer: COMMERCIAL

## 2021-01-01 ENCOUNTER — HOSPITAL ENCOUNTER (OUTPATIENT)
Dept: INFUSION THERAPY | Age: 61
Discharge: HOME | End: 2021-08-30
Payer: COMMERCIAL

## 2021-01-01 ENCOUNTER — HOSPITAL ENCOUNTER (OUTPATIENT)
Dept: PHYSICAL THERAPY | Age: 61
Discharge: HOME | End: 2021-08-05
Payer: COMMERCIAL

## 2021-01-01 ENCOUNTER — APPOINTMENT (OUTPATIENT)
Dept: CT IMAGING | Age: 61
DRG: 824 | End: 2021-01-01
Attending: EMERGENCY MEDICINE
Payer: COMMERCIAL

## 2021-01-01 ENCOUNTER — HOSPITAL ENCOUNTER (OUTPATIENT)
Dept: INFUSION THERAPY | Age: 61
Discharge: HOME | End: 2021-09-13
Payer: COMMERCIAL

## 2021-01-01 ENCOUNTER — APPOINTMENT (OUTPATIENT)
Dept: GENERAL RADIOLOGY | Age: 61
DRG: 824 | End: 2021-01-01
Attending: INTERNAL MEDICINE
Payer: COMMERCIAL

## 2021-01-01 ENCOUNTER — HOSPITAL ENCOUNTER (OUTPATIENT)
Dept: INFUSION THERAPY | Age: 61
Discharge: HOME | End: 2021-09-27
Payer: COMMERCIAL

## 2021-01-01 ENCOUNTER — APPOINTMENT (OUTPATIENT)
Dept: GENERAL RADIOLOGY | Age: 61
DRG: 824 | End: 2021-01-01
Attending: NURSE PRACTITIONER
Payer: COMMERCIAL

## 2021-01-01 ENCOUNTER — HOSPITAL ENCOUNTER (OUTPATIENT)
Dept: PHYSICAL THERAPY | Age: 61
Discharge: HOME | End: 2021-07-06
Payer: COMMERCIAL

## 2021-01-01 ENCOUNTER — HOSPITAL ENCOUNTER (OUTPATIENT)
Dept: INFUSION THERAPY | Age: 61
Discharge: HOME | End: 2021-09-20
Payer: COMMERCIAL

## 2021-01-01 ENCOUNTER — HOSPITAL ENCOUNTER (OUTPATIENT)
Dept: PHYSICAL THERAPY | Age: 61
Discharge: HOME | End: 2021-07-29
Payer: COMMERCIAL

## 2021-01-01 ENCOUNTER — HOSPITAL ENCOUNTER (OUTPATIENT)
Dept: INFUSION THERAPY | Age: 61
Discharge: HOME | End: 2021-09-07
Payer: COMMERCIAL

## 2021-01-01 ENCOUNTER — HOSPITAL ENCOUNTER (OUTPATIENT)
Dept: PHYSICAL THERAPY | Age: 61
Discharge: HOME | End: 2021-07-22
Payer: COMMERCIAL

## 2021-01-01 ENCOUNTER — HOSPITAL ENCOUNTER (OUTPATIENT)
Age: 61
Setting detail: OUTPATIENT SURGERY
Discharge: HOME | End: 2021-07-16
Attending: INTERNAL MEDICINE | Admitting: INTERNAL MEDICINE
Payer: COMMERCIAL

## 2021-01-01 ENCOUNTER — HOSPITAL ENCOUNTER (OUTPATIENT)
Dept: INFUSION THERAPY | Age: 61
Discharge: HOME | End: 2021-11-08
Payer: COMMERCIAL

## 2021-01-01 ENCOUNTER — HOSPITAL ENCOUNTER (OUTPATIENT)
Dept: INTERVENTIONAL RADIOLOGY/VASCULAR | Age: 61
Discharge: HOME | End: 2021-08-24
Attending: INTERNAL MEDICINE
Payer: COMMERCIAL

## 2021-01-01 ENCOUNTER — HOSPITAL ENCOUNTER (OUTPATIENT)
Dept: PHYSICAL THERAPY | Age: 61
Discharge: HOME | End: 2021-08-17
Payer: COMMERCIAL

## 2021-01-01 ENCOUNTER — HOSPITAL ENCOUNTER (OUTPATIENT)
Dept: PHYSICAL THERAPY | Age: 61
Discharge: HOME | End: 2021-06-17
Payer: COMMERCIAL

## 2021-01-01 ENCOUNTER — HOSPITAL ENCOUNTER (OUTPATIENT)
Dept: PHYSICAL THERAPY | Age: 61
Discharge: HOME | End: 2021-08-09
Payer: COMMERCIAL

## 2021-01-01 ENCOUNTER — HOSPITAL ENCOUNTER (OUTPATIENT)
Dept: INFUSION THERAPY | Age: 61
Discharge: HOME | End: 2021-11-29
Payer: COMMERCIAL

## 2021-01-01 ENCOUNTER — HOSPITAL ENCOUNTER (OUTPATIENT)
Dept: PHYSICAL THERAPY | Age: 61
Discharge: HOME | End: 2021-06-07
Payer: COMMERCIAL

## 2021-01-01 ENCOUNTER — HOSPITAL ENCOUNTER (OUTPATIENT)
Dept: PHYSICAL THERAPY | Age: 61
Discharge: HOME | End: 2021-08-12
Payer: COMMERCIAL

## 2021-01-01 ENCOUNTER — HOSPITAL ENCOUNTER (OUTPATIENT)
Dept: PHYSICAL THERAPY | Age: 61
Discharge: HOME | End: 2021-09-16
Payer: COMMERCIAL

## 2021-01-01 ENCOUNTER — APPOINTMENT (OUTPATIENT)
Dept: PHYSICAL THERAPY | Age: 61
End: 2021-01-01
Payer: COMMERCIAL

## 2021-01-01 ENCOUNTER — HOSPITAL ENCOUNTER (OUTPATIENT)
Dept: INFUSION THERAPY | Age: 61
Discharge: HOME | End: 2021-10-11

## 2021-01-01 ENCOUNTER — HOSPITAL ENCOUNTER (OUTPATIENT)
Dept: INFUSION THERAPY | Age: 61
Discharge: HOME | End: 2021-10-18
Payer: COMMERCIAL

## 2021-01-01 ENCOUNTER — HOSPITAL ENCOUNTER (OUTPATIENT)
Dept: PET IMAGING | Age: 61
Discharge: HOME | End: 2021-07-26
Attending: INTERNAL MEDICINE
Payer: COMMERCIAL

## 2021-01-01 ENCOUNTER — HOSPITAL ENCOUNTER (OUTPATIENT)
Dept: PHYSICAL THERAPY | Age: 61
Discharge: HOME | End: 2021-09-09
Payer: COMMERCIAL

## 2021-01-01 ENCOUNTER — HOSPITAL ENCOUNTER (OUTPATIENT)
Dept: PHYSICAL THERAPY | Age: 61
Discharge: HOME | End: 2021-07-15
Payer: COMMERCIAL

## 2021-01-01 ENCOUNTER — HOSPITAL ENCOUNTER (OUTPATIENT)
Dept: INFUSION THERAPY | Age: 61
Discharge: HOME | End: 2021-10-19
Payer: COMMERCIAL

## 2021-01-01 ENCOUNTER — HOSPITAL ENCOUNTER (OUTPATIENT)
Dept: PHYSICAL THERAPY | Age: 61
Discharge: HOME | End: 2021-06-01
Payer: COMMERCIAL

## 2021-01-01 ENCOUNTER — HOSPITAL ENCOUNTER (OUTPATIENT)
Dept: LAB | Age: 61
Discharge: HOME | End: 2021-07-29
Payer: COMMERCIAL

## 2021-01-01 ENCOUNTER — HOSPITAL ENCOUNTER (OUTPATIENT)
Dept: INFUSION THERAPY | Age: 61
Discharge: HOME | End: 2021-10-04
Payer: COMMERCIAL

## 2021-01-01 ENCOUNTER — HOSPITAL ENCOUNTER (OUTPATIENT)
Dept: PHYSICAL THERAPY | Age: 61
Discharge: HOME | End: 2021-06-14
Payer: COMMERCIAL

## 2021-01-01 ENCOUNTER — HOSPITAL ENCOUNTER (INPATIENT)
Age: 61
LOS: 7 days | Discharge: HOME HEALTH | DRG: 824 | End: 2021-07-01
Attending: EMERGENCY MEDICINE | Admitting: FAMILY MEDICINE
Payer: COMMERCIAL

## 2021-01-01 ENCOUNTER — HOSPITAL ENCOUNTER (OUTPATIENT)
Dept: PHYSICAL THERAPY | Age: 61
Discharge: HOME | End: 2021-05-24
Payer: COMMERCIAL

## 2021-01-01 ENCOUNTER — HOSPITAL ENCOUNTER (OUTPATIENT)
Dept: INFUSION THERAPY | Age: 61
Discharge: HOME | End: 2021-11-30
Payer: COMMERCIAL

## 2021-01-01 ENCOUNTER — HOSPITAL ENCOUNTER (OUTPATIENT)
Dept: PHYSICAL THERAPY | Age: 61
Discharge: HOME | End: 2021-08-31
Payer: COMMERCIAL

## 2021-01-01 ENCOUNTER — HOSPITAL ENCOUNTER (OUTPATIENT)
Dept: INFUSION THERAPY | Age: 61
Discharge: HOME | End: 2021-12-27
Payer: COMMERCIAL

## 2021-01-01 ENCOUNTER — APPOINTMENT (OUTPATIENT)
Dept: GENERAL RADIOLOGY | Age: 61
DRG: 824 | End: 2021-01-01
Attending: EMERGENCY MEDICINE
Payer: COMMERCIAL

## 2021-01-01 ENCOUNTER — HOSPITAL ENCOUNTER (OUTPATIENT)
Dept: LAB | Age: 61
Discharge: HOME | End: 2021-08-16
Payer: COMMERCIAL

## 2021-01-01 ENCOUNTER — HOSPITAL ENCOUNTER (OUTPATIENT)
Dept: PHYSICAL THERAPY | Age: 61
Discharge: HOME | End: 2021-06-03
Payer: COMMERCIAL

## 2021-01-01 ENCOUNTER — HOSPITAL ENCOUNTER (OUTPATIENT)
Dept: PHYSICAL THERAPY | Age: 61
End: 2021-01-01
Payer: COMMERCIAL

## 2021-01-01 ENCOUNTER — HOSPITAL ENCOUNTER (OUTPATIENT)
Dept: PHYSICAL THERAPY | Age: 61
Discharge: HOME | End: 2021-06-10
Payer: COMMERCIAL

## 2021-01-01 ENCOUNTER — HOSPITAL ENCOUNTER (OUTPATIENT)
Dept: PHYSICAL THERAPY | Age: 61
Discharge: HOME | End: 2021-05-26
Payer: COMMERCIAL

## 2021-01-01 ENCOUNTER — HOSPITAL ENCOUNTER (OUTPATIENT)
Dept: INFUSION THERAPY | Age: 61
Discharge: HOME | End: 2021-10-11
Payer: COMMERCIAL

## 2021-01-01 ENCOUNTER — ANESTHESIA (OUTPATIENT)
Dept: ENDOSCOPY | Age: 61
DRG: 824 | End: 2021-01-01
Payer: COMMERCIAL

## 2021-01-01 ENCOUNTER — HOSPITAL ENCOUNTER (OUTPATIENT)
Dept: PHYSICAL THERAPY | Age: 61
Discharge: HOME | End: 2021-06-21
Payer: COMMERCIAL

## 2021-01-01 ENCOUNTER — HOSPITAL ENCOUNTER (OUTPATIENT)
Dept: PHYSICAL THERAPY | Age: 61
Discharge: HOME | End: 2021-07-12
Payer: COMMERCIAL

## 2021-01-01 ENCOUNTER — HOSPITAL ENCOUNTER (OUTPATIENT)
Dept: PHYSICAL THERAPY | Age: 61
Discharge: HOME | End: 2021-07-08
Payer: COMMERCIAL

## 2021-01-01 ENCOUNTER — HOSPITAL ENCOUNTER (OUTPATIENT)
Dept: INFUSION THERAPY | Age: 61
Discharge: HOME | End: 2021-12-20
Payer: COMMERCIAL

## 2021-01-01 ENCOUNTER — HOSPITAL ENCOUNTER (OUTPATIENT)
Dept: PHYSICAL THERAPY | Age: 61
Discharge: HOME | End: 2021-07-19
Payer: COMMERCIAL

## 2021-01-01 ENCOUNTER — APPOINTMENT (OUTPATIENT)
Dept: ULTRASOUND IMAGING | Age: 61
DRG: 824 | End: 2021-01-01
Attending: FAMILY MEDICINE
Payer: COMMERCIAL

## 2021-01-01 ENCOUNTER — HOSPITAL ENCOUNTER (OUTPATIENT)
Dept: LAB | Age: 61
Discharge: HOME | End: 2021-07-15
Payer: COMMERCIAL

## 2021-01-01 ENCOUNTER — HOSPITAL ENCOUNTER (OUTPATIENT)
Dept: INFUSION THERAPY | Age: 61
End: 2021-01-01

## 2021-01-01 ENCOUNTER — HOSPITAL ENCOUNTER (OUTPATIENT)
Dept: CT IMAGING | Age: 61
Discharge: HOME | End: 2021-11-26
Attending: INTERNAL MEDICINE
Payer: COMMERCIAL

## 2021-01-01 ENCOUNTER — ANESTHESIA (OUTPATIENT)
Dept: INTERVENTIONAL RADIOLOGY/VASCULAR | Age: 61
End: 2021-01-01
Payer: COMMERCIAL

## 2021-01-01 VITALS
HEART RATE: 99 BPM | OXYGEN SATURATION: 96 % | BODY MASS INDEX: 29.02 KG/M2 | SYSTOLIC BLOOD PRESSURE: 130 MMHG | DIASTOLIC BLOOD PRESSURE: 64 MMHG | RESPIRATION RATE: 18 BRPM | WEIGHT: 170 LBS | HEIGHT: 64 IN

## 2021-01-01 VITALS
HEART RATE: 89 BPM | OXYGEN SATURATION: 100 % | SYSTOLIC BLOOD PRESSURE: 136 MMHG | DIASTOLIC BLOOD PRESSURE: 102 MMHG | RESPIRATION RATE: 18 BRPM | TEMPERATURE: 98.1 F

## 2021-01-01 VITALS
RESPIRATION RATE: 18 BRPM | OXYGEN SATURATION: 97 % | SYSTOLIC BLOOD PRESSURE: 106 MMHG | TEMPERATURE: 98.1 F | DIASTOLIC BLOOD PRESSURE: 74 MMHG | HEIGHT: 64 IN | BODY MASS INDEX: 30.58 KG/M2 | HEART RATE: 90 BPM | WEIGHT: 179.1 LBS

## 2021-01-01 VITALS
OXYGEN SATURATION: 97 % | RESPIRATION RATE: 16 BRPM | HEIGHT: 64 IN | SYSTOLIC BLOOD PRESSURE: 148 MMHG | WEIGHT: 161 LBS | DIASTOLIC BLOOD PRESSURE: 70 MMHG | HEART RATE: 95 BPM | BODY MASS INDEX: 27.49 KG/M2 | TEMPERATURE: 98 F

## 2021-01-01 VITALS
TEMPERATURE: 97.7 F | HEART RATE: 80 BPM | RESPIRATION RATE: 18 BRPM | SYSTOLIC BLOOD PRESSURE: 115 MMHG | DIASTOLIC BLOOD PRESSURE: 71 MMHG | OXYGEN SATURATION: 98 %

## 2021-01-01 VITALS
HEART RATE: 85 BPM | OXYGEN SATURATION: 100 % | RESPIRATION RATE: 16 BRPM | SYSTOLIC BLOOD PRESSURE: 126 MMHG | TEMPERATURE: 97.9 F | DIASTOLIC BLOOD PRESSURE: 72 MMHG

## 2021-01-01 DIAGNOSIS — C50.911 MALIGNANT NEOPLASM OF RIGHT FEMALE BREAST, UNSPECIFIED ESTROGEN RECEPTOR STATUS, UNSPECIFIED SITE OF BREAST (HCC): ICD-10-CM

## 2021-01-01 DIAGNOSIS — Z85.038 H/O COLON CANCER, STAGE II: ICD-10-CM

## 2021-01-01 DIAGNOSIS — E86.0 DEHYDRATION: ICD-10-CM

## 2021-01-01 DIAGNOSIS — R22.31 LOCALIZED SWELLING OF RIGHT UPPER EXTREMITY: Primary | ICD-10-CM

## 2021-01-01 DIAGNOSIS — C50.911 MALIGNANT NEOPLASM OF RIGHT FEMALE BREAST, UNSPECIFIED ESTROGEN RECEPTOR STATUS, UNSPECIFIED SITE OF BREAST (HCC): Primary | ICD-10-CM

## 2021-01-01 DIAGNOSIS — I82.A11 ACUTE DEEP VEIN THROMBOSIS (DVT) OF AXILLARY VEIN OF RIGHT UPPER EXTREMITY (HCC): ICD-10-CM

## 2021-01-01 DIAGNOSIS — I89.0 LYMPHEDEMA OF RIGHT ARM: ICD-10-CM

## 2021-01-01 DIAGNOSIS — E83.42 HYPOMAGNESEMIA: ICD-10-CM

## 2021-01-01 DIAGNOSIS — D64.9 ANEMIA, UNSPECIFIED TYPE: ICD-10-CM

## 2021-01-01 DIAGNOSIS — E87.6 HYPOKALEMIA: ICD-10-CM

## 2021-01-01 DIAGNOSIS — R59.0 AXILLARY LYMPHADENOPATHY: ICD-10-CM

## 2021-01-01 DIAGNOSIS — C18.2 MALIGNANT NEOPLASM OF ASCENDING COLON (HCC): ICD-10-CM

## 2021-01-01 DIAGNOSIS — E86.0 DEHYDRATION: Primary | ICD-10-CM

## 2021-01-01 DIAGNOSIS — Z79.899 HIGH RISK MEDICATION USE: ICD-10-CM

## 2021-01-01 DIAGNOSIS — J90 PLEURAL EFFUSION, RIGHT: ICD-10-CM

## 2021-01-01 DIAGNOSIS — Z86.711 HX PULMONARY EMBOLISM: ICD-10-CM

## 2021-01-01 DIAGNOSIS — C50.911 MALIGNANT NEOPLASM OF RIGHT BREAST, STAGE 4 (HCC): ICD-10-CM

## 2021-01-01 DIAGNOSIS — R93.89 ABNORMAL CT SCAN: ICD-10-CM

## 2021-01-01 DIAGNOSIS — K86.3 PANCREATIC PSEUDOCYST: ICD-10-CM

## 2021-01-01 DIAGNOSIS — I89.0 LYMPHEDEMA: ICD-10-CM

## 2021-01-01 DIAGNOSIS — J39.2 PHARYNGEAL EDEMA: ICD-10-CM

## 2021-01-01 DIAGNOSIS — F41.9 ANXIETY: ICD-10-CM

## 2021-01-01 DIAGNOSIS — I82.C11 INTERNAL JUGULAR (IJ) VEIN THROMBOEMBOLISM, ACUTE, RIGHT (HCC): ICD-10-CM

## 2021-01-01 DIAGNOSIS — J90 PLEURAL EFFUSION ON RIGHT: ICD-10-CM

## 2021-01-01 DIAGNOSIS — R22.31 AXILLARY MASS, RIGHT: ICD-10-CM

## 2021-01-01 DIAGNOSIS — R13.10 DYSPHAGIA, UNSPECIFIED TYPE: ICD-10-CM

## 2021-01-01 DIAGNOSIS — Z79.01 CURRENT USE OF ANTICOAGULANT THERAPY: ICD-10-CM

## 2021-01-01 LAB
ABO + RH BLD: NORMAL
ACID FAST STN SPEC: NEGATIVE
ACID FAST STN SPEC: NEGATIVE
ALBUMIN SERPL-MCNC: 2.5 G/DL (ref 3.2–4.6)
ALBUMIN SERPL-MCNC: 2.6 G/DL (ref 3.2–4.6)
ALBUMIN SERPL-MCNC: 2.6 G/DL (ref 3.2–4.6)
ALBUMIN SERPL-MCNC: 2.7 G/DL (ref 3.2–4.6)
ALBUMIN SERPL-MCNC: 2.8 G/DL (ref 3.2–4.6)
ALBUMIN SERPL-MCNC: 2.9 G/DL (ref 3.2–4.6)
ALBUMIN SERPL-MCNC: 3 G/DL (ref 3.2–4.6)
ALBUMIN SERPL-MCNC: 3.1 G/DL (ref 3.2–4.6)
ALBUMIN SERPL-MCNC: 3.1 G/DL (ref 3.2–4.6)
ALBUMIN SERPL-MCNC: 3.3 G/DL (ref 3.2–4.6)
ALBUMIN/GLOB SERPL: 0.5 {RATIO} (ref 1.2–3.5)
ALBUMIN/GLOB SERPL: 0.6 {RATIO} (ref 1.2–3.5)
ALBUMIN/GLOB SERPL: 0.7 {RATIO} (ref 1.2–3.5)
ALBUMIN/GLOB SERPL: 0.8 {RATIO} (ref 1.2–3.5)
ALP SERPL-CCNC: 103 U/L (ref 50–136)
ALP SERPL-CCNC: 105 U/L (ref 50–136)
ALP SERPL-CCNC: 118 U/L (ref 50–136)
ALP SERPL-CCNC: 125 U/L (ref 50–136)
ALP SERPL-CCNC: 67 U/L (ref 50–136)
ALP SERPL-CCNC: 76 U/L (ref 50–136)
ALP SERPL-CCNC: 77 U/L (ref 50–136)
ALP SERPL-CCNC: 77 U/L (ref 50–136)
ALP SERPL-CCNC: 78 U/L (ref 50–136)
ALP SERPL-CCNC: 79 U/L (ref 50–136)
ALP SERPL-CCNC: 79 U/L (ref 50–136)
ALP SERPL-CCNC: 82 U/L (ref 50–136)
ALP SERPL-CCNC: 83 U/L (ref 50–136)
ALP SERPL-CCNC: 84 U/L (ref 50–136)
ALP SERPL-CCNC: 87 U/L (ref 50–136)
ALP SERPL-CCNC: 89 U/L (ref 50–136)
ALT SERPL-CCNC: 15 U/L (ref 12–65)
ALT SERPL-CCNC: 15 U/L (ref 12–65)
ALT SERPL-CCNC: 16 U/L (ref 12–65)
ALT SERPL-CCNC: 17 U/L (ref 12–65)
ALT SERPL-CCNC: 19 U/L (ref 12–65)
ALT SERPL-CCNC: 20 U/L (ref 12–65)
ALT SERPL-CCNC: 21 U/L (ref 12–65)
ALT SERPL-CCNC: 22 U/L (ref 12–65)
ALT SERPL-CCNC: 22 U/L (ref 12–65)
ALT SERPL-CCNC: 24 U/L (ref 12–65)
ALT SERPL-CCNC: 25 U/L (ref 12–65)
ALT SERPL-CCNC: 25 U/L (ref 12–65)
ALT SERPL-CCNC: 26 U/L (ref 12–65)
ALT SERPL-CCNC: 27 U/L (ref 12–65)
ALT SERPL-CCNC: 27 U/L (ref 12–65)
ALT SERPL-CCNC: 28 U/L (ref 12–65)
ALT SERPL-CCNC: 29 U/L (ref 12–65)
ALT SERPL-CCNC: 37 U/L (ref 12–65)
ANION GAP SERPL CALC-SCNC: 10 MMOL/L (ref 7–16)
ANION GAP SERPL CALC-SCNC: 10 MMOL/L (ref 7–16)
ANION GAP SERPL CALC-SCNC: 11 MMOL/L (ref 7–16)
ANION GAP SERPL CALC-SCNC: 4 MMOL/L (ref 7–16)
ANION GAP SERPL CALC-SCNC: 5 MMOL/L (ref 7–16)
ANION GAP SERPL CALC-SCNC: 5 MMOL/L (ref 7–16)
ANION GAP SERPL CALC-SCNC: 6 MMOL/L (ref 7–16)
ANION GAP SERPL CALC-SCNC: 7 MMOL/L (ref 7–16)
ANION GAP SERPL CALC-SCNC: 8 MMOL/L (ref 7–16)
APPEARANCE FLD: CLEAR
APPEARANCE FLD: NORMAL
AST SERPL-CCNC: 24 U/L (ref 15–37)
AST SERPL-CCNC: 27 U/L (ref 15–37)
AST SERPL-CCNC: 27 U/L (ref 15–37)
AST SERPL-CCNC: 29 U/L (ref 15–37)
AST SERPL-CCNC: 31 U/L (ref 15–37)
AST SERPL-CCNC: 32 U/L (ref 15–37)
AST SERPL-CCNC: 32 U/L (ref 15–37)
AST SERPL-CCNC: 33 U/L (ref 15–37)
AST SERPL-CCNC: 33 U/L (ref 15–37)
AST SERPL-CCNC: 35 U/L (ref 15–37)
AST SERPL-CCNC: 35 U/L (ref 15–37)
AST SERPL-CCNC: 36 U/L (ref 15–37)
AST SERPL-CCNC: 38 U/L (ref 15–37)
AST SERPL-CCNC: 39 U/L (ref 15–37)
AST SERPL-CCNC: 46 U/L (ref 15–37)
AST SERPL-CCNC: 50 U/L (ref 15–37)
AST SERPL-CCNC: 54 U/L (ref 15–37)
AST SERPL-CCNC: 58 U/L (ref 15–37)
BACTERIA SPEC CULT: NORMAL
BASOPHILS # BLD: 0 K/UL (ref 0–0.2)
BASOPHILS # BLD: 0.1 K/UL (ref 0–0.2)
BASOPHILS NFR BLD: 0 % (ref 0–2)
BASOPHILS NFR BLD: 1 % (ref 0–2)
BILIRUB SERPL-MCNC: 0.5 MG/DL (ref 0.2–1.1)
BILIRUB SERPL-MCNC: 0.6 MG/DL (ref 0.2–1.1)
BILIRUB SERPL-MCNC: 0.7 MG/DL (ref 0.2–1.1)
BILIRUB SERPL-MCNC: 0.9 MG/DL (ref 0.2–1.1)
BILIRUB SERPL-MCNC: 1 MG/DL (ref 0.2–1.1)
BILIRUB SERPL-MCNC: 1 MG/DL (ref 0.2–1.1)
BILIRUB SERPL-MCNC: 1.1 MG/DL (ref 0.2–1.1)
BLD PROD TYP BPU: NORMAL
BLOOD GROUP ANTIBODIES SERPL: NORMAL
BPU ID: NORMAL
BUN SERPL-MCNC: 10 MG/DL (ref 8–23)
BUN SERPL-MCNC: 11 MG/DL (ref 8–23)
BUN SERPL-MCNC: 11 MG/DL (ref 8–23)
BUN SERPL-MCNC: 12 MG/DL (ref 8–23)
BUN SERPL-MCNC: 13 MG/DL (ref 8–23)
BUN SERPL-MCNC: 14 MG/DL (ref 8–23)
BUN SERPL-MCNC: 15 MG/DL (ref 8–23)
BUN SERPL-MCNC: 15 MG/DL (ref 8–23)
BUN SERPL-MCNC: 16 MG/DL (ref 8–23)
BUN SERPL-MCNC: 20 MG/DL (ref 8–23)
BUN SERPL-MCNC: 6 MG/DL (ref 8–23)
BUN SERPL-MCNC: 7 MG/DL (ref 8–23)
BUN SERPL-MCNC: 8 MG/DL (ref 8–23)
CALCIUM SERPL-MCNC: 8.2 MG/DL (ref 8.3–10.4)
CALCIUM SERPL-MCNC: 8.5 MG/DL (ref 8.3–10.4)
CALCIUM SERPL-MCNC: 8.6 MG/DL (ref 8.3–10.4)
CALCIUM SERPL-MCNC: 8.7 MG/DL (ref 8.3–10.4)
CALCIUM SERPL-MCNC: 8.8 MG/DL (ref 8.3–10.4)
CALCIUM SERPL-MCNC: 8.9 MG/DL (ref 8.3–10.4)
CALCIUM SERPL-MCNC: 9 MG/DL (ref 8.3–10.4)
CALCIUM SERPL-MCNC: 9.1 MG/DL (ref 8.3–10.4)
CALCIUM SERPL-MCNC: 9.2 MG/DL (ref 8.3–10.4)
CALCIUM SERPL-MCNC: 9.3 MG/DL (ref 8.3–10.4)
CALCIUM SERPL-MCNC: 9.3 MG/DL (ref 8.3–10.4)
CALCIUM SERPL-MCNC: 9.4 MG/DL (ref 8.3–10.4)
CALCIUM SERPL-MCNC: 9.5 MG/DL (ref 8.3–10.4)
CALCIUM SERPL-MCNC: 9.5 MG/DL (ref 8.3–10.4)
CANCER AG15-3 SERPL-ACNC: 32.5 U/ML (ref 1–35)
CANCER AG27-29 SERPL-ACNC: 133.3 U/ML (ref 0–38.6)
CANCER AG27-29 SERPL-ACNC: 170.6 U/ML (ref 0–38.6)
CEA SERPL-MCNC: 1.5 NG/ML (ref 0–3)
CHLORIDE SERPL-SCNC: 104 MMOL/L (ref 98–107)
CHLORIDE SERPL-SCNC: 105 MMOL/L (ref 98–107)
CHLORIDE SERPL-SCNC: 106 MMOL/L (ref 98–107)
CHLORIDE SERPL-SCNC: 106 MMOL/L (ref 98–107)
CHLORIDE SERPL-SCNC: 107 MMOL/L (ref 98–107)
CHLORIDE SERPL-SCNC: 108 MMOL/L (ref 98–107)
CHLORIDE SERPL-SCNC: 109 MMOL/L (ref 98–107)
CHLORIDE SERPL-SCNC: 109 MMOL/L (ref 98–107)
CHLORIDE SERPL-SCNC: 110 MMOL/L (ref 98–107)
CHLORIDE SERPL-SCNC: 111 MMOL/L (ref 98–107)
CHLORIDE SERPL-SCNC: 112 MMOL/L (ref 98–107)
CO2 SERPL-SCNC: 22 MMOL/L (ref 21–32)
CO2 SERPL-SCNC: 23 MMOL/L (ref 21–32)
CO2 SERPL-SCNC: 24 MMOL/L (ref 21–32)
CO2 SERPL-SCNC: 24 MMOL/L (ref 21–32)
CO2 SERPL-SCNC: 25 MMOL/L (ref 21–32)
CO2 SERPL-SCNC: 26 MMOL/L (ref 21–32)
CO2 SERPL-SCNC: 27 MMOL/L (ref 21–32)
CO2 SERPL-SCNC: 28 MMOL/L (ref 21–32)
COLOR FLD: YELLOW
COLOR FLD: YELLOW
CREAT SERPL-MCNC: 0.58 MG/DL (ref 0.6–1)
CREAT SERPL-MCNC: 0.6 MG/DL (ref 0.6–1)
CREAT SERPL-MCNC: 0.63 MG/DL (ref 0.6–1)
CREAT SERPL-MCNC: 0.63 MG/DL (ref 0.6–1)
CREAT SERPL-MCNC: 0.65 MG/DL (ref 0.6–1)
CREAT SERPL-MCNC: 0.65 MG/DL (ref 0.6–1)
CREAT SERPL-MCNC: 0.68 MG/DL (ref 0.6–1)
CREAT SERPL-MCNC: 0.7 MG/DL (ref 0.6–1)
CREAT SERPL-MCNC: 0.71 MG/DL (ref 0.6–1)
CREAT SERPL-MCNC: 0.72 MG/DL (ref 0.6–1)
CREAT SERPL-MCNC: 0.8 MG/DL (ref 0.6–1)
CREAT SERPL-MCNC: 0.9 MG/DL (ref 0.6–1)
CREAT SERPL-MCNC: 1 MG/DL (ref 0.6–1)
CROSSMATCH RESULT,%XM: NORMAL
DIFFERENTIAL METHOD BLD: ABNORMAL
EOSINOPHIL # BLD: 0 K/UL (ref 0–0.8)
EOSINOPHIL # BLD: 0.1 K/UL (ref 0–0.8)
EOSINOPHIL NFR BLD: 0 % (ref 0.5–7.8)
EOSINOPHIL NFR BLD: 1 % (ref 0.5–7.8)
EOSINOPHIL NFR BLD: 2 % (ref 0.5–7.8)
EOSINOPHIL NFR BLD: 3 % (ref 0.5–7.8)
EOSINOPHIL NFR BLD: 4 % (ref 0.5–7.8)
ERYTHROCYTE [DISTWIDTH] IN BLOOD BY AUTOMATED COUNT: 14.9 % (ref 11.9–14.6)
ERYTHROCYTE [DISTWIDTH] IN BLOOD BY AUTOMATED COUNT: 15.4 % (ref 11.9–14.6)
ERYTHROCYTE [DISTWIDTH] IN BLOOD BY AUTOMATED COUNT: 15.5 % (ref 11.9–14.6)
ERYTHROCYTE [DISTWIDTH] IN BLOOD BY AUTOMATED COUNT: 15.9 % (ref 11.9–14.6)
ERYTHROCYTE [DISTWIDTH] IN BLOOD BY AUTOMATED COUNT: 16.1 % (ref 11.9–14.6)
ERYTHROCYTE [DISTWIDTH] IN BLOOD BY AUTOMATED COUNT: 16.2 % (ref 11.9–14.6)
ERYTHROCYTE [DISTWIDTH] IN BLOOD BY AUTOMATED COUNT: 16.4 % (ref 11.9–14.6)
ERYTHROCYTE [DISTWIDTH] IN BLOOD BY AUTOMATED COUNT: 16.5 % (ref 11.9–14.6)
ERYTHROCYTE [DISTWIDTH] IN BLOOD BY AUTOMATED COUNT: 16.8 % (ref 11.9–14.6)
ERYTHROCYTE [DISTWIDTH] IN BLOOD BY AUTOMATED COUNT: 17.4 % (ref 11.9–14.6)
ERYTHROCYTE [DISTWIDTH] IN BLOOD BY AUTOMATED COUNT: 21.3 % (ref 11.9–14.6)
ERYTHROCYTE [DISTWIDTH] IN BLOOD BY AUTOMATED COUNT: 21.7 % (ref 11.9–14.6)
ERYTHROCYTE [DISTWIDTH] IN BLOOD BY AUTOMATED COUNT: 22.6 % (ref 11.9–14.6)
ERYTHROCYTE [DISTWIDTH] IN BLOOD BY AUTOMATED COUNT: 23.7 % (ref 11.9–14.6)
FERRITIN SERPL-MCNC: 57 NG/ML (ref 8–388)
FLOW CYTOMETRY, FBTC1: NORMAL
FLUID COMMENTS, FCOM: NORMAL
FUNGUS CULTURE, RFCO2T: NORMAL
FUNGUS CULTURE, RFCO2T: NORMAL
FUNGUS SMEAR, RFCO1T: NORMAL
FUNGUS SMEAR, RFCO1T: NORMAL
FUNGUS SPEC CULT: NORMAL
FUNGUS SPEC CULT: NORMAL
FUNGUS STAIN, 188244: NORMAL
FUNGUS STAIN, 188244: NORMAL
GLOBULIN SER CALC-MCNC: 3.9 G/DL (ref 2.3–3.5)
GLOBULIN SER CALC-MCNC: 4 G/DL (ref 2.3–3.5)
GLOBULIN SER CALC-MCNC: 4 G/DL (ref 2.3–3.5)
GLOBULIN SER CALC-MCNC: 4.1 G/DL (ref 2.3–3.5)
GLOBULIN SER CALC-MCNC: 4.3 G/DL (ref 2.3–3.5)
GLOBULIN SER CALC-MCNC: 4.3 G/DL (ref 2.3–3.5)
GLOBULIN SER CALC-MCNC: 4.4 G/DL (ref 2.3–3.5)
GLOBULIN SER CALC-MCNC: 4.4 G/DL (ref 2.3–3.5)
GLOBULIN SER CALC-MCNC: 4.6 G/DL (ref 2.3–3.5)
GLOBULIN SER CALC-MCNC: 4.8 G/DL (ref 2.3–3.5)
GLOBULIN SER CALC-MCNC: 4.9 G/DL (ref 2.3–3.5)
GLOBULIN SER CALC-MCNC: 4.9 G/DL (ref 2.3–3.5)
GLOBULIN SER CALC-MCNC: 5 G/DL (ref 2.3–3.5)
GLOBULIN SER CALC-MCNC: 5.2 G/DL (ref 2.3–3.5)
GLOBULIN SER CALC-MCNC: 5.2 G/DL (ref 2.3–3.5)
GLOBULIN SER CALC-MCNC: 5.5 G/DL (ref 2.3–3.5)
GLUCOSE BLD STRIP.AUTO-MCNC: 100 MG/DL (ref 65–100)
GLUCOSE BLD STRIP.AUTO-MCNC: 104 MG/DL (ref 65–100)
GLUCOSE BLD STRIP.AUTO-MCNC: 105 MG/DL (ref 65–100)
GLUCOSE BLD STRIP.AUTO-MCNC: 109 MG/DL (ref 65–100)
GLUCOSE BLD STRIP.AUTO-MCNC: 109 MG/DL (ref 65–100)
GLUCOSE BLD STRIP.AUTO-MCNC: 110 MG/DL (ref 65–100)
GLUCOSE BLD STRIP.AUTO-MCNC: 110 MG/DL (ref 65–100)
GLUCOSE BLD STRIP.AUTO-MCNC: 112 MG/DL (ref 65–100)
GLUCOSE BLD STRIP.AUTO-MCNC: 113 MG/DL (ref 65–100)
GLUCOSE BLD STRIP.AUTO-MCNC: 113 MG/DL (ref 65–100)
GLUCOSE BLD STRIP.AUTO-MCNC: 114 MG/DL (ref 65–100)
GLUCOSE BLD STRIP.AUTO-MCNC: 117 MG/DL (ref 65–100)
GLUCOSE BLD STRIP.AUTO-MCNC: 119 MG/DL (ref 65–100)
GLUCOSE BLD STRIP.AUTO-MCNC: 120 MG/DL (ref 65–100)
GLUCOSE BLD STRIP.AUTO-MCNC: 123 MG/DL (ref 65–100)
GLUCOSE BLD STRIP.AUTO-MCNC: 127 MG/DL (ref 65–100)
GLUCOSE BLD STRIP.AUTO-MCNC: 129 MG/DL (ref 65–100)
GLUCOSE BLD STRIP.AUTO-MCNC: 130 MG/DL (ref 65–100)
GLUCOSE BLD STRIP.AUTO-MCNC: 200 MG/DL (ref 65–100)
GLUCOSE BLD STRIP.AUTO-MCNC: 83 MG/DL (ref 65–100)
GLUCOSE BLD STRIP.AUTO-MCNC: 87 MG/DL (ref 65–100)
GLUCOSE BLD STRIP.AUTO-MCNC: 93 MG/DL (ref 65–100)
GLUCOSE BLD STRIP.AUTO-MCNC: 98 MG/DL (ref 65–100)
GLUCOSE BLD STRIP.AUTO-MCNC: 99 MG/DL (ref 65–100)
GLUCOSE FLD-MCNC: 104 MG/DL
GLUCOSE FLD-MCNC: 113 MG/DL
GLUCOSE SERPL-MCNC: 102 MG/DL (ref 65–100)
GLUCOSE SERPL-MCNC: 103 MG/DL (ref 65–100)
GLUCOSE SERPL-MCNC: 105 MG/DL (ref 65–100)
GLUCOSE SERPL-MCNC: 110 MG/DL (ref 65–100)
GLUCOSE SERPL-MCNC: 111 MG/DL (ref 65–100)
GLUCOSE SERPL-MCNC: 116 MG/DL (ref 65–100)
GLUCOSE SERPL-MCNC: 117 MG/DL (ref 65–100)
GLUCOSE SERPL-MCNC: 119 MG/DL (ref 65–100)
GLUCOSE SERPL-MCNC: 126 MG/DL (ref 65–100)
GLUCOSE SERPL-MCNC: 128 MG/DL (ref 65–100)
GLUCOSE SERPL-MCNC: 130 MG/DL (ref 65–100)
GLUCOSE SERPL-MCNC: 138 MG/DL (ref 65–100)
GLUCOSE SERPL-MCNC: 140 MG/DL (ref 65–100)
GLUCOSE SERPL-MCNC: 141 MG/DL (ref 65–100)
GLUCOSE SERPL-MCNC: 147 MG/DL (ref 65–100)
GLUCOSE SERPL-MCNC: 151 MG/DL (ref 65–100)
GLUCOSE SERPL-MCNC: 152 MG/DL (ref 65–100)
GLUCOSE SERPL-MCNC: 154 MG/DL (ref 65–100)
GLUCOSE SERPL-MCNC: 173 MG/DL (ref 65–100)
GLUCOSE SERPL-MCNC: 175 MG/DL (ref 65–100)
GLUCOSE SERPL-MCNC: 85 MG/DL (ref 65–100)
GLUCOSE SERPL-MCNC: 87 MG/DL (ref 65–100)
GLUCOSE SERPL-MCNC: 96 MG/DL (ref 65–100)
GLUCOSE SERPL-MCNC: 97 MG/DL (ref 65–100)
GRAM STN SPEC: NORMAL
GRAM STN SPEC: NORMAL
HBV SURFACE AB SERPL IA-ACNC: <3.1 MIU/ML
HCT VFR BLD AUTO: 17.9 % (ref 35.8–46.3)
HCT VFR BLD AUTO: 21.1 % (ref 35.8–46.3)
HCT VFR BLD AUTO: 21.6 % (ref 35.8–46.3)
HCT VFR BLD AUTO: 21.8 % (ref 35.8–46.3)
HCT VFR BLD AUTO: 22.8 % (ref 35.8–46.3)
HCT VFR BLD AUTO: 22.8 % (ref 35.8–46.3)
HCT VFR BLD AUTO: 23.1 % (ref 35.8–46.3)
HCT VFR BLD AUTO: 24.3 % (ref 35.8–46.3)
HCT VFR BLD AUTO: 26.9 % (ref 35.8–46.3)
HCT VFR BLD AUTO: 27.7 % (ref 35.8–46.3)
HCT VFR BLD AUTO: 27.7 % (ref 35.8–46.3)
HCT VFR BLD AUTO: 28.5 % (ref 35.8–46.3)
HCT VFR BLD AUTO: 28.5 % (ref 35.8–46.3)
HCT VFR BLD AUTO: 29.1 % (ref 35.8–46.3)
HCT VFR BLD AUTO: 29.7 % (ref 35.8–46.3)
HCT VFR BLD AUTO: 30.5 % (ref 35.8–46.3)
HCT VFR BLD AUTO: 31.4 % (ref 35.8–46.3)
HCT VFR BLD AUTO: 32.1 % (ref 35.8–46.3)
HCT VFR BLD AUTO: 32.4 % (ref 35.8–46.3)
HCT VFR BLD AUTO: 32.4 % (ref 35.8–46.3)
HCT VFR BLD AUTO: 32.6 % (ref 35.8–46.3)
HCT VFR BLD AUTO: 32.9 % (ref 35.8–46.3)
HCT VFR BLD AUTO: 34.3 % (ref 35.8–46.3)
HCT VFR BLD AUTO: 34.4 % (ref 35.8–46.3)
HGB BLD-MCNC: 10 G/DL (ref 11.7–15.4)
HGB BLD-MCNC: 10.3 G/DL (ref 11.7–15.4)
HGB BLD-MCNC: 10.3 G/DL (ref 11.7–15.4)
HGB BLD-MCNC: 10.5 G/DL (ref 11.7–15.4)
HGB BLD-MCNC: 10.5 G/DL (ref 11.7–15.4)
HGB BLD-MCNC: 10.6 G/DL (ref 11.7–15.4)
HGB BLD-MCNC: 11.1 G/DL (ref 11.7–15.4)
HGB BLD-MCNC: 6.1 G/DL (ref 11.7–15.4)
HGB BLD-MCNC: 7 G/DL (ref 11.7–15.4)
HGB BLD-MCNC: 7.2 G/DL (ref 11.7–15.4)
HGB BLD-MCNC: 7.2 G/DL (ref 11.7–15.4)
HGB BLD-MCNC: 7.5 G/DL (ref 11.7–15.4)
HGB BLD-MCNC: 7.6 G/DL (ref 11.7–15.4)
HGB BLD-MCNC: 7.6 G/DL (ref 11.7–15.4)
HGB BLD-MCNC: 8 G/DL (ref 11.7–15.4)
HGB BLD-MCNC: 9 G/DL (ref 11.7–15.4)
HGB BLD-MCNC: 9.2 G/DL (ref 11.7–15.4)
HGB BLD-MCNC: 9.2 G/DL (ref 11.7–15.4)
HGB BLD-MCNC: 9.3 G/DL (ref 11.7–15.4)
HGB BLD-MCNC: 9.5 G/DL (ref 11.7–15.4)
HGB BLD-MCNC: 9.5 G/DL (ref 11.7–15.4)
HGB BLD-MCNC: 9.7 G/DL (ref 11.7–15.4)
HGB BLD-MCNC: 9.7 G/DL (ref 11.7–15.4)
HGB BLD-MCNC: 9.9 G/DL (ref 11.7–15.4)
HISTORY CHECKED?,CKHIST: NORMAL
IMM GRANULOCYTES # BLD AUTO: 0 K/UL (ref 0–0.5)
IMM GRANULOCYTES NFR BLD AUTO: 0 % (ref 0–5)
IMM GRANULOCYTES NFR BLD AUTO: 1 % (ref 0–5)
IRON SATN MFR SERPL: 16 %
IRON SERPL-MCNC: 33 UG/DL (ref 35–150)
LDH FLD L TO P-CCNC: 101 U/L
LDH FLD L TO P-CCNC: 109 U/L
LYMPHOCYTES # BLD: 0.8 K/UL (ref 0.5–4.6)
LYMPHOCYTES # BLD: 0.8 K/UL (ref 0.5–4.6)
LYMPHOCYTES # BLD: 1 K/UL (ref 0.5–4.6)
LYMPHOCYTES # BLD: 1.1 K/UL (ref 0.5–4.6)
LYMPHOCYTES # BLD: 1.1 K/UL (ref 0.5–4.6)
LYMPHOCYTES # BLD: 1.2 K/UL (ref 0.5–4.6)
LYMPHOCYTES # BLD: 1.3 K/UL (ref 0.5–4.6)
LYMPHOCYTES # BLD: 1.3 K/UL (ref 0.5–4.6)
LYMPHOCYTES # BLD: 1.6 K/UL (ref 0.5–4.6)
LYMPHOCYTES # BLD: 1.6 K/UL (ref 0.5–4.6)
LYMPHOCYTES # BLD: 1.9 K/UL (ref 0.5–4.6)
LYMPHOCYTES NFR BLD: 22 % (ref 13–44)
LYMPHOCYTES NFR BLD: 25 % (ref 13–44)
LYMPHOCYTES NFR BLD: 26 % (ref 13–44)
LYMPHOCYTES NFR BLD: 26 % (ref 13–44)
LYMPHOCYTES NFR BLD: 27 % (ref 13–44)
LYMPHOCYTES NFR BLD: 27 % (ref 13–44)
LYMPHOCYTES NFR BLD: 30 % (ref 13–44)
LYMPHOCYTES NFR BLD: 30 % (ref 13–44)
LYMPHOCYTES NFR BLD: 31 % (ref 13–44)
LYMPHOCYTES NFR BLD: 31 % (ref 13–44)
LYMPHOCYTES NFR BLD: 32 % (ref 13–44)
LYMPHOCYTES NFR BLD: 33 % (ref 13–44)
LYMPHOCYTES NFR BLD: 41 % (ref 13–44)
LYMPHOCYTES NFR BLD: 46 % (ref 13–44)
LYMPHOCYTES NFR BLD: 49 % (ref 13–44)
LYMPHOCYTES NFR BLD: 51 % (ref 13–44)
LYMPHOCYTES NFR BLD: 55 % (ref 13–44)
LYMPHOCYTES NFR BLD: 61 % (ref 13–44)
LYMPHOCYTES NFR BRONCH MANUAL: 60 %
LYMPHOCYTES NFR BRONCH MANUAL: 94 %
MACROPHAGES NFR BRONCH MANUAL: 40 %
MACROPHAGES NFR BRONCH MANUAL: 6 %
MAGNESIUM SERPL-MCNC: 1.5 MG/DL (ref 1.8–2.4)
MAGNESIUM SERPL-MCNC: 1.7 MG/DL (ref 1.8–2.4)
MAGNESIUM SERPL-MCNC: 1.8 MG/DL (ref 1.8–2.4)
MAGNESIUM SERPL-MCNC: 1.9 MG/DL (ref 1.8–2.4)
MAGNESIUM SERPL-MCNC: 1.9 MG/DL (ref 1.8–2.4)
MAGNESIUM SERPL-MCNC: 2.1 MG/DL (ref 1.8–2.4)
MCH RBC QN AUTO: 27.8 PG (ref 26.1–32.9)
MCH RBC QN AUTO: 27.9 PG (ref 26.1–32.9)
MCH RBC QN AUTO: 28 PG (ref 26.1–32.9)
MCH RBC QN AUTO: 28 PG (ref 26.1–32.9)
MCH RBC QN AUTO: 28.1 PG (ref 26.1–32.9)
MCH RBC QN AUTO: 28.5 PG (ref 26.1–32.9)
MCH RBC QN AUTO: 28.6 PG (ref 26.1–32.9)
MCH RBC QN AUTO: 28.6 PG (ref 26.1–32.9)
MCH RBC QN AUTO: 28.7 PG (ref 26.1–32.9)
MCH RBC QN AUTO: 28.8 PG (ref 26.1–32.9)
MCH RBC QN AUTO: 28.8 PG (ref 26.1–32.9)
MCH RBC QN AUTO: 28.9 PG (ref 26.1–32.9)
MCH RBC QN AUTO: 29.1 PG (ref 26.1–32.9)
MCH RBC QN AUTO: 29.1 PG (ref 26.1–32.9)
MCH RBC QN AUTO: 29.2 PG (ref 26.1–32.9)
MCH RBC QN AUTO: 29.5 PG (ref 26.1–32.9)
MCH RBC QN AUTO: 29.9 PG (ref 26.1–32.9)
MCH RBC QN AUTO: 30.6 PG (ref 26.1–32.9)
MCH RBC QN AUTO: 32.6 PG (ref 26.1–32.9)
MCH RBC QN AUTO: 34.3 PG (ref 26.1–32.9)
MCH RBC QN AUTO: 34.7 PG (ref 26.1–32.9)
MCH RBC QN AUTO: 36.4 PG (ref 26.1–32.9)
MCHC RBC AUTO-ENTMCNC: 30.9 G/DL (ref 31.4–35)
MCHC RBC AUTO-ENTMCNC: 31.6 G/DL (ref 31.4–35)
MCHC RBC AUTO-ENTMCNC: 31.9 G/DL (ref 31.4–35)
MCHC RBC AUTO-ENTMCNC: 32.1 G/DL (ref 31.4–35)
MCHC RBC AUTO-ENTMCNC: 32.3 G/DL (ref 31.4–35)
MCHC RBC AUTO-ENTMCNC: 32.4 G/DL (ref 31.4–35)
MCHC RBC AUTO-ENTMCNC: 32.4 G/DL (ref 31.4–35)
MCHC RBC AUTO-ENTMCNC: 32.5 G/DL (ref 31.4–35)
MCHC RBC AUTO-ENTMCNC: 32.6 G/DL (ref 31.4–35)
MCHC RBC AUTO-ENTMCNC: 32.6 G/DL (ref 31.4–35)
MCHC RBC AUTO-ENTMCNC: 32.7 G/DL (ref 31.4–35)
MCHC RBC AUTO-ENTMCNC: 32.9 G/DL (ref 31.4–35)
MCHC RBC AUTO-ENTMCNC: 33 G/DL (ref 31.4–35)
MCHC RBC AUTO-ENTMCNC: 33.2 G/DL (ref 31.4–35)
MCHC RBC AUTO-ENTMCNC: 33.2 G/DL (ref 31.4–35)
MCHC RBC AUTO-ENTMCNC: 33.3 G/DL (ref 31.4–35)
MCHC RBC AUTO-ENTMCNC: 33.3 G/DL (ref 31.4–35)
MCHC RBC AUTO-ENTMCNC: 33.5 G/DL (ref 31.4–35)
MCHC RBC AUTO-ENTMCNC: 34.1 G/DL (ref 31.4–35)
MCHC RBC AUTO-ENTMCNC: 34.1 G/DL (ref 31.4–35)
MCV RBC AUTO: 100.6 FL (ref 79.6–97.8)
MCV RBC AUTO: 105.5 FL (ref 79.6–97.8)
MCV RBC AUTO: 109.1 FL (ref 79.6–97.8)
MCV RBC AUTO: 85.8 FL (ref 79.6–97.8)
MCV RBC AUTO: 86.8 FL (ref 79.6–97.8)
MCV RBC AUTO: 87.1 FL (ref 79.6–97.8)
MCV RBC AUTO: 87.5 FL (ref 79.6–97.8)
MCV RBC AUTO: 87.7 FL (ref 79.6–97.8)
MCV RBC AUTO: 88.2 FL (ref 79.6–97.8)
MCV RBC AUTO: 88.2 FL (ref 79.6–97.8)
MCV RBC AUTO: 88.3 FL (ref 79.6–97.8)
MCV RBC AUTO: 88.5 FL (ref 79.6–97.8)
MCV RBC AUTO: 88.7 FL (ref 79.6–97.8)
MCV RBC AUTO: 88.9 FL (ref 79.6–97.8)
MCV RBC AUTO: 88.9 FL (ref 79.6–97.8)
MCV RBC AUTO: 89.1 FL (ref 79.6–97.8)
MCV RBC AUTO: 89.2 FL (ref 79.6–97.8)
MCV RBC AUTO: 89.7 FL (ref 79.6–97.8)
MCV RBC AUTO: 90 FL (ref 79.6–97.8)
MCV RBC AUTO: 90.5 FL (ref 79.6–97.8)
MCV RBC AUTO: 90.7 FL (ref 79.6–97.8)
MCV RBC AUTO: 90.8 FL (ref 79.6–97.8)
MCV RBC AUTO: 94.3 FL (ref 79.6–97.8)
MCV RBC AUTO: 95.5 FL (ref 79.6–97.8)
MONOCYTES # BLD: 0.1 K/UL (ref 0.1–1.3)
MONOCYTES # BLD: 0.2 K/UL (ref 0.1–1.3)
MONOCYTES # BLD: 0.3 K/UL (ref 0.1–1.3)
MONOCYTES # BLD: 0.4 K/UL (ref 0.1–1.3)
MONOCYTES # BLD: 0.5 K/UL (ref 0.1–1.3)
MONOCYTES # BLD: 0.6 K/UL (ref 0.1–1.3)
MONOCYTES # BLD: 0.7 K/UL (ref 0.1–1.3)
MONOCYTES # BLD: 0.8 K/UL (ref 0.1–1.3)
MONOCYTES # BLD: 0.8 K/UL (ref 0.1–1.3)
MONOCYTES NFR BLD: 10 % (ref 4–12)
MONOCYTES NFR BLD: 11 % (ref 4–12)
MONOCYTES NFR BLD: 13 % (ref 4–12)
MONOCYTES NFR BLD: 14 % (ref 4–12)
MONOCYTES NFR BLD: 14 % (ref 4–12)
MONOCYTES NFR BLD: 16 % (ref 4–12)
MONOCYTES NFR BLD: 16 % (ref 4–12)
MONOCYTES NFR BLD: 17 % (ref 4–12)
MONOCYTES NFR BLD: 24 % (ref 4–12)
MONOCYTES NFR BLD: 32 % (ref 4–12)
MONOCYTES NFR BLD: 7 % (ref 4–12)
MONOCYTES NFR BLD: 8 % (ref 4–12)
MYCOBACTERIUM SPEC QL CULT: NEGATIVE
MYCOBACTERIUM SPEC QL CULT: NEGATIVE
NEUTS SEG # BLD: 0.2 K/UL (ref 1.7–8.2)
NEUTS SEG # BLD: 0.4 K/UL (ref 1.7–8.2)
NEUTS SEG # BLD: 0.5 K/UL (ref 1.7–8.2)
NEUTS SEG # BLD: 0.7 K/UL (ref 1.7–8.2)
NEUTS SEG # BLD: 1 K/UL (ref 1.7–8.2)
NEUTS SEG # BLD: 1.1 K/UL (ref 1.7–8.2)
NEUTS SEG # BLD: 1.9 K/UL (ref 1.7–8.2)
NEUTS SEG # BLD: 2 K/UL (ref 1.7–8.2)
NEUTS SEG # BLD: 2.1 K/UL (ref 1.7–8.2)
NEUTS SEG # BLD: 2.2 K/UL (ref 1.7–8.2)
NEUTS SEG # BLD: 2.5 K/UL (ref 1.7–8.2)
NEUTS SEG # BLD: 2.5 K/UL (ref 1.7–8.2)
NEUTS SEG # BLD: 3 K/UL (ref 1.7–8.2)
NEUTS SEG # BLD: 3.4 K/UL (ref 1.7–8.2)
NEUTS SEG # BLD: 3.5 K/UL (ref 1.7–8.2)
NEUTS SEG # BLD: 3.9 K/UL (ref 1.7–8.2)
NEUTS SEG # BLD: 4.1 K/UL (ref 1.7–8.2)
NEUTS SEG # BLD: 4.1 K/UL (ref 1.7–8.2)
NEUTS SEG NFR BLD: 11 % (ref 43–78)
NEUTS SEG NFR BLD: 19 % (ref 43–78)
NEUTS SEG NFR BLD: 36 % (ref 43–78)
NEUTS SEG NFR BLD: 37 % (ref 43–78)
NEUTS SEG NFR BLD: 38 % (ref 43–78)
NEUTS SEG NFR BLD: 42 % (ref 43–78)
NEUTS SEG NFR BLD: 47 % (ref 43–78)
NEUTS SEG NFR BLD: 53 % (ref 43–78)
NEUTS SEG NFR BLD: 54 % (ref 43–78)
NEUTS SEG NFR BLD: 54 % (ref 43–78)
NEUTS SEG NFR BLD: 56 % (ref 43–78)
NEUTS SEG NFR BLD: 57 % (ref 43–78)
NEUTS SEG NFR BLD: 59 % (ref 43–78)
NEUTS SEG NFR BLD: 59 % (ref 43–78)
NEUTS SEG NFR BLD: 60 % (ref 43–78)
NEUTS SEG NFR BLD: 60 % (ref 43–78)
NEUTS SEG NFR BLD: 61 % (ref 43–78)
NEUTS SEG NFR BLD: 63 % (ref 43–78)
NRBC # BLD: 0 K/UL (ref 0–0.2)
NUC CELL # FLD: 2373 /CU MM
NUC CELL # FLD: 795 /CU MM
PLATELET # BLD AUTO: 112 K/UL (ref 150–450)
PLATELET # BLD AUTO: 113 K/UL (ref 150–450)
PLATELET # BLD AUTO: 118 K/UL (ref 150–450)
PLATELET # BLD AUTO: 156 K/UL (ref 150–450)
PLATELET # BLD AUTO: 198 K/UL (ref 150–450)
PLATELET # BLD AUTO: 221 K/UL (ref 150–450)
PLATELET # BLD AUTO: 233 K/UL (ref 150–450)
PLATELET # BLD AUTO: 251 K/UL (ref 150–450)
PLATELET # BLD AUTO: 255 K/UL (ref 150–450)
PLATELET # BLD AUTO: 260 K/UL (ref 150–450)
PLATELET # BLD AUTO: 269 K/UL (ref 150–450)
PLATELET # BLD AUTO: 281 K/UL (ref 150–450)
PLATELET # BLD AUTO: 295 K/UL (ref 150–450)
PLATELET # BLD AUTO: 318 K/UL (ref 150–450)
PLATELET # BLD AUTO: 325 K/UL (ref 150–450)
PLATELET # BLD AUTO: 331 K/UL (ref 150–450)
PLATELET # BLD AUTO: 337 K/UL (ref 150–450)
PLATELET # BLD AUTO: 347 K/UL (ref 150–450)
PLATELET # BLD AUTO: 349 K/UL (ref 150–450)
PLATELET # BLD AUTO: 41 K/UL (ref 150–450)
PLATELET # BLD AUTO: 65 K/UL (ref 150–450)
PLATELET # BLD AUTO: 76 K/UL (ref 150–450)
PLATELET # BLD AUTO: 90 K/UL (ref 150–450)
PLATELET # BLD AUTO: 91 K/UL (ref 150–450)
PLATELET COMMENTS,PCOM: ADEQUATE
PMV BLD AUTO: 10.1 FL (ref 9.4–12.3)
PMV BLD AUTO: 10.2 FL (ref 9.4–12.3)
PMV BLD AUTO: 10.3 FL (ref 9.4–12.3)
PMV BLD AUTO: 10.4 FL (ref 9.4–12.3)
PMV BLD AUTO: 10.6 FL (ref 9.4–12.3)
PMV BLD AUTO: 10.6 FL (ref 9.4–12.3)
PMV BLD AUTO: 10.7 FL (ref 9.4–12.3)
PMV BLD AUTO: 10.8 FL (ref 9.4–12.3)
PMV BLD AUTO: 10.8 FL (ref 9.4–12.3)
PMV BLD AUTO: 10.9 FL (ref 9.4–12.3)
PMV BLD AUTO: 8.7 FL (ref 9.4–12.3)
PMV BLD AUTO: 9.3 FL (ref 9.4–12.3)
PMV BLD AUTO: 9.5 FL (ref 9.4–12.3)
PMV BLD AUTO: 9.5 FL (ref 9.4–12.3)
PMV BLD AUTO: 9.7 FL (ref 9.4–12.3)
PMV BLD AUTO: 9.8 FL (ref 9.4–12.3)
PMV BLD AUTO: 9.8 FL (ref 9.4–12.3)
POTASSIUM SERPL-SCNC: 3 MMOL/L (ref 3.5–5.1)
POTASSIUM SERPL-SCNC: 3.2 MMOL/L (ref 3.5–5.1)
POTASSIUM SERPL-SCNC: 3.3 MMOL/L (ref 3.5–5.1)
POTASSIUM SERPL-SCNC: 3.4 MMOL/L (ref 3.5–5.1)
POTASSIUM SERPL-SCNC: 3.5 MMOL/L (ref 3.5–5.1)
POTASSIUM SERPL-SCNC: 3.6 MMOL/L (ref 3.5–5.1)
POTASSIUM SERPL-SCNC: 3.7 MMOL/L (ref 3.5–5.1)
POTASSIUM SERPL-SCNC: 3.9 MMOL/L (ref 3.5–5.1)
POTASSIUM SERPL-SCNC: 3.9 MMOL/L (ref 3.5–5.1)
PROT FLD-MCNC: 4.2 G/DL
PROT FLD-MCNC: 4.3 G/DL
PROT SERPL-MCNC: 6.7 G/DL (ref 6.3–8.2)
PROT SERPL-MCNC: 6.8 G/DL (ref 6.3–8.2)
PROT SERPL-MCNC: 6.9 G/DL (ref 6.3–8.2)
PROT SERPL-MCNC: 7 G/DL (ref 6.3–8.2)
PROT SERPL-MCNC: 7.1 G/DL (ref 6.3–8.2)
PROT SERPL-MCNC: 7.3 G/DL (ref 6.3–8.2)
PROT SERPL-MCNC: 7.4 G/DL (ref 6.3–8.2)
PROT SERPL-MCNC: 7.4 G/DL (ref 6.3–8.2)
PROT SERPL-MCNC: 7.5 G/DL (ref 6.3–8.2)
PROT SERPL-MCNC: 7.6 G/DL (ref 6.3–8.2)
PROT SERPL-MCNC: 7.7 G/DL (ref 6.3–8.2)
PROT SERPL-MCNC: 7.9 G/DL (ref 6.3–8.2)
PROT SERPL-MCNC: 8 G/DL (ref 6.3–8.2)
PROT SERPL-MCNC: 8.1 G/DL (ref 6.3–8.2)
PROT SERPL-MCNC: 8.3 G/DL (ref 6.3–8.2)
PROT SERPL-MCNC: 8.3 G/DL (ref 6.3–8.2)
RBC # BLD AUTO: 1.78 M/UL (ref 4.05–5.2)
RBC # BLD AUTO: 2.09 M/UL (ref 4.05–5.2)
RBC # BLD AUTO: 2.19 M/UL (ref 4.05–5.2)
RBC # BLD AUTO: 2.21 M/UL (ref 4.05–5.2)
RBC # BLD AUTO: 2.29 M/UL (ref 4.05–5.2)
RBC # BLD AUTO: 2.41 M/UL (ref 4.05–5.2)
RBC # BLD AUTO: 2.57 M/UL (ref 4.05–5.2)
RBC # BLD AUTO: 2.77 M/UL (ref 4.05–5.2)
RBC # BLD AUTO: 3.05 M/UL (ref 4.05–5.2)
RBC # BLD AUTO: 3.18 M/UL (ref 4.05–5.2)
RBC # BLD AUTO: 3.19 M/UL (ref 4.05–5.2)
RBC # BLD AUTO: 3.22 M/UL (ref 4.05–5.2)
RBC # BLD AUTO: 3.3 M/UL (ref 4.05–5.2)
RBC # BLD AUTO: 3.32 M/UL (ref 4.05–5.2)
RBC # BLD AUTO: 3.33 M/UL (ref 4.05–5.2)
RBC # BLD AUTO: 3.4 M/UL (ref 4.05–5.2)
RBC # BLD AUTO: 3.46 M/UL (ref 4.05–5.2)
RBC # BLD AUTO: 3.6 M/UL (ref 4.05–5.2)
RBC # BLD AUTO: 3.61 M/UL (ref 4.05–5.2)
RBC # BLD AUTO: 3.66 M/UL (ref 4.05–5.2)
RBC # BLD AUTO: 3.67 M/UL (ref 4.05–5.2)
RBC # BLD AUTO: 3.76 M/UL (ref 4.05–5.2)
RBC # BLD AUTO: 3.78 M/UL (ref 4.05–5.2)
RBC # BLD AUTO: 3.87 M/UL (ref 4.05–5.2)
RBC # FLD: 2000 /CU MM
RBC # FLD: <1000 /CU MM
RBC MORPH BLD: ABNORMAL
REFLEX TO ID, RFCO3T: NORMAL
REFLEX TO ID, RFCO3T: NORMAL
SERVICE CMNT-IMP: ABNORMAL
SERVICE CMNT-IMP: NORMAL
SODIUM SERPL-SCNC: 139 MMOL/L (ref 136–145)
SODIUM SERPL-SCNC: 140 MMOL/L (ref 136–145)
SODIUM SERPL-SCNC: 141 MMOL/L (ref 136–145)
SODIUM SERPL-SCNC: 142 MMOL/L (ref 136–145)
SODIUM SERPL-SCNC: 143 MMOL/L (ref 136–145)
SODIUM SERPL-SCNC: 144 MMOL/L (ref 136–145)
SODIUM SERPL-SCNC: 144 MMOL/L (ref 136–145)
SPECIMEN EXP DATE BLD: NORMAL
SPECIMEN PREPARATION: NORMAL
SPECIMEN PREPARATION: NORMAL
SPECIMEN SOURCE FLD: NORMAL
SPECIMEN SOURCE: NORMAL
STATUS OF UNIT,%ST: NORMAL
TEST ORDERED:: NORMAL
TIBC SERPL-MCNC: 212 UG/DL (ref 250–450)
TSH SERPL DL<=0.005 MIU/L-ACNC: 2.36 UIU/ML (ref 0.36–3.74)
TSH SERPL DL<=0.005 MIU/L-ACNC: 3.15 UIU/ML (ref 0.36–3)
TSH SERPL DL<=0.005 MIU/L-ACNC: 3.4 UIU/ML (ref 0.36–3)
TSH SERPL DL<=0.005 MIU/L-ACNC: 4.12 UIU/ML (ref 0.36–3)
UNIT DIVISION, %UDIV: 0
WBC # BLD AUTO: 1.3 K/UL (ref 4.3–11.1)
WBC # BLD AUTO: 1.4 K/UL (ref 4.3–11.1)
WBC # BLD AUTO: 1.9 K/UL (ref 4.3–11.1)
WBC # BLD AUTO: 2 K/UL (ref 4.3–11.1)
WBC # BLD AUTO: 2.6 K/UL (ref 4.3–11.1)
WBC # BLD AUTO: 2.6 K/UL (ref 4.3–11.1)
WBC # BLD AUTO: 3.6 K/UL (ref 4.3–11.1)
WBC # BLD AUTO: 3.8 K/UL (ref 4.3–11.1)
WBC # BLD AUTO: 4 K/UL (ref 4.3–11.1)
WBC # BLD AUTO: 4.1 K/UL (ref 4.3–11.1)
WBC # BLD AUTO: 4.1 K/UL (ref 4.3–11.1)
WBC # BLD AUTO: 4.2 K/UL (ref 4.3–11.1)
WBC # BLD AUTO: 4.3 K/UL (ref 4.3–11.1)
WBC # BLD AUTO: 4.4 K/UL (ref 4.3–11.1)
WBC # BLD AUTO: 4.5 K/UL (ref 4.3–11.1)
WBC # BLD AUTO: 4.8 K/UL (ref 4.3–11.1)
WBC # BLD AUTO: 5.2 K/UL (ref 4.3–11.1)
WBC # BLD AUTO: 5.5 K/UL (ref 4.3–11.1)
WBC # BLD AUTO: 5.9 K/UL (ref 4.3–11.1)
WBC # BLD AUTO: 6.3 K/UL (ref 4.3–11.1)
WBC # BLD AUTO: 6.3 K/UL (ref 4.3–11.1)
WBC # BLD AUTO: 6.8 K/UL (ref 4.3–11.1)
WBC # BLD AUTO: 6.9 K/UL (ref 4.3–11.1)
WBC # BLD AUTO: 6.9 K/UL (ref 4.3–11.1)
WBC MORPH BLD: ABNORMAL

## 2021-01-01 PROCEDURE — 36430 TRANSFUSION BLD/BLD COMPNT: CPT

## 2021-01-01 PROCEDURE — 99232 SBSQ HOSP IP/OBS MODERATE 35: CPT | Performed by: INTERNAL MEDICINE

## 2021-01-01 PROCEDURE — 97140 MANUAL THERAPY 1/> REGIONS: CPT

## 2021-01-01 PROCEDURE — 80048 BASIC METABOLIC PNL TOTAL CA: CPT

## 2021-01-01 PROCEDURE — 74011000250 HC RX REV CODE- 250: Performed by: FAMILY MEDICINE

## 2021-01-01 PROCEDURE — C9113 INJ PANTOPRAZOLE SODIUM, VIA: HCPCS | Performed by: FAMILY MEDICINE

## 2021-01-01 PROCEDURE — 96360 HYDRATION IV INFUSION INIT: CPT

## 2021-01-01 PROCEDURE — 96367 TX/PROPH/DG ADDL SEQ IV INF: CPT

## 2021-01-01 PROCEDURE — 83735 ASSAY OF MAGNESIUM: CPT

## 2021-01-01 PROCEDURE — 85025 COMPLETE CBC W/AUTO DIFF WBC: CPT

## 2021-01-01 PROCEDURE — 74011250637 HC RX REV CODE- 250/637: Performed by: NURSE PRACTITIONER

## 2021-01-01 PROCEDURE — 96368 THER/DIAG CONCURRENT INF: CPT

## 2021-01-01 PROCEDURE — 84443 ASSAY THYROID STIM HORMONE: CPT

## 2021-01-01 PROCEDURE — 36415 COLL VENOUS BLD VENIPUNCTURE: CPT

## 2021-01-01 PROCEDURE — 92611 MOTION FLUOROSCOPY/SWALLOW: CPT

## 2021-01-01 PROCEDURE — 2709999900 HC NON-CHARGEABLE SUPPLY

## 2021-01-01 PROCEDURE — 74011250636 HC RX REV CODE- 250/636: Performed by: INTERNAL MEDICINE

## 2021-01-01 PROCEDURE — 83615 LACTATE (LD) (LDH) ENZYME: CPT

## 2021-01-01 PROCEDURE — 80053 COMPREHEN METABOLIC PANEL: CPT

## 2021-01-01 PROCEDURE — 74011250636 HC RX REV CODE- 250/636: Performed by: PHYSICIAN ASSISTANT

## 2021-01-01 PROCEDURE — 74011250636 HC RX REV CODE- 250/636: Performed by: NURSE PRACTITIONER

## 2021-01-01 PROCEDURE — 82962 GLUCOSE BLOOD TEST: CPT

## 2021-01-01 PROCEDURE — 36591 DRAW BLOOD OFF VENOUS DEVICE: CPT

## 2021-01-01 PROCEDURE — 77030036720 US GUIDE BX LYMPH NOD SUPER

## 2021-01-01 PROCEDURE — 85027 COMPLETE CBC AUTOMATED: CPT

## 2021-01-01 PROCEDURE — APPNB30 APP NON BILLABLE TIME 0-30 MINS: Performed by: NURSE PRACTITIONER

## 2021-01-01 PROCEDURE — 89050 BODY FLUID CELL COUNT: CPT

## 2021-01-01 PROCEDURE — 74011000636 HC RX REV CODE- 636: Performed by: INTERNAL MEDICINE

## 2021-01-01 PROCEDURE — 74011000250 HC RX REV CODE- 250: Performed by: PHYSICIAN ASSISTANT

## 2021-01-01 PROCEDURE — 74011250636 HC RX REV CODE- 250/636: Performed by: STUDENT IN AN ORGANIZED HEALTH CARE EDUCATION/TRAINING PROGRAM

## 2021-01-01 PROCEDURE — 77001 FLUOROGUIDE FOR VEIN DEVICE: CPT

## 2021-01-01 PROCEDURE — 71046 X-RAY EXAM CHEST 2 VIEWS: CPT

## 2021-01-01 PROCEDURE — C1894 INTRO/SHEATH, NON-LASER: HCPCS

## 2021-01-01 PROCEDURE — 86300 IMMUNOASSAY TUMOR CA 15-3: CPT

## 2021-01-01 PROCEDURE — C1769 GUIDE WIRE: HCPCS

## 2021-01-01 PROCEDURE — 76604 US EXAM CHEST: CPT | Performed by: INTERNAL MEDICINE

## 2021-01-01 PROCEDURE — 77030031131 HC SUT MXN P COVD -B

## 2021-01-01 PROCEDURE — 76060000032 HC ANESTHESIA 0.5 TO 1 HR: Performed by: INTERNAL MEDICINE

## 2021-01-01 PROCEDURE — 74011000250 HC RX REV CODE- 250: Performed by: NURSE ANESTHETIST, CERTIFIED REGISTERED

## 2021-01-01 PROCEDURE — 82945 GLUCOSE OTHER FLUID: CPT

## 2021-01-01 PROCEDURE — 74011250636 HC RX REV CODE- 250/636: Performed by: FAMILY MEDICINE

## 2021-01-01 PROCEDURE — 88184 FLOWCYTOMETRY/ TC 1 MARKER: CPT

## 2021-01-01 PROCEDURE — 74011250637 HC RX REV CODE- 250/637: Performed by: STUDENT IN AN ORGANIZED HEALTH CARE EDUCATION/TRAINING PROGRAM

## 2021-01-01 PROCEDURE — 97162 PT EVAL MOD COMPLEX 30 MIN: CPT

## 2021-01-01 PROCEDURE — 84157 ASSAY OF PROTEIN OTHER: CPT

## 2021-01-01 PROCEDURE — 96365 THER/PROPH/DIAG IV INF INIT: CPT

## 2021-01-01 PROCEDURE — 76040000007: Performed by: INTERNAL MEDICINE

## 2021-01-01 PROCEDURE — 86706 HEP B SURFACE ANTIBODY: CPT

## 2021-01-01 PROCEDURE — 99252 IP/OBS CONSLTJ NEW/EST SF 35: CPT | Performed by: NURSE PRACTITIONER

## 2021-01-01 PROCEDURE — 74011000258 HC RX REV CODE- 258: Performed by: INTERNAL MEDICINE

## 2021-01-01 PROCEDURE — 74011250637 HC RX REV CODE- 250/637: Performed by: INTERNAL MEDICINE

## 2021-01-01 PROCEDURE — 74011250636 HC RX REV CODE- 250/636: Performed by: ANESTHESIOLOGY

## 2021-01-01 PROCEDURE — 82728 ASSAY OF FERRITIN: CPT

## 2021-01-01 PROCEDURE — 2709999900 HC NON-CHARGEABLE SUPPLY: Performed by: INTERNAL MEDICINE

## 2021-01-01 PROCEDURE — 96417 CHEMO IV INFUS EACH ADDL SEQ: CPT

## 2021-01-01 PROCEDURE — 82378 CARCINOEMBRYONIC ANTIGEN: CPT

## 2021-01-01 PROCEDURE — 65270000029 HC RM PRIVATE

## 2021-01-01 PROCEDURE — 74011250636 HC RX REV CODE- 250/636: Performed by: REGISTERED NURSE

## 2021-01-01 PROCEDURE — 96375 TX/PRO/DX INJ NEW DRUG ADDON: CPT

## 2021-01-01 PROCEDURE — 77030037040 HC FCPS BIOP ENDOSC PED RAD JAW DISP BSC -B: Performed by: INTERNAL MEDICINE

## 2021-01-01 PROCEDURE — 77030021532 HC CATH ANGI DX IMPRS MRTM -B

## 2021-01-01 PROCEDURE — 96413 CHEMO IV INFUSION 1 HR: CPT

## 2021-01-01 PROCEDURE — 77030018667 ADMN ST IV BLD FENW -A

## 2021-01-01 PROCEDURE — 77030020851 HC CAUT BTRY HI AARM -A

## 2021-01-01 PROCEDURE — 07B53ZX EXCISION OF RIGHT AXILLARY LYMPHATIC, PERCUTANEOUS APPROACH, DIAGNOSTIC: ICD-10-PCS | Performed by: RADIOLOGY

## 2021-01-01 PROCEDURE — 74011000250 HC RX REV CODE- 250: Performed by: RADIOLOGY

## 2021-01-01 PROCEDURE — 92610 EVALUATE SWALLOWING FUNCTION: CPT

## 2021-01-01 PROCEDURE — 74011000250 HC RX REV CODE- 250: Performed by: ANESTHESIOLOGY

## 2021-01-01 PROCEDURE — 99223 1ST HOSP IP/OBS HIGH 75: CPT | Performed by: INTERNAL MEDICINE

## 2021-01-01 PROCEDURE — 74011000250 HC RX REV CODE- 250: Performed by: INTERNAL MEDICINE

## 2021-01-01 PROCEDURE — 92526 ORAL FUNCTION THERAPY: CPT

## 2021-01-01 PROCEDURE — 86923 COMPATIBILITY TEST ELECTRIC: CPT

## 2021-01-01 PROCEDURE — 88305 TISSUE EXAM BY PATHOLOGIST: CPT

## 2021-01-01 PROCEDURE — 76060000033 HC ANESTHESIA 1 TO 1.5 HR

## 2021-01-01 PROCEDURE — 74011250636 HC RX REV CODE- 250/636: Performed by: NURSE ANESTHETIST, CERTIFIED REGISTERED

## 2021-01-01 PROCEDURE — 71260 CT THORAX DX C+: CPT

## 2021-01-01 PROCEDURE — 76040000019: Performed by: INTERNAL MEDICINE

## 2021-01-01 PROCEDURE — 0DB38ZX EXCISION OF LOWER ESOPHAGUS, VIA NATURAL OR ARTIFICIAL OPENING ENDOSCOPIC, DIAGNOSTIC: ICD-10-PCS | Performed by: INTERNAL MEDICINE

## 2021-01-01 PROCEDURE — 74230 X-RAY XM SWLNG FUNCJ C+: CPT

## 2021-01-01 PROCEDURE — 77030031139 HC SUT VCRL2 J&J -A

## 2021-01-01 PROCEDURE — 96366 THER/PROPH/DIAG IV INF ADDON: CPT

## 2021-01-01 PROCEDURE — 76937 US GUIDE VASCULAR ACCESS: CPT

## 2021-01-01 PROCEDURE — 88185 FLOWCYTOMETRY/TC ADD-ON: CPT

## 2021-01-01 PROCEDURE — 0DB28ZX EXCISION OF MIDDLE ESOPHAGUS, VIA NATURAL OR ARTIFICIAL OPENING ENDOSCOPIC, DIAGNOSTIC: ICD-10-PCS | Performed by: INTERNAL MEDICINE

## 2021-01-01 PROCEDURE — 74220 X-RAY XM ESOPHAGUS 1CNTRST: CPT

## 2021-01-01 PROCEDURE — 87102 FUNGUS ISOLATION CULTURE: CPT

## 2021-01-01 PROCEDURE — 71045 X-RAY EXAM CHEST 1 VIEW: CPT

## 2021-01-01 PROCEDURE — 31575 DIAGNOSTIC LARYNGOSCOPY: CPT | Performed by: STUDENT IN AN ORGANIZED HEALTH CARE EDUCATION/TRAINING PROGRAM

## 2021-01-01 PROCEDURE — P9040 RBC LEUKOREDUCED IRRADIATED: HCPCS

## 2021-01-01 PROCEDURE — 83540 ASSAY OF IRON: CPT

## 2021-01-01 PROCEDURE — 87116 MYCOBACTERIA CULTURE: CPT

## 2021-01-01 PROCEDURE — C1788 PORT, INDWELLING, IMP: HCPCS

## 2021-01-01 PROCEDURE — 84132 ASSAY OF SERUM POTASSIUM: CPT

## 2021-01-01 PROCEDURE — 88112 CYTOPATH CELL ENHANCE TECH: CPT

## 2021-01-01 PROCEDURE — 77030014147 HC TY THORCENT PARA TELE -B: Performed by: INTERNAL MEDICINE

## 2021-01-01 PROCEDURE — 99253 IP/OBS CNSLTJ NEW/EST LOW 45: CPT | Performed by: STUDENT IN AN ORGANIZED HEALTH CARE EDUCATION/TRAINING PROGRAM

## 2021-01-01 PROCEDURE — 32555 ASPIRATE PLEURA W/ IMAGING: CPT | Performed by: INTERNAL MEDICINE

## 2021-01-01 PROCEDURE — 74011000250 HC RX REV CODE- 250: Performed by: REGISTERED NURSE

## 2021-01-01 PROCEDURE — 0D758ZZ DILATION OF ESOPHAGUS, VIA NATURAL OR ARTIFICIAL OPENING ENDOSCOPIC: ICD-10-PCS | Performed by: INTERNAL MEDICINE

## 2021-01-01 PROCEDURE — 99284 EMERGENCY DEPT VISIT MOD MDM: CPT

## 2021-01-01 PROCEDURE — 86901 BLOOD TYPING SEROLOGIC RH(D): CPT

## 2021-01-01 PROCEDURE — 87205 SMEAR GRAM STAIN: CPT

## 2021-01-01 PROCEDURE — 74011000258 HC RX REV CODE- 258: Performed by: NURSE PRACTITIONER

## 2021-01-01 PROCEDURE — 78815 PET IMAGE W/CT SKULL-THIGH: CPT

## 2021-01-01 PROCEDURE — APPSS60 APP SPLIT SHARED TIME 46-60 MINUTES: Performed by: NURSE PRACTITIONER

## 2021-01-01 PROCEDURE — 0DJ08ZZ INSPECTION OF UPPER INTESTINAL TRACT, VIA NATURAL OR ARTIFICIAL OPENING ENDOSCOPIC: ICD-10-PCS | Performed by: INTERNAL MEDICINE

## 2021-01-01 PROCEDURE — 76040000026: Performed by: INTERNAL MEDICINE

## 2021-01-01 PROCEDURE — 77030010507 HC ADH SKN DERMBND J&J -B

## 2021-01-01 PROCEDURE — 87206 SMEAR FLUORESCENT/ACID STAI: CPT

## 2021-01-01 PROCEDURE — 74177 CT ABD & PELVIS W/CONTRAST: CPT

## 2021-01-01 PROCEDURE — 77030040361 HC SLV COMPR DVT MDII -B

## 2021-01-01 PROCEDURE — 74011000636 HC RX REV CODE- 636: Performed by: EMERGENCY MEDICINE

## 2021-01-01 PROCEDURE — 86920 COMPATIBILITY TEST SPIN: CPT

## 2021-01-01 PROCEDURE — 74011636637 HC RX REV CODE- 636/637: Performed by: FAMILY MEDICINE

## 2021-01-01 PROCEDURE — 76040000025: Performed by: INTERNAL MEDICINE

## 2021-01-01 PROCEDURE — 0W993ZZ DRAINAGE OF RIGHT PLEURAL CAVITY, PERCUTANEOUS APPROACH: ICD-10-PCS | Performed by: INTERNAL MEDICINE

## 2021-01-01 PROCEDURE — 74011000258 HC RX REV CODE- 258: Performed by: EMERGENCY MEDICINE

## 2021-01-01 RX ORDER — HYDROMORPHONE HYDROCHLORIDE 1 MG/ML
0.5 INJECTION, SOLUTION INTRAMUSCULAR; INTRAVENOUS; SUBCUTANEOUS
Status: DISCONTINUED | OUTPATIENT
Start: 2021-01-01 | End: 2021-01-01 | Stop reason: HOSPADM

## 2021-01-01 RX ORDER — HEPARIN SODIUM 5000 [USP'U]/ML
5000 INJECTION, SOLUTION INTRAVENOUS; SUBCUTANEOUS EVERY 8 HOURS
Status: DISCONTINUED | OUTPATIENT
Start: 2021-01-01 | End: 2021-01-01

## 2021-01-01 RX ORDER — SODIUM CHLORIDE 0.9 % (FLUSH) 0.9 %
10 SYRINGE (ML) INJECTION AS NEEDED
Status: DISCONTINUED | OUTPATIENT
Start: 2021-01-01 | End: 2021-01-01 | Stop reason: HOSPADM

## 2021-01-01 RX ORDER — MIDAZOLAM HYDROCHLORIDE 1 MG/ML
2 INJECTION, SOLUTION INTRAMUSCULAR; INTRAVENOUS
Status: DISCONTINUED | OUTPATIENT
Start: 2021-01-01 | End: 2021-01-01 | Stop reason: HOSPADM

## 2021-01-01 RX ORDER — NYSTATIN 100000 [USP'U]/G
POWDER TOPICAL 2 TIMES DAILY
Status: DISCONTINUED | OUTPATIENT
Start: 2021-01-01 | End: 2021-01-01 | Stop reason: HOSPADM

## 2021-01-01 RX ORDER — POTASSIUM CHLORIDE 14.9 MG/ML
20 INJECTION INTRAVENOUS ONCE
Status: COMPLETED | OUTPATIENT
Start: 2021-01-01 | End: 2021-01-01

## 2021-01-01 RX ORDER — LIDOCAINE HYDROCHLORIDE 10 MG/ML
10 INJECTION INFILTRATION; PERINEURAL ONCE
Status: COMPLETED | OUTPATIENT
Start: 2021-01-01 | End: 2021-01-01

## 2021-01-01 RX ORDER — SODIUM CHLORIDE 9 MG/ML
250 INJECTION, SOLUTION INTRAVENOUS AS NEEDED
Status: DISCONTINUED | OUTPATIENT
Start: 2021-01-01 | End: 2021-01-01 | Stop reason: HOSPADM

## 2021-01-01 RX ORDER — DIPHENHYDRAMINE HYDROCHLORIDE 50 MG/ML
25 INJECTION, SOLUTION INTRAMUSCULAR; INTRAVENOUS ONCE
Status: COMPLETED | OUTPATIENT
Start: 2021-01-01 | End: 2021-01-01

## 2021-01-01 RX ORDER — SODIUM CHLORIDE, SODIUM LACTATE, POTASSIUM CHLORIDE, CALCIUM CHLORIDE 600; 310; 30; 20 MG/100ML; MG/100ML; MG/100ML; MG/100ML
75 INJECTION, SOLUTION INTRAVENOUS CONTINUOUS
Status: CANCELLED | OUTPATIENT
Start: 2021-01-01 | End: 2021-01-01

## 2021-01-01 RX ORDER — LIDOCAINE HYDROCHLORIDE AND EPINEPHRINE 10; 10 MG/ML; UG/ML
1-20 INJECTION, SOLUTION INFILTRATION; PERINEURAL
Status: DISCONTINUED | OUTPATIENT
Start: 2021-01-01 | End: 2021-01-01

## 2021-01-01 RX ORDER — POTASSIUM CHLORIDE 750 MG/1
40 TABLET, EXTENDED RELEASE ORAL
Status: COMPLETED | OUTPATIENT
Start: 2021-01-01 | End: 2021-01-01

## 2021-01-01 RX ORDER — SODIUM CHLORIDE 9 MG/ML
1000 INJECTION, SOLUTION INTRAVENOUS ONCE
Status: COMPLETED | OUTPATIENT
Start: 2021-01-01 | End: 2021-01-01

## 2021-01-01 RX ORDER — PROPOFOL 10 MG/ML
INJECTION, EMULSION INTRAVENOUS
Status: DISCONTINUED | OUTPATIENT
Start: 2021-01-01 | End: 2021-01-01 | Stop reason: HOSPADM

## 2021-01-01 RX ORDER — SODIUM CHLORIDE 0.9 % (FLUSH) 0.9 %
10 SYRINGE (ML) INJECTION
Status: COMPLETED | OUTPATIENT
Start: 2021-01-01 | End: 2021-01-01

## 2021-01-01 RX ORDER — SODIUM CHLORIDE 0.9 % (FLUSH) 0.9 %
10 SYRINGE (ML) INJECTION AS NEEDED
Status: ACTIVE | OUTPATIENT
Start: 2021-01-01 | End: 2021-01-01

## 2021-01-01 RX ORDER — SODIUM CHLORIDE 0.9 % (FLUSH) 0.9 %
10 SYRINGE (ML) INJECTION ONCE
Status: CANCELLED | OUTPATIENT
Start: 2021-01-01 | End: 2021-01-01

## 2021-01-01 RX ORDER — SODIUM CHLORIDE, SODIUM LACTATE, POTASSIUM CHLORIDE, CALCIUM CHLORIDE 600; 310; 30; 20 MG/100ML; MG/100ML; MG/100ML; MG/100ML
75 INJECTION, SOLUTION INTRAVENOUS CONTINUOUS
Status: CANCELLED | OUTPATIENT
Start: 2021-01-01

## 2021-01-01 RX ORDER — SODIUM CHLORIDE 0.9 % (FLUSH) 0.9 %
10 SYRINGE (ML) INJECTION EVERY 8 HOURS
Status: DISCONTINUED | OUTPATIENT
Start: 2021-01-01 | End: 2021-01-01 | Stop reason: HOSPADM

## 2021-01-01 RX ORDER — ONDANSETRON 2 MG/ML
8 INJECTION INTRAMUSCULAR; INTRAVENOUS ONCE
Status: COMPLETED | OUTPATIENT
Start: 2021-01-01 | End: 2021-01-01

## 2021-01-01 RX ORDER — ACETAMINOPHEN 500 MG
1000 TABLET ORAL ONCE
Status: DISCONTINUED | OUTPATIENT
Start: 2021-01-01 | End: 2021-01-01 | Stop reason: HOSPADM

## 2021-01-01 RX ORDER — MAGNESIUM SULFATE HEPTAHYDRATE 40 MG/ML
2 INJECTION, SOLUTION INTRAVENOUS ONCE
Status: COMPLETED | OUTPATIENT
Start: 2021-01-01 | End: 2021-01-01

## 2021-01-01 RX ORDER — LIDOCAINE HYDROCHLORIDE 10 MG/ML
0.1 INJECTION INFILTRATION; PERINEURAL AS NEEDED
Status: DISCONTINUED | OUTPATIENT
Start: 2021-01-01 | End: 2021-01-01 | Stop reason: HOSPADM

## 2021-01-01 RX ORDER — FLUMAZENIL 0.1 MG/ML
0.2 INJECTION INTRAVENOUS AS NEEDED
Status: CANCELLED | OUTPATIENT
Start: 2021-01-01

## 2021-01-01 RX ORDER — LOSARTAN POTASSIUM 25 MG/1
25 TABLET ORAL DAILY
Qty: 30 TABLET | Refills: 0 | Status: SHIPPED | OUTPATIENT
Start: 2021-01-01 | End: 2021-01-01

## 2021-01-01 RX ORDER — ACETAMINOPHEN 650 MG/1
650 SUPPOSITORY RECTAL
Status: DISCONTINUED | OUTPATIENT
Start: 2021-01-01 | End: 2021-01-01 | Stop reason: HOSPADM

## 2021-01-01 RX ORDER — ACETAMINOPHEN 325 MG/1
650 TABLET ORAL ONCE
Status: COMPLETED | OUTPATIENT
Start: 2021-01-01 | End: 2021-01-01

## 2021-01-01 RX ORDER — POTASSIUM CHLORIDE 14.9 MG/ML
20 INJECTION INTRAVENOUS
Status: COMPLETED | OUTPATIENT
Start: 2021-01-01 | End: 2021-01-01

## 2021-01-01 RX ORDER — LORAZEPAM 0.5 MG/1
0.5 TABLET ORAL
Qty: 3 TABLET | Refills: 0 | Status: SHIPPED | OUTPATIENT
Start: 2021-01-01 | End: 2021-01-01

## 2021-01-01 RX ORDER — DIPHENHYDRAMINE HCL 25 MG
25 CAPSULE ORAL ONCE
Status: COMPLETED | OUTPATIENT
Start: 2021-01-01 | End: 2021-01-01

## 2021-01-01 RX ORDER — SODIUM CHLORIDE 9 MG/ML
25 INJECTION, SOLUTION INTRAVENOUS CONTINUOUS
Status: DISCONTINUED | OUTPATIENT
Start: 2021-01-01 | End: 2021-01-01 | Stop reason: HOSPADM

## 2021-01-01 RX ORDER — ONDANSETRON 4 MG/1
4 TABLET, ORALLY DISINTEGRATING ORAL
Status: DISCONTINUED | OUTPATIENT
Start: 2021-01-01 | End: 2021-01-01 | Stop reason: HOSPADM

## 2021-01-01 RX ORDER — SODIUM CHLORIDE 0.9 % (FLUSH) 0.9 %
5-40 SYRINGE (ML) INJECTION AS NEEDED
Status: DISCONTINUED | OUTPATIENT
Start: 2021-01-01 | End: 2021-01-01 | Stop reason: HOSPADM

## 2021-01-01 RX ORDER — FAMOTIDINE 20 MG/1
20 TABLET, FILM COATED ORAL ONCE
Status: DISCONTINUED | OUTPATIENT
Start: 2021-01-01 | End: 2021-01-01 | Stop reason: HOSPADM

## 2021-01-01 RX ORDER — SODIUM CHLORIDE, SODIUM LACTATE, POTASSIUM CHLORIDE, CALCIUM CHLORIDE 600; 310; 30; 20 MG/100ML; MG/100ML; MG/100ML; MG/100ML
INJECTION, SOLUTION INTRAVENOUS
Status: DISCONTINUED | OUTPATIENT
Start: 2021-01-01 | End: 2021-01-01 | Stop reason: HOSPADM

## 2021-01-01 RX ORDER — HYDROCORTISONE SODIUM SUCCINATE 100 MG/2ML
100 INJECTION, POWDER, FOR SOLUTION INTRAMUSCULAR; INTRAVENOUS AS NEEDED
Status: DISPENSED | OUTPATIENT
Start: 2021-01-01 | End: 2021-01-01

## 2021-01-01 RX ORDER — CEFAZOLIN SODIUM/WATER 2 G/20 ML
2 SYRINGE (ML) INTRAVENOUS ONCE
Status: COMPLETED | OUTPATIENT
Start: 2021-01-01 | End: 2021-01-01

## 2021-01-01 RX ORDER — POTASSIUM CHLORIDE 14.9 MG/ML
20 INJECTION INTRAVENOUS ONCE
Status: DISCONTINUED | OUTPATIENT
Start: 2021-01-01 | End: 2021-01-01

## 2021-01-01 RX ORDER — DIPHENHYDRAMINE HYDROCHLORIDE 50 MG/ML
50 INJECTION, SOLUTION INTRAMUSCULAR; INTRAVENOUS ONCE
Status: COMPLETED | OUTPATIENT
Start: 2021-01-01 | End: 2021-01-01

## 2021-01-01 RX ORDER — LIDOCAINE HYDROCHLORIDE 20 MG/ML
INJECTION, SOLUTION EPIDURAL; INFILTRATION; INTRACAUDAL; PERINEURAL AS NEEDED
Status: DISCONTINUED | OUTPATIENT
Start: 2021-01-01 | End: 2021-01-01 | Stop reason: HOSPADM

## 2021-01-01 RX ORDER — FLUDEOXYGLUCOSE F-18 200 MCI/ML
10 INJECTION INTRAVENOUS ONCE
Status: COMPLETED | OUTPATIENT
Start: 2021-01-01 | End: 2021-01-01

## 2021-01-01 RX ORDER — POLYETHYLENE GLYCOL 3350 17 G/17G
17 POWDER, FOR SOLUTION ORAL DAILY PRN
Status: DISCONTINUED | OUTPATIENT
Start: 2021-01-01 | End: 2021-01-01 | Stop reason: HOSPADM

## 2021-01-01 RX ORDER — OXYCODONE HYDROCHLORIDE 5 MG/1
10 TABLET ORAL
Status: CANCELLED | OUTPATIENT
Start: 2021-01-01 | End: 2021-01-01

## 2021-01-01 RX ORDER — SODIUM CHLORIDE 0.9 % (FLUSH) 0.9 %
10 SYRINGE (ML) INJECTION ONCE
Status: COMPLETED | OUTPATIENT
Start: 2021-01-01 | End: 2021-01-01

## 2021-01-01 RX ORDER — MIDAZOLAM HYDROCHLORIDE 1 MG/ML
2 INJECTION, SOLUTION INTRAMUSCULAR; INTRAVENOUS
Status: CANCELLED | OUTPATIENT
Start: 2021-01-01 | End: 2021-01-01

## 2021-01-01 RX ORDER — SODIUM CHLORIDE 9 MG/ML
50 INJECTION, SOLUTION INTRAVENOUS CONTINUOUS
Status: DISCONTINUED | OUTPATIENT
Start: 2021-01-01 | End: 2021-01-01 | Stop reason: HOSPADM

## 2021-01-01 RX ORDER — POTASSIUM CHLORIDE 20MEQ/15ML
20 LIQUID (ML) ORAL DAILY
Qty: 105 ML | Refills: 0 | Status: SHIPPED | OUTPATIENT
Start: 2021-01-01 | End: 2021-01-01

## 2021-01-01 RX ORDER — SODIUM CHLORIDE, SODIUM LACTATE, POTASSIUM CHLORIDE, CALCIUM CHLORIDE 600; 310; 30; 20 MG/100ML; MG/100ML; MG/100ML; MG/100ML
100 INJECTION, SOLUTION INTRAVENOUS CONTINUOUS
Status: DISCONTINUED | OUTPATIENT
Start: 2021-01-01 | End: 2021-01-01 | Stop reason: HOSPADM

## 2021-01-01 RX ORDER — SODIUM CHLORIDE 9 MG/ML
25 INJECTION, SOLUTION INTRAVENOUS CONTINUOUS
Status: ACTIVE | OUTPATIENT
Start: 2021-01-01 | End: 2021-01-01

## 2021-01-01 RX ORDER — SODIUM CHLORIDE 0.9 % (FLUSH) 0.9 %
5-40 SYRINGE (ML) INJECTION EVERY 8 HOURS
Status: DISCONTINUED | OUTPATIENT
Start: 2021-01-01 | End: 2021-01-01 | Stop reason: HOSPADM

## 2021-01-01 RX ORDER — INSULIN LISPRO 100 [IU]/ML
INJECTION, SOLUTION INTRAVENOUS; SUBCUTANEOUS
Status: DISCONTINUED | OUTPATIENT
Start: 2021-01-01 | End: 2021-01-01

## 2021-01-01 RX ORDER — NALOXONE HYDROCHLORIDE 0.4 MG/ML
0.1 INJECTION, SOLUTION INTRAMUSCULAR; INTRAVENOUS; SUBCUTANEOUS
Status: CANCELLED | OUTPATIENT
Start: 2021-01-01

## 2021-01-01 RX ORDER — LIDOCAINE HYDROCHLORIDE 10 MG/ML
0.1 INJECTION INFILTRATION; PERINEURAL AS NEEDED
Status: CANCELLED | OUTPATIENT
Start: 2021-01-01

## 2021-01-01 RX ORDER — ONDANSETRON 2 MG/ML
4 INJECTION INTRAMUSCULAR; INTRAVENOUS
Status: DISCONTINUED | OUTPATIENT
Start: 2021-01-01 | End: 2021-01-01 | Stop reason: HOSPADM

## 2021-01-01 RX ORDER — INSULIN LISPRO 100 [IU]/ML
INJECTION, SOLUTION INTRAVENOUS; SUBCUTANEOUS
Status: DISCONTINUED | OUTPATIENT
Start: 2021-01-01 | End: 2021-01-01 | Stop reason: HOSPADM

## 2021-01-01 RX ORDER — FENTANYL CITRATE 50 UG/ML
100 INJECTION, SOLUTION INTRAMUSCULAR; INTRAVENOUS ONCE
Status: DISCONTINUED | OUTPATIENT
Start: 2021-01-01 | End: 2021-01-01 | Stop reason: HOSPADM

## 2021-01-01 RX ORDER — SODIUM CHLORIDE, SODIUM LACTATE, POTASSIUM CHLORIDE, CALCIUM CHLORIDE 600; 310; 30; 20 MG/100ML; MG/100ML; MG/100ML; MG/100ML
150 INJECTION, SOLUTION INTRAVENOUS CONTINUOUS
Status: DISCONTINUED | OUTPATIENT
Start: 2021-01-01 | End: 2021-01-01 | Stop reason: HOSPADM

## 2021-01-01 RX ORDER — DIPHENHYDRAMINE HYDROCHLORIDE 50 MG/ML
12.5 INJECTION, SOLUTION INTRAMUSCULAR; INTRAVENOUS
Status: CANCELLED | OUTPATIENT
Start: 2021-01-01

## 2021-01-01 RX ORDER — LOSARTAN POTASSIUM 25 MG/1
25 TABLET ORAL DAILY
Status: DISCONTINUED | OUTPATIENT
Start: 2021-01-01 | End: 2021-01-01 | Stop reason: HOSPADM

## 2021-01-01 RX ORDER — LORAZEPAM 0.5 MG/1
0.5 TABLET ORAL
Status: DISCONTINUED | OUTPATIENT
Start: 2021-01-01 | End: 2021-01-01 | Stop reason: HOSPADM

## 2021-01-01 RX ORDER — PROPOFOL 10 MG/ML
INJECTION, EMULSION INTRAVENOUS AS NEEDED
Status: DISCONTINUED | OUTPATIENT
Start: 2021-01-01 | End: 2021-01-01 | Stop reason: HOSPADM

## 2021-01-01 RX ORDER — LIDOCAINE HYDROCHLORIDE 20 MG/ML
20-200 INJECTION, SOLUTION INFILTRATION; PERINEURAL
Status: DISCONTINUED | OUTPATIENT
Start: 2021-01-01 | End: 2021-01-01

## 2021-01-01 RX ORDER — SODIUM CHLORIDE, SODIUM LACTATE, POTASSIUM CHLORIDE, CALCIUM CHLORIDE 600; 310; 30; 20 MG/100ML; MG/100ML; MG/100ML; MG/100ML
50 INJECTION, SOLUTION INTRAVENOUS CONTINUOUS
Status: DISCONTINUED | OUTPATIENT
Start: 2021-01-01 | End: 2021-01-01 | Stop reason: HOSPADM

## 2021-01-01 RX ORDER — HYDROMORPHONE HYDROCHLORIDE 2 MG/ML
0.5 INJECTION, SOLUTION INTRAMUSCULAR; INTRAVENOUS; SUBCUTANEOUS
Status: CANCELLED | OUTPATIENT
Start: 2021-01-01

## 2021-01-01 RX ORDER — FAMOTIDINE 20 MG/1
20 TABLET, FILM COATED ORAL AS NEEDED
Status: DISCONTINUED | OUTPATIENT
Start: 2021-01-01 | End: 2021-01-01 | Stop reason: HOSPADM

## 2021-01-01 RX ORDER — HYDROCODONE BITARTRATE AND ACETAMINOPHEN 5; 325 MG/1; MG/1
1 TABLET ORAL AS NEEDED
Status: DISCONTINUED | OUTPATIENT
Start: 2021-01-01 | End: 2021-01-01 | Stop reason: HOSPADM

## 2021-01-01 RX ORDER — DIPHENHYDRAMINE HYDROCHLORIDE 50 MG/ML
50 INJECTION, SOLUTION INTRAMUSCULAR; INTRAVENOUS AS NEEDED
Status: DISPENSED | OUTPATIENT
Start: 2021-01-01 | End: 2021-01-01

## 2021-01-01 RX ORDER — OXYCODONE HYDROCHLORIDE 5 MG/1
5 TABLET ORAL
Status: CANCELLED | OUTPATIENT
Start: 2021-01-01 | End: 2021-01-01

## 2021-01-01 RX ORDER — ACETAMINOPHEN 500 MG
1000 TABLET ORAL
Status: DISCONTINUED | OUTPATIENT
Start: 2021-01-01 | End: 2021-01-01 | Stop reason: HOSPADM

## 2021-01-01 RX ORDER — HEPARIN SODIUM (PORCINE) LOCK FLUSH IV SOLN 100 UNIT/ML 100 UNIT/ML
500 SOLUTION INTRAVENOUS ONCE
Status: COMPLETED | OUTPATIENT
Start: 2021-01-01 | End: 2021-01-01

## 2021-01-01 RX ORDER — PANTOPRAZOLE SODIUM 40 MG/1
40 TABLET, DELAYED RELEASE ORAL 2 TIMES DAILY
Qty: 60 TABLET | Refills: 0 | Status: SHIPPED | OUTPATIENT
Start: 2021-01-01 | End: 2021-01-01

## 2021-01-01 RX ORDER — ACETAMINOPHEN 325 MG/1
650 TABLET ORAL
Status: DISCONTINUED | OUTPATIENT
Start: 2021-01-01 | End: 2021-01-01 | Stop reason: HOSPADM

## 2021-01-01 RX ADMIN — Medication 10 ML: at 14:30

## 2021-01-01 RX ADMIN — APIXABAN 5 MG: 5 TABLET, FILM COATED ORAL at 17:16

## 2021-01-01 RX ADMIN — FLUDEOXYGLUCOSE F-18 13.5 MILLICURIE: 200 INJECTION INTRAVENOUS at 08:03

## 2021-01-01 RX ADMIN — POTASSIUM CHLORIDE 20 MEQ: 200 INJECTION, SOLUTION INTRAVENOUS at 10:22

## 2021-01-01 RX ADMIN — DEXAMETHASONE SODIUM PHOSPHATE 12 MG: 4 INJECTION, SOLUTION INTRAMUSCULAR; INTRAVENOUS at 16:00

## 2021-01-01 RX ADMIN — DEXAMETHASONE SODIUM PHOSPHATE 12 MG: 4 INJECTION, SOLUTION INTRAMUSCULAR; INTRAVENOUS at 13:50

## 2021-01-01 RX ADMIN — POTASSIUM CHLORIDE 20 MEQ: 200 INJECTION, SOLUTION INTRAVENOUS at 12:42

## 2021-01-01 RX ADMIN — BARIUM SULFATE 45 ML: 980 POWDER, FOR SUSPENSION ORAL at 09:54

## 2021-01-01 RX ADMIN — PROPOFOL 50 MG: 10 INJECTION, EMULSION INTRAVENOUS at 08:22

## 2021-01-01 RX ADMIN — ONDANSETRON 8 MG: 2 INJECTION INTRAMUSCULAR; INTRAVENOUS at 11:50

## 2021-01-01 RX ADMIN — Medication 10 ML: at 10:30

## 2021-01-01 RX ADMIN — CARBOPLATIN 547 MG: 10 INJECTION, SOLUTION INTRAVENOUS at 15:30

## 2021-01-01 RX ADMIN — IOPAMIDOL 100 ML: 755 INJECTION, SOLUTION INTRAVENOUS at 10:23

## 2021-01-01 RX ADMIN — FAMOTIDINE 20 MG: 10 INJECTION INTRAVENOUS at 06:51

## 2021-01-01 RX ADMIN — SODIUM CHLORIDE 25 ML/HR: 900 INJECTION, SOLUTION INTRAVENOUS at 14:50

## 2021-01-01 RX ADMIN — SODIUM CHLORIDE 25 ML/HR: 9 INJECTION, SOLUTION INTRAVENOUS at 10:30

## 2021-01-01 RX ADMIN — Medication 10 ML: at 16:06

## 2021-01-01 RX ADMIN — Medication 10 ML: at 08:48

## 2021-01-01 RX ADMIN — PROPOFOL 50 MG: 10 INJECTION, EMULSION INTRAVENOUS at 08:20

## 2021-01-01 RX ADMIN — SODIUM CHLORIDE 200 MG: 900 INJECTION, SOLUTION INTRAVENOUS at 12:53

## 2021-01-01 RX ADMIN — NYSTATIN: 100000 POWDER TOPICAL at 17:18

## 2021-01-01 RX ADMIN — Medication 10 ML: at 21:32

## 2021-01-01 RX ADMIN — LIDOCAINE HYDROCHLORIDE 40 MG: 20 INJECTION, SOLUTION EPIDURAL; INFILTRATION; INTRACAUDAL; PERINEURAL at 08:08

## 2021-01-01 RX ADMIN — CARBOPLATIN 475 MG: 10 INJECTION, SOLUTION INTRAVENOUS at 13:55

## 2021-01-01 RX ADMIN — PROPOFOL 120 MCG/KG/MIN: 10 INJECTION, EMULSION INTRAVENOUS at 08:08

## 2021-01-01 RX ADMIN — PANTOPRAZOLE SODIUM 40 MG: 40 INJECTION, POWDER, FOR SOLUTION INTRAVENOUS at 20:51

## 2021-01-01 RX ADMIN — SODIUM CHLORIDE 100 ML: 900 INJECTION, SOLUTION INTRAVENOUS at 10:23

## 2021-01-01 RX ADMIN — PROPOFOL 180 MCG/KG/MIN: 10 INJECTION, EMULSION INTRAVENOUS at 09:10

## 2021-01-01 RX ADMIN — PACLITAXEL 137 MG: 6 INJECTION, SOLUTION, CONCENTRATE INTRAVENOUS at 12:50

## 2021-01-01 RX ADMIN — HEPARIN SODIUM (PORCINE) LOCK FLUSH IV SOLN 100 UNIT/ML 500 UNITS: 100 SOLUTION at 10:02

## 2021-01-01 RX ADMIN — PANTOPRAZOLE SODIUM 40 MG: 40 INJECTION, POWDER, FOR SOLUTION INTRAVENOUS at 06:17

## 2021-01-01 RX ADMIN — SODIUM CHLORIDE 100 ML: 900 INJECTION, SOLUTION INTRAVENOUS at 13:42

## 2021-01-01 RX ADMIN — LIDOCAINE HYDROCHLORIDE 300 MG: 20 INJECTION, SOLUTION INFILTRATION; PERINEURAL at 12:44

## 2021-01-01 RX ADMIN — Medication 10 ML: at 11:01

## 2021-01-01 RX ADMIN — LOSARTAN POTASSIUM 25 MG: 25 TABLET, FILM COATED ORAL at 08:46

## 2021-01-01 RX ADMIN — Medication 10 ML: at 05:18

## 2021-01-01 RX ADMIN — CARBOPLATIN 712 MG: 10 INJECTION, SOLUTION INTRAVENOUS at 14:15

## 2021-01-01 RX ADMIN — ANTACID/ANTIFLATULENT 4 G: 380; 550; 10; 10 GRANULE, EFFERVESCENT ORAL at 11:23

## 2021-01-01 RX ADMIN — Medication 1 EACH: at 23:19

## 2021-01-01 RX ADMIN — Medication 10 ML: at 10:00

## 2021-01-01 RX ADMIN — POTASSIUM CHLORIDE 20 MEQ: 200 INJECTION, SOLUTION INTRAVENOUS at 10:38

## 2021-01-01 RX ADMIN — HEPARIN SODIUM 5000 UNITS: 5000 INJECTION INTRAVENOUS; SUBCUTANEOUS at 05:10

## 2021-01-01 RX ADMIN — Medication 10 ML: at 14:19

## 2021-01-01 RX ADMIN — SODIUM CHLORIDE, SODIUM LACTATE, POTASSIUM CHLORIDE, AND CALCIUM CHLORIDE: 600; 310; 30; 20 INJECTION, SOLUTION INTRAVENOUS at 08:12

## 2021-01-01 RX ADMIN — SODIUM CHLORIDE 25 ML/HR: 9 INJECTION, SOLUTION INTRAVENOUS at 13:44

## 2021-01-01 RX ADMIN — FAMOTIDINE 20 MG: 10 INJECTION, SOLUTION INTRAVENOUS at 11:36

## 2021-01-01 RX ADMIN — FOSAPREPITANT 150 MG: 150 INJECTION, POWDER, LYOPHILIZED, FOR SOLUTION INTRAVENOUS at 14:05

## 2021-01-01 RX ADMIN — Medication 10 ML: at 10:45

## 2021-01-01 RX ADMIN — LIDOCAINE HYDROCHLORIDE 60 MG: 20 INJECTION, SOLUTION EPIDURAL; INFILTRATION; INTRACAUDAL; PERINEURAL at 08:18

## 2021-01-01 RX ADMIN — SODIUM CHLORIDE 200 MG: 900 INJECTION, SOLUTION INTRAVENOUS at 11:00

## 2021-01-01 RX ADMIN — BARIUM SULFATE 15 ML: 400 PASTE ORAL at 09:54

## 2021-01-01 RX ADMIN — MAGNESIUM SULFATE HEPTAHYDRATE 2 G: 40 INJECTION, SOLUTION INTRAVENOUS at 12:50

## 2021-01-01 RX ADMIN — PANTOPRAZOLE SODIUM 40 MG: 40 INJECTION, POWDER, FOR SOLUTION INTRAVENOUS at 09:09

## 2021-01-01 RX ADMIN — PANTOPRAZOLE SODIUM 40 MG: 40 INJECTION, POWDER, FOR SOLUTION INTRAVENOUS at 21:45

## 2021-01-01 RX ADMIN — SODIUM CHLORIDE, SODIUM LACTATE, POTASSIUM CHLORIDE, AND CALCIUM CHLORIDE 50 ML/HR: 600; 310; 30; 20 INJECTION, SOLUTION INTRAVENOUS at 15:53

## 2021-01-01 RX ADMIN — MAGNESIUM SULFATE HEPTAHYDRATE 2 G: 40 INJECTION, SOLUTION INTRAVENOUS at 10:38

## 2021-01-01 RX ADMIN — HEPARIN SODIUM 5000 UNITS: 5000 INJECTION INTRAVENOUS; SUBCUTANEOUS at 14:19

## 2021-01-01 RX ADMIN — DIPHENHYDRAMINE HYDROCHLORIDE 25 MG: 50 INJECTION, SOLUTION INTRAMUSCULAR; INTRAVENOUS at 11:34

## 2021-01-01 RX ADMIN — SODIUM CHLORIDE 250 ML: 900 INJECTION, SOLUTION INTRAVENOUS at 15:20

## 2021-01-01 RX ADMIN — POTASSIUM CHLORIDE 40 MEQ: 10 TABLET, EXTENDED RELEASE ORAL at 16:44

## 2021-01-01 RX ADMIN — DEXAMETHASONE SODIUM PHOSPHATE 12 MG: 4 INJECTION, SOLUTION INTRAMUSCULAR; INTRAVENOUS at 12:05

## 2021-01-01 RX ADMIN — FAMOTIDINE 20 MG: 10 INJECTION, SOLUTION INTRAVENOUS at 11:48

## 2021-01-01 RX ADMIN — Medication 10 ML: at 15:15

## 2021-01-01 RX ADMIN — Medication 10 ML: at 08:03

## 2021-01-01 RX ADMIN — SODIUM CHLORIDE 1000 ML: 900 INJECTION, SOLUTION INTRAVENOUS at 10:15

## 2021-01-01 RX ADMIN — DIPHENHYDRAMINE HYDROCHLORIDE 25 MG: 50 INJECTION, SOLUTION INTRAMUSCULAR; INTRAVENOUS at 16:17

## 2021-01-01 RX ADMIN — BARIUM SULFATE 90 ML: 980 POWDER, FOR SUSPENSION ORAL at 11:24

## 2021-01-01 RX ADMIN — DEXAMETHASONE SODIUM PHOSPHATE 12 MG: 4 INJECTION, SOLUTION INTRAMUSCULAR; INTRAVENOUS at 11:38

## 2021-01-01 RX ADMIN — FAMOTIDINE 20 MG: 10 INJECTION, SOLUTION INTRAVENOUS at 15:54

## 2021-01-01 RX ADMIN — Medication 10 ML: at 14:00

## 2021-01-01 RX ADMIN — MAGNESIUM SULFATE HEPTAHYDRATE 2 G: 40 INJECTION, SOLUTION INTRAVENOUS at 14:57

## 2021-01-01 RX ADMIN — Medication 10 ML: at 15:18

## 2021-01-01 RX ADMIN — SODIUM CHLORIDE, SODIUM LACTATE, POTASSIUM CHLORIDE, AND CALCIUM CHLORIDE 50 ML/HR: 600; 310; 30; 20 INJECTION, SOLUTION INTRAVENOUS at 13:54

## 2021-01-01 RX ADMIN — Medication 10 ML: at 13:51

## 2021-01-01 RX ADMIN — FOSAPREPITANT 150 MG: 150 INJECTION, POWDER, LYOPHILIZED, FOR SOLUTION INTRAVENOUS at 16:25

## 2021-01-01 RX ADMIN — SODIUM CHLORIDE, SODIUM LACTATE, POTASSIUM CHLORIDE, AND CALCIUM CHLORIDE 50 ML/HR: 600; 310; 30; 20 INJECTION, SOLUTION INTRAVENOUS at 16:00

## 2021-01-01 RX ADMIN — SODIUM CHLORIDE 25 ML/HR: 9 INJECTION, SOLUTION INTRAVENOUS at 10:50

## 2021-01-01 RX ADMIN — NYSTATIN: 100000 POWDER TOPICAL at 08:16

## 2021-01-01 RX ADMIN — SODIUM CHLORIDE, SODIUM LACTATE, POTASSIUM CHLORIDE, AND CALCIUM CHLORIDE 100 ML/HR: 600; 310; 30; 20 INJECTION, SOLUTION INTRAVENOUS at 06:45

## 2021-01-01 RX ADMIN — SODIUM CHLORIDE 200 MG: 9 INJECTION, SOLUTION INTRAVENOUS at 15:01

## 2021-01-01 RX ADMIN — Medication 10 ML: at 14:32

## 2021-01-01 RX ADMIN — Medication 10 ML: at 16:38

## 2021-01-01 RX ADMIN — POTASSIUM CHLORIDE 20 MEQ: 14.9 INJECTION, SOLUTION INTRAVENOUS at 11:26

## 2021-01-01 RX ADMIN — FOSAPREPITANT 150 MG: 150 INJECTION, POWDER, LYOPHILIZED, FOR SOLUTION INTRAVENOUS at 12:20

## 2021-01-01 RX ADMIN — Medication 10 ML: at 15:24

## 2021-01-01 RX ADMIN — ONDANSETRON 8 MG: 2 INJECTION INTRAMUSCULAR; INTRAVENOUS at 15:58

## 2021-01-01 RX ADMIN — SODIUM CHLORIDE, SODIUM LACTATE, POTASSIUM CHLORIDE, AND CALCIUM CHLORIDE 50 ML/HR: 600; 310; 30; 20 INJECTION, SOLUTION INTRAVENOUS at 10:23

## 2021-01-01 RX ADMIN — DIPHENHYDRAMINE HYDROCHLORIDE 25 MG: 50 INJECTION INTRAMUSCULAR; INTRAVENOUS at 13:47

## 2021-01-01 RX ADMIN — PROPOFOL 160 MCG/KG/MIN: 10 INJECTION, EMULSION INTRAVENOUS at 08:25

## 2021-01-01 RX ADMIN — SODIUM CHLORIDE, SODIUM LACTATE, POTASSIUM CHLORIDE, AND CALCIUM CHLORIDE 50 ML/HR: 600; 310; 30; 20 INJECTION, SOLUTION INTRAVENOUS at 08:50

## 2021-01-01 RX ADMIN — PANTOPRAZOLE SODIUM 40 MG: 40 INJECTION, POWDER, FOR SOLUTION INTRAVENOUS at 08:15

## 2021-01-01 RX ADMIN — DIATRIZOATE MEGLUMINE AND DIATRIZOATE SODIUM 10 ML: 660; 100 LIQUID ORAL; RECTAL at 08:03

## 2021-01-01 RX ADMIN — PANTOPRAZOLE SODIUM 40 MG: 40 INJECTION, POWDER, FOR SOLUTION INTRAVENOUS at 20:14

## 2021-01-01 RX ADMIN — SODIUM CHLORIDE 1000 ML: 900 INJECTION, SOLUTION INTRAVENOUS at 10:35

## 2021-01-01 RX ADMIN — FOSAPREPITANT 150 MG: 150 INJECTION, POWDER, LYOPHILIZED, FOR SOLUTION INTRAVENOUS at 11:55

## 2021-01-01 RX ADMIN — Medication 10 ML: at 09:03

## 2021-01-01 RX ADMIN — HEPARIN SODIUM 5000 UNITS: 5000 INJECTION INTRAVENOUS; SUBCUTANEOUS at 21:47

## 2021-01-01 RX ADMIN — PACLITAXEL 137 MG: 6 INJECTION, SOLUTION, CONCENTRATE INTRAVENOUS at 14:30

## 2021-01-01 RX ADMIN — Medication 10 ML: at 21:15

## 2021-01-01 RX ADMIN — NYSTATIN: 100000 POWDER TOPICAL at 10:18

## 2021-01-01 RX ADMIN — Medication 10 ML: at 10:20

## 2021-01-01 RX ADMIN — PACLITAXEL 146 MG: 6 INJECTION, SOLUTION, CONCENTRATE INTRAVENOUS at 13:10

## 2021-01-01 RX ADMIN — SODIUM CHLORIDE 1000 ML: 9 INJECTION, SOLUTION INTRAVENOUS at 10:20

## 2021-01-01 RX ADMIN — SODIUM CHLORIDE 200 MG: 9 INJECTION, SOLUTION INTRAVENOUS at 13:21

## 2021-01-01 RX ADMIN — POTASSIUM CHLORIDE 20 MEQ: 200 INJECTION, SOLUTION INTRAVENOUS at 16:35

## 2021-01-01 RX ADMIN — MAGNESIUM SULFATE HEPTAHYDRATE 2 G: 40 INJECTION, SOLUTION INTRAVENOUS at 10:22

## 2021-01-01 RX ADMIN — Medication 10 ML: at 21:51

## 2021-01-01 RX ADMIN — APIXABAN 5 MG: 5 TABLET, FILM COATED ORAL at 08:16

## 2021-01-01 RX ADMIN — NYSTATIN: 100000 POWDER TOPICAL at 17:40

## 2021-01-01 RX ADMIN — NYSTATIN: 100000 POWDER TOPICAL at 17:01

## 2021-01-01 RX ADMIN — PANTOPRAZOLE SODIUM 40 MG: 40 INJECTION, POWDER, FOR SOLUTION INTRAVENOUS at 21:30

## 2021-01-01 RX ADMIN — PANTOPRAZOLE SODIUM 40 MG: 40 INJECTION, POWDER, FOR SOLUTION INTRAVENOUS at 10:20

## 2021-01-01 RX ADMIN — FAMOTIDINE 20 MG: 10 INJECTION, SOLUTION INTRAVENOUS at 13:44

## 2021-01-01 RX ADMIN — LORAZEPAM 0.5 MG: 0.5 TABLET ORAL at 23:42

## 2021-01-01 RX ADMIN — PACLITAXEL 137 MG: 6 INJECTION, SOLUTION, CONCENTRATE INTRAVENOUS at 16:50

## 2021-01-01 RX ADMIN — SODIUM CHLORIDE 1000 ML/HR: 9 INJECTION, SOLUTION INTRAVENOUS at 12:45

## 2021-01-01 RX ADMIN — ONDANSETRON 8 MG: 2 INJECTION INTRAMUSCULAR; INTRAVENOUS at 13:38

## 2021-01-01 RX ADMIN — Medication 10 ML: at 15:00

## 2021-01-01 RX ADMIN — PROPOFOL 50 MG: 10 INJECTION, EMULSION INTRAVENOUS at 08:08

## 2021-01-01 RX ADMIN — Medication 10 ML: at 10:50

## 2021-01-01 RX ADMIN — LIDOCAINE HYDROCHLORIDE,EPINEPHRINE BITARTRATE 100 MG: 10; .01 INJECTION, SOLUTION INFILTRATION; PERINEURAL at 09:32

## 2021-01-01 RX ADMIN — Medication 10 ML: at 10:23

## 2021-01-01 RX ADMIN — Medication 10 ML: at 10:03

## 2021-01-01 RX ADMIN — Medication 10 ML: at 16:32

## 2021-01-01 RX ADMIN — NYSTATIN: 100000 POWDER TOPICAL at 08:46

## 2021-01-01 RX ADMIN — SODIUM CHLORIDE 250 ML: 900 INJECTION, SOLUTION INTRAVENOUS at 14:30

## 2021-01-01 RX ADMIN — DIPHENHYDRAMINE HYDROCHLORIDE 50 MG: 50 INJECTION, SOLUTION INTRAMUSCULAR; INTRAVENOUS at 11:46

## 2021-01-01 RX ADMIN — Medication 10 ML: at 13:43

## 2021-01-01 RX ADMIN — PANTOPRAZOLE SODIUM 40 MG: 40 INJECTION, POWDER, FOR SOLUTION INTRAVENOUS at 21:00

## 2021-01-01 RX ADMIN — DIPHENHYDRAMINE HYDROCHLORIDE 25 MG: 25 CAPSULE ORAL at 09:28

## 2021-01-01 RX ADMIN — LIDOCAINE HYDROCHLORIDE 40 MG: 20 INJECTION, SOLUTION EPIDURAL; INFILTRATION; INTRACAUDAL; PERINEURAL at 09:13

## 2021-01-01 RX ADMIN — POTASSIUM CHLORIDE 20 MEQ: 14.9 INJECTION, SOLUTION INTRAVENOUS at 08:19

## 2021-01-01 RX ADMIN — PHENYLEPHRINE HYDROCHLORIDE 150 MCG: 10 INJECTION INTRAVENOUS at 09:56

## 2021-01-01 RX ADMIN — CARBOPLATIN 481 MG: 10 INJECTION, SOLUTION INTRAVENOUS at 17:55

## 2021-01-01 RX ADMIN — Medication 10 ML: at 17:08

## 2021-01-01 RX ADMIN — Medication 10 ML: at 15:01

## 2021-01-01 RX ADMIN — NYSTATIN: 100000 POWDER TOPICAL at 17:13

## 2021-01-01 RX ADMIN — LOSARTAN POTASSIUM 25 MG: 25 TABLET, FILM COATED ORAL at 08:16

## 2021-01-01 RX ADMIN — Medication 10 ML: at 12:55

## 2021-01-01 RX ADMIN — LORAZEPAM 0.5 MG: 0.5 TABLET ORAL at 23:19

## 2021-01-01 RX ADMIN — Medication 10 ML: at 05:25

## 2021-01-01 RX ADMIN — IOPAMIDOL 100 ML: 755 INJECTION, SOLUTION INTRAVENOUS at 13:42

## 2021-01-01 RX ADMIN — Medication 10 ML: at 14:28

## 2021-01-01 RX ADMIN — Medication 10 ML: at 12:32

## 2021-01-01 RX ADMIN — SODIUM CHLORIDE, SODIUM LACTATE, POTASSIUM CHLORIDE, AND CALCIUM CHLORIDE: 600; 310; 30; 20 INJECTION, SOLUTION INTRAVENOUS at 09:10

## 2021-01-01 RX ADMIN — SODIUM CHLORIDE 200 MG: 9 INJECTION, SOLUTION INTRAVENOUS at 11:20

## 2021-01-01 RX ADMIN — Medication 10 ML: at 12:40

## 2021-01-01 RX ADMIN — SODIUM CHLORIDE, SODIUM LACTATE, POTASSIUM CHLORIDE, AND CALCIUM CHLORIDE 50 ML/HR: 600; 310; 30; 20 INJECTION, SOLUTION INTRAVENOUS at 08:24

## 2021-01-01 RX ADMIN — ONDANSETRON 8 MG: 2 INJECTION INTRAMUSCULAR; INTRAVENOUS at 11:32

## 2021-01-01 RX ADMIN — INSULIN LISPRO 4 UNITS: 100 INJECTION, SOLUTION INTRAVENOUS; SUBCUTANEOUS at 11:46

## 2021-01-01 RX ADMIN — Medication 10 ML: at 13:35

## 2021-01-01 RX ADMIN — LIDOCAINE HYDROCHLORIDE 1 ML: 10 INJECTION, SOLUTION INFILTRATION; PERINEURAL at 13:00

## 2021-01-01 RX ADMIN — PANTOPRAZOLE SODIUM 40 MG: 40 INJECTION, POWDER, FOR SOLUTION INTRAVENOUS at 20:30

## 2021-01-01 RX ADMIN — Medication 10 ML: at 13:42

## 2021-01-01 RX ADMIN — PANTOPRAZOLE SODIUM 40 MG: 40 INJECTION, POWDER, FOR SOLUTION INTRAVENOUS at 08:40

## 2021-01-01 RX ADMIN — SODIUM CHLORIDE 25 ML/HR: 9 INJECTION, SOLUTION INTRAVENOUS at 12:55

## 2021-01-01 RX ADMIN — SODIUM CHLORIDE, SODIUM LACTATE, POTASSIUM CHLORIDE, AND CALCIUM CHLORIDE 50 ML/HR: 600; 310; 30; 20 INJECTION, SOLUTION INTRAVENOUS at 07:18

## 2021-01-01 RX ADMIN — SODIUM CHLORIDE 200 MG: 9 INJECTION, SOLUTION INTRAVENOUS at 14:24

## 2021-01-01 RX ADMIN — PROPOFOL 50 MG: 10 INJECTION, EMULSION INTRAVENOUS at 08:24

## 2021-01-01 RX ADMIN — Medication 10 ML: at 10:33

## 2021-01-01 RX ADMIN — Medication 10 ML: at 18:30

## 2021-01-01 RX ADMIN — DIATRIZOATE MEGLUMINE AND DIATRIZOATE SODIUM 15 ML: 660; 100 LIQUID ORAL; RECTAL at 10:23

## 2021-01-01 RX ADMIN — POTASSIUM CHLORIDE 40 MEQ: 10 TABLET, EXTENDED RELEASE ORAL at 14:08

## 2021-01-01 RX ADMIN — PANTOPRAZOLE SODIUM 40 MG: 40 INJECTION, POWDER, FOR SOLUTION INTRAVENOUS at 20:07

## 2021-01-01 RX ADMIN — ACETAMINOPHEN 650 MG: 325 TABLET ORAL at 09:28

## 2021-01-01 RX ADMIN — Medication 10 ML: at 08:50

## 2021-01-01 RX ADMIN — Medication 10 ML: at 21:06

## 2021-01-01 RX ADMIN — Medication 10 ML: at 09:05

## 2021-01-01 RX ADMIN — Medication 10 ML: at 14:18

## 2021-01-01 RX ADMIN — Medication 10 ML: at 11:26

## 2021-01-01 RX ADMIN — APIXABAN 5 MG: 5 TABLET, FILM COATED ORAL at 17:12

## 2021-01-01 RX ADMIN — SODIUM CHLORIDE, SODIUM LACTATE, POTASSIUM CHLORIDE, AND CALCIUM CHLORIDE 50 ML/HR: 600; 310; 30; 20 INJECTION, SOLUTION INTRAVENOUS at 16:59

## 2021-01-01 RX ADMIN — CEFAZOLIN 2 G: 1 INJECTION, POWDER, FOR SOLUTION INTRAVENOUS at 09:26

## 2021-01-01 RX ADMIN — SODIUM CHLORIDE, SODIUM LACTATE, POTASSIUM CHLORIDE, AND CALCIUM CHLORIDE 50 ML/HR: 600; 310; 30; 20 INJECTION, SOLUTION INTRAVENOUS at 05:21

## 2021-01-01 RX ADMIN — POTASSIUM CHLORIDE 20 MEQ: 200 INJECTION, SOLUTION INTRAVENOUS at 12:14

## 2021-01-01 RX ADMIN — PANTOPRAZOLE SODIUM 40 MG: 40 INJECTION, POWDER, FOR SOLUTION INTRAVENOUS at 08:46

## 2021-01-01 RX ADMIN — Medication 10 ML: at 08:52

## 2021-01-01 RX ADMIN — SODIUM CHLORIDE 1000 ML: 900 INJECTION, SOLUTION INTRAVENOUS at 16:39

## 2021-01-01 RX ADMIN — PROPOFOL 50 MG: 10 INJECTION, EMULSION INTRAVENOUS at 08:18

## 2021-01-01 RX ADMIN — PANTOPRAZOLE SODIUM 40 MG: 40 INJECTION, POWDER, FOR SOLUTION INTRAVENOUS at 08:55

## 2021-01-01 RX ADMIN — PHENYLEPHRINE HYDROCHLORIDE 100 MCG: 10 INJECTION INTRAVENOUS at 09:59

## 2021-01-04 ENCOUNTER — HOSPITAL ENCOUNTER (OUTPATIENT)
Dept: PHYSICAL THERAPY | Age: 61
Discharge: HOME | End: 2021-01-04
Payer: COMMERCIAL

## 2021-01-04 NOTE — PROGRESS NOTES
Ally Storm  : 1960  Primary: [employer]  Secondary:  [address]  at Replaced by [facility]  [address] , [address], [place] [address]  [phone]   [phone]        OUTPATIENT DAILY NOTE    NAME/AGE/GENDER: Ally Storm is a 56 y.o. female. DATE: 2021    Ms. Cantu Dong for today's appointment due to being concerned about dry needling cost and application of it. stated that she would talk to her doctor first..    Aaliyah Calhoun, PT, DPT

## 2021-01-05 ENCOUNTER — HOSPITAL ENCOUNTER (OUTPATIENT)
Dept: PHYSICAL THERAPY | Age: 61
Discharge: HOME | End: 2021-01-05
Payer: COMMERCIAL

## 2021-01-05 PROCEDURE — 97110 THERAPEUTIC EXERCISES: CPT

## 2021-01-05 PROCEDURE — 97140 MANUAL THERAPY 1/> REGIONS: CPT

## 2021-01-05 PROCEDURE — 97535 SELF CARE MNGMENT TRAINING: CPT

## 2021-01-05 NOTE — PROGRESS NOTES
[facility]  : 1960  Primary: [place] [employer], In*  Secondary:  [address] at [facility]  [address], [address]  [phone]   [phone]              OUTPATIENT OCCUPATIONAL THERAPY: Daily Note 2021    ICD-10: Treatment Diagnosis: I89.0 lymphedema, not elsewhere specified                                                       R60.0 localized edema  Precautions/Allergies:   Biaxin [clarithromycin] and Cortisone   Fall Risk Score:    Ambulatory/Rehab Services H2 Model Falls Risk Assessment    Risk Factors:       No Risk Factors Identified Ability to Rise from Chair:       (1)  Pushes up, successful in one attempt    Falls Prevention Plan:       No modifications necessary   Total: (5 or greater = High Risk): 1     [facility] [employer]. All Rights Reserved. [facility] Patent #[id number]. Federal Law prohibits the replication, distribution or use without written permission from [facility] [employer]     MD Orders: eval and treat MEDICAL/REFERRING DIAGNOSIS:   Lymphedema, not elsewhere classified [I89.0]   DATE OF ONSET: 2020   REFERRING PHYSICIAN: Steven Freeman MD  RETURN PHYSICIAN APPOINTMENT: to be determined      INITIAL ASSESSMENT:  Ms. Neftali Camargo was referred to occupational therapy for lymphedema treatment of the RUE. Patient sustained a right rotator cuff/biceps tear approximately 2 months ago following walking her dog and the dog jerking the leash. She had a biceps tendon repair on 20. Since then she has had persistent swelling of the RUE that has not resolved on its own. She is in PT for R shoulder rehab and it is yet to be determined if she will be a candidate for rotator cuff repair surgery due to the swelling. Patient presents today with pitting edema in the RUE from the palm to axilla. Her RUE is 45cm larger in size than her LUE.   She will benefit from lymphedema treatment to decrease RUE limb size to aid in RUE rehab and possibly allow for rotator cuff repair if it's deemed appropriate. Once limb size is reduced and stabilized she will be fitted for a compression garment. PLAN OF CARE:   PROBLEM LIST:  1. Decreased Flexibility/Joint Mobility  2. Edema/Girth INTERVENTIONS PLANNED  1. Skin care  2. Compression bandaging  3. Fitting for compression garment(s)  4. Manual therapy/Manual lymph drainage  5. Therapeutic exercise/Therapeutic activities  6. Patient Education  7. Compression pump trial prn  8.  kinesiotaping prn    TREATMENT PLAN:  Effective Dates: 12/15/2020 TO 3/15/2021. Frequency/Duration: 2 times a week for 90 days and upon reassessment will adjust frequency and duration as progress indicates. GOALS: (Goals have been discussed and agreed upon with patient.)  Short-Term Functional Goals: Time Frame: 45 days  1. The patient/caregiver will verbalize understanding of lymphedema precautions. 2. Patient will be independent with skin care regimen to decrease risk of cellulitis. 3. The patient/caregiver will be independent at donning and doffing right upper extremity compression bandages. 4. Patient will be independent in lymphatic exercises. Discharge Goals: Time Frame: 90 days  1. Patient's right upper extremity circumferential measurements will decrease on volumetric graph by 15-20cm to maximize functional use in ADL's.    2. The patient/caregiver will be independent with home management of lymphedema. 3. Patient/caregiver will be independent donning and doffing right upper extremity compression garment. Rehabilitation Potential For Stated Goals: Good  Regarding Nae Mcmahan's therapy, I certify that the treatment plan above will be carried out by a therapist or under their direction.   Thank you for this referral,  Ra Burns OT                 The information in this section was collected on 12/15/2020 (except where otherwise noted). OCCUPATIONAL PROFILE & HISTORY:   History of Present Injury/Illness (Reason for Referral):  Patient was referred to occupational therapy for lymphedema treatment of the RUE. Patient sustained a right rotator cuff/biceps tear approximately 2 months ago following walking her dog and the dog jerking the leash. She had a biceps tendon repair on 11/30/20. Since then she has had persistent swelling of the RUE that has not resolved on its own. She is in PT for R should rehab and it is yet to be determined if she will be a candidate for rotator cuff repair surgery due to the swelling. Past Medical History/Comorbidities:   Ms. Mil Espana  has a past medical history of Anemia (08/2018), Cancer of ascending colon ([facility] [address] (2018), Environmental allergies (9/12/2013), History of colon cancer (2018), History of DVT (deep vein thrombosis) (04/2018), Kidney stone, Pulmonary embolism ([facility] [address] (~2018), and Type 2 diabetes mellitus ([facility] [address] She also has no past medical history of Difficult intubation, Malignant hyperthermia due to anesthesia, Nausea & vomiting, or Pseudocholinesterase deficiency. Ms. Mil Espana  has a past surgical history that includes hx cholecystectomy (1980's); hx lipoma resection (Right, 1980's); pr part removal colon w anastomosis; hx colonoscopy; hx wisdom teeth extraction; and hx other surgical (09/27/2018). Social History/Living Environment:    Patient lives alone  Prior Level of Function/Work/Activity:  Worked in [employer] requiring repetitive motions of BUE's  Dominant Side:         RIGHT  Previous Treatment Approaches: In physical therapy for rehab of rotator cuff/biceps tear  Current Medications:    Current Outpatient Medications:     linaGLIPtin (Tradjenta) 5 mg tablet, Take 5 mg by mouth daily. , Disp: , Rfl:     acetaminophen (TylenoL) 325 mg tablet, Take  by mouth every four (4) hours as needed for Pain., Disp: , Rfl:     rivaroxaban (Xarelto) 20 mg tab tablet, Take 1 Tab by mouth daily (with breakfast). Indications: blood clot in a deep vein of the extremities (Patient taking differently: Take 20 mg by mouth nightly. 11/23/20- pt states has not received instructions from surgeon's office. States she called their office and left message requesting instructions. Office also contacted by nurse. Per patient prescribed by Dr. Shea Costello. Indications: blood clot in a deep vein of the extremities), Disp: 90 Tab, Rfl: 3    SITagliptin-metFORMIN (Janumet)  mg per tablet, Take 1 Tab by mouth two (2) times daily (with meals). , Disp: 180 Tab, Rfl: 3    Insulin Needles, Disposable, (BD JIMBO 2ND GEN PEN NEEDLE) 32 gauge x 2/31\" ndle, 953 Applicators by Does Not Apply route daily. , Disp: 100 Pen Needle, Rfl: 5            Date Last Reviewed:  1/5/2021   Complexity Level : Expanded review of therapy/medical records (1-2):  MODERATE COMPLEXITY   ASSESSMENT OF OCCUPATIONAL PERFORMANCE:   Palpation:          Pitting edema RUE from hand to axilla. RUE is 45cm larger in limb size than LUE  ROM:          Limited RUE secondary to recent injury    Skin Integrity:          intact  Sensation:  intact  Functional Mobility:  independent  Activities of Daily Living : independent with extra time  Edema/Girth:  pitting   PRETREATMENT AFFECTED LIMB(s): right upper extremity      Date:  12/15/202 12/18/20 12/22/20 1/5/21      Right / Left           Axilla   [x]      [] 47cm 46cm 46cm 46cm       3 inches   [x]      [] 49cm 47cm 47cm 45cm       Elbow   [x]      [] 35.5cm 35cm 34cm 33cm       6 inches   [x]      [] 32.5cm 31cm 31cm 30cm       3 inches   [x]      [] 25cm 23cm 22.5cm 21cm       Wrist   [x]      [] 17cm 16.5cm 16.2cm 16.5cm       Palm   [x]      [] 19.8cm 19.5cm 18.8cm 18.3cm       Total limb size   [x]      [] 225.8cm 218 215.5cm 209.8cm     Measurements are taken in centimeters:  2.54 cm = 1 inch          Physical Skills Involved:  1. Edema  2.  Skin Integrity Cognitive Skills Affected (resulting in the inability to perform in a timely and safe manner):  1. Psychosocial Skills Affected:  1. Habits/Routines   Number of elements that affect the Plan of Care: 1-3:  LOW COMPLEXITY   CLINICAL DECISION MAKING:   Outcome Measure: Tool Used: Tool Used: Lymphedema Life Impact Scale   Score:  Initial: 42 Most Recent: X (Date: -- )   Interpretation of Score: The Lymphedema Life Impact Scale (LLIS) is a validated instrument that measures the physical, functional, and psychosocial concerns pertinent to patients with extremity lymphedema. The Scale's questionnaire is administered to patients to gauge impairments, activity limitations, and participation restrictions resulting from their lymphedema. Score 0 1-13 14-26 27-40 41-54 55-67 68   Modifier CH CI CJ CK CL CM CN     ? Other PT/OT Primary Functional Limitations:     - CURRENT STATUS: CL - 60%-79% impaired, limited or restricted    - GOAL STATUS: CK - 40%-59% impaired, limited or restricted    - D/C STATUS:  ---------------To be determined---------------     Medical Necessity:   · Skilled intervention continues to be required due to RUE lymphedema onset following R rotator cuff/bicep tear. Reason for Services/Other Comments:  · Patient continues to require skilled intervention due to patient's inability to self manage RUE lymphedema that is impacting ability to recover from recent R rotator cuff/bicep tear. Use of outcome tool(s) and clinical judgement create a POC that gives a: Clear prediction of patient's progress: LOW COMPLEXITY   TREATMENT:   (In addition to Assessment/Re-Assessment sessions the following treatments were rendered)    Pre-treatment Symptoms/Complaints: Dense, nonpitting edema in axilla. Kinesiotape stayed in place. Pain: Initial: 1:3  Pain Intensity 1: 3  Post Session:  1:3   Occupational Therapy Treatments:    OT eval( x ) OT eval was completed on 12/15/2020.   Pt received information on lymphedema and risk reduction/self management practices as outlined by the National Lymphedema Network. Therapeutic Exercise ( minutes):     HEP:  As above; handouts given to patient for all exercises. Manual Therapy:(60 minutes): Patient arrived with bandages on she had applied using reasonably good technique, but they were very loose. Once removed RUE was measured from the palm to axilla and since therapy began she has lost 16cm in RUE limb size. Tissue is much softer throughout as well. She then received MLD in conjunction with trial of compression pump to RUE. Kineseotaped over axilla with two tapes: One with anchor on contralateral axilla with four finger tapes and a second with anchor on lateral posterior inguinal area and two finger tapes to axilla. Tissue is softer. Manual Lymph Drainage:(45 minutes)           Lymph Nodes:    Cervical Supraclavicular Axillary Abdominal Inguinal Popliteal Antecubital   RIGHT []     [x]     [x]     []     [x]     []     []       LEFT []     [x]     []     []     []     []     []          Anastamoses:   Axillo-axillary Inguino-inguinal Axillo-inguinal Inguino-axillary   ANTERIOR [x]     []     [x]     []       POSTERIOR [x]           []     []       RIGHT [x]     []     [x]     []       LEFT [x]     []     []     []         Limbs:   [x]    RUE     []    LUE     []    RLE    []    LLE   Self Care: (10 minutes): After MLD patient received skin care and multi layer bandaging to the RUE from palm to axilla using 3 short stretch bandages. Surgigrip applied over bandages to aid in keeping bandages in place. Patient instructed to perform wrist/elbow flexion /extension and grasp/release of hand with bandages on to aid in promoting lymphatic flow. Treatment/Session Assessment:    · Response to Treatment:  Patient tolerated treatment without complication. She is responding to MLD and multi layer bandaging as evidenced by 16cm in RUE limb size.   Surgigrip added over bandages to aid in keeping bandages in place..  · Compliance with Program/Exercises: good to date. · Recommendations/Intent for next treatment session: \"Next visit will focus on lymphedema treatment guidelines to decrease RUE lymphedema and patient education for self management principles. \".   Total Treatment Duration: 70 minutes  OT Patient Time In/Time Out  Time In: 0200  Time Out: GABRIELLA Barrera

## 2021-01-07 ENCOUNTER — HOSPITAL ENCOUNTER (OUTPATIENT)
Dept: PHYSICAL THERAPY | Age: 61
Discharge: HOME | End: 2021-01-07
Payer: COMMERCIAL

## 2021-01-07 ENCOUNTER — APPOINTMENT (OUTPATIENT)
Dept: PHYSICAL THERAPY | Age: 61
End: 2021-01-07
Payer: COMMERCIAL

## 2021-01-07 PROCEDURE — 97140 MANUAL THERAPY 1/> REGIONS: CPT

## 2021-01-07 PROCEDURE — 97535 SELF CARE MNGMENT TRAINING: CPT

## 2021-01-07 PROCEDURE — 97110 THERAPEUTIC EXERCISES: CPT

## 2021-01-07 NOTE — PROGRESS NOTES
Socrates Cruz  : 1960  Primary: [place] [employer], In*  Secondary:  [address] at [facility]  [address], [address]  [phone]   [phone]         OUTPATIENT PHYSICAL THERAPY:Daily Note 2021      TREATMENT:   PT Patient Time In/Time Out  Time In: 1300  Time Out: 1355      Total Time: 55min  Visit Count:  9     ICD-10: Treatment Diagnosis: M25.511, M75.0, Z47.89  Medication Last Reviewed: 21      TREATMENT PLAN  Effective Dates: 2020 TO 3/9/2021 (90 days). Frequency/Duration: 2-3 times a week for 90 Day(s)         Subjective: Patient states she can tell she can lift her arm further to the side.  Pain: 3 /10    Objective: Therapeutic Exercise: (42min) Done in order to improve strength, ROM and understanding of current condition.     Date:   Date:  12/30 Date  1-5-21 Date:     Activity/Exercise       Education       UBE x10min x8min X 10 x8mimn   Shoulder PROM x12min x6min (on side) x6min (on side) x10min (supine)   Pulleys x15min x10min x10min x8min   Shoulder AAROM       Elbow PROM    x8min ext 5lbs (sitting, supine)   Pendulums  x5min 10lbs prone hanging     Walking [id number] 5lbs      Elbow Flexion Iso    2x5   Deltoid Iso       ER/IR Iso       Shoulder W 3x10 red TB      Bicep Curls 4x10 5lbs   3x15 5lbs   ER/IR  3x10 red TB 3x10 red TB    Rows  3x10 red TB 3 x 10 red TB                      Manual Therapy: (13min) Done in order to improve joint and soft tissue mobility,reduce muscle guarding, and decrease muscle tone   Date:   Date:  12/30 Date  1-5-21 Date:     Type       Joint Mobilization  GHJ: A-P grades II-IV GHJ: A-P grades II-IV    Soft Tissue Mobilization Posterior shoulder, subscap, bicep  Posterior shoulder, subscap, bicep Posterior shoulder, subscap, bicep       Modalities: (-) Done in order to reduce swelling and pain    Assessment: Patient continues to make improvements in shoulder PROM and AROM    Plan: continue per POC    Future Appointments   Date Time Provider Alec Lobo   1/12/2021  1:00 PM [facility]   1/12/2021  2:00 PM Shankar Mulilns OT [facility]   1/14/2021 10:00 AM GABRIELLA [facility]   1/14/2021 11:00 AM Lianne Scott PT [facility]   1/14/2021  1:50 PM [address] [place] [address] [employer] [address]   1/14/2021  2:30 PM Ofelia Carrel, MD [facility]   1/19/2021  1:00 PM Lianne Scott PT [facility]   1/19/2021  2:00 PM Fausto Lang, GABRIELLA [facility]   1/21/2021  1:00 PM Lianne Scott PT [facility]   1/21/2021  2:00 PM Fausto Lang, OT [facility]       Units: 3 TE / MT 1      Davis Mazariegos PT,

## 2021-01-07 NOTE — PROGRESS NOTES
Reid Hancock  : 1960  Primary: [place] [employer] [place]*  Secondary:  [address] at [facility]  [address], [address]  [phone]   [phone]              OUTPATIENT OCCUPATIONAL THERAPY: Daily Note 2021    ICD-10: Treatment Diagnosis: I89.0 lymphedema, not elsewhere specified                                                       R60.0 localized edema  Precautions/Allergies:   Biaxin [clarithromycin] and Cortisone   Fall Risk Score:    Ambulatory/Rehab Services H2 Model Falls Risk Assessment    Risk Factors:       No Risk Factors Identified Ability to Rise from Chair:       (1)  Pushes up, successful in one attempt    Falls Prevention Plan:       No modifications necessary   Total: (5 or greater = High Risk): 1     [facility]. All Rights Reserved. [facility] [employer] Patent #3,[id number]. Federal Law prohibits the replication, distribution or use without written permission from [facility] [employer]     MD Orders: eval and treat MEDICAL/REFERRING DIAGNOSIS:   Lymphedema, not elsewhere classified [I89.0]   DATE OF ONSET: 2020   REFERRING PHYSICIAN: Jeniffer Silva MD  RETURN PHYSICIAN APPOINTMENT: to be determined      INITIAL ASSESSMENT:  Ms. Naomi Spence was referred to occupational therapy for lymphedema treatment of the RUE. Patient sustained a right rotator cuff/biceps tear approximately 2 months ago following walking her dog and the dog jerking the leash. She had a biceps tendon repair on 20. Since then she has had persistent swelling of the RUE that has not resolved on its own. She is in PT for R shoulder rehab and it is yet to be determined if she will be a candidate for rotator cuff repair surgery due to the swelling. Patient presents today with pitting edema in the RUE from the palm to axilla. Her RUE is 45cm larger in size than her LUE.   She will benefit from lymphedema treatment to decrease RUE limb size to aid in RUE rehab and possibly allow for rotator cuff repair if it's deemed appropriate. Once limb size is reduced and stabilized she will be fitted for a compression garment. PLAN OF CARE:   PROBLEM LIST:  1. Decreased Flexibility/Joint Mobility  2. Edema/Girth INTERVENTIONS PLANNED  1. Skin care  2. Compression bandaging  3. Fitting for compression garment(s)  4. Manual therapy/Manual lymph drainage  5. Therapeutic exercise/Therapeutic activities  6. Patient Education  7. Compression pump trial prn  8.  kinesiotaping prn    TREATMENT PLAN:  Effective Dates: 12/15/2020 TO 3/15/2021. Frequency/Duration: 2 times a week for 90 days and upon reassessment will adjust frequency and duration as progress indicates. GOALS: (Goals have been discussed and agreed upon with patient.)  Short-Term Functional Goals: Time Frame: 45 days  1. The patient/caregiver will verbalize understanding of lymphedema precautions. 2. Patient will be independent with skin care regimen to decrease risk of cellulitis. 3. The patient/caregiver will be independent at donning and doffing right upper extremity compression bandages. 4. Patient will be independent in lymphatic exercises. Discharge Goals: Time Frame: 90 days  1. Patient's right upper extremity circumferential measurements will decrease on volumetric graph by 15-20cm to maximize functional use in ADL's.    2. The patient/caregiver will be independent with home management of lymphedema. 3. Patient/caregiver will be independent donning and doffing right upper extremity compression garment. Rehabilitation Potential For Stated Goals: Good  Regarding Rea Mcmahan's therapy, I certify that the treatment plan above will be carried out by a therapist or under their direction.   Thank you for this referral,  Erika Gould OT                 The information in this section was collected on 12/15/2020 (except where otherwise noted). OCCUPATIONAL PROFILE & HISTORY:   History of Present Injury/Illness (Reason for Referral):  Patient was referred to occupational therapy for lymphedema treatment of the RUE. Patient sustained a right rotator cuff/biceps tear approximately 2 months ago following walking her dog and the dog jerking the leash. She had a biceps tendon repair on 11/30/20. Since then she has had persistent swelling of the RUE that has not resolved on its own. She is in PT for R should rehab and it is yet to be determined if she will be a candidate for rotator cuff repair surgery due to the swelling. Past Medical History/Comorbidities:   Ms. Bharti Calhoun  has a past medical history of Anemia (08/2018), Cancer of ascending colon ([address] (2018), Environmental allergies (9/12/2013), History of colon cancer (2018), History of DVT (deep vein thrombosis) (04/2018), Kidney stone, Pulmonary embolism ([facility] (~2018), and Type 2 diabetes mellitus ([facility] She also has no past medical history of Difficult intubation, Malignant hyperthermia due to anesthesia, Nausea & vomiting, or Pseudocholinesterase deficiency. Ms. Bharti Calhoun  has a past surgical history that includes hx cholecystectomy (1980's); hx lipoma resection (Right, 1980's); pr part removal colon w anastomosis; hx colonoscopy; hx wisdom teeth extraction; and hx other surgical (09/27/2018). Social History/Living Environment:    Patient lives alone  Prior Level of Function/Work/Activity:  Worked in [employer] requiring repetitive motions of BUE's  Dominant Side:         RIGHT  Previous Treatment Approaches: In physical therapy for rehab of rotator cuff/biceps tear  Current Medications:    Current Outpatient Medications:     linaGLIPtin (Tradjenta) 5 mg tablet, Take 5 mg by mouth daily. , Disp: , Rfl:     acetaminophen (TylenoL) 325 mg tablet, Take  by mouth every four (4) hours as needed for Pain., Disp: , Rfl:     rivaroxaban (Xarelto) 20 mg tab tablet, Take 1 Tab by mouth daily (with breakfast). Indications: blood clot in a deep vein of the extremities (Patient taking differently: Take 20 mg by mouth nightly. 11/23/20- pt states has not received instructions from surgeon's office. States she called their office and left message requesting instructions. Office also contacted by nurse. Per patient prescribed by Dr. Jessica Wolfe. Indications: blood clot in a deep vein of the extremities), Disp: 90 Tab, Rfl: 3    SITagliptin-metFORMIN (Janumet)  mg per tablet, Take 1 Tab by mouth two (2) times daily (with meals). , Disp: 180 Tab, Rfl: 3    Insulin Needles, Disposable, (BD JIMBO 2ND GEN PEN NEEDLE) 32 gauge x 8/18\" ndle, 054 Applicators by Does Not Apply route daily. , Disp: 100 Pen Needle, Rfl: 5            Date Last Reviewed:  1/7/2021   Complexity Level : Expanded review of therapy/medical records (1-2):  MODERATE COMPLEXITY   ASSESSMENT OF OCCUPATIONAL PERFORMANCE:   Palpation:          Pitting edema RUE from hand to axilla. RUE is 45cm larger in limb size than LUE  ROM:          Limited RUE secondary to recent injury    Skin Integrity:          intact  Sensation:  intact  Functional Mobility:  independent  Activities of Daily Living : independent with extra time  Edema/Girth:  pitting   PRETREATMENT AFFECTED LIMB(s): right upper extremity      Date:  12/15/202 12/18/20 12/22/20 1/5/21      Right / Left           Axilla   [x]      [] 47cm 46cm 46cm 46cm       3 inches   [x]      [] 49cm 47cm 47cm 45cm       Elbow   [x]      [] 35.5cm 35cm 34cm 33cm       6 inches   [x]      [] 32.5cm 31cm 31cm 30cm       3 inches   [x]      [] 25cm 23cm 22.5cm 21cm       Wrist   [x]      [] 17cm 16.5cm 16.2cm 16.5cm       Palm   [x]      [] 19.8cm 19.5cm 18.8cm 18.3cm       Total limb size   [x]      [] 225.8cm 218 215.5cm 209.8cm     Measurements are taken in centimeters:  2.54 cm = 1 inch          Physical Skills Involved:  1. Edema  2.  Skin Integrity Cognitive Skills Affected (resulting in the inability to perform in a timely and safe manner):  1. Psychosocial Skills Affected:  1. Habits/Routines   Number of elements that affect the Plan of Care: 1-3:  LOW COMPLEXITY   CLINICAL DECISION MAKING:   Outcome Measure: Tool Used: Tool Used: Lymphedema Life Impact Scale   Score:  Initial: 42 Most Recent: X (Date: -- )   Interpretation of Score: The Lymphedema Life Impact Scale (LLIS) is a validated instrument that measures the physical, functional, and psychosocial concerns pertinent to patients with extremity lymphedema. The Scale's questionnaire is administered to patients to gauge impairments, activity limitations, and participation restrictions resulting from their lymphedema. Score 0 1-13 14-26 27-40 41-54 55-67 68   Modifier CH CI CJ CK CL CM CN     ? Other PT/OT Primary Functional Limitations:     - CURRENT STATUS: CL - 60%-79% impaired, limited or restricted    - GOAL STATUS: CK - 40%-59% impaired, limited or restricted    - D/C STATUS:  ---------------To be determined---------------     Medical Necessity:   · Skilled intervention continues to be required due to RUE lymphedema onset following R rotator cuff/bicep tear. Reason for Services/Other Comments:  · Patient continues to require skilled intervention due to patient's inability to self manage RUE lymphedema that is impacting ability to recover from recent R rotator cuff/bicep tear. Use of outcome tool(s) and clinical judgement create a POC that gives a: Clear prediction of patient's progress: LOW COMPLEXITY   TREATMENT:   (In addition to Assessment/Re-Assessment sessions the following treatments were rendered)    Pre-treatment Symptoms/Complaints: Bandages stayed in place with biagrip stockinet over them. She was very pleased. Pain: Initial: 1:3  Pain Intensity 1: 3  Post Session:  1:3   Occupational Therapy Treatments:    OT eval( x ) OT eval was completed on 12/15/2020.   Pt received information on lymphedema and risk reduction/self management practices as outlined by the National Lymphedema Network. Therapeutic Exercise ( minutes):     HEP:  As above; handouts given to patient for all exercises. Manual Therapy:(50 minutes): Patient arrived with bandages in place from last session. She received MLD in conjunction with 20min trial of compression pump to RUE. Kinesiotape will be applied in PT. Compression bandaging applied following PT. Manual Lymph Drainage:          Lymph Nodes:    Cervical Supraclavicular Axillary Abdominal Inguinal Popliteal Antecubital   RIGHT []     [x]     [x]     []     [x]     []     []       LEFT []     [x]     []     []     []     []     []          Anastamoses:   Axillo-axillary Inguino-inguinal Axillo-inguinal Inguino-axillary   ANTERIOR [x]     []     [x]     []       POSTERIOR [x]           []     []       RIGHT [x]     []     [x]     []       LEFT [x]     []     []     []         Limbs:   [x]    RUE     []    LUE     []    RLE    []    LLE   Self Care: (10 minutes): After MLD patient received skin care and multi layer bandaging to the RUE from palm to axilla using 3 short stretch bandages. Biaigrip applied over bandages to aid in keeping bandages in place. Patient instructed to perform wrist/elbow flexion /extension and grasp/release of hand with bandages on to aid in promoting lymphatic flow. Treatment/Session Assessment:    · Response to Treatment:  Patient tolerated treatment without complication. She is responding to MLD and multi layer bandaging as evidenced by 16cm in RUE limb size. Biagrip added over bandages to aid in keeping bandages in place which worked well for her this week. · Compliance with Program/Exercises: good to date. · Recommendations/Intent for next treatment session: \"Next visit will focus on lymphedema treatment guidelines to decrease RUE lymphedema and patient education for self management principles. \".   Total Treatment Duration: 60 minutes  OT Patient Time In/Time Out  Time In: 1200  Time Out: [address], OT

## 2021-01-07 NOTE — PROGRESS NOTES
Armingiovanna Credit  : 1960  Primary: [employer]*  Secondary:  [address] at [facility]  [address], [address]  [phone]   [phone]         OUTPATIENT PHYSICAL THERAPY:Daily Note 2021      TREATMENT:   PT Patient Time In/Time Out  Time In: 1300  Time Out: 1355      Total Time: 55min  Visit Count:  9     ICD-10: Treatment Diagnosis: M25.511, M75.0, Z47.89  Medication Last Reviewed: 21      TREATMENT PLAN  Effective Dates: 2020 TO 3/9/2021 (90 days). Frequency/Duration: 2-3 times a week for 90 Day(s)         Subjective: Patient states she can tell she can lift her arm further to the side.  Pain: 3 /10    Objective: Therapeutic Exercise: (32min) Done in order to improve strength, ROM and understanding of current condition.     Date:   Date:   Date  21 Date:     Activity/Exercise       Education       UBE x10min x8min X 10 x8mimn   Shoulder PROM x12min x6min (on side) x6min (on side) x6min (supine)   Pulleys x15min x10min x10min x8min   Shoulder AAROM       Elbow PROM    x6min ext 5lbs (sitting, supine)   Pendulums  x5min 10lbs prone hanging     Walking [id number] 5lbs      Elbow Flexion Iso    2x5   Deltoid Iso       ER/IR Iso       Shoulder W 3x10 red TB      Bicep Curls 4x10 5lbs   3x15 5lbs   ER/IR  3x10 red TB 3x10 red TB    Rows  3x10 red TB 3 x 10 red TB                      Manual Therapy: (13min) Done in order to improve joint and soft tissue mobility,reduce muscle guarding, and decrease muscle tone   Date:   Date:  12/30 Date  1-5-21 Date:     Type       Joint Mobilization  GHJ: A-P grades II-IV GHJ: A-P grades II-IV    Soft Tissue Mobilization Posterior shoulder, subscap, bicep  Posterior shoulder, subscap, bicep Posterior shoulder, subscap, bicep       Modalities: (-) Done in order to reduce swelling and pain    Assessment: Patient continues to make improvements in shoulder PROM and AROM    Plan: continue per POC    Future Appointments   Date Time Provider Alec Lobo   1/12/2021  1:00 PM Guillermo [facility]   1/12/2021  2:00 PM Juliet Mcguire OT [facility]   1/14/2021 10:00 AM Juliet Mcguire OT [facility]   1/14/2021 11:00 AM Mariana Rico PT [facility]   1/14/2021  1:50 PM [address] [place] [address] OUTREACH INSURANCE [address]   1/14/2021  2:30 PM Curry Bautista MD [facility]   1/19/2021  1:00 PM Mariana Rico PT [facility]   1/19/2021  2:00 PM [facility]RI[facility]   1/21/2021  1:00 PM Mairana Rico PT [facility]   1/21/2021  2:00 PM Rosalina Mackey OT [facility]       Units: 2 TE / MT 1      Damien Vann, PT,

## 2021-01-11 NOTE — PROGRESS NOTES
Epidural Block    Date/Time: 1/11/2021 7:30 AM  Performed by: Josh Resendiz  Authorized by: Josh Resendiz     Patient Location:  Pre-op  Indication: post-op pain management     At Surgeon's request   Patient Identified, Risks and Benefits Discussed, Monitors and Equipment Checked, Timeout Performed, Pre-op Evaluation Complete and IV Bolus - Crystalloid  Epidural:     Patient Position:  Sitting    Prep:  Providone Iodine    Max Sterile Barrier Technique:  Hand Washing, Cap/Mask, Sterile gloves, Sterile drape and Sterile dressing applied    Monitoring:  Continuous pulse oximetry, heart rate, non-invasive blood pressure and cardiac monitor    Approach:  Midline    Location:  T6-7  Injection Technique:  SARMAD air  Ultrasound Used:  No  Needle and Epidural Catheter:     Needle Type:  Tuohy    Needle Gauge:  17 G    Needle Length:  3.5 in    SARMAD Depth:  9 cm  Catheter Type:  Side hole  Catheter at Skin Depth:  13 cm  Securement:  Transparent dressing and Tape  Test Dose:  Lidocaine 1.5% with epinephrine 1-to-200,000  Test Dose Volume (mL):  3  Test Dose Result:  Negative  Assessment:     Heme from needle:  No    Heme aspirated from catheter:  No    Clear CSF from catheter:  No    Painful injection:  No    Paresthesia:  No  Staff:     Performed By:  Anesthesiologist and Resident    Anesthesiologist:  Josesito Young MD    Resident:  Josh Resendiz  End Time:  1/11/2021 7:20 AM         Fernando Crouch  : 1960  Primary: [employer]  Secondary:  [address] at [facility]  [address], [address]  [phone]   [phone]         OUTPATIENT PHYSICAL THERAPY:Daily Note 2021      TREATMENT:   PT Patient Time In/Time Out  Time In: 5  Time Out: 0400      Total Time: 45min  Visit Count:  8     ICD-10: Treatment Diagnosis: M25.511, M75.0, Z47.89  Medication Last Reviewed: 21      TREATMENT PLAN  Effective Dates: 2020 TO 3/9/2021 (90 days). Frequency/Duration: 2-3 times a week for 90 Day(s)         Subjective: Patient reports shoulder continues to show some improvement.  Pain: 3 10    Objective: Flexion PROM: 80deg      Therapeutic Exercise: (35min) Done in order to improve strength, ROM and understanding of current condition.     Date:   Date:   Date  21   Activity/Exercise      Education      UBE x10min x8min X 10   Shoulder PROM x12min x6min (on side) x6min (on side)   Pulleys x15min x10min x10min   Shoulder AAROM      Elbow PROM      Pendulums  x5min 10lbs prone hanging    Walking [id number] 5lbs     Elbow Flexion Iso      Deltoid Iso      ER/IR Iso      Shoulder W 3x10 red TB     Bicep Curls 4x10 5lbs     ER/IR  3x10 red TB 3x10 red TB   Rows  3x10 red TB 3 x 10 red TB                   Manual Therapy: (10min) Done in order to improve joint and soft tissue mobility,reduce muscle guarding, and decrease muscle tone   Date:   Date:   Date  21   Type      Joint Mobilization  GHJ: A-P grades II-IV GHJ: A-P grades II-IV   Soft Tissue Mobilization Posterior shoulder, subscap, bicep  Posterior shoulder, subscap, bicep       Modalities: (-) Done in order to reduce swelling and pain    Assessment: Patient with improved ROM and less swelling today    Plan: continue per POC    Future Appointments   Date Time Provider Alec Lobo   2021 12:00 PM Ana Turcios OT [facility]   1/7/2021  1:00 PM Martha Santos, PT [facility]   1/12/2021  1:00 PM Mario Blas [facility]   1/12/2021  2:00 PM Krystal Rashid, OT [facility]   1/14/2021 10:00 AM Krystal Rashid, OT [facility]   1/14/2021 11:00 AM Martha Santos, PT [facility]   1/14/2021  1:50 PM [address] [facility] [address] [place] [address]   1/14/2021  2:30 PM Yady Lewis MD [facility]   1/19/2021  1:00 PM Martha Santos, PT [facility]   1/19/2021  2:00 PM Heather Engle, OT [facility]   1/21/2021  1:00 PM Martha Santos, PT [facility]   1/21/2021  2:00 PM Heather Oniel, OT [facility]       Units: 2 TE /  [place], [place]

## 2021-01-12 ENCOUNTER — HOSPITAL ENCOUNTER (OUTPATIENT)
Dept: PHYSICAL THERAPY | Age: 61
Discharge: HOME | End: 2021-01-12
Payer: COMMERCIAL

## 2021-01-12 PROCEDURE — 97535 SELF CARE MNGMENT TRAINING: CPT

## 2021-01-12 PROCEDURE — 97140 MANUAL THERAPY 1/> REGIONS: CPT

## 2021-01-12 PROCEDURE — 97110 THERAPEUTIC EXERCISES: CPT

## 2021-01-12 NOTE — PROGRESS NOTES
Conception Alison  : 1960  Primary: [place] [employer], [place]*  Secondary:  [address] at [facility] Therapy  [address], [address]  [phone]   [phone]              OUTPATIENT OCCUPATIONAL THERAPY: Daily Note 2021    ICD-10: Treatment Diagnosis: I89.0 lymphedema, not elsewhere specified                                                       R60.0 localized edema  Precautions/Allergies:   Biaxin [clarithromycin] and Cortisone   Fall Risk Score:    Ambulatory/Rehab Services H2 Model Falls Risk Assessment    Risk Factors:       No Risk Factors Identified Ability to Rise from Chair:       (1)  Pushes up, successful in one attempt    Falls Prevention Plan:       No modifications necessary   Total: (5 or greater = High Risk): 1     [facility] [employer]. All Rights Reserved. [facility] Patent #[id number]. Federal Law prohibits the replication, distribution or use without written permission from [facility]     MD Orders: eval and treat MEDICAL/REFERRING DIAGNOSIS:   Lymphedema, not elsewhere classified [I89.0]   DATE OF ONSET: 2020   REFERRING PHYSICIAN: Kenny Nava MD  RETURN PHYSICIAN APPOINTMENT: to be determined      INITIAL ASSESSMENT:  Ms. Josi Mcgill was referred to occupational therapy for lymphedema treatment of the RUE. Patient sustained a right rotator cuff/biceps tear approximately 2 months ago following walking her dog and the dog jerking the leash. She had a biceps tendon repair on 20. Since then she has had persistent swelling of the RUE that has not resolved on its own. She is in PT for R shoulder rehab and it is yet to be determined if she will be a candidate for rotator cuff repair surgery due to the swelling. Patient presents today with pitting edema in the RUE from the palm to axilla. Her RUE is 45cm larger in size than her LUE.   She will benefit from lymphedema treatment to decrease RUE limb size to aid in RUE rehab and possibly allow for rotator cuff repair if it's deemed appropriate. Once limb size is reduced and stabilized she will be fitted for a compression garment. PLAN OF CARE:   PROBLEM LIST:  1. Decreased Flexibility/Joint Mobility  2. Edema/Girth INTERVENTIONS PLANNED  1. Skin care  2. Compression bandaging  3. Fitting for compression garment(s)  4. Manual therapy/Manual lymph drainage  5. Therapeutic exercise/Therapeutic activities  6. Patient Education  7. Compression pump trial prn  8.  kinesiotaping prn    TREATMENT PLAN:  Effective Dates: 12/15/2020 TO 3/15/2021. Frequency/Duration: 2 times a week for 90 days and upon reassessment will adjust frequency and duration as progress indicates. GOALS: (Goals have been discussed and agreed upon with patient.)  Short-Term Functional Goals: Time Frame: 45 days  1. The patient/caregiver will verbalize understanding of lymphedema precautions. 2. Patient will be independent with skin care regimen to decrease risk of cellulitis. 3. The patient/caregiver will be independent at donning and doffing right upper extremity compression bandages. 4. Patient will be independent in lymphatic exercises. Discharge Goals: Time Frame: 90 days  1. Patient's right upper extremity circumferential measurements will decrease on volumetric graph by 15-20cm to maximize functional use in ADL's.    2. The patient/caregiver will be independent with home management of lymphedema. 3. Patient/caregiver will be independent donning and doffing right upper extremity compression garment. Rehabilitation Potential For Stated Goals: Good  Regarding Golden Mcmahan's therapy, I certify that the treatment plan above will be carried out by a therapist or under their direction.   Thank you for this referral,  Traci Christensen OT                 The information in this section was collected on 12/15/2020 (except where otherwise noted). OCCUPATIONAL PROFILE & HISTORY:   History of Present Injury/Illness (Reason for Referral):  Patient was referred to occupational therapy for lymphedema treatment of the RUE. Patient sustained a right rotator cuff/biceps tear approximately 2 months ago following walking her dog and the dog jerking the leash. She had a biceps tendon repair on 11/30/20. Since then she has had persistent swelling of the RUE that has not resolved on its own. She is in PT for R should rehab and it is yet to be determined if she will be a candidate for rotator cuff repair surgery due to the swelling. Past Medical History/Comorbidities:   Ms. Christian Casas  has a past medical history of Anemia (08/2018), Cancer of ascending colon ([address] (2018), Environmental allergies (9/12/2013), History of colon cancer (2018), History of DVT (deep vein thrombosis) (04/2018), Kidney stone, Pulmonary embolism ([facility] [address] (~2018), and Type 2 diabetes mellitus ([facility] [address] She also has no past medical history of Difficult intubation, Malignant hyperthermia due to anesthesia, Nausea & vomiting, or Pseudocholinesterase deficiency. Ms. Christian Casas  has a past surgical history that includes hx cholecystectomy (1980's); hx lipoma resection (Right, 1980's); pr part removal colon w anastomosis; hx colonoscopy; hx wisdom teeth extraction; and hx other surgical (09/27/2018). Social History/Living Environment:    Patient lives alone  Prior Level of Function/Work/Activity:  Worked in [employer] requiring repetitive motions of BUE's  Dominant Side:         RIGHT  Previous Treatment Approaches: In physical therapy for rehab of rotator cuff/biceps tear  Current Medications:    Current Outpatient Medications:     linaGLIPtin (Tradjenta) 5 mg tablet, Take 5 mg by mouth daily. , Disp: , Rfl:     acetaminophen (TylenoL) 325 mg tablet, Take  by mouth every four (4) hours as needed for Pain., Disp: , Rfl:     rivaroxaban (Xarelto) 20 mg tab tablet, Take 1 Tab by mouth daily (with breakfast). Indications: blood clot in a deep vein of the extremities (Patient taking differently: Take 20 mg by mouth nightly. 11/23/20- pt states has not received instructions from surgeon's office. States she called their office and left message requesting instructions. Office also contacted by nurse. Per patient prescribed by Dr. Tobias Razo. Indications: blood clot in a deep vein of the extremities), Disp: 90 Tab, Rfl: 3    SITagliptin-metFORMIN (Janumet)  mg per tablet, Take 1 Tab by mouth two (2) times daily (with meals). , Disp: 180 Tab, Rfl: 3    Insulin Needles, Disposable, (BD JIMBO 2ND GEN PEN NEEDLE) 32 gauge x 7/24\" ndle, 471 Applicators by Does Not Apply route daily. , Disp: 100 Pen Needle, Rfl: 5            Date Last Reviewed:  1/12/2021   Complexity Level : Expanded review of therapy/medical records (1-2):  MODERATE COMPLEXITY   ASSESSMENT OF OCCUPATIONAL PERFORMANCE:   Palpation:          Pitting edema RUE from hand to axilla. RUE is 45cm larger in limb size than LUE  ROM:          Limited RUE secondary to recent injury    Skin Integrity:          intact  Sensation:  intact  Functional Mobility:  independent  Activities of Daily Living : independent with extra time  Edema/Girth:  pitting   PRETREATMENT AFFECTED LIMB(s): right upper extremity      Date:  12/15/202 12/18/20 12/22/20 1/5/21 1/11/21     Right / Left           Axilla   [x]      [] 47cm 46cm 46cm 46cm 43cm      3 inches   [x]      [] 49cm 47cm 47cm 45cm 45cm      Elbow   [x]      [] 35.5cm 35cm 34cm 33cm 28.1cm      6 inches   [x]      [] 32.5cm 31cm 31cm 30cm 29.2cm      3 inches   [x]      [] 25cm 23cm 22.5cm 21cm 21cm      Wrist   [x]      [] 17cm 16.5cm 16.2cm 16.5cm 15.7cm      Palm   [x]      [] 19.8cm 19.5cm 18.8cm 18.3cm 18cm      Total limb size   [x]      [] 225.8cm 218 215.5cm 209.8cm 200cm    Measurements are taken in centimeters:  2.54 cm = 1 inch          Physical Skills Involved:  1. Edema  2.  Skin Integrity Cognitive Skills Affected (resulting in the inability to perform in a timely and safe manner):  1. Psychosocial Skills Affected:  1. Habits/Routines   Number of elements that affect the Plan of Care: 1-3:  LOW COMPLEXITY   CLINICAL DECISION MAKING:   Outcome Measure: Tool Used: Tool Used: Lymphedema Life Impact Scale   Score:  Initial: 42 Most Recent: X (Date: -- )   Interpretation of Score: The Lymphedema Life Impact Scale (LLIS) is a validated instrument that measures the physical, functional, and psychosocial concerns pertinent to patients with extremity lymphedema. The Scale's questionnaire is administered to patients to gauge impairments, activity limitations, and participation restrictions resulting from their lymphedema. Score 0 1-13 14-26 27-40 41-54 55-67 68   Modifier CH CI CJ CK CL CM CN     ? Other PT/OT Primary Functional Limitations:     - CURRENT STATUS: CL - 60%-79% impaired, limited or restricted    - GOAL STATUS: CK - 40%-59% impaired, limited or restricted    - D/C STATUS:  ---------------To be determined---------------     Medical Necessity:   · Skilled intervention continues to be required due to RUE lymphedema onset following R rotator cuff/bicep tear. Reason for Services/Other Comments:  · Patient continues to require skilled intervention due to patient's inability to self manage RUE lymphedema that is impacting ability to recover from recent R rotator cuff/bicep tear. Use of outcome tool(s) and clinical judgement create a POC that gives a: Clear prediction of patient's progress: LOW COMPLEXITY   TREATMENT:   (In addition to Assessment/Re-Assessment sessions the following treatments were rendered)    Pre-treatment Symptoms/Complaints: Measurements show and overall decrease of total circumferential measurements by 25.8cm. Pain: Initial: 1:3  Pain Intensity 1: 4  Post Session:  1:3   Occupational Therapy Treatments:    OT eval( x ) OT eval was completed on 12/15/2020. Pt received information on lymphedema and risk reduction/self management practices as outlined by the National Lymphedema Network. Therapeutic Exercise ( minutes):     HEP:  As above; handouts given to patient for all exercises. Manual Therapy:(50 minutes): Patient arrived with bandages removed. She had aquatic therapy with PT before session. She received MLD in conjunction with 20min trial of compression pump to RUE. Manuela Salter Compression bandaging applied following tx. Manual Lymph Drainage:          Lymph Nodes:    Cervical Supraclavicular Axillary Abdominal Inguinal Popliteal Antecubital   RIGHT []     [x]     [x]     []     [x]     []     []       LEFT []     [x]     []     []     []     []     []          Anastamoses:   Axillo-axillary Inguino-inguinal Axillo-inguinal Inguino-axillary   ANTERIOR [x]     []     [x]     []       POSTERIOR [x]           []     []       RIGHT [x]     []     [x]     []       LEFT [x]     []     []     []         Limbs:   [x]    RUE     []    LUE     []    RLE    []    LLE   Self Care: (10 minutes): After MLD patient received skin care and multi layer bandaging to the RUE from palm to axilla using 3 short stretch bandages. Biaigrip applied over bandages to aid in keeping bandages in place. Patient instructed to perform wrist/elbow flexion /extension and grasp/release of hand with bandages on to aid in promoting lymphatic flow. She is able to adjust bandages as needed at home with assistance from family. Treatment/Session Assessment:    · Response to Treatment:  Patient tolerated treatment without complication. She is responding to MLD and multi layer bandaging as evidenced by 25.8cm in RUE limb size. She is pleased with progress and tolerating bandages with good results. · Compliance with Program/Exercises: good to date. · Recommendations/Intent for next treatment session:  \"Next visit will focus on lymphedema treatment guidelines to decrease RUE lymphedema and patient education for self management principles. \".   Total Treatment Duration: 60 minutes  OT Patient Time In/Time Out  Time In: 0200  Time Out: [address] [facility],

## 2021-01-12 NOTE — PROGRESS NOTES
Fernando Crouch  : 1960  Primary: [employer], [employer]*  Secondary:  [address] at [facility] Therapy  [address], [address]  [phone]   [phone]         OUTPATIENT PHYSICAL THERAPY:Daily Note 2021      TREATMENT:   PT Patient Time In/Time Out  Time In: 0100  Time Out: 0200      Total Time: 55min  Visit Count:  10     ICD-10: Treatment Diagnosis: M25.511, M75.0, Z47.89  Medication Last Reviewed: 21      TREATMENT PLAN  Effective Dates: 2020 TO 3/9/2021 (90 days). Frequency/Duration: 2-3 times a week for 90 Day(s)         Subjective: Patient states arm feels about the same.  Pain: 3 /10    Objective: Therapeutic Exercise: (45min) Done in order to improve strength, ROM and understanding of current condition. Date  21 Date:   Date  21   Activity/Exercise      Education      UBE X 10 x8mimn    Shoulder PROM x6min (on side) x10min (supine) x10min (supine)   Pulleys x10min x8min x8min   Shoulder AAROM      Elbow PROM  x8min ext 5lbs (sitting, supine) x8min ext 5lbs (sitting, supine)   Pendulums      Walking      Elbow Flexion Iso  2x5 2x5   Deltoid Iso      ER/IR Iso      Shoulder W      Bicep Curls  3x15 5lbs 3x15 5lbs   ER/IR 3x10 red TB  3x10 red TB   Rows 3 x 10 red TB  3 x 10 red TB                   Manual Therapy: (min) Done in order to improve joint and soft tissue mobility,reduce muscle guarding, and decrease muscle tone   Date:   Date:   Date  21 Date:     Type       Joint Mobilization  GHJ: A-P grades II-IV GHJ: A-P grades II-IV    Soft Tissue Mobilization Posterior shoulder, subscap, bicep  Posterior shoulder, subscap, bicep Posterior shoulder, subscap, bicep       Modalities: (-) Done in order to reduce swelling and pain    Assessment: Patient continues to make improvements in shoulder PROM and AROM. Patient indicates she has been able to sleep a little better. Plan: continue per POC    Future Appointments   Date Time Provider Alec Lobo   1/12/2021  2:00 PM Krystal Rashid, OT [facility]   1/14/2021 10:00 AM Krystal Rashid, OT [facility]   1/14/2021 11:00 AM Martha Santos, PT [facility]   1/14/2021  1:50 PM [address] [place] [address] INSURANCE [address]   1/14/2021  2:30 PM Yady Lewis MD [facility]   1/19/2021  1:00 PM Martha Santos PT [facility]   1/19/2021  2:00 PM Heather Engle OT [facility]   1/21/2021  1:00 PM Martha Santos PT [facility]   1/21/2021  2:00 PM Heather Engle, OT [facility]       Units: 3 TE / MT [address] [place] [address]

## 2021-01-14 ENCOUNTER — HOSPITAL ENCOUNTER (OUTPATIENT)
Dept: PHYSICAL THERAPY | Age: 61
Discharge: HOME | End: 2021-01-14
Payer: COMMERCIAL

## 2021-01-14 PROCEDURE — 97110 THERAPEUTIC EXERCISES: CPT

## 2021-01-14 PROCEDURE — 97535 SELF CARE MNGMENT TRAINING: CPT

## 2021-01-14 NOTE — PROGRESS NOTES
Garfield Kocher  : 1960  Primary: [employer], In*  Secondary:  [address] at [facility]  [address], [address]  [phone]   [phone]         OUTPATIENT PHYSICAL THERAPY:Daily Note 2021      TREATMENT:   PT Patient Time In/Time Out  Time In: 1100  Time Out: 1200      Total Time: 55min  Visit Count:  11     ICD-10: Treatment Diagnosis: M25.511, M75.0, Z47.89  Medication Last Reviewed: 21      TREATMENT PLAN  Effective Dates: 2020 TO 3/9/2021 (90 days). Frequency/Duration: 2-3 times a week for 90 Day(s)         Subjective: Patient states arm feels a little better not as tight in certain places.  Pain: 3 /10    Objective: Therapeutic Exercise: (55min) Done in order to improve strength, ROM and understanding of current condition. Date:   Date  21 Date  21   Activity/Exercise      Education      UBE x8mimn  X 10 min   Shoulder PROM x10min (supine) x10min (supine) X 10 min   Pulleys x8min x8min X 10 min   Shoulder AAROM      Elbow PROM x8min ext 5lbs (sitting, supine) x8min ext 5lbs (sitting, supine)    Pendulums      Walking      Elbow Flexion Iso 2x5 2x5 3 x 8   Deltoid Iso      ER/IR Iso      Shoulder W      Bicep Curls 3x15 5lbs 3x15 5lbs    ER/IR  3x10 red TB 3 x 10    Rows  3 x 10 red TB 3 x 10                   Manual Therapy: (min) Done in order to improve joint and soft tissue mobility,reduce muscle guarding, and decrease muscle tone   Date:   Date:   Date  21 Date:     Type       Joint Mobilization  GHJ: A-P grades II-IV GHJ: A-P grades II-IV    Soft Tissue Mobilization Posterior shoulder, subscap, bicep  Posterior shoulder, subscap, bicep Posterior shoulder, subscap, bicep       Modalities: (-) Done in order to reduce swelling and pain    Assessment: Patient continues to make improvements in shoulder PROM and AROM.  Patient indicates she has been able to tell a difference since her last visit and getting into the pool.      Plan: continue per POC    Future Appointments   Date Time Provider Alec Lobo   1/19/2021  1:00 PM Sarah Saldivar, PT [facility]   1/19/2021  2:00 PM Rosendo Patterson, OT [facility]   1/21/2021  1:00 PM Sarah Saldivar, PT [facility]   1/21/2021  2:00 PM Rosendo Patterson, OT [facility]   1/26/2021 12:00 PM Dock Mealing, OT [facility]   1/26/2021  1:00 PM [facility]   1/29/2021 10:15 AM Fede Navarro [facility]   1/29/2021 11:00 AM Shaka Jones, OT [facility]   2[facility]20[facility]  [facility]   2/2/2021  2:00 PM Shaka Jones, OT [facility]   2/4/2021  1:[facility]   2/4/2021  2:00 PM Dock Mealing, OT [facility]       Units: [address] PTA

## 2021-01-14 NOTE — PROGRESS NOTES
Elizabeth Pittman  : 1960  Primary: [employer], [employer]*  Secondary:  [address]  at [address]  [address], [facility] [address]  [phone]   [phone]              OUTPATIENT OCCUPATIONAL THERAPY: Daily Note 2021    ICD-10: Treatment Diagnosis: I89.0 lymphedema, not elsewhere specified                                                       R60.0 localized edema  Precautions/Allergies:   Biaxin [clarithromycin] and Cortisone   Fall Risk Score:    Ambulatory/Rehab Services H2 Model Falls Risk Assessment    Risk Factors:       No Risk Factors Identified Ability to Rise from Chair:       (1)  Pushes up, successful in one attempt    Falls Prevention Plan:       No modifications necessary   Total: (5 or greater = High Risk): 1     [facility] of [employer]. All Rights Reserved. [facility] Patent #[id number]. Federal Law prohibits the replication, distribution or use without written permission from [facility] [employer]     MD Orders: eval and treat MEDICAL/REFERRING DIAGNOSIS:   Lymphedema, not elsewhere classified [I89.0]   DATE OF ONSET: 2020   REFERRING PHYSICIAN: Kostas Palomino MD  RETURN PHYSICIAN APPOINTMENT: to be determined      INITIAL ASSESSMENT:  Ms. Albert Romero was referred to occupational therapy for lymphedema treatment of the RUE. Patient sustained a right rotator cuff/biceps tear approximately 2 months ago following walking her dog and the dog jerking the leash. She had a biceps tendon repair on 20. Since then she has had persistent swelling of the RUE that has not resolved on its own. She is in PT for R shoulder rehab and it is yet to be determined if she will be a candidate for rotator cuff repair surgery due to the swelling. Patient presents today with pitting edema in the RUE from the palm to axilla. Her RUE is 45cm larger in size than her LUE.   She will benefit from lymphedema treatment to decrease RUE limb size to aid in RUE rehab and possibly allow for rotator cuff repair if it's deemed appropriate. Once limb size is reduced and stabilized she will be fitted for a compression garment. PLAN OF CARE:   PROBLEM LIST:  1. Decreased Flexibility/Joint Mobility  2. Edema/Girth INTERVENTIONS PLANNED  1. Skin care  2. Compression bandaging  3. Fitting for compression garment(s)  4. Manual therapy/Manual lymph drainage  5. Therapeutic exercise/Therapeutic activities  6. Patient Education  7. Compression pump trial prn  8.  kinesiotaping prn    TREATMENT PLAN:  Effective Dates: 12/15/2020 TO 3/15/2021. Frequency/Duration: 2 times a week for 90 days and upon reassessment will adjust frequency and duration as progress indicates. GOALS: (Goals have been discussed and agreed upon with patient.)  Short-Term Functional Goals: Time Frame: 45 days  1. The patient/caregiver will verbalize understanding of lymphedema precautions. 2. Patient will be independent with skin care regimen to decrease risk of cellulitis. 3. The patient/caregiver will be independent at donning and doffing right upper extremity compression bandages. 4. Patient will be independent in lymphatic exercises. Discharge Goals: Time Frame: 90 days  1. Patient's right upper extremity circumferential measurements will decrease on volumetric graph by 15-20cm to maximize functional use in ADL's.    2. The patient/caregiver will be independent with home management of lymphedema. 3. Patient/caregiver will be independent donning and doffing right upper extremity compression garment. Rehabilitation Potential For Stated Goals: Good  Regarding Adalberto Mcmahan's therapy, I certify that the treatment plan above will be carried out by a therapist or under their direction.   Thank you for this referral,  Umm Orosco OT                 The information in this section was collected on 12/15/2020 (except where otherwise noted). OCCUPATIONAL PROFILE & HISTORY:   History of Present Injury/Illness (Reason for Referral):  Patient was referred to occupational therapy for lymphedema treatment of the RUE. Patient sustained a right rotator cuff/biceps tear approximately 2 months ago following walking her dog and the dog jerking the leash. She had a biceps tendon repair on 11/30/20. Since then she has had persistent swelling of the RUE that has not resolved on its own. She is in PT for R should rehab and it is yet to be determined if she will be a candidate for rotator cuff repair surgery due to the swelling. Past Medical History/Comorbidities:   Ms. Emily Dean  has a past medical history of Anemia (08/2018), Cancer of ascending colon ([facility] [address] (2018), Environmental allergies (9/12/2013), History of colon cancer (2018), History of DVT (deep vein thrombosis) (04/2018), Kidney stone, Pulmonary embolism ([facility] [address] (~2018), and Type 2 diabetes mellitus ([facility] [address] She also has no past medical history of Difficult intubation, Malignant hyperthermia due to anesthesia, Nausea & vomiting, or Pseudocholinesterase deficiency. Ms. Emily Dean  has a past surgical history that includes hx cholecystectomy (1980's); hx lipoma resection (Right, 1980's); pr part removal colon w anastomosis; hx colonoscopy; hx wisdom teeth extraction; and hx other surgical (09/27/2018). Social History/Living Environment:    Patient lives alone  Prior Level of Function/Work/Activity:  Worked in [employer] requiring repetitive motions of BUE's  Dominant Side:         RIGHT  Previous Treatment Approaches: In physical therapy for rehab of rotator cuff/biceps tear  Current Medications:    Current Outpatient Medications:     linaGLIPtin (Tradjenta) 5 mg tablet, Take 5 mg by mouth daily. , Disp: , Rfl:     acetaminophen (TylenoL) 325 mg tablet, Take  by mouth every four (4) hours as needed for Pain., Disp: , Rfl:     rivaroxaban (Xarelto) 20 mg tab tablet, Take 1 Tab by mouth daily (with breakfast). Indications: blood clot in a deep vein of the extremities (Patient taking differently: Take 20 mg by mouth nightly. 11/23/20- pt states has not received instructions from surgeon's office. States she called their office and left message requesting instructions. Office also contacted by nurse. Per patient prescribed by Dr. Carissa Han. Indications: blood clot in a deep vein of the extremities), Disp: 90 Tab, Rfl: 3    SITagliptin-metFORMIN (Janumet)  mg per tablet, Take 1 Tab by mouth two (2) times daily (with meals). , Disp: 180 Tab, Rfl: 3    Insulin Needles, Disposable, (BD JIMBO 2ND GEN PEN NEEDLE) 32 gauge x 5/76\" ndle, 217 Applicators by Does Not Apply route daily. , Disp: 100 Pen Needle, Rfl: 5            Date Last Reviewed:  1/14/2021   Complexity Level : Expanded review of therapy/medical records (1-2):  MODERATE COMPLEXITY   ASSESSMENT OF OCCUPATIONAL PERFORMANCE:   Palpation:          Pitting edema RUE from hand to axilla. RUE is 45cm larger in limb size than LUE  ROM:          Limited RUE secondary to recent injury    Skin Integrity:          intact  Sensation:  intact  Functional Mobility:  independent  Activities of Daily Living : independent with extra time  Edema/Girth:  pitting   PRETREATMENT AFFECTED LIMB(s): right upper extremity      Date:  12/15/202 12/18/20 12/22/20 1/5/21 1/11/21     Right / Left           Axilla   [x]      [] 47cm 46cm 46cm 46cm 43cm      3 inches   [x]      [] 49cm 47cm 47cm 45cm 45cm      Elbow   [x]      [] 35.5cm 35cm 34cm 33cm 28.1cm      6 inches   [x]      [] 32.5cm 31cm 31cm 30cm 29.2cm      3 inches   [x]      [] 25cm 23cm 22.5cm 21cm 21cm      Wrist   [x]      [] 17cm 16.5cm 16.2cm 16.5cm 15.7cm      Palm   [x]      [] 19.8cm 19.5cm 18.8cm 18.3cm 18cm      Total limb size   [x]      [] 225.8cm 218 215.5cm 209.8cm 200cm    Measurements are taken in centimeters:  2.54 cm = 1 inch          Physical Skills Involved:  1. Edema  2.  Skin Integrity Cognitive Skills Affected (resulting in the inability to perform in a timely and safe manner):  1. Psychosocial Skills Affected:  1. Habits/Routines   Number of elements that affect the Plan of Care: 1-3:  LOW COMPLEXITY   CLINICAL DECISION MAKING:   Outcome Measure: Tool Used: Tool Used: Lymphedema Life Impact Scale   Score:  Initial: 42 Most Recent: X (Date: -- )   Interpretation of Score: The Lymphedema Life Impact Scale (LLIS) is a validated instrument that measures the physical, functional, and psychosocial concerns pertinent to patients with extremity lymphedema. The Scale's questionnaire is administered to patients to gauge impairments, activity limitations, and participation restrictions resulting from their lymphedema. Score 0 1-13 14-26 27-40 41-54 55-67 68   Modifier CH CI CJ CK CL CM CN     ? Other PT/OT Primary Functional Limitations:     - CURRENT STATUS: CL - 60%-79% impaired, limited or restricted    - GOAL STATUS: CK - 40%-59% impaired, limited or restricted    - D/C STATUS:  ---------------To be determined---------------     Medical Necessity:   · Skilled intervention continues to be required due to RUE lymphedema onset following R rotator cuff/bicep tear. Reason for Services/Other Comments:  · Patient continues to require skilled intervention due to patient's inability to self manage RUE lymphedema that is impacting ability to recover from recent R rotator cuff/bicep tear.      Use of outcome tool(s) and clinical judgement create a POC that gives a: Clear prediction of patient's progress: LOW COMPLEXITY   TREATMENT:   (In addition to Assessment/Re-Assessment sessions the following treatments were rendered)    Pre-treatment Symptoms/Complaints: Pt here for arm wrap after exercising in pool with physical therapy  Pain: Initial: 1:3  Pain Intensity 1: 4  Post Session:  1:3   Occupational Therapy Treatments:      Therapeutic Exercise ( minutes):     HEP:  As above; handouts given to patient for all exercises. Manual Therapy:( minutes): Patient arrived with bandages removed. She had aquatic therapy with PT before session. She received MLD in conjunction with 20min trial of compression pump to RUE. David [place]ter Compression bandaging applied following tx. Manual Lymph Drainage:          Lymph Nodes:    Cervical Supraclavicular Axillary Abdominal Inguinal Popliteal Antecubital   RIGHT []     []     [x]     []     []     []     []       LEFT []     []     []     []     []     []     []          Anastamoses:   Axillo-axillary Inguino-inguinal Axillo-inguinal Inguino-axillary   ANTERIOR []     []     []     []       POSTERIOR []           []     []       RIGHT []     []     []     []       LEFT []     []     []     []         Limbs:   [x]    RUE     []    LUE     []    RLE    []    LLE   Self Care: (15 minutes): After aquatic exercises, patient received skin care and multi layer bandaging to the RUE from palm to axilla using 3 short stretch bandages. Biaigrip applied over bandages to aid in keeping bandages in place. Patient instructed to perform wrist/elbow flexion /extension and grasp/release of hand with bandages on to aid in promoting lymphatic flow. She is able to adjust bandages as needed at home with assistance from family. Treatment/Session Assessment:    · Response to Treatment:  Patient tolerated treatment without complication. She is responding to MLD and multi layer bandaging as evidenced by 25.8cm in RUE limb size to date. She is pleased with progress and tolerating bandages with good results. · Compliance with Program/Exercises: good to date. · Recommendations/Intent for next treatment session: \"Next visit will focus on lymphedema treatment guidelines to decrease RUE lymphedema and patient education for self management principles. \".   Total Treatment Duration: 15 minutes  OT Patient Time In/Time Out  Time In: 6408  Time Out: [address] [place], [place]

## 2021-01-19 ENCOUNTER — HOSPITAL ENCOUNTER (OUTPATIENT)
Dept: PHYSICAL THERAPY | Age: 61
Discharge: HOME | End: 2021-01-19
Payer: COMMERCIAL

## 2021-01-19 PROCEDURE — 97110 THERAPEUTIC EXERCISES: CPT

## 2021-01-19 PROCEDURE — 97535 SELF CARE MNGMENT TRAINING: CPT

## 2021-01-19 PROCEDURE — 97140 MANUAL THERAPY 1/> REGIONS: CPT

## 2021-01-19 NOTE — PROGRESS NOTES
Douglas Ayde  : 1960  Primary: [employer]*  Secondary:  [address] [place]  at [facility]  [address], [address]  [phone]   [phone]              OUTPATIENT OCCUPATIONAL THERAPY: Daily Note 2021    ICD-10: Treatment Diagnosis: I89.0 lymphedema, not elsewhere specified                                                       R60.0 localized edema  Precautions/Allergies:   Biaxin [clarithromycin] and Cortisone   Fall Risk Score:    Ambulatory/Rehab Services H2 Model Falls Risk Assessment    Risk Factors:       No Risk Factors Identified Ability to Rise from Chair:       (1)  Pushes up, successful in one attempt    Falls Prevention Plan:       No modifications necessary   Total: (5 or greater = High Risk): 1     [facility] of [employer]. All Rights Reserved. [facility] Patent #[id number]. Federal Law prohibits the replication, distribution or use without written permission from [facility] [place]     MD Orders: eval and treat MEDICAL/REFERRING DIAGNOSIS:   Lymphedema, not elsewhere classified [I89.0]   DATE OF ONSET: 2020   REFERRING PHYSICIAN: Vilma Boles MD  RETURN PHYSICIAN APPOINTMENT: to be determined      INITIAL ASSESSMENT:  Ms. Saint Chant was referred to occupational therapy for lymphedema treatment of the RUE. Patient sustained a right rotator cuff/biceps tear approximately 2 months ago following walking her dog and the dog jerking the leash. She had a biceps tendon repair on 20. Since then she has had persistent swelling of the RUE that has not resolved on its own. She is in PT for R shoulder rehab and it is yet to be determined if she will be a candidate for rotator cuff repair surgery due to the swelling. Patient presents today with pitting edema in the RUE from the palm to axilla. Her RUE is 45cm larger in size than her LUE.   She will benefit from lymphedema treatment to decrease RUE limb size to aid in RUE rehab and possibly allow for rotator cuff repair if it's deemed appropriate. Once limb size is reduced and stabilized she will be fitted for a compression garment. PLAN OF CARE:   PROBLEM LIST:  1. Decreased Flexibility/Joint Mobility  2. Edema/Girth INTERVENTIONS PLANNED  1. Skin care  2. Compression bandaging  3. Fitting for compression garment(s)  4. Manual therapy/Manual lymph drainage  5. Therapeutic exercise/Therapeutic activities  6. Patient Education  7. Compression pump trial prn  8.  kinesiotaping prn    TREATMENT PLAN:  Effective Dates: 12/15/2020 TO 3/15/2021. Frequency/Duration: 2 times a week for 90 days and upon reassessment will adjust frequency and duration as progress indicates. GOALS: (Goals have been discussed and agreed upon with patient.)  Short-Term Functional Goals: Time Frame: 45 days  1. The patient/caregiver will verbalize understanding of lymphedema precautions. 2. Patient will be independent with skin care regimen to decrease risk of cellulitis. 3. The patient/caregiver will be independent at donning and doffing right upper extremity compression bandages. 4. Patient will be independent in lymphatic exercises. Discharge Goals: Time Frame: 90 days  1. Patient's right upper extremity circumferential measurements will decrease on volumetric graph by 15-20cm to maximize functional use in ADL's.    2. The patient/caregiver will be independent with home management of lymphedema. 3. Patient/caregiver will be independent donning and doffing right upper extremity compression garment. Rehabilitation Potential For Stated Goals: Good  Regarding Debi Mcmahan's therapy, I certify that the treatment plan above will be carried out by a therapist or under their direction.   Thank you for this referral,  Azeb Martinez OT                 The information in this section was collected on 12/15/2020 (except where otherwise noted). OCCUPATIONAL PROFILE & HISTORY:   History of Present Injury/Illness (Reason for Referral):  Patient was referred to occupational therapy for lymphedema treatment of the RUE. Patient sustained a right rotator cuff/biceps tear approximately 2 months ago following walking her dog and the dog jerking the leash. She had a biceps tendon repair on 11/30/20. Since then she has had persistent swelling of the RUE that has not resolved on its own. She is in PT for R should rehab and it is yet to be determined if she will be a candidate for rotator cuff repair surgery due to the swelling. Past Medical History/Comorbidities:   Ms. Anika Becerra  has a past medical history of Anemia (08/2018), Cancer of ascending colon ([facility] [address] (2018), Environmental allergies (9/12/2013), History of colon cancer (2018), History of DVT (deep vein thrombosis) (04/2018), Kidney stone, Pulmonary embolism ([facility] [address] (~2018), and Type 2 diabetes mellitus ([facility] [address] She also has no past medical history of Difficult intubation, Malignant hyperthermia due to anesthesia, Nausea & vomiting, or Pseudocholinesterase deficiency. Ms. Anika Becerra  has a past surgical history that includes hx cholecystectomy (1980's); hx lipoma resection (Right, 1980's); pr part removal colon w anastomosis; hx colonoscopy; hx wisdom teeth extraction; and hx other surgical (09/27/2018). Social History/Living Environment:    Patient lives alone  Prior Level of Function/Work/Activity:  Worked in [employer] requiring repetitive motions of BUE's  Dominant Side:         RIGHT  Previous Treatment Approaches: In physical therapy for rehab of rotator cuff/biceps tear  Current Medications:    Current Outpatient Medications:     linaGLIPtin (Tradjenta) 5 mg tablet, Take 5 mg by mouth daily. , Disp: , Rfl:     acetaminophen (TylenoL) 325 mg tablet, Take  by mouth every four (4) hours as needed for Pain., Disp: , Rfl:     rivaroxaban (Xarelto) 20 mg tab tablet, Take 1 Tab by mouth daily (with breakfast). Indications: blood clot in a deep vein of the extremities (Patient taking differently: Take 20 mg by mouth nightly. 11/23/20- pt states has not received instructions from surgeon's office. States she called their office and left message requesting instructions. Office also contacted by nurse. Per patient prescribed by Dr. Jose Casey. Indications: blood clot in a deep vein of the extremities), Disp: 90 Tab, Rfl: 3    SITagliptin-metFORMIN (Janumet)  mg per tablet, Take 1 Tab by mouth two (2) times daily (with meals). , Disp: 180 Tab, Rfl: 3    Insulin Needles, Disposable, (BD JIMBO 2ND GEN PEN NEEDLE) 32 gauge x 1/64\" ndle, 710 Applicators by Does Not Apply route daily. , Disp: 100 Pen Needle, Rfl: 5            Date Last Reviewed:  1/19/2021   Complexity Level : Expanded review of therapy/medical records (1-2):  MODERATE COMPLEXITY   ASSESSMENT OF OCCUPATIONAL PERFORMANCE:   Palpation:          Pitting edema RUE from hand to axilla. RUE is 45cm larger in limb size than LUE  ROM:          Limited RUE secondary to recent injury    Skin Integrity:          intact  Sensation:  intact  Functional Mobility:  independent  Activities of Daily Living : independent with extra time  Edema/Girth:  pitting   PRETREATMENT AFFECTED LIMB(s): right upper extremity      Date:  12/15/202 12/18/20 12/22/20 1/5/21 1/11/21     Right / Left           Axilla   [x]      [] 47cm 46cm 46cm 46cm 43cm      3 inches   [x]      [] 49cm 47cm 47cm 45cm 45cm      Elbow   [x]      [] 35.5cm 35cm 34cm 33cm 28.1cm      6 inches   [x]      [] 32.5cm 31cm 31cm 30cm 29.2cm      3 inches   [x]      [] 25cm 23cm 22.5cm 21cm 21cm      Wrist   [x]      [] 17cm 16.5cm 16.2cm 16.5cm 15.7cm      Palm   [x]      [] 19.8cm 19.5cm 18.8cm 18.3cm 18cm      Total limb size   [x]      [] 225.8cm 218 215.5cm 209.8cm 200cm    Measurements are taken in centimeters:  2.54 cm = 1 inch          Physical Skills Involved:  1. Edema  2.  Skin Integrity Cognitive Skills Affected (resulting in the inability to perform in a timely and safe manner):  1. Psychosocial Skills Affected:  1. Habits/Routines   Number of elements that affect the Plan of Care: 1-3:  LOW COMPLEXITY   CLINICAL DECISION MAKING:   Outcome Measure: Tool Used: Tool Used: Lymphedema Life Impact Scale   Score:  Initial: 42 Most Recent: X (Date: -- )   Interpretation of Score: The Lymphedema Life Impact Scale (LLIS) is a validated instrument that measures the physical, functional, and psychosocial concerns pertinent to patients with extremity lymphedema. The Scale's questionnaire is administered to patients to gauge impairments, activity limitations, and participation restrictions resulting from their lymphedema. Score 0 1-13 14-26 27-40 41-54 55-67 68   Modifier CH CI CJ CK CL CM CN     ? Other PT/OT Primary Functional Limitations:     - CURRENT STATUS: CL - 60%-79% impaired, limited or restricted    - GOAL STATUS: CK - 40%-59% impaired, limited or restricted    - D/C STATUS:  ---------------To be determined---------------     Medical Necessity:   · Skilled intervention continues to be required due to RUE lymphedema onset following R rotator cuff/bicep tear. Reason for Services/Other Comments:  · Patient continues to require skilled intervention due to patient's inability to self manage RUE lymphedema that is impacting ability to recover from recent R rotator cuff/bicep tear. Use of outcome tool(s) and clinical judgement create a POC that gives a: Clear prediction of patient's progress: LOW COMPLEXITY   TREATMENT:   (In addition to Assessment/Re-Assessment sessions the following treatments were rendered)    Pre-treatment Symptoms/Complaints: Pt has no new complaints. She remains very compliant with therapy.   Pain: Initial: 1:3  Pain Intensity 1: 4  Post Session:  1:3   Occupational Therapy Treatments:      Therapeutic Exercise ( minutes):     HEP:  As above; handouts given to patient for all exercises. Manual Therapy:(45 minutes): Patient arrived with bandages removed. She received MLD in conjunction with 20min trial of compression pump to RUE. [place] Compression bandaging applied following tx. Manual Lymph Drainage:          Lymph Nodes:    Cervical Supraclavicular Axillary Abdominal Inguinal Popliteal Antecubital   RIGHT []     [x]     [x]     []     [x]     []     []       LEFT []     []     []     []     []     []     []          Anastamoses:   Axillo-axillary Inguino-inguinal Axillo-inguinal Inguino-axillary   ANTERIOR []     []     [x]     []       POSTERIOR []           []     []       RIGHT []     []     [x]     []       LEFT []     []     []     []         Limbs:   [x]    RUE     []    LUE     []    RLE    []    LLE   Self Care: (15 minutes): After aquatic exercises, patient received skin care and multi layer bandaging to the RUE from palm to axilla using 3 short stretch bandages. Biaigrip applied over bandages to aid in keeping bandages in place. Patient instructed to perform wrist/elbow flexion /extension and grasp/release of hand with bandages on to aid in promoting lymphatic flow. She is able to adjust bandages as needed at home with assistance from family. Treatment/Session Assessment:    · Response to Treatment:  Patient tolerated treatment without complication. She is responding to MLD and multi layer bandaging as evidenced by 25.8cm in RUE limb size to date. She is pleased with progress and tolerating bandages with good results. · Compliance with Program/Exercises: good to date. · Recommendations/Intent for next treatment session: \"Next visit will focus on lymphedema treatment guidelines to decrease RUE lymphedema and patient education for self management principles. \".   Total Treatment Duration: 60 minutes  OT Patient Time In/Time Out  Time In: 0200  Time Out: [address]

## 2021-01-19 NOTE — PROGRESS NOTES
Krys Strong  : 1960  Primary: [employer]*  Secondary:  [address] at [facility]  [address], [address]  [phone]   [phone]         OUTPATIENT PHYSICAL THERAPY:Daily Note 2021      TREATMENT:   PT Patient Time In/Time Out  Time In: 1250      Total Time: 55min  Visit Count:  12     ICD-10: Treatment Diagnosis: M25.511, M75.0, Z47.89  Medication Last Reviewed: 21      TREATMENT PLAN  Effective Dates: 2020 TO 3/9/2021 (90 days). Frequency/Duration: 2-3 times a week for 90 Day(s)         Subjective: Patient states she is feeling better, feels like the pool has been helping    Pain: 3 /10    Objective: Therapeutic Exercise: (38min) Done in order to improve strength, ROM and understanding of current condition.     Date:   Date  21 Date  21 Date:     Activity/Exercise       Education       UBE x8mimn  X 10 min x10min   Shoulder PROM x10min (supine) x10min (supine) X 10 min x6min   Pulleys x8min x8min X 10 min x10min   Shoulder AAROM       Elbow PROM x8min ext 5lbs (sitting, supine) x8min ext 5lbs (sitting, supine)     Punches    3x fatigue 2lbs   Walking       Elbow Flexion Iso 2x5 2x5 3 x 8    S/L ER    4x fatigue   ER/IR Iso       Shoulder W       Bicep Curls 3x15 5lbs 3x15 5lbs  3x15 4lbs   ER/IR  3x10 red TB 3 x 10     Rows  3 x 10 red TB 3 x 10 3x15 30lbs                     Manual Therapy: (17min) Done in order to improve joint and soft tissue mobility,reduce muscle guarding, and decrease muscle tone   Date:   Date  21 Date:   Date:     Type       Joint Mobilization GHJ: A-P grades II-IV GHJ: A-P grades II-IV  GHJ: A-P grades II-IV; distraction grades III-IV   Soft Tissue Mobilization  Posterior shoulder, subscap, bicep Posterior shoulder, subscap, bicep        Modalities: (-) Done in order to reduce swelling and pain    Assessment: Patient able to tolerate more pressure with manual therapy without any reports of pain or tenderness in the anterior shoulder    Plan: continue per POC    Future Appointments   Date Time Provider Alec Lashell   1/19/2021  2:00 PM Woody Trent OT [facility]   1/21/2021  1:00 PM Danay Silver PT [facility]   1/21/2021  2:00 PM Woody Trent OT [facility]   1/26/2021 12:00 PM Clark Mar OT [facility]   1/26/2021  1:00 PM [place] Dry [facility]   1/29/2021 10:15 AM [place] Dry [facility]   1/29/2021 11:00 AM Casandra Antonio OT [facility]   2/2/2021  1:00 PM Saurav Dry [facility]   2/2/2021  2:00 PM Casandra Antonio OT [facility]   2/4/2021  1:00 PM Saurav Dry [facility]   2/4/2021  2:00 PM Clark Mar OT [facility]       Units: 3 TE/ 1MT       Omar Petersen PT,

## 2021-01-21 ENCOUNTER — HOSPITAL ENCOUNTER (OUTPATIENT)
Dept: PHYSICAL THERAPY | Age: 61
Discharge: HOME | End: 2021-01-21
Payer: COMMERCIAL

## 2021-01-21 PROCEDURE — 97110 THERAPEUTIC EXERCISES: CPT

## 2021-01-21 PROCEDURE — 97140 MANUAL THERAPY 1/> REGIONS: CPT

## 2021-01-21 PROCEDURE — 97535 SELF CARE MNGMENT TRAINING: CPT

## 2021-01-21 NOTE — PROGRESS NOTES
Emiliano Keith  : 1960  Primary: [employer], [employer]*  Secondary:  [address] at [facility] Therapy  [address], [address], [address]  [phone]   [phone]              OUTPATIENT OCCUPATIONAL THERAPY:Progress and Daily Note 2021    ICD-10: Treatment Diagnosis: I89.0 lymphedema, not elsewhere specified                                                       R60.0 localized edema  Precautions/Allergies:   Biaxin [clarithromycin] and Cortisone   Fall Risk Score:    Ambulatory/Rehab Services H2 Model Falls Risk Assessment    Risk Factors:       No Risk Factors Identified Ability to Rise from Chair:       (1)  Pushes up, successful in one attempt    Falls Prevention Plan:       No modifications necessary   Total: (5 or greater = High Risk): 1     [facility] [employer]. All Rights Reserved. [facility] [employer] Patent #[id number]. Federal Law prohibits the replication, distribution or use without written permission from [facility] [employer]     MD Orders: eval and treat MEDICAL/REFERRING DIAGNOSIS:   Lymphedema, not elsewhere classified [I89.0]   DATE OF ONSET: 2020   REFERRING PHYSICIAN: Chun Ford MD  RETURN PHYSICIAN APPOINTMENT: to be determined      INITIAL ASSESSMENT:  Ms. Nilsa Petty was referred to occupational therapy for lymphedema treatment of the RUE. Patient sustained a right rotator cuff/biceps tear approximately 2 months ago following walking her dog and the dog jerking the leash. She had a biceps tendon repair on 20. Since then she has had persistent swelling of the RUE that has not resolved on its own. She is in PT for R shoulder rehab and it is yet to be determined if she will be a candidate for rotator cuff repair surgery due to the swelling. Patient presents today with pitting edema in the RUE from the palm to axilla. Her RUE is 45cm larger in size than her LUE.   She will benefit from lymphedema treatment to decrease RUE limb size to aid in RUE rehab and possibly allow for rotator cuff repair if it's deemed appropriate. Once limb size is reduced and stabilized she will be fitted for a compression garment. PLAN OF CARE:   PROBLEM LIST:  1. Decreased Flexibility/Joint Mobility  2. Edema/Girth INTERVENTIONS PLANNED  1. Skin care  2. Compression bandaging  3. Fitting for compression garment(s)  4. Manual therapy/Manual lymph drainage  5. Therapeutic exercise/Therapeutic activities  6. Patient Education  7. Compression pump trial prn  8.  kinesiotaping prn    TREATMENT PLAN:  Effective Dates: 12/15/2020 TO 3/15/2021. Frequency/Duration: 2 times a week for 90 days and upon reassessment will adjust frequency and duration as progress indicates. GOALS: (Goals have been discussed and agreed upon with patient.)  Short-Term Functional Goals: Time Frame: 45 days  1. The patient/caregiver will verbalize understanding of lymphedema precautions. 2. Patient will be independent with skin care regimen to decrease risk of cellulitis. 3. The patient/caregiver will be independent at donning and doffing right upper extremity compression bandages. 4. Patient will be independent in lymphatic exercises. Discharge Goals: Time Frame: 90 days  1. Patient's right upper extremity circumferential measurements will decrease on volumetric graph by 15-20cm to maximize functional use in ADL's.    2. The patient/caregiver will be independent with home management of lymphedema. 3. Patient/caregiver will be independent donning and doffing right upper extremity compression garment. Rehabilitation Potential For Stated Goals: Good  Regarding Gwen Mcmahan's therapy, I certify that the treatment plan above will be carried out by a therapist or under their direction.   Thank you for this referral,  Vladimir Figueredo OT                 The information in this section was collected on 12/15/2020 (except where otherwise noted). OCCUPATIONAL PROFILE & HISTORY:   History of Present Injury/Illness (Reason for Referral):  Patient was referred to occupational therapy for lymphedema treatment of the RUE. Patient sustained a right rotator cuff/biceps tear approximately 2 months ago following walking her dog and the dog jerking the leash. She had a biceps tendon repair on 11/30/20. Since then she has had persistent swelling of the RUE that has not resolved on its own. She is in PT for R should rehab and it is yet to be determined if she will be a candidate for rotator cuff repair surgery due to the swelling. Past Medical History/Comorbidities:   Ms. Everett Dang  has a past medical history of Anemia (08/2018), Cancer of ascending colon ([facility] (2018), Environmental allergies (9/12/2013), History of colon cancer (2018), History of DVT (deep vein thrombosis) (04/2018), Kidney stone, Pulmonary embolism ([facility] (~2018), and Type 2 diabetes mellitus ([facility] She also has no past medical history of Difficult intubation, Malignant hyperthermia due to anesthesia, Nausea & vomiting, or Pseudocholinesterase deficiency. Ms. Everett Dang  has a past surgical history that includes hx cholecystectomy (1980's); hx lipoma resection (Right, 1980's); pr part removal colon w anastomosis; hx colonoscopy; hx wisdom teeth extraction; and hx other surgical (09/27/2018). Social History/Living Environment:    Patient lives alone  Prior Level of Function/Work/Activity:  Worked in [employer] requiring repetitive motions of BUE's  Dominant Side:         RIGHT  Previous Treatment Approaches: In physical therapy for rehab of rotator cuff/biceps tear  Current Medications:    Current Outpatient Medications:     linaGLIPtin (Tradjenta) 5 mg tablet, Take 5 mg by mouth daily. , Disp: , Rfl:     acetaminophen (TylenoL) 325 mg tablet, Take  by mouth every four (4) hours as needed for Pain., Disp: , Rfl:     rivaroxaban (Xarelto) 20 mg tab tablet, Take 1 Tab by mouth daily (with breakfast). Indications: blood clot in a deep vein of the extremities (Patient taking differently: Take 20 mg by mouth nightly. 11/23/20- pt states has not received instructions from surgeon's office. States she called their office and left message requesting instructions. Office also contacted by nurse. Per patient prescribed by Dr. Carissa Han. Indications: blood clot in a deep vein of the extremities), Disp: 90 Tab, Rfl: 3    SITagliptin-metFORMIN (Janumet)  mg per tablet, Take 1 Tab by mouth two (2) times daily (with meals). , Disp: 180 Tab, Rfl: 3    Insulin Needles, Disposable, (BD JIMBO 2ND GEN PEN NEEDLE) 32 gauge x 4/62\" ndle, 237 Applicators by Does Not Apply route daily. , Disp: 100 Pen Needle, Rfl: 5            Date Last Reviewed:  1/21/2021   Complexity Level : Expanded review of therapy/medical records (1-2):  MODERATE COMPLEXITY   ASSESSMENT OF OCCUPATIONAL PERFORMANCE:   Palpation:          Pitting edema RUE from hand to axilla. RUE is 45cm larger in limb size than LUE  ROM:          Limited RUE secondary to recent injury    Skin Integrity:          intact  Sensation:  intact  Functional Mobility:  independent  Activities of Daily Living : independent with extra time  Edema/Girth:  pitting   PRETREATMENT AFFECTED LIMB(s): right upper extremity      Date:  12/15/202 12/18/20 12/22/20 1/5/21 1/11/21     Right / Left           Axilla   [x]      [] 47cm 46cm 46cm 46cm 43cm      3 inches   [x]      [] 49cm 47cm 47cm 45cm 45cm      Elbow   [x]      [] 35.5cm 35cm 34cm 33cm 28.1cm      6 inches   [x]      [] 32.5cm 31cm 31cm 30cm 29.2cm      3 inches   [x]      [] 25cm 23cm 22.5cm 21cm 21cm      Wrist   [x]      [] 17cm 16.5cm 16.2cm 16.5cm 15.7cm      Palm   [x]      [] 19.8cm 19.5cm 18.8cm 18.3cm 18cm      Total limb size   [x]      [] 225.8cm 218 215.5cm 209.8cm 200cm    Measurements are taken in centimeters:  2.54 cm = 1 inch          Physical Skills Involved:  1. Edema  2.  Skin Integrity Cognitive Skills Affected (resulting in the inability to perform in a timely and safe manner):  1. Psychosocial Skills Affected:  1. Habits/Routines   Number of elements that affect the Plan of Care: 1-3:  LOW COMPLEXITY   CLINICAL DECISION MAKING:   Outcome Measure: Tool Used: Tool Used: Lymphedema Life Impact Scale   Score:  Initial: 42 Most Recent: X (Date: -- )   Interpretation of Score: The Lymphedema Life Impact Scale (LLIS) is a validated instrument that measures the physical, functional, and psychosocial concerns pertinent to patients with extremity lymphedema. The Scale's questionnaire is administered to patients to gauge impairments, activity limitations, and participation restrictions resulting from their lymphedema. Score 0 1-13 14-26 27-40 41-54 55-67 68   Modifier CH CI CJ CK CL CM CN     ? Other PT/OT Primary Functional Limitations:     - CURRENT STATUS: CL - 60%-79% impaired, limited or restricted    - GOAL STATUS: CK - 40%-59% impaired, limited or restricted    - D/C STATUS:  ---------------To be determined---------------     Medical Necessity:   · Skilled intervention continues to be required due to RUE lymphedema onset following R rotator cuff/bicep tear. Reason for Services/Other Comments:  · Patient continues to require skilled intervention due to patient's inability to self manage RUE lymphedema that is impacting ability to recover from recent R rotator cuff/bicep tear. Use of outcome tool(s) and clinical judgement create a POC that gives a: Clear prediction of patient's progress: LOW COMPLEXITY   TREATMENT:   (In addition to Assessment/Re-Assessment sessions the following treatments were rendered)    Pre-treatment Symptoms/Complaints: Pt is frustrated with fibrotic tissue on lateral trunk - \"I feel like it limits my arm mov't. \"  She remains very compliant with therapy.   Pain: Initial: 1:3  Pain Intensity 1: 4  Post Session:  1:3   Occupational Therapy Treatments:      Therapeutic Exercise ( minutes):     HEP:  As above; handouts given to patient for all exercises. Manual Therapy:(45 minutes): Patient arrived with bandages removed. She received MLD with emphasis n breaking up fibrotic tissue at lateral R trunk/axilla in conjunction with 20min trial of compression pump to RUE. Diptia Ou Compression bandaging applied following tx. Manual Lymph Drainage:          Lymph Nodes:    Cervical Supraclavicular Axillary Abdominal Inguinal Popliteal Antecubital   RIGHT []     [x]     [x]     []     [x]     []     []       LEFT []     []     []     []     []     []     []          Anastamoses:   Axillo-axillary Inguino-inguinal Axillo-inguinal Inguino-axillary   ANTERIOR []     []     [x]     []       POSTERIOR []           []     []       RIGHT []     []     [x]     []       LEFT []     []     []     []         Limbs:   [x]    RUE     []    LUE     []    RLE    []    LLE   Self Care: (15 minutes): After aquatic exercises, patient received skin care and multi layer bandaging to the RUE from palm to axilla using 3 short stretch bandages. Biaigrip applied over bandages to aid in keeping bandages in place. Patient instructed to perform wrist/elbow flexion /extension and grasp/release of hand with bandages on to aid in promoting lymphatic flow. She is able to adjust bandages as needed at home with assistance from family. Treatment/Session Assessment:    · Response to Treatment:  Patient tolerated treatment without complication. She is responding to MLD and multi layer bandaging as evidenced by 25.8cm in RUE limb size to date. She is pleased with progress although is frustrated with fibrotic tissue at lateral trunk/axilla. · Compliance with Program/Exercises: good to date. · Recommendations/Intent for next treatment session: \"Next visit will focus on lymphedema treatment guidelines to decrease RUE lymphedema and patient education for self management principles. \".   Total Treatment Duration: 60 minutes  OT Patient Time In/Time Out  Time In: 0200  Time Out: [address], OT

## 2021-01-21 NOTE — PROGRESS NOTES
Emiliano Keith  : 1960  Primary: [employer]  Secondary:  [address]  at [address]  [address], [address]  [phone]   [phone]         OUTPATIENT PHYSICAL THERAPY:Daily Note 2021      TREATMENT:   PT Patient Time In/Time Out  Time In: 1300  Time Out: 1400      Total Time: 60min  Visit Count:  13     ICD-10: Treatment Diagnosis: M25.511, M75.0, Z47.89  Medication Last Reviewed: 21      TREATMENT PLAN  Effective Dates: 2020 TO 3/9/2021 (90 days). Frequency/Duration: 2-3 times a week for 90 Day(s)         Subjective: Patient states she is noticing continued improvements   Pain: 3 /10    Objective: Therapeutic Exercise: (40min) Done in order to improve strength, ROM and understanding of current condition.     Date  21 Date  21 Date:   Date:     Activity/Exercise       Education       UBE  X 10 min x10min x10min   Shoulder PROM x10min (supine) X 10 min x6min    Pulleys x8min X 10 min x10min x10min   Shoulder AAROM    x6min (slider into flexion)   Elbow PROM x8min ext 5lbs (sitting, supine)      Punches   3x fatigue 2lbs 3x20 2lbs   Walking       Elbow Flexion Iso 2x5 3 x 8     S/L ER   4x fatigue 3x20   ER/IR Iso       Shoulder W       Bicep Curls 3x15 5lbs  3x15 4lbs    ER/IR 3x10 red TB 3 x 10      Rows 3 x 10 red TB 3 x 10 3x15 30lbs 3x10 blue TB                     Manual Therapy: 207min) Done in order to improve joint and soft tissue mobility,reduce muscle guarding, and decrease muscle tone   Date  21 Date:   Date:   Date:     Type       Joint Mobilization GHJ: A-P grades II-IV  GHJ: A-P grades II-IV; distraction grades III-IV GHJ: A-P grades II-IV; distraction grades III-IV   Soft Tissue Mobilization Posterior shoulder, subscap, bicep Posterior shoulder, subscap, bicep         Modalities: (-) Done in order to reduce swelling and pain    Assessment: Patient continues to make small gains in ROM    Plan: continue per POC    Future Appointments   Date Time Provider Alec Lashell   1/26/2021 12:00 PM Jes Quesada [facility]   1/26/2021  1:00 PM Nick [facility]hu [facility]   1/29/2021 10:15 AM Nick CORREA [facility]   1/29/2021 11:00 AM GABRIELLA MooreOST [facility]   2/2/2021  1:00 PM Nick PONCEOST [facility]   2/2/2021  2:00 PM GABRIELLA [facility]OST [facility]   2/4/2021  1:00 PM Nick CORREA [facility]   2/4/2021  2:00 PM Carl Nava OT [facility]       Units: 3 TE/ 1MT       Celestino Witt PT,

## 2021-01-26 ENCOUNTER — HOSPITAL ENCOUNTER (OUTPATIENT)
Dept: PHYSICAL THERAPY | Age: 61
Discharge: HOME | End: 2021-01-26
Payer: COMMERCIAL

## 2021-01-26 PROCEDURE — 97535 SELF CARE MNGMENT TRAINING: CPT

## 2021-01-26 PROCEDURE — 97140 MANUAL THERAPY 1/> REGIONS: CPT

## 2021-01-26 PROCEDURE — 97110 THERAPEUTIC EXERCISES: CPT

## 2021-01-26 NOTE — PROGRESS NOTES
Caitlin Duran  : 1960  Primary: [employer]  Secondary:  [address]  at [address]  [address], [address]  [phone]   [phone]         OUTPATIENT PHYSICAL THERAPY:Daily Note 2021      TREATMENT:   PT Patient Time In/Time Out  Time In: 0100  Time Out: 0200      Total Time: 60min  Visit Count:  14     ICD-10: Treatment Diagnosis: M25.511, M75.0, Z47.89  Medication Last Reviewed: 21      TREATMENT PLAN  Effective Dates: 2020 TO 3/9/2021 (90 days). Frequency/Duration: 2-3 times a week for 90 Day(s)         Subjective: Patient reports she has noticed some improvement and less discomfort.  Pain: 3 /10    Objective: Therapeutic Exercise: (40min) Done in order to improve strength, ROM and understanding of current condition.     Date  21 Date:   Date:      Activity/Exercise       Education       UBE X 10 min x10min x10min X 10 min   Shoulder PROM X 10 min x6min     Pulleys X 10 min x10min x10min X 10 min   Shoulder AAROM   x6min (slider into flexion) x6min (slider into flexion)   Elbow PROM       Punches  3x fatigue 2lbs 3x20 2lbs 3 x 20 2 lbs   Walking       Elbow Flexion Iso 3 x 8      S/L ER  4x fatigue 3x20 3 x 20   ER/IR Iso       Shoulder W       Bicep Curls  3x15 4lbs  3x15 4lbs   ER/IR 3 x 10       Rows 3 x 10 3x15 30lbs 3x10 blue TB 3x10 blue TB                     Manual Therapy:  20min) Done in order to improve joint and soft tissue mobility,reduce muscle guarding, and decrease muscle tone   Date:   Date:   Date:   Date  21   Type       Joint Mobilization  GHJ: A-P grades II-IV; distraction grades III-IV GHJ: A-P grades II-IV; distraction grades III-IV GHJ: A-P grades II-IV; distraction grades III-IV   Soft Tissue Mobilization Posterior shoulder, subscap, bicep   Posterior shoulder, subscap, bicep       Modalities: (-) Done in order to reduce swelling and pain    Assessment: Patient tolerated treatment with mild discomfort. Patient continues to be pleased with progress and hopes to continue therapy once she sees the doctor in a couple of weeks.     Plan: continue per POC    Future Appointments   Date Time Provider Alec Lobo   1/29/2021 10:15 AM [facility]   1/29/2021 11:00 [facility]   2[facility]2021  1[facility]00 [facility]   2/2/2021  2:00 [facility]   [facility]/4[facility]2021  [facility]   2/4/2021  2:00 PM [facility]       Units: 3 TE/ 1MT       Chucky Miller, PTA

## 2021-01-26 NOTE — PROGRESS NOTES
Emiliano Keith  : 1960  Primary: [place] [employer], In*  Secondary:  [address] at [facility] Therapy  [address], [address]  [phone]   [phone]              OUTPATIENT OCCUPATIONAL THERAPY: Daily Note 2021    ICD-10: Treatment Diagnosis: I89.0 lymphedema, not elsewhere specified                                                       R60.0 localized edema  Precautions/Allergies:   Biaxin [clarithromycin] and Cortisone   Fall Risk Score:    Ambulatory/Rehab Services H2 Model Falls Risk Assessment    Risk Factors:       No Risk Factors Identified Ability to Rise from Chair:       (1)  Pushes up, successful in one attempt    Falls Prevention Plan:       No modifications necessary   Total: (5 or greater = High Risk): 1     [facility] [employer]. All Rights Reserved. [facility] [employer] Patent #[id number]. Federal Law prohibits the replication, distribution or use without written permission from [facility] [email or web address]     MD Orders: eval and treat MEDICAL/REFERRING DIAGNOSIS:   Lymphedema, not elsewhere classified [I89.0]   DATE OF ONSET: 2020   REFERRING PHYSICIAN: Chun Ford MD  RETURN PHYSICIAN APPOINTMENT: to be determined      INITIAL ASSESSMENT:  Ms. Nilsa Petty was referred to occupational therapy for lymphedema treatment of the RUE. Patient sustained a right rotator cuff/biceps tear approximately 2 months ago following walking her dog and the dog jerking the leash. She had a biceps tendon repair on 20. Since then she has had persistent swelling of the RUE that has not resolved on its own. She is in PT for R shoulder rehab and it is yet to be determined if she will be a candidate for rotator cuff repair surgery due to the swelling. Patient presents today with pitting edema in the RUE from the palm to axilla. Her RUE is 45cm larger in size than her LUE.   She will benefit from lymphedema treatment to decrease RUE limb size to aid in RUE rehab and possibly allow for rotator cuff repair if it's deemed appropriate. Once limb size is reduced and stabilized she will be fitted for a compression garment. PLAN OF CARE:   PROBLEM LIST:  1. Decreased Flexibility/Joint Mobility  2. Edema/Girth INTERVENTIONS PLANNED  1. Skin care  2. Compression bandaging  3. Fitting for compression garment(s)  4. Manual therapy/Manual lymph drainage  5. Therapeutic exercise/Therapeutic activities  6. Patient Education  7. Compression pump trial prn  8.  kinesiotaping prn    TREATMENT PLAN:  Effective Dates: 12/15/2020 TO 3/15/2021. Frequency/Duration: 2 times a week for 90 days and upon reassessment will adjust frequency and duration as progress indicates. GOALS: (Goals have been discussed and agreed upon with patient.)  Short-Term Functional Goals: Time Frame: 45 days  1. The patient/caregiver will verbalize understanding of lymphedema precautions. 2. Patient will be independent with skin care regimen to decrease risk of cellulitis. 3. The patient/caregiver will be independent at donning and doffing right upper extremity compression bandages. 4. Patient will be independent in lymphatic exercises. Discharge Goals: Time Frame: 90 days  1. Patient's right upper extremity circumferential measurements will decrease on volumetric graph by 15-20cm to maximize functional use in ADL's.    2. The patient/caregiver will be independent with home management of lymphedema. 3. Patient/caregiver will be independent donning and doffing right upper extremity compression garment. Rehabilitation Potential For Stated Goals: Good  Regarding Francisco Javier Mcmahan's therapy, I certify that the treatment plan above will be carried out by a therapist or under their direction.   Thank you for this referral,  Mary Garcia OT                 The information in this section was collected on 12/15/2020 (except where otherwise noted). OCCUPATIONAL PROFILE & HISTORY:   History of Present Injury/Illness (Reason for Referral):  Patient was referred to occupational therapy for lymphedema treatment of the RUE. Patient sustained a right rotator cuff/biceps tear approximately 2 months ago following walking her dog and the dog jerking the leash. She had a biceps tendon repair on 11/30/20. Since then she has had persistent swelling of the RUE that has not resolved on its own. She is in PT for R should rehab and it is yet to be determined if she will be a candidate for rotator cuff repair surgery due to the swelling. Past Medical History/Comorbidities:   Ms. Bridger Villafuerte  has a past medical history of Anemia (08/2018), Cancer of ascending colon ([address] (2018), Environmental allergies (9/12/2013), History of colon cancer (2018), History of DVT (deep vein thrombosis) (04/2018), Kidney stone, Pulmonary embolism ([address] (~2018), and Type 2 diabetes mellitus ([facility] [address] She also has no past medical history of Difficult intubation, Malignant hyperthermia due to anesthesia, Nausea & vomiting, or Pseudocholinesterase deficiency. Ms. Bridger Villafuerte  has a past surgical history that includes hx cholecystectomy (1980's); hx lipoma resection (Right, 1980's); pr part removal colon w anastomosis; hx colonoscopy; hx wisdom teeth extraction; and hx other surgical (09/27/2018). Social History/Living Environment:    Patient lives alone  Prior Level of Function/Work/Activity:  Worked in manufacturing requiring repetitive motions of BUE's  Dominant Side:         RIGHT  Previous Treatment Approaches: In physical therapy for rehab of rotator cuff/biceps tear  Current Medications:    Current Outpatient Medications:     linaGLIPtin (Tradjenta) 5 mg tablet, Take 5 mg by mouth daily. , Disp: , Rfl:     acetaminophen (TylenoL) 325 mg tablet, Take  by mouth every four (4) hours as needed for Pain., Disp: , Rfl:     rivaroxaban (Xarelto) 20 mg tab tablet, Take 1 Tab by mouth daily (with breakfast). Indications: blood clot in a deep vein of the extremities (Patient taking differently: Take 20 mg by mouth nightly. 11/23/20- pt states has not received instructions from surgeon's office. States she called their office and left message requesting instructions. Office also contacted by nurse. Per patient prescribed by Dr. Dom Cody. Indications: blood clot in a deep vein of the extremities), Disp: 90 Tab, Rfl: 3    SITagliptin-metFORMIN (Janumet)  mg per tablet, Take 1 Tab by mouth two (2) times daily (with meals). , Disp: 180 Tab, Rfl: 3    Insulin Needles, Disposable, (BD JIMBO 2ND GEN PEN NEEDLE) 32 gauge x 2/39\" ndle, 884 Applicators by Does Not Apply route daily. , Disp: 100 Pen Needle, Rfl: 5            Date Last Reviewed:  1/26/2021   Complexity Level : Expanded review of therapy/medical records (1-2):  MODERATE COMPLEXITY   ASSESSMENT OF OCCUPATIONAL PERFORMANCE:   Palpation:          Pitting edema RUE from hand to axilla. RUE is 45cm larger in limb size than LUE  ROM:          Limited RUE secondary to recent injury    Skin Integrity:          intact  Sensation:  intact  Functional Mobility:  independent  Activities of Daily Living : independent with extra time  Edema/Girth:  pitting   PRETREATMENT AFFECTED LIMB(s): right upper extremity      Date:  12/15/202 12/18/20 12/22/20 1/5/21 1/11/21     Right / Left           Axilla   [x]      [] 47cm 46cm 46cm 46cm 43cm      3 inches   [x]      [] 49cm 47cm 47cm 45cm 45cm      Elbow   [x]      [] 35.5cm 35cm 34cm 33cm 28.1cm      6 inches   [x]      [] 32.5cm 31cm 31cm 30cm 29.2cm      3 inches   [x]      [] 25cm 23cm 22.5cm 21cm 21cm      Wrist   [x]      [] 17cm 16.5cm 16.2cm 16.5cm 15.7cm      Palm   [x]      [] 19.8cm 19.5cm 18.8cm 18.3cm 18cm      Total limb size   [x]      [] 225.8cm 218 215.5cm 209.8cm 200cm    Measurements are taken in centimeters:  2.54 cm = 1 inch          Physical Skills Involved:  1. Edema  2.  Skin Integrity Cognitive Skills Affected (resulting in the inability to perform in a timely and safe manner):  1.  Psychosocial Skills Affected:  1. Habits/Routines   Number of elements that affect the Plan of Care: 1-3:  LOW COMPLEXITY   CLINICAL DECISION MAKING:   Outcome Measure:   Tool Used: Tool Used: Lymphedema Life Impact Scale   Score:  Initial: 42 Most Recent: X (Date: -- )   Interpretation of Score: The Lymphedema Life Impact Scale (LLIS) is a validated instrument that measures the physical, functional, and psychosocial concerns pertinent to patients with extremity lymphedema.  The Scale's questionnaire is administered to patients to gauge impairments, activity limitations, and participation restrictions resulting from their lymphedema.  Score 0 1-13 14-26 27-40 41-54 55-67 68   Modifier CH CI CJ CK CL CM CN     ? Other PT/OT Primary Functional Limitations:     - CURRENT STATUS: CL - 60%-79% impaired, limited or restricted    - GOAL STATUS: CK - 40%-59% impaired, limited or restricted    - D/C STATUS:  ---------------To be determined---------------     Medical Necessity:   · Skilled intervention continues to be required due to RUE lymphedema onset following R rotator cuff/bicep tear.  Reason for Services/Other Comments:  · Patient continues to require skilled intervention due to patient's inability to self manage RUE lymphedema that is impacting ability to recover from recent R rotator cuff/bicep tear.     Use of outcome tool(s) and clinical judgement create a POC that gives a: Clear prediction of patient's progress: LOW COMPLEXITY   TREATMENT:   (In addition to Assessment/Re-Assessment sessions the following treatments were rendered)    Pre-treatment Symptoms/Complaints: \"My arm feels smaller today\"  Pain: Initial: 1:3  Pain Intensity 1: 4  Post Session:  1:3   Occupational Therapy Treatments:      Therapeutic Exercise ( minutes):     HEP:  As above; handouts given to patient for all exercises.    Manual  Therapy:(45 minutes): Patient arrived with bandages removed. She received MLD with emphasis n breaking up fibrotic tissue at lateral R trunk/axilla in conjunction with 20min trial of compression pump to RUE. Olimpo Glow Compression bandaging applied following tx. Manual Lymph Drainage:          Lymph Nodes:    Cervical Supraclavicular Axillary Abdominal Inguinal Popliteal Antecubital   RIGHT []     [x]     [x]     []     [x]     []     []       LEFT []     []     []     []     []     []     []          Anastamoses:   Axillo-axillary Inguino-inguinal Axillo-inguinal Inguino-axillary   ANTERIOR []     []     [x]     []       POSTERIOR []           []     []       RIGHT []     []     [x]     []       LEFT []     []     []     []         Limbs:   [x]    RUE     []    LUE     []    RLE    []    LLE   Self Care: (15 minutes): After aquatic exercises, patient received skin care and multi layer bandaging to the RUE from palm to axilla using 3 short stretch bandages. Biaigrip applied over bandages to aid in keeping bandages in place. Patient instructed to perform wrist/elbow flexion /extension and grasp/release of hand with bandages on to aid in promoting lymphatic flow. She is able to adjust bandages as needed at home with assistance from family. Treatment/Session Assessment:    · Response to Treatment:  Patient tolerated treatment without complication. She is responding to MLD and multi layer bandaging as evidenced by 25.8cm in RUE limb size to date. She is highly motivated and compliant with home progra. · Compliance with Program/Exercises: good to date. · Recommendations/Intent for next treatment session: \"Next visit will focus on lymphedema treatment guidelines to decrease RUE lymphedema and patient education for self management principles. \".   Total Treatment Duration: 60 minutes  OT Patient Time In/Time Out  Time In: 1200  Time Out: [address], OT

## 2021-01-29 ENCOUNTER — HOSPITAL ENCOUNTER (OUTPATIENT)
Dept: PHYSICAL THERAPY | Age: 61
Discharge: HOME | End: 2021-01-29
Payer: COMMERCIAL

## 2021-01-29 PROCEDURE — 97140 MANUAL THERAPY 1/> REGIONS: CPT

## 2021-01-29 PROCEDURE — 97535 SELF CARE MNGMENT TRAINING: CPT

## 2021-01-29 PROCEDURE — 97110 THERAPEUTIC EXERCISES: CPT

## 2021-01-29 NOTE — PROGRESS NOTES
Fernando Crouch  : 1960  Primary: [place] [employer], In*  Secondary:  [address] at [facility] Therapy  [address], [address]  [phone]   [phone]              OUTPATIENT OCCUPATIONAL THERAPY: Daily Note 2021    ICD-10: Treatment Diagnosis: I89.0 lymphedema, not elsewhere specified                                                       R60.0 localized edema  Precautions/Allergies:   Biaxin [clarithromycin] and Cortisone   Fall Risk Score:    Ambulatory/Rehab Services H2 Model Falls Risk Assessment    Risk Factors:       No Risk Factors Identified Ability to Rise from Chair:       (1)  Pushes up, successful in one attempt    Falls Prevention Plan:       No modifications necessary   Total: (5 or greater = High Risk): 1     [facility] [employer]. All Rights Reserved. [facility] [employer] Patent #[id number]. Federal Law prohibits the replication, distribution or use without written permission from [facility]     MD Orders: eval and treat MEDICAL/REFERRING DIAGNOSIS:   Lymphedema, not elsewhere classified [I89.0]   DATE OF ONSET: 2020   REFERRING PHYSICIAN: Efrain Shah MD  RETURN PHYSICIAN APPOINTMENT: to be determined      INITIAL ASSESSMENT:  Ms. Hetal Jane was referred to occupational therapy for lymphedema treatment of the RUE. Patient sustained a right rotator cuff/biceps tear approximately 2 months ago following walking her dog and the dog jerking the leash. She had a biceps tendon repair on 20. Since then she has had persistent swelling of the RUE that has not resolved on its own. She is in PT for R shoulder rehab and it is yet to be determined if she will be a candidate for rotator cuff repair surgery due to the swelling. Patient presents today with pitting edema in the RUE from the palm to axilla. Her RUE is 45cm larger in size than her LUE.   She will benefit from lymphedema treatment to decrease RUE limb size to aid in RUE rehab and possibly allow for rotator cuff repair if it's deemed appropriate. Once limb size is reduced and stabilized she will be fitted for a compression garment. PLAN OF CARE:   PROBLEM LIST:  1. Decreased Flexibility/Joint Mobility  2. Edema/Girth INTERVENTIONS PLANNED  1. Skin care  2. Compression bandaging  3. Fitting for compression garment(s)  4. Manual therapy/Manual lymph drainage  5. Therapeutic exercise/Therapeutic activities  6. Patient Education  7. Compression pump trial prn  8.  kinesiotaping prn    TREATMENT PLAN:  Effective Dates: 12/15/2020 TO 3/15/2021. Frequency/Duration: 2 times a week for 90 days and upon reassessment will adjust frequency and duration as progress indicates. GOALS: (Goals have been discussed and agreed upon with patient.)  Short-Term Functional Goals: Time Frame: 45 days  1. The patient/caregiver will verbalize understanding of lymphedema precautions. 2. Patient will be independent with skin care regimen to decrease risk of cellulitis. 3. The patient/caregiver will be independent at donning and doffing right upper extremity compression bandages. 4. Patient will be independent in lymphatic exercises. Discharge Goals: Time Frame: 90 days  1. Patient's right upper extremity circumferential measurements will decrease on volumetric graph by 15-20cm to maximize functional use in ADL's.    2. The patient/caregiver will be independent with home management of lymphedema. 3. Patient/caregiver will be independent donning and doffing right upper extremity compression garment. Rehabilitation Potential For Stated Goals: Good  Regarding Oz Mcmahan's therapy, I certify that the treatment plan above will be carried out by a therapist or under their direction.   Thank you for this referral,  Georgette Bruno OT                 The information in this section was collected on 12/15/2020 (except where otherwise noted). OCCUPATIONAL PROFILE & HISTORY:   History of Present Injury/Illness (Reason for Referral):  Patient was referred to occupational therapy for lymphedema treatment of the RUE. Patient sustained a right rotator cuff/biceps tear approximately 2 months ago following walking her dog and the dog jerking the leash. She had a biceps tendon repair on 11/30/20. Since then she has had persistent swelling of the RUE that has not resolved on its own. She is in PT for R should rehab and it is yet to be determined if she will be a candidate for rotator cuff repair surgery due to the swelling. Past Medical History/Comorbidities:   Ms. Trey Scott  has a past medical history of Anemia (08/2018), Cancer of ascending colon ([facility] [address] (2018), Environmental allergies (9/12/2013), History of colon cancer (2018), History of DVT (deep vein thrombosis) (04/2018), Kidney stone, Pulmonary embolism ([facility] [address] (~2018), and Type 2 diabetes mellitus ([facility] [address] She also has no past medical history of Difficult intubation, Malignant hyperthermia due to anesthesia, Nausea & vomiting, or Pseudocholinesterase deficiency. Ms. Trey Scott  has a past surgical history that includes hx cholecystectomy (1980's); hx lipoma resection (Right, 1980's); pr part removal colon w anastomosis; hx colonoscopy; hx wisdom teeth extraction; and hx other surgical (09/27/2018). Social History/Living Environment:    Patient lives alone  Prior Level of Function/Work/Activity:  Worked in manufacturing requiring repetitive motions of BUE's  Dominant Side:         RIGHT  Previous Treatment Approaches: In physical therapy for rehab of rotator cuff/biceps tear  Current Medications:    Current Outpatient Medications:     linaGLIPtin (Tradjenta) 5 mg tablet, Take 5 mg by mouth daily. , Disp: , Rfl:     acetaminophen (TylenoL) 325 mg tablet, Take  by mouth every four (4) hours as needed for Pain., Disp: , Rfl:     rivaroxaban (Xarelto) 20 mg tab tablet, Take 1 Tab by mouth daily (with breakfast). Indications: blood clot in a deep vein of the extremities (Patient taking differently: Take 20 mg by mouth nightly. 11/23/20- pt states has not received instructions from surgeon's office. States she called their office and left message requesting instructions. Office also contacted by nurse. Per patient prescribed by Dr. Penelope Lema. Indications: blood clot in a deep vein of the extremities), Disp: 90 Tab, Rfl: 3    SITagliptin-metFORMIN (Janumet)  mg per tablet, Take 1 Tab by mouth two (2) times daily (with meals). , Disp: 180 Tab, Rfl: 3    Insulin Needles, Disposable, (BD JIMBO 2ND GEN PEN NEEDLE) 32 gauge x 2/73\" ndle, 184 Applicators by Does Not Apply route daily. , Disp: 100 Pen Needle, Rfl: 5            Date Last Reviewed:  1/29/2021   Complexity Level : Expanded review of therapy/medical records (1-2):  MODERATE COMPLEXITY   ASSESSMENT OF OCCUPATIONAL PERFORMANCE:   Palpation:          Pitting edema RUE from hand to axilla. RUE is 45cm larger in limb size than LUE  ROM:          Limited RUE secondary to recent injury    Skin Integrity:          intact  Sensation:  intact  Functional Mobility:  independent  Activities of Daily Living : independent with extra time  Edema/Girth:  pitting   PRETREATMENT AFFECTED LIMB(s): right upper extremity      Date:  12/15/202 12/18/20 12/22/20 1/5/21 1/11/21     Right / Left           Axilla   [x]      [] 47cm 46cm 46cm 46cm 43cm      3 inches   [x]      [] 49cm 47cm 47cm 45cm 45cm      Elbow   [x]      [] 35.5cm 35cm 34cm 33cm 28.1cm      6 inches   [x]      [] 32.5cm 31cm 31cm 30cm 29.2cm      3 inches   [x]      [] 25cm 23cm 22.5cm 21cm 21cm      Wrist   [x]      [] 17cm 16.5cm 16.2cm 16.5cm 15.7cm      Palm   [x]      [] 19.8cm 19.5cm 18.8cm 18.3cm 18cm      Total limb size   [x]      [] 225.8cm 218 215.5cm 209.8cm 200cm    Measurements are taken in centimeters:  2.54 cm = 1 inch          Physical Skills Involved:  1. Edema  2.  Skin Integrity Cognitive Skills Affected (resulting in the inability to perform in a timely and safe manner):  1. Psychosocial Skills Affected:  1. Habits/Routines   Number of elements that affect the Plan of Care: 1-3:  LOW COMPLEXITY   CLINICAL DECISION MAKING:   Outcome Measure: Tool Used: Tool Used: Lymphedema Life Impact Scale   Score:  Initial: 42 Most Recent: X (Date: -- )   Interpretation of Score: The Lymphedema Life Impact Scale (LLIS) is a validated instrument that measures the physical, functional, and psychosocial concerns pertinent to patients with extremity lymphedema. The Scale's questionnaire is administered to patients to gauge impairments, activity limitations, and participation restrictions resulting from their lymphedema. Score 0 1-13 14-26 27-40 41-54 55-67 68   Modifier CH CI CJ CK CL CM CN     ? Other PT/OT Primary Functional Limitations:     - CURRENT STATUS: CL - 60%-79% impaired, limited or restricted    - GOAL STATUS: CK - 40%-59% impaired, limited or restricted    - D/C STATUS:  ---------------To be determined---------------     Medical Necessity:   · Skilled intervention continues to be required due to RUE lymphedema onset following R rotator cuff/bicep tear. Reason for Services/Other Comments:  · Patient continues to require skilled intervention due to patient's inability to self manage RUE lymphedema that is impacting ability to recover from recent R rotator cuff/bicep tear.      Use of outcome tool(s) and clinical judgement create a POC that gives a: Clear prediction of patient's progress: LOW COMPLEXITY   TREATMENT:   (In addition to Assessment/Re-Assessment sessions the following treatments were rendered)    Pre-treatment Symptoms/Complaints: Prescription received for [place] wrap; submitted to Hany Perdomo at [facility]  Pain: Initial: 1:3  Pain Intensity 1: (P) 4  Post Session:  1:3   Occupational Therapy Treatments:      Therapeutic Exercise ( minutes):     HEP:  As above; handouts given to patient for all exercises. Manual Therapy:(30 minutes): Patient arrived after aquatic exercises. She received MLD with emphasis n breaking up fibrotic tissue at lateral R trunk/axilla in conjunction with 20 min trial of compression pump to RUE. Kamron Luther Compression bandaging applied following tx. Manual Lymph Drainage:          Lymph Nodes:    Cervical Supraclavicular Axillary Abdominal Inguinal Popliteal Antecubital   RIGHT []     [x]     [x]     []     [x]     []     []       LEFT []     []     []     []     []     []     []          Anastamoses:   Axillo-axillary Inguino-inguinal Axillo-inguinal Inguino-axillary   ANTERIOR []     []     [x]     []       POSTERIOR []           []     []       RIGHT []     []     [x]     []       LEFT []     []     []     []         Limbs:   [x]    RUE     []    LUE     []    RLE    []    LLE   Self Care: (15 minutes):  Patient received skin care and multi layer bandaging to the RUE from palm to axilla using 3 short stretch bandages. Biaigrip applied over bandages to aid in keeping bandages in place. Patient instructed to perform wrist/elbow flexion /extension and grasp/release of hand with bandages on to aid in promoting lymphatic flow. She is able to adjust bandages as needed at home with assistance from family. [facility] arm wrap request submitted. Treatment/Session Assessment:    · Response to Treatment:  Patient tolerated treatment without complication. She is responding to MLD and multi layer bandaging as evidenced by 25.8cm in RUE limb size to date. She is highly motivated and compliant with home program.   · Compliance with Program/Exercises: good to date. · Recommendations/Intent for next treatment session: \"Next visit will focus on lymphedema treatment guidelines to decrease RUE lymphedema and patient education for self management principles. \".   Total Treatment Duration: 45 minutes  OT Patient Time In/Time Out  Time In: (P) 6205  Time Out: (P) Mesfin Leija

## 2021-01-29 NOTE — PROGRESS NOTES
Sybil Lieberman  : 1960  Primary: [employer] [place]*  Secondary:  [address] at [facility] Therapy  [address], [address]  [phone]   [phone]         OUTPATIENT PHYSICAL THERAPY:Daily Note 2021      TREATMENT:   PT Patient Time In/Time Out  Time In: 1015  Time Out: 1100      Total Time: 45min  Visit Count:  15     ICD-10: Treatment Diagnosis: M25.511, M75.0, Z47.89  Medication Last Reviewed: 21      TREATMENT PLAN  Effective Dates: 2020 TO 3/9/2021 (90 days). Frequency/Duration: 2-3 times a week for 90 Day(s)         Subjective: Patient reports shoulder a lot more sore today form exercises last couple of days.  Pain: 3 /10    Objective: Therapeutic Exercise: (30min) Done in order to improve strength, ROM and understanding of current condition.     Date:   Date  21 Date  21   Activity/Exercise      Education      UBE x10min X 10 min X 10 min   Shoulder PROM      Pulleys x10min X 10 min X 10 min   Shoulder AAROM x6min (slider into flexion) x6min (slider into flexion) x6min (slider into flexion)   Elbow PROM      Punches 3x20 2lbs 3 x 20 2 lbs    Walking      Elbow Flexion Iso      S/L ER 3x20 3 x 20    ER/IR Iso      Shoulder W      Bicep Curls  3x15 4lbs 3x15 4lbs   ER/IR      Rows 3x10 blue TB 3x10 blue TB 3x10 blue TB                   Manual Therapy:  15min) Done in order to improve joint and soft tissue mobility,reduce muscle guarding, and decrease muscle tone   Date:   Date  21 Date  21   Type      Joint Mobilization GHJ: A-P grades II-IV; distraction grades III-IV GHJ: A-P grades II-IV; distraction grades III-IV GHJ: A-P grades II-IV; distraction grades III-IV   Soft Tissue Mobilization  Posterior shoulder, subscap, bicep Posterior shoulder, subscap, bicep       Modalities: (-) Done in order to reduce swelling and pain    Assessment: Patient tolerated treatment with mild discomfort. Patient seemed to have less discomfort following treatment. Good compliance with home exercises.     Plan: continue per POC    Future Appointments   Date Time Provider Alec Lashell   1/29/2021 11:00 AM GABRIELLA [facility]   2/2/2021  1:00 PM Oly Reyes [facility]   2/2/2021  2:00 PM GABRIELLA [facility]   2/4/2021  1:00 PM Oly Reyes [facility]   2/4/2021  2:00 PM Erica Mcclendon OT [facility]       Units: 2 TE/ 1MT       Kelly Yao, PTA

## 2021-02-02 ENCOUNTER — HOSPITAL ENCOUNTER (OUTPATIENT)
Dept: PHYSICAL THERAPY | Age: 61
Discharge: HOME | End: 2021-02-02
Payer: COMMERCIAL

## 2021-02-02 PROCEDURE — 97140 MANUAL THERAPY 1/> REGIONS: CPT

## 2021-02-02 PROCEDURE — 97535 SELF CARE MNGMENT TRAINING: CPT

## 2021-02-02 PROCEDURE — 97110 THERAPEUTIC EXERCISES: CPT

## 2021-02-02 NOTE — PROGRESS NOTES
Katerina Oneill  : 1960  Primary: [employer] [place]*  Secondary:  [address] at [facility] Therapy  [address], [address]  [phone]   [phone]         OUTPATIENT PHYSICAL THERAPY:Daily Note 2021      TREATMENT:   PT Patient Time In/Time Out  Time In: 0100  Time Out: 0155      Total Time: 55min  Visit Count:  16     ICD-10: Treatment Diagnosis: M25.511, M75.0, Z47.89  Medication Last Reviewed: 21      TREATMENT PLAN  Effective Dates: 2020 TO 3/9/2021 (90 days). Frequency/Duration: 2-3 times a week for 90 Day(s)         Subjective: Patient reports shoulder is feeling a little better.  Pain: 3 /10    Objective: Therapeutic Exercise: (30min) Done in order to improve strength, ROM and understanding of current condition.     Date  21 Date  21 Date  21   Activity/Exercise      Education      UBE X 10 min X 10 min X 10 min   Shoulder PROM      Pulleys X 10 min X 10 min X 10   Shoulder AAROM x6min (slider into flexion) x6min (slider into flexion)    Elbow PROM   X 10   Punches 3 x 20 2 lbs     Walking      Elbow Flexion Iso      S/L ER 3 x 20     ER/IR Iso      Shoulder W      Bicep Curls 3x15 4lbs 3x15 4lbs 3x10 blue TB   ER/IR      Rows 3x10 blue TB 3x10 blue TB 3x10 blue TB   Pool exercises                Manual Therapy:  25min) Done in order to improve joint and soft tissue mobility,reduce muscle guarding, and decrease muscle tone   Date  21 Date  21 Date  21   Type      Joint Mobilization GHJ: A-P grades II-IV; distraction grades III-IV GHJ: A-P grades II-IV; distraction grades III-IV GHJ: A-P grades II-IV; distraction grades III-IV   Soft Tissue Mobilization Posterior shoulder, subscap, bicep Posterior shoulder, subscap, bicep Posterior shoulder, subscap, bicep       Modalities: (-) Done in order to reduce swelling and pain    Assessment: Patient tolerated treatment with mild discomfort. Less swelling and better motion today. Good compliance with home exercises.     Plan: continue per POC    Future Appointments   Date Time Provider Alec Lobo   2/2/2021  2:00 PM Jes Morel [facility]   2/4/2021  1:00 PM Norah mony [facility]   2/4/2021  2:00 PM Adonis Cunningham OT [facility]       Units: 2 TE/ [address], PTA

## 2021-02-02 NOTE — PROGRESS NOTES
Patsy Waller  : 1960  Primary: [employer] [place]*  Secondary:  [address] at [facility] Therapy  [address], [address]  [phone]   [phone]              OUTPATIENT OCCUPATIONAL THERAPY: Daily Note 2021    ICD-10: Treatment Diagnosis: I89.0 lymphedema, not elsewhere specified                                                       R60.0 localized edema  Precautions/Allergies:   Biaxin [clarithromycin] and Cortisone   Fall Risk Score:    Ambulatory/Rehab Services H2 Model Falls Risk Assessment    Risk Factors:       No Risk Factors Identified Ability to Rise from Chair:       (1)  Pushes up, successful in one attempt    Falls Prevention Plan:       No modifications necessary   Total: (5 or greater = High Risk): 1     [facility] [employer]. All Rights Reserved. [facility] [employer] Patent #[id number]. Federal Law prohibits the replication, distribution or use without written permission from [employer]     MD Orders: eval and treat MEDICAL/REFERRING DIAGNOSIS:   Lymphedema, not elsewhere classified [I89.0]   DATE OF ONSET: 2020   REFERRING PHYSICIAN: Samia Ness MD  RETURN PHYSICIAN APPOINTMENT: to be determined      INITIAL ASSESSMENT:  Ms. Stephanie Jones was referred to occupational therapy for lymphedema treatment of the RUE. Patient sustained a right rotator cuff/biceps tear approximately 2 months ago following walking her dog and the dog jerking the leash. She had a biceps tendon repair on 20. Since then she has had persistent swelling of the RUE that has not resolved on its own. She is in PT for R shoulder rehab and it is yet to be determined if she will be a candidate for rotator cuff repair surgery due to the swelling. Patient presents today with pitting edema in the RUE from the palm to axilla. Her RUE is 45cm larger in size than her LUE.   She will benefit from lymphedema treatment to decrease RUE limb size to aid in RUE rehab and possibly allow for rotator cuff repair if it's deemed appropriate. Once limb size is reduced and stabilized she will be fitted for a compression garment. PLAN OF CARE:   PROBLEM LIST:  1. Decreased Flexibility/Joint Mobility  2. Edema/Girth INTERVENTIONS PLANNED  1. Skin care  2. Compression bandaging  3. Fitting for compression garment(s)  4. Manual therapy/Manual lymph drainage  5. Therapeutic exercise/Therapeutic activities  6. Patient Education  7. Compression pump trial prn  8.  kinesiotaping prn    TREATMENT PLAN:  Effective Dates: 12/15/2020 TO 3/15/2021. Frequency/Duration: 2 times a week for 90 days and upon reassessment will adjust frequency and duration as progress indicates. GOALS: (Goals have been discussed and agreed upon with patient.)  Short-Term Functional Goals: Time Frame: 45 days  1. The patient/caregiver will verbalize understanding of lymphedema precautions. 2. Patient will be independent with skin care regimen to decrease risk of cellulitis. 3. The patient/caregiver will be independent at donning and doffing right upper extremity compression bandages. 4. Patient will be independent in lymphatic exercises. Discharge Goals: Time Frame: 90 days  1. Patient's right upper extremity circumferential measurements will decrease on volumetric graph by 15-20cm to maximize functional use in ADL's.    2. The patient/caregiver will be independent with home management of lymphedema. 3. Patient/caregiver will be independent donning and doffing right upper extremity compression garment. Rehabilitation Potential For Stated Goals: Good  Regarding Pietro Mcmahan's therapy, I certify that the treatment plan above will be carried out by a therapist or under their direction.   Thank you for this referral,  Rosemarie Lopez OT                 The information in this section was collected on 12/15/2020 (except where otherwise noted). OCCUPATIONAL PROFILE & HISTORY:   History of Present Injury/Illness (Reason for Referral):  Patient was referred to occupational therapy for lymphedema treatment of the RUE. Patient sustained a right rotator cuff/biceps tear approximately 2 months ago following walking her dog and the dog jerking the leash. She had a biceps tendon repair on 11/30/20. Since then she has had persistent swelling of the RUE that has not resolved on its own. She is in PT for R should rehab and it is yet to be determined if she will be a candidate for rotator cuff repair surgery due to the swelling. Past Medical History/Comorbidities:   Ms. Aidee Gurrola  has a past medical history of Anemia (08/2018), Cancer of ascending colon ([address] (2018), Environmental allergies (9/12/2013), History of colon cancer (2018), History of DVT (deep vein thrombosis) (04/2018), Kidney stone, Pulmonary embolism ([address] (~2018), and Type 2 diabetes mellitus ([facility] She also has no past medical history of Difficult intubation, Malignant hyperthermia due to anesthesia, Nausea & vomiting, or Pseudocholinesterase deficiency. Ms. Aidee Gurrola  has a past surgical history that includes hx cholecystectomy (1980's); hx lipoma resection (Right, 1980's); pr part removal colon w anastomosis; hx colonoscopy; hx wisdom teeth extraction; and hx other surgical (09/27/2018). Social History/Living Environment:    Patient lives alone  Prior Level of Function/Work/Activity:  Worked in [email or web address] requiring repetitive motions of BUE's  Dominant Side:         RIGHT  Previous Treatment Approaches: In physical therapy for rehab of rotator cuff/biceps tear  Current Medications:    Current Outpatient Medications:     linaGLIPtin (Tradjenta) 5 mg tablet, Take 5 mg by mouth daily. , Disp: , Rfl:     acetaminophen (TylenoL) 325 mg tablet, Take  by mouth every four (4) hours as needed for Pain., Disp: , Rfl:     rivaroxaban (Xarelto) 20 mg tab tablet, Take 1 Tab by mouth daily (with breakfast). Indications: blood clot in a deep vein of the extremities (Patient taking differently: Take 20 mg by mouth nightly. 11/23/20- pt states has not received instructions from surgeon's office. States she called their office and left message requesting instructions. Office also contacted by nurse. Per patient prescribed by Dr. Leslie Sheikh. Indications: blood clot in a deep vein of the extremities), Disp: 90 Tab, Rfl: 3    SITagliptin-metFORMIN (Janumet)  mg per tablet, Take 1 Tab by mouth two (2) times daily (with meals). , Disp: 180 Tab, Rfl: 3    Insulin Needles, Disposable, (BD JIMBO 2ND GEN PEN NEEDLE) 32 gauge x 7/44\" ndle, 828 Applicators by Does Not Apply route daily. , Disp: 100 Pen Needle, Rfl: 5            Date Last Reviewed:  2/2/2021   Complexity Level : Expanded review of therapy/medical records (1-2):  MODERATE COMPLEXITY   ASSESSMENT OF OCCUPATIONAL PERFORMANCE:   Palpation:          Pitting edema RUE from hand to axilla. RUE is 45cm larger in limb size than LUE  ROM:          Limited RUE secondary to recent injury    Skin Integrity:          intact  Sensation:  intact  Functional Mobility:  independent  Activities of Daily Living : independent with extra time  Edema/Girth:  pitting   PRETREATMENT AFFECTED LIMB(s): right upper extremity      Date:  12/15/202 12/18/20 12/22/20 1/5/21 1/11/21     Right / Left           Axilla   [x]      [] 47cm 46cm 46cm 46cm 43cm      3 inches   [x]      [] 49cm 47cm 47cm 45cm 45cm      Elbow   [x]      [] 35.5cm 35cm 34cm 33cm 28.1cm      6 inches   [x]      [] 32.5cm 31cm 31cm 30cm 29.2cm      3 inches   [x]      [] 25cm 23cm 22.5cm 21cm 21cm      Wrist   [x]      [] 17cm 16.5cm 16.2cm 16.5cm 15.7cm      Palm   [x]      [] 19.8cm 19.5cm 18.8cm 18.3cm 18cm      Total limb size   [x]      [] 225.8cm 218 215.5cm 209.8cm 200cm    Measurements are taken in centimeters:  2.54 cm = 1 inch          Physical Skills Involved:  1. Edema  2.  Skin Integrity Cognitive Skills Affected (resulting in the inability to perform in a timely and safe manner):  1. Psychosocial Skills Affected:  1. Habits/Routines   Number of elements that affect the Plan of Care: 1-3:  LOW COMPLEXITY   CLINICAL DECISION MAKING:   Outcome Measure: Tool Used: Tool Used: Lymphedema Life Impact Scale   Score:  Initial: 42 Most Recent: X (Date: -- )   Interpretation of Score: The Lymphedema Life Impact Scale (LLIS) is a validated instrument that measures the physical, functional, and psychosocial concerns pertinent to patients with extremity lymphedema. The Scale's questionnaire is administered to patients to gauge impairments, activity limitations, and participation restrictions resulting from their lymphedema. Score 0 1-13 14-26 27-40 41-54 55-67 68   Modifier CH CI CJ CK CL CM CN     ? Other PT/OT Primary Functional Limitations:     - CURRENT STATUS: CL - 60%-79% impaired, limited or restricted    - GOAL STATUS: CK - 40%-59% impaired, limited or restricted    - D/C STATUS:  ---------------To be determined---------------     Medical Necessity:   · Skilled intervention continues to be required due to RUE lymphedema onset following R rotator cuff/bicep tear. Reason for Services/Other Comments:  · Patient continues to require skilled intervention due to patient's inability to self manage RUE lymphedema that is impacting ability to recover from recent R rotator cuff/bicep tear.      Use of outcome tool(s) and clinical judgement create a POC that gives a: Clear prediction of patient's progress: LOW COMPLEXITY   TREATMENT:   (In addition to Assessment/Re-Assessment sessions the following treatments were rendered)    Pre-treatment Symptoms/Complaints: Ordered Farrow arm wrap, size large short, from [employer] today  Pain: Initial: 1:3  Pain Intensity 1: 4  Post Session:  1:3   Occupational Therapy Treatments:      Therapeutic Exercise ( minutes):     HEP:  As above; handouts given to patient for all exercises. Manual Therapy:(15 minutes): Patient arrived after aquatic exercises. She received MLD with emphasis n breaking up fibrotic tissue at lateral R trunk/axilla. Compression bandaging applied following tx. Manual Lymph Drainage:          Lymph Nodes:    Cervical Supraclavicular Axillary Abdominal Inguinal Popliteal Antecubital   RIGHT []     [x]     [x]     []     [x]     []     []       LEFT []     []     []     []     []     []     []          Anastamoses:   Axillo-axillary Inguino-inguinal Axillo-inguinal Inguino-axillary   ANTERIOR []     []     [x]     []       POSTERIOR []           []     []       RIGHT []     []     [x]     []       LEFT []     []     []     []         Limbs:   [x]    RUE     []    LUE     []    RLE    []    LLE   Self Care: (30 minutes):  Patient received skin care and multi layer bandaging to the RUE from palm to axilla using 3 short stretch bandages. Added Artiflex padding to elbow and upper arm for comfort. Biaigrip applied over bandages to aid in keeping bandages in place. Patient instructed to perform wrist/elbow flexion /extension and grasp/release of hand with bandages on to aid in promoting lymphatic flow. She is able to adjust bandages as needed at home with assistance from family. Farrow arm wrap ordered today. Treatment/Session Assessment:    · Response to Treatment:  Patient tolerated treatment without complication. She is responding to MLD and multi layer bandaging as evidenced by 25.8cm in RUE limb size to date. She is highly motivated and compliant with home program.   · Compliance with Program/Exercises: good to date. · Recommendations/Intent for next treatment session: \"Next visit will focus on lymphedema treatment guidelines to decrease RUE lymphedema and patient education for self management principles. \".   Total Treatment Duration: 45 minutes  OT Patient Time In/Time Out  Time In: 0215  Time Out: 119 [facility], OTR/L, CLT

## 2021-02-04 ENCOUNTER — HOSPITAL ENCOUNTER (OUTPATIENT)
Dept: PHYSICAL THERAPY | Age: 61
Discharge: HOME | End: 2021-02-04
Payer: COMMERCIAL

## 2021-02-04 PROCEDURE — 97140 MANUAL THERAPY 1/> REGIONS: CPT

## 2021-02-04 PROCEDURE — 97110 THERAPEUTIC EXERCISES: CPT

## 2021-02-04 PROCEDURE — 97535 SELF CARE MNGMENT TRAINING: CPT

## 2021-02-04 NOTE — PROGRESS NOTES
Krys Strong  : 1960  Primary: [employer]  Secondary:  [address] at [facility] Therapy  [address], [address]  [phone]   [phone]         OUTPATIENT PHYSICAL THERAPY:Daily Note 2021      TREATMENT:   PT Patient Time In/Time Out  Time In: 0100  Time Out: 0145      Total Time: 45min  Visit Count:  17     ICD-10: Treatment Diagnosis: M25.511, M75.0, Z47.89  Medication Last Reviewed: 21      TREATMENT PLAN  Effective Dates: 2020 TO 3/9/2021 (90 days). Frequency/Duration: 2-3 times a week for 90 Day(s)         Subjective: Patient reports shoulder seems to be moving a little better.  Pain: 3 /10    Objective: Therapeutic Exercise: (30min) Done in order to improve strength, ROM and understanding of current condition.     Date  21 Date  21 Date  21   Activity/Exercise      Education      UBE X 10 min X 10 min X 10 min   Shoulder PROM      Pulleys X 10 min X 10 X 10   Shoulder AAROM x6min (slider into flexion)  X 6 min   Elbow PROM  X 10 X 10   Punches      Walking      Elbow Flexion Iso      S/L ER      ER/IR Iso      Shoulder W      Bicep Curls 3x15 4lbs 3x10 blue TB 3 x 10   ER/IR      Rows 3x10 blue TB 3x10 blue TB 3 x 10    Pool exercises      Wall walk outs    X 30   Lap pull downs    X 30       Manual Therapy:  15min) Done in order to improve joint and soft tissue mobility,reduce muscle guarding, and decrease muscle tone   Date  21 Date  21 Date  21   Type      Joint Mobilization GHJ: A-P grades II-IV; distraction grades III-IV GHJ: A-P grades II-IV; distraction grades III-IV GHJ: A-P grades II-IV; distraction grades III-IV   Soft Tissue Mobilization Posterior shoulder, subscap, bicep Posterior shoulder, subscap, bicep Posterior shoulder, subscap, bicep       Modalities: (-) Done in order to reduce swelling and pain    Assessment: Patient tolerated treatment with mild discomfort. Patient indicates she has been able to use her arm a little more at home. Good compliance with home exercises.     Plan: continue per POC    Future Appointments   Date Time Provider Alec Lashell   2/4/2021  2:00 PM Jes Ernandez [facility]   2/10/2021  1:00 PM Viola Garces [facility]   2/10/2021  2:00 PM Malathi Sierra [facility]   2/12/2021  8:45 AM Viola Garces [facility]   2/12/2021 10:00 AM Chad Gongora OT [facility]   2/16/2021  1:00 PM Viola Garces [facility]   2/16/2021  2:00 PM Chad Gongora [facility]   2/18/2021  1:45 PM Viola Garces [facility]   2/18/2021  3:00 PM Panchito Conti OT [facility]   2/23/2021 11:00 AM Viola Garces [facility]   2/23/2021 12:00 PM Chad Gongora [facility]   2/25/2021 10:15 AM Viola Garces [facility]   2/25/2021 12:00 PM Panchito Conti OT [facility]       Units: 2 TE/ 1MT       Ismael Wilkerson PTA

## 2021-02-04 NOTE — PROGRESS NOTES
Reedsera Cee  : 1960  Primary: [place] [employer], In*  Secondary:  [address] at [facility]  [address], [address]  [phone]   [phone]              OUTPATIENT OCCUPATIONAL THERAPY: Daily Note 2021    ICD-10: Treatment Diagnosis: I89.0 lymphedema, not elsewhere specified                                                       R60.0 localized edema  Precautions/Allergies:   Biaxin [clarithromycin] and Cortisone   Fall Risk Score:    Ambulatory/Rehab Services H2 Model Falls Risk Assessment    Risk Factors:       No Risk Factors Identified Ability to Rise from Chair:       (1)  Pushes up, successful in one attempt    Falls Prevention Plan:       No modifications necessary   Total: (5 or greater = High Risk): 1     [facility] of [employer]. All Rights Reserved. [facility] [employer] Patent #[id number]. Federal Law prohibits the replication, distribution or use without written permission from [facility] of [address] [place] [address]     MD Orders: eval and treat MEDICAL/REFERRING DIAGNOSIS:   Lymphedema, not elsewhere classified [I89.0]   DATE OF ONSET: 2020   REFERRING PHYSICIAN: Charisse rFancisco MD  RETURN PHYSICIAN APPOINTMENT: to be determined      INITIAL ASSESSMENT:  Ms. Merline Gold was referred to occupational therapy for lymphedema treatment of the RUE. Patient sustained a right rotator cuff/biceps tear approximately 2 months ago following walking her dog and the dog jerking the leash. She had a biceps tendon repair on 20. Since then she has had persistent swelling of the RUE that has not resolved on its own. She is in PT for R shoulder rehab and it is yet to be determined if she will be a candidate for rotator cuff repair surgery due to the swelling. Patient presents today with pitting edema in the RUE from the palm to axilla. Her RUE is 45cm larger in size than her LUE.   She will benefit from lymphedema treatment to decrease RUE limb size to aid in RUE rehab and possibly allow for rotator cuff repair if it's deemed appropriate. Once limb size is reduced and stabilized she will be fitted for a compression garment. PLAN OF CARE:   PROBLEM LIST:  1. Decreased Flexibility/Joint Mobility  2. Edema/Girth INTERVENTIONS PLANNED  1. Skin care  2. Compression bandaging  3. Fitting for compression garment(s)  4. Manual therapy/Manual lymph drainage  5. Therapeutic exercise/Therapeutic activities  6. Patient Education  7. Compression pump trial prn  8.  kinesiotaping prn    TREATMENT PLAN:  Effective Dates: 12/15/2020 TO 3/15/2021. Frequency/Duration: 2 times a week for 90 days and upon reassessment will adjust frequency and duration as progress indicates. GOALS: (Goals have been discussed and agreed upon with patient.)  Short-Term Functional Goals: Time Frame: 45 days  1. The patient/caregiver will verbalize understanding of lymphedema precautions. 2. Patient will be independent with skin care regimen to decrease risk of cellulitis. 3. The patient/caregiver will be independent at donning and doffing right upper extremity compression bandages. 4. Patient will be independent in lymphatic exercises. Discharge Goals: Time Frame: 90 days  1. Patient's right upper extremity circumferential measurements will decrease on volumetric graph by 15-20cm to maximize functional use in ADL's.    2. The patient/caregiver will be independent with home management of lymphedema. 3. Patient/caregiver will be independent donning and doffing right upper extremity compression garment. Rehabilitation Potential For Stated Goals: Good  Regarding Rafi Mcmahan's therapy, I certify that the treatment plan above will be carried out by a therapist or under their direction.   Thank you for this referral,  Wyatt Felix OT                 The information in this section was collected on 12/15/2020 (except where otherwise noted). OCCUPATIONAL PROFILE & HISTORY:   History of Present Injury/Illness (Reason for Referral):  Patient was referred to occupational therapy for lymphedema treatment of the RUE. Patient sustained a right rotator cuff/biceps tear approximately 2 months ago following walking her dog and the dog jerking the leash. She had a biceps tendon repair on 11/30/20. Since then she has had persistent swelling of the RUE that has not resolved on its own. She is in PT for R should rehab and it is yet to be determined if she will be a candidate for rotator cuff repair surgery due to the swelling. Past Medical History/Comorbidities:   Ms. Trevor Barrow  has a past medical history of Anemia (08/2018), Cancer of ascending colon ([facility] [address] (2018), Environmental allergies (9/12/2013), History of colon cancer (2018), History of DVT (deep vein thrombosis) (04/2018), Kidney stone, Pulmonary embolism ([facility] [address] (~2018), and Type 2 diabetes mellitus ([facility] [address] She also has no past medical history of Difficult intubation, Malignant hyperthermia due to anesthesia, Nausea & vomiting, or Pseudocholinesterase deficiency. Ms. Trevor Barrow  has a past surgical history that includes hx cholecystectomy (1980's); hx lipoma resection (Right, 1980's); pr part removal colon w anastomosis; hx colonoscopy; hx wisdom teeth extraction; and hx other surgical (09/27/2018). Social History/Living Environment:    Patient lives alone  Prior Level of Function/Work/Activity:  Worked in [employer] requiring repetitive motions of BUE's  Dominant Side:         RIGHT  Previous Treatment Approaches: In physical therapy for rehab of rotator cuff/biceps tear  Current Medications:    Current Outpatient Medications:     linaGLIPtin (Tradjenta) 5 mg tablet, Take 5 mg by mouth daily. , Disp: , Rfl:     acetaminophen (TylenoL) 325 mg tablet, Take  by mouth every four (4) hours as needed for Pain., Disp: , Rfl:     rivaroxaban (Xarelto) 20 mg tab tablet, Take 1 Tab by mouth daily (with breakfast). Indications: blood clot in a deep vein of the extremities (Patient taking differently: Take 20 mg by mouth nightly. 11/23/20- pt states has not received instructions from surgeon's office. States she called their office and left message requesting instructions. Office also contacted by nurse. Per patient prescribed by Dr. Vinay Chatterjee. Indications: blood clot in a deep vein of the extremities), Disp: 90 Tab, Rfl: 3    SITagliptin-metFORMIN (Janumet)  mg per tablet, Take 1 Tab by mouth two (2) times daily (with meals). , Disp: 180 Tab, Rfl: 3    Insulin Needles, Disposable, (BD JIMBO 2ND GEN PEN NEEDLE) 32 gauge x 9/59\" ndle, 111 Applicators by Does Not Apply route daily. , Disp: 100 Pen Needle, Rfl: 5            Date Last Reviewed:  2/4/2021   Complexity Level : Expanded review of therapy/medical records (1-2):  MODERATE COMPLEXITY   ASSESSMENT OF OCCUPATIONAL PERFORMANCE:   Palpation:          Pitting edema RUE from hand to axilla. RUE is 45cm larger in limb size than LUE  ROM:          Limited RUE secondary to recent injury    Skin Integrity:          intact  Sensation:  intact  Functional Mobility:  independent  Activities of Daily Living : independent with extra time  Edema/Girth:  pitting   PRETREATMENT AFFECTED LIMB(s): right upper extremity      Date:  12/15/202 12/18/20 12/22/20 1/5/21 1/11/21     Right / Left           Axilla   [x]      [] 47cm 46cm 46cm 46cm 43cm      3 inches   [x]      [] 49cm 47cm 47cm 45cm 45cm      Elbow   [x]      [] 35.5cm 35cm 34cm 33cm 28.1cm      6 inches   [x]      [] 32.5cm 31cm 31cm 30cm 29.2cm      3 inches   [x]      [] 25cm 23cm 22.5cm 21cm 21cm      Wrist   [x]      [] 17cm 16.5cm 16.2cm 16.5cm 15.7cm      Palm   [x]      [] 19.8cm 19.5cm 18.8cm 18.3cm 18cm      Total limb size   [x]      [] 225.8cm 218 215.5cm 209.8cm 200cm    Measurements are taken in centimeters:  2.54 cm = 1 inch          Physical Skills Involved:  1. Edema  2.  Skin Integrity Cognitive Skills Affected (resulting in the inability to perform in a timely and safe manner):  1. Psychosocial Skills Affected:  1. Habits/Routines   Number of elements that affect the Plan of Care: 1-3:  LOW COMPLEXITY   CLINICAL DECISION MAKING:   Outcome Measure: Tool Used: Tool Used: Lymphedema Life Impact Scale   Score:  Initial: 42 Most Recent: X (Date: -- )   Interpretation of Score: The Lymphedema Life Impact Scale (LLIS) is a validated instrument that measures the physical, functional, and psychosocial concerns pertinent to patients with extremity lymphedema. The Scale's questionnaire is administered to patients to gauge impairments, activity limitations, and participation restrictions resulting from their lymphedema. Score 0 1-13 14-26 27-40 41-54 55-67 68   Modifier CH CI CJ CK CL CM CN     ? Other PT/OT Primary Functional Limitations:     - CURRENT STATUS: CL - 60%-79% impaired, limited or restricted    - GOAL STATUS: CK - 40%-59% impaired, limited or restricted    - D/C STATUS:  ---------------To be determined---------------     Medical Necessity:   · Skilled intervention continues to be required due to RUE lymphedema onset following R rotator cuff/bicep tear. Reason for Services/Other Comments:  · Patient continues to require skilled intervention due to patient's inability to self manage RUE lymphedema that is impacting ability to recover from recent R rotator cuff/bicep tear. Use of outcome tool(s) and clinical judgement create a POC that gives a: Clear prediction of patient's progress: LOW COMPLEXITY   TREATMENT:   (In addition to Assessment/Re-Assessment sessions the following treatments were rendered)    Pre-treatment Symptoms/Complaints: Pt demonstrates improved RUE abduction. [facility].    Pain: Initial: 1:3  Pain Intensity 1: 3  Post Session:  1:3   Occupational Therapy Treatments:      Therapeutic Exercise ( minutes):     HEP: As above; handouts given to patient for all exercises. Manual Therapy:(45 minutes): Patient arrived after aquatic exercises. She received MLD in conjunction with pump trial with emphasis n breaking up fibrotic tissue at lateral R trunk/axilla. Compression bandaging applied following tx. Manual Lymph Drainage:          Lymph Nodes:    Cervical Supraclavicular Axillary Abdominal Inguinal Popliteal Antecubital   RIGHT []     [x]     [x]     []     [x]     []     []       LEFT []     []     []     []     []     []     []          Anastamoses:   Axillo-axillary Inguino-inguinal Axillo-inguinal Inguino-axillary   ANTERIOR []     []     [x]     []       POSTERIOR []           []     []       RIGHT []     []     [x]     []       LEFT []     []     []     []         Limbs:   [x]    RUE     []    LUE     []    RLE    []    LLE   Self Care: (15 minutes):  Patient received skin care and multi layer bandaging to the RUE from palm to axilla using 3 short stretch bandages. Added Artiflex padding to elbow and upper arm for comfort. Biaigrip applied over bandages to aid in keeping bandages in place. Patient instructed to perform wrist/elbow flexion /extension and grasp/release of hand with bandages on to aid in promoting lymphatic flow. She is able to adjust bandages as needed at home with assistance from family. Farrow arm wrap is on order. Treatment/Session Assessment:    · Response to Treatment:  Patient tolerated treatment without complication. She is responding to MLD and multi layer bandaging as evidenced by 25.8cm in RUE limb size to date. She is highly motivated and compliant with home program. Pt is ready to transition to [facility] when [facility] arrives. · Compliance with Program/Exercises: good to date. · Recommendations/Intent for next treatment session: \"Next visit will focus on lymphedema treatment guidelines to decrease RUE lymphedema and patient education for self management principles. \".   Total Treatment Duration: 60 minutes  OT Patient Time In/Time Out  Time In: 0200  Time Out: 0300    ROSIE Vidales/BOO, CLT

## 2021-02-10 ENCOUNTER — HOSPITAL ENCOUNTER (OUTPATIENT)
Dept: PHYSICAL THERAPY | Age: 61
Discharge: HOME | End: 2021-02-10
Payer: COMMERCIAL

## 2021-02-10 PROCEDURE — 97140 MANUAL THERAPY 1/> REGIONS: CPT

## 2021-02-10 PROCEDURE — 97110 THERAPEUTIC EXERCISES: CPT

## 2021-02-10 PROCEDURE — 97535 SELF CARE MNGMENT TRAINING: CPT

## 2021-02-10 NOTE — PROGRESS NOTES
Armingiovanna Credit  : 1960  Primary: [place] [employer], In*  Secondary:  [address]  at [facility]  [address], [address]  [phone]   [phone]              OUTPATIENT OCCUPATIONAL THERAPY: Daily Note 2/10/2021    ICD-10: Treatment Diagnosis: I89.0 lymphedema, not elsewhere specified                                                       R60.0 localized edema  Precautions/Allergies:   Biaxin [clarithromycin] and Cortisone   Fall Risk Score:    Ambulatory/Rehab Services H2 Model Falls Risk Assessment    Risk Factors:       No Risk Factors Identified Ability to Rise from Chair:       (1)  Pushes up, successful in one attempt    Falls Prevention Plan:       No modifications necessary   Total: (5 or greater = High Risk): 1     [facility] [employer]. All Rights Reserved. [facility] [employer] Patent #[id number]. Federal Law prohibits the replication, distribution or use without written permission from [facility] [place]     MD Orders: eval and treat MEDICAL/REFERRING DIAGNOSIS:   Lymphedema, not elsewhere classified [I89.0]   DATE OF ONSET: 2020   REFERRING PHYSICIAN: Sepideh Wilks MD  RETURN PHYSICIAN APPOINTMENT: to be determined      INITIAL ASSESSMENT:  Ms. Sugar Bell was referred to occupational therapy for lymphedema treatment of the RUE. Patient sustained a right rotator cuff/biceps tear approximately 2 months ago following walking her dog and the dog jerking the leash. She had a biceps tendon repair on 20. Since then she has had persistent swelling of the RUE that has not resolved on its own. She is in PT for R shoulder rehab and it is yet to be determined if she will be a candidate for rotator cuff repair surgery due to the swelling. Patient presents today with pitting edema in the RUE from the palm to axilla. Her RUE is 45cm larger in size than her LUE.   She will benefit from lymphedema treatment to decrease RUE limb size to aid in RUE rehab and possibly allow for rotator cuff repair if it's deemed appropriate. Once limb size is reduced and stabilized she will be fitted for a compression garment. PLAN OF CARE:   PROBLEM LIST:  1. Decreased Flexibility/Joint Mobility  2. Edema/Girth INTERVENTIONS PLANNED  1. Skin care  2. Compression bandaging  3. Fitting for compression garment(s)  4. Manual therapy/Manual lymph drainage  5. Therapeutic exercise/Therapeutic activities  6. Patient Education  7. Compression pump trial prn  8.  kinesiotaping prn    TREATMENT PLAN:  Effective Dates: 12/15/2020 TO 3/15/2021. Frequency/Duration: 2 times a week for 90 days and upon reassessment will adjust frequency and duration as progress indicates. GOALS: (Goals have been discussed and agreed upon with patient.)  Short-Term Functional Goals: Time Frame: 45 days  1. The patient/caregiver will verbalize understanding of lymphedema precautions. 2. Patient will be independent with skin care regimen to decrease risk of cellulitis. 3. The patient/caregiver will be independent at donning and doffing right upper extremity compression bandages. 4. Patient will be independent in lymphatic exercises. Discharge Goals: Time Frame: 90 days  1. Patient's right upper extremity circumferential measurements will decrease on volumetric graph by 15-20cm to maximize functional use in ADL's.    2. The patient/caregiver will be independent with home management of lymphedema. 3. Patient/caregiver will be independent donning and doffing right upper extremity compression garment. Rehabilitation Potential For Stated Goals: Good  Regarding Donovan Mcmahan's therapy, I certify that the treatment plan above will be carried out by a therapist or under their direction.   Thank you for this referral,  Lucía Cesar OT                 The information in this section was collected on 12/15/2020 (except where otherwise noted). OCCUPATIONAL PROFILE & HISTORY:   History of Present Injury/Illness (Reason for Referral):  Patient was referred to occupational therapy for lymphedema treatment of the RUE. Patient sustained a right rotator cuff/biceps tear approximately 2 months ago following walking her dog and the dog jerking the leash. She had a biceps tendon repair on 11/30/20. Since then she has had persistent swelling of the RUE that has not resolved on its own. She is in PT for R should rehab and it is yet to be determined if she will be a candidate for rotator cuff repair surgery due to the swelling. Past Medical History/Comorbidities:   Ms. Sherri Leos  has a past medical history of Anemia (08/2018), Cancer of ascending colon ([facility] [address] (2018), Environmental allergies (9/12/2013), History of colon cancer (2018), History of DVT (deep vein thrombosis) (04/2018), Kidney stone, Pulmonary embolism ([facility] [address] (~2018), and Type 2 diabetes mellitus ([facility] [address] She also has no past medical history of Difficult intubation, Malignant hyperthermia due to anesthesia, Nausea & vomiting, or Pseudocholinesterase deficiency. Ms. Sherri Leos  has a past surgical history that includes hx cholecystectomy (1980's); hx lipoma resection (Right, 1980's); pr part removal colon w anastomosis; hx colonoscopy; hx wisdom teeth extraction; and hx other surgical (09/27/2018). Social History/Living Environment:    Patient lives alone  Prior Level of Function/Work/Activity:  Worked in manufacturing requiring repetitive motions of BUE's  Dominant Side:         RIGHT  Previous Treatment Approaches: In physical therapy for rehab of rotator cuff/biceps tear  Current Medications:    Current Outpatient Medications:     linaGLIPtin (Tradjenta) 5 mg tablet, Take 5 mg by mouth daily. , Disp: , Rfl:     acetaminophen (TylenoL) 325 mg tablet, Take  by mouth every four (4) hours as needed for Pain., Disp: , Rfl:     rivaroxaban (Xarelto) 20 mg tab tablet, Take 1 Tab by mouth daily (with breakfast). Indications: blood clot in a deep vein of the extremities (Patient taking differently: Take 20 mg by mouth nightly. 11/23/20- pt states has not received instructions from surgeon's office. States she called their office and left message requesting instructions. Office also contacted by nurse. Per patient prescribed by Dr. Yaneth Madrid. Indications: blood clot in a deep vein of the extremities), Disp: 90 Tab, Rfl: 3    SITagliptin-metFORMIN (Janumet)  mg per tablet, Take 1 Tab by mouth two (2) times daily (with meals). , Disp: 180 Tab, Rfl: 3    Insulin Needles, Disposable, (BD JIMBO 2ND GEN PEN NEEDLE) 32 gauge x 0/36\" ndle, 082 Applicators by Does Not Apply route daily. , Disp: 100 Pen Needle, Rfl: 5            Date Last Reviewed:  2/10/2021   Complexity Level : Expanded review of therapy/medical records (1-2):  MODERATE COMPLEXITY   ASSESSMENT OF OCCUPATIONAL PERFORMANCE:   Palpation:          Pitting edema RUE from hand to axilla. RUE is 45cm larger in limb size than LUE  ROM:          Limited RUE secondary to recent injury    Skin Integrity:          intact  Sensation:  intact  Functional Mobility:  independent  Activities of Daily Living : independent with extra time  Edema/Girth:  pitting   PRETREATMENT AFFECTED LIMB(s): right upper extremity      Date:  12/15/202 12/18/20 12/22/20 1/5/21 1/11/21     Right / Left           Axilla   [x]      [] 47cm 46cm 46cm 46cm 43cm      3 inches   [x]      [] 49cm 47cm 47cm 45cm 45cm      Elbow   [x]      [] 35.5cm 35cm 34cm 33cm 28.1cm      6 inches   [x]      [] 32.5cm 31cm 31cm 30cm 29.2cm      3 inches   [x]      [] 25cm 23cm 22.5cm 21cm 21cm      Wrist   [x]      [] 17cm 16.5cm 16.2cm 16.5cm 15.7cm      Palm   [x]      [] 19.8cm 19.5cm 18.8cm 18.3cm 18cm      Total limb size   [x]      [] 225.8cm 218 215.5cm 209.8cm 200cm    Measurements are taken in centimeters:  2.54 cm = 1 inch          Physical Skills Involved:  1. Edema  2.  Skin Integrity Cognitive Skills Affected (resulting in the inability to perform in a timely and safe manner):  1. Psychosocial Skills Affected:  1. Habits/Routines   Number of elements that affect the Plan of Care: 1-3:  LOW COMPLEXITY   CLINICAL DECISION MAKING:   Outcome Measure: Tool Used: Tool Used: Lymphedema Life Impact Scale   Score:  Initial: 42 Most Recent: X (Date: -- )   Interpretation of Score: The Lymphedema Life Impact Scale (LLIS) is a validated instrument that measures the physical, functional, and psychosocial concerns pertinent to patients with extremity lymphedema. The Scale's questionnaire is administered to patients to gauge impairments, activity limitations, and participation restrictions resulting from their lymphedema. Score 0 1-13 14-26 27-40 41-54 55-67 68   Modifier CH CI CJ CK CL CM CN     ? Other PT/OT Primary Functional Limitations:     - CURRENT STATUS: CL - 60%-79% impaired, limited or restricted    - GOAL STATUS: CK - 40%-59% impaired, limited or restricted    - D/C STATUS:  ---------------To be determined---------------     Medical Necessity:   · Skilled intervention continues to be required due to RUE lymphedema onset following R rotator cuff/bicep tear. Reason for Services/Other Comments:  · Patient continues to require skilled intervention due to patient's inability to self manage RUE lymphedema that is impacting ability to recover from recent R rotator cuff/bicep tear. Use of outcome tool(s) and clinical judgement create a POC that gives a: Clear prediction of patient's progress: LOW COMPLEXITY   TREATMENT:   (In addition to Assessment/Re-Assessment sessions the following treatments were rendered)    Pre-treatment Symptoms/Complaints: Pt had Cortizone injection in right shoulder. Pain decreased by 1 grade and she reports improved movement. She was able to brush her teeth with her right hand for the first time with elbow supported on counter.  She has follow up appt with MD Feb 25th to evaluate possible need for procedure to break up scar tissue. Pain: Initial: 1:3  Pain Intensity 1: 2  Post Session:  1:2   Occupational Therapy Treatments:      Therapeutic Exercise ( minutes):     HEP:  As above; handouts given to patient for all exercises. Manual Therapy:(35minutes): Pt received MLD treatment to decrease swelling in RUE. Manual Lymph Drainage:          Lymph Nodes:    Cervical Supraclavicular Axillary Abdominal Inguinal Popliteal Antecubital   RIGHT []     [x]     [x]     [x]     [x]     []     [x]       LEFT []     [x]     [x]     [x]     []     []     []          Anastamoses:   Axillo-axillary Inguino-inguinal Axillo-inguinal Inguino-axillary   ANTERIOR [x]     []     [x]     []       POSTERIOR []           []     []       RIGHT []     []     [x]     []       LEFT []     []     []     []         Limbs:   [x]    RUE     []    LUE     []    RLE    []    LLE   Self Care: (10 minutes):  Patient received skin care and multi layer bandaging to the RUE from palm to axilla using 3 short stretch bandages. Added Artiflex padding to elbow and upper arm for comfort. Biaigrip applied over bandages to aid in keeping bandages in place. Patient instructed to perform wrist/elbow flexion /extension and grasp/release of hand with bandages on to aid in promoting lymphatic flow. She is able to adjust bandages as needed at home with assistance from family. Farrow arm wrap is on order. Treatment/Session Assessment:    · Response to Treatment:  Patient tolerated treatment without complication. She is responding to MLD and multi layer bandaging as evidenced by 25.8cm in RUE limb size to date. She is highly motivated and compliant with home program. Will transition to [facility] when it arrives. · Compliance with Program/Exercises: good to date. · Recommendations/Intent for next treatment session:  \"Next visit will focus on lymphedema treatment guidelines to decrease RUE lymphedema and patient education for self management principles. \".   Total Treatment Duration:45 minutes  OT Patient Time In/Time Out  Time In: 0200  Time Out: 0245    ROSIE Michaud/BOO, CLT

## 2021-02-10 NOTE — PROGRESS NOTES
Conception Alison  : 1960  Primary: [employer], [place]*  Secondary:  [address] at [facility] Therapy  [address], [address]  [phone]   [phone]         OUTPATIENT PHYSICAL THERAPY:Daily Note 2/10/2021      TREATMENT:   PT Patient Time In/Time Out  Time In: 0100  Time Out: 0200      Total Time: 45min  Visit Count:  18     ICD-10: Treatment Diagnosis: M25.511, M75.0, Z47.89  Medication Last Reviewed: 02/10/21      TREATMENT PLAN  Effective Dates: 2020 TO 3/9/2021 (90 days). Frequency/Duration: 2-3 times a week for 90 Day(s)         Subjective: Patient reports she got a Cortizone shot and hopes that it will help her move it better. Patient states she was able to brush her teeth yesterday.  Pain: 3 /10    Objective: Therapeutic Exercise: (60min) Done in order to improve strength, ROM and understanding of current condition.     Date  21 Date  21 Date  2-10-21   Activity/Exercise      Education      UBE X 10 min X 10 min X 10 min   Shoulder PROM      Pulleys X 10 X 10    Shoulder AAROM  X 6 min    Elbow PROM X 10 X 10 X 15   Punches   X 10    Walking      Elbow Flexion Iso      S/L ER      ER/IR Iso      Shoulder W      Bicep Curls 3x10 blue TB 3 x 10 3 x 10 with 2lbs   ER/IR      Rows 3x10 blue TB 3 x 10  3 x 10 with 2 lbs   Pool exercises   X 20 min   Wall walk outs   X 30 X 30    Lap pull downs   X 30 X 30       Manual Therapy:  min) Done in order to improve joint and soft tissue mobility,reduce muscle guarding, and decrease muscle tone   Date  21 Date  21 Date  21   Type      Joint Mobilization GHJ: A-P grades II-IV; distraction grades III-IV GHJ: A-P grades II-IV; distraction grades III-IV GHJ: A-P grades II-IV; distraction grades III-IV   Soft Tissue Mobilization Posterior shoulder, subscap, bicep Posterior shoulder, subscap, bicep Posterior shoulder, subscap, bicep       Modalities: (-) Done in order to reduce swelling and pain    Assessment: Patient tolerated treatment with mild discomfort. Patient is scheduled to see another doctor about a possible manipulation. Good compliance with home exercises.     Plan: continue per POC    Future Appointments   Date Time Provider Alec Lobo   2/10/2021  2:00 PM Jes Cardenas [facility]   2/12/2021  8:45 AM Man Yeh [facility]   2/12/2021 10:00 AM Shankar Mullins OT [facility]   2/16/2021  1:00 PM Man Negron [facility]   2/16/2021  2:00 PM Shankar Mullins OT [facility]   2/18/2021  1:45 PM Man Negron [facility]   2/18/2021  3:00 PM Fausto Lang OT [facility]   2/23/2021 11:00 AM Man Negron [facility]   2/23/2021 12:00 PM Shankar Mullins OT [facility]   2/25/2021 10:15 AM Man Negron [facility]   2/25/2021 12:00 PM Fausto Lang OT [facility]       Units: [address], PTA

## 2021-02-12 ENCOUNTER — HOSPITAL ENCOUNTER (OUTPATIENT)
Dept: PHYSICAL THERAPY | Age: 61
Discharge: HOME | End: 2021-02-12
Payer: COMMERCIAL

## 2021-02-12 PROCEDURE — 97535 SELF CARE MNGMENT TRAINING: CPT

## 2021-02-12 PROCEDURE — 97110 THERAPEUTIC EXERCISES: CPT

## 2021-02-12 NOTE — PROGRESS NOTES
Toro Cee  : 1960  Primary: [employer], [place]*  Secondary:  [address] at [facility] Therapy  [address], [address]  [phone]   [phone]         OUTPATIENT PHYSICAL THERAPY:Daily Note 2021      TREATMENT:   PT Patient Time In/Time Out  Time In: 845  Time Out: 30      Total Time: 45min  Visit Count:  19     ICD-10: Treatment Diagnosis: M25.511, M75.0, Z47.89  Medication Last Reviewed: 21      TREATMENT PLAN  Effective Dates: 2020 TO 3/9/2021 (90 days). Frequency/Duration: 2-3 times a week for 90 Day(s)         Subjective: Patient reports she was able to get the fork up last night with that arm. She states that she can almost put her arm down to her side.  Pain: 2 /10    Objective: Therapeutic Exercise: (45min) Done in order to improve strength, ROM and understanding of current condition.     Date  21 Date  2-10-21 Date  21   Activity/Exercise      Education      UBE X 10 min X 10 min X 10 min   Shoulder PROM      Pulleys X 10  X 10 min   Shoulder AAROM X 6 min     Elbow PROM X 10 X 15 X 20   Punches  X 10  X 20   Walking      Elbow Flexion Iso      S/L ER      ER/IR Iso      Shoulder W      Bicep Curls 3 x 10 3 x 10 with 2lbs X 30 # 3 lbs   ER/IR      Rows 3 x 10  3 x 10 with 2 lbs    Pool exercises  X 20 min X 20 min   Wall walk outs  X 30 X 30  X 20    Lap pull downs  X 30 X 30 X 20       Manual Therapy:  min) Done in order to improve joint and soft tissue mobility,reduce muscle guarding, and decrease muscle tone   Date  21 Date  21 Date  21   Type      Joint Mobilization GHJ: A-P grades II-IV; distraction grades III-IV GHJ: A-P grades II-IV; distraction grades III-IV GHJ: A-P grades II-IV; distraction grades III-IV   Soft Tissue Mobilization Posterior shoulder, subscap, bicep Posterior shoulder, subscap, bicep Posterior shoulder, subscap, bicep       Modalities: (-) Done in order to reduce swelling and pain    Assessment: Patient tolerated treatment well today. Patient seems hopeful about her progress. Good compliance with home exercises.     Plan: continue per POC    Future Appointments   Date Time Provider Alec Lobo   2/12/2021 10:00 AM GABRIELLA [facility]   2/16/2021  1:00 PM Norah [facility]   2/16/2021  2:00 PM GABRIELLA [facility]l [facility]   2/18/2021  1:45 PM Norah [facility]   2/18/2021  3:00 PM [facility]RIELLA Valdez[facility]   2/23/2021 11:00 AM Norah [facility]   2/23/2021 12:00 PM [facility]RIELLA [facility]   2/25/2021 10:15 AM Norah [facility]   2/25/2021 12:00 PM Renu Iglesias OT [facility]       Units: 4 TE/ MT       Francisco Bryant, JOSÉ

## 2021-02-12 NOTE — PROGRESS NOTES
Douglas Ayde  : 1960  Primary: [employer] [place]*  Secondary:  [address] [facility]  at [facility] Therapy  [address], [address]  [phone]   [phone]              OUTPATIENT OCCUPATIONAL THERAPY: Daily Note 2021    ICD-10: Treatment Diagnosis: I89.0 lymphedema, not elsewhere specified                                                       R60.0 localized edema  Precautions/Allergies:   Biaxin [clarithromycin] and Cortisone   Fall Risk Score:    Ambulatory/Rehab Services H2 Model Falls Risk Assessment    Risk Factors:       No Risk Factors Identified Ability to Rise from Chair:       (1)  Pushes up, successful in one attempt    Falls Prevention Plan:       No modifications necessary   Total: (5 or greater = High Risk): 1     [facility]. All Rights Reserved. [facility] [employer] Patent #[id number]. Federal Law prohibits the replication, distribution or use without written permission from [facility] [employer]     MD Orders: eval and treat MEDICAL/REFERRING DIAGNOSIS:   Lymphedema, not elsewhere classified [I89.0]   DATE OF ONSET: 2020   REFERRING PHYSICIAN: Vilma Boles MD  RETURN PHYSICIAN APPOINTMENT: to be determined      INITIAL ASSESSMENT:  Ms. Saint Chant was referred to occupational therapy for lymphedema treatment of the RUE. Patient sustained a right rotator cuff/biceps tear approximately 2 months ago following walking her dog and the dog jerking the leash. She had a biceps tendon repair on 20. Since then she has had persistent swelling of the RUE that has not resolved on its own. She is in PT for R shoulder rehab and it is yet to be determined if she will be a candidate for rotator cuff repair surgery due to the swelling. Patient presents today with pitting edema in the RUE from the palm to axilla. Her RUE is 45cm larger in size than her LUE.   She will benefit from lymphedema treatment to decrease RUE limb size to aid in RUE rehab and possibly allow for rotator cuff repair if it's deemed appropriate. Once limb size is reduced and stabilized she will be fitted for a compression garment. PLAN OF CARE:   PROBLEM LIST:  1. Decreased Flexibility/Joint Mobility  2. Edema/Girth INTERVENTIONS PLANNED  1. Skin care  2. Compression bandaging  3. Fitting for compression garment(s)  4. Manual therapy/Manual lymph drainage  5. Therapeutic exercise/Therapeutic activities  6. Patient Education  7. Compression pump trial prn  8.  kinesiotaping prn    TREATMENT PLAN:  Effective Dates: 12/15/2020 TO 3/15/2021. Frequency/Duration: 2 times a week for 90 days and upon reassessment will adjust frequency and duration as progress indicates. GOALS: (Goals have been discussed and agreed upon with patient.)  Short-Term Functional Goals: Time Frame: 45 days  1. The patient/caregiver will verbalize understanding of lymphedema precautions. 2. Patient will be independent with skin care regimen to decrease risk of cellulitis. 3. The patient/caregiver will be independent at donning and doffing right upper extremity compression bandages. 4. Patient will be independent in lymphatic exercises. Discharge Goals: Time Frame: 90 days  1. Patient's right upper extremity circumferential measurements will decrease on volumetric graph by 15-20cm to maximize functional use in ADL's.    2. The patient/caregiver will be independent with home management of lymphedema. 3. Patient/caregiver will be independent donning and doffing right upper extremity compression garment. Rehabilitation Potential For Stated Goals: Good  Regarding Rafi Mcmahan's therapy, I certify that the treatment plan above will be carried out by a therapist or under their direction.   Thank you for this referral,  Christiane Jackman OT                 The information in this section was collected on 12/15/2020 (except where otherwise noted). OCCUPATIONAL PROFILE & HISTORY:   History of Present Injury/Illness (Reason for Referral):  Patient was referred to occupational therapy for lymphedema treatment of the RUE. Patient sustained a right rotator cuff/biceps tear approximately 2 months ago following walking her dog and the dog jerking the leash. She had a biceps tendon repair on 11/30/20. Since then she has had persistent swelling of the RUE that has not resolved on its own. She is in PT for R should rehab and it is yet to be determined if she will be a candidate for rotator cuff repair surgery due to the swelling. Past Medical History/Comorbidities:   Ms. Sherri Leos  has a past medical history of Anemia (08/2018), Cancer of ascending colon ([facility] [address] (2018), Environmental allergies (9/12/2013), History of colon cancer (2018), History of DVT (deep vein thrombosis) (04/2018), Kidney stone, Pulmonary embolism ([facility] [address] (~2018), and Type 2 diabetes mellitus ([facility] [address] She also has no past medical history of Difficult intubation, Malignant hyperthermia due to anesthesia, Nausea & vomiting, or Pseudocholinesterase deficiency. Ms. Sherri Leos  has a past surgical history that includes hx cholecystectomy (1980's); hx lipoma resection (Right, 1980's); pr part removal colon w anastomosis; hx colonoscopy; hx wisdom teeth extraction; and hx other surgical (09/27/2018). Social History/Living Environment:    Patient lives alone  Prior Level of Function/Work/Activity:  Worked in manufacturing requiring repetitive motions of BUE's  Dominant Side:         RIGHT  Previous Treatment Approaches: In physical therapy for rehab of rotator cuff/biceps tear  Current Medications:    Current Outpatient Medications:     linaGLIPtin (Tradjenta) 5 mg tablet, Take 5 mg by mouth daily. , Disp: , Rfl:     acetaminophen (TylenoL) 325 mg tablet, Take  by mouth every four (4) hours as needed for Pain., Disp: , Rfl:     rivaroxaban (Xarelto) 20 mg tab tablet, Take 1 Tab by mouth daily (with breakfast). Indications: blood clot in a deep vein of the extremities (Patient taking differently: Take 20 mg by mouth nightly. 11/23/20- pt states has not received instructions from surgeon's office. States she called their office and left message requesting instructions. Office also contacted by nurse. Per patient prescribed by Dr. Dariusz Carcamo. Indications: blood clot in a deep vein of the extremities), Disp: 90 Tab, Rfl: 3    SITagliptin-metFORMIN (Janumet)  mg per tablet, Take 1 Tab by mouth two (2) times daily (with meals). , Disp: 180 Tab, Rfl: 3    Insulin Needles, Disposable, (BD JIMBO 2ND GEN PEN NEEDLE) 32 gauge x 4/48\" ndle, 682 Applicators by Does Not Apply route daily. , Disp: 100 Pen Needle, Rfl: 5            Date Last Reviewed:  2/12/2021   Complexity Level : Expanded review of therapy/medical records (1-2):  MODERATE COMPLEXITY   ASSESSMENT OF OCCUPATIONAL PERFORMANCE:   Palpation:          Pitting edema RUE from hand to axilla. RUE is 45cm larger in limb size than LUE  ROM:          Limited RUE secondary to recent injury    Skin Integrity:          intact  Sensation:  intact  Functional Mobility:  independent  Activities of Daily Living : independent with extra time  Edema/Girth:  pitting   PRETREATMENT AFFECTED LIMB(s): right upper extremity      Date:  12/15/202 12/18/20 12/22/20 1/5/21 1/11/21     Right / Left           Axilla   [x]      [] 47cm 46cm 46cm 46cm 43cm      3 inches   [x]      [] 49cm 47cm 47cm 45cm 45cm      Elbow   [x]      [] 35.5cm 35cm 34cm 33cm 28.1cm      6 inches   [x]      [] 32.5cm 31cm 31cm 30cm 29.2cm      3 inches   [x]      [] 25cm 23cm 22.5cm 21cm 21cm      Wrist   [x]      [] 17cm 16.5cm 16.2cm 16.5cm 15.7cm      Palm   [x]      [] 19.8cm 19.5cm 18.8cm 18.3cm 18cm      Total limb size   [x]      [] 225.8cm 218 215.5cm 209.8cm 200cm    Measurements are taken in centimeters:  2.54 cm = 1 inch          Physical Skills Involved:  1. Edema  2.  Skin Integrity Cognitive Skills Affected (resulting in the inability to perform in a timely and safe manner):  1. Psychosocial Skills Affected:  1. Habits/Routines   Number of elements that affect the Plan of Care: 1-3:  LOW COMPLEXITY   CLINICAL DECISION MAKING:   Outcome Measure: Tool Used: Tool Used: Lymphedema Life Impact Scale   Score:  Initial: 42 Most Recent: X (Date: -- )   Interpretation of Score: The Lymphedema Life Impact Scale (LLIS) is a validated instrument that measures the physical, functional, and psychosocial concerns pertinent to patients with extremity lymphedema. The Scale's questionnaire is administered to patients to gauge impairments, activity limitations, and participation restrictions resulting from their lymphedema. Score 0 1-13 14-26 27-40 41-54 55-67 68   Modifier CH CI CJ CK CL CM CN     ? Other PT/OT Primary Functional Limitations:     - CURRENT STATUS: CL - 60%-79% impaired, limited or restricted    - GOAL STATUS: CK - 40%-59% impaired, limited or restricted    - D/C STATUS:  ---------------To be determined---------------     Medical Necessity:   · Skilled intervention continues to be required due to RUE lymphedema onset following R rotator cuff/bicep tear. Reason for Services/Other Comments:  · Patient continues to require skilled intervention due to patient's inability to self manage RUE lymphedema that is impacting ability to recover from recent R rotator cuff/bicep tear. Use of outcome tool(s) and clinical judgement create a POC that gives a: Clear prediction of patient's progress: LOW COMPLEXITY   TREATMENT:   (In addition to Assessment/Re-Assessment sessions the following treatments were rendered)    Pre-treatment Symptoms/Complaints:  Pt to receive Farrow arm wrap today.   Pain: Initial: 1:3  Pain Intensity 1: 2  Post Session:  1:2   Occupational Therapy Treatments:      Therapeutic Exercise ( minutes):     HEP:  As above; handouts given to patient for all exercises. Manual Therapy:(minutes):  Manual Lymph Drainage:          Lymph Nodes:    Cervical Supraclavicular Axillary Abdominal Inguinal Popliteal Antecubital   RIGHT []     [x]     [x]     [x]     [x]     []     [x]       LEFT []     [x]     [x]     [x]     []     []     []          Anastamoses:   Axillo-axillary Inguino-inguinal Axillo-inguinal Inguino-axillary   ANTERIOR [x]     []     [x]     []       POSTERIOR []           []     []       RIGHT []     []     [x]     []       LEFT []     []     []     []         Limbs:   [x]    RUE     []    LUE     []    RLE    []    LLE   Self Care: (30 minutes):  Patient received skin care and multi layer bandaging to the RUE from palm to axilla using 2 short stretch bandages. Biaigrip applied over bandages to aid in keeping bandages in place. Patient instructed to perform wrist/elbow flexion /extension and grasp/release of hand with bandages on to aid in promoting lymphatic flow. She is able to adjust bandages as needed at home with assistance from family. Farrow arm wrap is on order. Treatment/Session Assessment:    · Response to Treatment:  Patient tolerated treatment without complication. She is responding to MLD and multi layer bandaging as evidenced by 25.8cm in RUE limb size to date. She is highly motivated and compliant with home program. Will transition to [facility] when it arrives. · Compliance with Program/Exercises: good to date. · Recommendations/Intent for next treatment session: \"Next visit will focus on lymphedema treatment guidelines to decrease RUE lymphedema and patient education for self management principles. \".   Total Treatment Duration:  30 minutes  OT Patient Time In/Time Out  Time In: 1000  Time Out: Oma ROGER [address] ROSIE Mckinney/L, [place]

## 2021-02-16 ENCOUNTER — HOSPITAL ENCOUNTER (OUTPATIENT)
Dept: PHYSICAL THERAPY | Age: 61
Discharge: HOME | End: 2021-02-16
Payer: COMMERCIAL

## 2021-02-16 PROCEDURE — 97140 MANUAL THERAPY 1/> REGIONS: CPT

## 2021-02-16 PROCEDURE — 97535 SELF CARE MNGMENT TRAINING: CPT

## 2021-02-16 PROCEDURE — 97110 THERAPEUTIC EXERCISES: CPT

## 2021-02-16 NOTE — PROGRESS NOTES
Ruben Swartz  : 1960  Primary: [employer]*  Secondary:  [address] at [address]  [address], [address]  [phone]   [phone]         OUTPATIENT PHYSICAL THERAPY:Daily Note 2021      TREATMENT:   PT Patient Time In/Time Out  Time In: 0100  Time Out: 0200      Total Time: 45min  Visit Count:  20     ICD-10: Treatment Diagnosis: M25.511, M75.0, Z47.89  Medication Last Reviewed: 21      TREATMENT PLAN  Effective Dates: 2020 TO 3/9/2021 (90 days). Frequency/Duration: 2-3 times a week for 90 Day(s)         Subjective: Patient reports arm is feeling a little bit better.  Pain: 10    Objective: Therapeutic Exercise: (40min) Done in order to improve strength, ROM and understanding of current condition.     Date  2-10-21 Date  21 Date  21   Activity/Exercise      Education      UBE X 10 min X 10 min X 10 min   Shoulder PROM      Pulleys  X 10 min X 10 min   Shoulder AAROM      Elbow PROM X 15 X 20 X 20   Punches X 10  X 20 X 20    Walking      Elbow Flexion Iso      S/L ER      ER/IR Iso      Shoulder W      Bicep Curls 3 x 10 with 2lbs X 30 # 3 lbs X 30 # 4lbs   ER/IR      Rows 3 x 10 with 2 lbs  3 x 10   Pool exercises X 20 min X 20 min    Wall walk outs  X 30  X 20  X 25   Lap pull downs  X 30 X 20 X 25       Manual Therapy: 20:   min) Done in order to improve joint and soft tissue mobility,reduce muscle guarding, and decrease muscle tone   Date  21 Date  21 Date  21   Type      Joint Mobilization GHJ: A-P grades II-IV; distraction grades III-IV GHJ: A-P grades II-IV; distraction grades III-IV GHJ: A-P grades II-IV; distraction grades III-IV   Soft Tissue Mobilization Posterior shoulder, subscap, bicep Posterior shoulder, subscap, bicep Posterior shoulder, subscap, bicep       Modalities: (-) Done in order to reduce swelling and pain    Assessment: Patient tolerated treatment well today. Patient is scheduled to the doctor in the next couple of weeks. Good compliance with home exercises.     Plan: continue per POC    Future Appointments   Date Time Provider Alec Lobo   2/16/2021  2:00 PM GABRIELLA Carranza   2/18/2021  1:45 PM Fede RUFFIN   2/18/2021  3:00 PM GABRIELLA Silverman   2/23/2021 11:00 AM Fede RUFFIN   2/23/2021 12:00 PM GABRIELLA Carranza   2/25/2021 10:15 AM Fede RUFFIN   2/25/2021 12:00 PM GABRIELLA Silverman [facility]       Units: 3 / [address] [place] [address]

## 2021-02-16 NOTE — PROGRESS NOTES
Toro Cee  : 1960  Primary: [employer], [place]*  Secondary:  [address] at [facility]  [address], [address]  [phone]   [phone]              OUTPATIENT OCCUPATIONAL THERAPY: Daily Note 2021    ICD-10: Treatment Diagnosis: I89.0 lymphedema, not elsewhere specified                                                       R60.0 localized edema  Precautions/Allergies:   Biaxin [clarithromycin] and Cortisone   Fall Risk Score:    Ambulatory/Rehab Services H2 Model Falls Risk Assessment    Risk Factors:       No Risk Factors Identified Ability to Rise from Chair:       (1)  Pushes up, successful in one attempt    Falls Prevention Plan:       No modifications necessary   Total: (5 or greater = High Risk): 1     [facility] [employer]. All Rights Reserved. [facility] [employer] Patent #[id number]. Federal Law prohibits the replication, distribution or use without written permission from [facility] [employer]     MD Orders: eval and treat MEDICAL/REFERRING DIAGNOSIS:   Lymphedema, not elsewhere classified [I89.0]   DATE OF ONSET: 2020   REFERRING PHYSICIAN: Charisse Francisco MD  RETURN PHYSICIAN APPOINTMENT: to be determined      INITIAL ASSESSMENT:  Ms. Merline Gold was referred to occupational therapy for lymphedema treatment of the RUE. Patient sustained a right rotator cuff/biceps tear approximately 2 months ago following walking her dog and the dog jerking the leash. She had a biceps tendon repair on 20. Since then she has had persistent swelling of the RUE that has not resolved on its own. She is in PT for R shoulder rehab and it is yet to be determined if she will be a candidate for rotator cuff repair surgery due to the swelling. Patient presents today with pitting edema in the RUE from the palm to axilla. Her RUE is 45cm larger in size than her LUE.   She will benefit from lymphedema treatment to decrease RUE limb size to aid in RUE rehab and possibly allow for rotator cuff repair if it's deemed appropriate. Once limb size is reduced and stabilized she will be fitted for a compression garment. PLAN OF CARE:   PROBLEM LIST:  1. Decreased Flexibility/Joint Mobility  2. Edema/Girth INTERVENTIONS PLANNED  1. Skin care  2. Compression bandaging  3. Fitting for compression garment(s)  4. Manual therapy/Manual lymph drainage  5. Therapeutic exercise/Therapeutic activities  6. Patient Education  7. Compression pump trial prn  8.  kinesiotaping prn    TREATMENT PLAN:  Effective Dates: 12/15/2020 TO 3/15/2021. Frequency/Duration: 2 times a week for 90 days and upon reassessment will adjust frequency and duration as progress indicates. GOALS: (Goals have been discussed and agreed upon with patient.)  Short-Term Functional Goals: Time Frame: 45 days  1. The patient/caregiver will verbalize understanding of lymphedema precautions. 2. Patient will be independent with skin care regimen to decrease risk of cellulitis. 3. The patient/caregiver will be independent at donning and doffing right upper extremity compression bandages. 4. Patient will be independent in lymphatic exercises. Discharge Goals: Time Frame: 90 days  1. Patient's right upper extremity circumferential measurements will decrease on volumetric graph by 15-20cm to maximize functional use in ADL's.    2. The patient/caregiver will be independent with home management of lymphedema. 3. Patient/caregiver will be independent donning and doffing right upper extremity compression garment. Rehabilitation Potential For Stated Goals: Good                The information in this section was collected on 12/15/2020 (except where otherwise noted). OCCUPATIONAL PROFILE & HISTORY:   History of Present Injury/Illness (Reason for Referral):  Patient was referred to occupational therapy for lymphedema treatment of the RUE.   Patient sustained a right rotator cuff/biceps tear approximately 2 months ago following walking her dog and the dog jerking the leash. She had a biceps tendon repair on 11/30/20. Since then she has had persistent swelling of the RUE that has not resolved on its own. She is in PT for R should rehab and it is yet to be determined if she will be a candidate for rotator cuff repair surgery due to the swelling. Past Medical History/Comorbidities:   Ms. Benita Matos  has a past medical history of Anemia (08/2018), Cancer of ascending colon ([facility] [address] (2018), Environmental allergies (9/12/2013), History of colon cancer (2018), History of DVT (deep vein thrombosis) (04/2018), Kidney stone, Pulmonary embolism ([facility] [address] (~2018), and Type 2 diabetes mellitus ([facility] [address] She also has no past medical history of Difficult intubation, Malignant hyperthermia due to anesthesia, Nausea & vomiting, or Pseudocholinesterase deficiency. Ms. Benita Matos  has a past surgical history that includes hx cholecystectomy (1980's); hx lipoma resection (Right, 1980's); pr part removal colon w anastomosis; hx colonoscopy; hx wisdom teeth extraction; and hx other surgical (09/27/2018). Social History/Living Environment:    Patient lives alone  Prior Level of Function/Work/Activity:  Worked in [employer] requiring repetitive motions of BUE's  Dominant Side:         RIGHT  Previous Treatment Approaches: In physical therapy for rehab of rotator cuff/biceps tear  Current Medications:    Current Outpatient Medications:     linaGLIPtin (Tradjenta) 5 mg tablet, Take 5 mg by mouth daily. , Disp: , Rfl:     acetaminophen (TylenoL) 325 mg tablet, Take  by mouth every four (4) hours as needed for Pain., Disp: , Rfl:     rivaroxaban (Xarelto) 20 mg tab tablet, Take 1 Tab by mouth daily (with breakfast). Indications: blood clot in a deep vein of the extremities (Patient taking differently: Take 20 mg by mouth nightly.  11/23/20- pt states has not received instructions from surgeon's office. States she called their office and left message requesting instructions. Office also contacted by nurse. Per patient prescribed by Dr. Kellie Khalil. Indications: blood clot in a deep vein of the extremities), Disp: 90 Tab, Rfl: 3    SITagliptin-metFORMIN (Janumet)  mg per tablet, Take 1 Tab by mouth two (2) times daily (with meals). , Disp: 180 Tab, Rfl: 3    Insulin Needles, Disposable, (BD JIMBO 2ND GEN PEN NEEDLE) 32 gauge x 2/17\" ndle, 415 Applicators by Does Not Apply route daily. , Disp: 100 Pen Needle, Rfl: 5            Date Last Reviewed:  2/16/2021   Complexity Level : Expanded review of therapy/medical records (1-2):  MODERATE COMPLEXITY   ASSESSMENT OF OCCUPATIONAL PERFORMANCE:   Palpation:          Pitting edema RUE from hand to axilla. RUE is 45cm larger in limb size than LUE  ROM:          Limited RUE secondary to recent injury    Skin Integrity:          intact  Sensation:  intact  Functional Mobility:  independent  Activities of Daily Living : independent with extra time  Edema/Girth:  pitting   PRETREATMENT AFFECTED LIMB(s): right upper extremity      Date:  12/15/202 12/18/20 12/22/20 1/5/21 1/11/21 2.16.21    Right / Left           Axilla   [x]      [] 47cm 46cm 46cm 46cm 43cm 45     3 inches   [x]      [] 49cm 47cm 47cm 45cm 45cm 44     Elbow   [x]      [] 35.5cm 35cm 34cm 33cm 28.1cm 29     6 inches   [x]      [] 32.5cm 31cm 31cm 30cm 29.2cm 27     3 inches   [x]      [] 25cm 23cm 22.5cm 21cm 21cm 20.5     Wrist   [x]      [] 17cm 16.5cm 16.2cm 16.5cm 15.7cm 14.5     Palm   [x]      [] 19.8cm 19.5cm 18.8cm 18.3cm 18cm 17     Total limb size   [x]      [] 225.8cm 218 215.5cm 209.8cm 200cm 197 CM   Measurements are taken in centimeters:  2.54 cm = 1 inch          Physical Skills Involved:  1. Edema  2. Skin Integrity Cognitive Skills Affected (resulting in the inability to perform in a timely and safe manner):  1. Psychosocial Skills Affected:  1.  Habits/Routines   Number of elements that affect the Plan of Care: 1-3:  LOW COMPLEXITY   CLINICAL DECISION MAKING:   Outcome Measure: Tool Used: Tool Used: Lymphedema Life Impact Scale   Score:  Initial: 42 Most Recent: X (Date: -- )   Interpretation of Score: The Lymphedema Life Impact Scale (LLIS) is a validated instrument that measures the physical, functional, and psychosocial concerns pertinent to patients with extremity lymphedema. The Scale's questionnaire is administered to patients to gauge impairments, activity limitations, and participation restrictions resulting from their lymphedema. Score 0 1-13 14-26 27-40 41-54 55-67 68   Modifier CH CI CJ CK CL CM CN     ? Other PT/OT Primary Functional Limitations:     - CURRENT STATUS: CL - 60%-79% impaired, limited or restricted    - GOAL STATUS: CK - 40%-59% impaired, limited or restricted    - D/C STATUS:  ---------------To be determined---------------     Medical Necessity:   · Skilled intervention continues to be required due to RUE lymphedema onset following R rotator cuff/bicep tear. Reason for Services/Other Comments:  · Patient continues to require skilled intervention due to patient's inability to self manage RUE lymphedema that is impacting ability to recover from recent R rotator cuff/bicep tear. Use of outcome tool(s) and clinical judgement create a POC that gives a: Clear prediction of patient's progress: LOW COMPLEXITY   TREATMENT:   (In addition to Assessment/Re-Assessment sessions the following treatments were rendered)    Pre-treatment Symptoms/Complaints: Reduction measured 29 cm R LE (11.5 inches). Pt brought Eros wrap with her today. Pain: Initial: 1:3  Pain Intensity 1: 2  Post Session:  1:2   Occupational Therapy Treatments:      Therapeutic Exercise ( minutes):     HEP:  As above; handouts given to patient for all exercises.     Manual Therapy:(30 minutes): MLD and compression pump R UE  Manual Lymph Drainage:          Lymph Nodes:    Cervical Supraclavicular Axillary Abdominal Inguinal Popliteal Antecubital   RIGHT []     [x]     [x]     [x]     [x]     []     [x]       LEFT []     []     []     []     []     []     []          Anastamoses:   Axillo-axillary Inguino-inguinal Axillo-inguinal Inguino-axillary   ANTERIOR [x]     []     [x]     []       POSTERIOR []           []     []       RIGHT []     []     [x]     []       LEFT []     []     []     []         Limbs:   [x]    RUE     []    LUE     []    RLE    []    LLE   Self Care: (30 minutes): Instructed in donning R UE Farrow wrap. Requires mod A to don properly. Will try wearing a silky-sleeved shirt to pull over. Fits well but difficulty for patient to don independently due to fixed elbow flexion and reaching to tighten straps. Treatment/Session Assessment:    · Response to Treatment:  Patient tolerated treatment without complication. Transitioning to Farrow wrap  · Compliance with Program/Exercises: good to date. · Recommendations/Intent for next treatment session: \"Next visit will focus on lymphedema treatment guidelines to decrease RUE lymphedema and patient education for self management principles. \".   Total Treatment Duration:  60 minutes  OT Patient Time In/Time Out  Time In: 0200  Time Out: 0300    Jed Khalil, OTR/BOO, JOEL

## 2021-02-18 ENCOUNTER — HOSPITAL ENCOUNTER (OUTPATIENT)
Dept: PHYSICAL THERAPY | Age: 61
Discharge: HOME | End: 2021-02-18
Payer: COMMERCIAL

## 2021-02-18 PROCEDURE — 97110 THERAPEUTIC EXERCISES: CPT

## 2021-02-18 PROCEDURE — 97140 MANUAL THERAPY 1/> REGIONS: CPT

## 2021-02-18 PROCEDURE — 97535 SELF CARE MNGMENT TRAINING: CPT

## 2021-02-18 NOTE — PROGRESS NOTES
Patsy Waller  : 1960  Primary: [employer]  Secondary:  [address] at [facility]  [address], [address]  [phone]   [phone]         OUTPATIENT PHYSICAL THERAPY:Daily Note 2021      TREATMENT:   PT Patient Time In/Time Out  Time In: 014  Time Out: 0230      Total Time: 45min  Visit Count:  21     ICD-10: Treatment Diagnosis: M25.511, M75.0, Z47.89  Medication Last Reviewed: 21      TREATMENT PLAN  Effective Dates: 2020 TO 3/9/2021 (90 days). Frequency/Duration: 2-3 times a week for 90 Day(s)         Subjective: Patient reports arm feels stiff today.  Pain: 2 /10    Objective: Therapeutic Exercise: (45min) Done in order to improve strength, ROM and understanding of current condition. Date  21 Date  21 Date  21   Activity/Exercise      Education      UBE X 10 min X 10 min X 10 min   Shoulder PROM      Pulleys X 10 min X 10 min    Shoulder AAROM      Elbow PROM X 20 X 20 X 20   Punches X 20 X 20  X 20   Walking      Elbow Flexion Iso   X 20   S/L ER   X 20   ER/IR Iso      Shoulder W      Bicep Curls X 30 # 3 lbs X 30 # 4lbs X 30 # 4 lbs   ER/IR      Rows  3 x 10 3 x 10   Pool exercises X 20 min     Wall walk outs  X 20  X 25 X 25   Lap pull downs  X 20 X 25 X 25       Manual Therapy:   min) Done in order to improve joint and soft tissue mobility,reduce muscle guarding, and decrease muscle tone   Date  21 Date  21 Date  21   Type      Joint Mobilization GHJ: A-P grades II-IV; distraction grades III-IV GHJ: A-P grades II-IV; distraction grades III-IV GHJ: A-P grades II-IV; distraction grades III-IV   Soft Tissue Mobilization Posterior shoulder, subscap, bicep Posterior shoulder, subscap, bicep Posterior shoulder, subscap, bicep       Modalities: (-) Done in order to reduce swelling and pain    Assessment: Patient tolerated treatment well today.  Good motivation    Plan: continue per POC    Future Appointments   Date Time Provider Alec Lashell   2/18/2021  3:00 PM Solange iNno OT [facility]   2/23/2021 11:00 AM Ondina Schneider [facility]   2/23/2021 12:00 PM Brown Mcgee OT [facility]   2/25/2021 10:15 AM Ondina Schneider [facility]   2/25/2021 12:00 PM Solange Nino OT [facility]       Units: 3 TE/  HUMPHREY Lacy, PTA

## 2021-02-18 NOTE — PROGRESS NOTES
Katerina Oneill  : 1960  Primary: [place] Planned Administrators, In*  Secondary:  [address] at [facility]  [address], [address]  [phone]   [phone]              OUTPATIENT OCCUPATIONAL THERAPY: Daily Note 2021    ICD-10: Treatment Diagnosis: I89.0 lymphedema, not elsewhere specified                                                       R60.0 localized edema  Precautions/Allergies:   Biaxin [clarithromycin] and Cortisone   Fall Risk Score:    Ambulatory/Rehab Services H2 Model Falls Risk Assessment    Risk Factors:       No Risk Factors Identified Ability to Rise from Chair:       (1)  Pushes up, successful in one attempt    Falls Prevention Plan:       No modifications necessary   Total: (5 or greater = High Risk): 1     [facility] [email or web address]. All Rights Reserved. [facility] Patent #[id number]. Federal Law prohibits the replication, distribution or use without written permission from [facility] [employer]     MD Orders: eval and treat MEDICAL/REFERRING DIAGNOSIS:   Lymphedema, not elsewhere classified [I89.0]   DATE OF ONSET: 2020   REFERRING PHYSICIAN: Shweta Bhakta MD  RETURN PHYSICIAN APPOINTMENT: to be determined      INITIAL ASSESSMENT:  Ms. Maldonado Owens was referred to occupational therapy for lymphedema treatment of the RUE. Patient sustained a right rotator cuff/biceps tear approximately 2 months ago following walking her dog and the dog jerking the leash. She had a biceps tendon repair on 20. Since then she has had persistent swelling of the RUE that has not resolved on its own. She is in PT for R shoulder rehab and it is yet to be determined if she will be a candidate for rotator cuff repair surgery due to the swelling. Patient presents today with pitting edema in the RUE from the palm to axilla. Her RUE is 45cm larger in size than her LUE.   She will benefit from lymphedema treatment to decrease RUE limb size to aid in RUE rehab and possibly allow for rotator cuff repair if it's deemed appropriate. Once limb size is reduced and stabilized she will be fitted for a compression garment. PLAN OF CARE:   PROBLEM LIST:  1. Decreased Flexibility/Joint Mobility  2. Edema/Girth INTERVENTIONS PLANNED  1. Skin care  2. Compression bandaging  3. Fitting for compression garment(s)  4. Manual therapy/Manual lymph drainage  5. Therapeutic exercise/Therapeutic activities  6. Patient Education  7. Compression pump trial prn  8.  kinesiotaping prn    TREATMENT PLAN:  Effective Dates: 12/15/2020 TO 3/15/2021. Frequency/Duration: 2 times a week for 90 days and upon reassessment will adjust frequency and duration as progress indicates. GOALS: (Goals have been discussed and agreed upon with patient.)  Short-Term Functional Goals: Time Frame: 45 days  1. The patient/caregiver will verbalize understanding of lymphedema precautions. 2. Patient will be independent with skin care regimen to decrease risk of cellulitis. 3. The patient/caregiver will be independent at donning and doffing right upper extremity compression bandages. 4. Patient will be independent in lymphatic exercises. Discharge Goals: Time Frame: 90 days  1. Patient's right upper extremity circumferential measurements will decrease on volumetric graph by 15-20cm to maximize functional use in ADL's.    2. The patient/caregiver will be independent with home management of lymphedema. 3. Patient/caregiver will be independent donning and doffing right upper extremity compression garment. Rehabilitation Potential For Stated Goals: Good                The information in this section was collected on 12/15/2020 (except where otherwise noted). OCCUPATIONAL PROFILE & HISTORY:   History of Present Injury/Illness (Reason for Referral):  Patient was referred to occupational therapy for lymphedema treatment of the RUE.   Patient sustained a right rotator cuff/biceps tear approximately 2 months ago following walking her dog and the dog jerking the leash. She had a biceps tendon repair on 11/30/20. Since then she has had persistent swelling of the RUE that has not resolved on its own. She is in PT for R should rehab and it is yet to be determined if she will be a candidate for rotator cuff repair surgery due to the swelling. Past Medical History/Comorbidities:   Ms. Sherri Leos  has a past medical history of Anemia (08/2018), Cancer of ascending colon ([facility] [address] (2018), Environmental allergies (9/12/2013), History of colon cancer (2018), History of DVT (deep vein thrombosis) (04/2018), Kidney stone, Pulmonary embolism ([facility] [address] (~2018), and Type 2 diabetes mellitus ([facility] [address] She also has no past medical history of Difficult intubation, Malignant hyperthermia due to anesthesia, Nausea & vomiting, or Pseudocholinesterase deficiency. Ms. Sherri Leos  has a past surgical history that includes hx cholecystectomy (1980's); hx lipoma resection (Right, 1980's); pr part removal colon w anastomosis; hx colonoscopy; hx wisdom teeth extraction; and hx other surgical (09/27/2018). Social History/Living Environment:    Patient lives alone  Prior Level of Function/Work/Activity:  Worked in [employer] requiring repetitive motions of BETH's  Dominant Side:         RIGHT  Previous Treatment Approaches: In physical therapy for rehab of rotator cuff/biceps tear  Current Medications:    Current Outpatient Medications:     linaGLIPtin (Tradjenta) 5 mg tablet, Take 5 mg by mouth daily. , Disp: , Rfl:     acetaminophen (TylenoL) 325 mg tablet, Take  by mouth every four (4) hours as needed for Pain., Disp: , Rfl:     rivaroxaban (Xarelto) 20 mg tab tablet, Take 1 Tab by mouth daily (with breakfast). Indications: blood clot in a deep vein of the extremities (Patient taking differently: Take 20 mg by mouth nightly.  11/23/20- pt states has not received instructions from surgeon's office. States she called their office and left message requesting instructions. Office also contacted by nurse. Per patient prescribed by Dr. Ricky Galeas. Indications: blood clot in a deep vein of the extremities), Disp: 90 Tab, Rfl: 3    SITagliptin-metFORMIN (Janumet)  mg per tablet, Take 1 Tab by mouth two (2) times daily (with meals). , Disp: 180 Tab, Rfl: 3    Insulin Needles, Disposable, (BD JIMBO 2ND GEN PEN NEEDLE) 32 gauge x 9/89\" ndle, 257 Applicators by Does Not Apply route daily. , Disp: 100 Pen Needle, Rfl: 5            Date Last Reviewed:  2/18/2021   Complexity Level : Expanded review of therapy/medical records (1-2):  MODERATE COMPLEXITY   ASSESSMENT OF OCCUPATIONAL PERFORMANCE:   Palpation:          Pitting edema RUE from hand to axilla. RUE is 45cm larger in limb size than LUE  ROM:          Limited RUE secondary to recent injury    Skin Integrity:          intact  Sensation:  intact  Functional Mobility:  independent  Activities of Daily Living : independent with extra time  Edema/Girth:  pitting   PRETREATMENT AFFECTED LIMB(s): right upper extremity      Date:  12/15/202 12/18/20 12/22/20 1/5/21 1/11/21 2.16.21    Right / Left           Axilla   [x]      [] 47cm 46cm 46cm 46cm 43cm 45     3 inches   [x]      [] 49cm 47cm 47cm 45cm 45cm 44     Elbow   [x]      [] 35.5cm 35cm 34cm 33cm 28.1cm 29     6 inches   [x]      [] 32.5cm 31cm 31cm 30cm 29.2cm 27     3 inches   [x]      [] 25cm 23cm 22.5cm 21cm 21cm 20.5     Wrist   [x]      [] 17cm 16.5cm 16.2cm 16.5cm 15.7cm 14.5     Palm   [x]      [] 19.8cm 19.5cm 18.8cm 18.3cm 18cm 17     Total limb size   [x]      [] 225.8cm 218 215.5cm 209.8cm 200cm 197 CM   Measurements are taken in centimeters:  2.54 cm = 1 inch          Physical Skills Involved:  1. Edema  2. Skin Integrity Cognitive Skills Affected (resulting in the inability to perform in a timely and safe manner):  1. Psychosocial Skills Affected:  1.  Habits/Routines   Number of elements that affect the Plan of Care: 1-3:  LOW COMPLEXITY   CLINICAL DECISION MAKING:   Outcome Measure: Tool Used: Tool Used: Lymphedema Life Impact Scale   Score:  Initial: 42 Most Recent: X (Date: -- )   Interpretation of Score: The Lymphedema Life Impact Scale (LLIS) is a validated instrument that measures the physical, functional, and psychosocial concerns pertinent to patients with extremity lymphedema. The Scale's questionnaire is administered to patients to gauge impairments, activity limitations, and participation restrictions resulting from their lymphedema. Score 0 1-13 14-26 27-40 41-54 55-67 68   Modifier CH CI CJ CK CL CM CN     ? Other PT/OT Primary Functional Limitations:     - CURRENT STATUS: CL - 60%-79% impaired, limited or restricted    - GOAL STATUS: CK - 40%-59% impaired, limited or restricted    - D/C STATUS:  ---------------To be determined---------------     Medical Necessity:   · Skilled intervention continues to be required due to RUE lymphedema onset following R rotator cuff/bicep tear. Reason for Services/Other Comments:  · Patient continues to require skilled intervention due to patient's inability to self manage RUE lymphedema that is impacting ability to recover from recent R rotator cuff/bicep tear. Use of outcome tool(s) and clinical judgement create a POC that gives a: Clear prediction of patient's progress: LOW COMPLEXITY   TREATMENT:   (In addition to Assessment/Re-Assessment sessions the following treatments were rendered)    Pre-treatment Symptoms/Complaints: Reduction measured 29 cm R LE (11.5 inches). Pt has been wearing her Farrow wrap since the last appointment and reports it is easier to get on/off. Pain: Initial: 1:3  Pain Intensity 1: 1  Post Session:  1:2   Occupational Therapy Treatments:      Therapeutic Exercise ( minutes):     HEP:  As above; handouts given to patient for all exercises.     Manual Therapy:(45 minutes):Patient received scar tissue massage to break adhesions up on lateral R trunk and upper medial aspect of RUE, MLD and compression pump R UE  Manual Lymph Drainage:          Lymph Nodes:    Cervical Supraclavicular Axillary Abdominal Inguinal Popliteal Antecubital   RIGHT []     [x]     [x]     [x]     [x]     []     [x]       LEFT []     []     []     []     []     []     []          Anastamoses:   Axillo-axillary Inguino-inguinal Axillo-inguinal Inguino-axillary   ANTERIOR [x]     []     [x]     []       POSTERIOR []           []     []       RIGHT []     []     [x]     []       LEFT []     []     []     []         Limbs:   [x]    RUE     []    LUE     []    RLE    []    LLE   Self Care: (15 minutes):  Reviewed donning of R UE Farrow wrap. Patient is now able to samaria wrap with min/contact guard assist.    Treatment/Session Assessment:    · Response to Treatment:  Patient tolerated treatment without complication. Transitioning to Farrow wrap  · Compliance with Program/Exercises: good to date. · Recommendations/Intent for next treatment session: \"Next visit will focus on lymphedema treatment guidelines to decrease RUE lymphedema and patient education for self management principles. \".   Total Treatment Duration:  60 minutes  OT Patient Time In/Time Out  Time In: 0300  Time Out: 0400    ROSIE Liriano/BOO, HALIMAT

## 2021-02-18 NOTE — THERAPY EVALUATION
Bennett Wallace : 1960 Primary: Sc Planned Administrators, In* Secondary:  Therapy Center at [facility] 
[address] [place] [address], [address] [place] [address] [address] [place] [address], [address] [phone]   [phone] OUTPATIENT PHYSICAL THERAPY:Re-evaluation 2021 ICD-10: Treatment Diagnosis: M25.511, M75.0, Z47.89 Precautions/Allergies:  
Biaxin [clarithromycin] and Cortisone TREATMENT PLAN: 
Effective Dates: 2020 TO 3/9/2021 (90 days). Frequency/Duration: 2-3 times a week for 90 Day(s) MEDICAL/REFERRING DIAGNOSIS: 
Strain of muscle(s) and tendon(s) of the rotator cuff of right shoulder, initial encounter [S46.011A] Strain of muscle, fascia and tendon of other parts of biceps, right arm, initial encounter [G23.793X] DATE OF ONSET: 2020 REFERRING PHYSICIAN: Litzy Brown MD MD Orders: Era Cast and treat Return MD Appointment:   
 
INITIAL ASSESSMENT:  Ms. Mil Espana presents to physical therapy with MD diagnosis of a R shoulder pain s/p surgical intervention. Pt demonstrated signs and symptoms consistent with this diagnosis. On objective examination, the patient demonstrated deficits in ROM, strength, joint mobility, soft tissue mobility, functional ability. The patient also had increased pain, swelling. The patient is limited in the following activities: using arm, lifting, ADLs, functional tasks, work tasks. The patient has a fair  prognosis for recovery based on the examination findings and may be limited by: N/A. Patient requires skilled physical therapy services in order to return to prior level of function. Patient would benefit from an additional referral to occupational therapy in order to help manage the swelling in her operative arm. REASSESSMENT: Ms. Maximino Schultz presented to physical therapy with MD diagnosis of a R shoulder pain s/p surgical intervention. Pt demonstrated signs and symptoms consistent with this diagnosis. On objective examination, the patient demonstrated improvements in ROM, strength, soft tissue mobility, joint mobility. The patient also has decreased pain These improvements have increased the patient's ability to: ADLs, dressing, house work, work tasks. The patient is still limited in the following activities: lifting, reaching, functional use of her arm, ADLs, work tasks. The patient has a fair prognosis for recovery based on the examination findings and may be limited by: extent of the tightness and damage to her shoulder. Patient continues to require skilled physical therapy services in order to return to prior level of function. Patient may also benefit from additional orthopedic intervention, including additional surgical intervention such as TREASURE and debridement of the extensive scar tissue. Patient has already had a follow up visit with her referring MD and has more scheduled. PROBLEM LIST (Impacting functional limitations): 1. Decreased Strength 2. Decreased ADL/Functional Activities 3. Increased Pain 4. Decreased Activity Tolerance 5. Decreased Flexibility/Joint Mobility 6. Decreased Knowledge of Precautions 7. Decreased [place] with Home Exercise Program INTERVENTIONS PLANNED: (Treatment may consist of any combination of the following) 1. Cold 2. Heat 3. Home Exercise Program (HEP) 4. Manual Therapy 5. Neuromuscular Re-education/Strengthening 6. Range of Motion (ROM) 7. Therapeutic Activites 8. Therapeutic Exercise/Strengthening GOALS: (Goals have been discussed and agreed upon with patient.) Short-Term Functional Goals: Time Frame: 4 weeks 1. Pt will be compliant with progressive HEP-- goal met 2. Pt will have shoulder flexion PROM to 75deg-- goal met 3. Pt will have shoulder ER PROM to 15deg-- goal met Discharge Goals: Time Frame: 10 weeks-- in progress 1. Pt will have shoulder flexion AROM to 80deg 2. Pt will have shoulder IR AROM to L5 3. Pt will improve LIBBY score by 11pts OUTCOME MEASURE:  
OUTCOME: LIBBY Shoulder Score Initial Score: 26/100 Most Recent: 57/100 (Date: 2/18/2021) Interpretation of Score: 20 questions each scored on a 3 point scale with 0 representing \"extreme difficulty or unable to perform\" and 3 representing \"no difficulty\", 3 questions each scored from 0-10 about pain, and 1 question scored 0-10 about function of the shoulder  The lower the score, the greater the functional disability. 100/100 represents no disability, pain or loss of function. Minimal clinically important difference is 11.4. Minimal detectable change is 12.1. MEDICAL NECESSITY:  
· Skilled intervention continues to be required due to lacking full return to functional status. Patient continues to have significant objective deficits that require therapeutic intervention. REASON FOR SERVICES/OTHER COMMENTS: 
· Pt continues to require skilled physical therapy in order to return to prior level of function. Total Duration: 
  
 
Rehabilitation Potential For Stated Goals: Fair Regarding Brooke Mcmahan's therapy, I certify that the treatment plan above will be carried out by a therapist or under their direction. Thank you for this referral, 
Debra Chin PT, DPT, OCS Referring Physician Signature: Marcie Arshad MD _______________________________ Date _____________ PAIN/SUBJECTIVE:  
Initial:   5/10 Post Session:  4/10 HISTORY:  
History of Injury/Illness (Reason for Referral): 
 Pt is s/p R shoulder manipulation on 11/30. Pt states that her dog pulled her arm really hard which hurt her shoulder, but thinks her job for years had done some wear and tear. Following the surgery the patient states her arm has been hurting and feels stiff. Pain is located in the shoulder, but is better than is was prior to the surgery. Past Medical History/Comorbidities: Ms. Yusef Arias  has a past medical history of Anemia (08/2018), Cancer of ascending colon ([facility] [address] (2018), Environmental allergies (9/12/2013), History of colon cancer (2018), History of DVT (deep vein thrombosis) (04/2018), Kidney stone, Pulmonary embolism ([facility] [address] (~2018), and Type 2 diabetes mellitus ([facility] [address] She also has no past medical history of Difficult intubation, Malignant hyperthermia due to anesthesia, Nausea & vomiting, or Pseudocholinesterase deficiency. Ms. Yusef Arias  has a past surgical history that includes hx cholecystectomy (1980's); hx lipoma resection (Right, 1980's); pr part removal colon w anastomosis; hx colonoscopy; hx wisdom teeth extraction; and hx other surgical (09/27/2018). Social History/Living Environment:  
  Lives alone Prior Level of Function/Work/Activity: 
Work: manual labor, lifting, fine motor skills, tool use Personal Factors:   
      Sex:  female Age:  56 y.o. Ambulatory/Rehab Services H2 Model Falls Risk Assessment Risk Factors: 
     No Risk Factors Identified Ability to Rise from Chair: 
     (0)  Ability to rise in a single movement Falls Prevention Plan: No modifications necessary Total: (5 or greater = High Risk): 0  
©2010 [facility] [address] [place] [address] States Patent #[id number]. Federal Law prohibits the replication, distribution or use without written permission from [facility] [email or web address] Current Medications:   
  
Current Outpatient Medications:  
  linaGLIPtin (Tradjenta) 5 mg tablet, Take 5 mg by mouth daily. , Disp: , Rfl:  
   acetaminophen (TylenoL) 325 mg tablet, Take  by mouth every four (4) hours as needed for Pain., Disp: , Rfl:  
  rivaroxaban (Xarelto) 20 mg tab tablet, Take 1 Tab by mouth daily (with breakfast). Indications: blood clot in a deep vein of the extremities (Patient taking differently: Take 20 mg by mouth nightly. 11/23/20- pt states has not received instructions from surgeon's office. States she called their office and left message requesting instructions. Office also contacted by nurse. Per patient prescribed by Dr. Ricky Galeas. Indications: blood clot in a deep vein of the extremities), Disp: 90 Tab, Rfl: 3 
  SITagliptin-metFORMIN (Janumet)  mg per tablet, Take 1 Tab by mouth two (2) times daily (with meals). , Disp: 180 Tab, Rfl: 3 
  Insulin Needles, Disposable, (BD JIMBO 2ND GEN PEN NEEDLE) 32 gauge x 1/27\" ndle, 043 Applicators by Does Not Apply route daily. , Disp: 100 Pen Needle, Rfl: 5 Date Last Reviewed:  02/18/21 Number of Personal Factors/Comorbidities that affect the Plan of Care: 1-2: MODERATE COMPLEXITY EXAMINATION:  
*= Painful     WNL= within normal limits     NT= not tested Observation Posture: rounded shoulders Edema: significant edema in the upper arm and forearm on the R side, firm, non-pitting ROM Shoulder Right Left Flexion 55 136 ABD 40 140 IR 65 T9 ER 35 T2 Elbow Flexion 95 Ext -55 Strength (measured on MMT scale from 0-5/5) Right Left Shoulder Flexion NT 4+ ABD NT 4+  
    IR NT 5  
    ER NT 4 Elbow Flexion 4 5 Extension 4 5 Joint Mobility/Soft Tissue Description Joint Mobility Hypomobile in all directions, painful; this is improved since initial eval  
Soft Tissue Mobility Patient has significant stiffness in her shoulder, minimal pain. Extensive scar tissue and firmness around the posterior and inferior shoulder Body Structures Involved: 1. Bones 2. Joints 3. Muscles 4. Ligaments Body Functions Affected: 1. Sensory/Pain 2. Neuromusculoskeletal 
3. Movement Related Activities and Participation Affected: 1. General Tasks and Demands 2. Self Care 3. Domestic Life 4. Interpersonal Interactions and Relationships 5. Community, Social and [place] Candler Number of elements (examined above) that affect the Plan of Care: 3: MODERATE COMPLEXITY CLINICAL PRESENTATION:  
Presentation: Stable and uncomplicated: LOW COMPLEXITY CLINICAL DECISION MAKING:  
Use of outcome tool(s) and clinical judgement create a POC that gives a: Clear prediction of patient's progress: LOW COMPLEXITY Mathieu Olivares PT, DPT, OCS

## 2021-02-23 ENCOUNTER — HOSPITAL ENCOUNTER (OUTPATIENT)
Dept: PHYSICAL THERAPY | Age: 61
Discharge: HOME | End: 2021-02-23
Payer: COMMERCIAL

## 2021-02-23 NOTE — PROGRESS NOTES
Renu Alva  : 1960  Primary: [place] [employer], In*  Secondary:  [address]  at [facility]  [address], [facility] [address]  [phone]   [phone]      OUTPATIENT DAILY NOTE    NAME/AGE/GENDER: Renu Alva is a 56 y.o. female. DATE: 2021    Ms. Shah Stage for today's appointment due to insurance issues.  Ocie Flow, PTA

## 2021-02-25 ENCOUNTER — HOSPITAL ENCOUNTER (OUTPATIENT)
Dept: PHYSICAL THERAPY | Age: 61
Discharge: HOME | End: 2021-02-25
Payer: COMMERCIAL

## 2021-02-25 NOTE — PROGRESS NOTES
OUTPATIENT DAILY NOTE    NAME/AGE/GENDER: Steve Casas is a 56 y.o. female. DATE: 2/25/2021    Ms. Sellerschauncey Javierny for today's appointment due to a schedule conflict. Obey Phillips OT

## 2021-02-25 NOTE — PROGRESS NOTES
Krys Strong  : 1960  Primary: [place] [employer], In*  Secondary:  [address] at [facility] Therapy  [address], [facility] [address] [facility] [address] [facility] [address]  [phone]   [phone]      OUTPATIENT DAILY NOTE    NAME/AGE/GENDER: Krys Strong is a 56 y.o. female. DATE: 2021    Ms. Jessie Swann for today's appointment due to insurance issues.  King Siu, PTA

## 2021-03-10 PROBLEM — I89.0 LYMPHEDEMA OF RIGHT ARM: Status: ACTIVE | Noted: 2021-03-10

## 2021-03-10 PROBLEM — M25.621 ELBOW STIFFNESS, RIGHT: Status: ACTIVE | Noted: 2021-03-10

## 2021-03-15 ENCOUNTER — HOSPITAL ENCOUNTER (OUTPATIENT)
Dept: LAB | Age: 61
Discharge: HOME | End: 2021-03-15
Payer: COMMERCIAL

## 2021-03-15 DIAGNOSIS — D50.9 IRON DEFICIENCY ANEMIA, UNSPECIFIED IRON DEFICIENCY ANEMIA TYPE: ICD-10-CM

## 2021-03-15 LAB
ALBUMIN SERPL-MCNC: 3.1 G/DL (ref 3.2–4.6)
ALBUMIN/GLOB SERPL: 0.6 {RATIO} (ref 1.2–3.5)
ALP SERPL-CCNC: 98 U/L (ref 50–136)
ALT SERPL-CCNC: 30 U/L (ref 12–65)
ANION GAP SERPL CALC-SCNC: 8 MMOL/L (ref 7–16)
AST SERPL-CCNC: 26 U/L (ref 15–37)
BASOPHILS # BLD: 0.1 K/UL (ref 0–0.2)
BASOPHILS NFR BLD: 1 % (ref 0–2)
BILIRUB SERPL-MCNC: 0.5 MG/DL (ref 0.2–1.1)
BUN SERPL-MCNC: 12 MG/DL (ref 8–23)
CALCIUM SERPL-MCNC: 9.4 MG/DL (ref 8.3–10.4)
CHLORIDE SERPL-SCNC: 106 MMOL/L (ref 98–107)
CO2 SERPL-SCNC: 26 MMOL/L (ref 21–32)
CREAT SERPL-MCNC: 0.9 MG/DL (ref 0.6–1)
DIFFERENTIAL METHOD BLD: ABNORMAL
EOSINOPHIL # BLD: 0.1 K/UL (ref 0–0.8)
EOSINOPHIL NFR BLD: 1 % (ref 0.5–7.8)
ERYTHROCYTE [DISTWIDTH] IN BLOOD BY AUTOMATED COUNT: 15.8 % (ref 11.9–14.6)
FERRITIN SERPL-MCNC: 38 NG/ML (ref 8–388)
GLOBULIN SER CALC-MCNC: 5 G/DL (ref 2.3–3.5)
GLUCOSE SERPL-MCNC: 102 MG/DL (ref 65–100)
HCT VFR BLD AUTO: 33.3 % (ref 35.8–46.3)
HGB BLD-MCNC: 10.6 G/DL (ref 11.7–15.4)
IMM GRANULOCYTES # BLD AUTO: 0 K/UL (ref 0–0.5)
IMM GRANULOCYTES NFR BLD AUTO: 0 % (ref 0–5)
IRON SATN MFR SERPL: 11 %
IRON SERPL-MCNC: 25 UG/DL (ref 35–150)
LYMPHOCYTES # BLD: 2.5 K/UL (ref 0.5–4.6)
LYMPHOCYTES NFR BLD: 31 % (ref 13–44)
MCH RBC QN AUTO: 28 PG (ref 26.1–32.9)
MCHC RBC AUTO-ENTMCNC: 31.8 G/DL (ref 31.4–35)
MCV RBC AUTO: 88.1 FL (ref 79.6–97.8)
MONOCYTES # BLD: 0.8 K/UL (ref 0.1–1.3)
MONOCYTES NFR BLD: 10 % (ref 4–12)
NEUTS SEG # BLD: 4.6 K/UL (ref 1.7–8.2)
NEUTS SEG NFR BLD: 57 % (ref 43–78)
NRBC # BLD: 0 K/UL (ref 0–0.2)
PLATELET # BLD AUTO: 352 K/UL (ref 150–450)
PMV BLD AUTO: 9.9 FL (ref 9.4–12.3)
POTASSIUM SERPL-SCNC: 3.4 MMOL/L (ref 3.5–5.1)
PROT SERPL-MCNC: 8.1 G/DL (ref 6.3–8.2)
RBC # BLD AUTO: 3.78 M/UL (ref 4.05–5.25)
SODIUM SERPL-SCNC: 140 MMOL/L (ref 136–145)
TIBC SERPL-MCNC: 231 UG/DL (ref 250–450)
WBC # BLD AUTO: 8.1 K/UL (ref 4.3–11.1)

## 2021-03-15 PROCEDURE — 82728 ASSAY OF FERRITIN: CPT

## 2021-03-15 PROCEDURE — 85025 COMPLETE CBC W/AUTO DIFF WBC: CPT

## 2021-03-15 PROCEDURE — 80053 COMPREHEN METABOLIC PANEL: CPT

## 2021-03-15 PROCEDURE — 36415 COLL VENOUS BLD VENIPUNCTURE: CPT

## 2021-03-15 PROCEDURE — 83540 ASSAY OF IRON: CPT

## 2021-03-19 ENCOUNTER — APPOINTMENT (OUTPATIENT)
Dept: ULTRASOUND IMAGING | Age: 61
End: 2021-03-19
Attending: EMERGENCY MEDICINE
Payer: COMMERCIAL

## 2021-03-19 ENCOUNTER — HOSPITAL ENCOUNTER (EMERGENCY)
Age: 61
Discharge: HOME | End: 2021-03-19
Attending: EMERGENCY MEDICINE
Payer: COMMERCIAL

## 2021-03-19 ENCOUNTER — APPOINTMENT (OUTPATIENT)
Dept: CT IMAGING | Age: 61
End: 2021-03-19
Attending: EMERGENCY MEDICINE
Payer: COMMERCIAL

## 2021-03-19 ENCOUNTER — APPOINTMENT (OUTPATIENT)
Dept: GENERAL RADIOLOGY | Age: 61
End: 2021-03-19
Attending: EMERGENCY MEDICINE
Payer: COMMERCIAL

## 2021-03-19 VITALS
SYSTOLIC BLOOD PRESSURE: 136 MMHG | TEMPERATURE: 98.2 F | DIASTOLIC BLOOD PRESSURE: 79 MMHG | RESPIRATION RATE: 16 BRPM | OXYGEN SATURATION: 98 % | HEART RATE: 80 BPM

## 2021-03-19 DIAGNOSIS — R59.0 AXILLARY LYMPHADENOPATHY: ICD-10-CM

## 2021-03-19 DIAGNOSIS — R06.02 SOB (SHORTNESS OF BREATH): ICD-10-CM

## 2021-03-19 DIAGNOSIS — I82.A11 ACUTE DEEP VEIN THROMBOSIS (DVT) OF AXILLARY VEIN OF RIGHT UPPER EXTREMITY (HCC): Primary | ICD-10-CM

## 2021-03-19 LAB
ALBUMIN SERPL-MCNC: 3.2 G/DL (ref 3.2–4.6)
ALBUMIN/GLOB SERPL: 0.7 {RATIO} (ref 1.2–3.5)
ALP SERPL-CCNC: 99 U/L (ref 50–136)
ALT SERPL-CCNC: 29 U/L (ref 12–65)
ANION GAP SERPL CALC-SCNC: 6 MMOL/L (ref 7–16)
AST SERPL-CCNC: 30 U/L (ref 15–37)
ATRIAL RATE: 94 BPM
BASOPHILS # BLD: 0 K/UL (ref 0–0.2)
BASOPHILS NFR BLD: 1 % (ref 0–2)
BILIRUB SERPL-MCNC: 0.8 MG/DL (ref 0.2–1.1)
BUN SERPL-MCNC: 11 MG/DL (ref 8–23)
CALCIUM SERPL-MCNC: 9.6 MG/DL (ref 8.3–10.4)
CALCULATED P AXIS, ECG09: 66 DEGREES
CALCULATED R AXIS, ECG10: 36 DEGREES
CALCULATED T AXIS, ECG11: 22 DEGREES
CHLORIDE SERPL-SCNC: 109 MMOL/L (ref 98–107)
CO2 SERPL-SCNC: 26 MMOL/L (ref 21–32)
CREAT SERPL-MCNC: 0.87 MG/DL (ref 0.6–1)
DIAGNOSIS, 93000: NORMAL
DIFFERENTIAL METHOD BLD: ABNORMAL
EOSINOPHIL # BLD: 0.1 K/UL (ref 0–0.8)
EOSINOPHIL NFR BLD: 1 % (ref 0.5–7.8)
ERYTHROCYTE [DISTWIDTH] IN BLOOD BY AUTOMATED COUNT: 15.6 % (ref 11.9–14.6)
GLOBULIN SER CALC-MCNC: 4.6 G/DL (ref 2.3–3.5)
GLUCOSE SERPL-MCNC: 142 MG/DL (ref 65–100)
HCT VFR BLD AUTO: 33.6 % (ref 35.8–46.3)
HGB BLD-MCNC: 10.6 G/DL (ref 11.7–15.4)
IMM GRANULOCYTES # BLD AUTO: 0 K/UL (ref 0–0.5)
IMM GRANULOCYTES NFR BLD AUTO: 0 % (ref 0–5)
LYMPHOCYTES # BLD: 1.6 K/UL (ref 0.5–4.6)
LYMPHOCYTES NFR BLD: 26 % (ref 13–44)
MCH RBC QN AUTO: 28.1 PG (ref 26.1–32.9)
MCHC RBC AUTO-ENTMCNC: 31.5 G/DL (ref 31.4–35)
MCV RBC AUTO: 89.1 FL (ref 79.6–97.8)
MONOCYTES # BLD: 0.6 K/UL (ref 0.1–1.3)
MONOCYTES NFR BLD: 10 % (ref 4–12)
NEUTS SEG # BLD: 3.8 K/UL (ref 1.7–8.2)
NEUTS SEG NFR BLD: 61 % (ref 43–78)
NRBC # BLD: 0 K/UL (ref 0–0.2)
P-R INTERVAL, ECG05: 142 MS
PLATELET # BLD AUTO: 318 K/UL (ref 150–450)
PMV BLD AUTO: 9.7 FL (ref 9.4–12.3)
POTASSIUM SERPL-SCNC: 3.3 MMOL/L (ref 3.5–5.1)
PROT SERPL-MCNC: 7.8 G/DL (ref 6.3–8.2)
Q-T INTERVAL, ECG07: 350 MS
QRS DURATION, ECG06: 78 MS
QTC CALCULATION (BEZET), ECG08: 437 MS
RBC # BLD AUTO: 3.77 M/UL (ref 4.05–5.2)
SODIUM SERPL-SCNC: 141 MMOL/L (ref 136–145)
TROPONIN-HIGH SENSITIVITY: 5.5 PG/ML (ref 0–14)
TROPONIN-HIGH SENSITIVITY: 6 PG/ML (ref 0–14)
VENTRICULAR RATE, ECG03: 94 BPM
WBC # BLD AUTO: 6.3 K/UL (ref 4.3–11.1)

## 2021-03-19 PROCEDURE — 71260 CT THORAX DX C+: CPT

## 2021-03-19 PROCEDURE — 74011000636 HC RX REV CODE- 636: Performed by: EMERGENCY MEDICINE

## 2021-03-19 PROCEDURE — 80053 COMPREHEN METABOLIC PANEL: CPT

## 2021-03-19 PROCEDURE — 99283 EMERGENCY DEPT VISIT LOW MDM: CPT

## 2021-03-19 PROCEDURE — 71046 X-RAY EXAM CHEST 2 VIEWS: CPT

## 2021-03-19 PROCEDURE — 93005 ELECTROCARDIOGRAM TRACING: CPT | Performed by: EMERGENCY MEDICINE

## 2021-03-19 PROCEDURE — 84484 ASSAY OF TROPONIN QUANT: CPT

## 2021-03-19 PROCEDURE — 85025 COMPLETE CBC W/AUTO DIFF WBC: CPT

## 2021-03-19 PROCEDURE — 93971 EXTREMITY STUDY: CPT

## 2021-03-19 PROCEDURE — 74011000258 HC RX REV CODE- 258: Performed by: EMERGENCY MEDICINE

## 2021-03-19 RX ORDER — SODIUM CHLORIDE 0.9 % (FLUSH) 0.9 %
10 SYRINGE (ML) INJECTION
Status: COMPLETED | OUTPATIENT
Start: 2021-03-19 | End: 2021-03-19

## 2021-03-19 RX ADMIN — SODIUM CHLORIDE 100 ML: 900 INJECTION, SOLUTION INTRAVENOUS at 13:19

## 2021-03-19 RX ADMIN — IOPAMIDOL 100 ML: 755 INJECTION, SOLUTION INTRAVENOUS at 13:19

## 2021-03-19 RX ADMIN — Medication 10 ML: at 13:19

## 2021-03-19 NOTE — ED TRIAGE NOTES
Patient ambulatory to triage with mask in place. Patient states she had an ultrasound done on her right arm and neck on Monday due to right arm swelling and was told she had a blood clot from her right neck into her right shoulder. Patient states last night she started having some mild shortness of breath last night this was much worse this morning. Patient states she was on xarelto and they switched her to eliquis and she is supposed to start it tomorrow. Patient denies any cough, fever, or chest pain.

## 2021-03-19 NOTE — ED NOTES
I have reviewed discharge instructions with the patient. The patient verbalized understanding. Patient left ED via Discharge Method: ambulatory to Home with self transport. The patient is ambulatory upon exit and appears in no acute distress. The patient has been provided discharge instructions and follow up information. Opportunity for questions and clarification provided. Patient given 0 scripts. To continue your aftercare when you leave the hospital, you may receive an automated call from our care team to check in on how you are doing. This is a free service and part of our promise to provide the best care and service to meet your aftercare needs.  If you have questions, or wish to unsubscribe from this service please call [phone]. Thank you for Choosing our [address] [place] [address] Emergency Department.

## 2021-03-19 NOTE — ED PROVIDER NOTES
24-year-old female with history of DVT, pulmonary embolism, type 2 diabetes presents with complaint of shortness of breath since last night. Patient states that she was recently diagnosed with a right upper extremity DVT following a rotator cuff repair. States that she has had increasing swelling to right upper extremity. States that she is currently on Xarelto but was informed that she would be switched to Eliquis on tomorrow. Denies cough, hemoptysis, nausea, vomiting, fever, chills, chest pain. The history is provided by the patient. No  was used. Shortness of Breath  This is a new problem. The problem occurs rarely. The current episode started yesterday. The problem has not changed since onset. Pertinent negatives include no fever, no headaches, no coryza, no rhinorrhea, no sore throat, no swollen glands, no ear pain, no neck pain, no cough, no sputum production, no hemoptysis, no wheezing, no PND, no orthopnea, no chest pain, no syncope, no vomiting, no abdominal pain, no rash and no leg pain. She has tried nothing for the symptoms. The treatment provided no relief. Past Medical History:   Diagnosis Date    Anemia 08/2018    PO iron daily-no recent infusions    Cancer of ascending colon ([address] 2018    Environmental allergies 9/12/2013    History of colon cancer 2018    History of DVT (deep vein thrombosis) 04/2018    right lower ext.     Kidney stone     Pulmonary embolism (HCC) ~2018    Type 2 diabetes mellitus (HCC)     oral agent only/AVG BS:/no s.s of hypoglycemia/Last A1c: 5.4 per patient       Past Surgical History:   Procedure Laterality Date    HX CHOLECYSTECTOMY  1980's    HX COLONOSCOPY      HX LIPOMA RESECTION Right 1980's    HX OTHER SURGICAL  09/27/2018    filter placed to right groin; per patient- has been removed     HX WISDOM TEETH EXTRACTION      NM PART REMOVAL COLON W ANASTOMOSIS           Family History:   Problem Relation Age of Onset    No Known Problems Mother     Dementia Father     Heart Disease Father     Hypertension Father     Kidney Disease Father        Social History     Socioeconomic History    Marital status:      Spouse name: Not on file    Number of children: Not on file    Years of education: Not on file    Highest education level: Not on file   Occupational History    Not on file   Social Needs    Financial resource strain: Not on file    Food insecurity     Worry: Not on file     Inability: Not on file    Transportation needs     Medical: Not on file     Non-medical: Not on file   Tobacco Use    Smoking status: Never Smoker    Smokeless tobacco: Never Used   Substance and Sexual Activity    Alcohol use: No    Drug use: No    Sexual activity: Not Currently   Lifestyle    Physical activity     Days per week: Not on file     Minutes per session: Not on file    Stress: Not on file   Relationships    Social connections     Talks on phone: Not on file     Gets together: Not on file     Attends Oriental orthodox service: Not on file     Active member of club or organization: Not on file     Attends meetings of clubs or organizations: Not on file     Relationship status: Not on file    Intimate partner violence     Fear of current or ex partner: Not on file     Emotionally abused: Not on file     Physically abused: Not on file     Forced sexual activity: Not on file   Other Topics Concern    Not on file   Social History Narrative    Pt lives alone. Her son lives in a house behind hers. She works as a  . She has one indoor dog. ALLERGIES: Biaxin [clarithromycin] and Cortisone    Review of Systems   Constitutional: Negative for diaphoresis, fatigue and fever. HENT: Negative for ear pain, rhinorrhea and sore throat. Respiratory: Positive for shortness of breath. Negative for cough, hemoptysis, sputum production and wheezing.     Cardiovascular: Negative for chest pain, palpitations, orthopnea, syncope and PND. Gastrointestinal: Negative for abdominal pain, nausea and vomiting. Genitourinary: Negative for dysuria and flank pain. Musculoskeletal: Negative for gait problem and neck pain. Skin: Negative for pallor and rash. Neurological: Negative for syncope, light-headedness and headaches. Vitals:    03/19/21 1109   BP: (!) 150/73   Pulse: (!) 104   Resp: 18   Temp: 98.4 °F (36.9 °C)   SpO2: 98%            Physical Exam  Vitals signs and nursing note reviewed. Constitutional:       Appearance: She is well-developed. HENT:      Head: Normocephalic. Mouth/Throat:      Mouth: Mucous membranes are moist.   Eyes:      Extraocular Movements: Extraocular movements intact. Pupils: Pupils are equal, round, and reactive to light. Neck:      Musculoskeletal: Normal range of motion. Vascular: No JVD. Cardiovascular:      Rate and Rhythm: Regular rhythm. Tachycardia present. Pulses: Normal pulses. Heart sounds: Normal heart sounds. Comments: Pulses 2+ throughout. Pulmonary:      Effort: Pulmonary effort is normal.      Breath sounds: Normal breath sounds. Abdominal:      Palpations: Abdomen is soft. Tenderness: There is no abdominal tenderness. There is no rebound. Musculoskeletal: Normal range of motion. Comments: Swelling of right upper extremity. No tense muscle compartments. Radial pulses 2+ bilaterally. Cap refill <2 sec. NVID. Skin:     General: Skin is warm. Findings: No erythema. Neurological:      General: No focal deficit present. Mental Status: She is alert and oriented to person, place, and time. Motor: No weakness.           MDM  Number of Diagnoses or Management Options  Acute deep vein thrombosis (DVT) of axillary vein of right upper extremity [facility]): new and requires workup  Axillary lymphadenopathy: new and requires workup  SOB (shortness of breath): new and requires workup  Diagnosis management comments: Vital signs stable. Labs unremarkable. EKG with normal sinus rhythm. Initial troponin unremarkable. Chest x-ray clear. Right upper extremity ultrasound with nonocclusive DVT. CT chest with no evidence of pulmonary embolism. Small right pleural effusion. Extensive abnormal appearance of soft tissues of right hemithorax of uncertain etiology but neoplasm not excluded. There is asymmetrically enlarged right axillary lymph node. Patient is returning for close follow-up with hematology/oncology, PCP. Patient given strict return precautions. Amount and/or Complexity of Data Reviewed  Clinical lab tests: ordered and reviewed  Tests in the radiology section of CPT®: ordered and reviewed  Tests in the medicine section of CPT®: reviewed and ordered  Review and summarize past medical records: yes  Independent visualization of images, tracings, or specimens: yes    Risk of Complications, Morbidity, and/or Mortality  Presenting problems: high  Diagnostic procedures: high  Management options: high    Patient Progress  Patient progress: stable    ED Course as of Mar 19 1404   Fri Mar 19, 2021   1253 CXR IMPRESSION:  No active disease in the chest.    [DF]   1339 CT chest IMPRESSION  1. No evidence of pulmonary embolism. 2. Small right pleural effusion. 3. Extensive abnormal appearance of the soft tissues of the right hemithorax of  uncertain etiology with neoplasm not excluded. There are asymmetrically enlarged  right axillary lymph nodes also having increased in size. [DF]   1339 RUE US IMPRESSION  Limited exam due to inability to tolerate proper positioning. There appears to be nonocclusive thrombus within the right subclavian vein. [DF]      ED Course User Index  [DF] Billy Mcneil MD       EKG    Date/Time: 3/19/2021 11:49 AM  Performed by: Billy Mcneil MD  Authorized by:  Billy Mcneil MD     ECG reviewed by ED Physician in the absence of a cardiologist: yes Rate:     ECG rate:  94    ECG rate assessment: normal    Rhythm:     Rhythm: sinus rhythm    Ectopy:     Ectopy: none    QRS:     QRS axis:  Normal    QRS intervals:  Normal  Conduction:     Conduction: normal    ST segments:     ST segments:  Normal  T waves:     T waves: normal            Results Include:    Recent Results (from the past 24 hour(s))   CBC WITH AUTOMATED DIFF    Collection Time: 03/19/21 11:10 AM   Result Value Ref Range    WBC 6.3 4.3 - 11.1 K/uL    RBC 3.77 (L) 4.05 - 5.2 M/uL    HGB 10.6 (L) 11.7 - 15.4 g/dL    HCT 33.6 (L) 35.8 - 46.3 %    MCV 89.1 79.6 - 97.8 FL    MCH 28.1 26.1 - 32.9 PG    MCHC 31.5 31.4 - 35.0 g/dL    RDW 15.6 (H) 11.9 - 14.6 %    PLATELET 388 762 - 299 K/uL    MPV 9.7 9.4 - 12.3 FL    ABSOLUTE NRBC 0.00 0.0 - 0.2 K/uL    DF AUTOMATED      NEUTROPHILS 61 43 - 78 %    LYMPHOCYTES 26 13 - 44 %    MONOCYTES 10 4.0 - 12.0 %    EOSINOPHILS 1 0.5 - 7.8 %    BASOPHILS 1 0.0 - 2.0 %    IMMATURE GRANULOCYTES 0 0.0 - 5.0 %    ABS. NEUTROPHILS 3.8 1.7 - 8.2 K/UL    ABS. LYMPHOCYTES 1.6 0.5 - 4.6 K/UL    ABS. MONOCYTES 0.6 0.1 - 1.3 K/UL    ABS. EOSINOPHILS 0.1 0.0 - 0.8 K/UL    ABS. BASOPHILS 0.0 0.0 - 0.2 K/UL    ABS. IMM. GRANS. 0.0 0.0 - 0.5 K/UL   METABOLIC PANEL, COMPREHENSIVE    Collection Time: 03/19/21 11:10 AM   Result Value Ref Range    Sodium 141 136 - 145 mmol/L    Potassium 3.3 (L) 3.5 - 5.1 mmol/L    Chloride 109 (H) 98 - 107 mmol/L    CO2 26 21 - 32 mmol/L    Anion gap 6 (L) 7 - 16 mmol/L    Glucose 142 (H) 65 - 100 mg/dL    BUN 11 8 - 23 MG/DL    Creatinine 0.87 0.6 - 1.0 MG/DL    GFR est AA >60 >60 ml/min/1.73m2    GFR est non-AA >60 >60 ml/min/1.73m2    Calcium 9.6 8.3 - 10.4 MG/DL    Bilirubin, total 0.8 0.2 - 1.1 MG/DL    ALT (SGPT) 29 12 - 65 U/L    AST (SGOT) 30 15 - 37 U/L    Alk.  phosphatase 99 50 - 136 U/L    Protein, total 7.8 6.3 - 8.2 g/dL    Albumin 3.2 3.2 - 4.6 g/dL    Globulin 4.6 (H) 2.3 - 3.5 g/dL    A-G Ratio 0.7 (L) 1.2 - 3.5     TROPONIN-HIGH SENSITIVITY    Collection Time: 03/19/21 11:10 AM   Result Value Ref Range    Troponin-High Sensitivity 6.0 0 - 14 pg/mL   EKG, 12 LEAD, INITIAL    Collection Time: 03/19/21 11:46 AM   Result Value Ref Range    Ventricular Rate 94 BPM    Atrial Rate 94 BPM    P-R Interval 142 ms    QRS Duration 78 ms    Q-T Interval 350 ms    QTC Calculation (Bezet) 437 ms    Calculated P Axis 66 degrees    Calculated R Axis 36 degrees    Calculated T Axis 22 degrees    Diagnosis       Normal sinus rhythm  Normal ECG  When compared with ECG of 02-OCT-2018 10:21,  Premature atrial complexes are no longer Present  Nonspecific T wave abnormality, worse in Inferior leads              Jose R Luna MD; 3/19/2021 @11:27 AM Voice dictation software was used during the making of this note. This software is not perfect and grammatical and other typographical errors may be present.   This note has not been proofread for errors.  ===================================================================

## 2021-03-19 NOTE — DISCHARGE INSTRUCTIONS
CT Chest with extensive abnormal appearance of the soft tissues of the right hemithorax of uncertain etiology with neoplasm not excluded. There are asymmetrically enlarged right axillary lymph nodes also having increased in size. Continue taking anticoagulation as prescribed. Schedule close follow-up with oncologist.    Return to ED if symptoms worsen or progress in any way.

## 2021-03-22 ENCOUNTER — HOSPITAL ENCOUNTER (OUTPATIENT)
Dept: CT IMAGING | Age: 61
Discharge: HOME | End: 2021-03-22
Attending: INTERNAL MEDICINE

## 2021-03-22 DIAGNOSIS — I82.621 ACUTE DEEP VEIN THROMBOSIS (DVT) OF OTHER VEIN OF RIGHT UPPER EXTREMITY (HCC): ICD-10-CM

## 2021-03-22 RX ORDER — SODIUM CHLORIDE 0.9 % (FLUSH) 0.9 %
10 SYRINGE (ML) INJECTION
Status: COMPLETED | OUTPATIENT
Start: 2021-03-22 | End: 2021-03-22

## 2021-03-22 RX ADMIN — Medication 10 ML: at 09:06

## 2021-04-05 PROBLEM — C18.9 MALIGNANT NEOPLASM OF COLON, UNSPECIFIED (HCC): Status: ACTIVE | Noted: 2021-04-05

## 2021-04-05 PROBLEM — M54.16 LUMBAR RADICULOPATHY: Status: ACTIVE | Noted: 2018-02-06

## 2021-04-05 PROBLEM — D50.9 MICROCYTIC ANEMIA: Status: ACTIVE | Noted: 2021-04-05

## 2021-04-05 PROBLEM — M48.062 LUMBAR STENOSIS WITH NEUROGENIC CLAUDICATION: Status: ACTIVE | Noted: 2018-01-29

## 2021-04-05 PROBLEM — Z86.711 HX PULMONARY EMBOLISM: Status: ACTIVE | Noted: 2021-04-05

## 2021-04-05 PROBLEM — K86.3 PANCREATIC PSEUDOCYST: Status: ACTIVE | Noted: 2021-04-05

## 2021-04-05 PROBLEM — Z79.01 LONG TERM CURRENT USE OF ANTICOAGULANT: Status: ACTIVE | Noted: 2021-04-05

## 2021-04-05 PROBLEM — G47.00 INSOMNIA: Status: ACTIVE | Noted: 2021-04-05

## 2021-04-07 NOTE — THERAPY DISCHARGE
[facility] has been seen in physical therapy from 12/9/2020 to 2/18/2021 for 21 visits. Treatment has been discontinued at this time due to Decline in medical status and patient's insurance no longer allowed her authorized visits despite continued need for therapy. The below goals were met prior to discontinuation. Some goals were not met due to as mentioned above.    Thank you for this referral.

## 2021-04-13 ENCOUNTER — HOSPITAL ENCOUNTER (OUTPATIENT)
Dept: SURGERY | Age: 61
Discharge: HOME | End: 2021-04-13
Payer: COMMERCIAL

## 2021-04-13 VITALS
DIASTOLIC BLOOD PRESSURE: 80 MMHG | WEIGHT: 197.4 LBS | SYSTOLIC BLOOD PRESSURE: 136 MMHG | HEART RATE: 104 BPM | BODY MASS INDEX: 33.7 KG/M2 | RESPIRATION RATE: 22 BRPM | OXYGEN SATURATION: 98 % | TEMPERATURE: 98 F | HEIGHT: 64 IN

## 2021-04-13 LAB
GLUCOSE BLD STRIP.AUTO-MCNC: 110 MG/DL (ref 65–100)
HGB BLD-MCNC: 10.2 G/DL (ref 11.7–15.4)
SERVICE CMNT-IMP: ABNORMAL

## 2021-04-13 PROCEDURE — 36415 COLL VENOUS BLD VENIPUNCTURE: CPT

## 2021-04-13 PROCEDURE — 85018 HEMOGLOBIN: CPT

## 2021-04-13 PROCEDURE — 82962 GLUCOSE BLOOD TEST: CPT

## 2021-04-13 RX ORDER — INSULIN GLARGINE 300 U/ML
18 INJECTION, SOLUTION SUBCUTANEOUS EVERY MORNING
COMMUNITY
End: 2021-01-01

## 2021-04-13 NOTE — PERIOP NOTES
Patient verified name and     Order for consent not found in EHR; posting incomplete. Needs right arm noted on posting. Type 2 surgery, in person PAT assessment complete. Labs per surgeon: None  Labs per anesthesia protocol: Hgb (patient taken to lab to get Hgb drawn), SQBS (SQBS = 110). EKG: 3/19/2021 NSR. Patient has no CAD, active heart conditions or other heart conditions noted. No other EKG required. Patient COVID test date 21; Patient did show for the appointment. Hospital approved surgical skin cleanser and instructions given per hospital policy. Patient provided with and instructed on educational handouts including Guide to Surgery, Pain Management, Hand Hygiene, Blood Transfusion Education, and [facility] Brochure. Patient answered medical/surgical history questions at their best of ability. All prior to admission medications documented in [facility]. Original medication prescriptions reconciled with [facility] pharmacy in [facility]. Spoke with [facility] with permission of patient to retrieve via phone patient medication list during patient appointment. Patient instructed to hold all vitamins 7 days prior to surgery and NSAIDS 5 days prior to surgery, patient verbalized understanding. Dr. Keysha Peter gave orders to patient per patient regarding Eliquis (Prasantha Danni): patient is to remain on Eliquis (apixaban) through the day BEFORE the procedure (21). Day of procedure (21), patient is to hold Eliquis (apixaban) in the morning. They will instruct patient on when to restart after procedure is complete. Patient teach back successful and patient demonstrates knowledge of instructions.

## 2021-04-13 NOTE — PERIOP NOTES
Recent Results (from the past 12 hour(s))   GLUCOSE, POC    Collection Time: 04/13/21  1:32 PM   Result Value Ref Range    Glucose (POC) 110 (H) 65 - 100 mg/dL    Performed by [place]de    HEMOGLOBIN    Collection Time: 04/13/21  1:49 PM   Result Value Ref Range    HGB 10.2 (L) 11.7 - 15.4 g/dL     Labs reviewed. Routing to surgeon.

## 2021-04-13 NOTE — PERIOP NOTES
PLEASE CONTINUE TAKING ALL PRESCRIPTION MEDICATIONS UP TO THE DAY OF SURGERY UNLESS OTHERWISE DIRECTED BELOW. DISCONTINUE all vitamins and supplements 7 days prior to surgery. DISCONTINUE Non-Steriodal Anti-Inflammatory (NSAIDS) such as Advil and Aleve 5 days prior to surgery. Home Medications to take  the day of surgery   Toujeo 10 units morning of procedure     **Follow instructions from Dr. Dinora Rosales about taking Eliquis (per patient taking all the way through night before surgery**   **take tylenol if needed for pain**     Home Medications   to Hold   DISCONTINUE all vitamins and supplements 7 days prior to surgery. DISCONTINUE Non-Steriodal Anti-Inflammatory (NSAIDS) such as Advil and Aleve 5 days prior to surgery. Comments         *Visitor policy of 2 visitor per patient discussed. Please do not bring home medications with you on the day of surgery unless otherwise directed by your nurse. If you are instructed to bring home medications, please give them to your nurse as they will be administered by the nursing staff. If you have any questions, please call [place] [phone] or [address] [place] [address] [phone]. A copy of this note was provided to the patient for reference.

## 2021-04-15 NOTE — THERAPY DISCHARGE
Keon Montoya : 1960 Primary: [employer], In* Secondary:  [facility] Center at [address] [place] [address], [address] [place] [address], [address] [phone]   [phone] OUTPATIENT OCCUPATIONAL THERAPY: Discontinuation Note 4/15/2021 ICD-10: Treatment Diagnosis: I89.0 lymphedema, not elsewhere specified R60.0 localized edema Precautions/Allergies:  
Biaxin [clarithromycin] and Cortisone Fall Risk Score:  
 Ambulatory/Rehab Services H2 Model Falls Risk Assessment Risk Factors: 
     No Risk Factors Identified Ability to Rise from Chair: 
     (1)  Pushes up, successful in one attempt Falls Prevention Plan: No modifications necessary Total: (5 or greater = High Risk): 1  [facility]. [facility] [employer] Patent #[id number]. Federal Law prohibits the replication, distribution or use without written permission from [facility] [employer] MD Orders: eval and treat MEDICAL/REFERRING DIAGNOSIS:  
Lymphedema, not elsewhere classified [I89.0] DATE OF ONSET: 2020 REFERRING PHYSICIAN: Rajesh Allen MD 
RETURN PHYSICIAN APPOINTMENT: to be determined INITIAL ASSESSMENT:  Ms. Trey Scott was referred to occupational therapy for lymphedema treatment of the RUE. Patient sustained a right rotator cuff/biceps tear approximately 2 months ago following walking her dog and the dog jerking the leash. She had a biceps tendon repair on 20. Since then she has had persistent swelling of the RUE that has not resolved on its own. She is in PT for R shoulder rehab and it is yet to be determined if she will be a candidate for rotator cuff repair surgery due to the swelling. Patient presents today with pitting edema in the RUE from the palm to axilla. Her RUE is 45cm larger in size than her LUE.   She will benefit from lymphedema treatment to decrease RUE limb size to aid in RUE rehab and possibly allow for rotator cuff repair if it's deemed appropriate. Once limb size is reduced and stabilized she will be fitted for a compression garment. DISCONTINUATION (4/15/21):  Patient is discontinued from therapy due to having utilized all therapy visits approved by her insurance. Lymphedema in the RUE remains a great issue and will need to be addressed again in the future to allow maximal functional use of her RUE. She was very compliant in therapy and wears a Farrow velcro wrap for RUE compression. PLAN OF CARE:  
PROBLEM LIST: 
1. Decreased Flexibility/Joint Mobility 2. Edema/Girth INTERVENTIONS PLANNED 1. Skin care 2. Compression bandaging 3. Fitting for compression garment(s) 4. Manual therapy/Manual lymph drainage 5. Therapeutic exercise/Therapeutic activities 6. Patient Education 7. Compression pump trial prn 
8.  kinesiotaping prn   
TREATMENT PLAN: 
Effective Dates: 12/15/2020 TO 3/15/2021. Frequency/Duration: 2 times a week for 90 days and upon reassessment will adjust frequency and duration as progress indicates. GOALS: (Goals have been discussed and agreed upon with patient.) Short-Term Functional Goals: Time Frame: 45 days Goals were progressing (4/15/21) 1. The patient/caregiver will verbalize understanding of lymphedema precautions. 2. Patient will be independent with skin care regimen to decrease risk of cellulitis. 3. The patient/caregiver will be independent at donning and doffing right upper extremity compression bandages. 4. Patient will be independent in lymphatic exercises. Discharge Goals: Time Frame: 90 days Goals were progressing 4/15/21 1. Patient's right upper extremity circumferential measurements will decrease on volumetric graph by 15-20cm to maximize functional use in ADL's.   
2. The patient/caregiver will be independent with home management of lymphedema.    
3. Patient/caregiver will be independent donning and doffing right upper extremity compression garment. Rehabilitation Potential For Stated Goals: Good

## 2021-04-18 ENCOUNTER — ANESTHESIA EVENT (OUTPATIENT)
Dept: SURGERY | Age: 61
End: 2021-04-18
Payer: COMMERCIAL

## 2021-04-18 RX ORDER — SODIUM CHLORIDE 0.9 % (FLUSH) 0.9 %
5-40 SYRINGE (ML) INJECTION AS NEEDED
Status: CANCELLED | OUTPATIENT
Start: 2021-04-18

## 2021-04-18 RX ORDER — SODIUM CHLORIDE 0.9 % (FLUSH) 0.9 %
5-40 SYRINGE (ML) INJECTION EVERY 8 HOURS
Status: CANCELLED | OUTPATIENT
Start: 2021-04-18

## 2021-04-18 RX ORDER — MIDAZOLAM HYDROCHLORIDE 1 MG/ML
2 INJECTION, SOLUTION INTRAMUSCULAR; INTRAVENOUS
Status: CANCELLED | OUTPATIENT
Start: 2021-04-18 | End: 2021-04-19

## 2021-04-18 RX ORDER — ACETAMINOPHEN 500 MG
1000 TABLET ORAL ONCE
Status: CANCELLED | OUTPATIENT
Start: 2021-04-18 | End: 2021-04-18

## 2021-04-18 RX ORDER — HYDROCODONE BITARTRATE AND ACETAMINOPHEN 5; 325 MG/1; MG/1
1 TABLET ORAL AS NEEDED
Status: CANCELLED | OUTPATIENT
Start: 2021-04-18

## 2021-04-18 RX ORDER — SODIUM CHLORIDE, SODIUM LACTATE, POTASSIUM CHLORIDE, CALCIUM CHLORIDE 600; 310; 30; 20 MG/100ML; MG/100ML; MG/100ML; MG/100ML
150 INJECTION, SOLUTION INTRAVENOUS CONTINUOUS
Status: CANCELLED | OUTPATIENT
Start: 2021-04-18

## 2021-04-18 RX ORDER — SODIUM CHLORIDE, SODIUM LACTATE, POTASSIUM CHLORIDE, CALCIUM CHLORIDE 600; 310; 30; 20 MG/100ML; MG/100ML; MG/100ML; MG/100ML
150 INJECTION, SOLUTION INTRAVENOUS CONTINUOUS
Status: CANCELLED | OUTPATIENT
Start: 2021-04-18 | End: 2021-04-19

## 2021-04-18 RX ORDER — ACETAMINOPHEN 500 MG
1000 TABLET ORAL
Status: CANCELLED | OUTPATIENT
Start: 2021-04-18

## 2021-04-18 RX ORDER — FAMOTIDINE 20 MG/1
20 TABLET, FILM COATED ORAL ONCE
Status: CANCELLED | OUTPATIENT
Start: 2021-04-18 | End: 2021-04-18

## 2021-04-18 RX ORDER — SODIUM CHLORIDE 9 MG/ML
50 INJECTION, SOLUTION INTRAVENOUS CONTINUOUS
Status: CANCELLED | OUTPATIENT
Start: 2021-04-18

## 2021-04-18 RX ORDER — LIDOCAINE HYDROCHLORIDE 10 MG/ML
0.1 INJECTION INFILTRATION; PERINEURAL AS NEEDED
Status: CANCELLED | OUTPATIENT
Start: 2021-04-18

## 2021-04-18 RX ORDER — HYDROMORPHONE HYDROCHLORIDE 1 MG/ML
0.5 INJECTION, SOLUTION INTRAMUSCULAR; INTRAVENOUS; SUBCUTANEOUS
Status: CANCELLED | OUTPATIENT
Start: 2021-04-18

## 2021-04-18 RX ORDER — FENTANYL CITRATE 50 UG/ML
100 INJECTION, SOLUTION INTRAMUSCULAR; INTRAVENOUS ONCE
Status: CANCELLED | OUTPATIENT
Start: 2021-04-18 | End: 2021-04-18

## 2021-04-19 ENCOUNTER — ANESTHESIA (OUTPATIENT)
Dept: SURGERY | Age: 61
End: 2021-04-19
Payer: COMMERCIAL

## 2021-04-19 ENCOUNTER — HOSPITAL ENCOUNTER (OUTPATIENT)
Age: 61
Setting detail: OUTPATIENT SURGERY
Discharge: HOME | End: 2021-04-19
Attending: RADIOLOGY | Admitting: RADIOLOGY
Payer: COMMERCIAL

## 2021-04-19 ENCOUNTER — HOSPITAL ENCOUNTER (OUTPATIENT)
Dept: INTERVENTIONAL RADIOLOGY/VASCULAR | Age: 61
Discharge: HOME | End: 2021-04-19
Attending: RADIOLOGY
Payer: COMMERCIAL

## 2021-04-19 VITALS
TEMPERATURE: 98 F | DIASTOLIC BLOOD PRESSURE: 64 MMHG | HEART RATE: 90 BPM | RESPIRATION RATE: 14 BRPM | OXYGEN SATURATION: 96 % | SYSTOLIC BLOOD PRESSURE: 122 MMHG

## 2021-04-19 VITALS
RESPIRATION RATE: 16 BRPM | HEART RATE: 79 BPM | TEMPERATURE: 97.8 F | BODY MASS INDEX: 33.63 KG/M2 | SYSTOLIC BLOOD PRESSURE: 135 MMHG | DIASTOLIC BLOOD PRESSURE: 60 MMHG | WEIGHT: 197 LBS | OXYGEN SATURATION: 100 % | HEIGHT: 64 IN

## 2021-04-19 DIAGNOSIS — I82.621 ACUTE DEEP VEIN THROMBOSIS (DVT) OF OTHER VEIN OF RIGHT UPPER EXTREMITY (HCC): ICD-10-CM

## 2021-04-19 LAB
GLUCOSE BLD STRIP.AUTO-MCNC: 121 MG/DL (ref 65–100)
GLUCOSE BLD STRIP.AUTO-MCNC: 94 MG/DL (ref 65–100)
SERVICE CMNT-IMP: ABNORMAL
SERVICE CMNT-IMP: NORMAL

## 2021-04-19 PROCEDURE — C1769 GUIDE WIRE: HCPCS

## 2021-04-19 PROCEDURE — 74011250636 HC RX REV CODE- 250/636: Performed by: RADIOLOGY

## 2021-04-19 PROCEDURE — C1894 INTRO/SHEATH, NON-LASER: HCPCS

## 2021-04-19 PROCEDURE — 36005 INJECTION EXT VENOGRAPHY: CPT

## 2021-04-19 PROCEDURE — 77030021532 HC CATH ANGI DX IMPRS MRTM -B

## 2021-04-19 PROCEDURE — C1725 CATH, TRANSLUMIN NON-LASER: HCPCS

## 2021-04-19 PROCEDURE — 74011000636 HC RX REV CODE- 636: Performed by: RADIOLOGY

## 2021-04-19 PROCEDURE — 74011000250 HC RX REV CODE- 250: Performed by: NURSE ANESTHETIST, CERTIFIED REGISTERED

## 2021-04-19 PROCEDURE — 74011250636 HC RX REV CODE- 250/636: Performed by: NURSE ANESTHETIST, CERTIFIED REGISTERED

## 2021-04-19 PROCEDURE — 74011000250 HC RX REV CODE- 250: Performed by: RADIOLOGY

## 2021-04-19 PROCEDURE — 76060000034 HC ANESTHESIA 1.5 TO 2 HR: Performed by: RADIOLOGY

## 2021-04-19 PROCEDURE — 77030013058 HC DEV INFL ANGIO BSC -B

## 2021-04-19 PROCEDURE — 82962 GLUCOSE BLOOD TEST: CPT

## 2021-04-19 PROCEDURE — 76937 US GUIDE VASCULAR ACCESS: CPT

## 2021-04-19 RX ORDER — SODIUM CHLORIDE, SODIUM LACTATE, POTASSIUM CHLORIDE, CALCIUM CHLORIDE 600; 310; 30; 20 MG/100ML; MG/100ML; MG/100ML; MG/100ML
INJECTION, SOLUTION INTRAVENOUS
Status: DISCONTINUED | OUTPATIENT
Start: 2021-04-19 | End: 2021-04-19 | Stop reason: HOSPADM

## 2021-04-19 RX ORDER — LIDOCAINE HYDROCHLORIDE 20 MG/ML
2-20 INJECTION, SOLUTION INFILTRATION; PERINEURAL
Status: DISCONTINUED | OUTPATIENT
Start: 2021-04-19 | End: 2021-04-23 | Stop reason: HOSPADM

## 2021-04-19 RX ORDER — PROPOFOL 10 MG/ML
INJECTION, EMULSION INTRAVENOUS
Status: DISCONTINUED | OUTPATIENT
Start: 2021-04-19 | End: 2021-04-19 | Stop reason: HOSPADM

## 2021-04-19 RX ORDER — LIDOCAINE HYDROCHLORIDE 20 MG/ML
INJECTION, SOLUTION EPIDURAL; INFILTRATION; INTRACAUDAL; PERINEURAL AS NEEDED
Status: DISCONTINUED | OUTPATIENT
Start: 2021-04-19 | End: 2021-04-19 | Stop reason: HOSPADM

## 2021-04-19 RX ORDER — HEPARIN SODIUM 200 [USP'U]/100ML
2000 INJECTION, SOLUTION INTRAVENOUS
Status: DISCONTINUED | OUTPATIENT
Start: 2021-04-19 | End: 2021-04-23 | Stop reason: HOSPADM

## 2021-04-19 RX ORDER — PROPOFOL 10 MG/ML
INJECTION, EMULSION INTRAVENOUS AS NEEDED
Status: DISCONTINUED | OUTPATIENT
Start: 2021-04-19 | End: 2021-04-19 | Stop reason: HOSPADM

## 2021-04-19 RX ORDER — FENTANYL CITRATE 50 UG/ML
INJECTION, SOLUTION INTRAMUSCULAR; INTRAVENOUS AS NEEDED
Status: DISCONTINUED | OUTPATIENT
Start: 2021-04-19 | End: 2021-04-19 | Stop reason: HOSPADM

## 2021-04-19 RX ADMIN — HEPARIN SODIUM 4000 UNITS: 200 INJECTION, SOLUTION INTRAVENOUS at 12:00

## 2021-04-19 RX ADMIN — LIDOCAINE HYDROCHLORIDE 40 MG: 20 INJECTION, SOLUTION EPIDURAL; INFILTRATION; INTRACAUDAL; PERINEURAL at 11:29

## 2021-04-19 RX ADMIN — FENTANYL CITRATE 25 MCG: 50 INJECTION INTRAMUSCULAR; INTRAVENOUS at 12:12

## 2021-04-19 RX ADMIN — IOPAMIDOL 30 ML: 612 INJECTION, SOLUTION INTRAVENOUS at 12:42

## 2021-04-19 RX ADMIN — FENTANYL CITRATE 25 MCG: 50 INJECTION INTRAMUSCULAR; INTRAVENOUS at 11:55

## 2021-04-19 RX ADMIN — LIDOCAINE HYDROCHLORIDE 8 ML: 20 INJECTION, SOLUTION INFILTRATION; PERINEURAL at 12:42

## 2021-04-19 RX ADMIN — PROPOFOL 140 MCG/KG/MIN: 10 INJECTION, EMULSION INTRAVENOUS at 11:29

## 2021-04-19 RX ADMIN — SODIUM CHLORIDE, SODIUM LACTATE, POTASSIUM CHLORIDE, AND CALCIUM CHLORIDE: 600; 310; 30; 20 INJECTION, SOLUTION INTRAVENOUS at 11:23

## 2021-04-19 RX ADMIN — FENTANYL CITRATE 50 MCG: 50 INJECTION INTRAMUSCULAR; INTRAVENOUS at 11:34

## 2021-04-19 RX ADMIN — PROPOFOL 50 MG: 10 INJECTION, EMULSION INTRAVENOUS at 11:29

## 2021-04-19 NOTE — PROCEDURES
Department of Interventional Radiology  [phone]        Interventional Radiology Brief Procedure Note    Patient: Elisha Maxwell MRN: [id number]  SSN: [id number]    YOB: 1960  Age: 56 y.o. Sex: female      Date of Procedure: 4/19/2021    Pre-Procedure Diagnosis: RUE DVT    Post-Procedure Diagnosis: SAME    Procedure(s): Venogram    Brief Description of Procedure: as above with angioplasty    Performed By: Gill Abdi MD     Assistants: None    Anesthesia:TIVS/MAC    Estimated Blood Loss: Less than 10ml    Specimens:  None    Implants:  None    Findings: NO thrombus. Stenosed axillary and subclavian veins treated with pta    Complications: None    Recommendations: restart eliquis.   Consider other diagnosis     Follow Up: as needed    Signed By: Gill Abdi MD     April 19, 2021

## 2021-04-19 NOTE — PROGRESS NOTES
TRANSFER - OUT REPORT:    Verbal report given to Su Jiménez RN(name) on Oleta Gamma  being transferred to ir recovery(unit) for routine progression of care       Report consisted of patients Situation, Background, Assessment and   Recommendations(SBAR). Information from the following report(s) SBAR, Procedure Summary and MAR was reviewed with the receiving nurse. Opportunity for questions and clarification was provided. Pt has completed MAC recovery and has been seen and released by Dr Long Bejarano. Pt tolerated procedure well. Visit Vitals  /66   Pulse 71   Temp 97.8 °F (36.6 °C)   Resp (P) 16   Ht 5' 4\" (1.626 m)   Wt 89.4 kg (197 lb)   SpO2 98%   Breastfeeding No   BMI 33.81 kg/m²     Past Medical History:   Diagnosis Date    Adverse effect of anesthesia     slow to wake after cancer surgery on colon    Anemia 08/2018    no PO iron at present and no infusions    Cancer of ascending colon ([address] 2018    Environmental allergies 9/12/2013    History of colon cancer 2018    surgery on colon    History of DVT (deep vein thrombosis) 04/2018    right lower ext.     History of EKG 03/19/2021    NSR    Hx of echocardiogram 05/31/2018    EF 60-65%    Kidney stone     Lumbar stenosis 2018    per Dr. Loren Curry note (care every where)     Pulmonary embolism ([address] ~2018    Type 2 diabetes mellitus ([address]     AVG BS:/no s.s of hypoglycemia/Last A1c: 6.6 on 7/10/2020     Saline Lock 04/19/21 Left Antecubital (Active)

## 2021-04-19 NOTE — DISCHARGE INSTRUCTIONS
[address]  Department of Interventional Radiology  [facility]  [phone] Office  [phone] Fax       Venogram Discharge Instructions    General Information:   A venogram is a procedure that allows the interventional radiologist to take pictures of the blood flow in the vascular system. A radiology contrast (or dye) is injected into the veins so that the condition of the veins and valves may be visualized. This procedure can be used to diagnose narrowing of the veins or the presence of a blood clot in the deep veins of the body. During this process, a stent, filter or a special intravenous line could be placed. Home Care Instructions: You can resume your regular diet. Do not shower, bathe, swim, drink alcohol, or make any important legal decisions in the next 48 hours. Do not lift anything heavier than a gallon of milk for 5 days. If you take Glucophage (metformin) for diabetes, do not take it for the next 48 hours. If you were asked to hold any blood thinners prior to the procedure, you may restart that medicine the day after the procedure is completed. Recline your car seat for the ride home. Call If:   You should call your Physician and/or the Radiology Nurse if you have any signs of infection; fever, drainage, redness, and/or swelling. If the puncture site should ooze, please call. Also call if you have any pain, decreased sensation, numbness, tingling, swelling, or change in color to the affected extremity. SEEK IMMEDIATE MEDICAL CARE IF YOUR PUNCTURE SITE STARTS TO BLEED. APPLY ENOUGH FIRM PRESSURE TO THE SITE WITH THE TIPS OF YOUR FINGERS TO STOP THE BLEEDING. Follow-Up Instructions:  Please see your ordering doctor as he/she has requested. To Reach Us: If you have any questions about your procedure, please call the Interventional Radiology department at [phone].  After business hours (5pm) and weekends, call the answering service at [phone] and ask for the Radiologist on call to be paged. Si tiene Preguntas acerca del procedimiento, por favor llame al departamento de Radiología Intervencional al [phone]. Después de horas de oficina (5 pm) y los fines de [place], llamar al Yasir Trinidad al [phone] y pregunte por el Radiologo de [place]. Interventional Radiology General Nurse Discharge    After general anesthesia or intravenous sedation, for 24 hours or while taking prescription Narcotics:  · Limit your activities  · Do not drive and operate hazardous machinery  · Do not make important personal or business decisions  · Do  not drink alcoholic beverages  · If you have not urinated within 8 hours after discharge, please contact your surgeon on call. * Please give a list of your current medications to your Primary Care Provider. * Please update this list whenever your medications are discontinued, doses are     changed, or new medications (including over-the-counter products) are added. * Please carry medication information at all times in case of emergency situations. These are general instructions for a healthy lifestyle:    No smoking/ No tobacco products/ Avoid exposure to second hand smoke  Surgeon General's Warning:  Quitting smoking now greatly reduces serious risk to your health. Obesity, smoking, and sedentary lifestyle greatly increases your risk for illness  A healthy diet, regular physical exercise & weight monitoring are important for maintaining a healthy lifestyle    You may be retaining fluid if you have a history of heart failure or if you experience any of the following symptoms:  Weight gain of 3 pounds or more overnight or 5 pounds in a week, increased swelling in our hands or feet or shortness of breath while lying flat in bed. Please call your doctor as soon as you notice any of these symptoms; do not wait until your next office visit.     Recognize signs and symptoms of STROKE:  F-face looks uneven    A-arms unable to move or move unevenly    S-speech slurred or non-existent    T-time-call 911 as soon as signs and symptoms begin-DO NOT go       Back to bed or wait to see if you get better-TIME IS BRAIN.     Patient Signature:  Date: 4/19/2021  Discharging Nurse: Yenifer Buckley RN

## 2021-04-19 NOTE — ANESTHESIA POSTPROCEDURE EVALUATION
Procedure(s):  IR ANESTHESIA- RIGHT VENOGRAM WITH POSS INTERVENTION/ TIVS.    total IV anesthesia    Anesthesia Post Evaluation      Multimodal analgesia: multimodal analgesia not used between 6 hours prior to anesthesia start to PACU discharge  Patient location during evaluation: bedside  Patient participation: complete - patient participated  Level of consciousness: awake and alert  Pain management: adequate  Airway patency: patent  Anesthetic complications: no  Cardiovascular status: hemodynamically stable  Respiratory status: spontaneous ventilation  Hydration status: euvolemic  Comments: Patient stable and may discharge at this time.   Post anesthesia nausea and vomiting:  none  Final Post Anesthesia Temperature Assessment:  Normothermia (36.0-37.5 degrees C)      INITIAL Post-op Vital signs:   Vitals Value Taken Time   /66 04/19/21 1336   Temp     Pulse 71 04/19/21 1336   Resp 14 04/19/21 1325   SpO2 98 % 04/19/21 1336

## 2021-04-19 NOTE — H&P
Department of Interventional Radiology  [phone]    History and Physical    Patient:  Charmaine Green MRN:  [id number]  SSN:  [id number]    YOB: 1960  Age:  56 y.o. Sex:  female      Primary Care Provider:  Jeani Ormond, MD  Referring Physician:  Brennen Ely MD  Date of Surgery Update:  Charmaine Green was seen and examined. History and physical has been reviewed. The patient has been examined. There have been no significant clinical changes since the completion of the originally dated History and Physical.    H&P dated 3/31 signed and scanned in [facility].     Visit Vitals  BP (!) 141/64 (BP 1 Location: Right upper arm, BP Patient Position: At rest)   Pulse 86   Temp 97.8 °F (36.6 °C)   Resp 16   Ht 5' 4\" (1.626 m)   Wt 89.4 kg (197 lb)   SpO2 99%   Breastfeeding No   BMI 33.81 kg/m²         Signed By: Rickie Mares PA-C     April 19, 2021 11:46 AM

## 2021-04-19 NOTE — PROGRESS NOTES
Patient in IR 1 for procedure. Anesthesia has assumed care of patient for the duration of procedure. Please see anesthesia record for documentation.

## 2021-04-19 NOTE — ANESTHESIA PREPROCEDURE EVALUATION
Relevant Problems   ENDOCRINE   (+) Controlled type 2 diabetes mellitus without complication, with long-term current use of insulin (HCC)   (+) Obesity due to excess calories   (+) Obesity, morbid (HCC)   (+) Type 2 diabetes mellitus with diabetic neuropathy (HCC)   (+) Type 2 diabetes with nephropathy (HCC)      HEMATOLOGY   (+) Iron deficiency anemia   (+) Microcytic anemia      PERSONAL HX & FAMILY HX OF CANCER   (+) Malignant neoplasm of ascending colon (HCC)   (+) Malignant neoplasm of colon, unspecified (HCC)   (+) Primary adenocarcinoma of ascending colon (HCC)       Anesthetic History   No history of anesthetic complications            Review of Systems / Medical History  Patient summary reviewed and pertinent labs reviewed    Pulmonary  Within defined limits              Comments: Hx of DVT/PE--Eliquis  DVT R arm after R arm rotator cuff surgery 11/20?   Eliquis continued last dose4/18   Neuro/Psych   Within defined limits          Comments: DM neuropathy Cardiovascular              Hyperlipidemia    Exercise tolerance: >4 METS  Comments: 5/18 echo-preserved LV fx   GI/Hepatic/Renal         Renal disease: stones      Comments: Hx of colon ca Endo/Other    Diabetes: type 2, using insulin    Obesity and morbid obesity     Other Findings            Physical Exam    Airway  Mallampati: III  TM Distance: 4 - 6 cm  Neck ROM: normal range of motion   Mouth opening: Normal     Cardiovascular    Rhythm: regular           Dental         Pulmonary                 Abdominal  GI exam deferred       Other Findings            Anesthetic Plan    ASA: 3  Anesthesia type: total IV anesthesia          Induction: Intravenous  Anesthetic plan and risks discussed with: Patient

## 2021-04-19 NOTE — PROGRESS NOTES
Recovery period without difficulty. Pt alert and oriented and denies pain. Dressing is clean, dry, and intact. Reviewed discharge instructions with patient, verbalized understanding. Pt escorted to lobby discharge area via wheelchair. Vital signs and Bayron score completed.

## 2021-05-24 NOTE — THERAPY EVALUATION
Charmaine Green  : 1960  Primary: [employer] [place]*  Secondary:  [address]  at [facility]  [address], [address], [place] [address]  [phone]   [phone]          [place] Assessment 2021   ICD-10: Treatment Diagnosis: other lymphedema I 89.0  Precautions/Allergies:   Biaxin [clarithromycin] and Cortisone    TREATMENT PLAN:  Effective Dates: 2021 TO 2021 (90 days). Frequency/Duration: 2 times a week for 90 Day(s) MEDICAL/REFERRING DIAGNOSIS:  Acute deep vein thrombosis (DVT) of axillary vein of right upper extremity ([facility] [address] [place]. A11]  H/O colon cancer, stage II [Z85.038]    DATE OF ONSET:   REFERRING PHYSICIAN: Ulysses Vega MD MD Orders: lymphedema  Return MD Appointment:       INITIAL ASSESSMENT:  Ms. Tatiana Chirinos presents for an assessment of her lymphedema. She presents with gradual progression of edema beginning in September when she suffered an injury to her shoulder while walking her dog. She reports she had surgery to correct this in November. In April she developed a DVT of the right upper extremity. 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 Angioplasty was performed. She has continued to have increasing edema. She is stage 3 lymphedema at this point. She has lost function in her dominant arm. She is unable to work at this time. She will benefit from lymphedema treatment to reduce the edema and increase her function. PROBLEM LIST (Impacting functional limitations):  1. Decreased Strength  2. Decreased Flexibility/Joint Mobility  3. Edema/Girth  4. Decreased Knowledge of Precautions  5. Decreased Skin Integrity/Hygeine  6. Decreased [place] with Home Exercise Program INTERVENTIONS PLANNED: (Treatment may consist of any combination of the following)  1. Decongestion Therapy  2.  Home Exercise Program (HEP)  3. Manual Therapy  4. Range of Motion (ROM)  5. Therapeutic Exercise/Strengthening     GOALS: (Goals have been discussed and agreed upon with patient.)  Short-Term Functional Goals: Time Frame: 4 weeks  1. The patient will be independent with skin care within 4 weeks. 2. The patient will have knowledge of signs and symptoms of lymphedema and how to manage within 4 weeks. 3. The patient will reduce edema by 2 cm within 4 weeks. 4. The patient will be independent with self MLD within 4 weeks. 5. The patient will be independent with a HEP within 4 weeks. 6.   Discharge Goals: Time Frame: 8 weeks  1. The patient will be fit with a compression garment within 8 weeks. 2. The patient will reduce her edema by 4 cm within 8 weeks. 3. The patient will be independent with self management of lymphedema within 8 weeks. 4.     OUTCOME MEASURE:   Tool Used: Disabilities of the Arm, Shoulder and Hand (DASH) Questionnaire - Quick Version  Score:  Initial: 33/55  Most Recent: X/55 (Date: -- )   Interpretation of Score: The DASH is designed to measure the activities of daily living in person's with upper extremity dysfunction or pain. Each section is scored on a 1-5 scale, 5 representing the greatest disability. The scores of each section are added together for a total score of 55. Tool Used: ECOG Performance Survey Score  Score:  Initial: 1  Most Recent:      Interpretation of Score:   0 Fully active, able to carry on all pre-disease performance without restriction   1 Restricted in physically strenuous activity but ambulatory and able to carry out work of a light or sedentary nature, e.g., light house work, office work   2 Ambulatory and capable of all selfcare but unable to carry out any work activities. Up and about more than 50% of waking hours   3 Capable of only limited selfcare, confined to bed or chair more than 50% of waking hours   4 Completely disabled. Cannot carry on any selfcare.  Totally confined to bed or chair   5 Dead        Tool Used: Lymphedema Life Impact Scale   Score:  Initial: 71 Most Recent: X (Date: -- )   Interpretation of Score: The Lymphedema Life Impact Scale (LLIS) is a validated instrument that measures the physical, functional, and psychosocial concerns pertinent to patients with extremity lymphedema. The Scale's questionnaire is administered to patients to gauge impairments, activity limitations, and participation restrictions resulting from their lymphedema. MEDICAL NECESSITY:   · Patient is expected to demonstrate progress in strength, range of motion and edema management to reduce risk of cellulitis. REASON FOR SERVICES/OTHER COMMENTS:  · Patient continues to demonstrate capacity to improve strength, ROM and edema management which will increase independence. Total Duration:  PT Patient Time In/Time Out  Time In: 1435  Time Out: 1552    Rehabilitation Potential For Stated Goals: [address]'s therapy, I certify that the treatment plan above will be carried out by a therapist or under their direction. Thank you for this referral,  Sharron Cali, PT          PAIN/SUBJECTIVE:   Initial:   0 Post Session:  0/10   HISTORY:   History of Injury/Illness (Reason for Referral): Injury of the right shoulder in September, corrective surgery in November, surgery for DVT in April, edema continued, stage 3 lymphedema  Past Medical History/Comorbidities:   Ms. Elier Corral  has a past medical history of Adverse effect of anesthesia, Anemia (08/2018), Cancer of ascending colon ([address] (2018), Environmental allergies (9/12/2013), History of colon cancer (2018), History of DVT (deep vein thrombosis) (04/2018), History of EKG (03/19/2021), echocardiogram (05/31/2018), Kidney stone, Lumbar stenosis (2018), Pulmonary embolism ([address] (~2018), and Type 2 diabetes mellitus ([address]   Ms. Elier Corral  has a past surgical history that includes hx cholecystectomy (1980's); hx lipoma resection (Right, 1980's); pr part removal colon w anastomosis; hx colonoscopy; hx wisdom teeth extraction; hx orthopaedic; and hx other surgical (09/27/2018). Past Medical History:   Diagnosis Date    Adverse effect of anesthesia     slow to wake after cancer surgery on colon    Anemia 08/2018    no PO iron at present and no infusions    Cancer of ascending colon ([address] 2018    Environmental allergies 9/12/2013    History of colon cancer 2018    surgery on colon    History of DVT (deep vein thrombosis) 04/2018    right lower ext.  History of EKG 03/19/2021    NSR    Hx of echocardiogram 05/31/2018    EF 60-65%    Kidney stone     Lumbar stenosis 2018    per Dr. Nadeem Mendes note (care every where)     Pulmonary embolism ([facility] [address] ~2018    Type 2 diabetes mellitus ([facility] [address]     AVG BS:/no s.s of hypoglycemia/Last A1c: 6.6 on 7/10/2020     Past Surgical History:   Procedure Laterality Date    HX CHOLECYSTECTOMY  1980's    HX COLONOSCOPY      HX LIPOMA RESECTION Right 1980's   Golden Savage right shoulder tendon surgery    HX OTHER SURGICAL  09/27/2018    filter placed to right groin; per patient- has been removed     HX WISDOM TEETH EXTRACTION      [facility] History/Living Environment:     lives alone  Prior Level of Function/Work/Activity:  Works full time, but unable to work presently  Dominant Side:         RIGHT   Ambulatory/Rehab Services H2 Model Falls Risk Assessment   Risk Factors:       No Risk Factors Identified Ability to Rise from Chair:       (1)  Pushes up, successful in one attempt   Falls Prevention Plan:       No modifications necessary   Total: (5 or greater = High Risk): 1   ©2010 [facility] [place] [address] [place] [address] Patent #[id number].  Federal Law prohibits the replication, distribution or use without written permission from [facility] [employer]   Current Medications:       Current Outpatient Medications:     insulin glargine U-300 conc (Toujeo Max U-300 SoloStar) 300 unit/mL (3 mL) inpn, 20 Units by SubCUTAneous route Every morning. SQ under skin every morning, Disp: , Rfl:     apixaban (ELIQUIS) 5 mg tablet, Take 1 Tab by mouth two (2) times a day., Disp: 60 Tab, Rfl: 5    acetaminophen (TylenoL) 325 mg tablet, Take  by mouth every four (4) hours as needed for Pain., Disp: , Rfl:     SITagliptin-metFORMIN (Janumet)  mg per tablet, Take 1 Tab by mouth two (2) times daily (with meals). , Disp: 180 Tab, Rfl: 3    Insulin Needles, Disposable, (BD JIMBO 2ND GEN PEN NEEDLE) 32 gauge x 6/12\" ndle, 644 Applicators by Does Not Apply route daily. , Disp: 100 Pen Needle, Rfl: 5   Date Last Reviewed:  5/24/21   Number of Personal Factors/Comorbidities that affect the Plan of Care: 3+: HIGH COMPLEXITY   EXAMINATION:   Palpation:          Tissue pitting, appears rough in texture, engorged veins in the anterior chest.  Appears bruised. ROM:          Right shoulder flexion 20  Abduction 20  Elbow flexion 80  Extension - 55  Strength:          Minimal active mobility of the shoulder and elbow. Functional Mobility:         Gait/Ambulation:  independent        Transfers:  independent        Bed Mobility:  Independent with moderate difficulty  Skin Integrity:          Skin thin and fragile appearing  Edema/Girth:  pitting    Left Right    Initial Most Recent Initial Most Recent   Upper  Extremity Base 3rd Finger (cm): 17.6  Wrist (cm): 14.9  Mid Forearm (cm): 20.1  Elbow (cm): 24.2  Axilla (cm): 30.4 (35.2)   Base 3rd Finger (cm): 18.9  Wrist (cm): 18.5  Mid Forearm (cm): 32  Elbow (cm): 40.3  Axilla (cm): 55.5 (51.5)     Lower  Extremity               Body Structures Involved:  1. Joints  2. Muscles  3. lymphatic system Body Functions Affected:  1. Neuromusculoskeletal Activities and Participation Affected:  1. General Tasks and Demands  2.  Mobility   Number of elements (examined above) that affect the Plan of Care: 4+: HIGH COMPLEXITY   CLINICAL PRESENTATION:   Presentation: Evolving clinical presentation with changing clinical characteristics: MODERATE COMPLEXITY   CLINICAL DECISION MAKING:   Use of outcome tool(s) and clinical judgement create a POC that gives a: Questionable prediction of patient's progress: MODERATE COMPLEXITY

## 2021-05-24 NOTE — PROGRESS NOTES
Pushpa Magda  : 1960  Primary: [employer]*  Secondary:  [address]  at [facility]  [address] , [address], [address]  [phone]   [phone]        OUTPATIENT PHYSICAL THERAPY: Daily Treatment Note 2021  Visit Count:  1       ICD-10: Treatment Diagnosis: other lymphedema I 89.0  Precautions/Allergies:   Biaxin [clarithromycin] and Cortisone    TREATMENT PLAN:  Effective Dates: 2021 TO 2021 (90 days). Frequency/Duration: 2 times a week for 90 Day(s)       Pre-treatment Symptoms/Complaints:  I can't use my arm. I can't work. Pain: Initial:   3/10 Post Session:  3/10   Medications Last Reviewed:  2021  Updated Objective Findings:  See evaluation note from today  TREATMENT:     THERAPEUTIC EXERCISE: (5 minutes):  Exercises per grid below to improve mobility. Required minimal verbal cues to promote proper body alignment. Progressed range as indicated. MANUAL THERAPY: (30 minutes): Complex decongestive physiotherapy was utilized and necessary because of the patient's restricted joint motion and edema. instructed in wall slides with bilateral upper extremities. Short stretch bandage applied from fingers to axilla using finger wraps, stockinette, cotton padding, 6 cm, 8 cm and 2 10 cm short stretch bandages. She is to remove if pain is present. [facility] Portal  Treatment/Session Summary:    · Response to Treatment:  tolerated the treatment fair. she is very limited in mobility. .  · Communication/Consultation:  HEP  · Equipment provided today:  None today  · Recommendations/Intent for next treatment session: Next visit will focus on CDT and ROM.     Total Treatment Billable Duration:  77 minutes  PT Patient Time In/Time Out  Time In: 1437  Time Out: 9449  Hardy Bro, PT    Future Appointments   Date Time Provider Alec Lobo   2021  0:28 AM SFE CT 16 SLICE UNIT 1 SFERCT SFE   2021  3:30 PM [facility] OUTREACH INSURANCE [facility]   7/22/2021  4:00 PM Cathy Goetz MD [place] [facility]

## 2021-05-26 NOTE — PROGRESS NOTES
German Lat  : 1960  Primary: [employer] [place]*  Secondary:  [address]  at [facility]  [address], [address], [address]  [phone]   [phone]        OUTPATIENT PHYSICAL THERAPY: Daily Treatment Note 2021  Visit Count:  2       ICD-10: Treatment Diagnosis: other lymphedema I 89.0  Precautions/Allergies:   Biaxin [clarithromycin] and Cortisone    TREATMENT PLAN:  Effective Dates: 2021 TO 2021 (90 days). Frequency/Duration: 2 times a week for 90 Day(s)       Pre-treatment Symptoms/Complaints:  I kept the bandage on. Pain: Initial:   3/10 Post Session:  3/10   Medications Last Reviewed:  2021  Updated Objective Findings:  None today  TREATMENT:     THERAPEUTIC EXERCISE: ( minutes):  Exercises per grid below to improve mobility. Required minimal verbal cues to promote proper body alignment. Progressed range as indicated. MANUAL THERAPY: (40 minutes): Complex decongestive physiotherapy was utilized and necessary because of the patient's restricted joint motion and edema. bandage removed. Skin care performed. The patient has a new prescription for Nystatin powder. The powder was applied to the elbow crease and axilla. A short stretch bandage was applied using stockinette, finger wraps, 6 cm, 8 cm and 2 10 cm short stretch bandages from fingers to axilla. To leave in place until it slides down. She will then use the Nystatin powder as prescribed. [facility] Portal  Treatment/Session Summary:    · Response to Treatment:  tolerated the treatment fair. Tissue of the fingers less edematous. Mobility of fingers improved. · Communication/Consultation:  HEP  · Equipment provided today:  None today  · Recommendations/Intent for next treatment session: Next visit will focus on CDT and ROM.     Total Treatment Billable Duration:  40 minutes  PT Patient Time In/Time Out  Time In: 3085  Time Out: 1309 [facility], PT    Future Appointments   Date Time Provider Alec Lobo   6/1/2021 11:30 AM Keisha Cali, PT [facility]   6/3/2021 11:30 AM Stacey Cali, PT [facility]   6/7/2021 10:45 AM Sharron Sparks, PT [facility]   6/10/2021  1:00 PM Sharron Cali, PT [facility]   6/14/2021  1:00 PM Sharron Cali, PT [facility]   6/17/2021 10:00 AM Sharron Cali, PT [facility]   6/21/2021  1:00 PM Sharron Cali, PT [facility]   6/24/2021  1:00 PM Sharron Cali, PT [facility]   7/16/2021  9:54 AM SFE CT 16 SLICE UNIT 1 SFERCT SFE   7/22/2021  3:30 PM [address] [place] [address] OUTREACH INSURANCE [facility] [address] [place] [address]   7/22/2021  4:00 PM Shaye Terry MD [facility]

## 2021-06-01 NOTE — PROGRESS NOTES
Emy Clark  : 1960  Primary: [place] [employer], In*  Secondary:  [address]  at [facility]  [address], [address], [place]  [phone]   [phone]        OUTPATIENT PHYSICAL THERAPY: Daily Treatment Note 2021  Visit Count:  3       ICD-10: Treatment Diagnosis: other lymphedema I 89.0  Precautions/Allergies:   Biaxin [clarithromycin] and Cortisone    TREATMENT PLAN:  Effective Dates: 2021 TO 2021 (90 days). Frequency/Duration: 2 times a week for 90 Day(s)       Pre-treatment Symptoms/Complaints:  My itching is better. I have been using my powder. I kept the bandage on over 24 hours. She reports she has fullness in her ears. She will be seeing a physician next week. Pain: Initial:   3/10 Post Session:  3/10   Medications Last Reviewed:  2021  Updated Objective Findings:  Tissue slightly more pliable, rash much improved. TREATMENT:     THERAPEUTIC EXERCISE: ( minutes):  Exercises per grid below to improve mobility. Required minimal verbal cues to promote proper body alignment. Progressed range as indicated. MANUAL THERAPY: (53 minutes): Complex decongestive physiotherapy was utilized and necessary because of the patient's restricted joint motion and edema. the pateint presented with no bandage in place. Her rash in her inner elbow is improved. Skin care performed. MLD performed along with fibrotic techniques to the right upper extremity, right breast and right lateral trunk. She was instructed in trunk rotation to perform daily  She was unable to tolerated supine so we treated in sitting. A short stretch bandage was applied using stockinette, finger wraps, 6 cm, 8 cm and 2 10 cm short stretch bandages from fingers to axilla. To leave in place until it slides down. Hopefully it will stay in place until she returns. [place].st Classifieds PortalAccess Code: [email or web address]  URL: [email or web address]  Date: Lungs clear, less labored. Drank juice box as per mom. 06/01/2021  Prepared by: Stephen Haynes    Exercises  Standing shoulder flexion wall slides - 2 x daily - 7 x weekly - 1 sets - 10 reps - 5 hold  Supine Lower Trunk Rotation - 2 x daily - 7 x weekly - 1 sets - 10 reps - 5 hold      Treatment/Session Summary:    · Response to Treatment:  tolerated the treatment fair. Tissue more pliable post treatment. Unable to tolerate treatment in supine. · Communication/Consultation:  HEP  · Equipment provided today:  None today  · Recommendations/Intent for next treatment session: Next visit will focus on CDT and ROM.     Total Treatment Billable Duration:  53 minutes  PT Patient Time In/Time Out  Time In: 0180  Time Out: [facility] [facility], PT    Future Appointments   Date Time Provider Alec Lobo   6/3/2021 11:30 AM Dante Cali, PT [facility]   6/7/2021 10:45 AM Sharron Cali, PT [facility]   6/10/2021  1:00 PM Sharron Cali, PT [facility]   6/14/2021  1:00 PM Sharron Cali, PT [facility]   6/17/2021 10:00 AM Sharron Cali, PT [facility]   6/21/2021  1:00 PM Sharron Cali, PT [facility]   6/24/2021  1:00 PM Sharron Cali, PT [facility]   7/16/2021  2:64 AM SFE CT 16 SLICE UNIT 1 SFERCT SFE   7/22/2021  3:30 PM [facility] OUTREACH INSURANCE GCCOIG GVL [facility]   7/22/2021  4:00 PM Carson Mc MD [facility]

## 2021-06-03 NOTE — PROGRESS NOTES
Amie Pitts  : 1960  Primary: Sc [employer], In*  Secondary:  [address]  at [facility]  [address], [address], [place] [address]  [phone]   [phone]        OUTPATIENT PHYSICAL THERAPY: Daily Treatment Note 6/3/2021  Visit Count:  4       ICD-10: Treatment Diagnosis: other lymphedema I 89.0  Precautions/Allergies:   Biaxin [clarithromycin] and Cortisone    TREATMENT PLAN:  Effective Dates: 2021 TO 2021 (90 days). Frequency/Duration: 2 times a week for 90 Day(s)       Pre-treatment Symptoms/Complaints:  I think my arm is a little better. It really felt better after the massage. I have been exercising. The bandage came off yesterday. Pain: Initial:   3/10 Post Session:  3/10   Medications Last Reviewed:  6/3/2021  Updated Objective Findings:  Tissue slightly more pliable, rash much improved. Less edematous in the upper anterior chest.  TREATMENT:     THERAPEUTIC EXERCISE: ( minutes):  Exercises per grid below to improve mobility. Required minimal verbal cues to promote proper body alignment. Progressed range as indicated. MANUAL THERAPY: (34 minutes): Complex decongestive physiotherapy was utilized and necessary because of the patient's restricted joint motion and edema. the pateint presented with no bandage in place. Skin care performed. Modified MLD performed along with fibrotic techniques to the right upper extremity, right breast and right lateral trunk. We treated in sitting. A short stretch bandage was applied using stockinette, finger wraps, 6 cm, 8 cm and 2 10 cm short stretch bandages from fingers to axilla. To leave in place until it slides down. She had a neighbor with her today for instruction in bandaging. Bandaging  was demonstrated. The patient was allowed to video the process. She will practice this weekend. [place] 3D PortalAccess Code: [email or web address]  URL: [email or web address]  Date: 06/01/2021  Prepared by: Jered Barry    Exercises  Standing shoulder flexion wall slides - 2 x daily - 7 x weekly - 1 sets - 10 reps - 5 hold  Supine Lower Trunk Rotation - 2 x daily - 7 x weekly - 1 sets - 10 reps - 5 hold      Treatment/Session Summary:    · Response to Treatment:  tolerated the treatment well. The caregiver reported understanding the process. Tissue less edematous in the upper anterior chest.  · Communication/Consultation:  HEP  · Equipment provided today:  None today  · Recommendations/Intent for next treatment session: Next visit will focus on CDT and ROM.     Total Treatment Billable Duration:  34 minutes  PT Patient Time In/Time Out  Time In: 1130  Time Out: [address], PT    Future Appointments   Date Time Provider Alec Lobo   6/7/2021 10:45 AM Livier ROGER, PT [facility]   6/10/2021  1:00 PM Sharron Cali, PT [facility]   6/14/2021  1:00 PM Sahrron Cali, PT [facility]   6/17/2021 10:00 AM Sharron Cali, PT [facility]   6/21/2021  1:00 PM Sharron Cali, PT [facility]   6/24/2021  1:00 PM Sharron Cali, PT [facility]   7/16/2021  8:21 AM SFE CT 16 SLICE UNIT 1 SFERCT SFE   7/22/2021  3:30 PM [address] [place] [address] OUTREACH INSURANCE GCCOIG GVL [address] [place] [address]   7/22/2021  4:00 PM Mini Estrella MD [facility]

## 2021-06-07 NOTE — PROGRESS NOTES
Joseph Schofieldmisty  : 1960  Primary: [place] [employer], In*  Secondary:  [address]  at [facility]  [address], [address], [place] [address]  [phone]   [phone]        OUTPATIENT PHYSICAL THERAPY: Daily Treatment Note 2021  Visit Count:  5       ICD-10: Treatment Diagnosis: other lymphedema I 89.0  Precautions/Allergies:   Biaxin [clarithromycin] and Cortisone    TREATMENT PLAN:  Effective Dates: 2021 TO 2021 (90 days). Frequency/Duration: 2 times a week for 90 Day(s)       Pre-treatment Symptoms/Complaints: We practiced bandaging over the weekend. Pain: Initial:   3/10 Post Session:  3/10   Medications Last Reviewed:  2021  Updated Objective Findings:  Edema/Girth:  pitting    Left Right    Initial Most Recent Initial Most Recent   Upper  Extremity     Base 3rd Finger (cm): (P) 18.6  Wrist (cm): (P) 18.1  Mid Forearm (cm): (P) 32  Elbow (cm): (P) 39.5  Axilla (cm): (P) 53.1 (49.8)     Lower  Extremity               TREATMENT:     THERAPEUTIC EXERCISE: ( minutes):  Exercises per grid below to improve mobility. Required minimal verbal cues to promote proper body alignment. Progressed range as indicated. MANUAL THERAPY: ( 70 minutes): Complex decongestive physiotherapy was utilized and necessary because of the patient's restricted joint motion and edema. the pateint presented with no bandage in place. Skin care performed. Modified MLD performed along with fibrotic techniques to the right upper extremity, right breast and right lateral trunk. Trunk rotation x 10 reps with 5 count hold. Shoulder flexion, punch forward and pull back x 10 reps with 5 count hold. A short stretch bandage was applied using stockinette, finger wraps, 6 cm, 8 cm and 2 10 cm short stretch bandages from fingers to axilla. To leave in place until it slides down.   Sweet Shop PortalAccess Code: [email or web address]  URL: [email or web address]  Date: 06/01/2021  Prepared by: Fani Logan    Exercises  Standing shoulder flexion wall slides - 2 x daily - 7 x weekly - 1 sets - 10 reps - 5 hold  Supine Lower Trunk Rotation - 2 x daily - 7 x weekly - 1 sets - 10 reps - 5 hold      Treatment/Session Summary:    · Response to Treatment:  tolerated the treatment well. Girth measurements improved. Tissue more pliable. · Communication/Consultation:  HEP  · Equipment provided today:  None today  · Recommendations/Intent for next treatment session: Next visit will focus on CDT and ROM.     Total Treatment Billable Duration:  70 minutes  PT Patient Time In/Time Out  Time In: 1043  Time Out: [address] [place], PT    Future Appointments   Date Time Provider Alec Lobo   6/10/2021  1:00 PM Rogelio cMkeon, PT [facility]   6/14/2021  1:00 PM [facility]   6/17/2021 10:00 AM [facility]   6/21/2021  1:00 PM [facility]   6/24/2021  1:00 PM [facility]   7/16/2021  6:94 AM SFE CT 16 SLICE UNIT 1 SFERCT E   7/22/2021  3:30 PM [facility] OUTREACH INSURANCE GCCOIG GVL [facility]   7/22/2021  4:00 PM Shaye Terry MD [facility]

## 2021-06-10 NOTE — PROGRESS NOTES
German Lat  : 1960  Primary: Sc [employer], In*  Secondary:  [address]  at [facility]  [address], [address], [place] [address]  [phone]   [phone]        OUTPATIENT PHYSICAL THERAPY: Daily Treatment Note 6/10/2021  Visit Count:  6       ICD-10: Treatment Diagnosis: other lymphedema I 89.0  Precautions/Allergies:   Biaxin [clarithromycin] and Cortisone    TREATMENT PLAN:  Effective Dates: 2021 TO 2021 (90 days). Frequency/Duration: 2 times a week for 90 Day(s)       Pre-treatment Symptoms/Complaints:  The patient reports her arm feels softer and she is able to move it more. Pain: Initial:   0/10 Post Session:  0/10   Medications Last Reviewed:  6/10/2021  Updated Objective Findings:  Edema/Girth:  pitting    Left Right    Initial Most Recent Initial Most Recent   Upper  Extremity     Base 3rd Finger (cm): (P) 18.5  Wrist (cm): (P) 18.4  Mid Forearm (cm): (P) 31.7  Elbow (cm): (P) 39.5  Axilla (cm): (P) 53.7 (49.8)     Lower  Extremity               TREATMENT:     THERAPEUTIC EXERCISE: ( minutes):  Exercises per grid below to improve mobility. Required minimal verbal cues to promote proper body alignment. Progressed range as indicated. MANUAL THERAPY: ( 55 minutes): Complex decongestive physiotherapy was utilized and necessary because of the patient's restricted joint motion and edema. the pateint presented with bandage in place. Bandage removed. Skin care performed. Modified MLD performed along with fibrotic techniques to the right upper extremity, right breast and right lateral trunk. Trunk rotation x 10 reps with 5 count hold. Shoulder flexion, punch forward and pull back x 10 reps with 5 count hold. Manual stretch into abduction, elbow extension, supination and wrist extension.    A short stretch bandage was applied using stockinette, finger wraps, 6 cm, 8 cm and 2 10 cm short stretch bandages from fingers to axilla. To leave in place until it slides down. ZarthCode PortalAccess Code: [email or web address]  URL: [email or web address]  Date: 06/01/2021  Prepared by: Miguel Ganies    Exercises  Standing shoulder flexion wall slides - 2 x daily - 7 x weekly - 1 sets - 10 reps - 5 hold  Supine Lower Trunk Rotation - 2 x daily - 7 x weekly - 1 sets - 10 reps - 5 hold      Treatment/Session Summary:    · Response to Treatment:  tolerated the treatment well. Will plan to pursue a static garment for ease of donning and hygiene purposes for the patient. She had a decrease in girth. Tissue softer. Increased flexibility of the elbow and shoulder. · Communication/Consultation:  HEP  · Equipment provided today:  None today  · Recommendations/Intent for next treatment session: Next visit will focus on CDT and ROM.     Total Treatment Billable Duration:  55 minutes  PT Patient Time In/Time Out  Time In: 1300  Time Out: [address] [facility] [address], PT    Future Appointments   Date Time Provider Alec Lobo   6/14/2021  1:00 PM Brielle ROGER, PT [facility]   6/17/2021 10:00 AM Sharron Cali, PT [facility]   6/21/2021  1:00 PM Sharron Cali, PT [facility]   6/24/2021  1:00 PM Sharron Cali, PT [facility]   7/16/2021  3:46 AM SFE CT 16 SLICE UNIT 1 SFERCT SFE   7/22/2021  3:30 PM [address] [place] [address] OUTREACH INSURANCE GCCOIG GVL [address] [place] [address]   7/22/2021  4:00 PM Skylar Cadena MD [facility]

## 2021-06-14 NOTE — PROGRESS NOTES
Grace Hurt  : 1960  Primary: Sc [employer], In*  Secondary:  [address]  at [facility]  [address], [address], [place] [address]  [phone]   [phone]        OUTPATIENT PHYSICAL THERAPY: Daily Treatment Note 2021  Visit Count:  7       ICD-10: Treatment Diagnosis: other lymphedema I 89.0  Precautions/Allergies:   Biaxin [clarithromycin] and Cortisone    TREATMENT PLAN:  Effective Dates: 2021 TO 2021 (90 days). Frequency/Duration: 2 times a week for 90 Day(s)       Pre-treatment Symptoms/Complaints:  The patient reports she can tell she is using her arm more. Pain: Initial:   0/10 Post Session:  0/10   Medications Last Reviewed:  2021  Updated Objective Findings:  Edema/Girth:  pitting    Left Right    Initial Most Recent Initial Most Recent   Upper  Extremity           Lower  Extremity               TREATMENT:     THERAPEUTIC EXERCISE: ( minutes):  Exercises per grid below to improve mobility. Required minimal verbal cues to promote proper body alignment. Progressed range as indicated. MANUAL THERAPY: ( 53 minutes): Complex decongestive physiotherapy was utilized and necessary because of the patient's restricted joint motion and edema. the pateint presented with no bandage in place. Modified MLD performed along with fibrotic techniques to the right upper extremity, right breast and right lateral trunk. Trunk rotation x 10 reps with 5 count hold. Shoulder flexion, punch forward and pull back x 10 reps with 5 count hold. Manual stretch into abduction, elbow extension, supination and wrist extension. A short stretch bandage was applied using stockinette, finger wraps, 6 cm, 8 cm and 2 10 cm short stretch bandages from fingers to axilla. To leave in place until she returns unless pain is present or it slides down. 1/2 inch gray foam used on the dorsum of the hand.   1/4 inch gray foam used on the forearm. DrFirst PortalAccess Code: [email or web address]  URL: [email or web address]  Date: 06/01/2021  Prepared by: Ayana Aguayo    Exercises  Standing shoulder flexion wall slides - 2 x daily - 7 x weekly - 1 sets - 10 reps - 5 hold  Supine Lower Trunk Rotation - 2 x daily - 7 x weekly - 1 sets - 10 reps - 5 hold      Treatment/Session Summary:    · Response to Treatment:  tolerated the treatment well. Tissue continues to soften,  Mobility continues to improve slowly. Will plan to pursue a static garment for ease of donning and hygiene purposes for the patient. Increased flexibility of the elbow and shoulder. · Communication/Consultation:  HEP  · Equipment provided today:  None today  · Recommendations/Intent for next treatment session: Next visit will focus on CDT and ROM.     Total Treatment Billable Duration:  53 minutes  PT Patient Time In/Time Out  Time In: 1304  Time Out: 0382  Maxine Shirts, PT    Future Appointments   Date Time Provider Alec Lobo   6/17/2021 10:00 AM Sharron Cali, PT [facility]   6/21/2021  1:00 PM Sharron Cali, PT [facility]   6/24/2021  1:00 PM Sharron Cali, PT [facility]   7/16/2021  9:41 AM SFE CT 16 SLICE UNIT 1 SFERCT E   7/22/2021  3:30 PM Terry 88 GVL [facility]   7/22/2021  4:00 PM Leatha Schwab, MD [facility]

## 2021-06-17 NOTE — PROGRESS NOTES
Jonathan Zamora  : 1960  Primary: Sc [employer] Administrators, In*  Secondary:  [address]  at [facility]  [address], [place] [address]  [phone]   [phone]        OUTPATIENT PHYSICAL THERAPY: Daily Treatment Note 2021  Visit Count:  8       ICD-10: Treatment Diagnosis: other lymphedema I 89.0  Precautions/Allergies:   Biaxin [clarithromycin] and Cortisone    TREATMENT PLAN:  Effective Dates: 2021 TO 2021 (90 days). Frequency/Duration: 2 times a week for 90 Day(s)       Pre-treatment Symptoms/Complaints:  The patient reports she had to take the foam off and re-bandage her arm  Pain: Initial:   0/10 Post Session:  0/10   Medications Last Reviewed:  2021  Updated Objective Findings:  Edema/Girth:  pitting    Left Right    Initial Most Recent Initial Most Recent   Upper  Extremity           Lower  Extremity               TREATMENT:     THERAPEUTIC EXERCISE: ( minutes):  Exercises per grid below to improve mobility. Required minimal verbal cues to promote proper body alignment. Progressed range as indicated. MANUAL THERAPY: ( 54 minutes): Complex decongestive physiotherapy was utilized and necessary because of the patient's restricted joint motion and edema. the pateint presented with bandage in place. Bandage removed. Modified MLD performed along with fibrotic techniques to the right upper extremity, right breast and right lateral trunk. Trunk rotation x 10 reps with 5 count hold. Shoulder flexion, punch forward and pull back x 10 reps with 5 count hold. Manual stretch into abduction, elbow extension, supination and wrist extension. A short stretch bandage was applied using stockinette, cotton padding, 6 cm, 8 cm and 2 10 cm short stretch bandages from fingers to axilla. To leave in place until she returns unless pain is present or it slides down.      ImageBrief PortalAccess Code: [email or web address]  URL: [email or web address]  Date: 06/01/2021  Prepared by: Gino Montero    Exercises  Standing shoulder flexion wall slides - 2 x daily - 7 x weekly - 1 sets - 10 reps - 5 hold  Supine Lower Trunk Rotation - 2 x daily - 7 x weekly - 1 sets - 10 reps - 5 hold      Treatment/Session Summary:    · Response to Treatment:  tolerated the treatment well. Tissue continues to soften,  Mobility continues to improve slowly. Will plan to pursue a static garment for ease of donning and hygiene purposes for the patient. Increased flexibility of the elbow and shoulder. · Communication/Consultation:  HEP  · Equipment provided today:  None today  · Recommendations/Intent for next treatment session: Next visit will focus on CDT and ROM.     Total Treatment Billable Duration:  54 minutes  PT Patient Time In/Time Out  Time In: 1001  Time Out: 130 Petrona Bourgeois, PT    Future Appointments   Date Time Provider Alec Lobo   6/21/2021  1:00 PM Amador Modi, PT [facility]   6/24/2021  1:00 PM [facility]   7/16/2021  0:94 AM SFE CT 16 SLICE UNIT 1 SFERCT SFE   7/22/2021  3:30 PM [address] [place] [address] OUTREACH INSURANCE [address]   7/22/2021  4:00 PM Oma Schirmer, MD [facility]

## 2021-06-21 NOTE — PROGRESS NOTES
Anneliese Christopher  : 1960  Primary: Sc Planned Administrators, In*  Secondary:  [address]  at [address]  [facility] [address], [place] [address]  [phone]   [phone]        OUTPATIENT PHYSICAL THERAPY: Daily Treatment Note 2021  Visit Count:  9       ICD-10: Treatment Diagnosis: other lymphedema I 89.0  Precautions/Allergies:   Biaxin [clarithromycin] and Cortisone    TREATMENT PLAN:  Effective Dates: 2021 TO 2021 (90 days). Frequency/Duration: 2 times a week for 90 Day(s)       Pre-treatment Symptoms/Complaints:  The patient reports she feels her arm is slow to improve. Pain: Initial:   0/10 Post Session:  0/10   Medications Last Reviewed:  2021  Updated Objective Findings:  Edema/Girth:  pitting    Left Right    Initial Most Recent Initial Most Recent   Upper  Extremity           Lower  Extremity               TREATMENT:     THERAPEUTIC EXERCISE: ( minutes):  Exercises per grid below to improve mobility. Required minimal verbal cues to promote proper body alignment. Progressed range as indicated. MANUAL THERAPY: ( 58 minutes): Complex decongestive physiotherapy was utilized and necessary because of the patient's restricted joint motion and edema. the pateint presented with no bandage in place. Modified MLD performed along with fibrotic techniques to the right upper extremity, right breast and right lateral trunk. Trunk rotation x 10 reps with 5 count hold. Shoulder flexion, punch forward and pull back x 10 reps with 5 count hold. Manual stretch into abduction, elbow extension, supination and wrist extension. A short stretch bandage was applied using stockinette, cotton padding, 6 cm, 8 cm and 2 10 cm short stretch bandages from fingers to axilla. To leave in place until she returns unless pain is present or it slides down. uShip PortalAccess Code: [email or web address]  URL: [email or web address]  Date: 06/01/2021  Prepared by: Paz Griffin    Exercises  Standing shoulder flexion wall slides - 2 x daily - 7 x weekly - 1 sets - 10 reps - 5 hold  Supine Lower Trunk Rotation - 2 x daily - 7 x weekly - 1 sets - 10 reps - 5 hold      Treatment/Session Summary:    · Response to Treatment:  tolerated the treatment well. Trunk tissue looser allowing tissue mobility for trunk rotation. · Communication/Consultation:  HEP  · Equipment provided today:  None today  · Recommendations/Intent for next treatment session: Next visit will focus on CDT and ROM.     Total Treatment Billable Duration:  58 minutes  PT Patient Time In/Time Out  Time In: 1300  Time Out: 1715 [facility], PT    Future Appointments   Date Time Provider Alec Izaguirrei   6/24/2021  1:00 PM Korina Haji, PT [facility]   6/29/2021 10:00 AM Sharron Cali, PT [facility]   7/1/2021  1:00 PM Sharron Cali, PT [facility]   7/6/2021 10:00 AM Sharron Cali, PT [facility]   7/8/2021  1:00 PM Sharron Cali, PT [facility]   7/12/2021 10:00 AM Sharron Cali, PT [facility]   7/15/2021 10:00 AM Sharron Cali, PT [facility]   7/16/2021  0:12 AM SFE CT 16 SLICE UNIT 1 SFERCT SFE   7/19/2021 10:00 AM Sharron Cali, PT [facility]   7/22/2021  2:15 PM Sharron Cali, PT [facility]   7/22/2021  3:30 PM [address] [place] [address] OUTREACH INSURANCE GCCOIG GVL [address] [place] [address]   7/22/2021  4:00 PM Ulysses Vega MD [facility]   7/26/2021 10:00 AM Sharron Cali, PT [facility]   7/29/2021 10:00 AM Ramonita Cali, PT [facility]

## 2021-06-24 PROBLEM — I89.0 LYMPHEDEMA: Status: ACTIVE | Noted: 2021-01-01

## 2021-06-24 NOTE — ED TRIAGE NOTES
Pt arrives via POV c/o lymphedema for a month in the right arm. Presents with right arm swelling. States feels like she's choking every time she eats but unable to get anything up. Arrives with even and unlabored respirations. Currently being treated for the arm swelling through massages and wrapping. Pt states feels like increased swelling around the neck and chest area. Reports SHOB with exertion.

## 2021-06-24 NOTE — PROGRESS NOTES
TRANSFER - IN REPORT:    Verbal report received from Bel on Uli Webber  being received from ER for routine progression of care      Report consisted of patients Situation, Background, Assessment and   Recommendations(SBAR). Information from the following report(s) Kardex was reviewed with the receiving nurse. Opportunity for questions and clarification was provided. Assessment completed upon patients arrival to unit and care assumed.

## 2021-06-24 NOTE — ED NOTES
TRANSFER - OUT REPORT:    Verbal report given to Aylin Saavedra RN on Al  being transferred to 221-01 for routine progression of care       Report consisted of patients Situation, Background, Assessment and   Recommendations(SBAR). Information from the following report(s) SBAR, ED Summary, [facility] and Recent Results was reviewed with the receiving nurse. Lines:   Peripheral IV 06/24/21 Left Antecubital (Active)   Site Assessment Clean, dry, & intact 06/24/21 1150        Opportunity for questions and clarification was provided.       Patient transported with:   [employer]

## 2021-06-24 NOTE — ED PROVIDER NOTES
26-year-old  female with history of colon CA status post resection in 2018, history of DVT/PE on Eliquis, type 2 diabetes, and lymphedema of the right upper extremity presents the emergency department complaining of progressively worsening lymphedema, to the point where it has been come very difficult to swallow anything without feeling get the food is getting caught at the base of the neck on the right side. Patient states that she started having some swelling after shoulder surgery, was evaluated and thought she had a blood clot, at which time they went in and found she did not have a blood clot but they dilated her vein and since then the arm is become even more swollen. At this time she has been referred to a specialist and lymphedema but her condition continues to worsen. She describes an 18 pound weight loss in the last month due to difficulty with eating, not necessarily a change in appetite or taste. She denies any fever or chills or history of trauma. She denies any shortness of breath. The history is provided by the patient. Arm swelling   This is a chronic problem. The current episode started more than 1 week ago. The problem occurs constantly. The problem has been gradually worsening. The patient is experiencing no pain. Associated symptoms include limited range of motion and stiffness. Pertinent negatives include no numbness, no tingling, no itching, no back pain and no neck pain. The symptoms are aggravated by movement (Attempts to swallow). She has tried rest for the symptoms. The treatment provided no relief. There has been no history of extremity trauma.         Past Medical History:   Diagnosis Date    Adverse effect of anesthesia     slow to wake after cancer surgery on colon    Anemia 08/2018    no PO iron at present and no infusions    Cancer of ascending colon ([facility] [address] 2018    Environmental allergies 9/12/2013    History of colon cancer 2018    surgery on colon    History of DVT (deep vein thrombosis) 04/2018    right lower ext.  History of EKG 03/19/2021    NSR    Hx of echocardiogram 05/31/2018    EF 60-65%    Kidney stone     Lumbar stenosis 2018    per Dr. Anneliese Pham note (care every where)     Pulmonary embolism ([facility] [address] ~2018    Type 2 diabetes mellitus ([facility] [address]     AVG BS:/no s.s of hypoglycemia/Last A1c: 6.6 on 7/10/2020       Past Surgical History:   Procedure Laterality Date    HX CHOLECYSTECTOMY  1980's    HX COLONOSCOPY      HX Shirleyann Southerly Right 1980's   Eliza Cardona right shoulder tendon surgery    HX OTHER SURGICAL  09/27/2018    filter placed to right groin; per patient- has been removed     HX WISDOM TEETH EXTRACTION      VT PART REMOVAL COLON W ANASTOMOSIS           Family History:   Problem Relation Age of Onset    No Known Problems Mother     Dementia Father     Heart Disease Father     Hypertension Father     Kidney Disease Father        Social History     Socioeconomic History    Marital status:      Spouse name: Not on file    Number of children: Not on file    Years of education: Not on file    Highest education level: Not on file   Occupational History    Not on file   Tobacco Use    Smoking status: Never Smoker    Smokeless tobacco: Never Used   Vaping Use    Vaping Use: Never used   Substance and Sexual Activity    Alcohol use: No    Drug use: No    Sexual activity: Not Currently   Other Topics Concern    Not on file   Social History Narrative    Pt lives alone. Her son lives in a house behind hers. She works as a  . She has one indoor dog. Social Determinants of Health     Financial Resource Strain:     Difficulty of Paying Living Expenses:    Food Insecurity:     Worried About Running Out of Food in the Last Year:     [address] in the Last Year:    Transportation Needs:     Lack of Transportation (Medical):      Lack of Transportation (Non-Medical):    Physical Activity:     Days of Exercise per Week:     Minutes of Exercise per Session:    Stress:     Feeling of Stress :    Social Connections:     Frequency of Communication with Friends and Family:     Frequency of Social Gatherings with Friends and Family:     Attends Hindu Services:     Active Member of Clubs or Organizations:     Attends Club or Organization Meetings:     Marital Status:    Intimate Partner Violence:     Fear of Current or Ex-Partner:     Emotionally Abused:     Physically Abused:     Sexually Abused: ALLERGIES: Biaxin [clarithromycin] and Cortisone    Review of Systems   Constitutional: Positive for activity change, fatigue and unexpected weight change. Negative for appetite change, chills and fever. HENT: Positive for trouble swallowing. Negative for voice change. Respiratory: Negative for shortness of breath. Cardiovascular: Negative for chest pain, palpitations and leg swelling. Gastrointestinal: Negative for abdominal pain, nausea and vomiting. Genitourinary: Negative for dysuria. Musculoskeletal: Positive for stiffness. Negative for back pain and neck pain. Skin: Negative for itching. Neurological: Negative for tingling and numbness. Hematological: Positive for adenopathy. Bruises/bleeds easily. All other systems reviewed and are negative. Vitals:    06/24/21 1147 06/24/21 1148 06/24/21 1251   BP:  132/65 (!) 150/70   Pulse: (!) 108  97   Resp: 17  18   Temp: 98.5 °F (36.9 °C)     SpO2: 98%  99%   Weight: 79.4 kg (175 lb)     Height: 5' 4\" (1.626 m)              Physical Exam  Vitals and nursing note reviewed. Constitutional:       General: She is not in acute distress. Appearance: She is well-developed. HENT:      Head: Normocephalic and atraumatic. Right Ear: External ear normal.      Left Ear: External ear normal.   Eyes:      Extraocular Movements: Extraocular movements intact.       Conjunctiva/sclera: Conjunctivae normal. Pupils: Pupils are equal, round, and reactive to light. Neck:      Comments: Fullness noted to the right base of the neck  Cardiovascular:      Rate and Rhythm: Normal rate and regular rhythm. Pulses: Normal pulses. Heart sounds: Normal heart sounds. No murmur heard. Pulmonary:      Effort: Pulmonary effort is normal.      Breath sounds: Normal breath sounds. No wheezing, rhonchi or rales. Chest:      Chest wall: Swelling present. Abdominal:      General: Bowel sounds are normal.      Palpations: Abdomen is soft. Tenderness: There is no abdominal tenderness. Musculoskeletal:         General: Swelling (Right upper extremity is twice the size or more of the left upper extremity.) present. Cervical back: Normal range of motion and neck supple. No rigidity. Right lower leg: No edema. Left lower leg: No edema. Lymphadenopathy:      Cervical: No cervical adenopathy. Skin:     General: Skin is warm and dry. Capillary Refill: Capillary refill takes less than 2 seconds. Coloration: Skin is not jaundiced. Findings: No rash. Neurological:      General: No focal deficit present. Mental Status: She is alert and oriented to person, place, and time.    Psychiatric:         Mood and Affect: Mood normal.         Behavior: Behavior normal.          MDM  Number of Diagnoses or Management Options  Abnormal CT scan: new and requires workup  Localized swelling of right upper extremity: new and requires workup  Pleural effusion on right: new and requires workup     Amount and/or Complexity of Data Reviewed  Clinical lab tests: reviewed and ordered  Tests in the radiology section of CPT®: reviewed and ordered  Obtain history from someone other than the patient: yes  Review and summarize past medical records: yes  Discuss the patient with other providers: yes  Independent visualization of images, tracings, or specimens: yes    Risk of Complications, Morbidity, and/or Mortality  Presenting problems: high  Diagnostic procedures: high  Management options: high    Patient Progress  Patient progress: stable         Procedures    The patient was observed in the ED. records were meticulously reviewed. Due to concern over the patient's progressive swelling to the chest wall with reduction in breast size, as well as difficulty swallowing a CT of the neck chest abdomen pelvis was obtained with very concerning findings noted below. Patient was made aware of these findings and the case was discussed with the hospitalist who will admit. Results Reviewed:      Recent Results (from the past 24 hour(s))   CBC WITH AUTOMATED DIFF    Collection Time: 06/24/21 11:50 AM   Result Value Ref Range    WBC 6.3 4.3 - 11.1 K/uL    RBC 3.78 (L) 4.05 - 5.2 M/uL    HGB 10.6 (L) 11.7 - 15.4 g/dL    HCT 34.3 (L) 35.8 - 46.3 %    MCV 90.7 79.6 - 97.8 FL    MCH 28.0 26.1 - 32.9 PG    MCHC 30.9 (L) 31.4 - 35.0 g/dL    RDW 16.1 (H) 11.9 - 14.6 %    PLATELET 765 462 - 860 K/uL    MPV 10.1 9.4 - 12.3 FL    ABSOLUTE NRBC 0.00 0.0 - 0.2 K/uL    DF AUTOMATED      NEUTROPHILS 61 43 - 78 %    LYMPHOCYTES 26 13 - 44 %    MONOCYTES 11 4.0 - 12.0 %    EOSINOPHILS 1 0.5 - 7.8 %    BASOPHILS 1 0.0 - 2.0 %    IMMATURE GRANULOCYTES 0 0.0 - 5.0 %    ABS. NEUTROPHILS 3.9 1.7 - 8.2 K/UL    ABS. LYMPHOCYTES 1.6 0.5 - 4.6 K/UL    ABS. MONOCYTES 0.7 0.1 - 1.3 K/UL    ABS. EOSINOPHILS 0.1 0.0 - 0.8 K/UL    ABS. BASOPHILS 0.1 0.0 - 0.2 K/UL    ABS. IMM.  GRANS. 0.0 0.0 - 0.5 K/UL   METABOLIC PANEL, COMPREHENSIVE    Collection Time: 06/24/21 11:50 AM   Result Value Ref Range    Sodium 140 136 - 145 mmol/L    Potassium 3.6 3.5 - 5.1 mmol/L    Chloride 108 (H) 98 - 107 mmol/L    CO2 26 21 - 32 mmol/L    Anion gap 6 (L) 7 - 16 mmol/L    Glucose 128 (H) 65 - 100 mg/dL    BUN 13 8 - 23 MG/DL    Creatinine 0.72 0.6 - 1.0 MG/DL    GFR est AA >60 >60 ml/min/1.73m2    GFR est non-AA >60 >60 ml/min/1.73m2    Calcium 9.5 8.3 - 10.4 MG/DL Bilirubin, total 1.0 0.2 - 1.1 MG/DL    ALT (SGPT) 37 12 - 65 U/L    AST (SGOT) 54 (H) 15 - 37 U/L    Alk. phosphatase 89 50 - 136 U/L    Protein, total 8.3 (H) 6.3 - 8.2 g/dL    Albumin 3.3 3.2 - 4.6 g/dL    Globulin 5.0 (H) 2.3 - 3.5 g/dL    A-G Ratio 0.7 (L) 1.2 - 3.5         CT NECK CHEST ABD PELV W CONT   Final Result   1. Diffuse edematous change of the rightward aspect of the neck, mediastinum,   right axilla, right upper extremity, and right breast. There is diffuse skin   thickening and trabecular thickening of the right breast with an overall   smaller/contracted appearance of the right breast compared to the left. This is   suspicious for inflammatory breast cancer. Clinical correlation is advised with   consideration of diagnostic evaluation in the breast center. 2. Highly diminutive appearance of the right internal jugular vein which is   nearly occluded, further evaluation with vascular ultrasound can be considered. Prominent venous collaterals of the right chest wall. 3. Pathologically enlarged right axillary lymph nodes with a lymph conglomerate   measuring up to 3.9 cm, similar to although progressed from prior's. Biopsy can   be considered. 4. Wall thickening of the mid and distal esophagus, possible esophagitis. Consider further evaluation with EGD versus barium esophagram.   5. Enlarging hypodensity along falciform ligament of the liver most likely focal   fatty infiltration although indeterminate, attention on follow-up. 6. Large right pleural effusion. 7. Unchanged pancreatic tail cyst versus cystic neoplasm measuring up to 3.9 cm. These results were discussed with emergency room physician Dr. Moses Leblanc   specifically of my concern for potential inflammatory breast cancer. XR CHEST PA LAT   Final Result   1. No acute cardiopulmonary abnormality.

## 2021-06-24 NOTE — H&P
[facility]ist Note     Admit Date:  2021 12:50 PM   Name:  Olga Seo   Age:  59 y.o.  :  1960   MRN:  [id number]   PCP:  Tatum Griffiths MD  Treatment Team: Attending Provider: Renata Davies MD; Primary Nurse: Adiel Israel RN    HPI/Subjective:   Patient is a 75-year-old female with past medical history significant for diabetes mellitus, history of DVT/PE on Eliquis, history of colon cancer status post resection in 2018 and lymphedema of right upper extremity presented to the ED with worsening swelling of right upper extremity and dysphagia. Patient reports she had right shoulder arthroscopy and biceps repair in 2020 and since then she has developed significant swelling of the arm. She was evaluated and initially thought she had a DVT. She did have IR procedure and found she did not have any blood clot. She has been referred to [facility] to a specialist for lymphedema management but her condition continues to worsen. She reports lymphedema has been worsen to the point where she started having difficulty swallowing, and has lost 18 pounds unintentionally in a month. Denies chest pain, shortness of breath, nausea, vomiting, or abdominal pain. In the ED she was vitally stable, labs unremarkable. CT chest with concerning findings detailed as below, suspicious for inflammatory breast cancer, right internal jugular vein nearly occluded, pathological right axillary lymph nodes, mild thickening of mid and distal esophagus and large right pleural effusion. [facility] consulted for the admission.     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 for possible diagnostic/therapeutic thoracentesis     History of DVT/PE:  On Eliquis, will hold for now for possible IR intervention  Heparin for DVT prophylaxis    Diabetes mellitus:  Hold oral hypoglycemic  Sliding scale    Discharge planning: Admit patient to the medical floor    DVT ppx ordered  Code status:  Full  Estimated LOS:  Greater than 2 midnights  Risk:  high    Hospital Problems as of 6/24/2021 Date Reviewed: 5/20/2021        Codes Class Noted - Resolved POA    Lymphedema ICD-10-CM: I89.0  ICD-9-CM: 457.1  6/24/2021 - Present Unknown        Hx pulmonary embolism ICD-10-CM: G70.945  ICD-9-CM: V12.55  4/5/2021 - Present Yes        Pancreatic pseudocyst ICD-10-CM: K86.3  ICD-9-CM: 577.2  4/5/2021 - Present Yes        * (Principal) Lymphedema of right arm ICD-10-CM: I89.0  ICD-9-CM: 457.1  3/10/2021 - Present Yes        Malignant neoplasm of [facility] ([facility] ICD-10-CM: C18.2  ICD-9-CM: 153.6  9/28/2018 - Present Yes        Current use of anticoagulant therapy ICD-10-CM: Z79.01  ICD-9-CM: V58.61  9/18/2018 - Present Yes        Type 2 diabetes mellitus with diabetic neuropathy ([facility] ICD-10-CM: E11.40  ICD-9-CM: 250.60, 357.2  2/21/2018 - Present Yes                10 systems reviewed and negative except as noted in HPI.   Past Medical History:   Diagnosis Date    Adverse effect of anesthesia     slow to wake after cancer surgery on colon    Anemia 08/2018    no PO iron at present and no infusions    Cancer of ascending colon ([facility] 2018    Environmental allergies 9/12/2013    History of colon cancer 2018    surgery on colon    History of DVT (deep vein thrombosis) 2018    right lower ext.  History of EKG 2021    NSR    Hx of echocardiogram 2018    EF 60-65%    Kidney stone     Lumbar stenosis 2018    per Dr. Jesse Morfin note (care every where)     Pulmonary embolism ([facility] ~2018    Type 2 diabetes mellitus ([facility]     AVG BS:/no s.s of hypoglycemia/Last A1c: 6.6 on 7/10/2020      Past Surgical History:   Procedure Laterality Date    HX CHOLECYSTECTOMY      HX COLONOSCOPY      HX LIPOMA RESECTION Right    Stacey Ingram right shoulder tendon surgery    HX OTHER SURGICAL  2018    filter placed to right groin; per patient- has been removed     HX WISDOM TEETH EXTRACTION      MD PART REMOVAL COLON W ANASTOMOSIS        Allergies   Allergen Reactions    Biaxin [Clarithromycin] Rash    Cortisone Other (comments)     NUMBNESS IN THE LEG (SITE OF INJECTION)  Per pt, leg numbness. Social History     Tobacco Use    Smoking status: Never Smoker    Smokeless tobacco: Never Used   Substance Use Topics    Alcohol use: No      Family History   Problem Relation Age of Onset    No Known Problems Mother     Dementia Father     Heart Disease Father     Hypertension Father     Kidney Disease Father       Family history reviewed and noncontributory. Immunization History   Administered Date(s) Administered    Influenza High Dose Vaccine PF 2017, 2018    Influenza Vaccine 2017, 2018    Influenza, Quadrivalent, Adjuvanted (>65 Yrs FLUAD QUAD [id number]) 2020    Pneumococcal Polysaccharide (PPSV-23) 2015    Td 2010    Tdap 2020     PTA Medications:  Prior to Admission Medications   Prescriptions Last Dose Informant Patient Reported? Taking? Insulin Needles, Disposable, (BD JIMBO 2ND GEN PEN NEEDLE) 32 gauge x \" ndle   No No   Si Applicators by Does Not Apply route daily.    SITagliptin-metFORMIN (Janumet)  mg per tablet   No No   Sig: Take 1 Tab by mouth two (2) times daily (with meals). acetaminophen (TylenoL) 325 mg tablet   Yes No   Sig: Take  by mouth every four (4) hours as needed for Pain. apixaban (ELIQUIS) 5 mg tablet   No No   Sig: Take 1 Tab by mouth two (2) times a day. insulin glargine U-300 conc (Toujeo Max U-300 SoloStar) 300 unit/mL (3 mL) inpn   Yes No   Si Units by SubCUTAneous route Every morning. SQ under skin every morning   nystatin (MYCOSTATIN) powder   No No   Sig: Apply  to affected area four (4) times daily. Apply to elbow creases and axilla      Facility-Administered Medications: None       Objective:     Patient Vitals for the past 24 hrs:   Temp Pulse Resp BP SpO2   21 1451    (!) 173/81 97 %   21 1320    (!) 147/70 98 %   21 1251  97 18 (!) 150/70 99 %   21 1148    132/65    21 1147 98.5 °F (36.9 °C) (!) 108 17  98 %     Oxygen Therapy  O2 Sat (%): 97 % (21 1451)  Pulse via Oximetry: 95 beats per minute (21 1451)  O2 Device: None (Room air) (21 1309)    Estimated body mass index is 30.04 kg/m² as calculated from the following:    Height as of this encounter: 5' 4\" (1.626 m). Weight as of this encounter: 79.4 kg (175 lb). Intake/Output Summary (Last 24 hours) at 2021 1732  Last data filed at 2021 1458  Gross per 24 hour   Intake 100 ml   Output    Net 100 ml       *Note that automatically entered I/Os may not be accurate; dependent on patient compliance with collection and accurate  by assistants. Physical Exam:  General:    Alert. Eyes:   Normal sclerae. Extraocular movements intact. HENT:  Normocephalic, atraumatic. Moist mucous membranes  CV:   RRR. No m/r/g. Lungs:  CTAB. No wheezing, rhonchi, or rales. Abdomen: Soft, nontender, nondistended.    Extremities: Diffuse edema of right upper extremity up to the right breast, thickening of the skin, limited range of motion  Neurologic: CN II-XII grossly intact. No focal deficits  Skin:     No rashes or jaundice. Normal coloration  Psych:  Normal mood and affect. I reviewed the labs, imaging, EKGs, telemetry, and other studies done this admission. Data Reviewed:   Recent Results (from the past 24 hour(s))   CBC WITH AUTOMATED DIFF    Collection Time: 06/24/21 11:50 AM   Result Value Ref Range    WBC 6.3 4.3 - 11.1 K/uL    RBC 3.78 (L) 4.05 - 5.2 M/uL    HGB 10.6 (L) 11.7 - 15.4 g/dL    HCT 34.3 (L) 35.8 - 46.3 %    MCV 90.7 79.6 - 97.8 FL    MCH 28.0 26.1 - 32.9 PG    MCHC 30.9 (L) 31.4 - 35.0 g/dL    RDW 16.1 (H) 11.9 - 14.6 %    PLATELET 779 779 - 133 K/uL    MPV 10.1 9.4 - 12.3 FL    ABSOLUTE NRBC 0.00 0.0 - 0.2 K/uL    DF AUTOMATED      NEUTROPHILS 61 43 - 78 %    LYMPHOCYTES 26 13 - 44 %    MONOCYTES 11 4.0 - 12.0 %    EOSINOPHILS 1 0.5 - 7.8 %    BASOPHILS 1 0.0 - 2.0 %    IMMATURE GRANULOCYTES 0 0.0 - 5.0 %    ABS. NEUTROPHILS 3.9 1.7 - 8.2 K/UL    ABS. LYMPHOCYTES 1.6 0.5 - 4.6 K/UL    ABS. MONOCYTES 0.7 0.1 - 1.3 K/UL    ABS. EOSINOPHILS 0.1 0.0 - 0.8 K/UL    ABS. BASOPHILS 0.1 0.0 - 0.2 K/UL    ABS. IMM. GRANS. 0.0 0.0 - 0.5 K/UL   METABOLIC PANEL, COMPREHENSIVE    Collection Time: 06/24/21 11:50 AM   Result Value Ref Range    Sodium 140 136 - 145 mmol/L    Potassium 3.6 3.5 - 5.1 mmol/L    Chloride 108 (H) 98 - 107 mmol/L    CO2 26 21 - 32 mmol/L    Anion gap 6 (L) 7 - 16 mmol/L    Glucose 128 (H) 65 - 100 mg/dL    BUN 13 8 - 23 MG/DL    Creatinine 0.72 0.6 - 1.0 MG/DL    GFR est AA >60 >60 ml/min/1.73m2    GFR est non-AA >60 >60 ml/min/1.73m2    Calcium 9.5 8.3 - 10.4 MG/DL    Bilirubin, total 1.0 0.2 - 1.1 MG/DL    ALT (SGPT) 37 12 - 65 U/L    AST (SGOT) 54 (H) 15 - 37 U/L    Alk.  phosphatase 89 50 - 136 U/L    Protein, total 8.3 (H) 6.3 - 8.2 g/dL    Albumin 3.3 3.2 - 4.6 g/dL    Globulin 5.0 (H) 2.3 - 3.5 g/dL    A-G Ratio 0.7 (L) 1.2 - 3.5         All Micro Results     None          Current Facility-Administered Medications   Medication Dose Route Frequency    pantoprazole (PROTONIX) 40 mg in 0.9% sodium chloride 10 mL injection  40 mg IntraVENous Q12H    heparin (porcine) injection 5,000 Units  5,000 Units SubCUTAneous Q8H     Current Outpatient Medications   Medication Sig    nystatin (MYCOSTATIN) powder Apply  to affected area four (4) times daily. Apply to elbow creases and axilla    insulin glargine U-300 conc (Toujeo Max U-300 SoloStar) 300 unit/mL (3 mL) inpn 20 Units by SubCUTAneous route Every morning. SQ under skin every morning    apixaban (ELIQUIS) 5 mg tablet Take 1 Tab by mouth two (2) times a day.  acetaminophen (TylenoL) 325 mg tablet Take  by mouth every four (4) hours as needed for Pain.  SITagliptin-metFORMIN (Janumet)  mg per tablet Take 1 Tab by mouth two (2) times daily (with meals).  Insulin Needles, Disposable, (BD JIMBO 2ND GEN PEN NEEDLE) 32 gauge x 0/49\" ndle 667 Applicators by Does Not Apply route daily. Other Studies:  No results found for this visit on 06/24/21. XR CHEST PA LAT    Result Date: 6/24/2021  EXAM: 2 view chest radiograph. INDICATION: Lymphedema of the right arm. COMPARISON: Chest radiograph dated March 19, 2021. FINDINGS: Patient is rotated toward the right somewhat limiting evaluation. No pneumothorax or pleural effusion. No focal lung consolidation. Heart appears normal in size. No evidence of acute osseous abnormality. 1. No acute cardiopulmonary abnormality. CT NECK CHEST ABD PELV W CONT    Result Date: 6/24/2021  EXAM: CT neck, chest, abdomen and pelvis with contrast. INDICATION: Patient with lymphedema for one month of the right arm. Patient feels like she is choking every time she eats. Shortness of breath with exertion. Right neck fullness. COMPARISON: Chest CT dated March 19, 2021. Chest abdomen and pelvis CT dated December 06, 2019. Contrast: 100 mL [id number].  Multiple axial images were obtained through the chest, abdomen, and pelvis. Radiation dose reduction techniques were used for this study: All CT scans performed at this facility use one or all of the following: Automated exposure control, adjustment of the mA and/or kVp according to patient's size, iterative reconstruction. FINDINGS: CT neck: Parotid and submandibular glands are unremarkable. Right thyroid lobe 1.2 cm nodule which appears grossly unchanged from December 2019. Diffuse infiltrative edematous change of the right neck. Limited evaluation of intracranial contents without evidence of mass effect or extra-axial fluid collection. Highly diminutive appearance of the right internal jugular vein, nearly occluded. Prominent venous collaterals of the right superior chest wall. Dermal thickening and trabecular thickening of the right breast with diffuse edematous change of the right axilla with pathologic right axillary lymphadenopathy which appears similar although slightly progressed from prior measuring 3.9 x 1.8 cm and additional enlarged lymph nodes of the right axilla. Intraorbital contents are unremarkable. The airway is clear. There are several prominent right cervical chain lymph nodes which are nonpathologic by size criteria. LUNGS/PLEURA: Large right pleural effusion. Central airways are clear. Atelectasis of the right lower lobe. Left lung is clear. No pneumothorax. MEDIASTINUM: Thyroid nodule as above which appears grossly stable from prior. Aorta is normal in caliber. Heart appears normal in size. Apparent circumferential wall thickening of the mid and distal esophagus. Edematous change of the mediastinum without clear pathologic mediastinal or hilar adenopathy. LIVER: Hypodensity along the falciform ligament of the liver which appears increased in size from priors although favored focal fatty infiltration, overall indeterminate. Cannot well evaluate the right portal vein, left portal vein and main portal vein appear patent.  BILIARY SYSTEM: Cholecystectomy. SPLEEN: No splenomegaly or aggressive splenic lesion. PANCREAS: Unchanged pancreatic tail cyst versus cystic neoplasm measuring 3.9 x 3.9 cm, stable since September 11, 2018. ADRENALS: No adrenal nodule or adrenal hypertrophy. URINARY SYSTEM: No suspicious renal lesion. No renal stone. No hydronephrosis. The bladder is normal. FLUID:  No intraperitoneal free fluid. REPRODUCTIVE ORGANS: Unremarkable. BOWEL: Postoperative change of partial colonic resection with primary anastomosis with suture line seen in the right midabdomen. No evidence of bowel obstruction. VASCULAR/NODES: No aortic or iliac artery aneurysm. No lymphadenopathy. BONES/SUBCUTANEOUS TISSUE: Diffuse edematous change of the rightward aspect of the neck, mediastinum, right axilla, right upper extremity, and right breast. There is diffuse skin thickening and trabecular thickening of the right breast appears progressively worsening from prior with soft tissue thickening along the right chest wall favored edematous change although limited evaluation. Diffuse edematous change of the right upper extremity within the visualized portion with diffuse skin thickening. 1.  Diffuse edematous change of the rightward aspect of the neck, mediastinum, right axilla, right upper extremity, and right breast. There is diffuse skin thickening and trabecular thickening of the right breast with an overall smaller/contracted appearance of the right breast compared to the left. This is suspicious for inflammatory breast cancer. Clinical correlation is advised with consideration of diagnostic evaluation in the breast center. 2. Highly diminutive appearance of the right internal jugular vein which is nearly occluded, further evaluation with vascular ultrasound can be considered. Prominent venous collaterals of the right chest wall.  3. Pathologically enlarged right axillary lymph nodes with a lymph conglomerate measuring up to 3.9 cm, similar to although progressed from prior's. Biopsy can be considered. 4. Wall thickening of the mid and distal esophagus, possible esophagitis. Consider further evaluation with EGD versus barium esophagram. 5. Enlarging hypodensity along falciform ligament of the liver most likely focal fatty infiltration although indeterminate, attention on follow-up. 6. Large right pleural effusion. 7. Unchanged pancreatic tail cyst versus cystic neoplasm measuring up to 3.9 cm. These results were discussed with emergency room physician Dr. Yoel Liz specifically of my concern for potential inflammatory breast cancer. SARS-CoV-2 Lab Results  \"Novel Coronavirus\" Test: No results found for: COV2NT   \"Emergent Disease\" Test: No results found for: EDPR  \"SARS-COV-2\" Test: No results found for: XGCOVT  Rapid Test: No results found for: COVR            Part of this note was written by using a voice dictation software and the note has been proof read but may still contain some grammatical/other typographical errors.     Signed:  Nitza Ham MD

## 2021-06-25 PROBLEM — J90 PLEURAL EFFUSION, RIGHT: Status: ACTIVE | Noted: 2021-01-01

## 2021-06-25 NOTE — CONSULTS
[facility] Consult Note    Referring Physician: Dr Dilip Camacho Date: 6/24/2021    Chief Complaint: Dysphagia    Subjective:     History of Present Illness:  Patient is a 59 y.o. who is seen in consultation for dysphagia. She reports difficulty swallowing since undergoing most recent arm surgery. She has also had progression of severe R lymphedema. She denies heartburn, chest pain, N/V, epigastric pain. She has difficulty initiating a swallow, and has some choking w/ meals. Food seems to stick in the back of the throat. She reports regular BM's w/o bleeding. She has poor po intake secondary to dysphagia, and has lost almost 20# recently. CT today showed esophageal wall thickening, along w/ several other significant findings. She had a prior EGD and colonoscopy 9/18 Dr Isaías Sky showing R colon cancer and mild gastritis w/ NL esophagus. PMH:  Past Medical History:   Diagnosis Date    Adverse effect of anesthesia     slow to wake after cancer surgery on colon    Anemia 08/2018    no PO iron at present and no infusions    Cancer of ascending colon ([address] 2018    Environmental allergies 9/12/2013    History of colon cancer 2018    surgery on colon    History of DVT (deep vein thrombosis) 04/2018    right lower ext.     History of EKG 03/19/2021    NSR    Hx of echocardiogram 05/31/2018    EF 60-65%    Kidney stone     Lumbar stenosis 2018    per Dr. Tu Payton note (care every where)     Pulmonary embolism ([address] ~2018    Type 2 diabetes mellitus ([address]     AVG BS:/no s.s of hypoglycemia/Last A1c: 6.6 on 7/10/2020       PSH:  Past Surgical History:   Procedure Laterality Date    HX CHOLECYSTECTOMY  1980's    HX COLONOSCOPY      HX LIPOMA RESECTION Right 1980's   Rosaline Cordoba right shoulder tendon surgery    HX OTHER SURGICAL  09/27/2018    filter placed to right groin; per patient- has been removed     HX WISDOM TEETH EXTRACTION      [place] PART REMOVAL COLON W ANASTOMOSIS         Allergies: Allergies   Allergen Reactions    Biaxin [Clarithromycin] Rash    Cortisone Other (comments)     NUMBNESS IN THE LEG (SITE OF INJECTION)  Per pt, leg numbness. Home Medications:  Prior to Admission Medications   Prescriptions Last Dose Informant Patient Reported? Taking? Insulin Needles, Disposable, (BD JIMBO 2ND GEN PEN NEEDLE) 32 gauge x \" ndle   No No   Si Applicators by Does Not Apply route daily. SITagliptin-metFORMIN (Janumet)  mg per tablet   No No   Sig: Take 1 Tab by mouth two (2) times daily (with meals). acetaminophen (TylenoL) 325 mg tablet   Yes No   Sig: Take  by mouth every four (4) hours as needed for Pain. apixaban (ELIQUIS) 5 mg tablet   No No   Sig: Take 1 Tab by mouth two (2) times a day. insulin glargine U-300 conc (Toujeo Max U-300 SoloStar) 300 unit/mL (3 mL) inpn   Yes No   Si Units by SubCUTAneous route Every morning. SQ under skin every morning    nystatin (MYCOSTATIN) powder   No No   Sig: Apply  to affected area four (4) times daily.  Apply to elbow creases and axilla      Facility-Administered Medications: None       Hospital Medications:  Current Facility-Administered Medications   Medication Dose Route Frequency    insulin lispro (HUMALOG) injection   SubCUTAneous TIDAC    sodium chloride (NS) flush 5-40 mL  5-40 mL IntraVENous Q8H    sodium chloride (NS) flush 5-40 mL  5-40 mL IntraVENous PRN    acetaminophen (TYLENOL) tablet 650 mg  650 mg Oral Q6H PRN    Or    acetaminophen (TYLENOL) suppository 650 mg  650 mg Rectal Q6H PRN    polyethylene glycol (MIRALAX) packet 17 g  17 g Oral DAILY PRN    ondansetron (ZOFRAN ODT) tablet 4 mg  4 mg Oral Q8H PRN    Or    ondansetron (ZOFRAN) injection 4 mg  4 mg IntraVENous Q6H PRN    pantoprazole (PROTONIX) 40 mg in 0.9% sodium chloride 10 mL injection  40 mg IntraVENous Q12H    heparin (porcine) injection 5,000 Units  5,000 Units SubCUTAneous Q8H       Social History:  Social History     Tobacco Use    Smoking status: Never Smoker    Smokeless tobacco: Never Used   Substance Use Topics    Alcohol use: No       Family History:  Family History   Problem Relation Age of Onset    No Known Problems Mother     Dementia Father     Heart Disease Father     Hypertension Father     Kidney Disease Father        Review of Systems:  A detailed 10 system ROS is obtained, with pertinent positives as listed in the HPI and PMH. All others are negative. Objective:     Physical Exam:  Vitals:  Visit Vitals  /68   Pulse 96   Temp 98.5 °F (36.9 °C)   Resp 18   Ht 5' 4\" (1.626 m)   Wt 79.4 kg (175 lb)   SpO2 96%   BMI 30.04 kg/m²       Intake/Output:  No intake/output data recorded. 06/23 0701 - 06/24 1900  In: 100 [I.V.:100]  Out: -     Gen:  Alert and oriented, No acute distress. HEENT: Sclerae anicteric. Conjunctiva pink. No abnormal pigmentation of the lips. Neck: The neck is supple. No cervical or supraclavicular adenopathy. Cardiovascular: Regular rate and rhythm. No murmurs, gallops, or rubs. Respiratory:  Comfortable breathing, no accessory muscle use. Clear breath sounds bilaterally with no wheezes, rales, or rhonchi. GI:  Abdomen soft, nondistended, nontender. Normal active bowel sounds. No guarding or rebound, no masses or bruits, no plapable organomegaly. Extremities:  No clubbing . Severe R UE edema. Skin: No rash or jaundice. Neurological:  Grossly intact without focal deficits. Psychiatric:  Mood appears appropriate and judgement intact.     Laboratory:    Recent Results (from the past 24 hour(s))   CBC WITH AUTOMATED DIFF    Collection Time: 06/24/21 11:50 AM   Result Value Ref Range    WBC 6.3 4.3 - 11.1 K/uL    RBC 3.78 (L) 4.05 - 5.2 M/uL    HGB 10.6 (L) 11.7 - 15.4 g/dL    HCT 34.3 (L) 35.8 - 46.3 %    MCV 90.7 79.6 - 97.8 FL    MCH 28.0 26.1 - 32.9 PG    MCHC 30.9 (L) 31.4 - 35.0 g/dL    RDW 16.1 (H) 11.9 - 14.6 %    PLATELET 461 062 - 450 K/uL    MPV 10.1 9.4 - 12.3 FL    ABSOLUTE NRBC 0.00 0.0 - 0.2 K/uL    DF AUTOMATED      NEUTROPHILS 61 43 - 78 %    LYMPHOCYTES 26 13 - 44 %    MONOCYTES 11 4.0 - 12.0 %    EOSINOPHILS 1 0.5 - 7.8 %    BASOPHILS 1 0.0 - 2.0 %    IMMATURE GRANULOCYTES 0 0.0 - 5.0 %    ABS. NEUTROPHILS 3.9 1.7 - 8.2 K/UL    ABS. LYMPHOCYTES 1.6 0.5 - 4.6 K/UL    ABS. MONOCYTES 0.7 0.1 - 1.3 K/UL    ABS. EOSINOPHILS 0.1 0.0 - 0.8 K/UL    ABS. BASOPHILS 0.1 0.0 - 0.2 K/UL    ABS. IMM. GRANS. 0.0 0.0 - 0.5 K/UL   METABOLIC PANEL, COMPREHENSIVE    Collection Time: 06/24/21 11:50 AM   Result Value Ref Range    Sodium 140 136 - 145 mmol/L    Potassium 3.6 3.5 - 5.1 mmol/L    Chloride 108 (H) 98 - 107 mmol/L    CO2 26 21 - 32 mmol/L    Anion gap 6 (L) 7 - 16 mmol/L    Glucose 128 (H) 65 - 100 mg/dL    BUN 13 8 - 23 MG/DL    Creatinine 0.72 0.6 - 1.0 MG/DL    GFR est AA >60 >60 ml/min/1.73m2    GFR est non-AA >60 >60 ml/min/1.73m2    Calcium 9.5 8.3 - 10.4 MG/DL    Bilirubin, total 1.0 0.2 - 1.1 MG/DL    ALT (SGPT) 37 12 - 65 U/L    AST (SGOT) 54 (H) 15 - 37 U/L    Alk. phosphatase 89 50 - 136 U/L    Protein, total 8.3 (H) 6.3 - 8.2 g/dL    Albumin 3.3 3.2 - 4.6 g/dL    Globulin 5.0 (H) 2.3 - 3.5 g/dL    A-G Ratio 0.7 (L) 1.2 - 3.5     GLUCOSE, POC    Collection Time: 06/24/21  7:09 PM   Result Value Ref Range    Glucose (POC) 98 65 - 100 mg/dL    Performed by Edenilson        Assessment:       Principal Problem:    Lymphedema of right arm (3/10/2021)    Active Problems:    Type 2 diabetes mellitus with diabetic neuropathy ([facility] (2/21/2018)      Current use of anticoagulant therapy (9/18/2018)      [facility] (9/28/2018)      Hx pulmonary embolism (4/5/2021)      Pancreatic pseudocyst (4/5/2021)      Lymphedema (6/24/2021)        Plan:       Dysphagia appears to be oropharyngeal by description. SLP eval pending for tomorrow  Esophageal wall thickening noted on CT, w/o significant reflux symptoms.   This could be related to recent dysphagia and wt loss as well. Concern for possible esophagitis, stricture , neoplasm. EGD is indicated. Follow up Speech eval results  Currently holding Eliquis  Depending on results, consider EGD when off Eliquis > 48hrs for possible dilation.   Onc eval pending for possible breast CA  Pancreatic cyst stable  Mildly elevated LFTs and evidence of fatty infiltration on CT- consider outpt workup    Len Dillon MD  [facility]

## 2021-06-25 NOTE — PROGRESS NOTES
SPEECH PATHOLOGY NOTE:    Modified barium swallow study complete. Patient presents with grossly functional oropharyngeal swallow function. No aspiration/penetration with any consistencies. Min-mild pharyngeal stasis post swallow with pudding, mixed, and solid consistency trials due to minimally reduced pharyngeal constriction; however, clears with spontaneous double swallow. Patient reporting sensation of significant pharyngeal stasis post swallow even when no residuals remained in pharynx. Suspect esophageal component. Agree with GI recommendations for EGD to assess for esophageal dysphagia. From an oropharyngeal standpoint, patient cleared for regular consistency diet/thin liquids; however, due to patient's complaints and apprehension with consuming po (especially solids) consider initiating full liquid diet for now. Will defer diet recommendations to GI. Full report to follow. No further speech therapy needs at this time.        Mariangel Mcfadden MS, CCC-SLP

## 2021-06-25 NOTE — PROGRESS NOTES
LTG: Patient will tolerate least restrictive diet without overt signs or symptoms of airway compromise. STG: Patient will participate in modified barium swallow study as clinically indicated. SPEECH LANGUAGE PATHOLOGY: DYSPHAGIA- Initial Assessment    NAME/AGE/GENDER: Guillermina Velez is a 59 y.o. female  DATE: 6/25/2021  PRIMARY DIAGNOSIS: Lymphedema [I89.0]      ICD-10: Treatment Diagnosis: R13.13 Dysphagia, Pharyngeal Phase    RECOMMENDATIONS   DIET:    NPO    MEDICATIONS: Non-oral     ASPIRATION PRECAUTIONS  · Oral care every 3 hours     COMPENSATORY STRATEGIES/MODIFICATIONS  · None     RECOMMENDATIONS for CONTINUED SPEECH THERAPY:   YES: Anticipate need for ongoing speech therapy during this hospitalization and at next level of care. ASSESSMENT   Patient presents with significant pharyngeal dysphagia symptoms. Mulitple swallows upon palpation given trials of thin by teaspoon and puree with patient reporting severe pharyngeal stasis post swallow. Further trials deferred until instrumental swallowing assessment can be completed. Reports frequent coughing with thin liquids. Recommend strict NPO for now. Modified barium swallow study later this AM to objectively assess oropharyngeal swallow function. Anticipate patient will require temporary alternate means of nutrition/hydration/medication regardless of findings as per chart review patient has had ~18lb weight loss in past month. EDUCATION:  · Recommendations discussed with Patient, Family, RN and hospitalist  CONTINUATION OF SKILLED SERVICES/MEDICAL NECESSITY:   Patient is expected to demonstrate progress in  swallow strength, swallow timeliness, swallow function, diet tolerance and swallow safety in order to  improve swallow safety, work toward diet advancement and decrease aspiration risk.  Patient continues to require skilled intervention due to dysphagia.    REHABILITATION POTENTIAL FOR STATED GOALS: Good    PLAN FREQUENCY/DURATION: Continue to follow patient 3 times a week for duration of hospital stay to address above goals. - Recommendations for next treatment session: Next treatment session will address Modified Barium Swallow Study    SUBJECTIVE   Patient alert upright in bed for session. Pleasant and friendly. Able to provide medical history. Family at bedside. Oxygen Device: room air  Pain: Pain Scale 1: Numeric (0 - 10)  Pain Intensity 1: 0    History of Present Injury/Illness: Ms. Jered Bird  has a past medical history of Adverse effect of anesthesia, Anemia (08/2018), Cancer of ascending colon ([facility] (2018), Environmental allergies (9/12/2013), History of colon cancer (2018), History of DVT (deep vein thrombosis) (04/2018), History of EKG (03/19/2021), echocardiogram (05/31/2018), Kidney stone, Lumbar stenosis (2018), Pulmonary embolism ([facility] (~2018), and Type 2 diabetes mellitus ([facility] . She also  has a past surgical history that includes hx cholecystectomy (7813'J); hx lipoma resection (Right, 1980's); pr part removal colon w anastomosis; hx colonoscopy; hx wisdom teeth extraction; hx orthopaedic; and hx other surgical (09/27/2018). PRECAUTIONS/ALLERGIES: Biaxin [clarithromycin] and Cortisone     Problem List:  (Impairments causing functional limitations):  1. Pharyngeal dysphagia    Previous Dysphagia: YES Patient reports significant difficulty swallow for past month reporting pharyngeal statis with all po intake and coughing with thin liquids. Has lost 18 pounds in 1 month. Diet Prior to Evaluation: NPO     Orientation:  Person  Place  Time  Situation    Cognitive-Linguistic Screening:   Alertness  o Alert   Speech Production:   o Fully intelligible   Expressive Language:  o Fluent speech   Receptive Language:  o Answers yes/no questions appropriately and Follows commands   Cognition:   o Able to provide detailed medical history. No deficits noted.      OBJECTIVE   Oral Motor:   · Labial: No impairment  · Dentition: Intact  · Oral Hygiene: Adequate  · Lingual: No impairment    Dysphagia evaluation:   Patient consumed trials of thin by teaspoon and puree. 4-5 swallows upon palpation with thin by teaspoon and 1/4 teaspoon of puree with patient reporting significant pharyngeal statis. Further trials deferred. Tool Used: Dysphagia Outcome and Severity Scale (DIANE)    Score Comments   Normal Diet  [] 7 With no strategies or extra time needed   Functional Swallow  [] 6 May have mild oral or pharyngeal delay   Mild Dysphagia  [] 5 Which may require one diet consistency restricted    Mild-Moderate Dysphagia  [] 4 With 1-2 diet consistencies restricted   Moderate Dysphagia  [] 3 With 2 or more diet consistencies restricted   Moderate-Severe Dysphagia  [x] 2 With partial PO strategies (trials with ST only)   Severe Dysphagia  [] 1 With inability to tolerate any PO safely      Score:  Initial: 2 Most Recent: x (Date 06/25/21 )   Interpretation of Tool: The Dysphagia Outcome and Severity Scale (DIANE) is a simple, easy-to-use, 7-point scale developed to systematically rate the functional severity of dysphagia based on objective assessment and make recommendations for diet level, independence level, and type of nutrition. Current Medications:   No current facility-administered medications on file prior to encounter. Current Outpatient Medications on File Prior to Encounter   Medication Sig Dispense Refill    nystatin (MYCOSTATIN) powder Apply  to affected area four (4) times daily. Apply to elbow creases and axilla 1 Bottle 3    insulin glargine U-300 conc (Toujeo Max U-300 SoloStar) 300 unit/mL (3 mL) inpn 18 Units by SubCUTAneous route Every morning. SQ under skin every morning       apixaban (ELIQUIS) 5 mg tablet Take 1 Tab by mouth two (2) times a day. 60 Tab 5    acetaminophen (TylenoL) 325 mg tablet Take  by mouth every four (4) hours as needed for Pain.       SITagliptin-metFORMIN (Janumet)  mg per tablet Take 1 Tab by mouth two (2) times daily (with meals). 180 Tab 3    Insulin Needles, Disposable, (BD JIMBO 2ND GEN PEN NEEDLE) 32 gauge x 4/90\" ndle 828 Applicators by Does Not Apply route daily.  100 Pen Needle 5        INTERDISCIPLINARY COLLABORATION: RN    After treatment position/precautions:  · Upright in bed  · Call light within reach  · Family at bedside  · RN notified    Total Treatment Duration:   Time In: 7528  [facility]: [facility] [address] [address] [facility], [place]

## 2021-06-25 NOTE — PROGRESS NOTES
END OF SHIFT NOTE:    INTAKE/OUTPUT  06/24 0701 - 06/25 0700  In: 100 [I.V.:100]  Out: 200 [Urine:200]  Voiding: YES  Catheter: NO  Drain:              Flatus: Patient does have flatus present. Stool:  0 occurrences. Characteristics:       Emesis: 0 occurrences. Characteristics:        VITAL SIGNS  Patient Vitals for the past 12 hrs:   Temp Pulse Resp BP SpO2   06/25/21 1919 97.4 °F (36.3 °C) (!) 101 17 133/78 97 %   06/25/21 1535 97.4 °F (36.3 °C) 96 17 131/72 96 %   06/25/21 1326  89 18 (!) 136/58 99 %       Pain Assessment  Pain Intensity 1: 0 (06/25/21 1326)        Patient Stated Pain Goal: 0    Ambulating  Yes    Shift report given to oncoming nurse at the bedside.     Benedict Cat

## 2021-06-25 NOTE — PROGRESS NOTES
[facility]ist Progress Note     Name:  Kirk Jackson  Age:61 y.o. Sex:female   :  1960    MRN:  [id number]     Admit Date:  2021    Reason for Admission:  Lymphedema [I89.0]    Assessment & Plan   Lymphedema/?inflammatory breast cancer:  Patient with significant right upper extremity edema to the point having difficulty swallowing  CT findings concerning for inflammatory breast cancer  Oncology consult, appreciate recommendation  IR consult for possible lymph node biopsy for further evaluation  Patient following lymphedema clinic outpatient  21 IR has seen patient,  US guided biopsy completed today pathology pending; Elevate right arm, apply compression wraps as patient can tolerate; oncology to see      Dysphagia:  CT with mild thickening of mid and distal esophagus, concerning for esophagitis  GI consult  SLP eval  Continue Protonix  Liquid diet for now  21 SLP has seen patient and has deferred management to GI. Esophageal wall thickening noted on CT, w/o significant reflux symptoms. GI will consider EGD when patient has been off Eliquis x 48 hours.  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, will hold for now for possible IR intervention  Heparin for DVT prophylaxis  21 Heparin/Eliquis on hold for procedure; Continue SCDs     Diabetes mellitus:  Hold oral hypoglycemic  Sliding scale             Diet:  DIET NPO  DVT PPx: SCDs  GI Ppx: PPI   Code: Full Code    Dispo/Discharge [place] Course/Subjective:   Patient is a 57-year-old female with past medical history significant for diabetes mellitus, history of DVT/PE on Eliquis, history of colon cancer status post resection in 2018 and lymphedema of right upper extremity presented to the ED with worsening swelling of right upper extremity and dysphagia. She has history of right shoulder surgery in 2020 and complained that afterwards she noticed her RUE beginning to swell. Patient went in for evaluation with a suspicion of DVT. IR procedure found patient did not have a clot. Per patient the swelling became progressively worse afterward and she was referred to a lymphedema clinic. In addition to her swollen RUE patient complained the swelling progressed into her neck resulting in dysphagia and unintentional weight loss of ~20 lbs. Due to her concerns patient decided to present to ER. Subjective/24 hr Events (06/25/21):  Patient reports mild SOB, right arm pain and continued difficulty swallowing. Endorses globus     Review of Systems: 14 point review of systems is otherwise negative with the exception of the elements mentioned above. Objective:     Patient Vitals for the past 24 hrs:   Temp Pulse Resp BP SpO2   06/25/21 1326  89 18 (!) 136/58 99 %   06/25/21 0718 97.9 °F (36.6 °C) 96 18 127/66 97 %   06/25/21 0338 98.5 °F (36.9 °C) 90 18 132/70 93 %   06/24/21 2302 98.5 °F (36.9 °C) 96 18 128/62 96 %   06/24/21 2004 98.5 °F (36.9 °C) 96 18 133/68 96 %   06/24/21 1451    (!) 173/81 97 %     Oxygen Therapy  O2 Sat (%): 99 % (06/25/21 1326)  Pulse via Oximetry: 89 beats per minute (06/25/21 1326)  O2 Device: None (Room air) (06/25/21 1139)    Body mass index is 30.04 kg/m².     Physical Exam:   General: No acute distress, speaking in full sentences, no use of accessory muscles   HEENT: Pupils equal; oropharynx is clear   Neck: Swollen with some bruising; no JVD   Lungs: Clear to auscultation bilaterally   Cardiovascular: Regular rate and rhythm with normal S1 and S2   Abdomen: Soft, nontender, nondistended, normoactive bowel sounds   Extremities: No cyanosis clubbing, right arm swelling with bruising   Neuro: Nonfocal, A&O x3   Psych: Normal affect     Data Review:  I have reviewed all labs, meds, and studies from the last 24 hours:    Labs:    Recent Results (from the past 24 hour(s))   GLUCOSE, POC    Collection Time: 06/24/21  7:09 PM   Result Value Ref Range    Glucose (POC) 98 65 - 100 mg/dL    Performed by [employer]    METABOLIC PANEL, BASIC    Collection Time: 06/25/21  5:16 AM   Result Value Ref Range    Sodium 144 136 - 145 mmol/L    Potassium 3.7 3.5 - 5.1 mmol/L    Chloride 112 (H) 98 - 107 mmol/L    CO2 22 21 - 32 mmol/L    Anion gap 10 7 - 16 mmol/L    Glucose 97 65 - 100 mg/dL    BUN 11 8 - 23 MG/DL    Creatinine 0.68 0.6 - 1.0 MG/DL    GFR est AA >60 >60 ml/min/1.73m2    GFR est non-AA >60 >60 ml/min/1.73m2    Calcium 9.5 8.3 - 10.4 MG/DL   CBC W/O DIFF    Collection Time: 06/25/21  5:16 AM   Result Value Ref Range    WBC 5.9 4.3 - 11.1 K/uL    RBC 3.67 (L) 4.05 - 5.2 M/uL    HGB 10.5 (L) 11.7 - 15.4 g/dL    HCT 32.4 (L) 35.8 - 46.3 %    MCV 88.3 79.6 - 97.8 FL    MCH 28.6 26.1 - 32.9 PG    MCHC 32.4 31.4 - 35.0 g/dL    RDW 16.2 (H) 11.9 - 14.6 %    PLATELET 102 574 - 699 K/uL    MPV 10.1 9.4 - 12.3 FL    ABSOLUTE NRBC 0.00 0.0 - 0.2 K/uL   GLUCOSE, POC    Collection Time: 06/25/21  7:40 AM   Result Value Ref Range    Glucose (POC) 112 (H) 65 - 100 mg/dL    Performed by [facility]        All Micro Results     None          EKG Results     None          Other Studies:  XR CHEST PA LAT    Result Date: 6/24/2021  EXAM: 2 view chest radiograph. INDICATION: Lymphedema of the right arm. COMPARISON: Chest radiograph dated March 19, 2021. FINDINGS: Patient is rotated toward the right somewhat limiting evaluation. No pneumothorax or pleural effusion. No focal lung consolidation. Heart appears normal in size.  No evidence of acute osseous abnormality. 1. No acute cardiopulmonary abnormality. XR SWALLOW FUNC VIDEO    Result Date: 6/25/2021  Modified Barium Swallow INDICATION: Difficulty swallowing, inflammatory process, possible tumor involving the upper right chest and neck. Fluoro time: 1.3 min Spot films:  0 Fluoroscopy was used to evaluate pharyngeal function while the patient was given barium solutions and barium coated solids. FINDINGS: The study was well-tolerated. Pharyngeal function is normal.  There is no evidence of aspiration. No evidence of aspiration. CT NECK CHEST ABD PELV W CONT    Result Date: 6/24/2021  EXAM: CT neck, chest, abdomen and pelvis with contrast. INDICATION: Patient with lymphedema for one month of the right arm. Patient feels like she is choking every time she eats. Shortness of breath with exertion. Right neck fullness. COMPARISON: Chest CT dated March 19, 2021. Chest abdomen and pelvis CT dated December 06, 2019. Contrast: 100 mL Isovue-370. Multiple axial images were obtained through the chest, abdomen, and pelvis. Radiation dose reduction techniques were used for this study: All CT scans performed at this facility use one or all of the following: Automated exposure control, adjustment of the mA and/or kVp according to patient's size, iterative reconstruction. FINDINGS: CT neck: Parotid and submandibular glands are unremarkable. Right thyroid lobe 1.2 cm nodule which appears grossly unchanged from December 2019. Diffuse infiltrative edematous change of the right neck. Limited evaluation of intracranial contents without evidence of mass effect or extra-axial fluid collection. Highly diminutive appearance of the right internal jugular vein, nearly occluded. Prominent venous collaterals of the right superior chest wall.  Dermal thickening and trabecular thickening of the right breast with diffuse edematous change of the right axilla with pathologic right axillary lymphadenopathy which appears similar although slightly progressed from prior measuring 3.9 x 1.8 cm and additional enlarged lymph nodes of the right axilla. Intraorbital contents are unremarkable. The airway is clear. There are several prominent right cervical chain lymph nodes which are nonpathologic by size criteria. LUNGS/PLEURA: Large right pleural effusion. Central airways are clear. Atelectasis of the right lower lobe. Left lung is clear. No pneumothorax. MEDIASTINUM: Thyroid nodule as above which appears grossly stable from prior. Aorta is normal in caliber. Heart appears normal in size. Apparent circumferential wall thickening of the mid and distal esophagus. Edematous change of the mediastinum without clear pathologic mediastinal or hilar adenopathy. LIVER: Hypodensity along the falciform ligament of the liver which appears increased in size from priors although favored focal fatty infiltration, overall indeterminate. Cannot well evaluate the right portal vein, left portal vein and main portal vein appear patent. BILIARY SYSTEM: Cholecystectomy. SPLEEN: No splenomegaly or aggressive splenic lesion. PANCREAS: Unchanged pancreatic tail cyst versus cystic neoplasm measuring 3.9 x 3.9 cm, stable since September 11, 2018. ADRENALS: No adrenal nodule or adrenal hypertrophy. URINARY SYSTEM: No suspicious renal lesion. No renal stone. No hydronephrosis. The bladder is normal. FLUID:  No intraperitoneal free fluid. REPRODUCTIVE ORGANS: Unremarkable. BOWEL: Postoperative change of partial colonic resection with primary anastomosis with suture line seen in the right midabdomen. No evidence of bowel obstruction. VASCULAR/NODES: No aortic or iliac artery aneurysm. No lymphadenopathy.  BONES/SUBCUTANEOUS TISSUE: Diffuse edematous change of the rightward aspect of the neck, mediastinum, right axilla, right upper extremity, and right breast. There is diffuse skin thickening and trabecular thickening of the right breast appears progressively worsening from prior with soft tissue thickening along the right chest wall favored edematous change although limited evaluation. Diffuse edematous change of the right upper extremity within the visualized portion with diffuse skin thickening. 1.  Diffuse edematous change of the rightward aspect of the neck, mediastinum, right axilla, right upper extremity, and right breast. There is diffuse skin thickening and trabecular thickening of the right breast with an overall smaller/contracted appearance of the right breast compared to the left. This is suspicious for inflammatory breast cancer. Clinical correlation is advised with consideration of diagnostic evaluation in the breast center. 2. Highly diminutive appearance of the right internal jugular vein which is nearly occluded, further evaluation with vascular ultrasound can be considered. Prominent venous collaterals of the right chest wall. 3. Pathologically enlarged right axillary lymph nodes with a lymph conglomerate measuring up to 3.9 cm, similar to although progressed from prior's. Biopsy can be considered. 4. Wall thickening of the mid and distal esophagus, possible esophagitis. Consider further evaluation with EGD versus barium esophagram. 5. Enlarging hypodensity along falciform ligament of the liver most likely focal fatty infiltration although indeterminate, attention on follow-up. 6. Large right pleural effusion. 7. Unchanged pancreatic tail cyst versus cystic neoplasm measuring up to 3.9 cm. These results were discussed with emergency room physician Dr. Giuliano Mackey specifically of my concern for potential inflammatory breast cancer.           Current Meds:   Current Facility-Administered Medications   Medication Dose Route Frequency    insulin lispro (HUMALOG) injection   SubCUTAneous TIDAC    sodium chloride (NS) flush 5-40 mL  5-40 mL IntraVENous Q8H    sodium chloride (NS) flush 5-40 mL  5-40 mL IntraVENous PRN    acetaminophen (TYLENOL) tablet 650 mg  650 mg Oral Q6H PRN    Or    acetaminophen (TYLENOL) suppository 650 mg  650 mg Rectal Q6H PRN    polyethylene glycol (MIRALAX) packet 17 g  17 g Oral DAILY PRN    ondansetron (ZOFRAN ODT) tablet 4 mg  4 mg Oral Q8H PRN    Or    ondansetron (ZOFRAN) injection 4 mg  4 mg IntraVENous Q6H PRN    pantoprazole (PROTONIX) 40 mg in 0.9% sodium chloride 10 mL injection  40 mg IntraVENous Q12H    heparin (porcine) injection 5,000 Units  5,000 Units SubCUTAneous Q8H       Problem List:  Hospital Problems as of 6/25/2021 Date Reviewed: 5/20/2021        Codes Class Noted - Resolved POA    Pleural effusion, right ICD-10-CM: J90  ICD-9-CM: 511.9  6/25/2021 - Present Unknown        Lymphedema ICD-10-CM: I89.0  ICD-9-CM: 457.1  6/24/2021 - Present Unknown        Hx pulmonary embolism ICD-10-CM: P47.569  ICD-9-CM: V12.55  4/5/2021 - Present Yes        Pancreatic pseudocyst ICD-10-CM: K86.3  ICD-9-CM: 577.2  4/5/2021 - Present Yes        * (Principal) Lymphedema of right arm ICD-10-CM: I89.0  ICD-9-CM: 112.6  3/10/2021 - Present Yes        Malignant neoplasm of ascending colon ([facility] [address] ICD-10-CM: C18.2  ICD-9-CM: 153.6  9/28/2018 - Present Yes        Current use of anticoagulant therapy ICD-10-CM: Z79.01  ICD-9-CM: V58.61  9/18/2018 - Present Yes        Type 2 diabetes mellitus with diabetic neuropathy (HCC) ICD-10-CM: E11.40  ICD-9-CM: 250.60, 357.2  2/21/2018 - Present Yes               Part of this note was written by using a voice dictation software and the note has been proof read but may still contain some grammatical/other typographical errors.     Signed By: Tarah Yañez NP   [facility]    June 25, 2021  2:36 PM

## 2021-06-25 NOTE — H&P (VIEW-ONLY)
CONSULT NOTE    Mercedes Lora    6/25/2021    Date of Admission:  6/24/2021    The patient's chart is reviewed and the patient is discussed with the staff. Subjective:       Patient is a 59 y.o.  female seen and evaluated at the request of the hospitalist. She is admitted shortness of breath which has has noticed for about 6 weeks. CT scan shows a large right pleural effusion. She is having difficulty swallowing and has lost about 18 to 20 lbs over the past month. She states that this has worsened over the few weeks. ST has seen and recommended NPO status. She has a history of colon cancer with resection in Oct 2018. 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 More recently, she underwent right shoulder arthroscopy in November 2020 and then developed significant right upper extremity lymphedema. Evaluation showed no clot. She was referred to a lymphedema specialist for treatment. She has had right axillary fullness for some time. CT here shows diffuse edematous changes of the rightward aspect of the neck, mediastinum, right axilla, right upper extremity, and right breast. There is diffuse skin  thickening and trabecular thickening of the right breast with an overall smaller/contracted appearance of the right breast compared to the left. This is suspicious for inflammatory breast cancer; highly diminutive appearance of the right internal jugular vein which is nearly occluded; pathologically enlarged right axillary lymph nodes with a lymph conglomerate measuring up to 3.9 cm; wall thickening of the mid and distal esophagus, possible esophagitis; enlarging hypodensity along falciform ligament of the liver most likely focal fatty infiltration although indeterminate; large R pleural effusion; and unchanged pancreatic tail cyst vs cystic neoplasm measuring up to 3.9cm. IR has been consulted and plans for a US guided breast biopsy.  GI is seeing and plans an EGD. Vascular surgery consulted for occlusion of R IJ and does not recommend any surgery but continuation of the anticoagulation.  We were consulted for the right pleural effusion.      Review of Systems  A comprehensive review of systems was negative except for: Constitutional: positive for weight loss  Eyes: positive for none  Ears, nose, mouth, throat, and face: positive for difficulty swallowing  Respiratory: positive for dyspnea on exertion  Cardiovascular: positive for none  Gastrointestinal: positive for dysphagia  Genitourinary: positive for none  Integument/breast: positive for feels that right breast is larger and has had fullness in the right axilla  Hematologic/lymphatic: positive for none  Musculoskeletal: positive for right shoulder pain following surgery - limited ROM  Neurological: positive for dizziness  Behvioral/Psych: positive for none  Endocrine: positive for none  Allergic/Immunologic: positive for none    Patient Active Problem List   Diagnosis Code    Environmental allergies Z91.09    Bilateral low back pain with sciatica M54.40    Obesity due to excess calories E66.09    Type 2 diabetes mellitus with diabetic neuropathy (HCC) E11.40    Controlled type 2 diabetes mellitus without complication, with long-term current use of insulin (HCC) E11.9, Z79.4    Axillary mass, right R22.31    Pancreatic mass K86.89    Obesity, morbid (HCC) E66.01    Current use of anticoagulant therapy Z79.01    Malignant neoplasm of ascending colon (HCC) C18.2    Primary adenocarcinoma of ascending colon (HCC) C18.2    Iron deficiency anemia D50.9    Hyperlipidemia associated with type 2 diabetes mellitus (HCC) E11.69, E78.5    Dysuria R30.0    Type 2 diabetes with nephropathy (HCC) E11.21    Lymphedema of right arm I89.0    Elbow stiffness, right M25.621    Hx pulmonary embolism Z86.711    Insomnia G47.00    Long term current use of anticoagulant Z79.01    Microcytic anemia D50.9    Lumbar radiculopathy M54.16    Lumbar stenosis with neurogenic claudication M48.062    Malignant neoplasm of colon, unspecified (HCC) C18.9    Pancreatic pseudocyst K86.3    Lymphedema I89.0         Prior to Admission Medications   Prescriptions Last Dose Informant Patient Reported? Taking? Insulin Needles, Disposable, (BD JIMBO 2ND GEN PEN NEEDLE) 32 gauge x 532\" ndle   No No   Si Applicators by Does Not Apply route daily. SITagliptin-metFORMIN (Janumet)  mg per tablet   No No   Sig: Take 1 Tab by mouth two (2) times daily (with meals). acetaminophen (TylenoL) 325 mg tablet   Yes No   Sig: Take  by mouth every four (4) hours as needed for Pain. apixaban (ELIQUIS) 5 mg tablet   No No   Sig: Take 1 Tab by mouth two (2) times a day. insulin glargine U-300 conc (Toujeo Max U-300 SoloStar) 300 unit/mL (3 mL) inpn   Yes No   Si Units by SubCUTAneous route Every morning. SQ under skin every morning    nystatin (MYCOSTATIN) powder   No No   Sig: Apply  to affected area four (4) times daily. Apply to elbow creases and axilla      Facility-Administered Medications: None       Past Medical History:   Diagnosis Date    Adverse effect of anesthesia     slow to wake after cancer surgery on colon    Anemia 2018    no PO iron at present and no infusions    Cancer of ascending colon ([address] 2018    Environmental allergies 2013    History of colon cancer 2018    surgery on colon    History of DVT (deep vein thrombosis) 2018    right lower ext.     History of EKG 2021    NSR    Hx of echocardiogram 2018    EF 60-65%    Kidney stone     Lumbar stenosis 2018    per Dr. Sood Sterling note (care every where)     Pulmonary embolism ([address] ~2018    Type 2 diabetes mellitus ([address]     AVG BS:/no s.s of hypoglycemia/Last A1c: 6.6 on 7/10/2020     Active Ambulatory Problems     Diagnosis Date Noted    Environmental allergies 2013    Bilateral low back pain with sciatica 10/23/2017    Obesity due to excess calories 03/26/2018    Type 2 diabetes mellitus with diabetic neuropathy ([address] 02/21/2018    Controlled type 2 diabetes mellitus without complication, with long-term current use of insulin ([address] 03/26/2018    Axillary mass, right 03/26/2018    Pancreatic mass 03/26/2018    Obesity, morbid ([address] 04/05/2018    Current use of anticoagulant therapy 09/18/2018    Malignant neoplasm of ascending colon ([address] 09/28/2018    Primary adenocarcinoma of ascending colon ([address] 10/09/2018    Iron deficiency anemia 08/27/2019    Hyperlipidemia associated with type 2 diabetes mellitus ([address] 08/27/2019    Dysuria 09/09/2019    Type 2 diabetes with nephropathy ([address] 09/11/2019    Lymphedema of right arm 03/10/2021    Elbow stiffness, right 03/10/2021    Hx pulmonary embolism 04/05/2021    Insomnia 04/05/2021    Long term current use of anticoagulant 04/05/2021    Microcytic anemia 04/05/2021    Lumbar radiculopathy 02/06/2018    Lumbar stenosis with neurogenic claudication 01/29/2018    Malignant neoplasm of colon, unspecified ([address] 04/05/2021    Pancreatic pseudocyst 04/05/2021     Resolved Ambulatory Problems     Diagnosis Date Noted    Type II diabetes mellitus, uncontrolled ([address] 03/25/2015    PE (pulmonary thromboembolism) ([address] 03/26/2018    Malignant neoplasm of hepatic flexure ([address] 09/18/2018     Past Medical History:   Diagnosis Date    Adverse effect of anesthesia     Anemia 08/2018    Cancer of ascending colon ([address] 2018    History of colon cancer 2018    History of DVT (deep vein thrombosis) 04/2018    History of EKG 03/19/2021    Hx of echocardiogram 05/31/2018    Kidney stone     Lumbar stenosis 2018    Pulmonary embolism ([address] ~2018    Type 2 diabetes mellitus (HCC)      Past Surgical History:   Procedure Laterality Date    HX CHOLECYSTECTOMY  1980's    HX COLONOSCOPY      HX LIPOMA RESECTION Right 1980's   Antonieta Swartz Dr. Silvia Shell right shoulder tendon surgery    HX OTHER SURGICAL  09/27/2018    filter placed to right groin; per patient- has been removed     HX WISDOM TEETH EXTRACTION      SC PART REMOVAL COLON W ANASTOMOSIS       Social History     Socioeconomic History    Marital status:      Spouse name: Not on file    Number of children: Not on file    Years of education: Not on file    Highest education level: Not on file   Occupational History    Not on file   Tobacco Use    Smoking status: Never Smoker    Smokeless tobacco: Never Used   Vaping Use    Vaping Use: Never used   Substance and Sexual Activity    Alcohol use: No    Drug use: No    Sexual activity: Not Currently   Other Topics Concern    Not on file   Social History Narrative    Pt lives alone. Her son lives in a house behind hers. She works as a  . She has one indoor dog. Social Determinants of Health     Financial Resource Strain:     Difficulty of Paying Living Expenses:    Food Insecurity:     Worried About Running Out of Food in the Last Year:     [address] in the Last Year:    Transportation Needs:     Lack of Transportation (Medical):      Lack of Transportation (Non-Medical):    Physical Activity:     Days of Exercise per Week:     Minutes of Exercise per Session:    Stress:     Feeling of Stress :    Social Connections:     Frequency of Communication with Friends and Family:     Frequency of Social Gatherings with Friends and Family:     Attends L[employer] Services:     Active Member of Clubs or Organizations:     Attends Club or Organization Meetings:     Marital Status:    Intimate Partner Violence:     Fear of Current or Ex-Partner:     Emotionally Abused:     Physically Abused:     Sexually Abused:      Family History   Problem Relation Age of Onset    No Known Problems Mother     Dementia Father     Heart Disease Father     Hypertension Father     Kidney Disease Father Allergies   Allergen Reactions    Biaxin [Clarithromycin] Rash    Cortisone Other (comments)     NUMBNESS IN THE LEG (SITE OF INJECTION)  Per pt, leg numbness. Current Facility-Administered Medications   Medication Dose Route Frequency    insulin lispro (HUMALOG) injection   SubCUTAneous TIDAC    sodium chloride (NS) flush 5-40 mL  5-40 mL IntraVENous Q8H    sodium chloride (NS) flush 5-40 mL  5-40 mL IntraVENous PRN    acetaminophen (TYLENOL) tablet 650 mg  650 mg Oral Q6H PRN    Or    acetaminophen (TYLENOL) suppository 650 mg  650 mg Rectal Q6H PRN    polyethylene glycol (MIRALAX) packet 17 g  17 g Oral DAILY PRN    ondansetron (ZOFRAN ODT) tablet 4 mg  4 mg Oral Q8H PRN    Or    ondansetron (ZOFRAN) injection 4 mg  4 mg IntraVENous Q6H PRN    pantoprazole (PROTONIX) 40 mg in 0.9% sodium chloride 10 mL injection  40 mg IntraVENous Q12H    heparin (porcine) injection 5,000 Units  5,000 Units SubCUTAneous Q8H         Objective:     Vitals:    06/24/21 2004 06/24/21 2302 06/25/21 0338 06/25/21 0718   BP: 133/68 128/62 132/70 127/66   Pulse: 96 96 90 96   Resp: 18 18 18 18   Temp: 98.5 °F (36.9 °C) 98.5 °F (36.9 °C) 98.5 °F (36.9 °C) 97.9 °F (36.6 °C)   SpO2: 96% 96% 93% 97%   Weight:       Height:           PHYSICAL EXAM     Constitutional:  the patient is obese, well developed and in no acute distress  HEENT:  Sclera clear, pupils equal, oral mucosa moist  Lungs: Clear on the left, decreased with dullness on the right. Cardiovascular:  RRR without M,G,R  Abd/GI: soft and non-tender; with positive bowel sounds. Ext: warm without cyanosis. There is no lower leg edema. Her right arm is very edematous  Skin:  no jaundice or rashes, axillary wound on right secondary to recent biopsy  Neuro: no gross neuro deficits. Alert and oriented  Musculoskeletal: moves all four extremities. No deformities. Psychiatric: calm. Does not appear anxious or depressed  Chest X-ray:        CT neck -   1.   Diffuse edematous change of the rightward aspect of the neck, mediastinum,  right axilla, right upper extremity, and right breast. There is diffuse skin  thickening and trabecular thickening of the right breast with an overall  smaller/contracted appearance of the right breast compared to the left. This is  suspicious for inflammatory breast cancer. Clinical correlation is advised with  consideration of diagnostic evaluation in the breast center. 2. Highly diminutive appearance of the right internal jugular vein which is  nearly occluded, further evaluation with vascular ultrasound can be considered. Prominent venous collaterals of the right chest wall. 3. Pathologically enlarged right axillary lymph nodes with a lymph conglomerate  measuring up to 3.9 cm, similar to although progressed from prior's. Biopsy can  be considered. 4. Wall thickening of the mid and distal esophagus, possible esophagitis. Consider further evaluation with EGD versus barium esophagram.  5. Enlarging hypodensity along falciform ligament of the liver most likely focal  fatty infiltration although indeterminate, attention on follow-up. 6. Large right pleural effusion. 7. Unchanged pancreatic tail cyst versus cystic neoplasm measuring up to 3.9 cm.     Recent Labs     06/25/21  0516 06/24/21  1150   WBC 5.9 6.3   HGB 10.5* 10.6*   HCT 32.4* 34.3*    347     Recent Labs     06/25/21  0516 06/24/21  1150    140   K 3.7 3.6   * 108*   GLU 97 128*   CO2 22 26   BUN 11 13   CREA 0.68 0.72   CA 9.5 9.5   ALB  --  3.3       Assessment:  (Medical Decision Making)     Hospital Problems  Date Reviewed: 5/20/2021        Codes Class Noted POA    Lymphedema ICD-10-CM: I89.0  ICD-9-CM: 457.1  6/24/2021 Unknown        Hx pulmonary embolism ICD-10-CM: S39.999  ICD-9-CM: V12.55  4/5/2021 Yes        Pancreatic pseudocyst ICD-10-CM: K86.3  ICD-9-CM: 577.2  4/5/2021 Yes        * (Principal) Lymphedema of right arm ICD-10-CM: I89.0  ICD-9-CM: 596.8 3/10/2021 Yes        Malignant neoplasm of ascending colon (HCC) ICD-10-CM: C18.2  ICD-9-CM: 153.6  9/28/2018 Yes        Current use of anticoagulant therapy ICD-10-CM: Z79.01  ICD-9-CM: V58.61  9/18/2018 Yes        Type 2 diabetes mellitus with diabetic neuropathy ([facility] ICD-10-CM: E11.40  ICD-9-CM: 250.60, 357.2  2/21/2018 Yes              Plan:  (Medical Decision Making)   1. Patient admitted with several week history of progressive dyspnea and dysphagia. She had a CT done that showed a large right pleural effusion associated with diffuse skin thickening of the right breast. She had an US guided biopsy done in IR today. We can do thoracentesis tomorrow and send fluid for study. She takes Eliquis for a history of DVT/PE in 2018 and her last dose was yesterday morning. 2. ST has seen and felt that it was safe for patient to have regular diet with thin liquids but felt that full liquids would be easiest for patient to swallow. She has esophageal thickening on CT and she may need EGD. Caroline Muller NP      More than 50% of time documented was spent face-to-face contact with the patient and in the care of the patient on the floor/unit where the patient is located    Lungs: decrased sounds in Right lung base  Heart S1 and S2 audible, no murmers or rubs appreciated  Ext: +edema of Right arm  Other     Plan to tap patient tomorrow -- last dose was yesterday morning. Need at least off 48 hours to decrease risk of bleeding. She is aware    [place] and seen by Speech who recommends GI for possible endoscopy. Just back from Radiology for Right axillary lymph node biopsy. I have spoken with and examined the patient. I have reviewed the history, examination, assessment, and plan and agree with the above. Karthik Roa MD      This note was signed electronically. Errors are unfortunately her likely due to dictation software.

## 2021-06-25 NOTE — PROGRESS NOTES
CM attempted to complete assessment this day, however, pt off floor in IR. Chart screened by  for discharge planning. No needs identified at this time. Please consult  if any new issues arise.     Care Management Interventions  Transition of Care Consult (CM Consult): Discharge Planning

## 2021-06-25 NOTE — CONSULTS
Agree with findings on CT of possible inflammatory breast cancer. Will plan for US guided biopsy.     Barrett Tejada MD  Interventional Radiology

## 2021-06-25 NOTE — CONSULTS
CONSULT NOTE    Selam Chowdhury    6/25/2021    Date of Admission:  6/24/2021    The patient's chart is reviewed and the patient is discussed with the staff. Subjective:       Patient is a 59 y.o.  female seen and evaluated at the request of the hospitalist. She is admitted shortness of breath which has has noticed for about 6 weeks. CT scan shows a large right pleural effusion. She is having difficulty swallowing and has lost about 18 to 20 lbs over the past month. She states that this has worsened over the few weeks. ST has seen and recommended NPO status. She has a history of colon cancer with resection in Oct 2018. 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 More recently, she underwent right shoulder arthroscopy in November 2020 and then developed significant right upper extremity lymphedema. Evaluation showed no clot. She was referred to a lymphedema specialist for treatment. She has had right axillary fullness for some time. CT here shows diffuse edematous changes of the rightward aspect of the neck, mediastinum, right axilla, right upper extremity, and right breast. There is diffuse skin  thickening and trabecular thickening of the right breast with an overall smaller/contracted appearance of the right breast compared to the left. This is suspicious for inflammatory breast cancer; highly diminutive appearance of the right internal jugular vein which is nearly occluded; pathologically enlarged right axillary lymph nodes with a lymph conglomerate measuring up to 3.9 cm; wall thickening of the mid and distal esophagus, possible esophagitis; enlarging hypodensity along falciform ligament of the liver most likely focal fatty infiltration although indeterminate; large R pleural effusion; and unchanged pancreatic tail cyst vs cystic neoplasm measuring up to 3.9cm. IR has been consulted and plans for a US guided breast biopsy.  GI is seeing and plans an EGD. Vascular surgery consulted for occlusion of R IJ and does not recommend any surgery but continuation of the anticoagulation.  We were consulted for the right pleural effusion.      Review of Systems  A comprehensive review of systems was negative except for: Constitutional: positive for weight loss  Eyes: positive for none  Ears, nose, mouth, throat, and face: positive for difficulty swallowing  Respiratory: positive for dyspnea on exertion  Cardiovascular: positive for none  Gastrointestinal: positive for dysphagia  Genitourinary: positive for none  Integument/breast: positive for feels that right breast is larger and has had fullness in the right axilla  Hematologic/lymphatic: positive for none  Musculoskeletal: positive for right shoulder pain following surgery - limited ROM  Neurological: positive for dizziness  Behvioral/Psych: positive for none  Endocrine: positive for none  Allergic/Immunologic: positive for none    Patient Active Problem List   Diagnosis Code    Environmental allergies Z91.09    Bilateral low back pain with sciatica M54.40    Obesity due to excess calories E66.09    Type 2 diabetes mellitus with diabetic neuropathy (HCC) E11.40    Controlled type 2 diabetes mellitus without complication, with long-term current use of insulin (HCC) E11.9, Z79.4    Axillary mass, right R22.31    Pancreatic mass K86.89    Obesity, morbid (HCC) E66.01    Current use of anticoagulant therapy Z79.01    Malignant neoplasm of ascending colon (HCC) C18.2    Primary adenocarcinoma of ascending colon (HCC) C18.2    Iron deficiency anemia D50.9    Hyperlipidemia associated with type 2 diabetes mellitus (HCC) E11.69, E78.5    Dysuria R30.0    Type 2 diabetes with nephropathy (HCC) E11.21    Lymphedema of right arm I89.0    Elbow stiffness, right M25.621    Hx pulmonary embolism Z86.711    Insomnia G47.00    Long term current use of anticoagulant Z79.01    Microcytic anemia D50.9    Lumbar radiculopathy M54.16    Lumbar stenosis with neurogenic claudication M48.062    Malignant neoplasm of colon, unspecified (HCC) C18.9    Pancreatic pseudocyst K86.3    Lymphedema I89.0         Prior to Admission Medications   Prescriptions Last Dose Informant Patient Reported? Taking? Insulin Needles, Disposable, (BD JIMBO 2ND GEN PEN NEEDLE) 32 gauge x 532\" ndle   No No   Si Applicators by Does Not Apply route daily. SITagliptin-metFORMIN (Janumet)  mg per tablet   No No   Sig: Take 1 Tab by mouth two (2) times daily (with meals). acetaminophen (TylenoL) 325 mg tablet   Yes No   Sig: Take  by mouth every four (4) hours as needed for Pain. apixaban (ELIQUIS) 5 mg tablet   No No   Sig: Take 1 Tab by mouth two (2) times a day. insulin glargine U-300 conc (Toujeo Max U-300 SoloStar) 300 unit/mL (3 mL) inpn   Yes No   Si Units by SubCUTAneous route Every morning. SQ under skin every morning    nystatin (MYCOSTATIN) powder   No No   Sig: Apply  to affected area four (4) times daily. Apply to elbow creases and axilla      Facility-Administered Medications: None       Past Medical History:   Diagnosis Date    Adverse effect of anesthesia     slow to wake after cancer surgery on colon    Anemia 2018    no PO iron at present and no infusions    Cancer of ascending colon ([address] 2018    Environmental allergies 2013    History of colon cancer 2018    surgery on colon    History of DVT (deep vein thrombosis) 2018    right lower ext.     History of EKG 2021    NSR    Hx of echocardiogram 2018    EF 60-65%    Kidney stone     Lumbar stenosis 2018    per Dr. Dotty Conteh note (care every where)     Pulmonary embolism ([address] ~2018    Type 2 diabetes mellitus ([address]     AVG BS:/no s.s of hypoglycemia/Last A1c: 6.6 on 7/10/2020     Active Ambulatory Problems     Diagnosis Date Noted    Environmental allergies 2013    Bilateral low back pain with sciatica 10/23/2017    Obesity due to excess calories 03/26/2018    Type 2 diabetes mellitus with diabetic neuropathy ([address] 02/21/2018    Controlled type 2 diabetes mellitus without complication, with long-term current use of insulin ([address] 03/26/2018    Axillary mass, right 03/26/2018    Pancreatic mass 03/26/2018    Obesity, morbid ([address] 04/05/2018    Current use of anticoagulant therapy 09/18/2018    Malignant neoplasm of ascending colon ([address] 09/28/2018    Primary adenocarcinoma of ascending colon ([address] 10/09/2018    Iron deficiency anemia 08/27/2019    Hyperlipidemia associated with type 2 diabetes mellitus ([address] 08/27/2019    Dysuria 09/09/2019    Type 2 diabetes with nephropathy ([address] 09/11/2019    Lymphedema of right arm 03/10/2021    Elbow stiffness, right 03/10/2021    Hx pulmonary embolism 04/05/2021    Insomnia 04/05/2021    Long term current use of anticoagulant 04/05/2021    Microcytic anemia 04/05/2021    Lumbar radiculopathy 02/06/2018    Lumbar stenosis with neurogenic claudication 01/29/2018    Malignant neoplasm of colon, unspecified ([address] 04/05/2021    Pancreatic pseudocyst 04/05/2021     Resolved Ambulatory Problems     Diagnosis Date Noted    Type II diabetes mellitus, uncontrolled ([address] 03/25/2015    PE (pulmonary thromboembolism) ([address] 03/26/2018    Malignant neoplasm of hepatic flexure ([address] 09/18/2018     Past Medical History:   Diagnosis Date    Adverse effect of anesthesia     Anemia 08/2018    Cancer of ascending colon ([address] 2018    History of colon cancer 2018    History of DVT (deep vein thrombosis) 04/2018    History of EKG 03/19/2021    Hx of echocardiogram 05/31/2018    Kidney stone     Lumbar stenosis 2018    Pulmonary embolism ([address] ~2018    Type 2 diabetes mellitus (HCC)      Past Surgical History:   Procedure Laterality Date    HX CHOLECYSTECTOMY  1980's    HX COLONOSCOPY      HX LIPOMA RESECTION Right 1980's   [facility]indy Jane Dr. Rah Montero right shoulder tendon surgery    HX OTHER SURGICAL  09/27/2018    filter placed to right groin; per patient- has been removed     HX WISDOM TEETH EXTRACTION      RI PART REMOVAL COLON W ANASTOMOSIS       Social History     Socioeconomic History    Marital status:      Spouse name: Not on file    Number of children: Not on file    Years of education: Not on file    Highest education level: Not on file   Occupational History    Not on file   Tobacco Use    Smoking status: Never Smoker    Smokeless tobacco: Never Used   Vaping Use    Vaping Use: Never used   Substance and Sexual Activity    Alcohol use: No    Drug use: No    Sexual activity: Not Currently   Other Topics Concern    Not on file   Social History Narrative    Pt lives alone. Her son lives in a house behind hers. She works as a  . She has one indoor dog. Social Determinants of Health     Financial Resource Strain:     Difficulty of Paying Living Expenses:    Food Insecurity:     Worried About Running Out of Food in the Last Year:     [address] in the Last Year:    Transportation Needs:     Lack of Transportation (Medical):      Lack of Transportation (Non-Medical):    Physical Activity:     Days of Exercise per Week:     Minutes of Exercise per Session:    Stress:     Feeling of Stress :    Social Connections:     Frequency of Communication with Friends and Family:     Frequency of Social Gatherings with Friends and Family:     Attends [employer] Services:     Active Member of Clubs or Organizations:     Attends Club or Organization Meetings:     Marital Status:    Intimate Partner Violence:     Fear of Current or Ex-Partner:     Emotionally Abused:     Physically Abused:     Sexually Abused:      Family History   Problem Relation Age of Onset    No Known Problems Mother     Dementia Father     Heart Disease Father     Hypertension Father     Kidney Disease Father Allergies   Allergen Reactions    Biaxin [Clarithromycin] Rash    Cortisone Other (comments)     NUMBNESS IN THE LEG (SITE OF INJECTION)  Per pt, leg numbness. Current Facility-Administered Medications   Medication Dose Route Frequency    insulin lispro (HUMALOG) injection   SubCUTAneous TIDAC    sodium chloride (NS) flush 5-40 mL  5-40 mL IntraVENous Q8H    sodium chloride (NS) flush 5-40 mL  5-40 mL IntraVENous PRN    acetaminophen (TYLENOL) tablet 650 mg  650 mg Oral Q6H PRN    Or    acetaminophen (TYLENOL) suppository 650 mg  650 mg Rectal Q6H PRN    polyethylene glycol (MIRALAX) packet 17 g  17 g Oral DAILY PRN    ondansetron (ZOFRAN ODT) tablet 4 mg  4 mg Oral Q8H PRN    Or    ondansetron (ZOFRAN) injection 4 mg  4 mg IntraVENous Q6H PRN    pantoprazole (PROTONIX) 40 mg in 0.9% sodium chloride 10 mL injection  40 mg IntraVENous Q12H    heparin (porcine) injection 5,000 Units  5,000 Units SubCUTAneous Q8H         Objective:     Vitals:    06/24/21 2004 06/24/21 2302 06/25/21 0338 06/25/21 0718   BP: 133/68 128/62 132/70 127/66   Pulse: 96 96 90 96   Resp: 18 18 18 18   Temp: 98.5 °F (36.9 °C) 98.5 °F (36.9 °C) 98.5 °F (36.9 °C) 97.9 °F (36.6 °C)   SpO2: 96% 96% 93% 97%   Weight:       Height:           PHYSICAL EXAM     Constitutional:  the patient is obese, well developed and in no acute distress  HEENT:  Sclera clear, pupils equal, oral mucosa moist  Lungs: Clear on the left, decreased with dullness on the right. Cardiovascular:  RRR without M,G,R  Abd/GI: soft and non-tender; with positive bowel sounds. Ext: warm without cyanosis. There is no lower leg edema. Her right arm is very edematous  Skin:  no jaundice or rashes, axillary wound on right secondary to recent biopsy  Neuro: no gross neuro deficits. Alert and oriented  Musculoskeletal: moves all four extremities. No deformities. Psychiatric: calm. Does not appear anxious or depressed  Chest X-ray:        CT neck -   1.   Diffuse edematous change of the rightward aspect of the neck, mediastinum,  right axilla, right upper extremity, and right breast. There is diffuse skin  thickening and trabecular thickening of the right breast with an overall  smaller/contracted appearance of the right breast compared to the left. This is  suspicious for inflammatory breast cancer. Clinical correlation is advised with  consideration of diagnostic evaluation in the breast center. 2. Highly diminutive appearance of the right internal jugular vein which is  nearly occluded, further evaluation with vascular ultrasound can be considered. Prominent venous collaterals of the right chest wall. 3. Pathologically enlarged right axillary lymph nodes with a lymph conglomerate  measuring up to 3.9 cm, similar to although progressed from prior's. Biopsy can  be considered. 4. Wall thickening of the mid and distal esophagus, possible esophagitis. Consider further evaluation with EGD versus barium esophagram.  5. Enlarging hypodensity along falciform ligament of the liver most likely focal  fatty infiltration although indeterminate, attention on follow-up. 6. Large right pleural effusion. 7. Unchanged pancreatic tail cyst versus cystic neoplasm measuring up to 3.9 cm.     Recent Labs     06/25/21  0516 06/24/21  1150   WBC 5.9 6.3   HGB 10.5* 10.6*   HCT 32.4* 34.3*    347     Recent Labs     06/25/21  0516 06/24/21  1150    140   K 3.7 3.6   * 108*   GLU 97 128*   CO2 22 26   BUN 11 13   CREA 0.68 0.72   CA 9.5 9.5   ALB  --  3.3       Assessment:  (Medical Decision Making)     Hospital Problems  Date Reviewed: 5/20/2021        Codes Class Noted POA    Lymphedema ICD-10-CM: I89.0  ICD-9-CM: 457.1  6/24/2021 Unknown        Hx pulmonary embolism ICD-10-CM: J45.853  ICD-9-CM: V12.55  4/5/2021 Yes        Pancreatic pseudocyst ICD-10-CM: K86.3  ICD-9-CM: 577.2  4/5/2021 Yes        * (Principal) Lymphedema of right arm ICD-10-CM: I89.0  ICD-9-CM: 476.6 3/10/2021 Yes        Malignant neoplasm of ascending colon (HCC) ICD-10-CM: C18.2  ICD-9-CM: 153.6  9/28/2018 Yes        Current use of anticoagulant therapy ICD-10-CM: Z79.01  ICD-9-CM: V58.61  9/18/2018 Yes        Type 2 diabetes mellitus with diabetic neuropathy ([facility] ICD-10-CM: E11.40  ICD-9-CM: 250.60, 357.2  2/21/2018 Yes              Plan:  (Medical Decision Making)   1. Patient admitted with several week history of progressive dyspnea and dysphagia. She had a CT done that showed a large right pleural effusion associated with diffuse skin thickening of the right breast. She had an US guided biopsy done in IR today. We can do thoracentesis tomorrow and send fluid for study. She takes Eliquis for a history of DVT/PE in 2018 and her last dose was yesterday morning. 2. ST has seen and felt that it was safe for patient to have regular diet with thin liquids but felt that full liquids would be easiest for patient to swallow. She has esophageal thickening on CT and she may need EGD. Ruben Boogie NP      More than 50% of time documented was spent face-to-face contact with the patient and in the care of the patient on the floor/unit where the patient is located    Lungs: decrased sounds in Right lung base  Heart S1 and S2 audible, no murmers or rubs appreciated  Ext: +edema of Right arm  Other     Plan to tap patient tomorrow -- last dose was yesterday morning. Need at least off 48 hours to decrease risk of bleeding. She is aware    [place] and seen by Speech who recommends GI for possible endoscopy. Just back from Radiology for Right axillary lymph node biopsy. I have spoken with and examined the patient. I have reviewed the history, examination, assessment, and plan and agree with the above. Sofya Krishna MD      This note was signed electronically. Errors are unfortunately her likely due to dictation software.

## 2021-06-25 NOTE — PROGRESS NOTES
Comprehensive Nutrition Assessment    Type and Reason for Visit: Initial, Positive nutrition screen  Best Practice Alert for Malnutrition Screening Tool: Recently Lost Weight Without Trying: Yes, If Yes, How Much Weight Loss: 14 - 23 lbs, Eating Poorly Due to Decreased Appetite: No (dysphagia)    Nutrition Recommendations/Plan:    Advance diet as medically appropriate   Ensure Enlive included on FLD: (350 kcal and 20 g pro each)     Malnutrition Assessment:  Malnutrition Status: Mild malnutrition  Context: Acute illness  Findings of clinical characteristics of malnutrition:   Energy Intake:  Mild decrease in energy intake (specify) (progressive decline in PO secondary to dysphagia)  Weight Loss:  7.00 - Greater than 7.5% over 3 months (27# (13.3%))     Body Fat Loss:  No significant body fat loss,     Muscle Mass Loss:  No significant muscle mass loss,    Fluid Accumulation:  No significant fluid accumulation (noted lymphedema),     Strength:  Not performed       Nutrition Assessment:   Nutrition History: Patient reports progressive worsening dysphagia to solids over about the last month and a half. She states that just prior to admission she was unable to tolerate even water. She states that she has lost 17# in last month and a half. Nutrition Background: Patient with PMH significant for DM, DVT/PE, colocn cancer s/p resection, lymphedema of right upper extremity. She presented with worseing swelling of RUE, difficulty swallowing, and weight loss. CT was suspicious for inflammatory breast cancer, nearly occluded right IJ, enlarged lymph nodes, pleural effusions, wall thickening of mid and distal esophagus. Daily Update:  Patient seen with niece at bedside. She states that she is felling fine. She is s/p MBS today which was negative for oropharyngeal dysphagia. She is also s/p lymph node Bx today. She is asking about diet.  Discussed with MD and will allow liquids tonight and NPO after midnight. Nutrition Related Findings:   No physical signs of wasting. Current Nutrition Therapies:  ADULT DIET Full Liquid; Nothing red  DIET NPO    Current Intake:   Average Meal Intake: NPO        Anthropometric Measures:  Height: 5' 4\" (162.6 cm)  Current Body Wt: 79.4 kg (175 lb 0.7 oz) (6/24), Weight source: Stated  BMI: 30, Obese class 1 (BMI 30.0-34.9)     Ideal Body Weight (lbs) (Calculated): 120 lbs (55 kg), 145.9 %  Usual Body Wt: 91.6 kg (202 lb) (3/15), Percent weight change: -13.3          Edema: Generalized: 3+;Pitting (R upper shoulder and chest) (6/25/2021  7:30 AM)  RUE: 4+;Pitting (6/25/2021  7:30 AM)     Estimated Daily Nutrient Needs:  Energy (kcal/day): 1388-1253 (Kcal/kg (20-25), Weight Used: Current (79.4 kg))  Protein (g/day): 79-99 (20% kcal) Weight Used: (Current)  Fluid (ml/day):   (1 ml/kcal)    Nutrition Diagnosis:   · Inadequate oral intake related to swallowing difficulty (medical status) as evidenced by  (NPO/CLD, reported barriers to PO)    Nutrition Interventions:   Food and/or Nutrient Delivery:  (advance diet as able )     Coordination of Nutrition Care: Continue to monitor while inpatient  Plan of Care discussed with Dr. Jhonathan Jeronimo    Goals: Active Goal: Meet at least 75% nutrition needs by next RD follow-up    Nutrition Monitoring and Evaluation:      Food/Nutrient Intake Outcomes: Diet advancement/tolerance, Food and nutrient intake  Physical Signs/Symptoms Outcomes: Chewing or swallowing, GI status    Discharge Planning:     Too soon to determine    [address] [facility], LD on 6/25/2021 at 2:59 PM  Contact: [phone]

## 2021-06-25 NOTE — PROGRESS NOTES
TRANSFER - OUT REPORT:    Verbal report given to Jeff Jules on Rome Cedeño  being transferred to [facility] for routine post - op       Report consisted of patients Situation, Background, Assessment and   Recommendations(SBAR). Information from the following report(s) SBAR, Procedure Summary and MAR was reviewed with the receiving nurse. Opportunity for questions and clarification was provided. Pt tolerated procedure well. Visit Vitals  /66   Pulse 96   Temp 97.9 °F (36.6 °C)   Resp 18   Ht 5' 4\" (1.626 m)   Wt 79.4 kg (175 lb)   SpO2 97%   BMI 30.04 kg/m²     Past Medical History:   Diagnosis Date    Adverse effect of anesthesia     slow to wake after cancer surgery on colon    Anemia 08/2018    no PO iron at present and no infusions    Cancer of ascending colon ([facility] 2018    Environmental allergies 9/12/2013    History of colon cancer 2018    surgery on colon    History of DVT (deep vein thrombosis) 04/2018    right lower ext.  History of EKG 03/19/2021    NSR    Hx of echocardiogram 05/31/2018    EF 60-65%    Kidney stone     Lumbar stenosis 2018    per Dr. Devang Davis note (care every where)     Pulmonary embolism ([facility] ~2018    Type 2 diabetes mellitus ([facility]     AVG BS:/no s.s of hypoglycemia/Last A1c: 6.6 on 7/10/2020     Peripheral IV 06/24/21 Left Antecubital (Active)   Site Assessment Clean, dry, & intact 06/25/21 0730   Phlebitis Assessment 0 06/25/21 0730   Infiltration Assessment 0 06/25/21 0730   Dressing Status Clean, dry, & intact 06/25/21 0730   Dressing Type Transparent;Tape 06/25/21 0730   Hub Color/Line Status Patent; Flushed 06/24/21 6929

## 2021-06-25 NOTE — MANAGEMENT PLAN
[facility] Hematology & Oncology        Inpatient Hematology / Oncology Plan of Care    Reason for Consult:  Lymphedema [I89.0]  Referring Physician:  Pablo Polk MD    History of Present Illness:  Ms. Michelle Sr is a 59 y.o. female admitted on 6/24/2021. The primary encounter diagnosis was Localized swelling of right upper extremity. Diagnoses of Pleural effusion on right and Abnormal CT scan were also pertinent to this visit. Timur Haro Her PMH includes PE/LE DVT on Eliquis, MTHFR mutation, DM, and chronic RUE lymphedema. She is a known patient of Dr. Kylee Friedman with hepatic flexure colon cancer s/p hemicolectomy on 10/9/18. MMR-D indicated low risk so no strong indication for adjuvant chemo. She presented in f/u with Dr Kylee Friedman 5/2021 with ongoing RUE swelling after right shoulder arthroscopy and biceps repair in November 2020 and has also had lipoma resection in that area. She was thought to have had a DVT in RUE however, when referred to IR for thrombolysis it was noted that there was no evidence of thrombosis but did not right axillary vein stenosis and she is s/p balloon angioplasty. She was then referred to lymphedema specialist in [place] who felt lymphedema secondary to previous surgeries in affected limb. She has been seeing lymphedema clinic to manage symptoms. She has continued on Eliquis due to risk of clotting with MTHFR mutation. Ms Michelle Sr presented to ED with c/o worsening swelling of RUE and dysphagia, resulting in 18 pound weight loss in the last month. CT NCAP with diffuse edematous change of the rightward aspect of the neck, mediastinum, right axilla, right upper extremity, and right breast. There is diffuse skin thickening and trabecular thickening of the right breast with an overall smaller/contracted appearance of the right breast compared to the left.  This is suspicious for inflammatory breast cancer; highly diminutive appearance of the right internal jugular vein which is nearly occluded; pathologically enlarged right axillary lymph nodes with a lymph conglomerate measuring up to 3.9 cm; wall thickening of the mid and distal esophagus, possible esophagitis; enlarging hypodensity along falciform ligament of the liver most likely focal fatty infiltration although indeterminate; large R pleural effusion; and unchanged pancreatic tail cyst vs cystic neoplasm measuring up to 3.9cm. IR has been consulted for possible LN bx. GI consulted for dysphagia. Vascular surgery consulted for occlusion of R IJ. We were consulted for concern for inflammatory breast cancer. Allergies   Allergen Reactions    Biaxin [Clarithromycin] Rash    Cortisone Other (comments)     NUMBNESS IN THE LEG (SITE OF INJECTION)  Per pt, leg numbness. Past Medical History:   Diagnosis Date    Adverse effect of anesthesia     slow to wake after cancer surgery on colon    Anemia 08/2018    no PO iron at present and no infusions    Cancer of ascending colon ([address] 2018    Environmental allergies 9/12/2013    History of colon cancer 2018    surgery on colon    History of DVT (deep vein thrombosis) 04/2018    right lower ext.     History of EKG 03/19/2021    NSR    Hx of echocardiogram 05/31/2018    EF 60-65%    Kidney stone     Lumbar stenosis 2018    per Dr. Maria Eugenia Glass note (care every where)     Pulmonary embolism ([address] ~2018    Type 2 diabetes mellitus ([address]     AVG BS:/no s.s of hypoglycemia/Last A1c: 6.6 on 7/10/2020     Past Surgical History:   Procedure Laterality Date    HX CHOLECYSTECTOMY  1980's    HX COLONOSCOPY      HX LIPOMA RESECTION Right 1980's   Nellie Morillo right shoulder tendon surgery    HX OTHER SURGICAL  09/27/2018    filter placed to right groin; per patient- has been removed     HX WISDOM TEETH EXTRACTION      VT PART REMOVAL COLON W ANASTOMOSIS       Family History   Problem Relation Age of Onset    No Known Problems Mother     Dementia Father     Heart Disease Father     Hypertension Father     Kidney Disease Father      Social History     Socioeconomic History    Marital status:      Spouse name: Not on file    Number of children: Not on file    Years of education: Not on file    Highest education level: Not on file   Occupational History    Not on file   Tobacco Use    Smoking status: Never Smoker    Smokeless tobacco: Never Used   Vaping Use    Vaping Use: Never used   Substance and Sexual Activity    Alcohol use: No    Drug use: No    Sexual activity: Not Currently   Other Topics Concern    Not on file   Social History Narrative    Pt lives alone. Her son lives in a house behind hers. She works as a  . She has one indoor dog. Social Determinants of Health     Financial Resource Strain:     Difficulty of Paying Living Expenses:    Food Insecurity:     Worried About Running Out of Food in the Last Year:     [address] in the Last Year:    Transportation Needs:     Lack of Transportation (Medical):      Lack of Transportation (Non-Medical):    Physical Activity:     Days of Exercise per Week:     Minutes of Exercise per Session:    Stress:     Feeling of Stress :    Social Connections:     Frequency of Communication with Friends and Family:     Frequency of Social Gatherings with Friends and Family:     Attends Taoist Services:     Active Member of Clubs or Organizations:     Attends Club or Organization Meetings:     Marital Status:    Intimate Partner Violence:     Fear of Current or Ex-Partner:     Emotionally Abused:     Physically Abused:     Sexually Abused:      Current Facility-Administered Medications   Medication Dose Route Frequency Provider Last Rate Last Admin    insulin lispro (HUMALOG) injection   SubCUTAneous Renato Sanchez MD        sodium chloride (NS) flush 5-40 mL  5-40 mL IntraVENous Q8H Elizabeth Treviño MD   10 mL at 06/25/21 0525    sodium chloride (NS) flush 5-40 mL  5-40 mL IntraVENous PRN Brenda Canela MD        acetaminophen (TYLENOL) tablet 650 mg  650 mg Oral Q6H PRN Brenda Canela MD        Or    acetaminophen (TYLENOL) suppository 650 mg  650 mg Rectal Q6H PRN Brenda Canela MD        polyethylene glycol (MIRALAX) packet 17 g  17 g Oral DAILY PRN Brenda Canela MD        ondansetron (ZOFRAN ODT) tablet 4 mg  4 mg Oral Q8H PRN Brenda Canela MD        Or    ondansetron (ZOFRAN) injection 4 mg  4 mg IntraVENous Q6H PRN Brenda Canela MD        pantoprazole (PROTONIX) 40 mg in 0.9% sodium chloride 10 mL injection  40 mg IntraVENous Q12H Brenda Canela MD   40 mg at 21 0840    heparin (porcine) injection 5,000 Units  5,000 Units SubCUTAneous Sathish Fiore MD   5,000 Units at 21 0510       OBJECTIVE:  Patient Vitals for the past 8 hrs:   BP Temp Pulse Resp SpO2   21 0718 127/66 97.9 °F (36.6 °C) 96 18 97 %   21 0338 132/70 98.5 °F (36.9 °C) 90 18 93 %     Temp (24hrs), Av.4 °F (36.9 °C), Min:97.9 °F (36.6 °C), Max:98.5 °F (36.9 °C)    No intake/output data recorded. Physical Exam:  Constitutional: Well developed, well nourished female in no acute distress, sitting comfortably in the hospital bed. HEENT: Normocephalic and atraumatic. Oropharynx is clear, mucous membranes are moist. Extraocular muscles are intact. Sclerae anicteric. Neck supple without JVD. No thyromegaly present. Lymph node   Unable to assess - significant swelling along right neck, arm. Skin +right breast firm, taut skin and prominent vasculature/discoloration of chest wall, and markedly smaller/retracted compared to left breast. Warm and dry. No bruising and no rash noted. No erythema. No pallor. Respiratory Lungs are diminished on right, clear on left without wheezes, rales or rhonchi, normal air exchange without accessory muscle use. CVS Normal rate, regular rhythm and normal S1 and S2. No murmurs, gallops, or rubs.    Abdomen Soft, nontender and nondistended, normoactive bowel sounds. No palpable mass. No hepatosplenomegaly. Neuro Grossly nonfocal with no obvious sensory or motor deficits. MSK +significant RUE edema affecting right neck and chest wall with some discoloration/prominent vasculature apparent. Normal range of motion in general.  No edema and no tenderness. Psych Appropriate mood and affect. Labs:    Recent Results (from the past 24 hour(s))   CBC WITH AUTOMATED DIFF    Collection Time: 06/24/21 11:50 AM   Result Value Ref Range    WBC 6.3 4.3 - 11.1 K/uL    RBC 3.78 (L) 4.05 - 5.2 M/uL    HGB 10.6 (L) 11.7 - 15.4 g/dL    HCT 34.3 (L) 35.8 - 46.3 %    MCV 90.7 79.6 - 97.8 FL    MCH 28.0 26.1 - 32.9 PG    MCHC 30.9 (L) 31.4 - 35.0 g/dL    RDW 16.1 (H) 11.9 - 14.6 %    PLATELET 660 374 - 338 K/uL    MPV 10.1 9.4 - 12.3 FL    ABSOLUTE NRBC 0.00 0.0 - 0.2 K/uL    DF AUTOMATED      NEUTROPHILS 61 43 - 78 %    LYMPHOCYTES 26 13 - 44 %    MONOCYTES 11 4.0 - 12.0 %    EOSINOPHILS 1 0.5 - 7.8 %    BASOPHILS 1 0.0 - 2.0 %    IMMATURE GRANULOCYTES 0 0.0 - 5.0 %    ABS. NEUTROPHILS 3.9 1.7 - 8.2 K/UL    ABS. LYMPHOCYTES 1.6 0.5 - 4.6 K/UL    ABS. MONOCYTES 0.7 0.1 - 1.3 K/UL    ABS. EOSINOPHILS 0.1 0.0 - 0.8 K/UL    ABS. BASOPHILS 0.1 0.0 - 0.2 K/UL    ABS. IMM. GRANS. 0.0 0.0 - 0.5 K/UL   METABOLIC PANEL, COMPREHENSIVE    Collection Time: 06/24/21 11:50 AM   Result Value Ref Range    Sodium 140 136 - 145 mmol/L    Potassium 3.6 3.5 - 5.1 mmol/L    Chloride 108 (H) 98 - 107 mmol/L    CO2 26 21 - 32 mmol/L    Anion gap 6 (L) 7 - 16 mmol/L    Glucose 128 (H) 65 - 100 mg/dL    BUN 13 8 - 23 MG/DL    Creatinine 0.72 0.6 - 1.0 MG/DL    GFR est AA >60 >60 ml/min/1.73m2    GFR est non-AA >60 >60 ml/min/1.73m2    Calcium 9.5 8.3 - 10.4 MG/DL    Bilirubin, total 1.0 0.2 - 1.1 MG/DL    ALT (SGPT) 37 12 - 65 U/L    AST (SGOT) 54 (H) 15 - 37 U/L    Alk.  phosphatase 89 50 - 136 U/L    Protein, total 8.3 (H) 6.3 - 8.2 g/dL    Albumin 3.3 3.2 - 4.6 g/dL Globulin 5.0 (H) 2.3 - 3.5 g/dL    A-G Ratio 0.7 (L) 1.2 - 3.5     GLUCOSE, POC    Collection Time: 06/24/21  7:09 PM   Result Value Ref Range    Glucose (POC) 98 65 - 100 mg/dL    Performed by [facility]    METABOLIC PANEL, BASIC    Collection Time: 06/25/21  5:16 AM   Result Value Ref Range    Sodium 144 136 - 145 mmol/L    Potassium 3.7 3.5 - 5.1 mmol/L    Chloride 112 (H) 98 - 107 mmol/L    CO2 22 21 - 32 mmol/L    Anion gap 10 7 - 16 mmol/L    Glucose 97 65 - 100 mg/dL    BUN 11 8 - 23 MG/DL    Creatinine 0.68 0.6 - 1.0 MG/DL    GFR est AA >60 >60 ml/min/1.73m2    GFR est non-AA >60 >60 ml/min/1.73m2    Calcium 9.5 8.3 - 10.4 MG/DL   CBC W/O DIFF    Collection Time: 06/25/21  5:16 AM   Result Value Ref Range    WBC 5.9 4.3 - 11.1 K/uL    RBC 3.67 (L) 4.05 - 5.2 M/uL    HGB 10.5 (L) 11.7 - 15.4 g/dL    HCT 32.4 (L) 35.8 - 46.3 %    MCV 88.3 79.6 - 97.8 FL    MCH 28.6 26.1 - 32.9 PG    MCHC 32.4 31.4 - 35.0 g/dL    RDW 16.2 (H) 11.9 - 14.6 %    PLATELET 423 483 - 086 K/uL    MPV 10.1 9.4 - 12.3 FL    ABSOLUTE NRBC 0.00 0.0 - 0.2 K/uL   GLUCOSE, POC    Collection Time: 06/25/21  7:40 AM   Result Value Ref Range    Glucose (POC) 112 (H) 65 - 100 mg/dL    Performed by [facility]        Imaging:  CT NECK CHEST ABD PELV W CONT [[id number]] Collected: 06/24/21 3982   Order Status: Completed Updated: 06/24/21 5961   Narrative:     EXAM: CT neck, chest, abdomen and pelvis with contrast.   INDICATION: Patient with lymphedema for one month of the right arm. Patient   feels like she is choking every time she eats. Shortness of breath with   exertion. Right neck fullness. COMPARISON: Chest CT dated March 19, 2021. Chest abdomen and pelvis CT dated   December 06, 2019. Contrast: 100 mL Isovue-370. Multiple axial images were obtained through the chest, abdomen, and pelvis.    Radiation dose reduction techniques were used for this study:  All CT scans   performed at this facility use one or all of the following: Automated exposure   control, adjustment of the mA and/or kVp according to patient's size, iterative   reconstruction. FINDINGS:   CT neck: Parotid and submandibular glands are unremarkable. Right thyroid lobe   1.2 cm nodule which appears grossly unchanged from December 2019. Diffuse   infiltrative edematous change of the right neck. Limited evaluation of   intracranial contents without evidence of mass effect or extra-axial fluid   collection. Highly diminutive appearance of the right internal jugular vein,   nearly occluded. Prominent venous collaterals of the right superior chest wall. Dermal thickening and trabecular thickening of the right breast with diffuse   edematous change of the right axilla with pathologic right axillary   lymphadenopathy which appears similar although slightly progressed from prior   measuring 3.9 x 1.8 cm and additional enlarged lymph nodes of the right axilla. Intraorbital contents are unremarkable. The airway is clear. There are several   prominent right cervical chain lymph nodes which are nonpathologic by size   criteria. LUNGS/PLEURA:   Large right pleural effusion. Central airways are clear. Atelectasis of the   right lower lobe. Left lung is clear. No pneumothorax. MEDIASTINUM:   Thyroid nodule as above which appears grossly stable from prior. Aorta is normal   in caliber. Heart appears normal in size. Apparent circumferential wall   thickening of the mid and distal esophagus. Edematous change of the mediastinum   without clear pathologic mediastinal or hilar adenopathy. LIVER: Hypodensity along the falciform ligament of the liver which appears   increased in size from priors although favored focal fatty infiltration, overall   indeterminate. Cannot well evaluate the right portal vein, left portal vein and   main portal vein appear patent. BILIARY SYSTEM: Cholecystectomy. SPLEEN: No splenomegaly or aggressive splenic lesion.      PANCREAS: Unchanged pancreatic tail cyst versus cystic neoplasm measuring 3.9 x   3.9 cm, stable since September 11, 2018. ADRENALS: No adrenal nodule or adrenal hypertrophy. URINARY SYSTEM: No suspicious renal lesion. No renal stone. No hydronephrosis. The bladder is normal.     FLUID:  No intraperitoneal free fluid. REPRODUCTIVE ORGANS: Unremarkable. BOWEL: Postoperative change of partial colonic resection with primary   anastomosis with suture line seen in the right midabdomen. No evidence of bowel   obstruction. VASCULAR/NODES: No aortic or iliac artery aneurysm. No lymphadenopathy. BONES/SUBCUTANEOUS TISSUE: Diffuse edematous change of the rightward aspect of   the neck, mediastinum, right axilla, right upper extremity, and right breast.   There is diffuse skin thickening and trabecular thickening of the right breast   appears progressively worsening from prior with soft tissue thickening along the   right chest wall favored edematous change although limited evaluation. Diffuse   edematous change of the right upper extremity within the visualized portion with   diffuse skin thickening. Impression:     1.  Diffuse edematous change of the rightward aspect of the neck, mediastinum,   right axilla, right upper extremity, and right breast. There is diffuse skin   thickening and trabecular thickening of the right breast with an overall   smaller/contracted appearance of the right breast compared to the left. This is   suspicious for inflammatory breast cancer. Clinical correlation is advised with   consideration of diagnostic evaluation in the breast center. 2. Highly diminutive appearance of the right internal jugular vein which is   nearly occluded, further evaluation with vascular ultrasound can be considered. Prominent venous collaterals of the right chest wall.    3. Pathologically enlarged right axillary lymph nodes with a lymph conglomerate   measuring up to 3.9 cm, similar to although progressed from prior's. Biopsy can   be considered. 4. Wall thickening of the mid and distal esophagus, possible esophagitis. Consider further evaluation with EGD versus barium esophagram.   5. Enlarging hypodensity along falciform ligament of the liver most likely focal   fatty infiltration although indeterminate, attention on follow-up. 6. Large right pleural effusion. 7. Unchanged pancreatic tail cyst versus cystic neoplasm measuring up to 3.9 cm. These results were discussed with emergency room physician Dr. Sander Coates   specifically of my concern for potential inflammatory breast cancer. XR CHEST PA LAT [[id number]] Collected: 06/24/21 1228   Order Status: Completed Updated: 06/24/21 1230   Narrative:     EXAM: 2 view chest radiograph. INDICATION: Lymphedema of the right arm. COMPARISON: Chest radiograph dated March 19, 2021. FINDINGS:   Patient is rotated toward the right somewhat limiting evaluation. No   pneumothorax or pleural effusion. No focal lung consolidation. Heart appears   normal in size. No evidence of acute osseous abnormality. Impression:     1. No acute cardiopulmonary abnormality.           ASSESSMENT:  Problem List  Date Reviewed: 5/20/2021        Codes Class Noted    Lymphedema ICD-10-CM: I89.0  ICD-9-CM: 457.1  6/24/2021        Hx pulmonary embolism ICD-10-CM: K09.842  ICD-9-CM: V12.55  4/5/2021        Insomnia ICD-10-CM: G47.00  ICD-9-CM: 780.52  4/5/2021        Long term current use of anticoagulant ICD-10-CM: Z79.01  ICD-9-CM: V58.61  4/5/2021        Microcytic anemia ICD-10-CM: D50.9  ICD-9-CM: 280.9  4/5/2021        Malignant neoplasm of colon, unspecified ([facility] ICD-10-CM: C18.9  ICD-9-CM: 153.9  4/5/2021        Pancreatic pseudocyst ICD-10-CM: K86.3  ICD-9-CM: 577.2  4/5/2021        * (Principal) Lymphedema of right arm ICD-10-CM: I89.0  ICD-9-CM: 457.1  3/10/2021        Elbow stiffness, right ICD-10-CM: M25.621  ICD-9-CM: 719.52  3/10/2021        Type 2 diabetes with nephropathy ([facility] ICD-10-CM: E11.21  ICD-9-CM: 250.40, 583.81  9/11/2019        Dysuria ICD-10-CM: R30.0  ICD-9-CM: 788.1  9/9/2019        Iron deficiency anemia ICD-10-CM: D50.9  ICD-9-CM: 280.9  8/27/2019        Hyperlipidemia associated with type 2 diabetes mellitus ([facility] ICD-10-CM: E11.69, E78.5  ICD-9-CM: 250.80, 272.4  8/27/2019        Primary adenocarcinoma of ascending colon ([facility] ICD-10-CM: C18.2  ICD-9-CM: 153.6  10/9/2018    Overview Signed 10/26/2018 10:47 AM by Sai Ryan MD     S/p R hemicolectomy 10/9/2018   DIAGNOSIS   RIGHT HEMICOLECTOMY: MODERATELY TO POORLY DIFFERENTIATED ADENOCARCINOMA, 4.6 CM IN GREATEST DIMENSION, EXTENDING THROUGH THE MUSCULARIS PROPRIA INTO THE PERICOLONIC ADIPOSE TISSUE. MARGINS UNINVOLVED. 28 BENIGN LYMPH NODES, ALL NEGATIVE FOR METASTATIC CARCINOMA (0/28). Electronically signed out on 10/11/2018 10:24 by Yue Flores. Felicia Coles, III,              Malignant neoplasm of ascending colon [facility]) ICD-10-CM: C18.2  ICD-9-CM: 153.6  9/28/2018        Current use of anticoagulant therapy ICD-10-CM: Z79.01  ICD-9-CM: V58.61  9/18/2018        Obesity, morbid ([facility] ICD-10-CM: E66.01  ICD-9-CM: 278.01  4/5/2018        Obesity due to excess calories ICD-10-CM: E66.09  ICD-9-CM: 278.00  3/26/2018        Controlled type 2 diabetes mellitus without complication, with long-term current use of insulin ([facility] ICD-10-CM: E11.9, Z79.4  ICD-9-CM: 250.00, V58.67  3/26/2018        Axillary mass, right ICD-10-CM: R22.31  ICD-9-CM: 782.2  3/26/2018        Pancreatic mass ICD-10-CM: K86.89  ICD-9-CM: 577.8  3/26/2018        Type 2 diabetes mellitus with diabetic neuropathy ([facility] ICD-10-CM: E11.40  ICD-9-CM: 250.60, 357.2  2/21/2018        Lumbar radiculopathy ICD-10-CM: M54.16  ICD-9-CM: 724.4  2/6/2018        Lumbar stenosis with neurogenic claudication ICD-10-CM: F53.512  ICD-9-CM: 724.03  1/29/2018    Overview Signed 4/5/2021 11:02 AM by Zahraa Beckford     Last Assessment & Plan:    I want her to continue with home exercises. If her pain worsens, down her leg, then I would recommend a repeat epidural. She's been out of work two months. She can return to work with no restrictions. She can lift 65 lbs if needed. If her pain worsens, we will get another injection. Bilateral low back pain with sciatica ICD-10-CM: M54.40  ICD-9-CM: 724.3  10/23/2017        Environmental allergies ICD-10-CM: Z91.09  ICD-9-CM: V15.09  9/12/2013                RECOMMENDATIONS:    Breast thickening / R axillary LAD / Pancreatic lesion  - CT NCAP with diffuse edematous change of the rightward aspect of the neck, mediastinum, right axilla, right upper extremity, and right breast. There is diffuse skin thickening and trabecular thickening of the right breast with an overall smaller/contracted appearance of the right breast compared to the left. This is suspicious for inflammatory breast cancer; pathologically enlarged right axillary lymph nodes with a lymph conglomerate measuring up to 3.9 cm; enlarging hypodensity along falciform ligament of the liver most likely focal fatty infiltration although indeterminate; large R pleural effusion; and unchanged pancreatic tail cyst vs cystic neoplasm measuring up to 3.9cm  - IR consulted for R axillary LN bx    Dysphagia   - CT with wall thickening of the mid and distal esophagus, possible esophagitis  - SLP eval complete  - GI consulted - plans for EGD after Eliquis washout    Occlusion of R IJ  - CT with highly diminutive appearance of the right internal jugular vein which is  nearly occluded  - On Eliquis for previous DVT/PE, held for possible procedure, on heparin  - Vasc surgery consulted and recommend continuing AC after procedures, no surgical intervention warranted. Large R pleural effusion  - Pulmonology planning for thoracentesis tomorrow  - Will need pathology/cytology sent on fluid    Lab studies and imaging studies were personally reviewed.   Thank you for allowing us to participate in the care of Ms. Mcmahan. Formal consult note by Dr. Elyssa Yeager to follow.          SOWMYA Hilario. [address] Hematology & Oncology  [address] [address]  Office : [phone]  Fax : [phone]

## 2021-06-25 NOTE — PROGRESS NOTES
LTG: Patient will tolerate least restrictive diet without overt signs or symptoms of airway compromise. STG: Patient will participate in modified barium swallow study as clinically indicated. SPEECH LANGUAGE PATHOLOGY: MODIFIED BARIUM SWALLOW STUDY  Initial Assessment    NAME/AGE/GENDER: Guillermina Velez is a 59 y.o. female  DATE: 6/25/2021  PRIMARY DIAGNOSIS: Lymphedema [I89.0]      ICD-10: Treatment Diagnosis: Oropharyngeal dysphagia (R13.12)  INTERDISCIPLINARY COLLABORATION: Radiologist, Dr. Nila Nickerson  PRECAUTIONS/ALLERGIES: Biaxin [clarithromycin] and Cortisone     RECOMMENDATIONS/PLAN   DIET: DEFER DIET RECOMMENDATIONS TO GI. From an oropharyngeal standpoint patient can have a regular consistency diet/thin liquids, but due to her complaints and apprehension with consuming any po (especillay solids) consider full liquid diet for now. COMPENSATORY STRATEGIES/MODIFICATIONS INCLUDING:  · Fully awake/alert  · Upright for all PO  · Alternate liquids/solids  · Double swallow     OTHER RECOMMENDATIONS (including follow up treatment recommendations):   · GI Consult  · Barium Swallow/Esophagram     RECOMMENDATIONS for CONTINUED SPEECH THERAPY:   No further speech therapy indicated at this time. ASSESSMENT   Ms. Evangelina Merlin presents with oropharyngeal swallow function that is grossly WNL. No aspiration/penetration with any consistencies. Assessment somewhat limited as patient only took small bites/sips. Min-mild pharyngeal stasis post swallow with pudding, mixed, and solid consistency trials due to minimally reduced pharyngeal constriction; however, clears with spontaneous double swallow. Patient reporting sensation of significant pharyngeal stasis post swallow even when no residuals remained in pharynx. Suspect esophageal component contributing to globus sensation. Agree with GI recommendations for EGD to assess for esophageal dysphagia.  Barium swallow/esophagram may also be beneficial per MD discretion. From an oropharyngeal standpoint, patient cleared for regular consistency diet/thin liquids; however, due to patient's complaints and apprehension with consuming po (especially solids) consider initiating full liquid diet for now. Will defer diet recommendations to GI. Full report to follow. No further speech therapy needs at this time. SUBJECTIVE   Patient alert upright in stretcher for assessment. \"I just want to find out what's wrong. \"    History of Present Injury/Illness: Ms. Mary Mendoza  has a past medical history of Adverse effect of anesthesia, Anemia (08/2018), Cancer of ascending colon ([address] (2018), Environmental allergies (9/12/2013), History of colon cancer (2018), History of DVT (deep vein thrombosis) (04/2018), History of EKG (03/19/2021), echocardiogram (05/31/2018), Kidney stone, Lumbar stenosis (2018), Pulmonary embolism ([address] (~2018), and Type 2 diabetes mellitus ([facility] . She also  has a past surgical history that includes hx cholecystectomy (2372'B); hx lipoma resection (Right, 1980's); pr part removal colon w anastomosis; hx colonoscopy; hx wisdom teeth extraction; hx orthopaedic; and hx other surgical (09/27/2018). Pain:  Pain Intensity 1: 0    Current dietary status prior to evaluation today:  NPO    Previous Modified Barium Swallow studies: N/A    Current Medications:   No current facility-administered medications on file prior to encounter. Current Outpatient Medications on File Prior to Encounter   Medication Sig Dispense Refill    nystatin (MYCOSTATIN) powder Apply  to affected area four (4) times daily. Apply to elbow creases and axilla 1 Bottle 3    insulin glargine U-300 conc (Toujeo Max U-300 SoloStar) 300 unit/mL (3 mL) inpn 18 Units by SubCUTAneous route Every morning. SQ under skin every morning       apixaban (ELIQUIS) 5 mg tablet Take 1 Tab by mouth two (2) times a day.  60 Tab 5    acetaminophen (TylenoL) 325 mg tablet Take  by mouth every four (4) hours as needed for Pain.  SITagliptin-metFORMIN (Janumet)  mg per tablet Take 1 Tab by mouth two (2) times daily (with meals). 180 Tab 3    Insulin Needles, Disposable, (BD JIMBO 2ND GEN PEN NEEDLE) 32 gauge x 9/41\" ndle 455 Applicators by Does Not Apply route daily. 100 Pen Needle 5       OBJECTIVE   Orientation:    Person   Place   Time   Situation    Oral Assessment:  Labial: No impairment  Lingual: No impairment  Dentition: Natural  Oral Hygiene: Adequate  Vocal Quality: WFL    Modified barium swallow study was performed in the radiology suite using c-arm with Ms. Mcmahan in the lateral plane upright 90° in a stretcher. To evaluate her swallow function, barium coated liquid and food was administered in the form of thin liquids (by spoon, cup sip and straw sip), pudding, mixed consistency and cracker. Oral phase of swallow:    no significant oral issues observed    Pharyngeal phase of swallow:     Swallows of thin by teaspoon/cup/straw all triggered at vallecula with no aspiration/penetration. Patient only took small single sips. Minimal vallecular/pyriform residue post swallow. Clears with double swallow. Patient continues to swallow and report pharyngeal status when pharynx was completely clear of residuals.  Swallows of pudding, mixed fruit, and cracker triggered at vallecula. No aspiration/penetration. Mild vallecular residua and minimal pyriform residue post swallow, which primarily clears with subsequent swallows. Continues to reports severe pharyngeal stasis with minimal residuals.  Pharyngeal characteristics:   functional pharyngeal swallow   adequate retraction of base of tongue   adequate hyolaryngeal elevation/excursion   adequate epiglottic inversion   reduced constriction of posterior pharyngeal wall   adequate laryngeal closure  Attempted strategies:    none  Effective strategies:    none  Aspiration/Penetration Scale: 1 (No penetration/aspiration.  Contrast does not enter the airway.)    Cervical esophageal phase of swallow:    a limited view. Therefore, unable to rule out an esophageal component of dysphagia  **Distal esophagus not assessed due to limitations of MBS study. Assessment/Reassessment only, no treatment provided today. Tool Used: Dysphagia Outcome and Severity Scale (DIANE)    Comments   Normal Diet With no strategies or extra time needed   Functional Swallow May have mild oral or pharyngeal delay   Mild Dysphagia Which may require one diet consistency restricted    Mild-Moderate Dysphagia With 1-2 diet consistencies restricted   Moderate Dysphagia With 2 or more diet consistencies restricted   Moderately Severe Dysphagia With partial PO strategies (trials with ST only)   Severe Dysphagia With inability to tolerate any PO safely      Score:  Initial: 6 Most Recent: x (Date:6/25/2021)   Interpretation of Tool: The Dysphagia Outcome and Severity Scale (DIANE) is a simple, easy-to-use, 7-point scale developed to systematically rate the functional severity of dysphagia based on objective assessment and make recommendations for diet level, independence level, and type of nutrition. Payor: [address] [place] [address] Hwy / Plan: [place] [employer]. / Product Type: Commerical /     Education:  · Recommendations discussed with patient, RN. Messaged hospitalist and GI regarding findings. Safety:   After treatment position/precautions:  · Patient waiting in radiology holding bay for transport back to room.     Total Treatment Duration:  Time In: 0930   Time Out: Dada Price 87, CCC-SLP

## 2021-06-25 NOTE — PROCEDURES
Department of Interventional Radiology  [phone]        Interventional Radiology Brief Procedure Note    Patient: Olga Seo MRN: [id number]  SSN: [id number]    YOB: 1960  Age: 59 y.o. Sex: female      Date of Procedure: 6/25/2021    Attending: Olivier Weber MD     Assistants: None    Pre-Procedure Diagnosis: Right Upper extremity and chest lymphedema. Post-Procedure Diagnosis: SAME    Procedure(s): Image Guided Biopsy    Brief Description of Procedure: Right axillary lymph node biopsy. Anesthesia:Lidocaine    Specimens:  Pathology    Estimated Blood Loss: Less than 65AE    Complications: None. Findings:   US guided core needle biopsy of right axillary enlarged lymph node. Very poor visualization of the node during biopsy secondary to the patient's severe right upper extremity lymphedema which prevented the patient from being able to raise her arm during the procedure. Recommendations and Follow Up: FU path. See detailed description of Findings and Procedure in Procedure Dictation in PACS.     Signed By: Olivier Weber MD     June 25, 2021

## 2021-06-25 NOTE — PROGRESS NOTES
IR Nurse Pre-Procedure Checklist Part 2          Consent signed: Yes    H&P complete:  Yes    Antibiotics: Not applicable    Airway/Mallampati Done: Not applicable    Shaved: Not applicable    Pregnancy Form:Not applicable    Patient Position: Yes    MD Side: Yes     Biopsy Worksheet: Yes    Specimen Medium: Yes    Pt in [address] via stretcher with RN.

## 2021-06-25 NOTE — PROGRESS NOTES
[facility] Progress Note         Admit Date:  6/24/2021  Today's Date:  6/25/2021    CC:  Dysphagia    Subjective:     Underwent MBS & lymph node biopsy this morning. Has had trouble swallowing for several months. No N/V or abdominal pain. Medications:   Current Facility-Administered Medications   Medication Dose Route Frequency    insulin lispro (HUMALOG) injection   SubCUTAneous TIDAC    sodium chloride (NS) flush 5-40 mL  5-40 mL IntraVENous Q8H    sodium chloride (NS) flush 5-40 mL  5-40 mL IntraVENous PRN    acetaminophen (TYLENOL) tablet 650 mg  650 mg Oral Q6H PRN    Or    acetaminophen (TYLENOL) suppository 650 mg  650 mg Rectal Q6H PRN    polyethylene glycol (MIRALAX) packet 17 g  17 g Oral DAILY PRN    ondansetron (ZOFRAN ODT) tablet 4 mg  4 mg Oral Q8H PRN    Or    ondansetron (ZOFRAN) injection 4 mg  4 mg IntraVENous Q6H PRN    pantoprazole (PROTONIX) 40 mg in 0.9% sodium chloride 10 mL injection  40 mg IntraVENous Q12H    heparin (porcine) injection 5,000 Units  5,000 Units SubCUTAneous Q8H       Review of Systems:  ROS was obtained, with pertinent positives as listed above. No chest pain or SOB. Diet:  NPO    Objective:   Vitals:  Visit Vitals  BP (!) 136/58   Pulse 89   Temp 97.9 °F (36.6 °C)   Resp 18   Ht 5' 4\" (1.626 m)   Wt 79.4 kg (175 lb)   SpO2 99%   BMI 30.04 kg/m²     Intake/Output:  No intake/output data recorded. 06/23 1901 - 06/25 0700  In: 100 [I.V.:100]  Out: 200 [Urine:200]  Exam:  General appearance: alert, cooperative, NAD  Lungs: decrease breath sounds on right, clear on left. Heart: regular rate and rhythm  Abdomen: soft, non-tender. Bowel sounds normal. No masses, no organomegaly  Extremities: Right upper extremity swelling.    Neuro:  alert and oriented    Data Review (Labs):    Recent Labs     06/25/21  0516 06/24/21  1150   WBC 5.9 6.3   HGB 10.5* 10.6*   HCT 32.4* 34.3*    347   MCV 88.3 90.7    140   K 3.7 3.6   * 108*   CO2 22 26   BUN 11 13   CREA 0.68 0.72   CA 9.5 9.5   GLU 97 128*   AP  --  89   AST  --  54*   ALT  --  37   TBILI  --  1.0   ALB  --  3.3   TP  --  8.3*       Assessment:     Principal Problem:  Lymphedema of right arm (3/10/2021)    Active Problems:  Type 2 diabetes mellitus with diabetic neuropathy ([facility] (2/21/2018)  Current use of anticoagulant therapy (9/18/2018)  Malignant neoplasm of ascending colon ([facility] (9/28/2018)  Hx pulmonary embolism (4/5/2021)  Pancreatic pseudocyst (4/5/2021)  Lymphedema (6/24/2021)    58 y/o F who present with progression of severe R lymphedema. GI consulted for dysphagia. Esophageal wall thickening noted on CT, w/o significant reflux symptoms, and possibly related to recent dysphagia and wt loss ~ 20# within the past month. Concern for possible esophagitis, stricture, or neoplasm, etc. MBS done, SLP evaluation with grossly functional oropharyngeal swallow function. No aspiration/penetration with any consistencies. On Eliquis, last dose 6/24 AM.     CT findings were also suspicious for inflammatory breast cancer. Vascular surgery on board. Plan:     Full liquid diet. NPO at midnight. EGD with dilation in AM. Hold Heparin tonight. CT of possible inflammatory breast cancer. Underwent US guided biopsy of right axillary lymph node with IR this morning. CT also showed a large right pleural effusion. Pulmonary planning on thoracentesis. Pancreatic cyst stable. Mildly elevated LFTs and evidence of fatty infiltration on CT- consider outpt workup.     Valeria Yeung PA-C  [facility]

## 2021-06-25 NOTE — H&P (VIEW-ONLY)
[facility] Consult Note    Referring Physician: Dr Shanel Muro Date: 6/24/2021    Chief Complaint: Dysphagia    Subjective:     History of Present Illness:  Patient is a 59 y.o. who is seen in consultation for dysphagia. She reports difficulty swallowing since undergoing most recent arm surgery. She has also had progression of severe R lymphedema. She denies heartburn, chest pain, N/V, epigastric pain. She has difficulty initiating a swallow, and has some choking w/ meals. Food seems to stick in the back of the throat. She reports regular BM's w/o bleeding. She has poor po intake secondary to dysphagia, and has lost almost 20# recently. CT today showed esophageal wall thickening, along w/ several other significant findings. She had a prior EGD and colonoscopy 9/18 Dr Candice Varela showing R colon cancer and mild gastritis w/ NL esophagus. PMH:  Past Medical History:   Diagnosis Date    Adverse effect of anesthesia     slow to wake after cancer surgery on colon    Anemia 08/2018    no PO iron at present and no infusions    Cancer of ascending colon ([address] 2018    Environmental allergies 9/12/2013    History of colon cancer 2018    surgery on colon    History of DVT (deep vein thrombosis) 04/2018    right lower ext.     History of EKG 03/19/2021    NSR    Hx of echocardiogram 05/31/2018    EF 60-65%    Kidney stone     Lumbar stenosis 2018    per Dr. Faustina Nath note (care every where)     Pulmonary embolism ([address] ~2018    Type 2 diabetes mellitus ([address]     AVG BS:/no s.s of hypoglycemia/Last A1c: 6.6 on 7/10/2020       PSH:  Past Surgical History:   Procedure Laterality Date    HX CHOLECYSTECTOMY  1980's    HX COLONOSCOPY      HX LIPOMA RESECTION Right 1980's   Miguelina José right shoulder tendon surgery    HX OTHER SURGICAL  09/27/2018    filter placed to right groin; per patient- has been removed     HX WISDOM TEETH EXTRACTION      NC PART REMOVAL COLON W ANASTOMOSIS         Allergies: Allergies   Allergen Reactions    Biaxin [Clarithromycin] Rash    Cortisone Other (comments)     NUMBNESS IN THE LEG (SITE OF INJECTION)  Per pt, leg numbness. Home Medications:  Prior to Admission Medications   Prescriptions Last Dose Informant Patient Reported? Taking? Insulin Needles, Disposable, (BD JIMBO 2ND GEN PEN NEEDLE) 32 gauge x \" ndle   No No   Si Applicators by Does Not Apply route daily. SITagliptin-metFORMIN (Janumet)  mg per tablet   No No   Sig: Take 1 Tab by mouth two (2) times daily (with meals). acetaminophen (TylenoL) 325 mg tablet   Yes No   Sig: Take  by mouth every four (4) hours as needed for Pain. apixaban (ELIQUIS) 5 mg tablet   No No   Sig: Take 1 Tab by mouth two (2) times a day. insulin glargine U-300 conc (Toujeo Max U-300 SoloStar) 300 unit/mL (3 mL) inpn   Yes No   Si Units by SubCUTAneous route Every morning. SQ under skin every morning    nystatin (MYCOSTATIN) powder   No No   Sig: Apply  to affected area four (4) times daily.  Apply to elbow creases and axilla      Facility-Administered Medications: None       Hospital Medications:  Current Facility-Administered Medications   Medication Dose Route Frequency    insulin lispro (HUMALOG) injection   SubCUTAneous TIDAC    sodium chloride (NS) flush 5-40 mL  5-40 mL IntraVENous Q8H    sodium chloride (NS) flush 5-40 mL  5-40 mL IntraVENous PRN    acetaminophen (TYLENOL) tablet 650 mg  650 mg Oral Q6H PRN    Or    acetaminophen (TYLENOL) suppository 650 mg  650 mg Rectal Q6H PRN    polyethylene glycol (MIRALAX) packet 17 g  17 g Oral DAILY PRN    ondansetron (ZOFRAN ODT) tablet 4 mg  4 mg Oral Q8H PRN    Or    ondansetron (ZOFRAN) injection 4 mg  4 mg IntraVENous Q6H PRN    pantoprazole (PROTONIX) 40 mg in 0.9% sodium chloride 10 mL injection  40 mg IntraVENous Q12H    heparin (porcine) injection 5,000 Units  5,000 Units SubCUTAneous Q8H       Social History:  Social History     Tobacco Use    Smoking status: Never Smoker    Smokeless tobacco: Never Used   Substance Use Topics    Alcohol use: No       Family History:  Family History   Problem Relation Age of Onset    No Known Problems Mother     Dementia Father     Heart Disease Father     Hypertension Father     Kidney Disease Father        Review of Systems:  A detailed 10 system ROS is obtained, with pertinent positives as listed in the HPI and PMH. All others are negative. Objective:     Physical Exam:  Vitals:  Visit Vitals  /68   Pulse 96   Temp 98.5 °F (36.9 °C)   Resp 18   Ht 5' 4\" (1.626 m)   Wt 79.4 kg (175 lb)   SpO2 96%   BMI 30.04 kg/m²       Intake/Output:  No intake/output data recorded. 06/23 0701 - 06/24 1900  In: 100 [I.V.:100]  Out: -     Gen:  Alert and oriented, No acute distress. HEENT: Sclerae anicteric. Conjunctiva pink. No abnormal pigmentation of the lips. Neck: The neck is supple. No cervical or supraclavicular adenopathy. Cardiovascular: Regular rate and rhythm. No murmurs, gallops, or rubs. Respiratory:  Comfortable breathing, no accessory muscle use. Clear breath sounds bilaterally with no wheezes, rales, or rhonchi. GI:  Abdomen soft, nondistended, nontender. Normal active bowel sounds. No guarding or rebound, no masses or bruits, no plapable organomegaly. Extremities:  No clubbing . Severe R UE edema. Skin: No rash or jaundice. Neurological:  Grossly intact without focal deficits. Psychiatric:  Mood appears appropriate and judgement intact.     Laboratory:    Recent Results (from the past 24 hour(s))   CBC WITH AUTOMATED DIFF    Collection Time: 06/24/21 11:50 AM   Result Value Ref Range    WBC 6.3 4.3 - 11.1 K/uL    RBC 3.78 (L) 4.05 - 5.2 M/uL    HGB 10.6 (L) 11.7 - 15.4 g/dL    HCT 34.3 (L) 35.8 - 46.3 %    MCV 90.7 79.6 - 97.8 FL    MCH 28.0 26.1 - 32.9 PG    MCHC 30.9 (L) 31.4 - 35.0 g/dL    RDW 16.1 (H) 11.9 - 14.6 %    PLATELET 228 669 - 450 K/uL    MPV 10.1 9.4 - 12.3 FL    ABSOLUTE NRBC 0.00 0.0 - 0.2 K/uL    DF AUTOMATED      NEUTROPHILS 61 43 - 78 %    LYMPHOCYTES 26 13 - 44 %    MONOCYTES 11 4.0 - 12.0 %    EOSINOPHILS 1 0.5 - 7.8 %    BASOPHILS 1 0.0 - 2.0 %    IMMATURE GRANULOCYTES 0 0.0 - 5.0 %    ABS. NEUTROPHILS 3.9 1.7 - 8.2 K/UL    ABS. LYMPHOCYTES 1.6 0.5 - 4.6 K/UL    ABS. MONOCYTES 0.7 0.1 - 1.3 K/UL    ABS. EOSINOPHILS 0.1 0.0 - 0.8 K/UL    ABS. BASOPHILS 0.1 0.0 - 0.2 K/UL    ABS. IMM. GRANS. 0.0 0.0 - 0.5 K/UL   METABOLIC PANEL, COMPREHENSIVE    Collection Time: 06/24/21 11:50 AM   Result Value Ref Range    Sodium 140 136 - 145 mmol/L    Potassium 3.6 3.5 - 5.1 mmol/L    Chloride 108 (H) 98 - 107 mmol/L    CO2 26 21 - 32 mmol/L    Anion gap 6 (L) 7 - 16 mmol/L    Glucose 128 (H) 65 - 100 mg/dL    BUN 13 8 - 23 MG/DL    Creatinine 0.72 0.6 - 1.0 MG/DL    GFR est AA >60 >60 ml/min/1.73m2    GFR est non-AA >60 >60 ml/min/1.73m2    Calcium 9.5 8.3 - 10.4 MG/DL    Bilirubin, total 1.0 0.2 - 1.1 MG/DL    ALT (SGPT) 37 12 - 65 U/L    AST (SGOT) 54 (H) 15 - 37 U/L    Alk. phosphatase 89 50 - 136 U/L    Protein, total 8.3 (H) 6.3 - 8.2 g/dL    Albumin 3.3 3.2 - 4.6 g/dL    Globulin 5.0 (H) 2.3 - 3.5 g/dL    A-G Ratio 0.7 (L) 1.2 - 3.5     GLUCOSE, POC    Collection Time: 06/24/21  7:09 PM   Result Value Ref Range    Glucose (POC) 98 65 - 100 mg/dL    Performed by [facility]        Assessment:       Principal Problem:    Lymphedema of right arm (3/10/2021)    Active Problems:    Type 2 diabetes mellitus with diabetic neuropathy ([facility] (2/21/2018)      Current use of anticoagulant therapy (9/18/2018)      Malignant neoplasm of ascending colon ([facility] (9/28/2018)      Hx pulmonary embolism (4/5/2021)      Pancreatic pseudocyst (4/5/2021)      Lymphedema (6/24/2021)        Plan:       Dysphagia appears to be oropharyngeal by description. SLP eval pending for tomorrow  Esophageal wall thickening noted on CT, w/o significant reflux symptoms.   This could be related to recent dysphagia and wt loss as well. Concern for possible esophagitis, stricture , neoplasm. EGD is indicated. Follow up Speech eval results  Currently holding Eliquis  Depending on results, consider EGD when off Eliquis > 48hrs for possible dilation.   Onc eval pending for possible breast CA  Pancreatic cyst stable  Mildly elevated LFTs and evidence of fatty infiltration on CT- consider outpt workup    Pia Venegas MD  [facility]

## 2021-06-25 NOTE — CONSULTS
[facility] Hematology & Oncology        Inpatient Hematology / Oncology Consult Note    Reason for Consult:  Lymphedema [I89.0]  Referring Physician:  Lorelei Freeman MD    History of Present Illness:  Ms. Laureen Buckley is a 59 y.o. female admitted on 6/24/2021 with a primary diagnosis of The primary encounter diagnosis was Localized swelling of right upper extremity. Diagnoses of Pleural effusion on right, Abnormal CT scan, Current use of anticoagulant therapy, Hx pulmonary embolism, Lymphedema, Lymphedema of right arm, Malignant neoplasm of ascending colon ([address] Pleural effusion, right, and Pancreatic pseudocyst were also pertinent to this visit. .      59 y.o. female with past medical history of PE/DVT on Eliquis, MTHFR mutation, diabetes mellitus, right upper extremity lymphedema, had hepatic flexure colon cancer status post hemicolectomy on 10/9/2018, no adjuvant chemotherapy was indicated. She had a remote resection of large lipoma of right axilla with chronic scarring. She received right shoulder arthroscopy and biceps repair in November 2020 and developed significant right upper extremity swelling, Doppler reported DVT and referred to IR for thrombolysis however was noted that no evidence of thrombosis was found but a rather showed right axillary vein stenosis for which she received balloon angioplasty but that the swelling continued worsening and she was referred to lymphedema specialist at [facility] who felt the lymphedema was secondary to previous surgeries that affected the limb. She has been referred to lymphedema rehab program but has not presented yet. She has continued on Eliquis due to risk of clotting with MTHFR mutation.   She came to ER complaining of worsening swelling of right upper limb and dysphagia/18 pounds weight loss over the month, CT reported diffuse edematous change of the entire area of the right side from the neck to axilla and chest wall, diffuse skin thickening that suspected for inflammatory breast cancer, nearly occluded right IJ, enlarged Gael axillary lymph node conglomerate up to 3.9 cm, esophagus thickening and possible esophagitis, large right-sided pleural effusion. IR performed lymph node core biopsy. GI consulted for dysphagia and planning for EGD evaluation. Vascular surgery consulted for the right IJ occlusion. Oncology is consulted for concern of inflammatory breast cancer. Review of Systems:  Constitutional Denies fever, chills, weight loss, appetite changes, fatigue, night sweats. HEENT Denies trauma, blurry vision, hearing loss, ear pain, nosebleeds, sore throat, neck pain and ear discharge. Skin Denies lesions or rashes. Lungs Denies dyspnea, cough, sputum production or hemoptysis. Cardiovascular Denies chest pain, palpitations, or lower extremity edema. Gastrointestinal Denies nausea, vomiting, changes in bowel habits, bloody or black stools, abdominal pain.  Denies dysuria, frequency or hesitancy of urination. Neuro Denies headaches, visual changes or ataxia. Denies dizziness, tingling, tremors, sensory change, speech change, focal weakness or headaches. Hematology Denies easy bruising or bleeding, denies gingival bleeding or epistaxis. Endo Denies heat/cold intolerance, denies diabetes or thyroid abnormalities. MSK  extensive swelling of right upper extremity and chest wall. Denies back pain, arthralgias, myalgias or frequent falls. Psychiatric/Behavioral Denies depression and substance abuse. The patient is not nervous/anxious. Allergies   Allergen Reactions    Biaxin [Clarithromycin] Rash    Cortisone Other (comments)     NUMBNESS IN THE LEG (SITE OF INJECTION)  Per pt, leg numbness.      Past Medical History:   Diagnosis Date    Adverse effect of anesthesia     slow to wake after cancer surgery on colon    Anemia 08/2018    no PO iron at present and no infusions    [facility] 2018    Environmental allergies 9/12/2013    History of colon cancer 2018    surgery on colon    History of DVT (deep vein thrombosis) 04/2018    right lower ext.  History of EKG 03/19/2021    NSR    Hx of echocardiogram 05/31/2018    EF 60-65%    Kidney stone     Lumbar stenosis 2018    per Dr. Devang Davis note (care every where)     Pulmonary embolism ([facility] ~2018    Type 2 diabetes mellitus ([facility]     AVG BS:/no s.s of hypoglycemia/Last A1c: 6.6 on 7/10/2020     Past Surgical History:   Procedure Laterality Date    HX CHOLECYSTECTOMY  1980's    HX COLONOSCOPY      HX Verline Karma Right 1980's   Erickson Cardenas right shoulder tendon surgery    HX OTHER SURGICAL  09/27/2018    filter placed to right groin; per patient- has been removed     HX WISDOM TEETH EXTRACTION      NE PART REMOVAL COLON W ANASTOMOSIS       Family History   Problem Relation Age of Onset    No Known Problems Mother     Dementia Father     Heart Disease Father     Hypertension Father     Kidney Disease Father      Social History     Socioeconomic History    Marital status:      Spouse name: Not on file    Number of children: Not on file    Years of education: Not on file    Highest education level: Not on file   Occupational History    Not on file   Tobacco Use    Smoking status: Never Smoker    Smokeless tobacco: Never Used   Vaping Use    Vaping Use: Never used   Substance and Sexual Activity    Alcohol use: No    Drug use: No    Sexual activity: Not Currently   Other Topics Concern    Not on file   Social History Narrative    Pt lives alone. Her son lives in a house behind hers. She works as a  . She has one indoor dog.       Social Determinants of Health     Financial Resource Strain:     Difficulty of Paying Living Expenses:    Food Insecurity:     Worried About Running Out of Food in the Last Year:     [address] in the Last Year:    Transportation Needs:     Lack of Transportation (Medical):      Lack of Transportation (Non-Medical):    Physical Activity:     Days of Exercise per Week:     Minutes of Exercise per Session:    Stress:     Feeling of Stress :    Social Connections:     Frequency of Communication with Friends and Family:     Frequency of Social Gatherings with Friends and Family:     Attends [employer] Services:     Active Member of Clubs or Organizations:     Attends Club or Organization Meetings:     Marital Status:    Intimate Partner Violence:     Fear of Current or Ex-Partner:     Emotionally Abused:     Physically Abused:     Sexually Abused:      Current Facility-Administered Medications   Medication Dose Route Frequency Provider Last Rate Last Admin    lactated Ringers infusion  50 mL/hr IntraVENous CONTINUOUS Mahogany Gaffney NP        insulin lispro (HUMALOG) injection   SubCUTAneous Merline Carwin, MD        sodium chloride (NS) flush 5-40 mL  5-40 mL IntraVENous Q8H Silvino Singh MD   10 mL at 06/25/21 1419    sodium chloride (NS) flush 5-40 mL  5-40 mL IntraVENous PRN Silvino Singh MD        acetaminophen (TYLENOL) tablet 650 mg  650 mg Oral Q6H PRN Silvino Singh MD        Or    acetaminophen (TYLENOL) suppository 650 mg  650 mg Rectal Q6H PRN Silvino Singh MD        polyethylene glycol (MIRALAX) packet 17 g  17 g Oral DAILY PRN Silvino Singh MD        ondansetron (ZOFRAN ODT) tablet 4 mg  4 mg Oral Q8H PRN Silvino Singh MD        Or    ondansetron (ZOFRAN) injection 4 mg  4 mg IntraVENous Q6H PRN Silvino Singh MD        pantoprazole (PROTONIX) 40 mg in 0.9% sodium chloride 10 mL injection  40 mg IntraVENous Q12H Silvino Singh MD   40 mg at 06/25/21 0840    [Held by provider] heparin (porcine) injection 5,000 Units  5,000 Units SubCUTAneous Jose Wright MD   5,000 Units at 06/25/21 1419       OBJECTIVE:  Patient Vitals for the past 8 hrs:   BP Pulse Resp SpO2 Height   06/25/21 1441     5' 4\" (1.626 m)   06/25/21 1326 (!) 136/58 89 18 99 %      Temp (24hrs), Av.4 °F (36.9 °C), Min:97.9 °F (36.6 °C), Max:98.5 °F (36.9 °C)    No intake/output data recorded. Physical Exam:  Constitutional: Well developed, well nourished female in no acute distress, sitting comfortably in the hospital bed. HEENT: Normocephalic and atraumatic. Oropharynx is clear, mucous membranes are moist.  Pupils are equal, round, and reactive to light. Extraocular muscles are intact. Sclerae anicteric. Neck supple without JVD. No thyromegaly present. Lymph node   No palpable submandibular, cervical, supraclavicular, axillary or inguinal lymph nodes. Skin  right breast firm and taut skin with prominent vasculature and discoloration of chest wall, but markedly smaller/retracted compared to the left breast.  Warm and dry. No bruising and no rash noted. No erythema. No pallor. Respiratory Lungs are clear to auscultation bilaterally without wheezes, rales or rhonchi, normal air exchange without accessory muscle use. CVS Normal rate, regular rhythm and normal S1 and S2. No murmurs, gallops, or rubs. Abdomen Soft, nontender and nondistended, normoactive bowel sounds. No palpable mass. No hepatosplenomegaly. Neuro Grossly nonfocal with no obvious sensory or motor deficits. MSK  extensive swelling affecting right side of the neck and chest wall no right upper extremity. Normal range of motion in general.   Psych Appropriate mood and affect.         Labs:    Recent Results (from the past 24 hour(s))   GLUCOSE, POC    Collection Time: 21  7:09 PM   Result Value Ref Range    Glucose (POC) 98 65 - 100 mg/dL    Performed by [facility]    METABOLIC PANEL, BASIC    Collection Time: 21  5:16 AM   Result Value Ref Range    Sodium 144 136 - 145 mmol/L    Potassium 3.7 3.5 - 5.1 mmol/L    Chloride 112 (H) 98 - 107 mmol/L    CO2 22 21 - 32 mmol/L    Anion gap 10 7 - 16 mmol/L    Glucose 97 65 - 100 mg/dL    BUN 11 8 - 23 MG/DL    Creatinine 0.68 0.6 - 1.0 MG/DL    GFR est AA >60 >60 ml/min/1.73m2    GFR est non-AA >60 >60 ml/min/1.73m2    Calcium 9.5 8.3 - 10.4 MG/DL   CBC W/O DIFF    Collection Time: 06/25/21  5:16 AM   Result Value Ref Range    WBC 5.9 4.3 - 11.1 K/uL    RBC 3.67 (L) 4.05 - 5.2 M/uL    HGB 10.5 (L) 11.7 - 15.4 g/dL    HCT 32.4 (L) 35.8 - 46.3 %    MCV 88.3 79.6 - 97.8 FL    MCH 28.6 26.1 - 32.9 PG    MCHC 32.4 31.4 - 35.0 g/dL    RDW 16.2 (H) 11.9 - 14.6 %    PLATELET 592 205 - 062 K/uL    MPV 10.1 9.4 - 12.3 FL    ABSOLUTE NRBC 0.00 0.0 - 0.2 K/uL   GLUCOSE, POC    Collection Time: 06/25/21  7:40 AM   Result Value Ref Range    Glucose (POC) 112 (H) 65 - 100 mg/dL    Performed by [facility]        Imaging:        ASSESSMENT/RECOMMENDATIONS:  Problem List  Date Reviewed: 5/20/2021        Codes Class Noted    Pleural effusion, right ICD-10-CM: J90  ICD-9-CM: 511.9  6/25/2021        Lymphedema ICD-10-CM: I89.0  ICD-9-CM: 457.1  6/24/2021        Hx pulmonary embolism ICD-10-CM: F61.327  ICD-9-CM: V12.55  4/5/2021        Insomnia ICD-10-CM: G47.00  ICD-9-CM: 780.52  4/5/2021        Long term current use of anticoagulant ICD-10-CM: Z79.01  ICD-9-CM: V58.61  4/5/2021        Microcytic anemia ICD-10-CM: D50.9  ICD-9-CM: 280.9  4/5/2021        Malignant neoplasm of colon, unspecified ([facility] ICD-10-CM: C18.9  ICD-9-CM: 153.9  4/5/2021        Pancreatic pseudocyst ICD-10-CM: K86.3  ICD-9-CM: 577.2  4/5/2021        * (Principal) Lymphedema of right arm ICD-10-CM: I89.0  ICD-9-CM: 457.1  3/10/2021        Elbow stiffness, right ICD-10-CM: M25.621  ICD-9-CM: 719.52  3/10/2021        Type 2 diabetes with nephropathy ([facility] ICD-10-CM: E11.21  ICD-9-CM: 250.40, 583.81  9/11/2019        Dysuria ICD-10-CM: R30.0  ICD-9-CM: 788.1  9/9/2019        Iron deficiency anemia ICD-10-CM: D50.9  ICD-9-CM: 280.9  8/27/2019        Hyperlipidemia associated with type 2 diabetes mellitus ([facility] ICD-10-CM: E11.69, E78.5  ICD-9-CM: 250.80, 272.4  8/27/2019        Primary adenocarcinoma of ascending colon ([facility] ICD-10-CM: C18.2  ICD-9-CM: 153.6  10/9/2018    Overview Signed 10/26/2018 10:47 AM by Lester Terrell MD     S/p R hemicolectomy 10/9/2018   DIAGNOSIS   RIGHT HEMICOLECTOMY: MODERATELY TO POORLY DIFFERENTIATED ADENOCARCINOMA, 4.6 CM IN GREATEST DIMENSION, EXTENDING THROUGH THE MUSCULARIS PROPRIA INTO THE PERICOLONIC ADIPOSE TISSUE. MARGINS UNINVOLVED. 28 BENIGN LYMPH NODES, ALL NEGATIVE FOR METASTATIC CARCINOMA (0/28). Electronically signed out on 10/11/2018 10:24 by Luann Townsend. [facility],              Malignant neoplasm of ascending colon [facility]) ICD-10-CM: C18.2  ICD-9-CM: 153.6  9/28/2018        Current use of anticoagulant therapy ICD-10-CM: Z79.01  ICD-9-CM: V58.61  9/18/2018        Obesity, morbid ([facility] ICD-10-CM: E66.01  ICD-9-CM: 278.01  4/5/2018        Obesity due to excess calories ICD-10-CM: E66.09  ICD-9-CM: 278.00  3/26/2018        Controlled type 2 diabetes mellitus without complication, with long-term current use of insulin ([facility] ICD-10-CM: E11.9, Z79.4  ICD-9-CM: 250.00, V58.67  3/26/2018        Axillary mass, right ICD-10-CM: R22.31  ICD-9-CM: 782.2  3/26/2018        Pancreatic mass ICD-10-CM: K86.89  ICD-9-CM: 577.8  3/26/2018        Type 2 diabetes mellitus with diabetic neuropathy ([facility] ICD-10-CM: E11.40  ICD-9-CM: 250.60, 357.2  2/21/2018        Lumbar radiculopathy ICD-10-CM: M54.16  ICD-9-CM: 724.4  2/6/2018        Lumbar stenosis with neurogenic claudication ICD-10-CM: O90.360  ICD-9-CM: 724.03  1/29/2018    Overview Signed 4/5/2021 11:02 AM by Hawa Castano     Last Assessment & Plan:   I want her to continue with home exercises. If her pain worsens, down her leg, then I would recommend a repeat epidural. She's been out of work two months. She can return to work with no restrictions. She can lift 65 lbs if needed. If her pain worsens, we will get another injection.              Bilateral low back pain with sciatica ICD-10-CM: M54.40  ICD-9-CM: 724.3  10/23/2017        Environmental allergies ICD-10-CM: Z91.09  ICD-9-CM: V15.09  9/12/2013            18-year-old female with complicated past medical history as detailed above developed significant lymphedema of right upper extremity and right chest wall since November 2020 and all appeared worsened with symptom of dysphagia and weight loss and nearly obstructed right IJ, inflammatory breast cancer was suspected and biopsy taken, very complicated case, will follow pathology result. Lab studies and imaging studies were personally reviewed. Pertinent old records were reviewed. Case discussed with primary team.    Thank you for allowing us to participate in the care of Ms. Mcmahan. Ce Trivedi M.D.   [address]  Office : [phone]  Fax : [phone]

## 2021-06-25 NOTE — CONSULTS
History and Physical/Surgical Consult   Harriett Garcia    Admit date: 2021    MRN: [id number]     : 1960     Age: 59 y.o.          2021 8:43 AM    Subjective/HPI:   This patient is a 59 y.o. seen and evaluated at the request of Dr. Mark Valencia for right upper extremity swelling and \"nearly occluded right internal jugular vein. \"      Patient is a 75-year-old female who presented to the emergency department with difficulty breathing, swallowing and worsening right upper extremity edema. She has undergone shoulder arthroscopy in 2020 for a torn bicep and has had issues with right upper extremity edema since that time. She did undergo a venogram in 2021 by Dr. Santiago Loera and had PTA of the axillary and subclavian veins on the right. She has been on Eliquis twice daily as she has a history of a PE. She has undergone evaluation at [facility] due to her upper extremity edema and has seen a local lymphedema specialist for about the past month. They have been working with Ace wraps to try and improve some of her swelling before fitting her for a sleeve. She is now having difficulty swallowing and has lost an unintentional 18 to 20 pounds within the last month. She does report that she had some shortness of breath yesterday. She does have a history of colon cancer and has undergone a hemicolectomy in 2018 by Dr. Romero Fuentes. We are consulted for a nearly occluded right internal jugular vein. Her CT chest was also concerning for suspicious inflammatory breast cancer. Review of Systems  A comprehensive review of systems was negative except for that written in the HPI. Constitutional:  20 lb unintentional weight loss over the last month. Difficulty swallowing  Eyes:   Negative for visual disturbance. ENT:   Negative for significant hearing loss and tinnitus. Respiratory:   Negative for hemoptysis. Cardiovascular:   Negative except as noted in HPI.   Gastrointestinal:   Negative for melena and abdominal pain. Genitourinary:   Negative for hematuria, renal stones. Integumentary:   Negative for rash or non-healing wounds  Hematologic/Lymphatic:   h/x colon cancer and PE  Musculoskeletal:  Right upper extremity swelling. Neurological:   Negative for stroke.    Behavioral/Psych:   Negative for suicidal ideations.    Endocrine:   Negative for uncontrolled diabetic symptoms including polyuria, polydipsia and poor wound healing. Past Medical History:   Diagnosis Date    Adverse effect of anesthesia     slow to wake after cancer surgery on colon    Anemia 08/2018    no PO iron at present and no infusions    Cancer of ascending colon ([address] 2018    Environmental allergies 9/12/2013    History of colon cancer 2018    surgery on colon    History of DVT (deep vein thrombosis) 04/2018    right lower ext.  History of EKG 03/19/2021    NSR    Hx of echocardiogram 05/31/2018    EF 60-65%    Kidney stone     Lumbar stenosis 2018    per Dr. Tam Mode note (care every where)     Pulmonary embol[facility] [address] ~2018    Type 2 diabetes mellitus ([facility] [address]     AVG BS:/no s.s of hypoglycemia/Last A1c: 6.6 on 7/10/2020      Past Surgical History:   Procedure Laterality Date    HX CHOLECYSTECTOMY  1980's    HX COLONOSCOPY      HX LIPOMA RESECTION Right 1980's   Ivanna Tran Linear right shoulder tendon surgery    HX OTHER SURGICAL  09/27/2018    filter placed to right groin; per patient- has been removed     HX WISDOM TEETH EXTRACTION      [place] PART REMOVAL COLON W ANASTOMOSIS        Allergies   Allergen Reactions    Biaxin [Clarithromycin] Rash    Cortisone Other (comments)     NUMBNESS IN THE LEG (SITE OF INJECTION)  Per pt, leg numbness. Social History     Tobacco Use    Smoking status: Never Smoker    Smokeless tobacco: Never Used   Substance Use Topics    Alcohol use: No      Social History     Social History Narrative    Pt lives alone.  Her son lives in a house behind hers. She works as a  . She has one indoor dog. Family History   Problem Relation Age of Onset    No Known Problems Mother     Dementia Father     Heart Disease Father     Hypertension Father     Kidney Disease Father       Prior to Admission Medications   Prescriptions Last Dose Informant Patient Reported? Taking? Insulin Needles, Disposable, (BD JIMBO 2ND GEN PEN NEEDLE) 32 gauge x \" ndle   No No   Si Applicators by Does Not Apply route daily. SITagliptin-metFORMIN (Janumet)  mg per tablet   No No   Sig: Take 1 Tab by mouth two (2) times daily (with meals). acetaminophen (TylenoL) 325 mg tablet   Yes No   Sig: Take  by mouth every four (4) hours as needed for Pain. apixaban (ELIQUIS) 5 mg tablet   No No   Sig: Take 1 Tab by mouth two (2) times a day. insulin glargine U-300 conc (Toujeo Max U-300 SoloStar) 300 unit/mL (3 mL) inpn   Yes No   Si Units by SubCUTAneous route Every morning. SQ under skin every morning    nystatin (MYCOSTATIN) powder   No No   Sig: Apply  to affected area four (4) times daily.  Apply to elbow creases and axilla      Facility-Administered Medications: None     Current Facility-Administered Medications   Medication Dose Route Frequency    insulin lispro (HUMALOG) injection   SubCUTAneous TIDAC    sodium chloride (NS) flush 5-40 mL  5-40 mL IntraVENous Q8H    sodium chloride (NS) flush 5-40 mL  5-40 mL IntraVENous PRN    acetaminophen (TYLENOL) tablet 650 mg  650 mg Oral Q6H PRN    Or    acetaminophen (TYLENOL) suppository 650 mg  650 mg Rectal Q6H PRN    polyethylene glycol (MIRALAX) packet 17 g  17 g Oral DAILY PRN    ondansetron (ZOFRAN ODT) tablet 4 mg  4 mg Oral Q8H PRN    Or    ondansetron (ZOFRAN) injection 4 mg  4 mg IntraVENous Q6H PRN    pantoprazole (PROTONIX) 40 mg in 0.9% sodium chloride 10 mL injection  40 mg IntraVENous Q12H    heparin (porcine) injection 5,000 Units  5,000 Units SubCUTAneous Q8H Objective:     Vitals:    06/24/21 2004 06/24/21 2302 06/25/21 0338 06/25/21 0718   BP: 133/68 128/62 132/70 127/66   Pulse: 96 96 90 96   Resp: 18 18 18 18   Temp: 98.5 °F (36.9 °C) 98.5 °F (36.9 °C) 98.5 °F (36.9 °C) 97.9 °F (36.6 °C)   SpO2: 96% 96% 93% 97%   Weight:       Height:         No intake/output data recorded. 06/23 1901 - 06/25 0700  In: 100 [I.V.:100]  Out: 200 [Urine:200]     Narrative & Impression   EXAM: CT neck, chest, abdomen and pelvis with contrast.  INDICATION: Patient with lymphedema for one month of the right arm. Patient  feels like she is choking every time she eats. Shortness of breath with  exertion. Right neck fullness. COMPARISON: Chest CT dated March 19, 2021. Chest abdomen and pelvis CT dated  December 06, 2019. Contrast: 100 mL Isovue-370.     Multiple axial images were obtained through the chest, abdomen, and pelvis. Radiation dose reduction techniques were used for this study: All CT scans  performed at this facility use one or all of the following: Automated exposure  control, adjustment of the mA and/or kVp according to patient's size, iterative  reconstruction.     FINDINGS:  CT neck: Parotid and submandibular glands are unremarkable. Right thyroid lobe  1.2 cm nodule which appears grossly unchanged from December 2019. Diffuse  infiltrative edematous change of the right neck. Limited evaluation of  intracranial contents without evidence of mass effect or extra-axial fluid  collection. Highly diminutive appearance of the right internal jugular vein,  nearly occluded. Prominent venous collaterals of the right superior chest wall. Dermal thickening and trabecular thickening of the right breast with diffuse  edematous change of the right axilla with pathologic right axillary  lymphadenopathy which appears similar although slightly progressed from prior  measuring 3.9 x 1.8 cm and additional enlarged lymph nodes of the right axilla.   Intraorbital contents are unremarkable. The airway is clear. There are several  prominent right cervical chain lymph nodes which are nonpathologic by size  criteria.     LUNGS/PLEURA:   Large right pleural effusion. Central airways are clear. Atelectasis of the  right lower lobe. Left lung is clear. No pneumothorax.     MEDIASTINUM:  Thyroid nodule as above which appears grossly stable from prior. Aorta is normal  in caliber. Heart appears normal in size. Apparent circumferential wall  thickening of the mid and distal esophagus. Edematous change of the mediastinum  without clear pathologic mediastinal or hilar adenopathy.     LIVER: Hypodensity along the falciform ligament of the liver which appears  increased in size from priors although favored focal fatty infiltration, overall  indeterminate. Cannot well evaluate the right portal vein, left portal vein and  main portal vein appear patent.     BILIARY SYSTEM: Cholecystectomy.     SPLEEN: No splenomegaly or aggressive splenic lesion.     PANCREAS: Unchanged pancreatic tail cyst versus cystic neoplasm measuring 3.9 x  3.9 cm, stable since September 11, 2018.     ADRENALS: No adrenal nodule or adrenal hypertrophy.     URINARY SYSTEM: No suspicious renal lesion. No renal stone. No hydronephrosis. The bladder is normal.     FLUID:  No intraperitoneal free fluid.     REPRODUCTIVE ORGANS: Unremarkable.     BOWEL: Postoperative change of partial colonic resection with primary  anastomosis with suture line seen in the right midabdomen. No evidence of bowel  obstruction.     VASCULAR/NODES: No aortic or iliac artery aneurysm.  No lymphadenopathy.     BONES/SUBCUTANEOUS TISSUE: Diffuse edematous change of the rightward aspect of  the neck, mediastinum, right axilla, right upper extremity, and right breast.  There is diffuse skin thickening and trabecular thickening of the right breast  appears progressively worsening from prior with soft tissue thickening along the  right chest wall favored edematous change although limited evaluation. Diffuse  edematous change of the right upper extremity within the visualized portion with  diffuse skin thickening.        IMPRESSION  1. Diffuse edematous change of the rightward aspect of the neck, mediastinum,  right axilla, right upper extremity, and right breast. There is diffuse skin  thickening and trabecular thickening of the right breast with an overall  smaller/contracted appearance of the right breast compared to the left. This is  suspicious for inflammatory breast cancer. Clinical correlation is advised with  consideration of diagnostic evaluation in the breast center. 2. Highly diminutive appearance of the right internal jugular vein which is  nearly occluded, further evaluation with vascular ultrasound can be considered. Prominent venous collaterals of the right chest wall. 3. Pathologically enlarged right axillary lymph nodes with a lymph conglomerate  measuring up to 3.9 cm, similar to although progressed from prior's. Biopsy can  be considered. 4. Wall thickening of the mid and distal esophagus, possible esophagitis. Consider further evaluation with EGD versus barium esophagram.  5. Enlarging hypodensity along falciform ligament of the liver most likely focal  fatty infiltration although indeterminate, attention on follow-up. 6. Large right pleural effusion. 7. Unchanged pancreatic tail cyst versus cystic neoplasm measuring up to 3.9 cm.        These results were discussed with emergency room physician Dr. Hao Garcia  specifically of my concern for potential inflammatory breast cancer.          Physical Exam:   Gen- the patient is well developed and in no acute distress  HEENT- PERRL, EOMI, no scleral icterus       nose without alar flaring or epistaxis                  oral muscosa moist without cyanosis  Neck- no JVD or retractions  Lungs- resp even/unlab   Heart- RRR   Abd- soft, non-tender  Ext- warm without cyanosis.  RUE edema, multiple right chest varicosities. Decreased ROM and hand  d/t the amount of upper extremity edema  Skin- no jaundice or rashes  Neuro- alert and oriented x 3. No gross sensorimotor deficits are present. Data Review   Recent Labs     06/25/21  0516 06/24/21  1150   WBC 5.9 6.3   HGB 10.5* 10.6*   HCT 32.4* 34.3*    347     Recent Labs     06/25/21  0516 06/24/21  1150    140   K 3.7 3.6   * 108*   CO2 22 26   GLU 97 128*   BUN 11 13   CREA 0.68 0.72       Assessment:     Hospital Problems  Date Reviewed: 5/20/2021        Codes Class Noted POA    Lymphedema ICD-10-CM: I89.0  ICD-9-CM: 457.1  6/24/2021 Unknown        Hx pulmonary embolism ICD-10-CM: E41.571  ICD-9-CM: V12.55  4/5/2021 Yes        Pancreatic pseudocyst ICD-10-CM: K86.3  ICD-9-CM: 577.2  4/5/2021 Yes        * (Principal) Lymphedema of right arm ICD-10-CM: I89.0  ICD-9-CM: 457.1  3/10/2021 Yes        Malignant neoplasm of ascending colon (HCC) ICD-10-CM: C18.2  ICD-9-CM: 153.6  9/28/2018 Yes        Current use of anticoagulant therapy ICD-10-CM: Z79.01  ICD-9-CM: V58.61  9/18/2018 Yes        Type 2 diabetes mellitus with diabetic neuropathy (HCC) ICD-10-CM: E11.40  ICD-9-CM: 250.60, 357.2  2/21/2018 Yes            Plan:     Patient is a 75-year-old female with a history of colon cancer. She underwent right shoulder arthroscopy in November 2020 and developed right upper extremity edema. She has been evaluated at [facility] and has currently been seen a lymphedema specialist for the past month. She is now having worsening edema and unintentional 18 to 20 pound weight loss within the last month. We are consulted for a nearly occluded right internal jugular vein. Her CT findings were also suspicious for inflammatory breast cancer. She has undergone a venogram in April 2021 by Dr. Santiago Loera of interventional radiology who performed PTA of her axillary and subclavian veins on the right. From a vascular standpoint no surgical intervention.   I would recommend continued systemic anticoagulation. She also would benefit from continued elevation and compression of her right upper extremity. Will defer any further treatment to her right upper extremity to interventional radiology who has followed her recently. Thank you very much for this referral.  We appreciate the opportunity to participate in this patient's care. Will sign off, please call with questions or concerns. Approximately 40 minutes  was spent in direct patient contact during this encounter including 50% of which was spend in patient education, counseling, review of medical history and imaging, and medical decision making.        Abhijit Morataya NP

## 2021-06-26 NOTE — PROGRESS NOTES
[facility] Hospitalist Progress Note     Name:  Maggie   Age:61 y.o. Sex:female   :  1960    MRN:  [id number]     Admit Date:  2021    Reason for Admission:  Lymphedema [I89.0]    Hospital Course/Interval history:     Patient is a 61-year-old female with past medical history significant for diabetes mellitus, history of DVT/PE on Eliquis, history of colon cancer status post resection in 2018 and lymphedema of right upper extremity presented to the ED with worsening swelling of right upper extremity and dysphagia. She has history of right shoulder surgery in  and complained that afterwards she noticed her RUE beginning to swell. Patient went in for evaluation with a suspicion of DVT. IR procedure found patient did not have a clot. Per patient the swelling became progressively worse afterward and she was referred to a lymphedema clinic. In addition to her swollen RUE patient complained the swelling progressed into her neck resulting in dysphagia and unintentional weight loss of ~20 lbs. Due to her concerns patient decided to present to ER. Subjective (21)    Patient pleasant, conversational, sitting in chair. Denies chest pain, endorses slight SOB, orthopnea. Tolerated broth and pudding yesterday well. Review of Systems: 14 point review of systems is otherwise negative with the exception of the elements mentioned above.   Assessment & Plan     Lymphedema/?inflammatory breast cancer:  Patient with significant right upper extremity edema to the point having difficulty swallowing  CT findings concerning for inflammatory breast cancer  Oncology consult, appreciate recommendation  IR consult for possible lymph node biopsy for further evaluation  Patient following lymphedema clinic outpatient  21 IR has seen patient,  US guided biopsy completed today pathology pending; Elevate right arm, apply compression wraps as patient can tolerate; oncology to see    Dysphagia:  CT with mild thickening of mid and distal esophagus, concerning for esophagitis  GI consult  SLP eval  Continue Protonix  Liquid diet for now  6/25/21 SLP has seen patient and has deferred management to GI. Esophageal wall thickening noted on CT, w/o significant reflux symptoms. GI will consider EGD when patient has been off Eliquis x 48 hours. 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, given collaterals  Vascular surgery consult  6/25/21 Vascular consulted with recs for continued [facility] therapy, elevation and compression of RA      Right large pleural effusion:  -6/26 Pulmonary consulted, thoracentesis with 600 ml fluid removed, specimens sent to lab, patient remains stable on RA    History of DVT/PE:  On Eliquis, will hold for now for possible IR intervention  Heparin for DVT prophylaxis  -6/26 Continue to hold [facility], EGD with dilation in AM     Diabetes mellitus  -Hold oral hypoglycemic  -SSI ACHS    Diet:  DIET NPO  DIET NPO   DVT PPx: SCD's  Code status: Full Code  Disposition/Expected LOS: Pending clinical course      Objective:     Patient Vitals for the past 24 hrs:   Temp Pulse Resp BP SpO2   06/26/21 0333 97.8 °F (36.6 °C) 92 18 129/76 96 %   06/25/21 2346 98 °F (36.7 °C) 83 17 128/71 94 %   06/25/21 1919 97.4 °F (36.3 °C) (!) 101 17 133/78 97 %   06/25/21 1535 97.4 °F (36.3 °C) 96 17 131/72 96 %   06/25/21 1326  89 18 (!) 136/58 99 %     Oxygen Therapy  O2 Sat (%): 96 % (06/26/21 0333)  Pulse via Oximetry: 89 beats per minute (06/25/21 1326)  O2 Device: None (Room air) (06/25/21 1910)    Body mass index is 28.56 kg/m².     Physical Exam:   General:  No acute distress, speaking in full sentences, no use of accessory muscles   Lungs:  RLL with very diminished breath sounds, otherwise breath sounds clear to auscultation  Neck:  R lower neck swelling, no JVD  CV:  Regular rate and rhythm with normal S1 and S2   Abdomen: Soft, nontender, nondistended, normoactive bowel sounds   Extremities:  No cyanosis clubbing, RUE edematous with swelling into upper anterior chest  Neuro:  Nonfocal, A&O x3   Psych:  Normal affect     Data Review:  I have reviewed all labs, meds, and studies from the last 24 hours:    Labs:    Recent Results (from the past 24 hour(s))   GLUCOSE, POC    Collection Time: 06/25/21  7:40 AM   Result Value Ref Range    Glucose (POC) 112 (H) 65 - 100 mg/dL    Performed by Kiarra Pill    GLUCOSE, POC    Collection Time: 06/25/21  4:04 PM   Result Value Ref Range    Glucose (POC) 87 65 - 100 mg/dL    Performed by Kiarra Pill    GLUCOSE, POC    Collection Time: 06/25/21  9:01 PM   Result Value Ref Range    Glucose (POC) 123 (H) 65 - 100 mg/dL    Performed by [facility]    METABOLIC PANEL, COMPREHENSIVE    Collection Time: 06/26/21  5:07 AM   Result Value Ref Range    Sodium 144 136 - 145 mmol/L    Potassium 3.5 3.5 - 5.1 mmol/L    Chloride 110 (H) 98 - 107 mmol/L    CO2 23 21 - 32 mmol/L    Anion gap 11 7 - 16 mmol/L    Glucose 85 65 - 100 mg/dL    BUN 13 8 - 23 MG/DL    Creatinine 0.63 0.6 - 1.0 MG/DL    GFR est AA >60 >60 ml/min/1.73m2    GFR est non-AA >60 >60 ml/min/1.73m2    Calcium 8.8 8.3 - 10.4 MG/DL    Bilirubin, total 0.9 0.2 - 1.1 MG/DL    ALT (SGPT) 27 12 - 65 U/L    AST (SGOT) 33 15 - 37 U/L    Alk.  phosphatase 79 50 - 136 U/L    Protein, total 7.4 6.3 - 8.2 g/dL    Albumin 2.6 (L) 3.2 - 4.6 g/dL    Globulin 4.8 (H) 2.3 - 3.5 g/dL    A-G Ratio 0.5 (L) 1.2 - 3.5     CBC WITH AUTOMATED DIFF    Collection Time: 06/26/21  5:07 AM   Result Value Ref Range    WBC 4.1 (L) 4.3 - 11.1 K/uL    RBC 3.46 (L) 4.05 - 5.2 M/uL    HGB 9.7 (L) 11.7 - 15.4 g/dL    HCT 31.4 (L) 35.8 - 46.3 %    MCV 90.8 79.6 - 97.8 FL    MCH 28.0 26.1 - 32.9 PG    MCHC 30.9 (L) 31.4 - 35.0 g/dL    RDW 16.2 (H) 11.9 - 14.6 %    PLATELET 752 718 - 005 K/uL    MPV 10.2 9.4 - 12.3 FL    ABSOLUTE NRBC 0.00 0.0 - 0.2 K/uL    DF AUTOMATED NEUTROPHILS 47 43 - 78 %    LYMPHOCYTES 33 13 - 44 %    MONOCYTES 17 (H) 4.0 - 12.0 %    EOSINOPHILS 3 0.5 - 7.8 %    BASOPHILS 1 0.0 - 2.0 %    IMMATURE GRANULOCYTES 0 0.0 - 5.0 %    ABS. NEUTROPHILS 1.9 1.7 - 8.2 K/UL    ABS. LYMPHOCYTES 1.3 0.5 - 4.6 K/UL    ABS. MONOCYTES 0.7 0.1 - 1.3 K/UL    ABS. EOSINOPHILS 0.1 0.0 - 0.8 K/UL    ABS. BASOPHILS 0.1 0.0 - 0.2 K/UL    ABS. IMM. GRANS. 0.0 0.0 - 0.5 K/UL   GLUCOSE, POC    Collection Time: 06/26/21  7:03 AM   Result Value Ref Range    Glucose (POC) 119 (H) 65 - 100 mg/dL    Performed by Natalie Sales        All Micro Results     None          EKG Results     None          Other Studies:  XR SWALLOW FUNC VIDEO    Result Date: 6/25/2021  Modified Barium Swallow INDICATION: Difficulty swallowing, inflammatory process, possible tumor involving the upper right chest and neck. Fluoro time: 1.3 min Spot films:  0 Fluoroscopy was used to evaluate pharyngeal function while the patient was given barium solutions and barium coated solids. FINDINGS: The study was well-tolerated. Pharyngeal function is normal.  There is no evidence of aspiration. No evidence of aspiration. US GUIDE BX LYMPH NOD SUPER    Result Date: 6/25/2021  Procedure Performed: Ultrasound guidance Percutaneous core needle biopsy of right axillary lymph node HISTORY: 57-year-old female with a history of colon cancer as well as severe right upper extremity and right chest wall lymphedema and diffuse right breast skin thickening. Concern for right-sided inflammatory breast cancer. We are asked to biopsy one of the large right axillary lymph nodes noted on the 6/24/2021 CT exam. ATTENDING: Wanda Gregory M.D. ANESTHESIA: Local anesthetic with 1% lidocaine. TECHNIQUE/FINDINGS: After the risks, benefits and alternatives were discussed, consent was obtained for the procedure. A timeout was performed confirming the correct patient identity, site, and procedure.  Maximal sterile barrier technique (including: Sterile Ultrasound probe cover, Sterile Ultrasound gel, cap, mask, sterile gloves, sterile drape, hand hygiene, and 2% chlorhexidine cutaneous antisepsis) was used for the procedure. The patient was positioned in the Supine position. Limited ultrasound examination of the right axilla demonstrated the diffuse right axillary edema and limited visualization of a mildly enlarged right axillary node measuring approximately 2.3 x 1.2 cm in dimension. There was very poor ability to evaluate the right axillary soft tissues secondary to the patient's inability to raise her arm as a result of her severe diffuse right upper extremity swelling from lymphedema. The right axillary lymph node was targeted with ultrasound. The overlying area was prepped and draped in sterile fashion. Local anesthetic with 1% lidocaine was administered to the subcutaneous soft tissues. A small dermatotomy was performed with a #11 blade scalpel. A 17-gauge Sandoval needle was advanced under direct ultrasound into the right axillary lymph node with very limited visualization secondary to the patient's very suboptimal positioning as a result of her being unable to raise her arm. Multiple core needle biopsy specimens were obtained by inserting an 18-gauge biopsy device through the needle into the right axillary node. The needle was removed. Hemostasis was obtained. Limited postbiopsy ultrasound images demonstrating no gross evidence significant hematoma in the region of biopsy. A sterile bandage was placed over the needle exit site. The patient tolerated the procedure. There were no immediate complications. Biopsy specimens were sent to pathology for analysis. Estimated blood loss: Negligible.      Ultrasound-guided percutaneous core needle biopsy of a mildly enlarged right axillary lymph node with very limited evaluation of the needle tip during the biopsy procedure secondary to the patient being unable to raise her arm due to diffuse right upper extremity swelling from lymphedema. Biopsy specimens were sent to pathology for analysis. If the biopsy results are discordant with the concern for neoplasm, recommend further evaluation in the breast center due to the concern for right-sided inflammatory breast cancer.        Current Meds:   Current Facility-Administered Medications   Medication Dose Route Frequency    lactated Ringers infusion  50 mL/hr IntraVENous CONTINUOUS    insulin lispro (HUMALOG) injection   SubCUTAneous TIDAC    sodium chloride (NS) flush 5-40 mL  5-40 mL IntraVENous Q8H    sodium chloride (NS) flush 5-40 mL  5-40 mL IntraVENous PRN    acetaminophen (TYLENOL) tablet 650 mg  650 mg Oral Q6H PRN    Or    acetaminophen (TYLENOL) suppository 650 mg  650 mg Rectal Q6H PRN    polyethylene glycol (MIRALAX) packet 17 g  17 g Oral DAILY PRN    ondansetron (ZOFRAN ODT) tablet 4 mg  4 mg Oral Q8H PRN    Or    ondansetron (ZOFRAN) injection 4 mg  4 mg IntraVENous Q6H PRN    pantoprazole (PROTONIX) 40 mg in 0.9% sodium chloride 10 mL injection  40 mg IntraVENous Q12H    [Held by provider] heparin (porcine) injection 5,000 Units  5,000 Units SubCUTAneous Q8H       Problem List:  Hospital Problems as of 6/26/2021 Date Reviewed: 5/20/2021        Codes Class Noted - Resolved POA    Pleural effusion, right ICD-10-CM: J90  ICD-9-CM: 511.9  6/25/2021 - Present Unknown        Lymphedema ICD-10-CM: I89.0  ICD-9-CM: 457.1  6/24/2021 - Present Unknown        Hx pulmonary embolism ICD-10-CM: Q55.718  ICD-9-CM: V12.55  4/5/2021 - Present Yes        Pancreatic pseudocyst ICD-10-CM: K86.3  ICD-9-CM: 577.2  4/5/2021 - Present Yes        * (Principal) Lymphedema of right arm ICD-10-CM: I89.0  ICD-9-CM: 457.1  3/10/2021 - Present Yes        Malignant neoplasm of ascending colon (HCC) ICD-10-CM: C18.2  ICD-9-CM: 153.6  9/28/2018 - Present Yes        Current use of anticoagulant therapy ICD-10-CM: Z79.01  ICD-9-CM: V58.61  9/18/2018 - Present Yes        Type 2 diabetes mellitus with diabetic neuropathy ([facility] ICD-10-CM: E11.40  ICD-9-CM: 250.60, 357.2  2/21/2018 - Present Yes               Labs, vital signs, diagnostic testing reviewed. Case discussed with patient, care team, Dr. Selma Patino. Part of this note was written by using a voice dictation software and the note has been proof read but may still contain some grammatical/other typographical errors.     Signed By: Ganesh Petit NP   [facility]ist Service    June 26, 2021  5:15 PM

## 2021-06-26 NOTE — PROGRESS NOTES
END OF SHIFT NOTE:    INTAKE/OUTPUT  06/25 0701 - 06/26 0700  In: 580.7 [I.V.:580.7]  Out: 500 [Urine:500]  Voiding: YES  Catheter: NO  Drain:      Flatus: Patient does have flatus present, per patient    Stool:  0 occurrences. Emesis: 0 occurrences. VITAL SIGNS  Patient Vitals for the past 12 hrs:   Temp Pulse Resp BP SpO2   06/26/21 0333 97.8 °F (36.6 °C) 92 18 129/76 96 %   06/25/21 2346 98 °F (36.7 °C) 83 17 128/71 94 %   06/25/21 1919 97.4 °F (36.3 °C) (!) 101 17 133/78 97 %     Pain Assessment  Pain Intensity 1: 0 (06/25/21 1910)     Patient Stated Pain Goal: 0    Ambulating  Yes    Shift report to be given to oncoming nurse at the bedside.     [address]

## 2021-06-26 NOTE — PERIOP NOTES
TRANSFER - IN REPORT:    Verbal report received from Hudson Ordoñez, [facility] on [address] being received from [id number] for ordered procedure      Report consisted of patients Situation, Background, Assessment and Recommendations(SBAR). Information from the following report(s) SBAR, Kardex, MAR, Recent Results, Procedure Verification and Quality Measures was reviewed with the receiving nurse. Opportunity for questions and clarification was provided. Assessment completed upon patients arrival to unit and care assumed.

## 2021-06-26 NOTE — INTERVAL H&P NOTE
Update History & Physical    The Patient's History and Physical of June 26,   chart was reviewed with the patient and I examined the patient. There was no change. The surgical site was confirmed by the patient and me. Plan:  The risk, benefits, expected outcome, and alternative to the recommended procedure have been discussed with the patient. Patient understands and wants to proceed with the procedure.     Electronically signed by Adelita Amaya MD on 6/26/2021 at 12:19 PM

## 2021-06-26 NOTE — PROGRESS NOTES
cxr noted and ?ptx and radiology does not feel it is so. Will get another x-ray for tomorrow.  Told night time intensivist    Kat Liu MD

## 2021-06-26 NOTE — PROCEDURES
US Guided Thoracentesis Procedure Note--UNILATERAL    Time Out -- Correct patient identified and Everyone Agrees. For procedure sterile techniques used including: Sterile gown, gloves, cap, mask, drapes and chlorhexidine to the insertions site/location. Procedure:  Right/Left Thoracentesis with ultrasound guidance    Indication:  Right/Left Pleural effusion     Summary:    After informed consent was obtained, the patient was positioned upright in the usual fashion.  Ultrasound was used to identify the optimal spot for pleural drainage.     The right/  intercostal space was anesthetized with 1% Lidocaine to achieve adequate anesthesia.  patient with very thick skin and even anesthesia was very hard to flush. Tried and not able to pass on first location. Then anesthesied adjacent location and then able to enter  pleural space was entered with bright yellow fluid identified.  Lidocaine was instilled within the pleural space as well.  A small stab incision was made at the site of local anesthesia and the thoracentesis catheter was advanced into the pleural space. Approximately 600 ml of fluid was obtained with ease.  The procedure was terminated after  pleural drainage stopped Air was not aspirated during the procedure.  A bandaid was placed over the incision after adequate hemostasis was achieved.  A CXR is pending. EBL -- 2 ml    Patient stable post procedure    The pleural fluid will be sent for :protein, LDH, cytology, cell count, AFB. Told nurse to monitor incision site. Did have bleeding and stopped but was having marked bleeding ? thick skin ? CA     Signed By: Gonzalo Salmon MD

## 2021-06-26 NOTE — ANESTHESIA PREPROCEDURE EVALUATION
Relevant Problems   ENDOCRINE   (+) Controlled type 2 diabetes mellitus without complication, with long-term current use of insulin (HCC)   (+) Obesity due to excess calories   (+) Obesity, morbid (HCC)   (+) Type 2 diabetes mellitus with diabetic neuropathy (HCC)   (+) Type 2 diabetes with nephropathy (HCC)      HEMATOLOGY   (+) Iron deficiency anemia   (+) Microcytic anemia      PERSONAL HX & FAMILY HX OF CANCER   (+) Malignant neoplasm of ascending colon (HCC)   (+) Malignant neoplasm of colon, unspecified (HCC)   (+) Primary adenocarcinoma of ascending colon (HCC)       Anesthetic History   No history of anesthetic complications            Review of Systems / Medical History  Patient summary reviewed and pertinent labs reviewed    Pulmonary  Within defined limits              Comments: Hx of DVT/PE--Eliquis  DVT R arm after R arm rotator cuff surgery 11/20? Eliquis continued last dose4/18   Neuro/Psych   Within defined limits          Comments: DM neuropathy Cardiovascular              Hyperlipidemia    Exercise tolerance: >4 METS  Comments: 5/18 echo-preserved LV fx   GI/Hepatic/Renal         Renal disease: stones      Comments: Hx of colon ca Endo/Other    Diabetes: type 2, using insulin    Obesity     Other Findings   Comments: RUE swelling, possibly associated with Inflammatory Breast CA? Affecting RIJ with concurrent R pleural effusion seen on CT (unimpressive on 6/24 CXR) SOB and increased WOB laying flat although satting ok on RA when still.   Discussed with Dr. Akin Thompson and Letitia Benitez to perform EGD following thoracentesis that could not be performed until noon on Saturday, and thus EGD was moved to Sunday AM.           Physical Exam    Airway  Mallampati: III  TM Distance: 4 - 6 cm  Neck ROM: normal range of motion   Mouth opening: Normal     Cardiovascular    Rhythm: regular    Peripheral edema (RUE and R chest R arm wrapped.)    Pertinent negatives: No murmur   Dental         Pulmonary      Decreased breath sounds: right           Abdominal  GI exam deferred       Other Findings            Anesthetic Plan    ASA: 3  Anesthesia type: total IV anesthesia          Induction: Intravenous  Anesthetic plan and risks discussed with: Patient      Addendum: Pt underwent thoracentesis yesterday with 600mL pleural fluid drained. Bleeding noted during procedure. F/u CXR this AM shows no PTX. Tamra says she's breathing more easily this morning and can get deeper breaths. RA SpO2 98-99% in Preop.

## 2021-06-26 NOTE — PROGRESS NOTES
Michael Velazco  Admission Date: 6/24/2021             Daily Progress Note: 6/26/2021   Patient is a 59 y.o.  female. Admitted for shortness of breath which has has noticed for about 6 weeks. CT scan shows a large right pleural effusion. She is having difficulty swallowing and has lost about 18 to 20 lbs over the past month. She states that this has worsened over the few weeks. ST has seen and recommended NPO status. She has a history of colon cancer with resection in Oct 2018. 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 More recently, she underwent right shoulder arthroscopy in November 2020 and then developed significant right upper extremity lymphedema. Evaluation showed no clot. She was referred to a lymphedema specialist for treatment. She has had right axillary fullness for some time. CT here shows diffuse edematous changes of the rightward aspect of the neck, mediastinum, right axilla, right upper extremity, and right breast. There is diffuse skin thickening and trabecular thickening of the right breast with an overall smaller/contracted appearance of the right breast compared to the left. This is suspicious for inflammatory breast cancer; highly diminutive appearance of the right internal jugular vein which is nearly occluded; pathologically enlarged right axillary lymph nodes with a lymph conglomerate measuring up to 3.9 cm; wall thickening of the mid and distal esophagus, possible esophagitis; enlarging hypodensity along falciform ligament of the liver most likely focal fatty infiltration although indeterminate; large R pleural effusion; and unchanged pancreatic tail cyst vs cystic neoplasm measuring up to 3.9cm. IR performed an US guided breast biopsy 6/25. GI is seeing and plans an EGD AM 6/26. Vascular surgery consulted for occlusion of R IJ and does not recommend any surgery but continuation of the anticoagulation.  We were consulted for the right pleural effusion with plans to tap 6/26  Subjective:   Remains on RA. Plans for EGD per GI and thoracentesis per us this AM. Remains NPO. U/S guided biopsy performed per IR yesterday. Results pending.      Review of Systems  Constitutional: positive for fatigue and weight loss, negative for fevers, chills and sweats  Respiratory: positive for dyspnea on exertion, negative for cough, sputum or hemoptysis  Cardiovascular: negative for chest pain, chest pressure/discomfort, irregular heart beats  Gastrointestinal: negative for nausea, vomiting, diarrhea and constipation  Integument/breast: positive for skin color change and breast tenderness, negative for nipple discharge  Hematologic/lymphatic: positive for lymphadenopathy and petechiae, negative for bleeding and coughing up blood    Current Facility-Administered Medications   Medication Dose Route Frequency    lactated Ringers infusion  50 mL/hr IntraVENous CONTINUOUS    insulin lispro (HUMALOG) injection   SubCUTAneous TIDAC    sodium chloride (NS) flush 5-40 mL  5-40 mL IntraVENous Q8H    sodium chloride (NS) flush 5-40 mL  5-40 mL IntraVENous PRN    acetaminophen (TYLENOL) tablet 650 mg  650 mg Oral Q6H PRN    Or    acetaminophen (TYLENOL) suppository 650 mg  650 mg Rectal Q6H PRN    polyethylene glycol (MIRALAX) packet 17 g  17 g Oral DAILY PRN    ondansetron (ZOFRAN ODT) tablet 4 mg  4 mg Oral Q8H PRN    Or    ondansetron (ZOFRAN) injection 4 mg  4 mg IntraVENous Q6H PRN    pantoprazole (PROTONIX) 40 mg in 0.9% sodium chloride 10 mL injection  40 mg IntraVENous Q12H    [Held by provider] heparin (porcine) injection 5,000 Units  5,000 Units SubCUTAneous Q8H         Objective:     Vitals:    06/25/21 1919 06/25/21 2346 06/26/21 0333 06/26/21 0548   BP: 133/78 128/71 129/76    Pulse: (!) 101 83 92    Resp: 17 17 18    Temp: 97.4 °F (36.3 °C) 98 °F (36.7 °C) 97.8 °F (36.6 °C)    SpO2: 97% 94% 96%    Weight:    166 lb 6.4 oz (75.5 kg)   Height: Intake and Output:   06/24 1901 - 06/26 0700  In: 580.7 [I.V.:580.7]  Out: 700 [Urine:700]  No intake/output data recorded. Intake/Output Summary (Last 24 hours) at 6/26/2021 0730  Last data filed at 6/26/2021 2112  Gross per 24 hour   Intake 580.7 ml   Output 500 ml   Net 80.7 ml       Physical Exam:            GEN: well developed and in no acute distress,   HEENT:  PERRL, EOMI, no alar flaring or epistaxis, oral mucosa moist without cyanosis,   NECK:  no JVD, no retractions, no thyromegaly or masses   LUNGS:  Clear on L, R diminished posterior base on RA  HEART:  RRR with no M,G,R;  ABDOMEN:  soft with no tenderness, positive bowel sounds present  EXTREMITIES:  warm with no cyanosis, R arm 4+ edema, R breast swollen and taunt   SKIN:  no jaundice or ecchymosis   NEURO:  alert and oriented, grossly non-focal    CT:   6/24        CT neck -   1.  Diffuse edematous change of the rightward aspect of the neck, mediastinum,  right axilla, right upper extremity, and right breast. There is diffuse skin  thickening and trabecular thickening of the right breast with an overall  smaller/contracted appearance of the right breast compared to the left. This is  suspicious for inflammatory breast cancer. Clinical correlation is advised with  consideration of diagnostic evaluation in the breast center. 2. Highly diminutive appearance of the right internal jugular vein which is  nearly occluded, further evaluation with vascular ultrasound can be considered. Prominent venous collaterals of the right chest wall. 3. Pathologically enlarged right axillary lymph nodes with a lymph conglomerate  measuring up to 3.9 cm, similar to although progressed from prior's. Biopsy can  be considered. 4. Wall thickening of the mid and distal esophagus, possible esophagitis.   Consider further evaluation with EGD versus barium esophagram.  5. Enlarging hypodensity along falciform ligament of the liver most likely focal  fatty infiltration although indeterminate, attention on follow-up. 6. Large right pleural effusion. 7. Unchanged pancreatic tail cyst versus cystic neoplasm measuring up to 3.9 cm. ECHO:   5/2018    LAB  Recent Labs     06/26/21  0507 06/25/21  0516 06/24/21  1150   WBC 4.1* 5.9 6.3   HGB 9.7* 10.5* 10.6*   HCT 31.4* 32.4* 34.3*    325 347     Recent Labs     06/26/21  0507 06/25/21  0516 06/24/21  1150    144 140   K 3.5 3.7 3.6   * 112* 108*   CO2 23 22 26   GLU 85 97 128*   BUN 13 11 13   CREA 0.63 0.68 0.72     ABG:  No results found for: PH, PHI, PCO2, PCO2I, PO2, PO2I, HCO3, HCO3I, FIO2, FIO2I    Cultures:   Results     ** No results found for the last 336 hours. **            Assessment:     Hospital Problems  Date Reviewed: 5/20/2021        Codes Class Noted POA    Pleural effusion, right ICD-10-CM: J90  ICD-9-CM: 511.9  6/25/2021 Unknown        Lymphedema ICD-10-CM: I89.0  ICD-9-CM: 457.1  6/24/2021 Unknown        Hx pulmonary embolism ICD-10-CM: F88.135  ICD-9-CM: V12.55  4/5/2021 Yes        Pancreatic pseudocyst ICD-10-CM: K86.3  ICD-9-CM: 577.2  4/5/2021 Yes        * (Principal) Lymphedema of right arm ICD-10-CM: I89.0  ICD-9-CM: 457.1  3/10/2021 Yes        Malignant neoplasm of ascending colon (HCC) ICD-10-CM: C18.2  ICD-9-CM: 153.6  9/28/2018 Yes        Current use of anticoagulant therapy ICD-10-CM: Z79.01  ICD-9-CM: V58.61  9/18/2018 Yes        Type 2 diabetes mellitus with diabetic neuropathy ([facility] ICD-10-CM: E11.40  ICD-9-CM: 250.60, 357.2  2/21/2018 Yes              PLAN:   --plans for R thoracentesis today. Fluid to be sent for study.   --RN to obtain consent for R thoracentesis   --eliquis on hold since Thursday AM  --GI planning for EGD with dilation this AM  --R axillary biopsy pending, follow results  --oncology onboard and following        Nava Hernández NP    More than 50% of time documented was spent in face-to-face contact with the patient and in the care of the patient on the floor/unit where the patient is located. Lungs : decreased sounds right chest. No wheezing  Heart S1 and S2 audible, no murmers or rubs appreciated  Other     Taper today Right chest    I have spoken with and examined the patient. I have reviewed the history, examination, assessment, and plan and agree with the above. Bobby Hahn MD      This note was signed electronically. Errors are unfortunately her likely due to dictation software.

## 2021-06-26 NOTE — ROUTINE PROCESS
Procedure explained to patient. Consent obtained for thoracentesis. Patient sat up on side of the bed. Vitals documented in flow sheet. Dr. Maged Ramos at bedside. Time out for procedure at 1150. Ultrasound of bilateral chest done with picture taken. Approximately 600cc of yellow color fluid was taken from right side with no complications noted. Picture of obtained and placed on chart. Specimens x 3 were taken to lab. Primary RN notified of procedure summary. Patient tolerated well and vital signs stable. Primary RN to continue care.

## 2021-06-26 NOTE — PROGRESS NOTES
[facility] Progress Note         Admit Date:  6/24/2021  Today's Date:  6/26/2021    CC:  Dysphagia    Subjective:     Patient to undergo thoracentesis today. EGD deferred til tomorrow. No N/V or abdominal pain. Medications:   Current Facility-Administered Medications   Medication Dose Route Frequency    lactated Ringers infusion  50 mL/hr IntraVENous CONTINUOUS    insulin lispro (HUMALOG) injection   SubCUTAneous TIDAC    sodium chloride (NS) flush 5-40 mL  5-40 mL IntraVENous Q8H    sodium chloride (NS) flush 5-40 mL  5-40 mL IntraVENous PRN    acetaminophen (TYLENOL) tablet 650 mg  650 mg Oral Q6H PRN    Or    acetaminophen (TYLENOL) suppository 650 mg  650 mg Rectal Q6H PRN    polyethylene glycol (MIRALAX) packet 17 g  17 g Oral DAILY PRN    ondansetron (ZOFRAN ODT) tablet 4 mg  4 mg Oral Q8H PRN    Or    ondansetron (ZOFRAN) injection 4 mg  4 mg IntraVENous Q6H PRN    pantoprazole (PROTONIX) 40 mg in 0.9% sodium chloride 10 mL injection  40 mg IntraVENous Q12H    [Held by provider] heparin (porcine) injection 5,000 Units  5,000 Units SubCUTAneous Q8H       Review of Systems:  ROS was obtained, with pertinent positives as listed above. No chest pain or SOB.     Diet:  NPO    Objective:   Vitals:  Visit Vitals  /81   Pulse (!) 51   Temp 97.8 °F (36.6 °C)   Resp 18   Ht 5' 4\" (1.626 m)   Wt 75.5 kg (166 lb 6.4 oz)   SpO2 95%   BMI 28.56 kg/m²     Intake/Output:  06/26 0701 - 06/26 1900  In: -   Out: 250 [Urine:250]  06/24 1901 - 06/26 0700  In: 580.7 [I.V.:580.7]  Out: 700 [Urine:700]  Exam:  General appearance: alert, cooperative, NAD      Data Review (Labs):    Recent Labs     06/26/21  0507 06/25/21  0516 06/24/21  1150   WBC 4.1* 5.9 6.3   HGB 9.7* 10.5* 10.6*   HCT 31.4* 32.4* 34.3*    325 347   MCV 90.8 88.3 90.7    144 140   K 3.5 3.7 3.6   * 112* 108*   CO2 23 22 26   BUN 13 11 13   CREA 0.63 0.68 0.72   CA 8.8 9.5 9.5   GLU 85 97 128*   AP 79 --  89   AST 33  --  54*   ALT 27  --  37   TBILI 0.9  --  1.0   ALB 2.6*  --  3.3   TP 7.4  --  8.3*       Assessment:     Principal Problem:  Lymphedema of right arm (3/10/2021)    Active Problems:  Type 2 diabetes mellitus with diabetic neuropathy ([facility] (2/21/2018)  Current use of anticoagulant therapy (9/18/2018)  Malignant neoplasm of ascending colon ([facility] (9/28/2018)  Hx pulmonary embolism (4/5/2021)  Pancreatic pseudocyst (4/5/2021)  Lymphedema (6/24/2021)    60 y/o F who present with progression of severe R lymphedema. GI consulted for dysphagia. Esophageal wall thickening noted on CT, w/o significant reflux symptoms, and possibly related to recent dysphagia and wt loss ~ 20# within the past month. Concern for possible esophagitis, stricture, or neoplasm, etc. MBS done, SLP evaluation with grossly functional oropharyngeal swallow function. No aspiration/penetration with any consistencies. On Eliquis, last dose 6/24 AM.     CT findings were also suspicious for inflammatory breast cancer. Vascular surgery on board. Plan:     Continue to hold Eliquis/heparin. EGD with dilation in AM. Hold Heparin tonight. CT of possible inflammatory breast cancer. Underwent US guided biopsy of right axillary lymph node with IR yesterday. CT also showed a large right pleural effusion. Pulmonary planning on thoracentesis today. .      Pancreatic cyst stable. Mildly elevated LFTs and evidence of fatty infiltration on CT- consider outpt workup.       Fide Garcia PA-C  [facility]

## 2021-06-26 NOTE — PROGRESS NOTES
TRANSFER - OUT REPORT:    Verbal report given to BETH Sue on Yonny's  being transferred to GI lab for ordered procedure       Report consisted of patients Situation, Background, Assessment and   Recommendations(SBAR). Information from the following report(s) SBAR, Kardex, Intake/Output, MAR and Recent Results was reviewed with the receiving nurse. Lines:   Peripheral IV 96/75/03 Left Basilic (Active)   Site Assessment Clean, dry, & intact 06/25/21 1910   Phlebitis Assessment 0 06/25/21 1910   Infiltration Assessment 0 06/25/21 1910   Dressing Status Clean, dry, & intact 06/25/21 1910   Dressing Type Transparent;Tape 06/25/21 1910   Hub Color/Line Status Infusing 06/25/21 1910        Opportunity for questions and clarification was provided.       Patient transported with:   TRAFI

## 2021-06-26 NOTE — PROGRESS NOTES
Problem: Pressure Injury - Risk of  Goal: *Prevention of pressure injury  Description: Document Lito Scale and appropriate interventions in the flowsheet. Outcome: Progressing Towards Goal  Note: Pressure Injury Interventions: Activity Interventions: Increase time out of bed, Pressure redistribution bed/mattress(bed type)    Mobility Interventions: Float heels, Pressure redistribution bed/mattress (bed type), Turn and reposition approx. every two hours(pillow and wedges)    Nutrition Interventions: Document food/fluid/supplement intake, Offer support with meals,snacks and hydration                     Problem: Patient Education: Go to Patient Education Activity  Goal: Patient/Family Education  Outcome: Progressing Towards Goal     Problem: Falls - Risk of  Goal: *Absence of Falls  Description: Document Khloefredy Joseph Fall Risk and appropriate interventions in the flowsheet.   Outcome: Progressing Towards Goal  Note: Fall Risk Interventions:                 Elimination Interventions: Call light in reach, Patient to call for help with toileting needs              Problem: Patient Education: Go to Patient Education Activity  Goal: Patient/Family Education  Outcome: Progressing Towards Goal     Problem: Patient Education: Go to Patient Education Activity  Goal: Patient/Family Education  Outcome: Progressing Towards Goal

## 2021-06-27 NOTE — PROGRESS NOTES
END OF SHIFT NOTE:    INTAKE/OUTPUT  06/25 0701 - 06/26 0700  In: 580.7 [I.V.:580.7]  Out: 500 [Urine:500]  Voiding: YES  Catheter: NO  Drain:              Flatus: Patient does have flatus present. Stool:  0 occurrences. Characteristics:       Emesis: 0 occurrences. Characteristics:        VITAL SIGNS  Patient Vitals for the past 12 hrs:   Temp Pulse Resp BP SpO2   06/26/21 1250  99 18 (!) 144/94 98 %   06/26/21 1230 97.6 °F (36.4 °C) 94 18 126/76 95 %   06/26/21 1220  97 18 124/87 97 %   06/26/21 1155  (!) 103 18 (!) 144/82 98 %   06/26/21 1135  99 18 (!) 144/94 98 %       Pain Assessment  Pain Intensity 1: 0 (06/26/21 1250)        Patient Stated Pain Goal: 0    Ambulating  Yes    Shift report given to oncoming nurse at the bedside.     Razia Shipley

## 2021-06-27 NOTE — PROGRESS NOTES
[facility] Hospitalist Progress Note     Name:  Arnulfo Clifford  Age:61 y.o. Sex:female   :  1960    MRN:  [id number]     Admit Date:  2021    Reason for Admission:  Lymphedema [I89.0]    Hospital Course/Interval history:     Patient is a 75-year-old female with past medical history significant for diabetes mellitus, history of DVT/PE on Eliquis, history of colon cancer status post resection in 2018 and lymphedema of right upper extremity presented to the ED with worsening swelling of right upper extremity and dysphagia. She has history of right shoulder surgery in  and complained that afterwards she noticed her RUE beginning to swell. Patient went in for evaluation with a suspicion of DVT. IR procedure found patient did not have a clot. Per patient the swelling became progressively worse afterward and she was referred to a lymphedema clinic. In addition to her swollen RUE patient complained the swelling progressed into her neck resulting in dysphagia and unintentional weight loss of ~20 lbs. Due to her concerns patient decided to present to ER.     Subjective (21): Patient seen in room after EGD attempt, remains slightly drowsy. Endorses some improvement with chest pressure/breathing after thoracentesis yesterday. Denies chest pain, abdominal pain, NVD. Review of Systems: 14 point review of systems is otherwise negative with the exception of the elements mentioned above.   Assessment & Plan     Lymphedema/?inflammatory breast cancer  -Significant lymphedema of RUE and R chest wall  -CT findings concerning for inflammatory breast cancer  -Oncology consult, will follow pathology results  -s/p right axillary lymph node biopsy with IR on , pathologies pending  -Elevation and compression to RUE  -Following lymphedema clinic outpaitnet     Dysphagia  -CT with mild thickening of mid and distal esophagus, concerning for esophagitis  -SLP eval-defer management to GI  -GI consult  -Continue Protonix  -Clear liquid diet   -6/27 EGD unable to be completed (could not advance gastroscope), planning for Barium esophagram today     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, given collaterals  -Vascular surgery consult - recommend continue [facility] therapy, elevation and compression of RA  -AC (Eliquis) held for procedures, resume when safe to do so     Right large pleural effusion  -Pulmonary consult  -s/p R thoracentesis 6/26 with 600 ml pleural fluid removed  -Pleural fluid exudative per light's criteria  -Patient remains stable on RA     History of DVT/PE  -On Eliquis, will hold for now for IR/GI procedures, restart when safe to do so     Diabetes Mellitus  -Hold oral hypoglycemic  -SSI ACHS  -Remains on clear liquid diet    Hypokalemia  -K 3.3, replete, continue to monitor    Diet:  DIET ADULT Clear Liquid  DVT PPx: SCD's  Code status: Full Code  Disposition/Expected LOS: Pending clinical course    Objective:     Patient Vitals for the past 24 hrs:   Temp Pulse Resp BP SpO2   06/27/21 0918  81  127/63 99 %   06/27/21 0912 97.8 °F (36.6 °C) 83 16 (!) 130/59 98 %   06/27/21 0907  85 16 137/65 98 %   06/27/21 0902  83 16 (!) 116/55 98 %   06/27/21 0857  85 16 (!) 113/55 98 %   06/27/21 0851 97.7 °F (36.5 °C) 83 16 (!) 112/58 98 %   06/27/21 0819  93 16 (!) 143/65 100 %   06/27/21 0639 98.7 °F (37.1 °C) 88 18 (!) 160/70 99 %   06/27/21 0328 97.5 °F (36.4 °C) 81 18 136/71 97 %   06/26/21 2338 97.7 °F (36.5 °C) 90 18 (!) 145/77 97 %   06/26/21 1939 97.7 °F (36.5 °C) (!) 105 18 (!) 143/78 97 %   06/26/21 1250  99 18 (!) 144/94 98 %   06/26/21 1230 97.6 °F (36.4 °C) 94 18 126/76 95 %   06/26/21 1220  97 18 124/87 97 %   06/26/21 1155  (!) 103 18 (!) 144/82 98 %   06/26/21 1135  99 18 (!) 144/94 98 %     Oxygen Therapy  O2 Sat (%): 99 % (06/27/21 0918)  Pulse via Oximetry: 81 beats per minute (06/27/21 0918)  O2 Device: Nasal cannula (06/27/21 0912)  O2 Flow Rate (L/min): 3 l/min (06/27/21 0912)    Body mass index is 29.09 kg/m². Physical Exam:   General:          No acute distress, speaking in full sentences, no use of accessory muscles   Lungs:             RLL with very diminished breath sounds, otherwise breath sounds clear to auscultation  Neck:               R lower neck swelling, no JVD  CV:                  Regular rate and rhythm with normal S1 and S2   Abdomen:        Soft, nontender, nondistended, normoactive bowel sounds   Extremities:     No cyanosis clubbing, RUE edematous with swelling into upper anterior chest  Neuro:             Nonfocal, A&O x3   Psych:             Normal affect     Data Review:  I have reviewed all labs, meds, and studies from the last 24 hours:    Labs:    Recent Results (from the past 24 hour(s))   CELL COUNT, BODY FLUID    Collection Time: 06/26/21 12:20 PM   Result Value Ref Range    BODY FLUID TYPE PLEURAL FLUID      FLUID COLOR YELLOW      FLUID APPEARANCE CLOUDY      FLUID RBC CT. 2,000 /cu mm    FLUID WBC COUNT 2,373 /cu mm    BRCH LYMPHS 60 %    BRCH MACROPHAGES 40 %   GLUCOSE, FLUID    Collection Time: 06/26/21 12:20 PM   Result Value Ref Range    Fluid Type: PLEURAL FLUID      Glucose, body fld. 104 MG/DL   LDH, BODY FLUID    Collection Time: 06/26/21 12:20 PM   Result Value Ref Range    Fluid Type: PLEURAL FLUID      LD, body fld. 109 U/L   PROTEIN TOTAL, FLUID    Collection Time: 06/26/21 12:20 PM   Result Value Ref Range    Fluid Type: PLEURAL FLUID      Protein total, body fld.  4.3 g/dL   CULTURE, BODY FLUID Shavonne Sears STAIN    Collection Time: 06/26/21 12:20 PM    Specimen: Pleural Fluid    RIGHT   Result Value Ref Range    Special Requests: NO SPECIAL REQUESTS      GRAM STAIN PENDING     Culture result: NO GROWTH 1 DAY     GLUCOSE, POC    Collection Time: 06/26/21 12:26 PM   Result Value Ref Range    Glucose (POC) 113 (H) 65 - 100 mg/dL    Performed by Julius Nava    GLUCOSE, POC    Collection Time: 06/26/21  4:54 PM   Result Value Ref Range    Glucose (POC) 109 (H) 65 - 100 mg/dL    Performed by Kevin    CBC W/O DIFF    Collection Time: 06/27/21  4:22 AM   Result Value Ref Range    WBC 4.3 4.3 - 11.1 K/uL    RBC 3.30 (L) 4.05 - 5.2 M/uL    HGB 9.5 (L) 11.7 - 15.4 g/dL    HCT 29.1 (L) 35.8 - 46.3 %    MCV 88.2 79.6 - 97.8 FL    MCH 28.8 26.1 - 32.9 PG    MCHC 32.6 31.4 - 35.0 g/dL    RDW 15.9 (H) 11.9 - 14.6 %    PLATELET 417 299 - 874 K/uL    MPV 10.7 9.4 - 12.3 FL    ABSOLUTE NRBC 0.00 0.0 - 0.2 K/uL   METABOLIC PANEL, BASIC    Collection Time: 06/27/21  4:22 AM   Result Value Ref Range    Sodium 143 136 - 145 mmol/L    Potassium 3.3 (L) 3.5 - 5.1 mmol/L    Chloride 110 (H) 98 - 107 mmol/L    CO2 25 21 - 32 mmol/L    Anion gap 8 7 - 16 mmol/L    Glucose 87 65 - 100 mg/dL    BUN 14 8 - 23 MG/DL    Creatinine 0.65 0.6 - 1.0 MG/DL    GFR est AA >60 >60 ml/min/1.73m2    GFR est non-AA >60 >60 ml/min/1.73m2    Calcium 8.8 8.3 - 10.4 MG/DL   GLUCOSE, POC    Collection Time: 06/27/21  6:22 AM   Result Value Ref Range    Glucose (POC) 99 65 - 100 mg/dL    Performed by RamosWaterford        All Micro Results     Procedure Component Value Units Date/Time    CULTURE, BODY FLUID Daleen Sleight STAIN [[id number]] Collected: 06/26/21 1220    Order Status: Completed Specimen: Pleural Fluid Updated: 06/27/21 0742     Special Requests: NO SPECIAL REQUESTS        GRAM STAIN PENDING     Culture result: NO GROWTH 1 DAY       AFB CULTURE + SMEAR W/RFLX ID FROM CULTURE [[id number]] Collected: 06/26/21 1220    Order Status: Completed Updated: 06/26/21 1308    FUNGUS CULTURE AND SMEAR [[id number]] Collected: 06/26/21 1220    Order Status: Completed Updated: 06/26/21 1308          EKG Results     None          Other Studies:  XR CHEST SNGL V    Addendum Date: 6/27/2021    Addendum: There is likely a right pleural effusion. There is no visible pneumothorax.     Result Date: 6/27/2021  EXAMINATION: One view chest HISTORY: check effusion r/o pneumothorax TECHNIQUE: Frontal chest. COMPARISON: 6/26/2021 FINDINGS:  The heart is not enlarged. There is pleural effusion or significant pneumothorax. There is no consolidation. 1. Findings as described above. No significant interval change. XR CHEST SNGL V    Result Date: 6/26/2021  AP PORTABLE CHEST X-RAY 6/26/2021 5:27 PM HISTORY: s/p tap with 600 ml out of Right chest.  PCXR:  S/P LUNG TAP WITH 600 ML OUT OF RT CHEST COMPARISON: June 24, 2021 FINDINGS: There is no visible pneumothorax status post thoracentesis. There is mildly increased density in the right hemithorax. Left lung is well expanded and clear. No visible pneumothorax status post right-sided thoracentesis.       Current Meds:   Current Facility-Administered Medications   Medication Dose Route Frequency    lactated Ringers infusion  50 mL/hr IntraVENous CONTINUOUS    insulin lispro (HUMALOG) injection   SubCUTAneous TIDAC    sodium chloride (NS) flush 5-40 mL  5-40 mL IntraVENous Q8H    sodium chloride (NS) flush 5-40 mL  5-40 mL IntraVENous PRN    acetaminophen (TYLENOL) tablet 650 mg  650 mg Oral Q6H PRN    Or    acetaminophen (TYLENOL) suppository 650 mg  650 mg Rectal Q6H PRN    polyethylene glycol (MIRALAX) packet 17 g  17 g Oral DAILY PRN    ondansetron (ZOFRAN ODT) tablet 4 mg  4 mg Oral Q8H PRN    Or    ondansetron (ZOFRAN) injection 4 mg  4 mg IntraVENous Q6H PRN    pantoprazole (PROTONIX) 40 mg in 0.9% sodium chloride 10 mL injection  40 mg IntraVENous Q12H    [Held by provider] heparin (porcine) injection 5,000 Units  5,000 Units SubCUTAneous Q8H       Problem List:  Hospital Problems as of 6/27/2021 Date Reviewed: 5/20/2021        Codes Class Noted - Resolved POA    Pleural effusion, right ICD-10-CM: J90  ICD-9-CM: 511.9  6/25/2021 - Present Unknown        Lymphedema ICD-10-CM: I89.0  ICD-9-CM: 457.1  6/24/2021 - Present Unknown        Hx pulmonary embolism ICD-10-CM: J13.617  ICD-9-CM: V12.55  4/5/2021 - Present Yes        Pancreatic pseudocyst ICD-10-CM: K86.3  ICD-9-CM: 577.2  4/5/2021 - Present Yes        * (Principal) Lymphedema of right arm ICD-10-CM: I89.0  ICD-9-CM: 457.1  3/10/2021 - Present Yes        Malignant neoplasm of ascending colon ([facility] ICD-10-CM: C18.2  ICD-9-CM: 153.6  9/28/2018 - Present Yes        Current use of anticoagulant therapy ICD-10-CM: Z79.01  ICD-9-CM: V58.61  9/18/2018 - Present Yes        Type 2 diabetes mellitus with diabetic neuropathy ([facility] ICD-10-CM: E11.40  ICD-9-CM: 250.60, 357.2  2/21/2018 - Present Yes               Labs, vital signs, diagnostic testing reviewed. Case discussed with patient, care team, Dr. Xiomara Deluca. Part of this note was written by using a voice dictation software and the note has been proof read but may still contain some grammatical/other typographical errors.     Signed By: Amy Zapata NP   [employer] [facility]    June 27, 2021  5:15 PM

## 2021-06-27 NOTE — PROGRESS NOTES
Mercedes Lora  Admission Date: 6/24/2021             Daily Progress Note: 6/27/2021     Patient is a 59 y.o.  female. Admitted 6/24, for SOB, worsening over past 6 weeks. CT scan shows a large right pleural effusion. She is having difficulty swallowing and has lost about 18 to 20 lbs over the past month. Now NPO per speech, GI following, EGD attempted 6/26, but unable to pass. GI plans for barium esophogram.      PMHx includes colon cancer with resection in Oct 2018, DVT, and PE that year as well and has been on Eliquis. More recently, she underwent right shoulder arthroscopy in November 2020 and then developed significant right upper extremity lymphedema. Evaluation showed no clot. She was referred to a lymphedema specialist for treatment. She has had right axillary fullness for some time. CT here shows diffuse edematous changes of the rightward aspect of the neck, mediastinum, right axilla, right upper extremity, and right breast. There is diffuse skin thickening and trabecular thickening of the right breast with an overall smaller/contracted appearance of the right breast compared to the left. This is suspicious for inflammatory breast cancer; highly diminutive appearance of the right internal jugular vein which is nearly occluded; pathologically enlarged right axillary lymph nodes with a lymph conglomerate measuring up to 3.9 cm; wall thickening of the mid and distal esophagus, possible esophagitis; enlarging hypodensity along falciform ligament of the liver most likely focal fatty infiltration although indeterminate; large R pleural effusion; and unchanged pancreatic tail cyst vs cystic neoplasm measuring up to 3.9cm. IR performed an US guided breast biopsy 6/25, pathology pending. Vascular surgery consulted for occlusion of R IJ and does not recommend any surgery but continuation of the anticoagulation.  Pulm following for right pleural effusion with tap 6/26 removed 600 ml from R. Pleural fluid sent for routine studies and cytology, pending results. Subjective: On RA. R thora completed yesterday with 600 ml removed. GI unable to do EGD r/t inability to pass scope.   Plan for barium esophogram.      Review of Systems  Constitutional: positive for fatigue and weight loss, negative for fevers, chills and sweats  Respiratory: positive for dyspnea on exertion, negative for cough, sputum or hemoptysis  Cardiovascular: negative for chest pain, chest pressure/discomfort, irregular heart beats  Gastrointestinal: positive for dysphagia, negative for nausea, vomiting, diarrhea and constipation  Integument/breast: positive for skin color change and breast tenderness, negative for nipple discharge  Hematologic/lymphatic: positive for lymphadenopathy and petechiae, negative for bleeding and coughing up blood       Current Facility-Administered Medications   Medication Dose Route Frequency    lactated Ringers infusion  50 mL/hr IntraVENous CONTINUOUS    insulin lispro (HUMALOG) injection   SubCUTAneous TIDAC    sodium chloride (NS) flush 5-40 mL  5-40 mL IntraVENous Q8H    sodium chloride (NS) flush 5-40 mL  5-40 mL IntraVENous PRN    acetaminophen (TYLENOL) tablet 650 mg  650 mg Oral Q6H PRN    Or    acetaminophen (TYLENOL) suppository 650 mg  650 mg Rectal Q6H PRN    polyethylene glycol (MIRALAX) packet 17 g  17 g Oral DAILY PRN    ondansetron (ZOFRAN ODT) tablet 4 mg  4 mg Oral Q8H PRN    Or    ondansetron (ZOFRAN) injection 4 mg  4 mg IntraVENous Q6H PRN    pantoprazole (PROTONIX) 40 mg in 0.9% sodium chloride 10 mL injection  40 mg IntraVENous Q12H    [Held by provider] heparin (porcine) injection 5,000 Units  5,000 Units SubCUTAneous Q8H         Objective:     Vitals:    06/27/21 0902 06/27/21 0907 06/27/21 0912 06/27/21 0918   BP: (!) 116/55 137/65 (!) 130/59 127/63   Pulse: 83 85 83 81   Resp: 16 16 16    Temp:   97.8 °F (36.6 °C)    SpO2: 98% 98% 98% 99%   Weight: Height:         Intake and Output:   06/25 1901 - 06/27 0700  In: 1666.9 [P.O.:360; I.V.:1306.9]  Out: 750 [Urine:750]  06/27 0701 - 06/27 1900  In: 200 [I.V.:200]  Out: -       Intake/Output Summary (Last 24 hours) at 6/27/2021 1004  Last data filed at 6/27/2021 0848  Gross per 24 hour   Intake 1286.23 ml   Output 200 ml   Net 1086.23 ml       Physical Exam:            GEN: well developed and in no acute distress,   HEENT:  PERRL, EOMI, no alar flaring or epistaxis, oral mucosa moist without cyanosis,   NECK:  no JVD, no retractions, no thyromegaly or masses   LUNGS:  Clear on L, R diminished posterior base on RA  HEART:  RRR with no M,G,R;  ABDOMEN:  soft with no tenderness, positive bowel sounds present  EXTREMITIES:  warm with no cyanosis, R arm 4+ edema, R breast swollen and taunt   SKIN:  no jaundice or ecchymosis   NEURO:  alert and oriented, grossly non-focal    CT:   6/24        CT neck -   1.  Diffuse edematous change of the rightward aspect of the neck, mediastinum,  right axilla, right upper extremity, and right breast. There is diffuse skin  thickening and trabecular thickening of the right breast with an overall  smaller/contracted appearance of the right breast compared to the left. This is  suspicious for inflammatory breast cancer. Clinical correlation is advised with  consideration of diagnostic evaluation in the breast center. 2. Highly diminutive appearance of the right internal jugular vein which is  nearly occluded, further evaluation with vascular ultrasound can be considered. Prominent venous collaterals of the right chest wall. 3. Pathologically enlarged right axillary lymph nodes with a lymph conglomerate  measuring up to 3.9 cm, similar to although progressed from prior's. Biopsy can  be considered. 4. Wall thickening of the mid and distal esophagus, possible esophagitis.   Consider further evaluation with EGD versus barium esophagram.  5. Enlarging hypodensity along falciform ligament of the liver most likely focal  fatty infiltration although indeterminate, attention on follow-up. 6. Large right pleural effusion. 7. Unchanged pancreatic tail cyst versus cystic neoplasm measuring up to 3.9 cm.     ECHO:   5/2018    LAB  Recent Labs     06/27/21  0422 06/26/21  0507 06/25/21  0516   WBC 4.3 4.1* 5.9   HGB 9.5* 9.7* 10.5*   HCT 29.1* 31.4* 32.4*    295 325     Recent Labs     06/27/21  0422 06/26/21  0507 06/25/21  0516    144 144   K 3.3* 3.5 3.7   * 110* 112*   CO2 25 23 22   GLU 87 85 97   BUN 14 13 11   CREA 0.65 0.63 0.68     ABG:  No results found for: PH, PHI, PCO2, PCO2I, PO2, PO2I, HCO3, HCO3I, FIO2, FIO2I    Cultures:   Results     Procedure Component Value Units Date/Time    CULTURE, BODY FLUID Pankaj Yoandyi STAIN [[id number]] Collected: 06/26/21 1220    Order Status: Completed Specimen: Pleural Fluid Updated: 06/27/21 0742     Special Requests: NO SPECIAL REQUESTS        GRAM STAIN PENDING     Culture result: NO GROWTH 1 DAY       FUNGUS CULTURE AND SMEAR [[id number]] Collected: 06/26/21 1220    Order Status: Completed Updated: 06/26/21 1308    AFB CULTURE + SMEAR W/RFLX ID FROM CULTURE [[id number]] Collected: 06/26/21 1220    Order Status: Completed Updated: 06/26/21 1308            Assessment:     Hospital Problems  Date Reviewed: 5/20/2021        Codes Class Noted POA    Pleural effusion, right ICD-10-CM: J90  ICD-9-CM: 511.9  6/25/2021 Unknown        Lymphedema ICD-10-CM: I89.0  ICD-9-CM: 457.1  6/24/2021 Unknown        Hx pulmonary embolism ICD-10-CM: J55.915  ICD-9-CM: V12.55  4/5/2021 Yes        Pancreatic pseudocyst ICD-10-CM: K86.3  ICD-9-CM: 577.2  4/5/2021 Yes        * (Principal) Lymphedema of right arm ICD-10-CM: I89.0  ICD-9-CM: 457.1  3/10/2021 Yes        Malignant neoplasm of ascending colon ([facility] ICD-10-CM: C18.2  ICD-9-CM: 153.6  9/28/2018 Yes        Current use of anticoagulant therapy ICD-10-CM: Z79.01  ICD-9-CM: V58.61  9/18/2018 Yes        Type 2 diabetes mellitus with diabetic neuropathy ([facility] ICD-10-CM: E11.40  ICD-9-CM: 250.60, 357.2  2/21/2018 Yes              PLAN:   --follow pleural studies, +lights criteria,  exudative  --RN to obtain consent for R thoracentesis   --eliquis on hold since Thursday AM, heparin also on hold since 6/25 (can one of these be restarted? GI held both with ? EGD, but unable to complete procedure)  --GI planning for barium esophogram   --R axillary biopsy pending, follow results  --oncology onboard and following        Kiran Wolfe NP    More than 50% of time documented was spent in face-to-face contact with the patient and in the care of the patient on the floor/unit where the patient is located. Lungs: CTA b/l  Heart S1 and S2 audible, no murmers or rubs appreciated  Other     cxr noted and no reaccumulation of fluid and no ptx today  Fluid is exudative based on TP ratio. F/u fluid cutlures  Awaiting pathology of fluid and recent biopsy by IR. I have spoken with and examined the patient. I have reviewed the history, examination, assessment, and plan and agree with the above. Kathia Hagen MD      This note was signed electronically. Errors are unfortunately her likely due to dictation software.

## 2021-06-27 NOTE — INTERVAL H&P NOTE
Update History & Physical    The Patient's History and Physical of June 24, 2021 was reviewed with the patient and I examined the patient. There was no change. The surgical site was confirmed by the patient and me. Plan:  The risk, benefits, expected outcome, and alternative to the recommended procedure have been discussed with the patient. Patient understands and wants to proceed with the procedure.     Electronically signed by David Gupta MD on 6/27/2021 at 8:11 AM

## 2021-06-27 NOTE — PERIOP NOTES
TRANSFER - OUT REPORT:    Verbal report given to Ramy Arana RN on Al  being transferred to  for routine post - op       Report consisted of patients Situation, Background, Assessment and   Recommendations(SBAR). Information from the following report(s) OR Summary, Procedure Summary, Intake/Output and MAR was reviewed with the receiving nurse. Lines:   Peripheral IV 06/26/21 Anterior; Left Wrist (Active)   Site Assessment Clean, dry, & intact 06/27/21 0851   Phlebitis Assessment 0 06/27/21 0851   Infiltration Assessment 0 06/27/21 0851   Dressing Status Clean, dry, & intact 06/27/21 0851   Dressing Type Tape;Transparent 06/27/21 0851   Hub Color/Line Status Patent 06/27/21 0851   Alcohol Cap Used No 06/27/21 0600        Opportunity for questions and clarification was provided. Patient transported with:   O2 @ 3 liters  Tech    VTE prophylaxis orders have been written for Al. Patient and family given floor number and nurses name. Family updated re: pt status after security code verified.

## 2021-06-27 NOTE — ANESTHESIA POSTPROCEDURE EVALUATION
Procedure(s):  ESOPHAGOGASTRODUODENOSCOPY (EGD) ROOM 221. total IV anesthesia    Anesthesia Post Evaluation      Multimodal analgesia: multimodal analgesia used between 6 hours prior to anesthesia start to PACU discharge  Patient location during evaluation: bedside  Patient participation: complete - patient participated  Level of consciousness: awake  Pain management: adequate  Airway patency: patent  Anesthetic complications: no  Cardiovascular status: acceptable  Respiratory status: spontaneous ventilation and acceptable  Hydration status: acceptable  Post anesthesia nausea and vomiting:  none      INITIAL Post-op Vital signs:   Vitals Value Taken Time   /63 06/27/21 0918   Temp 36.6 °C (97.8 °F) 06/27/21 0912   Pulse 82 06/27/21 0919   Resp 16 06/27/21 0912   SpO2 99 % 06/27/21 0919   Vitals shown include unvalidated device data.

## 2021-06-27 NOTE — ADDENDUM NOTE
Addendum  created 06/27/21 1021 by Nimesh Monreal MD    Flowsheet accepted, Intraprocedure Flowsheets edited

## 2021-06-27 NOTE — PROCEDURES
Esophagogastroduodenoscopy    DATE of PROCEDURE: 6/27/2021    INDICATION:  1. Dysphagia    POSTPROCEDURE DIAGNOSIS:  1. Discontinued procedure    MEDICATIONS ADMINISTERED: MAC. Further details per anesthesia note. INSTRUMENT: [id number] -->  XP    PROCEDURE:  After obtaining informed consent, the patient was placed in the left lateral position and sedated. The endoscope was advanced under direct vision without difficulty. The esophagus, stomach (including retroflexed views) and duodenum were evaluated. The patient was taken to the recovery area in stable condition. FINDINGS:  ESOPHAGUS: Multiple unsuccessful attempts were made to intubate the esophagus with a [id number] as well as pediatric (XP) gastroscope. The gastroscope would at times advance 1 cm beyond the level of the vocal cords then meet resistance. Unable to visualize the nature of the anatomical change that resulted in increased resistance to passage of the scope. Procedure discontinued. Estimated blood loss: 0-minimal   Specimens obtained during procedure: none    PLAN:  1.  Barium esophogram    Jermaine Mccormick MD

## 2021-06-27 NOTE — PROGRESS NOTES
TRANSFER - IN REPORT:    Verbal report received from Aixa Rodriguez RN(name) on Al  being received from [facility]) for routine post - op      Report consisted of patients Situation, Background, Assessment and   Recommendations(SBAR). Information from the following report(s) SBAR, Kardex, Procedure Summary, Intake/Output, MAR and Recent Results was reviewed with the receiving nurse. Opportunity for questions and clarification was provided. Assessment completed upon patients arrival to unit and care assumed.

## 2021-06-28 NOTE — PROGRESS NOTES
END OF SHIFT NOTE:    INTAKE/OUTPUT  06/27 0701 - 06/28 0700  In: 1193.5 [P.O.:540; I.V.:653.5]  Out: 1000 [Urine:1000]  Voiding: YES  Catheter: NO  Drain:              Flatus: Patient does have flatus present. Stool:  1 occurrences. Nurse did not see  Characteristics:       Emesis: 0 occurrences. Characteristics:        VITAL SIGNS  Patient Vitals for the past 12 hrs:   Temp Pulse Resp BP SpO2   06/28/21 0341 98.6 °F (37 °C) 85 18 126/76 96 %   06/27/21 2320 97.7 °F (36.5 °C) 87 18 127/68 96 %   06/27/21 1855 98 °F (36.7 °C) 92 18 125/76 96 %       Pain Assessment  Pain Intensity 1: 0 (06/27/21 0912)        Patient Stated Pain Goal: 0    Ambulating  Yes    Shift report given to oncoming nurse at the bedside.     Kong Hamilton RN

## 2021-06-28 NOTE — PROGRESS NOTES
Tammy Casey  Admission Date: 6/24/2021             Daily Progress Note: 6/28/2021     Patient is a 59 y.o.  female. Admitted 6/24, for SOB, worsening over past 6 weeks. CT scan shows a large right pleural effusion. She is having difficulty swallowing and has lost about 18 to 20 lbs over the past month. Now NPO per speech, GI following, EGD attempted 6/26, but unable to pass. GI plans for barium esophogram.      PMHx includes colon cancer with resection in Oct 2018, DVT, and PE that year as well and has been on Eliquis. More recently, she underwent right shoulder arthroscopy in November 2020 and then developed significant right upper extremity lymphedema. Evaluation showed no clot. She was referred to a lymphedema specialist for treatment. She has had right axillary fullness for some time. CT here shows diffuse edematous changes of the rightward aspect of the neck, mediastinum, right axilla, right upper extremity, and right breast. There is diffuse skin thickening and trabecular thickening of the right breast with an overall smaller/contracted appearance of the right breast compared to the left. This is suspicious for inflammatory breast cancer; highly diminutive appearance of the right internal jugular vein which is nearly occluded; pathologically enlarged right axillary lymph nodes with a lymph conglomerate measuring up to 3.9 cm; wall thickening of the mid and distal esophagus, possible esophagitis; enlarging hypodensity along falciform ligament of the liver most likely focal fatty infiltration although indeterminate; large R pleural effusion; and unchanged pancreatic tail cyst vs cystic neoplasm measuring up to 3.9cm. IR performed an US guided breast biopsy 6/25, pathology pending. Vascular surgery consulted for occlusion of R IJ and does not recommend any surgery but continuation of the anticoagulation.  Pulm following for right pleural effusion with tap 6/26 removed 600 ml from R. Pleural fluid sent for routine studies and cytology, pending results. Subjective: On RA. R kashif completed 6/26 with 600 ml removed. Off O2. Breathing better.  Still with R arm and breast swelling    Review of Systems  Constitutional: positive for fatigue and weight loss, negative for fevers, chills and sweats  Respiratory: positive for dyspnea on exertion, negative for cough, sputum or hemoptysis  Cardiovascular: negative for chest pain, chest pressure/discomfort, irregular heart beats  Gastrointestinal: positive for dysphagia, negative for nausea, vomiting, diarrhea and constipation  Integument/breast: positive for skin color change and breast tenderness, negative for nipple discharge  Hematologic/lymphatic: positive for lymphadenopathy and petechiae, negative for bleeding and coughing up blood       Current Facility-Administered Medications   Medication Dose Route Frequency    lactated Ringers infusion  50 mL/hr IntraVENous CONTINUOUS    insulin lispro (HUMALOG) injection   SubCUTAneous TIDAC    sodium chloride (NS) flush 5-40 mL  5-40 mL IntraVENous Q8H    sodium chloride (NS) flush 5-40 mL  5-40 mL IntraVENous PRN    acetaminophen (TYLENOL) tablet 650 mg  650 mg Oral Q6H PRN    Or    acetaminophen (TYLENOL) suppository 650 mg  650 mg Rectal Q6H PRN    polyethylene glycol (MIRALAX) packet 17 g  17 g Oral DAILY PRN    ondansetron (ZOFRAN ODT) tablet 4 mg  4 mg Oral Q8H PRN    Or    ondansetron (ZOFRAN) injection 4 mg  4 mg IntraVENous Q6H PRN    pantoprazole (PROTONIX) 40 mg in 0.9% sodium chloride 10 mL injection  40 mg IntraVENous Q12H    [Held by provider] heparin (porcine) injection 5,000 Units  5,000 Units SubCUTAneous Q8H     Objective:     Vitals:    06/27/21 2320 06/28/21 0341 06/28/21 0527 06/28/21 0735   BP: 127/68 126/76  138/85   Pulse: 87 85  90   Resp: 18 18  18   Temp: 97.7 °F (36.5 °C) 98.6 °F (37 °C)  98.6 °F (37 °C)   SpO2: 96% 96%     Weight:   183 lb 1.6 oz (83.1 kg)    Height:         Intake and Output:   06/26 1901 - 06/28 0700  In: 2200.5 [P.O.:540; I.V.:1660.5]  Out: 1100 [Urine:1100]  No intake/output data recorded. Intake/Output Summary (Last 24 hours) at 6/28/2021 0856  Last data filed at 6/28/2021 2672  Gross per 24 hour   Intake 1893.53 ml   Output 1100 ml   Net 793.53 ml       Physical Exam:            GEN: well developed and in no acute distress,   HEENT:  PERRL, EOMI, no alar flaring or epistaxis, oral mucosa moist without cyanosis,   NECK:  no JVD, no retractions, no thyromegaly or masses   LUNGS:  Clear on L, R diminished posterior base on RA  HEART:  RRR with no M,G,R;  ABDOMEN:  soft with no tenderness, positive bowel sounds present  EXTREMITIES:  warm with no cyanosis, R arm 4+ edema, R breast swollen and taunt   SKIN:  no jaundice or ecchymosis   NEURO:  alert and oriented, grossly non-focal    CT:   6/24        CT neck -   1.  Diffuse edematous change of the rightward aspect of the neck, mediastinum,  right axilla, right upper extremity, and right breast. There is diffuse skin  thickening and trabecular thickening of the right breast with an overall  smaller/contracted appearance of the right breast compared to the left. This is  suspicious for inflammatory breast cancer. Clinical correlation is advised with  consideration of diagnostic evaluation in the breast center. 2. Highly diminutive appearance of the right internal jugular vein which is  nearly occluded, further evaluation with vascular ultrasound can be considered. Prominent venous collaterals of the right chest wall. 3. Pathologically enlarged right axillary lymph nodes with a lymph conglomerate  measuring up to 3.9 cm, similar to although progressed from prior's. Biopsy can  be considered. 4. Wall thickening of the mid and distal esophagus, possible esophagitis.   Consider further evaluation with EGD versus barium esophagram.  5. Enlarging hypodensity along falciform ligament of the liver most likely focal  fatty infiltration although indeterminate, attention on follow-up. 6. Large right pleural effusion. 7. Unchanged pancreatic tail cyst versus cystic neoplasm measuring up to 3.9 cm. ECHO:   5/2018    LAB  Recent Labs     06/28/21  0521 06/27/21  0422 06/26/21  0507   WBC 4.8 4.3 4.1*   HGB 9.3* 9.5* 9.7*   HCT 28.5* 29.1* 31.4*    281 295     Recent Labs     06/28/21  0521 06/27/21  1015 06/27/21  0422 06/26/21  0507 06/26/21  0507     --  143  --  144   K 3.6 3.4* 3.3*   < > 3.5   *  --  110*  --  110*   CO2 26  --  25  --  23   GLU 96  --  87  --  85   BUN 10  --  14  --  13   CREA 0.65  --  0.65  --  0.63   MG 1.9  --   --   --   --     < > = values in this interval not displayed.      ABG:  No results found for: PH, PHI, PCO2, PCO2I, PO2, PO2I, HCO3, HCO3I, FIO2, FIO2I    Cultures:   Results     Procedure Component Value Units Date/Time    CULTURE, BODY FLUID Herschell Rinks STAIN [[id number]] Collected: 06/26/21 1220    Order Status: Completed Specimen: Pleural Fluid Updated: 06/28/21 0805     Special Requests: NO SPECIAL REQUESTS        GRAM STAIN 0 TO 3 WBCS SEEN PER OIF      NO DEFINITE ORGANISM SEEN        Culture result: NO GROWTH 2 DAYS       FUNGUS CULTURE AND SMEAR [[id number]] Collected: 06/26/21 1220    Order Status: Completed Updated: 06/26/21 1308    AFB CULTURE + SMEAR W/RFLX ID FROM CULTURE [[id number]] Collected: 06/26/21 1220    Order Status: Completed Updated: 06/26/21 1308            Assessment:     Hospital Problems  Date Reviewed: 5/20/2021        Codes Class Noted POA    Pleural effusion, right ICD-10-CM: J90  ICD-9-CM: 511.9  6/25/2021 Unknown        Lymphedema ICD-10-CM: I89.0  ICD-9-CM: 457.1  6/24/2021 Unknown        Hx pulmonary embolism ICD-10-CM: Q32.715  ICD-9-CM: V12.55  4/5/2021 Yes        Pancreatic pseudocyst ICD-10-CM: K86.3  ICD-9-CM: 577.2  4/5/2021 Yes        * (Principal) Lymphedema of right arm ICD-10-CM: I89.0  ICD-9-CM: 457.1  3/10/2021 Yes Malignant neoplasm of ascending colon [facility]) ICD-10-CM: C18.2  ICD-9-CM: 153.6  9/28/2018 Yes        Current use of anticoagulant therapy ICD-10-CM: Z79.01  ICD-9-CM: V58.61  9/18/2018 Yes        Type 2 diabetes mellitus with diabetic neuropathy [facility]) ICD-10-CM: E11.40  ICD-9-CM: 250.60, 357.2  2/21/2018 Yes              PLAN:   --follow pleural studies, +lights criteria,  exudative  --Repeat CXR PA/Lat, haziness on right chest may be noted breast/chest swelling  --eliquis on hold  --still having difficulty swallowing anything other than thin liquids  --R axillary biopsy pending, follow results  --oncology onboard and following    Zaira Montoya MD    More than 50% of time documented was spent in face-to-face contact with the patient and in the care of the patient on the floor/unit where the patient is located.

## 2021-06-28 NOTE — PROGRESS NOTES
Problem: Pressure Injury - Risk of  Goal: *Prevention of pressure injury  Description: Document Lito Scale and appropriate interventions in the flowsheet. Outcome: Progressing Towards Goal  Note: Pressure Injury Interventions: Activity Interventions: Increase time out of bed, Pressure redistribution bed/mattress(bed type)    Mobility Interventions: HOB 30 degrees or less, Pressure redistribution bed/mattress (bed type)    Nutrition Interventions: Document food/fluid/supplement intake, Offer support with meals,snacks and hydration                     Problem: Patient Education: Go to Patient Education Activity  Goal: Patient/Family Education  Outcome: Progressing Towards Goal     Problem: Falls - Risk of  Goal: *Absence of Falls  Description: Document Tamiko Fall Risk and appropriate interventions in the flowsheet.   Outcome: Progressing Towards Goal  Note: Fall Risk Interventions:  Mobility Interventions: Communicate number of staff needed for ambulation/transfer, Patient to call before getting OOB         Medication Interventions: Evaluate medications/consider consulting pharmacy, Patient to call before getting OOB    Elimination Interventions: Call light in reach, Patient to call for help with toileting needs              Problem: Patient Education: Go to Patient Education Activity  Goal: Patient/Family Education  Outcome: Progressing Towards Goal     Problem: Patient Education: Go to Patient Education Activity  Goal: Patient/Family Education  Outcome: Progressing Towards Goal     Problem: General Medical Care Plan  Goal: *Vital signs within specified parameters  Outcome: Progressing Towards Goal  Goal: *Labs within defined limits  Outcome: Progressing Towards Goal  Goal: *Absence of infection signs and symptoms  Outcome: Progressing Towards Goal  Goal: *Optimal pain control at patient's stated goal  Outcome: Progressing Towards Goal  Goal: *Skin integrity maintained  Outcome: Progressing Towards Goal  Goal: *Fluid volume balance  Outcome: Progressing Towards Goal  Goal: *Optimize nutritional status  Outcome: Progressing Towards Goal  Goal: *Anxiety reduced or absent  Outcome: Progressing Towards Goal  Goal: *Progressive mobility and function (eg: ADL's)  Outcome: Progressing Towards Goal     Problem: Patient Education: Go to Patient Education Activity  Goal: Patient/Family Education  Outcome: Progressing Towards Goal

## 2021-06-28 NOTE — PROGRESS NOTES
Spiritual Care Visit, initial visit. Visited with patient at bedside. Prayed for patient's healing and health. Visit by Mili Cristobal, Staff .  [place]., [place].B., B.A.

## 2021-06-28 NOTE — PROGRESS NOTES
Chart screened by CM for d/c planning. Pt is on RA. Per speech recommendation p is now NPO r/t difficulty swallowing and recent weight loss. An EGD was attempted on 6-26-21 but unable to pass tube. GI is following and is now planning to conduct a barium esophogram.  Resent CT showed a large right PE. On 6-25-21 IR performed a US guided breast biopsy. Pulmonary is also following. On 6-26-21 a tap of the right PE removed mL. Pt admitted with Dx of Right PE, Lymphedema, Hx pulmonary embolism, Pancreatic pseudocyst, Lymphedema of right arm, Malignant neoplasm of ascending colon, Current use of anticoagulant therapy, and DM-II with diabetic neuropathy. Pt resides in a one-story house alone. Her son and daughter-in-law reside behind her. Pt has been independent with ADLs. Her family checks on her often. She is employed but is currently on disability leave. She has insurance with pharmacy benefits. CM will continue to follow and remain available if any needs arise.

## 2021-06-28 NOTE — PROGRESS NOTES
TRANSFER - OUT REPORT:    Verbal report given to Pacheco(name) on José Manuel Edouard  being transferred to preop(unit) for ordered procedure       Report consisted of patients Situation, Background, Assessment and   Recommendations(SBAR). Information from the following report(s) Kardex was reviewed with the receiving nurse. Lines:   Peripheral IV 06/27/21 Anterior; Left Forearm (Active)   Site Assessment Clean, dry, & intact 06/27/21 2320   Phlebitis Assessment 0 06/27/21 2320   Infiltration Assessment 0 06/27/21 2320   Dressing Status Clean, dry, & intact 06/27/21 2320   Dressing Type Transparent 06/27/21 2320   Hub Color/Line Status Infusing 06/27/21 2320        Opportunity for questions and clarification was provided. Patient transported with:   Registered Nurse,  BS 99 this am. Am dose of Protonix given.

## 2021-06-28 NOTE — PROGRESS NOTES
END OF SHIFT NOTE:    INTAKE/OUTPUT  06/27 0701 - 06/28 0700  In: 2093.5 [P.O.:540; I.V.:1553.5]  Out: 1100 [Urine:1100]  Voiding: YES  Catheter: NO  Drain:              Flatus: Patient does have flatus present. Stool:  1 occurrences. Characteristics:       Emesis: 0 occurrences. Characteristics:        VITAL SIGNS  Patient Vitals for the past 12 hrs:   Temp Pulse Resp BP SpO2   06/28/21 1447 97.9 °F (36.6 °C) 71 16 117/72 96 %   06/28/21 1142 98 °F (36.7 °C) 82 18 120/75 96 %       Pain Assessment  Pain Intensity 1: 0 (06/28/21 0906)        Patient Stated Pain Goal: 0    Ambulating  Yes    Shift report given to oncoming nurse at the bedside.     Prasanth Long RN

## 2021-06-28 NOTE — PROGRESS NOTES
[facility] Progress Note         Admit Date:  6/24/2021  Today's Date:  6/28/2021    CC:  Dysphagia    Subjective:     She is s/p EGD and subsequent Barium esophagram 6/27/21 with results as outlined below. She had some trouble eating apple sauce since but otherwise did ok with buillon. This morning she reports that she had some ear pain following her procedure yesterday and this has improved some but continues off and on. She has some indigestion this morning and this has improved with belching but she is having trouble belching. She denies N/V, abdominal pain. She reports normal BMs (2yesterday) without overt GI bleeding. Medications:   Current Facility-Administered Medications   Medication Dose Route Frequency    lactated Ringers infusion  50 mL/hr IntraVENous CONTINUOUS    insulin lispro (HUMALOG) injection   SubCUTAneous TIDAC    sodium chloride (NS) flush 5-40 mL  5-40 mL IntraVENous Q8H    sodium chloride (NS) flush 5-40 mL  5-40 mL IntraVENous PRN    acetaminophen (TYLENOL) tablet 650 mg  650 mg Oral Q6H PRN    Or    acetaminophen (TYLENOL) suppository 650 mg  650 mg Rectal Q6H PRN    polyethylene glycol (MIRALAX) packet 17 g  17 g Oral DAILY PRN    ondansetron (ZOFRAN ODT) tablet 4 mg  4 mg Oral Q8H PRN    Or    ondansetron (ZOFRAN) injection 4 mg  4 mg IntraVENous Q6H PRN    pantoprazole (PROTONIX) 40 mg in 0.9% sodium chloride 10 mL injection  40 mg IntraVENous Q12H    [Held by provider] heparin (porcine) injection 5,000 Units  5,000 Units SubCUTAneous Q8H       Review of Systems:  ROS was obtained, with pertinent positives as listed above. No chest pain or SOB.     Diet: clear liquids     Objective:   Vitals:  Visit Vitals  /85 (BP 1 Location: Left upper arm, BP Patient Position: Sitting)   Pulse 90   Temp 98.6 °F (37 °C)   Resp 18   Ht 5' 4\" (1.626 m)   Wt 83.1 kg (183 lb 1.6 oz)   SpO2 96%   BMI 31.43 kg/m²     Intake/Output:  No intake/output data recorded. 06/26 1901 - 06/28 0700  In: 2200.5 [P.O.:540; I.V.:1660.5]  Out: 1100 [Urine:1100]  Exam:  General appearance: alert, cooperative, NAD. Sitting up in chair. Heart: RRR  Lungs: CTAB  Abdomen: soft, Non-tender. BSx4. Psych: Mood and affect unremarkable. Data Review (Labs):    Recent Labs     06/28/21  0521 06/27/21  1015 06/27/21  0422 06/26/21  0507   WBC 4.8  --  4.3 4.1*   HGB 9.3*  --  9.5* 9.7*   HCT 28.5*  --  29.1* 31.4*     --  281 295   MCV 88.5  --  88.2 90.8     --  143 144   K 3.6 3.4* 3.3* 3.5   *  --  110* 110*   CO2 26  --  25 23   BUN 10  --  14 13   CREA 0.65  --  0.65 0.63   CA 8.7  --  8.8 8.8   MG 1.9  --   --   --    GLU 96  --  87 85   AP  --   --   --  79   AST  --   --   --  33   ALT  --   --   --  27   TBILI  --   --   --  0.9   ALB  --   --   --  2.6*   TP  --   --   --  7.4     EGD 6/27/21 with Dr. Charlene Lorenzo for dysphagia FINDINGS:  ESOPHAGUS: Multiple unsuccessful attempts were made to intubate the esophagus with a [id number] as well as pediatric (XP) gastroscope. The gastroscope would at times advance 1 cm beyond the level of the vocal cords then meet resistance. Unable to visualize the nature of the anatomical change that resulted in increased resistance to passage of the scope. Procedure discontinued. Estimated blood loss: 0-minimal   Specimens obtained during procedure: none  PLAN: 1. Barium esophogram    Barium esophagram 6/27/21   HISTORY: Dysphasia with incomplete EGD because of narrowed esophagus. TECHNIQUE: The patient ingested effervescent granules followed by thin and thick barium under direct fluoroscopic observation. 1.6 minutes of fluoroscopy time was utilized. 11 spot fluoroscopic images were saved. FINDINGS: There is mild diffuse narrowing of the esophagus without strictures. The gastroesophageal junction was in a normal position. Rapid sequence imaging of the oropharynx demonstrates a normal swallowing motion.  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 The patient was unable to participate in prone swallowing and wished to not ingest the barium tablet because she was concerned about successfully swallowing this capsule. IMPRESSION  1. No mechanical esophageal obstruction identified. 2. Patient unable to participate in prone swallowing or to ingest a barium tablet. Assessment:     Principal Problem:  Lymphedema of right arm (3/10/2021)    Active Problems:  Type 2 diabetes mellitus with diabetic neuropathy ([facility] [address] (2/21/2018)  Current use of anticoagulant therapy (9/18/2018)  Malignant neoplasm of ascending colon ([facility] [address] (9/28/2018)  Hx pulmonary embolism (4/5/2021)  Pancreatic pseudocyst (4/5/2021)  Lymphedema (6/24/2021)    58 y/o F who presented with progression of severe R lymphedema. GI consulted for dysphagia. Esophageal wall thickening noted on CT, w/o significant reflux symptoms, and possibly related to recent dysphagia and wt loss ~ 20# within the past month. Concern for possible esophagitis, stricture, or neoplasm, etc. MBS done, SLP evaluation with grossly functional oropharyngeal swallow function. No aspiration/penetration with any consistencies. On Eliquis, last dose 6/24 AM.  EGD 6/27 was incomplete due to narrowed esophagus (see details above). Subsequent Barium esophagram was negative for esophageal obstruction but reported mild diffuse narrowing of the esophagus without strictures. CT findings were also suspicious for inflammatory breast cancer. Vascular surgery on board. Plan:     - Clear liquids for now. NPO after midnight for re-attempt at EGD tomorrow 6/29/21 with Dr. Radha Bartholomew given report of mild diffuse narrowing of esophagus without strictures. - Continue IV Protonix q12  - Hx DVT/PE: on Eliquis- currently held for IR/GI procedures. - Nearly occluded right internal jugular vein- s/p vascular consult- see note. no surgical intervention recommended.   Recommend continued systemic anticoagulation. - possible inflammatory Breast cancer on CT- s/p US guided biopsy of right axillary lymph node with IR 6/25. Pathology pending. Oncology consulted 6/25 and following.  - Large R pleural effusion on CT- S/p thoracentesis 6/26 with 600 ml pleural fluid removed. - Pancreatic cyst: stable. - Mildly elevated LFTs: pt with evidence of fatty infiltration on CT- consider outpt workup.     Talia Barfield PA-C  [facility]

## 2021-06-28 NOTE — PROGRESS NOTES
Comprehensive Nutrition Assessment    Type and Reason for Visit: [address] for Malnutrition Screening Tool: Recently Lost Weight Without Trying: Yes, If Yes, How Much Weight Loss: 14 - 23 lbs, Eating Poorly Due to Decreased Appetite: No (dysphagia)    Nutrition Recommendations/Plan:   · Advance diet as medically appropriate  · Start Ensure Clear three times per day on CLD: (240 kcal and 8g protein each)     Malnutrition Assessment:  Malnutrition Status: Mild malnutrition  Context: Acute illness  Findings of clinical characteristics of malnutrition:   Energy Intake:  Mild decrease in energy intake (specify) (progressive decline in PO secondary to dysphagia)  Weight Loss:  7.00 - Greater than 7.5% over 3 months (27# (13.3%))     Body Fat Loss:  No significant body fat loss,     Muscle Mass Loss:  No significant muscle mass loss,    Fluid Accumulation:  No significant fluid accumulation (noted lymphedema),     Strength:  Not performed     Nutrition Assessment:   Nutrition History: Patient reports progressive worsening dysphagia to solids over about the last month and a half. She states that just prior to admission she was unable to tolerate even water. She states that she has lost 17# in last month and a half. Nutrition Background: Patient with PMH significant for DM, DVT/PE, colocn cancer s/p resection, lymphedema of right upper extremity. She presented with worseing swelling of RUE, difficulty swallowing, and weight loss. CT was suspicious for inflammatory breast cancer, nearly occluded right IJ, enlarged lymph nodes, pleural effusions, wall thickening of mid and distal esophagus. SLP found no evidence of dysphagia. EGD 6/27 unsuccessful, pt pending barium esphogram.  Daily Update:  Pt seen sitting in chair, no family present.  Pt reported her appetite is returning and she was able to tolerate her broth and pudding for dinner 6/27, however she could not tolerate the applesauce as it felt \"stuck\" in her throat. Pt was eager to eat clear liquid lunch tray at time of assessment and is agreeable to Ensure Clear (apple flavor) three times per day while waiting to advance diet. Nutrition Related Findings:   No physical signs of wasting. Current Nutrition Therapies:  ADULT DIET Clear Liquid; No Concentrated Sweets; Nothing red    Current Intake:   Average Meal Intake: 51-75% Average Supplement Intake: None ordered      Anthropometric Measures:  Height: 5' 4\" (162.6 cm)  Current Body Wt: 83.1 kg (183 lb 3.2 oz) (6/27), Weight source: Bed scale  BMI: 31.4, Obese class 1 (BMI 30.0-34.9)     Ideal Body Weight (lbs) (Calculated): 120 lbs (55 kg), 152.7 %  Usual Body Wt: 91.6 kg (202 lb) (3/15), Percent weight change: -13.3          Edema: Generalized: Trace (BLE) (6/28/2021  8:40 AM)  RUE: 4+;Non-pitting (6/28/2021  8:40 AM)     Estimated Daily Nutrient Needs:  Energy (kcal/day): 4788-9975 (Kcal/kg (20-25), Weight Used: Current (79.4 kg))  Protein (g/day): 79-99 (20% kcal) Weight Used: (Current)  Fluid (ml/day):   (1 ml/kcal)    Nutrition Diagnosis:   · Inadequate oral intake related to swallowing difficulty (medical status) as evidenced by  (CLQ diet, pt reported barriers to intake)    Nutrition Interventions:   Food and/or Nutrient Delivery: Continue current diet, Start oral nutrition supplement (Advance diet as medically able)     Coordination of Nutrition Care: Continue to monitor while inpatient    Goals:   Previous Goal Met: Progress towards goal(s) declining  Active Goal: Meet at least 50% nutrition needs by next RD follow-up    Nutrition Monitoring and Evaluation:      Food/Nutrient Intake Outcomes: Diet advancement/tolerance, Food and nutrient intake, Supplement intake  Physical Signs/Symptoms Outcomes: Chewing or swallowing    Discharge Planning:     Too soon to determine    Jules Fernandes, [address] [place] [address],   [phone]

## 2021-06-28 NOTE — PROGRESS NOTES
[facility]ist Progress Note     Name:  Kylie Mello  Age:61 y.o. Sex:female   :  1960    MRN:  [id number]     Admit Date:  2021    Reason for Admission:  Lymphedema [I89.0]    Hospital Course/Interval history:     Patient is a 20-year-old female with past medical history significant for diabetes mellitus, history of DVT/PE on Eliquis, history of colon cancer status post resection in  and lymphedema of right upper extremity (s/p R shoulder surgery  with subsequent referral to lymphedema clinic) who presented to the ED with worsening swelling of right upper extremity and dysphagia. The swelling has progressed into her neck resulting in dysphagia and unintentional weight loss of ~20 lbs. Concerns for inflammatory breast cancer, pathologies pending s/p R axillary LN biopsy, oncology following. Pulmonary following for R pleural effusion, s/p R thoracentesis with 600 ml exudative fluid removed. GI following, will re-attempt EGD 21. Subjective (21): Patient tearful during rounding, she is worried about what is going on and just wants answers/plan to move forward and fix things. Re-assured her that many specialties were coordinating her care to get her answers and a plan as quickly as possible. She would like a  to come to bedside to pray and comfort her in her ifeanyi. Review of Systems: 14 point review of systems is otherwise negative with the exception of the elements mentioned above.   Assessment & Plan     Lymphedema/?inflammatory breast cancer  -Significant lymphedema of RUE and R chest wall  -CT findings concerning for inflammatory breast cancer  -Oncology consult, will follow pathology results  -s/p right axillary lymph node biopsy with IR on , pathologies pending  -Elevation and compression to RUE  -Following lymphedema clinic outpaitnet     Dysphagia  -CT with mild thickening of mid and distal esophagus, concerning for esophagitis  -SLP eval-defer management to GI  -GI consult  -Continue Protonix  -Full liquid diet  -EGD unable to be completed (could not advance gastroscope)  -Barium esophagram attempted but patient unable to prone swallow or ingest barium tablet  -GI planning to re-attempt EGD 6/29, NPO at midnight     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, given collaterals  -Vascular surgery consult - recommend continue [facility] therapy, elevation/compression of RUE  -AC held for procedures, resume when safe to do so  -6/29 Continue to hold [facility] for EGD tomorrow per GI     Right large pleural effusion  -Pulmonary consult  -s/p R thoracentesis 6/26 with 600 ml pleural fluid removed  -Pleural fluid exudative per light's criteria  -Patient remains stable on RA     History of DVT/PE  -On Eliquis, will hold for now for IR/GI procedures, restart when safe to do so  -6/29 Continue to hold [facility] for EGD tomorrow per GI     Diabetes Mellitus  -Hold oral hypoglycemic  -SSI ACHS  -Remains on full liquid diet     Hypokalemia, resolved  -K 3.6, continue to monitor     Diet:  DIET ADULT Full Liquid  DVT PPx: SCD's  Code status: Full Code  Disposition/Expected LOS: Pending clinical course    Objective:     Patient Vitals for the past 24 hrs:   Temp Pulse Resp BP SpO2   06/28/21 0735 98.6 °F (37 °C) 90 18 138/85    06/28/21 0341 98.6 °F (37 °C) 85 18 126/76 96 %   06/27/21 2320 97.7 °F (36.5 °C) 87 18 127/68 96 %   06/27/21 1855 98 °F (36.7 °C) 92 18 125/76 96 %   06/27/21 1510 97.4 °F (36.3 °C) 88 18 103/61 99 %   06/27/21 1215 97.4 °F (36.3 °C) 72 18 120/66 96 %   06/27/21 1015 97.8 °F (36.6 °C) 84 18 119/69 95 %   06/27/21 0918  81  127/63 99 %   06/27/21 0912 97.8 °F (36.6 °C) 83 16 (!) 130/59 98 %   06/27/21 0907  85 16 137/65 98 %   06/27/21 0902  83 16 (!) 116/55 98 %   06/27/21 0857  85 16 (!) 113/55 98 %   06/27/21 0851 97.7 °F (36.5 °C) 83 16 (!) 112/58 98 %   06/27/21 0819  93 16 (!) 143/65 100 %     Oxygen Therapy  O2 Sat (%): 96 % (06/28/21 0341)  Pulse via Oximetry: 81 beats per minute (06/27/21 0918)  O2 Device: None (Room air) (06/27/21 1930)  O2 Flow Rate (L/min): 3 l/min (06/27/21 0912)    Body mass index is 31.43 kg/m².     Physical Exam:   General:          No acute distress, speaking in full sentences, no use of accessory muscles   Lungs:             RLL with improved aeration, otherwise breath sounds clear to auscultation  Neck:               R lower neck swelling, no JVD  CV:                  Regular rate and rhythm with normal S1 and S2   Abdomen:        Soft, nontender, nondistended, normoactive bowel sounds   Extremities:     No cyanosis clubbing, RUE edematous with swelling into upper anterior chest  Neuro:             Nonfocal, A&O x3   Psych:             Tearful affect     Data Review:  I have reviewed all labs, meds, and studies from the last 24 hours:    Labs:    Recent Results (from the past 24 hour(s))   POTASSIUM    Collection Time: 06/27/21 10:15 AM   Result Value Ref Range    Potassium 3.4 (L) 3.5 - 5.1 mmol/L   GLUCOSE, POC    Collection Time: 06/27/21 11:46 AM   Result Value Ref Range    Glucose (POC) 93 65 - 100 mg/dL    Performed by RobertdicePCT    GLUCOSE, POC    Collection Time: 06/27/21  4:28 PM   Result Value Ref Range    Glucose (POC) 100 65 - 100 mg/dL    Performed by Kevin    CBC W/O DIFF    Collection Time: 06/28/21  5:21 AM   Result Value Ref Range    WBC 4.8 4.3 - 11.1 K/uL    RBC 3.22 (L) 4.05 - 5.2 M/uL    HGB 9.3 (L) 11.7 - 15.4 g/dL    HCT 28.5 (L) 35.8 - 46.3 %    MCV 88.5 79.6 - 97.8 FL    MCH 28.9 26.1 - 32.9 PG    MCHC 32.6 31.4 - 35.0 g/dL    RDW 15.9 (H) 11.9 - 14.6 %    PLATELET 074 693 - 598 K/uL    MPV 10.4 9.4 - 12.3 FL    ABSOLUTE NRBC 0.00 0.0 - 0.2 K/uL   METABOLIC PANEL, BASIC    Collection Time: 06/28/21  5:21 AM   Result Value Ref Range    Sodium 143 136 - 145 mmol/L    Potassium 3.6 3.5 - 5.1 mmol/L    Chloride 111 (H) 98 - 107 mmol/L    CO2 26 21 - 32 mmol/L    Anion gap 6 (L) 7 - 16 mmol/L    Glucose 96 65 - 100 mg/dL    BUN 10 8 - 23 MG/DL    Creatinine 0.65 0.6 - 1.0 MG/DL    GFR est AA >60 >60 ml/min/1.73m2    GFR est non-AA >60 >60 ml/min/1.73m2    Calcium 8.7 8.3 - 10.4 MG/DL   MAGNESIUM    Collection Time: 06/28/21  5:21 AM   Result Value Ref Range    Magnesium 1.9 1.8 - 2.4 mg/dL       All Micro Results     Procedure Component Value Units Date/Time    CULTURE, BODY FLUID [place] STAIN [[id number]] Collected: 06/26/21 1220    Order Status: Completed Specimen: Pleural Fluid Updated: 06/27/21 1208     Special Requests: NO SPECIAL REQUESTS        GRAM STAIN 0 TO 3 WBCS SEEN PER OIF      NO DEFINITE ORGANISM SEEN        Culture result: NO GROWTH 1 DAY       AFB CULTURE + SMEAR W/RFLX ID FROM CULTURE [[id number]] Collected: 06/26/21 1220    Order Status: Completed Updated: 06/26/21 1308    FUNGUS CULTURE AND SMEAR [[id number]] Collected: 06/26/21 1220    Order Status: Completed Updated: 06/26/21 1308          EKG Results     None          Other Studies:  XR BA SWALLOW ESOPHOGRAM    Result Date: 6/27/2021  BARIUM SWALLOW ESOPHAGRAM 6/27/2021 HISTORY: Dysphasia with incomplete EGD because of narrowed esophagus. TECHNIQUE: The patient ingested effervescent granules followed by thin and thick barium under direct fluoroscopic observation. 1.6 minutes of fluoroscopy time was utilized. 11 spot fluoroscopic images were saved. FINDINGS: There is mild diffuse narrowing of the esophagus without strictures. The gastroesophageal junction was in a normal position. Rapid sequence imaging of the oropharynx demonstrates a normal swallowing motion. 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 The patient was unable to participate in prone swallowing and wished to not ingest the barium tablet because she was concerned about successfully swallowing this capsule. 1. No mechanical esophageal obstruction identified.  2. Patient unable to participate in prone swallowing or to ingest a barium tablet. Current Meds:   Current Facility-Administered Medications   Medication Dose Route Frequency    lactated Ringers infusion  50 mL/hr IntraVENous CONTINUOUS    insulin lispro (HUMALOG) injection   SubCUTAneous TIDAC    sodium chloride (NS) flush 5-40 mL  5-40 mL IntraVENous Q8H    sodium chloride (NS) flush 5-40 mL  5-40 mL IntraVENous PRN    acetaminophen (TYLENOL) tablet 650 mg  650 mg Oral Q6H PRN    Or    acetaminophen (TYLENOL) suppository 650 mg  650 mg Rectal Q6H PRN    polyethylene glycol (MIRALAX) packet 17 g  17 g Oral DAILY PRN    ondansetron (ZOFRAN ODT) tablet 4 mg  4 mg Oral Q8H PRN    Or    ondansetron (ZOFRAN) injection 4 mg  4 mg IntraVENous Q6H PRN    pantoprazole (PROTONIX) 40 mg in 0.9% sodium chloride 10 mL injection  40 mg IntraVENous Q12H    [Held by provider] heparin (porcine) injection 5,000 Units  5,000 Units SubCUTAneous Q8H       Problem List:  Hospital Problems as of 6/28/2021 Date Reviewed: 5/20/2021        Codes Class Noted - Resolved POA    Pleural effusion, right ICD-10-CM: J90  ICD-9-CM: 511.9  6/25/2021 - Present Unknown        Lymphedema ICD-10-CM: I89.0  ICD-9-CM: 457.1  6/24/2021 - Present Unknown        Hx pulmonary embolism ICD-10-CM: U02.845  ICD-9-CM: V12.55  4/5/2021 - Present Yes        Pancreatic pseudocyst ICD-10-CM: K86.3  ICD-9-CM: 577.2  4/5/2021 - Present Yes        * (Principal) Lymphedema of right arm ICD-10-CM: I89.0  ICD-9-CM: 457.1  3/10/2021 - Present Yes        Malignant neoplasm of ascending colon (HCC) ICD-10-CM: C18.2  ICD-9-CM: 153.6  9/28/2018 - Present Yes        Current use of anticoagulant therapy ICD-10-CM: Z79.01  ICD-9-CM: V58.61  9/18/2018 - Present Yes        Type 2 diabetes mellitus with diabetic neuropathy (HCC) ICD-10-CM: E11.40  ICD-9-CM: 250.60, 357.2  2/21/2018 - Present Yes               Labs, vital signs, diagnostic testing reviewed.   Case discussed with patient, care team, Dr. Phi Carter.       Part of this note was written by using a voice dictation software and the note has been proof read but may still contain some grammatical/other typographical errors.     Signed By: Wilbert Reese NP   [facility] Hospitalist Service    June 28, 2021  5:15 PM

## 2021-06-29 PROBLEM — E44.1 MILD PROTEIN-CALORIE MALNUTRITION (HCC): Status: ACTIVE | Noted: 2021-01-01

## 2021-06-29 NOTE — PROGRESS NOTES
END OF SHIFT NOTE: Patient tearful but feels much better. Would like to talk to everyone on the care team if possible today. INTAKE/OUTPUT  06/28 0701 - 06/29 0700  In: 1820.8 [I.V.:1820.8]  Out: 850 [Urine:850]  Voiding: YES  Catheter: NO    Flatus: Patient does have flatus present, per patient    Stool:  1 occurrences. Characteristics:  Stool Assessment  Stool Color: Brown  Stool Appearance: Soft  Stool Amount: Medium  Stool Source/Status: Rectum    Emesis: 0 occurrences. VITAL SIGNS  Patient Vitals for the past 12 hrs:   Temp Pulse Resp BP SpO2   06/29/21 0351 98.1 °F (36.7 °C) 85 18 130/64 95 %   06/28/21 2332 98 °F (36.7 °C) 84 18 125/75 96 %   06/28/21 1919 97.9 °F (36.6 °C) 89 18 127/70 96 %     Pain Assessment  Pain Intensity 1: 0 (06/28/21 1945)        Patient Stated Pain Goal: 0    Ambulating  Yes    Shift report to be given to oncoming nurse at the bedside.     [address]

## 2021-06-29 NOTE — PROGRESS NOTES
IV to left forearm infiltrated when receiving patient in recovery. Attempted to start new IV unsuccessfully. Dr. Lee Retana attempting to obtain IV with ultrasound. Potassium via IV unable to be given at 0900 due to loss of IV.

## 2021-06-29 NOTE — PROGRESS NOTES
Problem: Pressure Injury - Risk of  Goal: *Prevention of pressure injury  Description: Document Lito Scale and appropriate interventions in the flowsheet. Outcome: Progressing Towards Goal  Note: Pressure Injury Interventions: Activity Interventions: Increase time out of bed, Pressure redistribution bed/mattress(bed type)    Mobility Interventions: HOB 30 degrees or less, Pressure redistribution bed/mattress (bed type), PT/OT evaluation    Nutrition Interventions: Document food/fluid/supplement intake, Offer support with meals,snacks and hydration                     Problem: Patient Education: Go to Patient Education Activity  Goal: Patient/Family Education  Outcome: Progressing Towards Goal     Problem: Falls - Risk of  Goal: *Absence of Falls  Description: Document Tamiko Fall Risk and appropriate interventions in the flowsheet.   Outcome: Progressing Towards Goal  Note: Fall Risk Interventions:  Mobility Interventions: Communicate number of staff needed for ambulation/transfer, Patient to call before getting OOB         Medication Interventions: Evaluate medications/consider consulting pharmacy, Patient to call before getting OOB    Elimination Interventions: Call light in reach, Patient to call for help with toileting needs              Problem: Patient Education: Go to Patient Education Activity  Goal: Patient/Family Education  Outcome: Progressing Towards Goal     Problem: Patient Education: Go to Patient Education Activity  Goal: Patient/Family Education  Outcome: Progressing Towards Goal     Problem: General Medical Care Plan  Goal: *Vital signs within specified parameters  Outcome: Progressing Towards Goal  Goal: *Labs within defined limits  Outcome: Progressing Towards Goal  Goal: *Absence of infection signs and symptoms  Outcome: Progressing Towards Goal  Goal: *Optimal pain control at patient's stated goal  Outcome: Progressing Towards Goal  Goal: *Skin integrity maintained  Outcome: Progressing Towards Goal  Goal: *Fluid volume balance  Outcome: Progressing Towards Goal  Goal: *Optimize nutritional status  Outcome: Progressing Towards Goal  Goal: *Anxiety reduced or absent  Outcome: Progressing Towards Goal  Goal: *Progressive mobility and function (eg: ADL's)  Outcome: Progressing Towards Goal     Problem: Patient Education: Go to Patient Education Activity  Goal: Patient/Family Education  Outcome: Progressing Towards Goal

## 2021-06-29 NOTE — PROCEDURES
[facility]  ESOPHAGOGASTRODUODENOSCOPY        DATE of PROCEDURE: 6/29/2021    PT NAME: Mercedes Lora  [id number]    PHYSICIAN:  Radha Ingram MD    MEDICATION:  MAC    INSTRUMENT: GIFQ, XP    PROCEDURE:  EGD with Savary Dilation of Esophagus (21, 24 and 27 Fr), EGD with biopsies, EGD diagnostic    INDICATIONS: Dysphagia, Abnormal Barium Swallow and Abnormal CT    FINDINGS:  OROPHARYNX: Appears swollen. I could not get the [id number] down into the esophagus. Changed the scope to XP and was able to traverse into the esophagus. ESOPHAGUS: The esophagus appears narrowed but I see no inflammation, ulcers, strictures or masses. I did dilate the esophagus using a 21 Fr, 24 Fr and 27 Fr Savary over a wire. Re-endoscopy after dilation showed no heme or rents. Multiple biopsies were taken from the mid and distal esophagus using cold forceps to rule out EoE. STOMACH: The fundus on antegrade and retroflex views is normal. The body, antrum and pylorus is normal.  DUODENUM: The bulb and second portions are normal.    ASSESSMENT:  1. Swelling in Oropharynx  2. Narrowed Esophagus without inflammation, ulcers, focal strictures or masses    PLAN:  1. Follow up pathology  2. Avoid NSAIDs  3. PPI BID  4.  Will have ENT take a look at the oropharynx     Radha Ingram MD  [facility]

## 2021-06-29 NOTE — PROGRESS NOTES
Mercedes Lora  Admission Date: 6/24/2021             Daily Progress Note: 6/29/2021    The patient's chart is reviewed and the patient is discussed with the staff.  59 y.o.  female who was admitted 6/24, for SOB, worsening over past 6 weeks.  CT scan shows a large right pleural effusion. [place] is having difficulty swallowing and has lost about 18 to 20 lbs over the past month.    PMHx includes colon cancer with resection in Oct 2018, DVT, and PE that year as well and has been on Eliquis.  More recently, she underwent right shoulder arthroscopy in November 2020 and then developed significant right upper extremity lymphedema. Evaluation showed no clot. She was referred to a lymphedema specialist for treatment.    She has had right axillary fullness for some time. CT here shows diffuse edematous changes of the rightward aspect of the neck, mediastinum, right axilla, right upper extremity, and right breast. There is diffuse skin thickening and trabecular thickening of the right breast with an overall smaller/contracted appearance of the right breast compared to the left. This is suspicious for inflammatory breast cancer; highly diminutive appearance of the right internal jugular vein which is nearly occluded; pathologically enlarged right axillary lymph nodes with a lymph conglomerate measuring up to 3.9 cm; wall thickening of the mid and distal esophagus, possible esophagitis; enlarging hypodensity along falciform ligament of the liver most likely focal fatty infiltration although indeterminate; large R pleural effusion; and unchanged pancreatic tail cyst vs cystic neoplasm measuring up to 3.9cm.     IR performed an US guided breast biopsy 6/25, pathology pending.  Vascular surgery consulted for occlusion of R IJ and does not recommend any surgery but continuation of the anticoagulation.  Pulm following for right pleural effusion with tap 6/26 removed 600 ml from R - exudative fluid. Pleural fluid sent for routine studies and cytology, pending. GI performed EGD on 6/29 with swelling noted of oropharynx, random biopsies obtained, ENT consulted. Dilation performed with narrowing noted of esophagus but no ulcers/stricture or mass. .      Subjective:     Seen sitting up in the chair - swelling right arm about the same. Anxious to get biopsy results. Asked nurse for medication for anxiety last night to help her sleep.       Current Facility-Administered Medications   Medication Dose Route Frequency    potassium chloride 20 mEq in 100 ml IVPB  20 mEq IntraVENous Q2H    nystatin (MYCOSTATIN) 100,000 unit/gram powder   Topical BID    lactated Ringers infusion  50 mL/hr IntraVENous CONTINUOUS    insulin lispro (HUMALOG) injection   SubCUTAneous TIDAC    sodium chloride (NS) flush 5-40 mL  5-40 mL IntraVENous Q8H    sodium chloride (NS) flush 5-40 mL  5-40 mL IntraVENous PRN    acetaminophen (TYLENOL) tablet 650 mg  650 mg Oral Q6H PRN    Or    acetaminophen (TYLENOL) suppository 650 mg  650 mg Rectal Q6H PRN    polyethylene glycol (MIRALAX) packet 17 g  17 g Oral DAILY PRN    ondansetron (ZOFRAN ODT) tablet 4 mg  4 mg Oral Q8H PRN    Or    ondansetron (ZOFRAN) injection 4 mg  4 mg IntraVENous Q6H PRN    pantoprazole (PROTONIX) 40 mg in 0.9% sodium chloride 10 mL injection  40 mg IntraVENous Q12H    [Held by provider] heparin (porcine) injection 5,000 Units  5,000 Units SubCUTAneous Q8H         Objective:     Vitals:    06/29/21 0915 06/29/21 0929 06/29/21 0942 06/29/21 0951   BP: (!) 180/77 136/71 (!) 147/85 133/81   Pulse: 91 83 85 87   Resp: 20 18 18 17   Temp:    98 °F (36.7 °C)   SpO2: 95% 91% 95% 93%   Weight:       Height:         Intake and Output:   06/27 1901 - 06/29 0700  In: 2720.8 [I.V.:2720.8]  Out: 1150 [[id number]]  06/29 0701 - 06/29 1900  In: 350 [I.V.:350]  Out: 0     Physical Exam:   Constitutional:  the patient is well developed and in no acute distress  HEENT: Sclera clear, pupils equal, oral mucosa moist  Lungs: clear bilaterally and decreased on the right. On room air  Cardiovascular:  RRR without M,G,R  Abd/GI: soft and non-tender; with positive bowel sounds. Ext: warm without cyanosis. There is no lower leg edema. Right arm with significant swelling  Musculoskeletal: moves all four extremities with equal strength  Skin:  no jaundice or rashes, no wounds. Skin right chest feels tight  Neuro: no gross neuro deficits   Musculoskeletal: can ambulate. No deformity  Psychiatric: Calm. Review of Systems - Respiratory ROS: positive for - less short of breath since tap  Cardiovascular ROS: no chest pain or dyspnea on exertion  Gastrointestinal ROS: no abdominal pain, change in bowel habits, or black or bloody stools. Recent dysphagia/wt loss - s/p EGD    Lines: peripheral IV    CHEST XRAY:        LAB  Recent Labs     06/29/21 0457 06/28/21  0521 06/27/21 0422   WBC 4.4 4.8 4.3   HGB 9.2* 9.3* 9.5*   HCT 27.7* 28.5* 29.1*    269 281     Recent Labs     06/29/21 0457 06/28/21  0521 06/27/21  1015 06/27/21  0422 06/27/21  0422    143  --   --  143   K 3.0* 3.6 3.4*   < > 3.3*   * 111*  --   --  110*   CO2 26 26  --   --  25   * 96  --   --  87   BUN 8 10  --   --  14   CREA 0.63 0.65  --   --  0.65   MG  --  1.9  --   --   --     < > = values in this interval not displayed. Assessment:  (Medical Decision Making)     Hospital Problems  Date Reviewed: 5/20/2021        Codes Class Noted POA    Mild protein-calorie malnutrition ([facility] ICD-10-CM: E44.1  ICD-9-CM: 263.1  6/29/2021 Yes    Weight loss with dysphagia - has just been able to take liquids    Pleural effusion, right ICD-10-CM: J90  ICD-9-CM: 511.9  6/25/2021 Unknown    S/p tap - 600 mls removed.  Cytology pending    Lymphedema ICD-10-CM: I89.0  ICD-9-CM: 457.1  6/24/2021 Unknown    Right arm - no change    Hx pulmonary embolism ICD-10-CM: V39.187  ICD-9-CM: V12.55  4/5/2021 Yes eliquis on hold for procedures    Pancreatic pseudocyst ICD-10-CM: K86.3  ICD-9-CM: 577.2  4/5/2021 Yes        * (Principal) Lymphedema of right arm ICD-10-CM: I89.0  ICD-9-CM: 457.1  3/10/2021 Yes    As above    Malignant neoplasm of ascending colon ([facility] ICD-10-CM: C18.2  ICD-9-CM: 153.6  9/28/2018 Yes    Hx of - s/p surgery    Current use of anticoagulant therapy ICD-10-CM: Z79.01  ICD-9-CM: V58.61  9/18/2018 Yes    As above    Type 2 diabetes mellitus with diabetic neuropathy ([facility] ICD-10-CM: E11.40  ICD-9-CM: 250.60, 357.2  2/21/2018 Yes    controlled          Plan:  (Medical Decision Making)   1. Still awaiting biopsy results of lymph node biopsy right axilla  2. Pleural fluid cytology pending as well. CXR without recurrence of effusion  3. S/p EGD - noted swelling of upper airway - GI has consulted ENT. Random biopsies taken with narrowing of esophagus noted - dilation performed  4. Oncology on standby  5. Reassess blood pressure - hypertensive. No outpatient or current tx  6. Eliquis on hold - restart when all procedures complete  7. Supplementing david Patel NP    More than 50% of time documented was spent face-to-face contact with the patient and in the care of the patient on the floor/unit where the patient is located. Lungs:  Clear, somewhat decreased on R, RA  Heart:  RRR with no Murmur/Rubs/Gallops    Additional Comments:  Cyto from pleural fluid negative. Will repeat CXR in AM to re-evaluate ATX and any evidence for pleural fluid re accumulation. Lymph node biopsy still pending. I have spoken with and examined the patient. I agree with the above assessment and plan as documented.     Reta Lovell MD

## 2021-06-29 NOTE — PROGRESS NOTES
GI Progress Note    S:    Patient says she is still not swallowing anything solid. She is able to tolerate liquids okay. She is okay with us trying again with the EGD. O:     Visit Vitals  /63   Pulse 79   Temp 98.3 °F (36.8 °C)   Resp 18   Ht 5' 4\" (1.626 m)   Wt 79.4 kg (175 lb)   SpO2 97%   BMI 30.04 kg/m²     GEN: Sitting up in bed in NAD  CV: Bruising noted on anterior chest wall, RRR  RESP: comfortable on room air  ABD: soft, NT, ND  NEURO: awake and interactive  EXT: Right upper extremity wrapped  PSYCH: normal mood    A/P:    1. Dysphagia  2. Abnormal Barium Swallow    - Will re-attempt EGD today. - Endoscopy and risks explained to the patient. Risks including reaction to sedation, cardiopulmonary events, infection, bleeding, perforation, requirement for surgery if complications should occur, death were explained to patient who expressed understanding and agreed to proceed with endoscopy.     Sylvia Harris MD   [facility]

## 2021-06-29 NOTE — PROGRESS NOTES
Patient very upset, anxious and tearful at shift change. Patient feels like no one is talking to her concerning her health issues. Patient would like to speak with all of her care team in the morning if possible. Writer expressed to the patient she would notify the care team. Also took the opportunity to educate and instruct patient on the user of the incentive spirometer to assist with her deep breathing.

## 2021-06-29 NOTE — ROUTINE PROCESS
Patient transferred out via stretcher back to room 221. VSS. New IV obtained by Dr. Yris Leonardo. 20g left AC. Blood return noted and patent.

## 2021-06-29 NOTE — ANESTHESIA PREPROCEDURE EVALUATION
Relevant Problems   ENDOCRINE   (+) Controlled type 2 diabetes mellitus without complication, with long-term current use of insulin (HCC)   (+) Obesity due to excess calories   (+) Obesity, morbid (HCC)   (+) Type 2 diabetes mellitus with diabetic neuropathy (HCC)   (+) Type 2 diabetes with nephropathy (HCC)      HEMATOLOGY   (+) Iron deficiency anemia   (+) Microcytic anemia      PERSONAL HX & FAMILY HX OF CANCER   (+) Malignant neoplasm of ascending colon (HCC)   (+) Malignant neoplasm of colon, unspecified (HCC)   (+) Primary adenocarcinoma of ascending colon (HCC)       Anesthetic History   No history of anesthetic complications            Review of Systems / Medical History  Patient summary reviewed and pertinent labs reviewed    Pulmonary          Shortness of breath (Improved s/p thoracentesis )      Comments: Hx of DVT/PE--Eliquis  DVT R arm after R arm rotator cuff surgery 11/20? Eliquis continued last dose4/18   Neuro/Psych   Within defined limits          Comments: DM neuropathy Cardiovascular              Hyperlipidemia    Exercise tolerance: >4 METS  Comments: 5/18 echo-preserved LV fx  Denies CV chest pain     GI/Hepatic/Renal         Renal disease: stones      Comments: Hx of colon ca Endo/Other    Diabetes: type 2, using insulin    Obesity and cancer (Hx/o colon ca)     Other Findings   Comments: RUE swelling, possibly associated with Inflammatory Breast CA? Affecting RIJ with concurrent R pleural effusion seen on CT with 600mL removed last week.              Physical Exam    Airway  Mallampati: III  TM Distance: 4 - 6 cm  Neck ROM: normal range of motion   Mouth opening: Normal     Cardiovascular    Rhythm: regular  Rate: normal  Peripheral edema (RUE and R chest R arm wrapped.)    Pertinent negatives: No murmur   Dental    Dentition: Caps/crowns     Pulmonary      Decreased breath sounds: right           Abdominal  GI exam deferred       Other Findings            Anesthetic Plan    ASA: 3  Anesthesia type: total IV anesthesia          Induction: Intravenous  Anesthetic plan and risks discussed with: Patient      RA SpO2 98-99% in Preop. No difficulty breathing. No evidence of SVC syndrome.

## 2021-06-29 NOTE — PROGRESS NOTES
END OF SHIFT NOTE:    INTAKE/OUTPUT  06/28 0701 - 06/29 0700  In: 1820.8 [I.V.:1820.8]  Out: 850 [Urine:850]  Voiding: YES  Catheter: YES  Drain:              Flatus: Patient does have flatus present. Stool:  1 occurrences. Characteristics:  Stool Assessment  Stool Color: Brown  Stool Appearance: Soft  Stool Amount: Medium  Stool Source/Status: Rectum    Emesis: 0 occurrences. Characteristics:        VITAL SIGNS  Patient Vitals for the past 12 hrs:   Temp Pulse Resp BP SpO2   06/29/21 1524 97.7 °F (36.5 °C) 93 17 (!) 142/75 93 %   06/29/21 1103 97.5 °F (36.4 °C) 85 18 131/73 95 %   06/29/21 0951 98 °F (36.7 °C) 87 17 133/81 93 %   06/29/21 0942  85 18 (!) 147/85 95 %   06/29/21 0929  83 18 136/71 91 %   06/29/21 0915  91 20 (!) 180/77 95 %   06/29/21 0904  89 18 (!) 173/77 96 %   06/29/21 0846 98 °F (36.7 °C) 90 14 131/68 91 %   06/29/21 0844  82 14 131/68 91 %   06/29/21 0713 98.3 °F (36.8 °C) 79 18 135/63 97 %       Pain Assessment  Pain Intensity 1: 0 (06/29/21 0942)        Patient Stated Pain Goal: 0    Ambulating  Yes    Shift report given to oncoming nurse at the bedside.     Annika Way RN

## 2021-06-29 NOTE — ANESTHESIA POSTPROCEDURE EVALUATION
Procedure(s):  ESOPHAGOGASTRODUODENOSCOPY (EGD)/ BMI 31 ROOM 221  ESOPHAGEAL DILATION  ESOPHAGOGASTRODUODENAL (EGD) BIOPSY. total IV anesthesia    Anesthesia Post Evaluation      Multimodal analgesia: multimodal analgesia not used between 6 hours prior to anesthesia start to PACU discharge  Patient location during evaluation: bedside  Patient participation: complete - patient participated  Level of consciousness: awake  Pain score: 1  Pain management: adequate  Airway patency: patent  Anesthetic complications: no  Cardiovascular status: acceptable  Respiratory status: acceptable  Hydration status: acceptable  Comments: Pt doing well. Post anesthesia nausea and vomiting:  none  Final Post Anesthesia Temperature Assessment:  Normothermia (36.0-37.5 degrees C)      INITIAL Post-op Vital signs:   Vitals Value Taken Time   /71 06/29/21 0929   Temp 36.7 °C (98 °F) 06/29/21 0846   Pulse 83 06/29/21 0929   Resp 18 06/29/21 0929   SpO2 93 % 06/29/21 0940   Vitals shown include unvalidated device data.

## 2021-06-29 NOTE — PROGRESS NOTES
[facility] Hospitalist Progress Note     Name:  Olga Seo  Age:61 y.o. Sex:female   :  1960    MRN:  [id number]     Admit Date:  2021    Reason for Admission:  Lymphedema [I89.0]    Hospital Course/Interval history:     Patient is a 57-year-old female with past medical history significant for diabetes mellitus, history of DVT/PE on Eliquis, history of colon cancer status post resection in 2018 and lymphedema of right upper extremity (s/p R shoulder surgery  with subsequent referral to lymphedema clinic) who presented to the ED with worsening swelling of right upper extremity and dysphagia. The swelling has progressed into her neck resulting in dysphagia and unintentional weight loss of ~20 lbs. Concerns for inflammatory breast cancer, pathologies pending s/p R axillary LN biopsy, oncology following. Pulmonary following for R pleural effusion, s/p R thoracentesis with 600 ml exudative fluid removed. GI following, will re-attempt EGD 21. Subjective (21):    EGD with dilation performed this morning; no masses seen but oropharyngeal swelling noted; otolaryngology will see the patient this evening. Awaiting biopsy and cytology results. Pulmonology and GI recs appreciated. Ms. Taylor Durham is actively working on incentive spirometry while in the room. Review of Systems: 14 point review of systems is otherwise negative with the exception of the elements mentioned above.   Assessment & Plan     Lymphedema/?inflammatory breast cancer  -Significant lymphedema of RUE and R chest wall  -CT findings concerning for inflammatory breast cancer  -Oncology consult, will follow pathology results  -s/p right axillary lymph node biopsy with IR on , pathologies pending  -Elevation and compression to RUE  -Following lymphedema clinic outpt  : No pathology results yet     Dysphagia  -CT with mild thickening of mid and distal esophagus, concerning for esophagitis  -SLP eval-defer management to GI  -GI consult  -Continue Protonix  -Full liquid diet  -EGD unable to be completed (could not advance gastroscope)  -Barium esophagram attempted but patient unable to prone swallow or ingest barium tablet  -GI planning to re-attempt EGD 6/29, NPO at midnight  Jun/29: Patient feels better s/p dilation   - ENT to see d/t noted oropharyngeal swelling     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, given collaterals  -Vascular surgery consult - recommend continue [facility] therapy, elevation/compression of RUE  -AC held for procedures, resume when safe to do so  -6/29 Continue to hold [facility] for EGD tomorrow per GI     Right large pleural effusion  -Pulmonary consult  -s/p R thoracentesis 6/26 with 600 ml pleural fluid removed  -Pleural fluid exudative per light's criteria  -Patient remains stable on RA  Jun/29: Marlo Frankel continues to follow   - Awaiting cytology results     History of DVT/PE  -On Eliquis, will hold for now for IR/GI procedures, restart when safe to do so  -6/29 Continue to hold [facility] for EGD tomorrow per GI     Diabetes Mellitus  -Hold oral hypoglycemic  -SSI ACHS  -Remains on full liquid diet     Hypokalemia  Jun/29: 3.0; replace   - f/u a.m. labs    HTN  Jun/29: start low dose ARB      Diet:  DIET ADULT ORAL NUTRITION SUPPLEMENT  DIET NPO  DVT PPx: SCD  Code status: Full Code  Disposition/Expected LOS: > 2 midnights    Objective:     Patient Vitals for the past 24 hrs:   Temp Pulse Resp BP SpO2   06/29/21 0951 98 °F (36.7 °C) 87 17 133/81 93 %   06/29/21 0942  85 18 (!) 147/85 95 %   06/29/21 0929  83 18 136/71 91 %   06/29/21 0915  91 20 (!) 180/77 95 %   06/29/21 0904  89 18 (!) 173/77 96 %   06/29/21 0846 98 °F (36.7 °C) 90 14 131/68 91 %   06/29/21 0844  82 14 131/68 91 %   06/29/21 0713 98.3 °F (36.8 °C) 79 18 135/63 97 %   06/29/21 0351 98.1 °F (36.7 °C) 85 18 130/64 95 %   06/28/21 2332 98 °F (36.7 °C) 84 18 125/75 96 %   06/28/21 1919 97.9 °F (36.6 °C) 89 18 127/70 96 %   06/28/21 1447 97.9 °F (36.6 °C) 71 16 117/72 96 %     Oxygen Therapy  O2 Sat (%): 93 % (06/29/21 0951)  Pulse via Oximetry: 89 beats per minute (06/29/21 0942)  O2 Device: None (Room air) (06/29/21 0942)  O2 Flow Rate (L/min): 2 l/min (06/29/21 0915)    Body mass index is 30.04 kg/m².     Physical Exam:   General:  No acute distress, speaking in full sentences, no use of accessory muscles   Lungs:  Non labored on room air, no tachypnea  CV:  Regular rate and rhythm with normal S1 and S2   Abdomen:  Soft, nontender, nondistended  Extremities:  R arm wrapped; no LUE edema  Neuro:  Nonfocal, A&O x3   Psych:  Normal affect     Data Review:  I have reviewed all labs, meds, and studies from the last 24 hours:    Labs:    Recent Results (from the past 24 hour(s))   GLUCOSE, POC    Collection Time: 06/28/21  4:46 PM   Result Value Ref Range    Glucose (POC) 105 (H) 65 - 100 mg/dL    Performed by Josie    CBC W/O DIFF    Collection Time: 06/29/21  4:57 AM   Result Value Ref Range    WBC 4.4 4.3 - 11.1 K/uL    RBC 3.19 (L) 4.05 - 5.2 M/uL    HGB 9.2 (L) 11.7 - 15.4 g/dL    HCT 27.7 (L) 35.8 - 46.3 %    MCV 86.8 79.6 - 97.8 FL    MCH 28.8 26.1 - 32.9 PG    MCHC 33.2 31.4 - 35.0 g/dL    RDW 15.9 (H) 11.9 - 14.6 %    PLATELET 495 211 - 421 K/uL    MPV 10.2 9.4 - 12.3 FL    ABSOLUTE NRBC 0.00 0.0 - 0.2 K/uL   METABOLIC PANEL, BASIC    Collection Time: 06/29/21  4:57 AM   Result Value Ref Range    Sodium 143 136 - 145 mmol/L    Potassium 3.0 (L) 3.5 - 5.1 mmol/L    Chloride 110 (H) 98 - 107 mmol/L    CO2 26 21 - 32 mmol/L    Anion gap 7 7 - 16 mmol/L    Glucose 103 (H) 65 - 100 mg/dL    BUN 8 8 - 23 MG/DL    Creatinine 0.63 0.6 - 1.0 MG/DL    GFR est AA >60 >60 ml/min/1.73m2    GFR est non-AA >60 >60 ml/min/1.73m2    Calcium 8.5 8.3 - 10.4 MG/DL   GLUCOSE, POC    Collection Time: 06/29/21  9:54 AM   Result Value Ref Range    Glucose (POC) 117 (H) 65 - 100 mg/dL    Performed by Eleuterio Rocha    GLUCOSE, POC Collection Time: 06/29/21 11:06 AM   Result Value Ref Range    Glucose (POC) 113 (H) 65 - 100 mg/dL    Performed by [facility]        All Micro Results     Procedure Component Value Units Date/Time    CULTURE, BODY FLUID Jackie Combe STAIN [[id number]] Collected: 06/26/21 1220    Order Status: Completed Specimen: Pleural Fluid Updated: 06/28/21 0805     Special Requests: NO SPECIAL REQUESTS        GRAM STAIN 0 TO 3 WBCS SEEN PER OIF      NO DEFINITE ORGANISM SEEN        Culture result: NO GROWTH 2 DAYS       AFB CULTURE + SMEAR W/RFLX ID FROM CULTURE [[id number]] Collected: 06/26/21 1220    Order Status: Completed Updated: 06/26/21 1308    FUNGUS CULTURE AND SMEAR [[id number]] Collected: 06/26/21 1220    Order Status: Completed Updated: 06/26/21 1308          EKG Results     None          Other Studies:  No results found.     Current Meds:   Current Facility-Administered Medications   Medication Dose Route Frequency    potassium chloride 20 mEq in 100 ml IVPB  20 mEq IntraVENous Q2H    LORazepam (ATIVAN) tablet 0.5 mg  0.5 mg Oral Q6H PRN    [START ON 6/30/2021] losartan (COZAAR) tablet 25 mg  25 mg Oral DAILY    nystatin (MYCOSTATIN) 100,000 unit/gram powder   Topical BID    lactated Ringers infusion  50 mL/hr IntraVENous CONTINUOUS    insulin lispro (HUMALOG) injection   SubCUTAneous TIDAC    sodium chloride (NS) flush 5-40 mL  5-40 mL IntraVENous Q8H    sodium chloride (NS) flush 5-40 mL  5-40 mL IntraVENous PRN    acetaminophen (TYLENOL) tablet 650 mg  650 mg Oral Q6H PRN    Or    acetaminophen (TYLENOL) suppository 650 mg  650 mg Rectal Q6H PRN    polyethylene glycol (MIRALAX) packet 17 g  17 g Oral DAILY PRN    ondansetron (ZOFRAN ODT) tablet 4 mg  4 mg Oral Q8H PRN    Or    ondansetron (ZOFRAN) injection 4 mg  4 mg IntraVENous Q6H PRN    pantoprazole (PROTONIX) 40 mg in 0.9% sodium chloride 10 mL injection  40 mg IntraVENous Q12H    [Held by provider] heparin (porcine) injection 5,000 Units  5,000 Units SubCUTAneous Q8H       Problem List:  Hospital Problems as of 6/29/2021 Date Reviewed: 5/20/2021        Codes Class Noted - Resolved POA    Mild protein-calorie malnutrition ([facility] ICD-10-CM: E44.1  ICD-9-CM: 263.1  6/29/2021 - Present Yes        Pleural effusion, right ICD-10-CM: J90  ICD-9-CM: 511.9  6/25/2021 - Present Unknown        Lymphedema ICD-10-CM: I89.0  ICD-9-CM: 457.1  6/24/2021 - Present Unknown        Hx pulmonary embolism ICD-10-CM: L55.590  ICD-9-CM: V12.55  4/5/2021 - Present Yes        Pancreatic pseudocyst ICD-10-CM: K86.3  ICD-9-CM: 577.2  4/5/2021 - Present Yes        * (Principal) Lymphedema of right arm ICD-10-CM: I89.0  ICD-9-CM: 634.7  3/10/2021 - Present Yes        Malignant neoplasm of ascending colon ([facility] ICD-10-CM: C18.2  ICD-9-CM: 153.6  9/28/2018 - Present Yes        Current use of anticoagulant therapy ICD-10-CM: Z79.01  ICD-9-CM: V58.61  9/18/2018 - Present Yes        Type 2 diabetes mellitus with diabetic neuropathy ([facility] ICD-10-CM: E11.40  ICD-9-CM: 250.60, 357.2  2/21/2018 - Present Yes              Part of this note was written by using a voice dictation software and the note has been proof read but may still contain some grammatical/other typographical errors.     Signed By: Alyce Diaz NP   [facility]    June 29, 2021  5:15 PM

## 2021-06-29 NOTE — PROGRESS NOTES
TRANSFER - IN REPORT:    Verbal report received from Preeti RN(name) on Yvette Jerez  being received from room 221(unit) for ordered procedure      Report consisted of patients Situation, Background, Assessment and   Recommendations(SBAR). Information from the following report(s) SBAR and MAR was reviewed with the receiving nurse. Opportunity for questions and clarification was provided. Assessment completed upon patients arrival to unit and care assumed.

## 2021-06-29 NOTE — CONSULTS
HPI:  Kelly Ayoub is a 59 y.o. female seen for a consultation from No ref. provider found for Arm swelling. Patient seen as a inpatient ENT consultation request for complaint of oral pharyngeal swelling. She is currently under admission with a history of breast cancer and having several weeks of progressively worsening right upper extremity edema which progressed into her chest shoulders and neck on the right side. She states that it became so significant over the last several weeks that she was having dysphagia even to solid foods and worsened to the point where she was losing weight and came into the emergency room for work-up. She was found to have significant edema to the right upper extremity shoulder chest back and neck and a right-sided pleural effusion and she has undergone diuresis and paracentesis with what she states is significant improvement to her symptoms of the neck compared to a couple days ago. There was some concern of esophagitis on her admission CT imaging and she underwent EGD this morning with dilation and during that procedure there was some concern of noted oral pharyngeal edema. Therefore we have been consulted for further evaluation. She states that she is swallowing somewhat better although she has not been given a diet there is no concerns with swallowing her saliva. She states that she has significantly improved flexibility of her neck and less pressure and tightness. She denies any shortness of breath or stridor. Past Medical History, Past Surgical History, Family history, Social History, and Medications were all reviewed with the patient today and updated as necessary. Allergies   Allergen Reactions    Biaxin [Clarithromycin] Rash    Cortisone Other (comments)     NUMBNESS IN THE LEG (SITE OF INJECTION)  Per pt, leg numbness.        Patient Active Problem List   Diagnosis Code    Environmental allergies Z91.09    Bilateral low back pain with sciatica M54.40    Obesity due to excess calories E66.09    Type 2 diabetes mellitus with diabetic neuropathy (HCC) E11.40    Controlled type 2 diabetes mellitus without complication, with long-term current use of insulin (HCC) E11.9, Z79.4    Axillary mass, right R22.31    Pancreatic mass K86.89    Obesity, morbid (HCC) E66.01    Current use of anticoagulant therapy Z79.01    Malignant neoplasm of ascending colon (HCC) C18.2    Primary adenocarcinoma of ascending colon (HCC) C18.2    Iron deficiency anemia D50.9    Hyperlipidemia associated with type 2 diabetes mellitus (HCC) E11.69, E78.5    Dysuria R30.0    Type 2 diabetes with nephropathy (HCC) E11.21    Lymphedema of right arm I89.0    Elbow stiffness, right M25.621    Hx pulmonary embolism Z86.711    Insomnia G47.00    Long term current use of anticoagulant Z79.01    Microcytic anemia D50.9    Lumbar radiculopathy M54.16    Lumbar stenosis with neurogenic claudication M48.062    Malignant neoplasm of colon, unspecified (HCC) C18.9    Pancreatic pseudocyst K86.3    Lymphedema I89.0    Pleural effusion, right J90    Mild protein-calorie malnutrition (HCC) E44.1       Current Facility-Administered Medications   Medication Dose Route Frequency    potassium chloride 20 mEq in 100 ml IVPB  20 mEq IntraVENous Q2H    LORazepam (ATIVAN) tablet 0.5 mg  0.5 mg Oral Q6H PRN    [START ON 6/30/2021] losartan (COZAAR) tablet 25 mg  25 mg Oral DAILY    nystatin (MYCOSTATIN) 100,000 unit/gram powder   Topical BID    lactated Ringers infusion  50 mL/hr IntraVENous CONTINUOUS    insulin lispro (HUMALOG) injection   SubCUTAneous TIDAC    sodium chloride (NS) flush 5-40 mL  5-40 mL IntraVENous Q8H    sodium chloride (NS) flush 5-40 mL  5-40 mL IntraVENous PRN    acetaminophen (TYLENOL) tablet 650 mg  650 mg Oral Q6H PRN    Or    acetaminophen (TYLENOL) suppository 650 mg  650 mg Rectal Q6H PRN    polyethylene glycol (MIRALAX) packet 17 g  17 g Oral DAILY PRN    ondansetron (ZOFRAN ODT) tablet 4 mg  4 mg Oral Q8H PRN    Or    ondansetron (ZOFRAN) injection 4 mg  4 mg IntraVENous Q6H PRN    pantoprazole (PROTONIX) 40 mg in 0.9% sodium chloride 10 mL injection  40 mg IntraVENous Q12H    [Held by provider] heparin (porcine) injection 5,000 Units  5,000 Units SubCUTAneous Q8H       Past Medical History:   Diagnosis Date    Adverse effect of anesthesia     slow to wake after cancer surgery on colon    Anemia 08/2018    no PO iron at present and no infusions    Cancer of ascending colon ([facility] [address] 2018    Environmental allergies 9/12/2013    History of colon cancer 2018    surgery on colon    History of DVT (deep vein thrombosis) 04/2018    right lower ext.  History of EKG 03/19/2021    NSR    Hx of echocardiogram 05/31/2018    EF 60-65%    Kidney stone     Lumbar stenosis 2018    per Dr. Guevara Crump note (care every where)     Pulmonary embolism ([facility] [address] ~2018    Type 2 diabetes mellitus ([facility] [address]     AVG BS:/no s.s of hypoglycemia/Last A1c: 6.6 on 7/10/2020       Past Surgical History:   Procedure Laterality Date    HX CHOLECYSTECTOMY  1980's    HX COLONOSCOPY      HX LIPOMA RESECTION Right 1980's   Galdino Dobson right shoulder tendon surgery    HX OTHER SURGICAL  09/27/2018    filter placed to right groin; per patient- has been removed     HX WISDOM TEETH EXTRACTION      DC PART REMOVAL COLON W ANASTOMOSIS         Social History     Tobacco Use    Smoking status: Never Smoker    Smokeless tobacco: Never Used   Substance Use Topics    Alcohol use: No       Family History   Problem Relation Age of Onset    No Known Problems Mother     Dementia Father     Heart Disease Father     Hypertension Father     Kidney Disease Father         ROS:    Review of Systems   Constitutional: Negative for chills and fever. HENT: Negative for hearing loss, sinus pain and sore throat. Eyes: Negative for blurred vision.    Respiratory: Negative for cough and stridor. Cardiovascular: Negative for chest pain. Gastrointestinal: Negative for heartburn. Genitourinary: Negative for dysuria. Musculoskeletal: Negative for myalgias. Skin: Negative for rash. Neurological: Negative for dizziness. Psychiatric/Behavioral: Negative for depression. PHYSICAL EXAM:    Visit Vitals  BP (!) 142/75   Pulse 93   Temp 97.7 °F (36.5 °C)   Resp 17   Ht 5' 4\" (1.626 m)   Wt 175 lb (79.4 kg)   SpO2 93%   BMI 30.04 kg/m²       Physical Exam  Vitals and nursing note reviewed. Constitutional:       General: She is awake. She is not in acute distress. Appearance: Normal appearance. She is well-developed. She is not ill-appearing or diaphoretic. HENT:      Head: Normocephalic and atraumatic. Jaw: No trismus, tenderness, swelling or pain on movement. Salivary Glands: Right salivary gland is not diffusely enlarged or tender. Left salivary gland is not diffusely enlarged or tender. Right Ear: Tympanic membrane, ear canal and external ear normal. No drainage, swelling or tenderness. No middle ear effusion. There is no impacted cerumen. Tympanic membrane is not perforated. Left Ear: Tympanic membrane, ear canal and external ear normal. No drainage, swelling or tenderness. No middle ear effusion. There is no impacted cerumen. Tympanic membrane is not perforated. Nose: Nose normal. No nasal deformity, nasal tenderness, mucosal edema, congestion or rhinorrhea. Right Nostril: No epistaxis, septal hematoma or occlusion. Left Nostril: No epistaxis, septal hematoma or occlusion. Mouth/Throat:      Lips: No lesions. Mouth: Mucous membranes are moist. No injury or oral lesions. Tongue: No lesions. Tongue does not deviate from midline. Palate: No mass and lesions. Pharynx: Oropharynx is clear. Uvula midline.  No pharyngeal swelling, oropharyngeal exudate, posterior oropharyngeal erythema or uvula swelling. Tonsils: No tonsillar exudate or tonsillar abscesses. Comments: No obvious oral pharyngeal wall edema or swelling on transoral evaluation. No cobblestoning or inflammation present. Eyes:      General: No scleral icterus. Extraocular Movements: Extraocular movements intact. Conjunctiva/sclera: Conjunctivae normal.      Pupils: Pupils are equal, round, and reactive to light. Neck:      Thyroid: No thyroid mass or thyromegaly. Trachea: Trachea and phonation normal. No tracheal tenderness. Comments: Significant right-sided neck edema which worsened inferiorly with diffuse severe edema to the right shoulder chest and back and right upper extremity. There is no evidence of erythema or cellulitis. Suboptimal exam but there is no evidence of obvious cervical lymphadenopathy or neck masses. Pulmonary:      Effort: Pulmonary effort is normal. No tachypnea. Breath sounds: No stridor. Musculoskeletal:      Cervical back: Normal range of motion and neck supple. No edema, erythema or rigidity. Lymphadenopathy:      Head:      Right side of head: No submental, submandibular, preauricular or posterior auricular adenopathy. Left side of head: No submental, submandibular, preauricular or posterior auricular adenopathy. Cervical: No cervical adenopathy. Right cervical: No superficial, deep or posterior cervical adenopathy. Left cervical: No superficial, deep or posterior cervical adenopathy. Skin:     General: Skin is warm and dry. Neurological:      Mental Status: She is alert and oriented to person, place, and time. Cranial Nerves: Cranial nerves are intact. No facial asymmetry.    Psychiatric:         Mood and Affect: Mood and affect normal.         Behavior: Behavior normal.        CT Results (most recent):  Results from Hospital Encounter encounter on 06/24/21    CT NECK CHEST ABD PELV W CONT    Narrative  EXAM: CT neck, chest, abdomen and pelvis with contrast.  INDICATION: Patient with lymphedema for one month of the right arm. Patient  feels like she is choking every time she eats. Shortness of breath with  exertion. Right neck fullness. COMPARISON: Chest CT dated March 19, 2021. Chest abdomen and pelvis CT dated  December 06, 2019. Contrast: 100 mL Isovue-[id number]. Multiple axial images were obtained through the chest, abdomen, and pelvis. Radiation dose reduction techniques were used for this study: All CT scans  performed at this facility use one or all of the following: Automated exposure  control, adjustment of the mA and/or kVp according to patient's size, iterative  reconstruction. FINDINGS:  CT neck: Parotid and submandibular glands are unremarkable. Right thyroid lobe  1.2 cm nodule which appears grossly unchanged from December 2019. Diffuse  infiltrative edematous change of the right neck. Limited evaluation of  intracranial contents without evidence of mass effect or extra-axial fluid  collection. Highly diminutive appearance of the right internal jugular vein,  nearly occluded. Prominent venous collaterals of the right superior chest wall. Dermal thickening and trabecular thickening of the right breast with diffuse  edematous change of the right axilla with pathologic right axillary  lymphadenopathy which appears similar although slightly progressed from prior  measuring 3.9 x 1.8 cm and additional enlarged lymph nodes of the right axilla. Intraorbital contents are unremarkable. The airway is clear. There are several  prominent right cervical chain lymph nodes which are nonpathologic by size  criteria. LUNGS/PLEURA:  Large right pleural effusion. Central airways are clear. Atelectasis of the  right lower lobe. Left lung is clear. No pneumothorax. MEDIASTINUM:  Thyroid nodule as above which appears grossly stable from prior. Aorta is normal  in caliber. Heart appears normal in size.  Apparent circumferential wall  thickening of the mid and distal esophagus. Edematous change of the mediastinum  without clear pathologic mediastinal or hilar adenopathy. LIVER: Hypodensity along the falciform ligament of the liver which appears  increased in size from priors although favored focal fatty infiltration, overall  indeterminate. Cannot well evaluate the right portal vein, left portal vein and  main portal vein appear patent. BILIARY SYSTEM: Cholecystectomy. SPLEEN: No splenomegaly or aggressive splenic lesion. PANCREAS: Unchanged pancreatic tail cyst versus cystic neoplasm measuring 3.9 x  3.9 cm, stable since September 11, 2018. ADRENALS: No adrenal nodule or adrenal hypertrophy. URINARY SYSTEM: No suspicious renal lesion. No renal stone. No hydronephrosis. The bladder is normal.    FLUID:  No intraperitoneal free fluid. REPRODUCTIVE ORGANS: Unremarkable. BOWEL: Postoperative change of partial colonic resection with primary  anastomosis with suture line seen in the right midabdomen. No evidence of bowel  obstruction. VASCULAR/NODES: No aortic or iliac artery aneurysm. No lymphadenopathy. BONES/SUBCUTANEOUS TISSUE: Diffuse edematous change of the rightward aspect of  the neck, mediastinum, right axilla, right upper extremity, and right breast.  There is diffuse skin thickening and trabecular thickening of the right breast  appears progressively worsening from prior with soft tissue thickening along the  right chest wall favored edematous change although limited evaluation. Diffuse  edematous change of the right upper extremity within the visualized portion with  diffuse skin thickening. Impression  1. Diffuse edematous change of the rightward aspect of the neck, mediastinum,  right axilla, right upper extremity, and right breast. There is diffuse skin  thickening and trabecular thickening of the right breast with an overall  smaller/contracted appearance of the right breast compared to the left.  This is  suspicious for inflammatory breast cancer. Clinical correlation is advised with  consideration of diagnostic evaluation in the breast center. 2. Highly diminutive appearance of the right internal jugular vein which is  nearly occluded, further evaluation with vascular ultrasound can be considered. Prominent venous collaterals of the right chest wall. 3. Pathologically enlarged right axillary lymph nodes with a lymph conglomerate  measuring up to 3.9 cm, similar to although progressed from prior's. Biopsy can  be considered. 4. Wall thickening of the mid and distal esophagus, possible esophagitis. Consider further evaluation with EGD versus barium esophagram.  5. Enlarging hypodensity along falciform ligament of the liver most likely focal  fatty infiltration although indeterminate, attention on follow-up. 6. Large right pleural effusion. 7. Unchanged pancreatic tail cyst versus cystic neoplasm measuring up to 3.9 cm. These results were discussed with emergency room physician Dr. Josseline Booth  specifically of my concern for potential inflammatory breast cancer.     Lab Results   Component Value Date/Time    WBC 4.4 06/29/2021 04:57 AM    HGB (POC) 13.2 08/26/2019 04:30 PM    HGB 9.2 (L) 06/29/2021 04:57 AM    HCT (POC) 39.2 08/26/2019 04:30 PM    HCT 27.7 (L) 06/29/2021 04:57 AM    PLATELET 887 60/41/0511 04:57 AM    MCV 86.8 06/29/2021 04:57 AM     Lab Results   Component Value Date/Time    Sodium 143 06/29/2021 04:57 AM    Potassium 3.0 (L) 06/29/2021 04:57 AM    Chloride 110 (H) 06/29/2021 04:57 AM    CO2 26 06/29/2021 04:57 AM    Anion gap 7 06/29/2021 04:57 AM    Glucose 103 (H) 06/29/2021 04:57 AM    BUN 8 06/29/2021 04:57 AM    Creatinine 0.63 06/29/2021 04:57 AM    BUN/Creatinine ratio 23 07/10/2020 08:41 AM    GFR est AA >60 06/29/2021 04:57 AM    GFR est non-AA >60 06/29/2021 04:57 AM    Calcium 8.5 06/29/2021 04:57 AM         PROCEDURE: Flexible laryngoscopy  INDICATIONS: Oral pharyngeal edema  DESCRIPTION: After verbal consent was obtained and a timeout was performed, the flexible scope was advanced down one of the patient's nares into the nasopharynx. The nasal cavity and nasopharynx were clear. The scope was then turned distally down towards the oropharynx. The BOT and vallecula were clear and the epiglottis was normal in appearance. Both aryepiglottic folds were clear and there was a normal appearing glottis w/ healthy TVCs. No nodules or polyps and the cords were fully mobile. There was noted to have fullness to the right posterior lateral oropharyngeal/hypopharyngeal region with some obstruction of the right piriform sinus. Remaining laryngoscopy examination within normal limits. This area of concern had no mucosal or submucosal mass type lesions and likely represents edema changes. Airway widely patent. . The posterior pharyngeal was clear as well. The scope was then carefully removed. The patient tolerated the procedure well and there were no complications. ASSESSMENT and PLAN      1. Oropharyngeal/hypopharyngeal edema  2. Dysphagia  3. Lymphedema of right upper extremity  4. Breast cancer    Patient has evidence of mild right posterior lateral oropharyngeal/hypopharyngeal edema with some obstruction of the right piriform sinus. Otherwise completely normal laryngoscopy evaluation with a widely patent airway. The above findings on laryngoscopy are very likely associated with the significant neck edema from her right upper extremity lymphedema secondary to her breast cancer. Patient has significantly improved ability to swallow her saliva and improvement of neck restriction following her diuresis this week. She has widely patent airway on laryngoscopy and is likely okay to trial swallowing. If symptoms recur due to swelling in the future she can follow-up as outpatient for repeat evaluation.     Devi Sanches DO  6/29/2021

## 2021-06-29 NOTE — PROGRESS NOTES
TRANSFER - IN REPORT:    Verbal report received from BETH Guillaume(name) on Harriett Modest  being received from GI Lab (unit) for ordered procedure      Report consisted of patients Situation, Background, Assessment and   Recommendations(SBAR). Information from the following report(s) SBAR, Kardex, Procedure Summary, Intake/Output, MAR and Recent Results was reviewed with the receiving nurse. Opportunity for questions and clarification was provided. Assessment completed upon patients arrival to unit and care assumed.

## 2021-06-30 NOTE — PROGRESS NOTES
END OF SHIFT NOTE: Patient given Ambien this shift to help with rest. Patient rested well. Patient c/o chapped lips. Blistex ordered. INTAKE/OUTPUT  06/29 0701 - 06/30 0700  In: 1692.2 [I.V.:1692.2]  Out: 1100 [Urine:1100]  Voiding: YES  Catheter: NO    Flatus: Patient does have flatus present. Stool:  0 occurrences. Emesis: 0 occurrences. VITAL SIGNS  Patient Vitals for the past 12 hrs:   Temp Pulse Resp BP SpO2   06/30/21 0253 98.1 °F (36.7 °C) 87 17 130/77 95 %   06/29/21 2345 98.4 °F (36.9 °C) 91 18 120/75 94 %   06/29/21 1903 98.2 °F (36.8 °C) 100 18 119/69 96 %     Pain Assessment  Pain Intensity 1: 0 (06/29/21 1945)     Patient Stated Pain Goal: 0    Ambulating  Yes    Shift report to be given to oncoming nurse at the bedside.     [address]

## 2021-06-30 NOTE — PROGRESS NOTES
END OF SHIFT NOTE:    INTAKE/OUTPUT  06/29 0701 - 06/30 0700  In: 1692.2 [I.V.:1692.2]  Out: 1100 [Urine:1100]  Voiding: YES  Catheter: NO  Drain:              Flatus: Patient does have flatus present. Stool:  1 occurrences. Characteristics:  Stool Assessment  Stool Color: Brown  Stool Appearance: Soft  Stool Amount: Medium  Stool Source/Status: Rectum    Emesis: 0 occurrences. Characteristics:        VITAL SIGNS  Patient Vitals for the past 12 hrs:   Temp Pulse Resp BP SpO2   06/30/21 1600 97.6 °F (36.4 °C) 90 18 120/79 98 %   06/30/21 1457  90  116/76    06/30/21 1115 98 °F (36.7 °C) 82 18 105/70 98 %       Pain Assessment  Pain Intensity 1: 0 (06/30/21 1100)        Patient Stated Pain Goal: 0    Ambulating  Yes    Shift report given to oncoming nurse at the bedside.     Andi Rosa RN

## 2021-06-30 NOTE — PROGRESS NOTES
[facility] Hospitalist Progress Note     Name:  Yessi David  Age:61 y.o. Sex:female   :  1960    MRN:  [id number]     Admit Date:  2021    Reason for Admission:  Lymphedema [I89.0]    Hospital Course/Interval history:     Patient is a 58-year-old female with past medical history significant for diabetes mellitus, history of DVT/PE on Eliquis, history of colon cancer status post resection in  and lymphedema of right upper extremity (s/p R shoulder surgery  with subsequent referral to lymphedema clinic) who presented to the ED with worsening swelling of right upper extremity and dysphagia. The swelling has progressed into her neck resulting in dysphagia and unintentional weight loss of ~20 lbs. Concerns for inflammatory breast cancer, pathologies pending s/p R axillary LN biopsy, oncology following. Pulmonary following for R pleural effusion, s/p R thoracentesis with 600 ml exudative fluid removed, non malignant cytology. EGD with GI , Laryngoscope examination with ENT . Pathologies for LN biopsy remain pending. Subjective (21):    Patient sitting in chair at bedside. Denies chest pain, abdominal pain, NVD. In good spirits today, anticipates discharge home soon. Review of Systems: 14 point review of systems is otherwise negative with the exception of the elements mentioned above.   Assessment & Plan     Lymphedema/?inflammatory breast cancer  -Significant lymphedema of RUE and R chest wall  -CT findings concerning for inflammatory breast cancer  -Oncology consult, will follow pathology results  -s/p right axillary lymph node biopsy with IR on , pathologies pending  -Elevation and compression to RUE  -Following lymphedema clinic outpt  - pathology results still pending      Dysphagia  -CT with mild thickening of mid and distal esophagus, concerning for esophagitis  -SLP eval-defer management to GI  -GI consult  -Continue Protonix  -EGD unable to be completed (could not advance gastroscope)  -Barium esophagram attempted but patient unable to prone swallow or ingest barium tablet  -s/p EGD with dilation 6/29  -s/p laryngoscope exam with ENT for oropharyngeal/hypopharyngeal swelling, most likely associated with significant neck edema from RUE lymphedema, no inpatient recommendations, can follow outpatient if symptoms recur  -per GI, advance diet as patient tolerates, will try minced and moist with Glucerna shakes per patient discussion    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, given collaterals  -Vascular surgery consult - recommend continue [facility] therapy, elevation/compression of RUE  -AC held for procedures, resume when safe to do so  -6/30 Resume home Eliquis     Right large pleural effusion  -Pulmonary consult  -s/p R thoracentesis 6/26 with 600 ml pleural fluid removed  -Pleural fluid exudative per light's criteria, cytology non-malignant  -Patient remains stable on RA  -Pulmonary has signed off, will follow-up outpatient    History of DVT/PE  -On Eliquis, will hold for now for IR/GI procedures, restart when safe to do so  -6/30 Resume home Eliquis     Diabetes Mellitus  -Hold oral hypoglycemic  -SSI ACHS     Hypokalemia, resolved    Hypomagnesemia  -Mg 1.7, replete, check in am    Hypertension  -ARB started 6/29, BP improved        Diet:  Diet Minced and Moist, Glucerna Oral Nutritional Supplement  DVT PPx: Eliquis  Code status: Full Code  Disposition/Expected LOS: Anticipate d/c tomorrow      Objective:     Patient Vitals for the past 24 hrs:   Temp Pulse Resp BP SpO2   06/30/21 1457  90  116/76    06/30/21 1115 98 °F (36.7 °C) 82 18 105/70 98 %   06/30/21 0720 98.3 °F (36.8 °C) 89 18 135/80 95 %   06/30/21 0253 98.1 °F (36.7 °C) 87 17 130/77 95 %   06/29/21 2345 98.4 °F (36.9 °C) 91 18 120/75 94 %   06/29/21 1903 98.2 °F (36.8 °C) 100 18 119/69 96 %     Oxygen Therapy  O2 Sat (%): 98 % (06/30/21 1115)  Pulse via Oximetry: 89 beats per minute (06/29/21 0942)  O2 Device: None (Room air) (06/29/21 0405)  O2 Flow Rate (L/min): 2 l/min (06/29/21 0915)    Body mass index is 31.24 kg/m².     Physical Exam:   General:  No acute distress, speaking in full sentences, no use of accessory muscles   Lungs:  Clear to auscultation bilaterally, slightly diminished RLL  CV:  Regular rate and rhythm with normal S1 and S2, no murmurs, rubs, gallops   Abdomen:  Soft, nontender, nondistended, normoactive bowel sounds   Extremities:  No cyanosis clubbing, significant RUE edema spreading into anterior right chest, clavicular area, and lateral neck  Neuro:   Nonfocal, A&O x3   Psych:  Normal affect     Data Review:  I have reviewed all labs, meds, and studies from the last 24 hours:    Labs:    Recent Results (from the past 24 hour(s))   GLUCOSE, POC    Collection Time: 06/29/21  4:24 PM   Result Value Ref Range    Glucose (POC) 110 (H) 65 - 100 mg/dL    Performed by Ricci Choe    GLUCOSE, POC    Collection Time: 06/30/21  7:21 AM   Result Value Ref Range    Glucose (POC) 83 65 - 100 mg/dL    Performed by [facility]    METABOLIC PANEL, BASIC    Collection Time: 06/30/21 10:24 AM   Result Value Ref Range    Sodium 141 136 - 145 mmol/L    Potassium 3.5 3.5 - 5.1 mmol/L    Chloride 108 (H) 98 - 107 mmol/L    CO2 25 21 - 32 mmol/L    Anion gap 8 7 - 16 mmol/L    Glucose 175 (H) 65 - 100 mg/dL    BUN 7 (L) 8 - 23 MG/DL    Creatinine 0.71 0.6 - 1.0 MG/DL    GFR est AA >60 >60 ml/min/1.73m2    GFR est non-AA >60 >60 ml/min/1.73m2    Calcium 8.9 8.3 - 10.4 MG/DL   MAGNESIUM    Collection Time: 06/30/21 10:24 AM   Result Value Ref Range    Magnesium 1.7 (L) 1.8 - 2.4 mg/dL   CBC W/O DIFF    Collection Time: 06/30/21 10:24 AM   Result Value Ref Range    WBC 6.9 4.3 - 11.1 K/uL    RBC 3.60 (L) 4.05 - 5.2 M/uL    HGB 10.0 (L) 11.7 - 15.4 g/dL    HCT 32.4 (L) 35.8 - 46.3 %    MCV 90.0 79.6 - 97.8 FL    MCH 27.8 26.1 - 32.9 PG    MCHC 30.9 (L) 31.4 - 35.0 g/dL    RDW 16.2 (H) 11.9 - 14.6 %    PLATELET 211 737 - 849 K/uL    MPV 10.3 9.4 - 12.3 FL    ABSOLUTE NRBC 0.00 0.0 - 0.2 K/uL   GLUCOSE, POC    Collection Time: 06/30/21 10:59 AM   Result Value Ref Range    Glucose (POC) 200 (H) 65 - 100 mg/dL    Performed by Marian Luz        All Micro Results     Procedure Component Value Units Date/Time    FUNGUS CULTURE AND SMEAR [[id number]] Collected: 06/26/21 1220    Order Status: Completed Specimen: Miscellaneous sample Updated: 06/29/21 1636     Source PLEURAL FLUID        Comment: RIGHT        Fungus stain Direct Inoculation     Fungus (Mycology) Culture Other source received     Comment: (NOTE)  Performed At: [facility]  [employer] [address] [place] [address] [id number]  Bentley Severs MD DZ:[id number]         AFB CULTURE + SMEAR W/RFLX ID FROM CULTURE [[id number]] Collected: 06/26/21 1220    Order Status: Completed Specimen: Miscellaneous sample Updated: 06/29/21 1536     Source PLEURAL FLUID        Comment: RIGHT        AFB Specimen processing Concentration     Acid Fast Smear Negative        Comment: (NOTE)  Performed At: [facility]  [employer] [address] [place] [address] [id number]  Bentley Severs MD KW:[id number]          Acid Fast Culture PENDING    CULTURE, BODY FLUID [place] STAIN [[id number]] Collected: 06/26/21 1220    Order Status: Completed Specimen: Pleural Fluid Updated: 06/28/21 0805     Special Requests: NO SPECIAL REQUESTS        GRAM STAIN 0 TO 3 WBCS SEEN PER OIF      NO DEFINITE ORGANISM SEEN        Culture result: NO GROWTH 2 DAYS             EKG Results     None          Other Studies:  XR CHEST PA LAT    Result Date: 6/30/2021  Chest, 2 views, 6/30/2021 Indication:Follow-up right effusion Comparison:6/28/2021 There is a persistent small right pleural effusion. Associated basilar opacity may be compressive atelectasis or small infiltrate. Left lung clear. Persistent small right pleural effusion with associated atelectasis or small infiltrate.       Current Meds: Current Facility-Administered Medications   Medication Dose Route Frequency    magnesium sulfate 2 g/50 ml IVPB (premix or compounded)  2 g IntraVENous ONCE    apixaban (ELIQUIS) tablet 5 mg  5 mg Oral BID    LORazepam (ATIVAN) tablet 0.5 mg  0.5 mg Oral Q6H PRN    losartan (COZAAR) tablet 25 mg  25 mg Oral DAILY    lip protectant (BLISTEX) ointment 1 Each  1 Each Topical PRN    nystatin (MYCOSTATIN) 100,000 unit/gram powder   Topical BID    lactated Ringers infusion  50 mL/hr IntraVENous CONTINUOUS    insulin lispro (HUMALOG) injection   SubCUTAneous TIDAC    sodium chloride (NS) flush 5-40 mL  5-40 mL IntraVENous Q8H    sodium chloride (NS) flush 5-40 mL  5-40 mL IntraVENous PRN    acetaminophen (TYLENOL) tablet 650 mg  650 mg Oral Q6H PRN    Or    acetaminophen (TYLENOL) suppository 650 mg  650 mg Rectal Q6H PRN    polyethylene glycol (MIRALAX) packet 17 g  17 g Oral DAILY PRN    ondansetron (ZOFRAN ODT) tablet 4 mg  4 mg Oral Q8H PRN    Or    ondansetron (ZOFRAN) injection 4 mg  4 mg IntraVENous Q6H PRN    pantoprazole (PROTONIX) 40 mg in 0.9% sodium chloride 10 mL injection  40 mg IntraVENous Q12H       Problem List:  Hospital Problems as of 6/30/2021 Date Reviewed: 5/20/2021        Codes Class Noted - Resolved POA    Mild protein-calorie malnutrition (HCC) ICD-10-CM: E44.1  ICD-9-CM: 263.1  6/29/2021 - Present Yes        Pleural effusion, right ICD-10-CM: J90  ICD-9-CM: 511.9  6/25/2021 - Present Unknown        Lymphedema ICD-10-CM: I89.0  ICD-9-CM: 457.1  6/24/2021 - Present Unknown        Hx pulmonary embolism ICD-10-CM: G74.611  ICD-9-CM: V12.55  4/5/2021 - Present Yes        Pancreatic pseudocyst ICD-10-CM: K86.3  ICD-9-CM: 577.2  4/5/2021 - Present Yes        * (Principal) Lymphedema of right arm ICD-10-CM: I89.0  ICD-9-CM: 457.1  3/10/2021 - Present Yes        Malignant neoplasm of ascending colon (HCC) ICD-10-CM: C18.2  ICD-9-CM: 153.6  9/28/2018 - Present Yes        Current use of anticoagulant therapy ICD-10-CM: Z79.01  ICD-9-CM: V58.61  9/18/2018 - Present Yes        Type 2 diabetes mellitus with diabetic neuropathy [facility] ICD-10-CM: E11.40  ICD-9-CM: 250.60, 357.2  2/21/2018 - Present Yes               Labs, vital signs, diagnostic testing reviewed. Case discussed with patient, care team, Dr. Manuelito Schulte. Part of this note was written by using a voice dictation software and the note has been proof read but may still contain some grammatical/other typographical errors.     Signed By: Jus Logan NP   [facility]    June 30, 2021  5:15 PM

## 2021-06-30 NOTE — PROGRESS NOTES
Deandre Petit  Admission Date: 6/24/2021             Daily Progress Note: 6/30/2021    The patient's chart is reviewed and the patient is discussed with the staff.  59 y.o.  female who was admitted 6/24, for SOB, worsening over past 6 weeks.  CT scan shows a large right pleural effusion. Clarice Sanchez is having difficulty swallowing and has lost about 18 to 20 lbs over the past month.    PMHx includes colon cancer with resection in Oct 2018, DVT, and PE that year as well and has been on Eliquis.  More recently, she underwent right shoulder arthroscopy in November 2020 and then developed significant right upper extremity lymphedema. Evaluation showed no clot. She was referred to a lymphedema specialist for treatment.    She has had right axillary fullness for some time. CT here shows diffuse edematous changes of the rightward aspect of the neck, mediastinum, right axilla, right upper extremity, and right breast. There is diffuse skin thickening and trabecular thickening of the right breast with an overall smaller/contracted appearance of the right breast compared to the left. This is suspicious for inflammatory breast cancer; highly diminutive appearance of the right internal jugular vein which is nearly occluded; pathologically enlarged right axillary lymph nodes with a lymph conglomerate measuring up to 3.9 cm; wall thickening of the mid and distal esophagus, possible esophagitis; enlarging hypodensity along falciform ligament of the liver most likely focal fatty infiltration although indeterminate; large R pleural effusion; and unchanged pancreatic tail cyst vs cystic neoplasm measuring up to 3.9cm.     IR performed an US guided breast biopsy 6/25, pathology pending.  Vascular surgery consulted for occlusion of R IJ and does not recommend any surgery but continuation of the anticoagulation.  Pulm following for right pleural effusion with tap 6/26 removed 600 ml from R - exudative fluid. Pleural fluid sent for routine studies and cytology, pending. GI performed EGD on 6/29 with swelling noted of oropharynx, random biopsies obtained, ENT consulted. Dilation performed with narrowing noted of esophagus but no ulcers/stricture or mass. .      Subjective:    Sitting in chair eating breakfast. Feels well. On RA. No new symptoms. Current Facility-Administered Medications   Medication Dose Route Frequency    LORazepam (ATIVAN) tablet 0.5 mg  0.5 mg Oral Q6H PRN    losartan (COZAAR) tablet 25 mg  25 mg Oral DAILY    lip protectant (BLISTEX) ointment 1 Each  1 Each Topical PRN    nystatin (MYCOSTATIN) 100,000 unit/gram powder   Topical BID    lactated Ringers infusion  50 mL/hr IntraVENous CONTINUOUS    insulin lispro (HUMALOG) injection   SubCUTAneous TIDAC    sodium chloride (NS) flush 5-40 mL  5-40 mL IntraVENous Q8H    sodium chloride (NS) flush 5-40 mL  5-40 mL IntraVENous PRN    acetaminophen (TYLENOL) tablet 650 mg  650 mg Oral Q6H PRN    Or    acetaminophen (TYLENOL) suppository 650 mg  650 mg Rectal Q6H PRN    polyethylene glycol (MIRALAX) packet 17 g  17 g Oral DAILY PRN    ondansetron (ZOFRAN ODT) tablet 4 mg  4 mg Oral Q8H PRN    Or    ondansetron (ZOFRAN) injection 4 mg  4 mg IntraVENous Q6H PRN    pantoprazole (PROTONIX) 40 mg in 0.9% sodium chloride 10 mL injection  40 mg IntraVENous Q12H    [Held by provider] heparin (porcine) injection 5,000 Units  5,000 Units SubCUTAneous Q8H     Objective:     Vitals:    06/29/21 1903 06/29/21 2345 06/30/21 0253 06/30/21 0720   BP: 119/69 120/75 130/77 135/80   Pulse: 100 91 87 89   Resp: 18 18 17 18   Temp: 98.2 °F (36.8 °C) 98.4 °F (36.9 °C) 98.1 °F (36.7 °C) 98.3 °F (36.8 °C)   SpO2: 96% 94% 95% 95%   Weight:   182 lb (82.6 kg)    Height:         Intake and Output:   06/28 1901 - 06/30 0700  In: 2661.9 [I.V.:2661.9]  Out: 1400 [Urine:1400]  No intake/output data recorded.     Physical Exam:   Constitutional:  the patient is well developed and in no acute distress  HEENT:  Sclera clear, pupils equal, oral mucosa moist  Lungs: clear bilaterally and decreased on the right. On room air  Cardiovascular:  RRR without M,G,R  Abd/GI: soft and non-tender; with positive bowel sounds. Ext: warm without cyanosis. There is no lower leg edema. Right arm with significant swelling  Musculoskeletal: moves all four extremities with equal strength  Skin:  no jaundice or rashes, no wounds. Skin right chest feels tight  Neuro: no gross neuro deficits   Musculoskeletal: can ambulate. No deformity  Psychiatric: Calm. Review of Systems - Respiratory ROS: positive for - less short of breath since tap  Cardiovascular ROS: no chest pain or dyspnea on exertion  Gastrointestinal ROS: no abdominal pain, change in bowel habits, or black or bloody stools. Recent dysphagia/wt loss - s/p EGD    Lines: peripheral IV    CHEST XRAY:  Small R effusion, no real change. LAB  Recent Labs     06/29/21 0457 06/28/21  0521   WBC 4.4 4.8   HGB 9.2* 9.3*   HCT 27.7* 28.5*    269     Recent Labs     06/29/21  0457 06/28/21  0521 06/27/21  1015    143  --    K 3.0* 3.6 3.4*   * 111*  --    CO2 26 26  --    * 96  --    BUN 8 10  --    CREA 0.63 0.65  --    MG  --  1.9  --      Assessment:  (Medical Decision Making)     Hospital Problems  Date Reviewed: 5/20/2021        Codes Class Noted POA    Mild protein-calorie malnutrition ([facility] ICD-10-CM: E44.1  ICD-9-CM: 263.1  6/29/2021 Yes    Weight loss with dysphagia - has just been able to take liquids    Pleural effusion, right ICD-10-CM: J90  ICD-9-CM: 511.9  6/25/2021 Unknown    S/p tap - 600 mls removed.  Cytology pending    Lymphedema ICD-10-CM: I89.0  ICD-9-CM: 457.1  6/24/2021 Unknown    Right arm - no change    Hx pulmonary embolism ICD-10-CM: K50.809  ICD-9-CM: V12.55  4/5/2021 Yes    eliquis on hold for procedures    Pancreatic pseudocyst ICD-10-CM: K86.3  ICD-9-CM: 577.2  4/5/2021 Yes        * (Principal) Lymphedema of right arm ICD-10-CM: I89.0  ICD-9-CM: 457.1  3/10/2021 Yes    As above    Malignant neoplasm of ascending colon (HCC) ICD-10-CM: C18.2  ICD-9-CM: 153.6  9/28/2018 Yes    Hx of - s/p surgery    Current use of anticoagulant therapy ICD-10-CM: Z79.01  ICD-9-CM: V58.61  9/18/2018 Yes    As above    Type 2 diabetes mellitus with diabetic neuropathy ([facility] ICD-10-CM: E11.40  ICD-9-CM: 250.60, 357.2  2/21/2018 Yes    controlled        Plan:  (Medical Decision Making)   1. Still awaiting biopsy results of lymph node biopsy right axilla  2. Pleural fluid cytology negative. CXR with persistent small effusion  3. We can see her in the clinic in a couple weeks after discharge to follow up on effusion    Will sign off for now, call with questions. Grupo Mahajan MD    More than 50% of time documented was spent face-to-face contact with the patient and in the care of the patient on the floor/unit where the patient is located.

## 2021-06-30 NOTE — PROGRESS NOTES
[facility] Progress Note         Admit Date:  6/24/2021  Today's Date:  6/30/2021    CC:  Dysphagia    Subjective:     She is s/p repeat EGD 6/29 as below. She is tolerating full liquid diet and reports only some left throat soreness. She otherwise has no complaints. She denies dysphagia, N/V, abdominal pain. She reports BMs without overt GI bleeding. Medications:   Current Facility-Administered Medications   Medication Dose Route Frequency    LORazepam (ATIVAN) tablet 0.5 mg  0.5 mg Oral Q6H PRN    losartan (COZAAR) tablet 25 mg  25 mg Oral DAILY    lip protectant (BLISTEX) ointment 1 Each  1 Each Topical PRN    nystatin (MYCOSTATIN) 100,000 unit/gram powder   Topical BID    lactated Ringers infusion  50 mL/hr IntraVENous CONTINUOUS    insulin lispro (HUMALOG) injection   SubCUTAneous TIDAC    sodium chloride (NS) flush 5-40 mL  5-40 mL IntraVENous Q8H    sodium chloride (NS) flush 5-40 mL  5-40 mL IntraVENous PRN    acetaminophen (TYLENOL) tablet 650 mg  650 mg Oral Q6H PRN    Or    acetaminophen (TYLENOL) suppository 650 mg  650 mg Rectal Q6H PRN    polyethylene glycol (MIRALAX) packet 17 g  17 g Oral DAILY PRN    ondansetron (ZOFRAN ODT) tablet 4 mg  4 mg Oral Q8H PRN    Or    ondansetron (ZOFRAN) injection 4 mg  4 mg IntraVENous Q6H PRN    pantoprazole (PROTONIX) 40 mg in 0.9% sodium chloride 10 mL injection  40 mg IntraVENous Q12H    [Held by provider] heparin (porcine) injection 5,000 Units  5,000 Units SubCUTAneous Q8H       Review of Systems:  ROS was obtained, with pertinent positives as listed above. No chest pain or SOB. Diet: full liquids     Objective:   Vitals:  Visit Vitals  /80   Pulse 89   Temp 98.3 °F (36.8 °C)   Resp 18   Ht 5' 4\" (1.626 m)   Wt 82.6 kg (182 lb)   SpO2 95%   BMI 31.24 kg/m²     Intake/Output:  No intake/output data recorded.   06/28 1901 - 06/30 0700  In: 2661.9 [I.V.:2661.9]  Out: 1400 [Urine:1400]  Exam:  General appearance: alert, cooperative, NAD. Sitting up in chair. Heart: RRR  Lungs: CTAB  Abdomen: soft, Non-tender. BSx4. Psych: Mood and affect unremarkable. Data Review (Labs):    Recent Labs     06/29/21  0457 06/28/21  0521 06/27/21  1015   WBC 4.4 4.8  --    HGB 9.2* 9.3*  --    HCT 27.7* 28.5*  --     269  --    MCV 86.8 88.5  --     143  --    K 3.0* 3.6 3.4*   * 111*  --    CO2 26 26  --    BUN 8 10  --    CREA 0.63 0.65  --    CA 8.5 8.7  --    MG  --  1.9  --    * 96  --      EGD 6/27/21 with Dr. Sheryl Perez for dysphagia FINDINGS:  ESOPHAGUS: Multiple unsuccessful attempts were made to intubate the esophagus with a [id number] as well as pediatric (XP) gastroscope. The gastroscope would at times advance 1 cm beyond the level of the vocal cords then meet resistance. Unable to visualize the nature of the anatomical change that resulted in increased resistance to passage of the scope. Procedure discontinued. Estimated blood loss: 0-minimal   Specimens obtained during procedure: none  PLAN: 1. Barium esophogram    Barium esophagram 6/27/21   HISTORY: Dysphasia with incomplete EGD because of narrowed esophagus. TECHNIQUE: The patient ingested effervescent granules followed by thin and thick barium under direct fluoroscopic observation. 1.6 minutes of fluoroscopy time was utilized. 11 spot fluoroscopic images were saved. FINDINGS: There is mild diffuse narrowing of the esophagus without strictures. The gastroesophageal junction was in a normal position. Rapid sequence imaging of the oropharynx demonstrates a normal swallowing motion. 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 The patient was unable to participate in prone swallowing and wished to not ingest the barium tablet because she was concerned about successfully swallowing this capsule. IMPRESSION  1. No mechanical esophageal obstruction identified.   2. Patient unable to participate in prone swallowing or to ingest a barium tablet. EGD 6/29/21 with Dr. Archana Lomeli FINDINGS:  OROPHARYNX: Appears swollen. I could not get the [id number] down into the esophagus. Changed the scope to XP and was able to traverse into the esophagus. ESOPHAGUS: The esophagus appears narrowed but I see no inflammation, ulcers, strictures or masses. I did dilate the esophagus using a 21 Fr, 24 Fr and 27 Fr Savary over a wire. Re-endoscopy after dilation showed no heme or rents. Multiple biopsies were taken from the mid and distal esophagus using cold forceps to rule out EoE. STOMACH: The fundus on antegrade and retroflex views is normal. The body, antrum and pylorus is normal.  DUODENUM: The bulb and second portions are normal.  ASSESSMENT:  1. Swelling in Oropharynx  2. Narrowed Esophagus without inflammation, ulcers, focal strictures or masses    Flexible laryngoscopy 6/29/21  INDICATIONS: Oral pharyngeal edema  DESCRIPTION: After verbal consent was obtained and a timeout was performed, the flexible scope was advanced down one of the patient's nares into the nasopharynx. The nasal cavity and nasopharynx were clear. The scope was then turned distally down towards the oropharynx. The BOT and vallecula were clear and the epiglottis was normal in appearance. Both aryepiglottic folds were clear and there was a normal appearing glottis w/ healthy TVCs. No nodules or polyps and the cords were fully mobile. There was noted to have fullness to the right posterior lateral oropharyngeal/hypopharyngeal region with some obstruction of the right piriform sinus. Remaining laryngoscopy examination within normal limits. This area of concern had no mucosal or submucosal mass type lesions and likely represents edema changes. Airway widely patent. . The posterior pharyngeal was clear as well. The scope was then carefully removed.  The patient tolerated the procedure well and there were no complications.     Assessment:     Principal Problem:  Lymphedema of right arm (3/10/2021)    Active Problems:  Type 2 diabetes mellitus with diabetic neuropathy ([facility] [address] (2/21/2018)  Current use of anticoagulant therapy (9/18/2018)  Malignant neoplasm of ascending colon ([facility] [address] (9/28/2018)  Hx pulmonary embolism (4/5/2021)  Pancreatic pseudocyst (4/5/2021)  Lymphedema (6/24/2021)    60 y/o F who presented with progression of severe R lymphedema. GI consulted for dysphagia. Esophageal wall thickening noted on CT, w/o significant reflux symptoms, and possibly related to recent dysphagia and wt loss ~ 20# within the past month. Concern for possible esophagitis, stricture, or neoplasm, etc. MBS done, SLP evaluation with grossly functional oropharyngeal swallow function. No aspiration/penetration with any consistencies. On Eliquis, last dose 6/24 AM.  EGD 6/27 was incomplete due to narrowed esophagus (see details above). Subsequent Barium esophagram was negative for esophageal obstruction but reported mild diffuse narrowing of the esophagus without strictures. She is s/p repeat attempt at EGD 6/29 with esophageal dilation as above. She is s/p ENT eval and flexible laryngoscopy for findings of swelling in oropharynx. CT findings were also suspicious for inflammatory breast cancer. Vascular surgery on board. Plan:     - Follow up pathology  - Full liquid diet. Will ask speech to see today to help guide with diet advancements. - Continue IV Protonix q12. Avoid NSAIDs. - Appreciate ENT assist.  Note findings on Flexible laryngoscopy. Per ENT, Danay Plan has widely patent airway on laryngoscopy and is likely okay to trial swallowing. \"  - Hx DVT/PE: on Eliquis- this was held for IR/GI procedures. OK to resume from GI standpoint.    - Nearly occluded right internal jugular vein- s/p vascular consult- see note. no surgical intervention recommended. Recommend continued systemic anticoagulation.   - possible inflammatory Breast cancer on CT- s/p US guided biopsy of right axillary lymph node with IR 6/25. Pathology pending. Oncology consulted 6/25. - Large R pleural effusion on CT- S/p thoracentesis 6/26 with 600 ml pleural fluid removed. - Pancreatic cyst: stable. - Mildly elevated LFTs: pt with evidence of fatty infiltration on CT- consider outpt workup.     Junaid Sadler PA-C  [facility]

## 2021-06-30 NOTE — PROGRESS NOTES
Problem: Pressure Injury - Risk of  Goal: *Prevention of pressure injury  Description: Document Lito Scale and appropriate interventions in the flowsheet. Outcome: Progressing Towards Goal  Note: Pressure Injury Interventions: Activity Interventions: Increase time out of bed, Pressure redistribution bed/mattress(bed type)    Mobility Interventions: Pressure redistribution bed/mattress (bed type), HOB 30 degrees or less    Nutrition Interventions: Document food/fluid/supplement intake, Offer support with meals,snacks and hydration                     Problem: Patient Education: Go to Patient Education Activity  Goal: Patient/Family Education  Outcome: Progressing Towards Goal     Problem: Falls - Risk of  Goal: *Absence of Falls  Description: Document Tamiko Fall Risk and appropriate interventions in the flowsheet.   Outcome: Progressing Towards Goal  Note: Fall Risk Interventions:  Mobility Interventions: Communicate number of staff needed for ambulation/transfer, Patient to call before getting OOB         Medication Interventions: Patient to call before getting OOB, Teach patient to arise slowly    Elimination Interventions: Call light in reach, Patient to call for help with toileting needs              Problem: Patient Education: Go to Patient Education Activity  Goal: Patient/Family Education  Outcome: Progressing Towards Goal     Problem: Patient Education: Go to Patient Education Activity  Goal: Patient/Family Education  Outcome: Progressing Towards Goal     Problem: General Medical Care Plan  Goal: *Vital signs within specified parameters  Outcome: Progressing Towards Goal  Goal: *Labs within defined limits  Outcome: Progressing Towards Goal  Goal: *Absence of infection signs and symptoms  Outcome: Progressing Towards Goal  Goal: *Optimal pain control at patient's stated goal  Outcome: Progressing Towards Goal  Goal: *Skin integrity maintained  Outcome: Progressing Towards Goal  Goal: *Fluid volume balance  Outcome: Progressing Towards Goal  Goal: *Optimize nutritional status  Outcome: Progressing Towards Goal  Goal: *Anxiety reduced or absent  Outcome: Progressing Towards Goal  Goal: *Progressive mobility and function (eg: ADL's)  Outcome: Progressing Towards Goal     Problem: Patient Education: Go to Patient Education Activity  Goal: Patient/Family Education  Outcome: Progressing Towards Goal

## 2021-06-30 NOTE — PROGRESS NOTES
Attempted to placed Ultrasound guided IV in patient's left  Forearm x2 without success. Ultrasound IV placement attempted by Speedy Contrerases RN as well x1 without success. RN notified of attempts and outcome.

## 2021-06-30 NOTE — PROGRESS NOTES
SPEECH LANGUAGE PATHOLOGY: DYSPHAGIA- Re-evaluation and Discharge    NAME/AGE/GENDER: José Manuel Edouard is a 59 y.o. female  DATE: 6/30/2021  PRIMARY DIAGNOSIS: Lymphedema [I89.0]  Procedure(s) (LRB):  ESOPHAGOGASTRODUODENOSCOPY (EGD)/ BMI 31 ROOM 221 (N/A)  ESOPHAGEAL DILATION (N/A)  ESOPHAGOGASTRODUODENAL (EGD) BIOPSY (N/A) 1 Day Post-Op  ICD-10: Treatment Diagnosis: R13.13 Dysphagia, Pharyngeal Phase    RECOMMENDATIONS   DIET:    Regular Consistency   Thin Liquids    MEDICATIONS: With liquid     ASPIRATION PRECAUTIONS  · Slow rate of intake  · Small bites/sips  · Upright at 90 degrees during meal     COMPENSATORY STRATEGIES/MODIFICATIONS  · Alternate liquids/solids     RECOMMENDATIONS for CONTINUED SPEECH THERAPY:   No further speech therapy indicated at this time. ASSESSMENT   Patient seen for re evaluation. Patient demonstrates overall functional oropharyngeal swallow, which is consistent with recent modfiied barium swallow study. Continues to double swallow, but reports significantly reduced globus sensation and able to independently implement strategies to further decrease globus sensation. Recommend regular diet and thin liquids. No further ST indicated. Please reconsult if new concerns arise. EDUCATION:  · Recommendations discussed with Patient  CONTINUATION OF SKILLED SERVICES/MEDICAL NECESSITY:   No additional speech services warranted. REHABILITATION POTENTIAL FOR STATED GOALS: no goals identified    PLAN    FREQUENCY/DURATION: No further speech therapy indicated at this time as oropharyngeal swallow function is within normal limits. - Recommendations for next treatment session: No additional speech therapy indicated at this time. SUBJECTIVE   Upright in chair. Pleasant and conversant. Concerned about sugar going up after drinking some ensure clear.      Oxygen Device: room air  Pain: Pain Scale 1: Numeric (0 - 10)  Pain Intensity 1: 0    History of Present Injury/Illness: Ms. Morelia Urbano  has a past medical history of Adverse effect of anesthesia, Anemia (08/2018), Cancer of ascending colon ([facility] [address] (2018), Environmental allergies (9/12/2013), History of colon cancer (2018), History of DVT (deep vein thrombosis) (04/2018), History of EKG (03/19/2021), echocardiogram (05/31/2018), Kidney stone, Lumbar stenosis (2018), Pulmonary embolism ([facility] [address] (~2018), and Type 2 diabetes mellitus ([facility] [address] . She also  has a past surgical history that includes hx cholecystectomy (0541'A); hx lipoma resection (Right, 1980's); pr part removal colon w anastomosis; hx colonoscopy; hx wisdom teeth extraction; hx orthopaedic; and hx other surgical (09/27/2018). PRECAUTIONS/ALLERGIES: Biaxin [clarithromycin] and Cortisone     Problem List:  (Impairments causing functional limitations):  1. Pharyngeal dysphagia     Previous Dysphagia: YES see prior SLP notes. Per ENT, 6/29\"Patient has evidence of mild right posterior lateral oropharyngeal/hypopharyngeal edema with some obstruction of the right piriform sinus. Otherwise completely normal laryngoscopy evaluation with a widely patent airway. The above findings on laryngoscopy are very likely associated with the significant neck edema from her right upper extremity lymphedema secondary to her breast cancer. Patient has significantly improved ability to swallow her saliva and improvement of neck restriction following her diuresis this week. She has widely patent airway on laryngoscopy and is likely okay to trial swallowing. If symptoms recur due to swelling in the future she can follow-up as outpatient for repeat evaluation. \"    Per GI notes:  EGD 6/27/21 with Dr. Kieran Biggs for dysphagia FINDINGS:  ESOPHAGUS: Multiple unsuccessful attempts were made to intubate the esophagus with a [id number] as well as pediatric (XP) gastroscope.  The gastroscope would at times advance 1 cm beyond the level of the vocal cords then meet resistance.  Unable to visualize the nature of the anatomical change that resulted in increased resistance to passage of the scope. Procedure discontinued. Estimated blood loss: 0-minimal   Specimens obtained during procedure: none  PLAN: 1. Barium esophogram     Barium esophagram 6/27/21   HISTORY: Dysphasia with incomplete EGD because of narrowed esophagus. TECHNIQUE: The patient ingested effervescent granules followed by thin and thick barium under direct fluoroscopic observation. 1.6 minutes of fluoroscopy time was utilized. 11 spot fluoroscopic images were saved. FINDINGS: There is mild diffuse narrowing of the esophagus without strictures. The gastroesophageal junction was in a normal position. Rapid sequence imaging of the oropharynx demonstrates a normal swallowing motion. 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 The patient was unable to participate in prone swallowing and wished to not ingest the barium tablet because she was concerned about successfully swallowing this capsule. IMPRESSION  1. No mechanical esophageal obstruction identified. 2. Patient unable to participate in prone swallowing or to ingest a barium tablet.     EGD 6/29/21 with Dr. Luma Sullivan:  OROPHARYNX: Appears swollen.  I could not get the [id number] down into the esophagus.  Changed the scope to XP and was able to traverse into the esophagus. ESOPHAGUS: The esophagus appears narrowed but I see no inflammation, ulcers, strictures or masses.  I did dilate the esophagus using a 21 Fr, 24 Fr and 27 Fr Savary over a wire.  Re-endoscopy after dilation showed no heme or rents.  Multiple biopsies were taken from the mid and distal esophagus using cold forceps to rule out EoE. STOMACH: The fundus on antegrade and retroflex views is normal. The body, antrum and pylorus is normal.  DUODENUM: The bulb and second portions are normal.  ASSESSMENT:  1. Swelling in Oropharynx  2.  Narrowed Esophagus without inflammation, ulcers, focal strictures or masses         Diet Prior to Evaluation: full liquid    Orientation:  Person  Place  Time  Situation    Cognitive-Linguistic Screening:   Alertness  o Alert   Speech Production:   o Fully intelligible   Expressive Language:  o Fluent speech   Receptive Language:  o Answers yes/no questions appropriately and Follows commands   Cognition:   o Denies changes. Prior Level of Function:   Recommendations: Given results of screening, patient appears to be functioning at baseline. No acute assessment of speech, language, or cognition warranted. OBJECTIVE   Oral Motor:   · Labial: No impairment  · Dentition: Intact  · Oral Hygiene: Adequate  · Lingual: No impairment    Dysphagia reevaluation:   Patient presented with thin liquid via cup and straw, puree, mixed, and solid consistencies. Appropriate oral prep with all textures. Timely swallow initiation, and double swallow upon palpation. Reports globus sensation significantly improving and further improves with sip of liquid as wash. Able to implement alternating solids/liquids independently. Adequate oral clearing. No overt signs or symptoms of airway compromise observed with liquid or solid textures.          Tool Used: Dysphagia Outcome and Severity Scale (DIANE)    Score Comments   Normal Diet  [] 7 With no strategies or extra time needed   Functional Swallow  [] 6 May have mild oral or pharyngeal delay   Mild Dysphagia  [] 5 Which may require one diet consistency restricted    Mild-Moderate Dysphagia  [] 4 With 1-2 diet consistencies restricted   Moderate Dysphagia  [] 3 With 2 or more diet consistencies restricted   Moderate-Severe Dysphagia  [] 2 With partial PO strategies (trials with ST only)   Severe Dysphagia  [] 1 With inability to tolerate any PO safely      Score:  Initial: 6 Most Recent: 6 (Date 06/30/21 )   Interpretation of Tool: The Dysphagia Outcome and Severity Scale (DIANE) is a simple, easy-to-use, 7-point scale developed to systematically rate the functional severity of dysphagia based on objective assessment and make recommendations for diet level, independence level, and type of nutrition. Current Medications:   No current facility-administered medications on file prior to encounter. Current Outpatient Medications on File Prior to Encounter   Medication Sig Dispense Refill    nystatin (MYCOSTATIN) powder Apply  to affected area four (4) times daily. Apply to elbow creases and axilla 1 Bottle 3    insulin glargine U-300 conc (Toujeo Max U-300 SoloStar) 300 unit/mL (3 mL) inpn 18 Units by SubCUTAneous route Every morning. SQ under skin every morning       apixaban (ELIQUIS) 5 mg tablet Take 1 Tab by mouth two (2) times a day. 60 Tab 5    acetaminophen (TylenoL) 325 mg tablet Take  by mouth every four (4) hours as needed for Pain.  SITagliptin-metFORMIN (Janumet)  mg per tablet Take 1 Tab by mouth two (2) times daily (with meals). 180 Tab 3    Insulin Needles, Disposable, (BD JIMBO 2ND GEN PEN NEEDLE) 32 gauge x 7/02\" ndle 458 Applicators by Does Not Apply route daily.  100 Pen Needle 5        INTERDISCIPLINARY COLLABORATION: n/a    After treatment position/precautions:  · Upright in chair  · Call light within reach    Total Treatment Duration:   Time In: 1119  Time Out: [address], [facility], [facility] ROBBIN ADDISON, CCC-SLP

## 2021-07-01 NOTE — DISCHARGE SUMMARY
[facility]ist Discharge Summary     Name:  Olga Seo    Age:61 y.o.    Sex:female  :  1960       MRN:  [id number]       Admitting Physician: Vianney Bates MD Admit Date: 2021 12:50 PM   Attending Physician: Rosalie Hua MD  Primary Care Physician: aTtum Griffiths MD       Discharge Physician: Alexi Farrell NP  Discharge date: 21   Discharged Condition: Stable    Indication for Admission:   Chief Complaint   Patient presents with    Arm swelling        Reasons for hospitalization:  Hospital Problems as of 2021 Date Reviewed: 2021        Codes Class Noted - Resolved POA    Mild protein-calorie malnutrition ([facility] ICD-10-CM: E44.1  ICD-9-CM: 263.1  2021 - Present Yes        Pleural effusion, right ICD-10-CM: J90  ICD-9-CM: 511.9  2021 - Present Unknown        Lymphedema ICD-10-CM: I89.0  ICD-9-CM: 457.1  2021 - Present Unknown        Hx pulmonary embolism ICD-10-CM: H95.023  ICD-9-CM: V12.55  2021 - Present Yes        Pancreatic pseudocyst ICD-10-CM: K86.3  ICD-9-CM: 577.2  2021 - Present Yes        * (Principal) Lymphedema of right arm ICD-10-CM: I89.0  ICD-9-CM: 457.1  3/10/2021 - Present Yes        Malignant neoplasm of ascending colon ([facility] ICD-10-CM: C18.2  ICD-9-CM: 153.6  2018 - Present Yes        Current use of anticoagulant therapy ICD-10-CM: Z79.01  ICD-9-CM: V58.61  2018 - Present Yes        Type 2 diabetes mellitus with diabetic neuropathy ([facility] ICD-10-CM: E11.40  ICD-9-CM: 250.60, 357.2  2018 - Present Yes             Discharge Diagnosis: Lymphedema of right arm  Did Patient have Sepsis (YES OR NO): [facility] Course/Interval history:  Patient is a 75-year-old female with past medical history significant for diabetes mellitus, history of DVT/PE on Eliquis, history of colon cancer status post resection in 2018 and lymphedema of right upper extremity (s/p R shoulder surgery  with subsequent referral to lymphedema clinic) who presented to the ED with worsening swelling of right upper extremity and dysphagia. The swelling has progressed into her neck resulting in dysphagia and unintentional weight loss of ~20 lbs. Concerns for inflammatory breast cancer, pathologies pending s/p R axillary LN biopsy, oncology following. Pulmonary following for R pleural effusion, s/p R thoracentesis with 600 ml exudative fluid removed, non malignant cytology. EGD with GI 6/29 with non-malignant esophageal biopsies, Laryngoscope examination with ENT 6/29. Pathologies for LN biopsy remain pending.     Assessment/Plan:  Lymphedema/?inflammatory breast cancer  -Significant lymphedema of RUE and R chest wall  -CT findings concerning for inflammatory breast cancer  -Oncology consult, will follow pathology results  -s/p right axillary lymph node biopsy with IR on 6/25, pathologies pending  -Elevation and compression to RUE  -Following lymphedema clinic outpt - CM assisted with referral to [facility] OT to assist with lymphedema management   -Pathology results still pending, follow-up with Dr. Greene Phlejocelyn in 1 week     Dysphagia  -CT with mild thickening of mid and distal esophagus, concerning for esophagitis  -SLP eval-defer management to GI  -GI consult  -Continue Protonix  -EGD unable to be completed (could not advance gastroscope)  -Barium esophagram attempted but patient unable to prone swallow or ingest barium tablet  -s/p EGD with dilation 6/29  -s/p laryngoscope exam with ENT for oropharyngeal/hypopharyngeal swelling, most likely associated with significant neck edema from RUE lymphedema, no inpatient recommendations, can follow outpatient if symptoms recur  -Patient has tolerated minced and moist diet, appropriate for discharge per GI, follow-up in 3-4 months or sooner if needed, continue Protonix BID     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 therapy, elevation/compression of RUE  -AC held for procedures, resume when safe to do so  -Continue home Eliquis     Right large pleural effusion  -Pulmonary consult  -s/p R thoracentesis 6/26 with 600 ml pleural fluid removed  -Pleural fluid exudative per light's criteria, cytology non-malignant  -Patient remains stable on RA  -Pulmonary has signed off, follow-up outpatient in 1 week    History of DVT/PE  -On Eliquis, will hold for now for IR/GI procedures, restart when safe to do so  -Continue home Eliquis     Diabetes Mellitus  -Sugars have been well controlled off medications inpatient due to dietary/dyphagia adjustments, hold home oral agents and long-acting insulin until outpatient follow-up     Hypokalemia  -Potassium Liquid 20 mEq daily x 1 week, follow-up with PCP     Hypomagnesemia, resolved  -Mg 2.1     Hypertension  -Continue Losartan  -Follow-up with PCP        Diet:  Diet Minced and Moist, Glucerna Oral Nutritional Supplement  DVT PPx: Eliquis  Code status: Full Code    Consults:   IP CONSULT TO ONCOLOGY  IP CONSULT TO INTERVENTIONAL RADIOLOGY  IP CONSULT TO GASTROENTEROLOGY  IP CONSULT TO OTOLARYNGOLOGY  IP CONSULT TO VASCULAR SURGERY  IP CONSULT TO PULMONOLOGY     Disposition: Home or Self Care  Diet:   DIET ADULT  DIET ADULT ORAL NUTRITION SUPPLEMENT  Code Status: Full Code      Follow up labs/imaging: n/a  Follow up with:  PCP 1 week  Dr. Elkin Ayers Oncology 1 week  Pulmonary 2 weeks  GI 3-4 months  Pending labs/studies: n/a    Current Discharge Medication List      START taking these medications    Details   losartan (COZAAR) 25 mg tablet Take 1 Tablet by mouth daily for 30 days. Indications: high blood pressure  Qty: 30 Tablet, Refills: 0  Start date: 7/2/2021, End date: 8/1/2021      pantoprazole (PROTONIX) 40 mg tablet Take 1 Tablet by mouth two (2) times a day for 30 days.   Qty: 60 Tablet, Refills: 0  Start date: 7/1/2021, End date: 7/31/2021      potassium chloride (KAON 10%) 20 mEq/15 mL solution Take 15 mL by mouth daily for 7 days. Qty: 105 mL, Refills: 0  Start date: 7/1/2021, End date: 7/8/2021         CONTINUE these medications which have NOT CHANGED    Details   nystatin (MYCOSTATIN) powder Apply  to affected area four (4) times daily. Apply to elbow creases and axilla  Qty: 1 Bottle, Refills: 3      apixaban (ELIQUIS) 5 mg tablet Take 1 Tab by mouth two (2) times a day. Qty: 60 Tab, Refills: 5      acetaminophen (TylenoL) 325 mg tablet Take  by mouth every four (4) hours as needed for Pain.          STOP taking these medications       insulin glargine U-300 conc (Toujeo Max U-300 SoloStar) 300 unit/mL (3 mL) inpn Comments:   Reason for Stopping:         SITagliptin-metFORMIN (Janumet)  mg per tablet Comments:   Reason for Stopping:         Insulin Needles, Disposable, (BD JIMBO 2ND GEN PEN NEEDLE) 32 gauge x 5/32\" ndle Comments:   Reason for Stopping:               Medications Discontinued During This Encounter   Medication Reason    insulin lispro (HUMALOG) injection     lidocaine (XYLOCAINE) 20 mg/mL (2 %) injection  mg     lidocaine (XYLOCAINE) 10 mg/mL (1 %) injection 0.1 mL Patient Transfer    lactated Ringers infusion Patient Transfer    sodium chloride (NS) flush 5-40 mL Patient Transfer    sodium chloride (NS) flush 5-40 mL Patient Transfer    famotidine (PEPCID) tablet 20 mg Patient Transfer    acetaminophen (TYLENOL) tablet 1,000 mg Patient Transfer    fentaNYL citrate (PF) injection 100 mcg Patient Transfer    midazolam (VERSED) injection 2 mg Patient Transfer    lactated Ringers infusion Patient Transfer    0.9% sodium chloride infusion Patient Transfer    acetaminophen (TYLENOL) tablet 1,000 mg Patient Transfer    HYDROcodone-acetaminophen (NORCO) 5-325 mg per tablet 1 Tablet Patient Transfer    HYDROmorphone (DILAUDID) injection 0.5 mg Patient Transfer    promethazine (PHENERGAN) with saline injection 3.25 mg Patient Transfer    lactated Ringers infusion Patient Transfer    famotidine (PEPCID) tablet 20 mg Patient Transfer    lactated Ringers infusion Patient Transfer    heparin (porcine) injection 5,000 Units     potassium chloride 20 mEq in 100 ml IVPB          Follow Up Orders: Follow-up Appointments   Procedures    FOLLOW UP VISIT Appointment in: Other (Specify) Please arrange hospital follow-up with Dr Evens Lee in 1 week [phone]. Thanks! Please arrange hospital follow-up with Dr Evens Lee in 1 week [phone]. Thanks! Standing Status:   Standing     Number of Occurrences:   1     Order Specific Question:   Appointment in     Answer: Other (Specify)    FOLLOW UP VISIT Appointment in: Other Edgar Caller) PCP 1 week Dr. Evens Lee Oncology 1 week Pulmonology 2 weeks GI 3-4 months     PCP 1 week  Dr. Evens Lee Oncology 1 week  Pulmonology 2 weeks  GI 3-4 months     Standing Status:   Standing     Number of Occurrences:   1     Standing Expiration Date:   7/2/2021     Order Specific Question:   Appointment in     Answer:    Other (Specify)         Follow-up Information     Follow up With Specialties Details Why Contact Info    Luis Muñoz MD Oncology, Internal Medicine   [address] [facility]      [facility], [address]  [phone]              Discharge Exam:    Patient Vitals for the past 24 hrs:   Temp Pulse Resp BP SpO2   07/01/21 0750 97.9 °F (36.6 °C) 85 18 134/79 96 %   07/01/21 0329 97.5 °F (36.4 °C) 84 18 124/76 95 %   06/30/21 2300 98 °F (36.7 °C) 88 18 124/67 97 %   06/30/21 1935 97.7 °F (36.5 °C) 87 18 124/66 97 %   06/30/21 1600 97.6 °F (36.4 °C) 90 18 120/79 98 %   06/30/21 1457  90  116/76      Oxygen Therapy  O2 Sat (%): 96 % (07/01/21 0750)  Pulse via Oximetry: 89 beats per minute (06/29/21 0942)  O2 Device: None (Room air) (06/30/21 2015)  O2 Flow Rate (L/min): 2 l/min (06/29/21 0915)    Estimated body mass index is 30.74 kg/m² as calculated from the following:    Height as of this encounter: 5' 4\" (1.626 m). Weight as of this encounter: 81.2 kg (179 lb 1.6 oz). Intake/Output Summary (Last 24 hours) at 7/1/2021 1217  Last data filed at 7/1/2021 0750  Gross per 24 hour   Intake 618.33 ml   Output 1100 ml   Net -481.67 ml       *Note that automatically entered I/Os may not be accurate; dependent on patient compliance with collection and accurate  by assistants.     Physical Exam:   General:          No acute distress, speaking in full sentences, no use of accessory muscles   Lungs:             Clear to auscultation bilaterally, slightly diminished RLL  CV:                  Regular rate and rhythm with normal S1 and S2, no murmurs, rubs, gallops   Abdomen:        Soft, nontender, nondistended, normoactive bowel sounds   Extremities:     No cyanosis clubbing, significant RUE edema spreading into anterior right chest, clavicular area, and lateral neck  Neuro:              Nonfocal, A&O x3   Psych:             Normal affect        All Labs from Last 24 Hrs:  Recent Results (from the past 24 hour(s))   GLUCOSE, POC    Collection Time: 06/30/21  4:54 PM   Result Value Ref Range    Glucose (POC) 104 (H) 65 - 100 mg/dL    Performed by Zubiea    METABOLIC PANEL, BASIC    Collection Time: 07/01/21  5:22 AM   Result Value Ref Range    Sodium 142 136 - 145 mmol/L    Potassium 3.3 (L) 3.5 - 5.1 mmol/L    Chloride 110 (H) 98 - 107 mmol/L    CO2 28 21 - 32 mmol/L    Anion gap 4 (L) 7 - 16 mmol/L    Glucose 102 (H) 65 - 100 mg/dL    BUN 6 (L) 8 - 23 MG/DL    Creatinine 0.58 (L) 0.6 - 1.0 MG/DL    GFR est AA >60 >60 ml/min/1.73m2    GFR est non-AA >60 >60 ml/min/1.73m2    Calcium 8.2 (L) 8.3 - 10.4 MG/DL   MAGNESIUM    Collection Time: 07/01/21  5:22 AM   Result Value Ref Range    Magnesium 2.1 1.8 - 2.4 mg/dL   CBC W/O DIFF    Collection Time: 07/01/21  6:17 AM   Result Value Ref Range    WBC 4.5 4.3 - 11.1 K/uL    RBC 3.32 (L) 4.05 - 5.2 M/uL    HGB 9.5 (L) 11.7 - 15.4 g/dL    HCT 28.5 (L) 35.8 - 46.3 %    MCV 85.8 79.6 - 97.8 FL    MCH 28.6 26.1 - 32.9 PG    MCHC 33.3 31.4 - 35.0 g/dL    RDW 16.1 (H) 11.9 - 14.6 %    PLATELET 473 148 - 907 K/uL    MPV 10.3 9.4 - 12.3 FL    ABSOLUTE NRBC 0.00 0.0 - 0.2 K/uL   GLUCOSE, POC    Collection Time: 07/01/21  7:49 AM   Result Value Ref Range    Glucose (POC) 109 (H) 65 - 100 mg/dL    Performed by Raven Bond        All Micro Results     Procedure Component Value Units Date/Time    FUNGUS CULTURE AND SMEAR [[id number]] Collected: 06/26/21 1220    Order Status: Completed Specimen: Miscellaneous sample Updated: 07/01/21 0838     Source PLEURAL FLUID        Comment: RIGHT        Fungus stain Direct Inoculation     Fungus (Mycology) Culture Other source received     Comment: (NOTE)  Performed At: [address] [id number]  Tata Nielsen MD MQ:[id number]         AFB CULTURE + SMEAR W/RFLX ID FROM CULTURE [[id number]] Collected: 06/26/21 1220    Order Status: Completed Specimen: Miscellaneous sample Updated: 06/29/21 1536     Source PLEURAL FLUID        Comment: RIGHT        AFB Specimen processing Concentration     Acid Fast Smear Negative        Comment: (NOTE)  Performed At: [address] [id number]  Tata Nielsen MD [id number]          Acid Fast Culture PENDING    CULTURE, BODY FLUID Elonda Locker STAIN [[id number]] Collected: 06/26/21 1220    Order Status: Completed Specimen: Pleural Fluid Updated: 06/28/21 0805     Special Requests: NO SPECIAL REQUESTS        GRAM STAIN 0 TO 3 WBCS SEEN PER OIF      NO DEFINITE ORGANISM SEEN        Culture result: NO GROWTH 2 DAYS             SARS-CoV-2 Lab Results  \"Novel Coronavirus\" Test: No results found for: COV2NT   \"Emergent Disease\" Test: No results found for: EDPR  \"SARS-COV-2\" Test: No results found for: XGCOVT  Rapid Test:   No results found for: COVR         Diagnostic Imaging/Tests:   XR CHEST SNGL V    Addendum Date: 6/27/2021    Addendum: There is likely a right pleural effusion. There is no visible pneumothorax. Result Date: 6/27/2021  1. Findings as described above. No significant interval change. XR CHEST SNGL V    Result Date: 6/26/2021  No visible pneumothorax status post right-sided thoracentesis. XR CHEST PA LAT    Result Date: 6/30/2021  Persistent small right pleural effusion with associated atelectasis or small infiltrate. XR CHEST PA LAT    Result Date: 6/28/2021  Right lower lobe pneumonia and right pleural effusion worse in the interval.    XR CHEST PA LAT    Result Date: 6/24/2021  1. No acute cardiopulmonary abnormality. XR BA SWALLOW ESOPHOGRAM    Result Date: 6/27/2021  1. No mechanical esophageal obstruction identified. 2. Patient unable to participate in prone swallowing or to ingest a barium tablet. XR SWALLOW FUNC VIDEO    Result Date: 6/25/2021  No evidence of aspiration. CT NECK CHEST ABD PELV W CONT    Result Date: 6/24/2021  1. Diffuse edematous change of the rightward aspect of the neck, mediastinum, right axilla, right upper extremity, and right breast. There is diffuse skin thickening and trabecular thickening of the right breast with an overall smaller/contracted appearance of the right breast compared to the left. This is suspicious for inflammatory breast cancer. Clinical correlation is advised with consideration of diagnostic evaluation in the breast center. 2. Highly diminutive appearance of the right internal jugular vein which is nearly occluded, further evaluation with vascular ultrasound can be considered. Prominent venous collaterals of the right chest wall. 3. Pathologically enlarged right axillary lymph nodes with a lymph conglomerate measuring up to 3.9 cm, similar to although progressed from prior's. Biopsy can be considered. 4. Wall thickening of the mid and distal esophagus, possible esophagitis.  Consider further evaluation with EGD versus barium esophagram. 5. Enlarging hypodensity along falciform ligament of the liver most likely focal fatty infiltration although indeterminate, attention on follow-up. 6. Large right pleural effusion. 7. Unchanged pancreatic tail cyst versus cystic neoplasm measuring up to 3.9 cm. These results were discussed with emergency room physician Dr. Ezequiel Gao specifically of my concern for potential inflammatory breast cancer. US GUIDE BX LYMPH NOD SUPER    Result Date: 6/25/2021  Ultrasound-guided percutaneous core needle biopsy of a mildly enlarged right axillary lymph node with very limited evaluation of the needle tip during the biopsy procedure secondary to the patient being unable to raise her arm due to diffuse right upper extremity swelling from lymphedema. Biopsy specimens were sent to pathology for analysis. If the biopsy results are discordant with the concern for neoplasm, recommend further evaluation in the breast center due to the concern for right-sided inflammatory breast cancer. Echocardiogram/EKG results:  No results found for this visit on 06/24/21.     EKG Results     None          Results for orders placed or performed during the hospital encounter of 03/19/21   EKG, 12 LEAD, INITIAL   Result Value Ref Range    Ventricular Rate 94 BPM    Atrial Rate 94 BPM    P-R Interval 142 ms    QRS Duration 78 ms    Q-T Interval 350 ms    QTC Calculation (Bezet) 437 ms    Calculated P Axis 66 degrees    Calculated R Axis 36 degrees    Calculated T Axis 22 degrees    Diagnosis       Normal sinus rhythm  Normal ECG  When compared with ECG of 02-OCT-2018 10:21,  Premature atrial complexes are no longer Present  Nonspecific T wave abnormality, worse in Inferior leads  Confirmed by ST MINI GODINEZ MD (), SÁNCHEZ ARIAS ([id number]) on 3/19/2021 7:15:49 PM         Procedures done this admission:  Procedure(s):  ESOPHAGOGASTRODUODENOSCOPY (EGD)/ BMI 31 ROOM 221  ESOPHAGEAL DILATION  ESOPHAGOGASTRODUODENAL (EGD) BIOPSY        Time spent in patient discharge planning and coordination 40 minutes.       Signed By: Florencia Solis NP   [facility]    July 1, 2021  12:17 PM

## 2021-07-01 NOTE — PROGRESS NOTES
Pt with discharge orders this day. Pt was getting outpatient lymphedema rehab but interested in home health as it is getting more and more difficult for her to drive. [facility] pt agreeable to [facility]. Referral made this day. No additional CM needs at time of discharge. Milestones met. Care Management Interventions  PCP Verified by CM: Yes  Mode of Transport at Discharge:  Other (see comment) (family)  Transition of Care Consult (CM Consult): Discharge Planning, Home Health  Discharge Durable Medical Equipment: No  Physical Therapy Consult: No  Occupational Therapy Consult: No  Speech Therapy Consult: No  Current Support Network: Own Home  Confirm Follow Up Transport: Family  The Plan for Transition of Care is Related to the Following Treatment Goals : home with home health   The Patient and/or Patient Representative was Provided with a Choice of Provider and Agrees with the Discharge Plan?: Yes  Name of the Patient Representative Who was Provided with a Choice of Provider and Agrees with the Discharge Plan: Ms. Janett Cruz of Choice List was Provided with Basic Dialogue that Supports the Patient's Individualized Plan of Care/Goals, Treatment Preferences and Shares the Quality Data Associated with the Providers?: Yes   Resource Information Provided?: No  Discharge Location  Discharge Placement: Home with home health

## 2021-07-01 NOTE — PROGRESS NOTES
D/c paperwork reviewed with pt. Pt currently still receiving IV K+ per NP order. All questions answered. Education completed with pt. Prescription for Ativan give to pt as well. Once IV K+ completed PIV will be removed and pt will be d/c home with Cousin. No further questions at this time, and no acute signs of distress.

## 2021-07-01 NOTE — DISCHARGE INSTRUCTIONS
BIOPSY DISCHARGE INSTRUCTIONS    General Instructions:     A biopsy is the removal of a small piece of tissue for microscopic examination or testing. Healthy tissue can be obtained for the purpose of tissue-type matching for transplants. Unhealthy tissues are more commonly biopsied to diagnose disease. Lung Biopsy:     A needle lung biopsy is performed when there is a mass discovered in the lung area. The most serious risk is infection, bleeding, and pneumothorax (a collapsed lung). Signs of pneumothorax include shortness of breath, rapid heart rate, and blueness of the skin. If any of these occur, call 911. Liver Biopsy: This test helps detect cancer, infections, and the cause of an enlargement of the liver or elevated liver enzymes. It also helps to diagnose a number of liver diseases. The pain associated with the procedure may be felt in the shoulder. The risks include internal bleeding, pneumothorax, and injury to the surrounding organs. Renal Biopsy: This procedure is sometimes done to evaluate a transplanted kidney. It is also used to evaluate unexplained decrease in kidney function, blood, or protein in the urine. You may see bright red blood in the urine the first 24 hours after the test. If the bleeding lasts longer, you need to call your doctor. There is a risk of infection and bleeding into the muscle, which may cause soreness. Spinal Biopsy: This test is sometimes done in conjunction with other procedures. Your back will be sore, as we are taking a small sample of bone, which is slightly more difficult to sample than tissue. General Biopsy:     A mass can grow in any area of the body, and we are taking a specimen as ordered by your doctor. The risks are the same. They include bleeding, pain, and infection. Home Care Instructions: You may resume your regular diet and medication regimen.  Do not drink alcohol, drive, or make any important legal decisions in the next 24 hours. Do not lift anything heavier than a gallon of milk until the soreness goes away. You may use over the counter acetaminophen or ibuprofen for the soreness. You may apply an ice pack to the affected area for 20-30 minutes at time for the first 24 hours. After that, you may apply a heat pack. Call If:     You should call your Physician and/or the Radiology Nurse if you have any questions or concerns about the biopsy site. Call if you should have increased pain, fever, redness, drainage, or bleeding more than a small spot on the bandage. Follow-Up Instructions: Please see your ordering doctor as he/she has requested. To Reach Us:        Patient Signature:  Date: 6/25/2021  Discharging Nurse: Oj Patino RN  Interventional Radiology General Nurse Discharge    After general anesthesia or intravenous sedation, for 24 hours or while taking prescription Narcotics:  · Limit your activities  · Do not drive and operate hazardous machinery  · Do not make important personal or business decisions  · Do  not drink alcoholic beverages  · If you have not urinated within 8 hours after discharge, please contact your surgeon on call. * Please give a list of your current medications to your Primary Care Provider. * Please update this list whenever your medications are discontinued, doses are     changed, or new medications (including over-the-counter products) are added. * Please carry medication information at all times in case of emergency situations. These are general instructions for a healthy lifestyle:    No smoking/ No tobacco products/ Avoid exposure to second hand smoke  Surgeon General's Warning:  Quitting smoking now greatly reduces serious risk to your health.     Obesity, smoking, and sedentary lifestyle greatly increases your risk for illness  A healthy diet, regular physical exercise & weight monitoring are important for maintaining a healthy lifestyle    You may be retaining fluid if you have a history of heart failure or if you experience any of the following symptoms:  Weight gain of 3 pounds or more overnight or 5 pounds in a week, increased swelling in our hands or feet or shortness of breath while lying flat in bed. Please call your doctor as soon as you notice any of these symptoms; do not wait until your next office visit. Recognize signs and symptoms of STROKE:  F-face looks uneven    A-arms unable to move or move unevenly    S-speech slurred or non-existent    T-time-call 911 as soon as signs and symptoms begin-DO NOT go       Back to bed or wait to see if you get better-TIME IS BRAIN. If you have any questions about your procedure, please call the Interventional Radiology department at [phone]. After business hours (5pm) and weekends, call the answering service at [phone] and ask for the Radiologist on call to be paged. Si tiene Preguntas acerca del procedimiento, por favor llame al departamento de Radiología Intervencional al [phone]. Después de horas de oficina (5 pm) y los fines de [place], llamar al Cesario Trinidad al [phone] y pregunte por el [facility]olog[facility] de [facility]. DISCHARGE SUMMARY from Nurse    PATIENT INSTRUCTIONS:    After general anesthesia or intravenous sedation, for 24 hours or while taking prescription Narcotics:  · Limit your activities  · Do not drive and operate hazardous machinery  · Do not make important personal or business decisions  · Do  not drink alcoholic beverages  · If you have not urinated within 8 hours after discharge, please contact your surgeon on call.     Report the following to your surgeon:  · Excessive pain, swelling, redness or odor of or around the surgical area  · Temperature over 100.5  · Nausea and vomiting lasting longer than 4 hours or if unable to take medications  · Any signs of decreased circulation or nerve impairment to extremity: change in color, persistent  numbness, tingling, coldness or increase pain  · Any questions    What to do at Home:  Recommended activity: Activity as tolerated. If you experience any of the following symptoms fever greater than 100.5, nausea/vomiting lasting longer than 4 hours not relieved by any medication, increase in swelling/tempature/redness/tenderness to right arm/chest, any concerns, please follow up with Primary Doctor. *  Please give a list of your current medications to your Primary Care Provider. *  Please update this list whenever your medications are discontinued, doses are      changed, or new medications (including over-the-counter products) are added. *  Please carry medication information at all times in case of emergency situations. These are general instructions for a healthy lifestyle:    No smoking/ No tobacco products/ Avoid exposure to second hand smoke  Surgeon General's Warning:  Quitting smoking now greatly reduces serious risk to your health. Obesity, smoking, and sedentary lifestyle greatly increases your risk for illness    A healthy diet, regular physical exercise & weight monitoring are important for maintaining a healthy lifestyle    You may be retaining fluid if you have a history of heart failure or if you experience any of the following symptoms:  Weight gain of 3 pounds or more overnight or 5 pounds in a week, increased swelling in our hands or feet or shortness of breath while lying flat in bed. Please call your doctor as soon as you notice any of these symptoms; do not wait until your next office visit. The discharge information has been reviewed with the patient. The patient verbalized understanding. Discharge medications reviewed with the patient and appropriate educational materials and side effects teaching were provided. Patient Education   Patient Education   Patient Education        Lymphedema: Care Instructions  Your Care Instructions     Lymphedema is fluid that builds up in the arms or legs.  It is often caused by surgery to remove lymph nodes during cancer treatment, especially breast cancer surgery, which can cause fluid to build up in the arm. It can happen after radiation treatment to an area that involves lymph nodes. It also can be caused by a fractured bone or surgery to fix a fracture. And some medicines also can cause lymphedema. Some people get it for unknown reasons. Normally, lymph nodes trap bacteria and other substances as fluid flows through them. Then, the white cells in the body's defense, or immune, system can destroy the substances. But if there are few or no lymph nodes--or if the lymph system in an arm or leg has been damaged--fluid can build up in the affected arm or leg. You can take simple steps at home to help treat or prevent fluid buildup. Treatment may include raising the arm or leg to let gravity drain the fluid. You also can wear compression stockings or sleeves. Follow-up care is a key part of your treatment and safety. Be sure to make and go to all appointments, and call your doctor if you are having problems. It's also a good idea to know your test results and keep a list of the medicines you take. How can you care for yourself at home? · Wear a compression stocking or sleeve as your doctor suggests. It can help keep fluid from pooling in an arm or leg. Wear it during air travel. · Prop up the swollen arm or leg on a pillow anytime you sit or lie down. Try to keep it above the level of your heart. This will help reduce swelling. · Avoid crossing your legs if your legs are swollen. · Get some exercise on most days of the week. Increase the intensity of exercise slowly. Water aerobics can help reduce swelling by helping fluid move around. Wear your compression stocking or sleeve during exercise, but not during water exercise. · See a physical therapist. He or she can teach you how to do self-massage to help fluid move around.  You also can learn what activities would be best for you. · Keep your feet clean and wear clean socks or stockings every day. Check your feet often for signs of infection, such as redness or heat. Do not walk barefoot. · If you have had lymph nodes removed from under your arm:  ? Do not have blood drawn from the arm on the side of the lymph node surgery. ? Do not allow a blood pressure cuff to be placed on that arm. If you are in the hospital, make sure your nurse and other hospital staff know of your condition. ? Wear gloves when gardening or doing other activities that may lead to cuts on your fingers or hands. · If you have had lymph nodes removed from your groin:  ? Bathe your feet daily in lukewarm, not hot, water. Use a mild soap that has a moisturizer, or use a moisturizer separately. ? Check your feet for blisters or cuts. ? Wear comfortable and supportive shoes that fit properly. ? Wear the correct size of panty hose and stockings. Avoid garters or knee-high or thigh-high stockings. · Ask your doctor how to treat any cuts, scratches, insect bites, or other injuries that may occur. · Use sunscreen and insect repellent when outdoors to protect your skin from sunburn and insect bites. · Wear medical alert jewelry that says you have lymphedema. You can buy these at most [employer] and on the Internet. When should you call for help? Call your doctor now or seek immediate medical care if:    · You have signs of infection, such as:  ? Increased pain, swelling, warmth, or redness. ? Red streaks leading from the area. ? Pus draining from the area. ? A fever. Watch closely for changes in your health, and be sure to contact your doctor if:    · You have new or worse symptoms from lymphedema.     · You do not get better as expected. Where can you learn more? Go to http://www.gray.com/  Enter V398 in the search box to learn more about \"Lymphedema: Care Instructions. \"  Current as of: December 17, 2020               Content Version: 12.8  © 2006-2021 [employer]. Care instructions adapted under license by [employer] (which disclaims liability or warranty for this information). If you have questions about a medical condition or this instruction, always ask your healthcare professional. [address] any warranty or liability for your use of this information. Axillary Lymph Node Dissection: What to Expect at [address] [facility]  Right after the surgery you will probably feel weak, and your shoulder area will feel sore and stiff for a few days. It may be hard to move your arm and shoulder in all directions. Your doctor or physical therapist will teach you some arm exercises. You now have a higher chance of swelling in the affected arm. This is called lymphedema. From now on, you will have to be careful when using your arm. You will have a scar under your arm that will fade over time. You may also notice a hollow area in your armpit. It may also feel like you have a lump in your armpit. You may lose some feeling under your arm, or the arm may have a tingling or burning feeling. The loss of feeling may last only a little while, or it may last the rest of your life. You will probably be able to go back to work or your normal routine in 3 to 6 weeks. This depends on the type of work you do and any further treatment. If cancer was found in the lymph nodes, you will probably need more treatment. An axillary node dissection may be done at the same time as other breast cancer surgeries. If this is the case, your recovery may be different. When you find out that you have cancer, you may feel many emotions and may need some help coping. Seek out family, friends, and counselors for support. You also can do things at home to make yourself feel better while you go through treatment.  Call the [employer] (6-974.563.4197) or visit its website at www.cancer. org for more information. This care sheet gives you a general idea about how long it will take for you to recover. But each person recovers at a different pace. Follow the steps below to get better as quickly as possible. How can you care for yourself at home? Activity    · Rest when you feel tired. Getting enough sleep will help you recover.     · Try to walk each day. Start by walking a little more than you did the day before. Bit by bit, increase the amount you walk. Walking boosts blood flow and helps prevent pneumonia and constipation.     · Avoid strenuous activities, such as biking, jogging, weightlifting, or aerobic exercise, until your doctor says it is okay. This includes housework, especially if you have to use your affected arm. You will probably be able to do your normal activities in 3 to 6 weeks. But for the next 3 to 6 months, be careful when you do tasks that use the same motions over and over, such as vacuuming, weed pulling, and window cleaning.     · For 4 to 6 weeks, avoid lifting anything that weighs more than 10 to 15 pounds or that would make you strain. This may include heavy grocery bags and milk containers, a heavy briefcase or backpack, cat litter or dog food bags, a vacuum , or a child.     · Ask your doctor when you can drive again.     · You will probably be able to go back to work or your normal routine in 3 to 6 weeks. It will also depend on the type of work you do and any further treatment.     · You may be able to take showers (unless you have a drain in your incision) 24 to 48 hours after surgery. Pat the cut (incision) dry. Do not take a bath for the first 2 weeks, or until your doctor tells you it is okay. If you have a drain coming out of your incision, follow your doctor's instructions to empty and care for it.     · Take precautions to prevent infection and swelling in your arm. This is called lymphedema.   ? Wear gloves when you garden, handle garbage, wash dishes, and clean house. ? Protect your hands and arms from burns, including sunburns. ? Do not wear tight sleeves, elastic cuffs, bracelets, wristwatches, or rings on the affected arm. ? Do not let anyone take blood pressure, draw blood, or give shots in that arm.  ? Do not carry heavy purses, suitcases, grocery bags, and other heavy items with that arm.  ? Keep the skin of that arm well moisturized. ? Do not cut your cuticles. ? Use an electric shaver if you shave your armpits. ? Protect yourself from insect bites on the arm. ? Wear medical alert jewelry that says you can develop lymphedema. You can buy this at most [employer] and on the Internet. Diet    · You can eat your normal diet. If your stomach is upset, try bland, low-fat foods like plain rice, broiled chicken, toast, and yogurt.     · You may notice that your bowel movements are not regular right after your surgery. This is common. Try to avoid constipation and straining with bowel movements. You may want to take a fiber supplement every day. If you have not had a bowel movement after a couple of days, ask your doctor about taking a mild laxative. Medicines    · Your doctor will tell you if and when you can restart your medicines. He or she will also give you instructions about taking any new medicines.     · If you take aspirin or some other blood thinner, ask your doctor if and when to start taking it again. Make sure that you understand exactly what your doctor wants you to do.     · Be safe with medicines. Take pain medicines exactly as directed. ? If the doctor gave you a prescription medicine for pain, take it as prescribed. ? If you are not taking a prescription pain medicine, ask your doctor if you can take an over-the-counter medicine.     · If your doctor prescribed antibiotics, take them as directed. Do not stop taking them just because you feel better.  You need to take the full course of antibiotics.     · If you think your pain medicine is making you sick to your stomach:  ? Take your medicine after meals (unless your doctor has told you not to). ? Ask your doctor for a different pain medicine. Incision care    · If you have strips of tape on the cut (incision) the doctor made, leave the tape on for a week or until it falls off.     · After 24 to 48 hours, wash the area daily with warm, soapy water and pat it dry. Keep the area clean and dry.     · You may cover the area with a gauze bandage if it weeps or rubs against clothing. Change the bandage every day. Exercise    · You will need to do arm exercises once your doctor tells you it is okay. Do the range-of-motion exercises as instructed by your doctor. Elevation    · Prop up your arm on a pillow anytime you sit or lie down. Try to keep it above the level of your heart. This will help reduce swelling. Other instructions    · You may have a drain in your armpit. Follow your doctor's instructions to empty and care for it. Follow-up care is a key part of your treatment and safety. Be sure to make and go to all appointments, and call your doctor if you are having problems. It's also a good idea to know your test results and keep a list of the medicines you take. When should you call for help? Call 911 anytime you think you may need emergency care. For example, call if:    · You passed out (lost consciousness).     · You have chest pain, are short of breath, or cough up blood. Call your doctor now or seek immediate medical care if:    · You have pain that does not get better after you take pain medicine.     · You cannot pass stools or gas.     · You are sick to your stomach or cannot drink fluids.     · You have signs of a blood clot in your leg (called a deep vein thrombosis), such as:  ? Pain in your calf, back of the knee, thigh, or groin. ?  Redness or swelling in your leg.     · You have loose stitches, or your incision comes open.     · Bright red blood has soaked through the bandage over your incision.     · You have signs of infection, such as:  ? Increased pain, swelling, warmth, or redness. ? Red streaks leading from the incision. ? Pus draining from the incision. ? A fever. Watch closely for changes in your health, and be sure to contact your doctor if:    · You have any problems.     · You have new or worse swelling or pain in your arm. Where can you learn more? Go to http://www.gray.com/  Enter L189 in the search box to learn more about \"Axillary Lymph Node Dissection: What to Expect at Home. \"  Current as of: December 17, 2020               Content Version: 12.8  © 3785-0500 [employer]. Care instructions adapted under license by "[employer]" (which disclaims liability or warranty for this information). If you have questions about a medical condition or this instruction, always ask your healthcare professional. [place] [address] any warranty or liability for your use of this information. Thoracentesis: What to Expect at Home  Your Recovery  Thoracentesis (say \"mejh-ja-fbu-OLIVER-sis\") is a procedure to remove fluid from the space between the lungs and the chest wall (pleural space). This procedure may also be called a \"chest tap. \" It's normal to have a small amount of fluid in the pleural space. But too much fluid can build up because of problems such as infection, heart failure, or lung cancer. The procedure may have been done to help with shortness of breath and pain caused by the fluid buildup. Or you may have had this procedure so the doctor could test the fluid to find the cause of the buildup. Your chest may be sore where the doctor put the needle or catheter into your skin (the puncture site). This usually gets better after a day or two. You can go back to work or your normal activities as soon as you feel up to it.   If a large amount of pleural fluid was removed during the procedure, you will probably be able to breathe more easily. If more pleural fluid collects and needs to be removed, another thoracentesis may be done later. If the doctor sent the fluid to a lab for testing, it may take several days to get the results. The doctor or nurse will discuss the results with you. This care sheet gives you a general idea about how long it will take for you to recover. But each person recovers at a different pace. Follow the steps below to feel better as quickly as possible. How can you care for yourself at home? Activity    · Rest when you feel tired. Getting enough sleep will help you recover.     · Avoid strenuous activities, such as bicycle riding, jogging, weight lifting, or aerobic exercise, until your doctor says it is okay.     · You may shower. Do not take a bath until the puncture site has healed, or until your doctor tells you it is okay.     · Ask your doctor when you can drive again.     · You may need to take 1 or 2 days off from work. It depends on the type of work you do and how you feel. Diet    · You can eat your normal diet.     · Drink plenty of fluids (unless your doctor tells you not to). Medicines    · Your doctor will tell you if and when you can restart your medicines. He or she will also give you instructions about taking any new medicines.     · If you take aspirin or some other blood thinner, ask your doctor if and when to start taking it again. Make sure that you understand exactly what your doctor wants you to do.     · Be safe with medicines. Take pain medicines exactly as directed. ? If the doctor gave you a prescription medicine for pain, take it as prescribed. ? If you are not taking a prescription pain medicine, ask your doctor if you can take an over-the-counter medicine. ? Do not take two or more pain medicines at the same time unless the doctor told you to. Many pain medicines have acetaminophen, which is Tylenol.  Too much acetaminophen (Tylenol) can be harmful.     · If you think your pain medicine is making you sick to your stomach:  ? Take your medicine after meals (unless your doctor has told you not to). ? Ask your doctor for a different pain medicine.     · If your doctor prescribed antibiotics, take them as directed. Do not stop taking them just because you feel better. You need to take the full course of antibiotics. Care of the puncture site    · Wash the area daily with warm, soapy water, and pat it dry. Don't use hydrogen peroxide or alcohol, which may delay healing. You may cover the area with a gauze bandage if it weeps or rubs against clothing. Change the bandage every day.     · Keep the area clean and dry. Follow-up care is a key part of your treatment and safety. Be sure to make and go to all appointments, and call your doctor if you are having problems. It's also a good idea to know your test results and keep a list of the medicines you take. When should you call for help? Call 911 anytime you think you may need emergency care. For example, call if:    · You passed out (lost consciousness).     · You have severe trouble breathing.     · You have sudden chest pain and shortness of breath, or you cough up blood. Call your doctor now or seek immediate medical care if:    · You have shortness of breath that is new or getting worse.     · You have new or worse pain in your chest, especially when you take a deep breath.     · You are sick to your stomach or cannot keep fluids down.     · You have a fever over 100°F.     · Bright red blood has soaked through the bandage over your puncture site.     · You have signs of infection, such as:  ? Increased pain, swelling, warmth, or redness. ? Red streaks leading from the puncture site. ? Pus draining from the puncture site. ? Swollen lymph nodes in your neck, armpits, or groin. ? A fever.     · You cough up a lot more mucus than normal, or your mucus changes color.    Watch closely for changes in your health, and be sure to contact your doctor if you have any problems. Where can you learn more? Go to http://www.gray.com/  Enter Q755 in the search box to learn more about \"Thoracentesis: What to Expect at Home. \"  Current as of: October 26, 2020               Content Version: 12.8  © 2006-2021 [employer] ([employer]). Care instructions adapted under license by [employer] (which disclaims liability or warranty for this information). If you have questions about a medical condition or this instruction, always ask your healthcare professional. [place] [address] any warranty or liability for your use of this information. Patient Education        Learning About Swallowing Problems  What are swallowing problems? Certain health problems that affect the nervous system can cause trouble swallowing. These conditions include stroke, ALS (also known as Jeannette Gehrig's disease), Parkinson's disease, and multiple sclerosis. The muscles and nerves that help move food through the throat and esophagus may not work right. Growths, such as cancer, and other problems with your esophagus can also make it hard to swallow. The esophagus is the tube that leads from your throat to your stomach. How are swallowing problems diagnosed? A doctor or speech therapist will examine you to check for swallowing problems. You may get swallowing tests to check how well your throat muscles work. For these tests, you swallow a special liquid that helps the doctor see your throat and esophagus on an X-ray or video screen. Other tests use a thin, flexible tube called a scope to check for problems with your esophagus. The doctor puts the scope in your mouth and down your throat to look at your esophagus. What are the symptoms? Symptoms of swallowing problems may include:  · Trouble getting food or liquids to go down on the first try.   · Gagging, choking, or coughing when you swallow. · Having food or liquids come back up through your throat, mouth, or nose after you swallow. · Feeling like foods or liquids are stuck in some part of your throat or chest.  · Pain when you swallow. How are swallowing problems treated? How swallowing problems are treated depends on the cause. The main goals of treatment will be to help you eat and swallow safely and get good nutrition. This is important for your health and quality of life. You may learn exercises to train your throat muscles to work together so you're able to swallow better. Learning certain ways to put food in your mouth or to position your head while eating may also help. Your doctor or a speech therapist may recommend changes to your diet to help make it easier to swallow. You may need to avoid certain foods or liquids. You also may need to change the thickness of foods or liquids in your diet. To eat and swallow safely, follow any instructions you get from your doctor or therapist. These ideas may help:  · Sit upright when eating, drinking, and taking pills. · Take small bites of food. Chew completely and swallow before taking another bite. · Take small sips of liquids. · If eating makes you tired, eat smaller but more frequent meals. · Tip your chin down when there is food in your mouth. Where can you learn more? Go to http://www.gray.com/  Enter D018 in the search box to learn more about \"Learning About Swallowing Problems. \"  Current as of: February 26, 2020               Content Version: 12.8  © 3862-2050 [employer]. Care instructions adapted under license by [employer] (which disclaims liability or warranty for this information). If you have questions about a medical condition or this instruction, always ask your healthcare professional. [place] [address] any warranty or liability for your use of this information.          Patient Education        Upper GI Endoscopy: What to Expect at [address] had an upper GI endoscopy. Your doctor used a thin, lighted tube that bends to look at the inside of your esophagus, your stomach, and the first part of the small intestine, called the duodenum. After you have an endoscopy, you will stay at the hospital or clinic for 1 to 2 hours. This will allow the medicine to wear off. You will be able to go home after your doctor or nurse checks to make sure that you're not having any problems. You may have to stay overnight if you had treatment during the test. You may have a sore throat for a day or two after the test.  This care sheet gives you a general idea about what to expect after the test.  How can you care for yourself at home? Activity   · Rest as much as you need to after you go home. · You should be able to go back to your usual activities the day after the test.  Diet   · Follow your doctor's directions for eating after the test.  · Drink plenty of fluids (unless your doctor has told you not to). Medications   · If you have a sore throat the day after the test, use an over-the-counter spray to numb your throat. Follow-up care is a key part of your treatment and safety. Be sure to make and go to all appointments, and call your doctor if you are having problems. It's also a good idea to know your test results and keep a list of the medicines you take. When should you call for help? Call 911 anytime you think you may need emergency care. For example, call if:    · You passed out (lost consciousness).     · You have trouble breathing.     · You pass maroon or bloody stools.    Call your doctor now or seek immediate medical care if:    · You have pain that does not get better after your take pain medicine.     · You have new or worse belly pain.     · You have blood in your stools.     · You are sick to your stomach and cannot keep fluids down.     · You have a fever.     · You cannot pass stools or gas. Watch closely for changes in your health, and be sure to contact your doctor if:    · Your throat still hurts after a day or two.     · You do not get better as expected. Where can you learn more? Go to http://www.[employer].com/  Enter J454 in the search box to learn more about \"Upper GI Endoscopy: What to Expect at Home. \"  Current as of: April 15, 2020               Content Version: 12.8  © 2006-2021 [employer]. Care instructions adapted under license by [employer] (which disclaims liability or warranty for this information). If you have questions about a medical condition or this instruction, always ask your healthcare professional. [address] any warranty or liability for your use of this information.            ___________________________________________________________________________________________________________________________________

## 2021-07-01 NOTE — PROGRESS NOTES
[facility] Progress Note         Admit Date:  6/24/2021  Today's Date:  7/1/2021    CC:  Dysphagia    Subjective:     She is sitting up in chair. She reports overall toleration of diet. She has continued to have some soreness in the back of her throat but says that this may have improved some. She does not think that foods are necessarily becoming stuck in the back of her throat. She is s/p repeat EGD 6/29 as below. She otherwise has no complaints. She denies N/V, abdominal pain. She reports BMs without overt GI bleeding. She anticipates discharge home soon. Medications:   Current Facility-Administered Medications   Medication Dose Route Frequency    potassium chloride 20 mEq in 100 ml IVPB  20 mEq IntraVENous Q2H    apixaban (ELIQUIS) tablet 5 mg  5 mg Oral BID    LORazepam (ATIVAN) tablet 0.5 mg  0.5 mg Oral Q6H PRN    losartan (COZAAR) tablet 25 mg  25 mg Oral DAILY    lip protectant (BLISTEX) ointment 1 Each  1 Each Topical PRN    nystatin (MYCOSTATIN) 100,000 unit/gram powder   Topical BID    lactated Ringers infusion  50 mL/hr IntraVENous CONTINUOUS    insulin lispro (HUMALOG) injection   SubCUTAneous TIDAC    sodium chloride (NS) flush 5-40 mL  5-40 mL IntraVENous Q8H    sodium chloride (NS) flush 5-40 mL  5-40 mL IntraVENous PRN    acetaminophen (TYLENOL) tablet 650 mg  650 mg Oral Q6H PRN    Or    acetaminophen (TYLENOL) suppository 650 mg  650 mg Rectal Q6H PRN    polyethylene glycol (MIRALAX) packet 17 g  17 g Oral DAILY PRN    ondansetron (ZOFRAN ODT) tablet 4 mg  4 mg Oral Q8H PRN    Or    ondansetron (ZOFRAN) injection 4 mg  4 mg IntraVENous Q6H PRN    pantoprazole (PROTONIX) 40 mg in 0.9% sodium chloride 10 mL injection  40 mg IntraVENous Q12H       Review of Systems:  ROS was obtained, with pertinent positives as listed above. No chest pain or SOB.     Diet: Dysphagia diet- Minced and moist    Objective:   Vitals:  Visit Vitals  /79   Pulse 85 Temp 97.9 °F (36.6 °C)   Resp 18   Ht 5' 4\" (1.626 m)   Wt 81.2 kg (179 lb 1.6 oz)   SpO2 96%   BMI 30.74 kg/m²     Intake/Output:  No intake/output data recorded. 06/29 1901 - 07/01 0700  In: 1359.5 [P.O.:360; I.V.:999.5]  Out: 2200 [Urine:2200]  Exam:  General appearance: alert, cooperative, NAD. Sitting up in chair. Heart: RRR  Lungs: CTAB  Abdomen: soft, Non-tender. BSx4. Psych: Mood and affect unremarkable. Data Review (Labs):    Recent Labs     07/01/21  0617 07/01/21  0522 06/30/21  1024 06/29/21  0457   WBC 4.5  --  6.9 4.4   HGB 9.5*  --  10.0* 9.2*   HCT 28.5*  --  32.4* 27.7*     --  260 251   MCV 85.8  --  90.0 86.8   NA  --  142 141 143   K  --  3.3* 3.5 3.0*   CL  --  110* 108* 110*   CO2  --  28 25 26   BUN  --  6* 7* 8   CREA  --  0.58* 0.71 0.63   CA  --  8.2* 8.9 8.5   MG  --  2.1 1.7*  --    GLU  --  102* 175* 103*     EGD 6/27/21 with Dr. Quin Reno for dysphagia FINDINGS:  ESOPHAGUS: Multiple unsuccessful attempts were made to intubate the esophagus with a [id number] as well as pediatric (XP) gastroscope. The gastroscope would at times advance 1 cm beyond the level of the vocal cords then meet resistance. Unable to visualize the nature of the anatomical change that resulted in increased resistance to passage of the scope. Procedure discontinued. Estimated blood loss: 0-minimal   Specimens obtained during procedure: none  PLAN: 1. Barium esophogram    Barium esophagram 6/27/21   HISTORY: Dysphasia with incomplete EGD because of narrowed esophagus. TECHNIQUE: The patient ingested effervescent granules followed by thin and thick barium under direct fluoroscopic observation. 1.6 minutes of fluoroscopy time was utilized. 11 spot fluoroscopic images were saved. FINDINGS: There is mild diffuse narrowing of the esophagus without strictures. The gastroesophageal junction was in a normal position. Rapid sequence imaging of the oropharynx demonstrates a normal swallowing motion.  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 The patient was unable to participate in prone swallowing and wished to not ingest the barium tablet because she was concerned about successfully swallowing this capsule. IMPRESSION  1. No mechanical esophageal obstruction identified. 2. Patient unable to participate in prone swallowing or to ingest a barium tablet. EGD 6/29/21 with Dr. Pavel Lama FINDINGS:  OROPHARYNX: Appears swollen. I could not get the [id number] down into the esophagus. Changed the scope to XP and was able to traverse into the esophagus. ESOPHAGUS: The esophagus appears narrowed but I see no inflammation, ulcers, strictures or masses. I did dilate the esophagus using a 21 Fr, 24 Fr and 27 Fr Savary over a wire. Re-endoscopy after dilation showed no heme or rents. Multiple biopsies were taken from the mid and distal esophagus using cold forceps to rule out EoE. STOMACH: The fundus on antegrade and retroflex views is normal. The body, antrum and pylorus is normal.  DUODENUM: The bulb and second portions are normal.  ASSESSMENT:  1. Swelling in Oropharynx  2. Narrowed Esophagus without inflammation, ulcers, focal strictures or masses   DIAGNOSIS   A: \"DISTAL ESOPHAGEAL BIOPSIES\": DETACHED FRAGMENTS OF MINIMALLY HYPERPLASTIC SQUAMOUS EPITHELIUM. B: \"MID ESOPHAGEAL BIOPSIES\": DETACHED FRAGMENTS OF MINIMALLY HYPERPLASTIC SQUAMOUS EPITHELIUM. Flexible laryngoscopy 6/29/21  INDICATIONS: Oral pharyngeal edema  DESCRIPTION: After verbal consent was obtained and a timeout was performed, the flexible scope was advanced down one of the patient's nares into the nasopharynx. The nasal cavity and nasopharynx were clear. The scope was then turned distally down towards the oropharynx. The BOT and vallecula were clear and the epiglottis was normal in appearance. Both aryepiglottic folds were clear and there was a normal appearing glottis w/ healthy TVCs.  No nodules or polyps and the cords were fully mobile. There was noted to have fullness to the right posterior lateral oropharyngeal/hypopharyngeal region with some obstruction of the right piriform sinus. Remaining laryngoscopy examination within normal limits. This area of concern had no mucosal or submucosal mass type lesions and likely represents edema changes. Airway widely patent. . The posterior pharyngeal was clear as well. The scope was then carefully removed. The patient tolerated the procedure well and there were no complications.     Assessment:     Principal Problem:  Lymphedema of right arm (3/10/2021)    Active Problems:  Type 2 diabetes mellitus with diabetic neuropathy ([facility] [address] (2/21/2018)  Current use of anticoagulant therapy (9/18/2018)  Malignant neoplasm of ascending colon ([facility] [address] (9/28/2018)  Hx pulmonary embolism (4/5/2021)  Pancreatic pseudocyst (4/5/2021)  Lymphedema (6/24/2021)    60 y/o F who presented with progression of severe R lymphedema. GI consulted for dysphagia. Esophageal wall thickening noted on CT, w/o significant reflux symptoms, and possibly related to recent dysphagia and wt loss ~ 20# within the past month. Concern for possible esophagitis, stricture, or neoplasm, etc. MBS done, SLP evaluation with grossly functional oropharyngeal swallow function. No aspiration/penetration with any consistencies. On Eliquis, last dose 6/24 AM.  EGD 6/27 was incomplete due to narrowed esophagus (see details above). Subsequent Barium esophagram was negative for esophageal obstruction but reported mild diffuse narrowing of the esophagus without strictures. She is s/p repeat attempt at EGD 6/29 with esophageal dilation as above. She is s/p ENT eval and flexible laryngoscopy for findings of swelling in oropharynx. CT findings were also suspicious for inflammatory breast cancer. Vascular surgery on board. Plan:     - Diet as tolerated, per speech recommendations. - Continue IV Protonix q12. Avoid NSAIDs.   - Appreciate ENT assist.  Note findings on Flexible laryngoscopy. Per ENTYordan Many has widely patent airway on laryngoscopy and is likely okay to trial swallowing. \"  - Hx DVT/PE: Eliquis resumed  - Nearly occluded right internal jugular vein- s/p vascular consult- see note. no surgical intervention recommended. Recommend continued systemic anticoagulation. - possible inflammatory Breast cancer on CT- s/p US guided biopsy of right axillary lymph node with IR 6/25. Pathology pending. Oncology consulted 6/25. - Large R pleural effusion on CT- S/p thoracentesis 6/26 with 600 ml pleural fluid removed. - Pancreatic cyst: stable. - Mildly elevated LFTs: pt with evidence of fatty infiltration on CT- consider outpt workup. Agree with plans for discharge home. No new GI recommendations at this time.     Lito Dasilva PA-C  [facility]

## 2021-07-01 NOTE — PROGRESS NOTES
Spiritual Care Visit, initial visit. Visited with patient at bedside. Prayed for patient's healing and health. Patient has received some answers concerning her condition(s), but is not fully informed. She hopes to obtain more results soon. She expects to be discharged today, and that she will find the answers     Visit by Yessenia Centeno. Isaac Bowens, Staff .  [place]., Th.B., B.A.

## 2021-07-01 NOTE — PROGRESS NOTES
Problem: Pressure Injury - Risk of  Goal: *Prevention of pressure injury  Description: Document Lito Scale and appropriate interventions in the flowsheet. Outcome: Progressing Towards Goal  Note: Pressure Injury Interventions: Activity Interventions: Increase time out of bed, PT/OT evaluation    Mobility Interventions: HOB 30 degrees or less, PT/OT evaluation    Nutrition Interventions: Document food/fluid/supplement intake, Offer support with meals,snacks and hydration                     Problem: Patient Education: Go to Patient Education Activity  Goal: Patient/Family Education  Outcome: Progressing Towards Goal     Problem: Falls - Risk of  Goal: *Absence of Falls  Description: Document Tamiko Fall Risk and appropriate interventions in the flowsheet.   Outcome: Progressing Towards Goal  Note: Fall Risk Interventions:  Mobility Interventions: Communicate number of staff needed for ambulation/transfer, Patient to call before getting OOB         Medication Interventions: Evaluate medications/consider consulting pharmacy, Patient to call before getting OOB, Teach patient to arise slowly    Elimination Interventions: Call light in reach, Patient to call for help with toileting needs, Toileting schedule/hourly rounds              Problem: Patient Education: Go to Patient Education Activity  Goal: Patient/Family Education  Outcome: Progressing Towards Goal     Problem: Patient Education: Go to Patient Education Activity  Goal: Patient/Family Education  Outcome: Progressing Towards Goal     Problem: General Medical Care Plan  Goal: *Vital signs within specified parameters  Outcome: Progressing Towards Goal  Goal: *Labs within defined limits  Outcome: Progressing Towards Goal  Goal: *Absence of infection signs and symptoms  Outcome: Progressing Towards Goal  Goal: *Optimal pain control at patient's stated goal  Outcome: Progressing Towards Goal  Goal: *Skin integrity maintained  Outcome: Progressing Towards Goal  Goal: *Fluid volume balance  Outcome: Progressing Towards Goal  Goal: *Optimize nutritional status  Outcome: Progressing Towards Goal  Goal: *Anxiety reduced or absent  Outcome: Progressing Towards Goal  Goal: *Progressive mobility and function (eg: ADL's)  Outcome: Progressing Towards Goal     Problem: Patient Education: Go to Patient Education Activity  Goal: Patient/Family Education  Outcome: Progressing Towards Goal

## 2021-07-06 NOTE — THERAPY EVALUATION
Liberty Cody  : 1960  Primary: [employer], [place]*  Secondary:  [address]  at [facility]  [address], [place] [address]  [phone]   [phone]          OUTPATIENT PHYSICAL THERAPY:Re-evaluation 2021   ICD-10: Treatment Diagnosis: other lymphedema I 89.0  Precautions/Allergies:   Biaxin [clarithromycin] and Cortisone    TREATMENT PLAN:  Effective Dates: 2021 TO 2021 (90 days). Frequency/Duration: 2 times a week for 90 Day(s) MEDICAL/REFERRING DIAGNOSIS:  Acute embolism and thrombosis of right axillary vein [I82. A11]  Personal history of other malignant neoplasm of large intestine [Z85.038]    DATE OF ONSET:   REFERRING PHYSICIAN: Kathe Barboza MD MD Orders: lymphedema  Return MD Appointment:       INITIAL ASSESSMENT:  Ms. Ruslan Azevedo presents for an assessment of her lymphedema. She presents with gradual progression of edema beginning in September when she suffered an injury to her shoulder while walking her dog. She reports she had surgery to correct this in November. In April she developed a DVT of the right upper extremity. 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 Angioplasty was performed. She has continued to have increasing edema. She is stage 3 lymphedema at this point. She has lost function in her dominant arm. She is unable to work at this time. She will benefit from lymphedema treatment to reduce the edema and increase her function. 21:  The patient was recently discharged from the hospital.  She was referred to home health, but the agency does not treat upper extremity lymphedema. The  from the hospital was contacted. She reports she will attempt to find another agency. The patient does not feel safe driving due to the limitations in her arm and neck.   Until she can receive home health, she will try to find transportation to outpatient therapy. She continues to have significant edema and limited ROM. She will benefit from CDT. She is waiting on biopsy results of the axilla lymph node which was performed in the hospital.   PROBLEM LIST (Impacting functional limitations):  1. Decreased Strength  2. Decreased Flexibility/Joint Mobility  3. Edema/Girth  4. Decreased Knowledge of Precautions  5. Decreased Skin Integrity/Hygeine  6. Decreased [place] with Home Exercise Program INTERVENTIONS PLANNED: (Treatment may consist of any combination of the following)  1. Decongestion Therapy  2. Home Exercise Program (HEP)  3. Manual Therapy  4. Range of Motion (ROM)  5. Therapeutic Exercise/Strengthening     GOALS: (Goals have been discussed and agreed upon with patient.)  Short-Term Functional Goals: Time Frame: 4 weeks  1. The patient will be independent with skin care within 4 weeks. 2. The patient will have knowledge of signs and symptoms of lymphedema and how to manage within 4 weeks. 3. The patient will reduce edema by 2 cm within 4 weeks. 4. The patient will be independent with self MLD within 4 weeks. 5. The patient will be independent with a HEP within 4 weeks. 6.   Discharge Goals: Time Frame: 8 weeks  1. The patient will be fit with a compression garment within 8 weeks. 2. The patient will reduce her edema by 4 cm within 8 weeks. 3. The patient will be independent with self management of lymphedema within 8 weeks. 4.     OUTCOME MEASURE:   Tool Used: Disabilities of the Arm, Shoulder and Hand (DASH) Questionnaire - Quick Version  Score:  Initial: 33/55  Most Recent: X/55 (Date: -- )   Interpretation of Score: The DASH is designed to measure the activities of daily living in person's with upper extremity dysfunction or pain. Each section is scored on a 1-5 scale, 5 representing the greatest disability. The scores of each section are added together for a total score of 55.         Tool Used: ECOG Performance Survey Score  Score:  Initial: 1  Most Recent:      Interpretation of Score:   0 Fully active, able to carry on all pre-disease performance without restriction   1 Restricted in physically strenuous activity but ambulatory and able to carry out work of a light or sedentary nature, e.g., light house work, office work   2 Ambulatory and capable of all selfcare but unable to carry out any work activities. Up and about more than 50% of waking hours   3 Capable of only limited selfcare, confined to bed or chair more than 50% of waking hours   4 Completely disabled. Cannot carry on any selfcare. Totally confined to bed or chair   5 Dead        Tool Used: Lymphedema Life Impact Scale   Score:  Initial: 71 Most Recent: X (Date: -- )   Interpretation of Score: The Lymphedema Life Impact Scale (LLIS) is a validated instrument that measures the physical, functional, and psychosocial concerns pertinent to patients with extremity lymphedema. The Scale's questionnaire is administered to patients to gauge impairments, activity limitations, and participation restrictions resulting from their lymphedema. MEDICAL NECESSITY:   · Patient is expected to demonstrate progress in strength, range of motion and edema management to reduce risk of cellulitis. REASON FOR SERVICES/OTHER COMMENTS:  · Patient continues to demonstrate capacity to improve strength, ROM and edema management which will increase independence. Total Duration:  PT Patient Time In/Time Out  Time In: 1000  Time Out: 1057    Rehabilitation Potential For Stated Goals: [address] therapy, I certify that the treatment plan above will be carried out by a therapist or under their direction. Thank you for this referral,  Sharron Cali, PT          PAIN/SUBJECTIVE:   Initial:   0 Post Session:  0/10   HISTORY:   History of Injury/Illness (Reason for Referral):   Injury of the right shoulder in September, corrective surgery in November, surgery for DVT in April, edema continued, stage 3 lymphedema  Past Medical History/Comorbidities:   Ms. Bam Canchola  has a past medical history of Adverse effect of anesthesia, Anemia (08/2018), Cancer of ascending colon ([address] (2018), Environmental allergies (9/12/2013), History of colon cancer (2018), History of DVT (deep vein thrombosis) (04/2018), History of EKG (03/19/2021), echocardiogram (05/31/2018), Kidney stone, Lumbar stenosis (2018), Pulmonary embolism ([address] (~2018), and Type 2 diabetes mellitus ([address] Ms. Bam Canchola  has a past surgical history that includes hx cholecystectomy (1980's); hx lipoma resection (Right, 1980's); pr part removal colon w anastomosis; hx colonoscopy; hx wisdom teeth extraction; hx orthopaedic; and hx other surgical (09/27/2018). Past Medical History:   Diagnosis Date    Adverse effect of anesthesia     slow to wake after cancer surgery on colon    Anemia 08/2018    no PO iron at present and no infusions    Cancer of ascending colon ([address] 2018    Environmental allergies 9/12/2013    History of colon cancer 2018    surgery on colon    History of DVT (deep vein thrombosis) 04/2018    right lower ext.     History of EKG 03/19/2021    NSR    Hx of echocardiogram 05/31/2018    EF 60-65%    Kidney stone     Lumbar stenosis 2018    per Dr. Shavon Gaines note (care every where)     Pulmonary embolism ([address] ~2018    Type 2 diabetes mellitus ([address]     AVG BS:/no s.s of hypoglycemia/Last A1c: 6.6 on 7/10/2020     Past Surgical History:   Procedure Laterality Date    HX CHOLECYSTECTOMY  1980's    HX COLONOSCOPY      HX LIPOMA RESECTION Right 1980's   Fern Daly right shoulder tendon surgery    HX OTHER SURGICAL  09/27/2018    filter placed to right groin; per patient- has been removed     HX WISDOM TEETH EXTRACTION      IN PART REMOVAL COLON W ANASTOMOSIS         Social History/Living Environment:     lives alone  Prior Level of Function/Work/Activity:  Works full time, but unable to work presently  Dominant Side:         RIGHT   Ambulatory/Rehab Services H2 Model Falls Risk Assessment   Risk Factors:       No Risk Factors Identified Ability to Rise from Chair:       (1)  Pushes up, successful in one attempt   Falls Prevention Plan:       No modifications necessary   Total: (5 or greater = High Risk): 1   ©2010 [facility] [employer] [place] Patent #[id number]. Federal Law prohibits the replication, distribution or use without written permission from [facility] [place]   Current Medications:       Current Outpatient Medications:     losartan (COZAAR) 25 mg tablet, Take 1 Tablet by mouth daily for 30 days. Indications: high blood pressure, Disp: 30 Tablet, Rfl: 0    pantoprazole (PROTONIX) 40 mg tablet, Take 1 Tablet by mouth two (2) times a day for 30 days. , Disp: 60 Tablet, Rfl: 0    potassium chloride (KAON 10%) 20 mEq/15 mL solution, Take 15 mL by mouth daily for 7 days. , Disp: 105 mL, Rfl: 0    nystatin (MYCOSTATIN) powder, Apply  to affected area four (4) times daily. Apply to elbow creases and axilla, Disp: 1 Bottle, Rfl: 3    apixaban (ELIQUIS) 5 mg tablet, Take 1 Tab by mouth two (2) times a day., Disp: 60 Tab, Rfl: 5    acetaminophen (TylenoL) 325 mg tablet, Take  by mouth every four (4) hours as needed for Pain., Disp: , Rfl:    Date Last Reviewed:  7/6/21   Number of Personal Factors/Comorbidities that affect the Plan of Care: 3+: HIGH COMPLEXITY   EXAMINATION:   Palpation:          Tissue pitting, appears rough in texture, engorged veins in the anterior chest.  Appears bruised. ROM:          Right shoulder flexion 20  Abduction 20  Elbow flexion 80  Extension - 55  Strength:          Minimal active mobility of the shoulder and elbow.   Functional Mobility:         Gait/Ambulation:  independent        Transfers:  independent        Bed Mobility:  Independent with moderate difficulty  Skin Integrity:          Skin thin and fragile appearing  Edema/Girth:  pitting    Left Right    Initial Most Recent Initial Most Recent   Upper  Extremity     Base 3rd Finger (cm): 18.5  Wrist (cm): 18.5  Mid Forearm (cm): 32.5  Elbow (cm): 40  Axilla (cm): 54 (48)     Lower  Extremity               Body Structures Involved:  1. Joints  2. Muscles  3. lymphatic system Body Functions Affected:  1. Neuromusculoskeletal Activities and Participation Affected:  1. General Tasks and Demands  2.  Mobility   Number of elements (examined above) that affect the Plan of Care: 4+: HIGH COMPLEXITY   CLINICAL PRESENTATION:   Presentation: Evolving clinical presentation with changing clinical characteristics: MODERATE COMPLEXITY   CLINICAL DECISION MAKING:   Use of outcome tool(s) and clinical judgement create a POC that gives a: Questionable prediction of patient's progress: MODERATE COMPLEXITY

## 2021-07-06 NOTE — PROGRESS NOTES
Ortiz Abel  : 1960  Primary: [employer], [place]*  Secondary:  [address]  at [facility]  [address], [address]  [phone]   [phone]        OUTPATIENT PHYSICAL THERAPY: Daily Treatment Note 2021  Visit Count:  10       ICD-10: Treatment Diagnosis: other lymphedema I 89.0  Precautions/Allergies:   Biaxin [clarithromycin] and Cortisone    TREATMENT PLAN:  Effective Dates: 2021 TO 2021 (90 days). Frequency/Duration: 2 times a week for 90 Day(s)       Pre-treatment Symptoms/Complaints:  The patient reports she has been in the hospital and is waiting on her biopsy results. Pain: Initial:   0/10 Post Session:  0/10   Medications Last Reviewed:  2021  Updated Objective Findings:  Edema/Girth:  pitting    Left Right    Initial Most Recent Initial Most Recent   Upper  Extremity     Base 3rd Finger (cm): 18.5  Wrist (cm): 18.5  Mid Forearm (cm): 32.5  Elbow (cm): 40  Axilla (cm): 54 (48)     Lower  Extremity               TREATMENT:     THERAPEUTIC EXERCISE: ( minutes):  Exercises per grid below to improve mobility. Required minimal verbal cues to promote proper body alignment. Progressed range as indicated. MANUAL THERAPY: ( 57 minutes): Complex decongestive physiotherapy was utilized and necessary because of the patient's restricted joint motion and edema. the patient presented with no bandage in place. Modified MLD performed along with fibrotic techniques to the right upper extremity, right breast and right lateral trunk. A short stretch bandage was applied using stockinette, cotton padding, 6 cm, 8 cm and 2 10 cm short stretch bandages from fingers to axilla. To leave in place until she returns unless pain is present or it slides down. Text A Cab PortalAccess Code: [email or web address]  URL: [email or web address]  Date: 2021  Prepared by: Gibson Hernandez    Exercises  Standing shoulder flexion wall slides - 2 x daily - 7 x weekly - 1 sets - 10 reps - 5 hold  Supine Lower Trunk Rotation - 2 x daily - 7 x weekly - 1 sets - 10 reps - 5 hold      Treatment/Session Summary:    · Response to Treatment:  tolerated the treatment well. Trunk tissue looser allowing tissue mobility for trunk rotation. girth is stable. · Communication/Consultation:  HEP  · Equipment provided today:  None today  · Recommendations/Intent for next treatment session: Next visit will focus on CDT and ROM.     Total Treatment Billable Duration:  57 minutes  PT Patient Time In/Time Out  Time In: 1000  Time Out: 85 [facility], PT    Future Appointments   Date Time Provider Alec Lobo   7/8/2021  1:00 PM Seamus Stevens, PT [facility]   7/12/2021 10:00 AM Sharron Cali, PT [facility]   7/14/2021  8:00 AM Sunni Loya NP [facility] PP PP   7/15/2021 10:00 AM Sharron Cali, PT [facility]   7/15/2021 12:50 PM [facility]   7/15/2021  1:30 PM Higinio Null MD [facility]   7/16/2021  0:63 AM [facility] CT 16 SLICE UNIT 1 [facility]   7/19/2021 10:00 AM Sharron Cali, PT [facility]   7/22/2021  2:15 PM Sharron Cali, PT [facility]   7/22/2021  3:30 PM [facility] OUTREACH INSURANCE [facility]   7/22/2021  4:00 PM Higinio Null MD [facility]   7/26/2021 10:00 AM Sharron Cali, PT [facility]   7/29/2021 10:00 AM Jeannie Cali, PT [facility]

## 2021-07-07 NOTE — ADT AUTH CERT NOTES
Systemic or Infectious Condition GRG - Care Day 7 (6/30/2021) by Say Nguyen       Review Status Review Entered   Completed 7/1/2021 09:50      Criteria Review      Care Day: 7 Care Date: 6/30/2021 Level of Care: Inpatient Floor    Guideline Day 3    Level Of Care    (X) * Activity level acceptable    7/1/2021 09:50:03 EDT by Annika Alvarenga Patient sitting in chair at bedside. Activity as tolerated w/ assist    (X) Floor to discharge    7/1/2021 09:50:03 EDT by Say Nguyen      Medical Bed    Clinical Status    (X) * Hemodynamic stability    7/1/2021 09:50:03 EDT by Say Nguyen      HR 90, /70    (X) * Respiratory status acceptable    7/1/2021 09:50:03 EDT by Say Nguyen      RR 18, 94% RA    (X) * Neurologic status acceptable    7/1/2021 09:50:03 EDT by Say Nguyen      Neuro:  Nonfocal, A&O x3    (X) * Temperature status acceptable    7/1/2021 09:50:03 EDT by Say Nguyen      Temp: 98.0; 97.6; 98.3    (X) * No infection, or status acceptable    7/1/2021 09:50:03 EDT by Say Nguyen      Lymphedema/?inflammatory breast cancer  -6/30 pathology results still pending  Right large pleural effusion  Patient remains stable on RA  -Pulmonary has signed off, will follow-up outpatient    (X) * No neutropenia, or status acceptable    ( ) * Special infection or injury situations absent    7/1/2021 09:50:03 EDT by Say Nguyen      Lymphedema/?inflammatory breast cancer  -6/30 pathology results still pending    (X) * Electrolyte status acceptable    7/1/2021 09:50:03 EDT by Say Nguyen      Chloride: 108 (H)  Glucose: 175 (H)  BUN: 7 (L)  Magnesium: 1.7 (L)    ( ) * General Discharge Criteria met    Interventions    (X) * Intake acceptable    7/1/2021 09:50:03 EDT by Annika Alvarenga Recommend regular diet and thin liquids. No further ST indicated. Please reconsult if new concerns arise.   DysphagiaMinced & Moist Diet    ( ) * No inpatient interventions needed    7/1/2021 09:50:03 EDT by Manjula Patino      Mag 2g IV x1 dose   LR IV infusion @ 50ml/hr  Protonix 40mg IV q12 hrs    * Milestone   Additional Notes   6/30/2021   LOC -IP-Medical       ATTENDING PN Subjective (06/30/21):    Patient sitting in chair at bedside. Denies chest pain, abdominal pain, NVD. In good spirits today, anticipates discharge home soon.    Physical Exam:    General:          No acute distress, speaking in full sentences, no use of accessory muscles    Lungs:             Clear to auscultation bilaterally, slightly diminished RLL   CV:                  Regular rate and rhythm with normal S1 and S2, no murmurs, rubs, gallops    Abdomen:        Soft, nontender, nondistended, normoactive bowel sounds    Extremities:     No cyanosis clubbing, significant RUE edema spreading into anterior right chest, clavicular area, and lateral neck   Neuro:                         Nonfocal, A&O x3    Psych:             Normal affect      Assessment & Plan    Lymphedema/?inflammatory breast cancer   -Significant lymphedema of RUE and R chest wall   -CT findings concerning for inflammatory breast cancer   -Oncology consult, will follow pathology results   -s/p right axillary lymph node biopsy with IR on 6/25, pathologies pending   -Elevation and compression to RUE   -Following lymphedema clinic outpt   -6/30 pathology results still pending        Dysphagia   -CT with mild thickening of mid and distal esophagus, concerning for esophagitis   -SLP eval-defer management to GI   -GI consult   -Continue Protonix   -EGD unable to be completed (could not advance gastroscope)   -Barium esophagram attempted but patient unable to prone swallow or ingest barium tablet   -s/p EGD with dilation 6/29   -s/p laryngoscope exam with ENT for oropharyngeal/hypopharyngeal swelling, most likely associated with significant neck edema from RUE lymphedema, no inpatient recommendations, can follow outpatient if symptoms recur   -per GI, advance diet as patient tolerates, will try minced and moist with Glucerna shakes per patient discussion       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, given collaterals   -Vascular surgery consult - recommend continue [facility] therapy, elevation/compression of RUE   -AC held for procedures, resume when safe to do so   -6/30 Resume home Eliquis       Right large pleural effusion   -Pulmonary consult   -s/p R thoracentesis 6/26 with 600 ml pleural fluid removed   -Pleural fluid exudative per light's criteria, cytology non-malignant   -Patient remains stable on RA   -Pulmonary has signed off, will follow-up outpatient       History of DVT/PE   -On Eliquis, will hold for now for IR/GI procedures, restart when safe to do so   -6/30 Resume home Eliquis       Diabetes Mellitus   -Hold oral hypoglycemic   -SSI ACHS       Hypokalemia, resolved       Hypomagnesemia   -Mg 1.7, replete, check in am       Hypertension   -ARB started 6/29, BP improved       Diet:  Diet Minced and Moist, Glucerna Oral Nutritional Supplement   DVT PPx: Eliquis   Code status: Full Code   Disposition/Expected LOS: Anticipate d/c tomorrow      PULMONARY PN Subjective:    Sitting in chair eating breakfast. Feels well. On RA. No new symptoms. 1. Still awaiting biopsy results of lymph node biopsy right axilla   2. Pleural fluid cytology negative. CXR with persistent small effusion   3. We can see her in the clinic in a couple weeks after discharge to follow up on effusion    Will sign off for now, call with questions.       GI PN: We did an EGD yesterday.  We were able to get through with an XP and I was able to dilate her esophagus up to 27 Fr Savary.  Biopsies were taken from the esophagus and they are pending.  I noted that the oropharynx was swollen so ENT was consulted for evaluation.    ENT noted fullness to the right posterior lateral oropharyngeal/ hypopharyngeal region with some obstruction of right piriform sinus.    Hubert Iniguez is tolerating full liquid diet today. Render General plan for repeat SLP evaluation.  If passes, can advance as tolerates.      ENT did see the patient yesterday. Plan:    - Follow up pathology   - Full liquid diet.  Will ask speech to see today to help guide with diet advancements.     - Continue IV Protonix q12.  Avoid NSAIDs. - Appreciate ENT assist.  Note findings on Flexible laryngoscopy. Per ENT, Malachi Rodriguez has widely patent airway on laryngoscopy and is likely okay to trial swallowing. \"   - Hx DVT/PE: on Eliquis- this was held for IR/GI procedures.  OK to resume from GI standpoint.     - Nearly occluded right internal jugular vein- s/p vascular consult- see note.  no surgical intervention recommended.  Recommend continued systemic anticoagulation. - possible inflammatory Breast cancer on CT- s/p US guided biopsy of right axillary lymph node with IR 6/25.  Pathology pending.  Oncology consulted 6/25. - Large R pleural effusion on CT- S/p thoracentesis 6/26 with 600 ml pleural fluid removed. - Pancreatic cyst: stable. - Mildly elevated LFTs: pt with evidence of fatty infiltration on CT- consider outpt workup. SLP ASSESSMENT   Patient seen for re evaluation. Patient demonstrates overall functional oropharyngeal swallow, which is consistent with recent modfiied barium swallow study. Continues to double swallow, but reports significantly reduced globus sensation and able to independently implement strategies to further decrease globus sensation.     Recommend regular diet and thin liquids. No further ST indicated. Please reconsult if new concerns arise. VITALS: 98.3, HR 90, /70, RR 18, 94% RA    Weight: 82.6kg      LABS:    RBC: 3.60 (L)   HGB: 10.0 (L)   HCT: 32.4 (L)   MCHC: 30.9 (L)   RDW: 16.2 (H)   XR CHEST PA LAT: Persistent small right pleural effusion with associated atelectasis or small infiltrate.       MEDS:    Eliquis 5mg PO BID    Humalog SC TID SSI    Ativan 0.5mg PO q6 hrs PRN x1 dose Cozar 25mg PO qd    Nystatin 573522p topical BID       PLAN:   SCD         Systemic or Infectious Condition GRG - Care Day 6 (6/29/2021) by Mala Paige       Review Status Review Entered   Completed 6/30/2021 09:53      Criteria Review      Care Day: 6 Care Date: 6/29/2021 Level of Care: Inpatient Floor    Guideline Day 3    Level Of Care    (X) * Activity level acceptable    6/30/2021 09:52:59 EDT by Mala Paige      Activity as tolerated w/ assist    (X) Floor to discharge    6/30/2021 09:52:59 EDT by Mala Paige      Medical Bed    Clinical Status    (X) * Hemodynamic stability    6/30/2021 09:52:59 EDT by Mala Paige      , /77    ( ) * Respiratory status acceptable    6/30/2021 09:52:59 EDT by Mala Paige      RR 20, 91% RA    (X) * Neurologic status acceptable    6/30/2021 09:52:59 EDT by Mala Paige      Neuro:             Nonfocal, A&O x3    (X) * Temperature status acceptable    6/30/2021 09:52:59 EDT by Mala Paige      Temp: 98.4; 97.5; 98.1    ( ) * No infection, or status acceptable    6/30/2021 09:52:59 EDT by Mala Paige      Lymphedema/?inflammatory breast cancer  Jun/29: No pathology results yet    (X) * No neutropenia, or status acceptable    ( ) * Special infection or injury situations absent    ( ) * Electrolyte status acceptable    6/30/2021 09:52:59 EDT by Mala Paige      Potassium: 3.0 (L)  Chloride: 110 (H)  Glucose: 103 (H)    ( ) * General Discharge Criteria met    Interventions    ( ) * Intake acceptable    6/30/2021 09:52:59 EDT by Zulema Barrios NPO then full liquid diet post procedure    ( ) * No inpatient interventions needed    6/30/2021 09:52:59 EDT by Mala Paige      MEDS:   LR IV infusion @ 50ml/hr  Ativan 0.5mg PO q6 hrs PRN x1 dose   Nystatin 488123y topical BID   Protonix 40mg IV q12 hrs   KCL 20meq IV q2 hrs x3 doses    * Milestone   Additional Notes   6/29/2021   LOC -IP-Medical       Attending PN Subjective (06/29/21):    EGD with dilation performed this morning; no masses seen but oropharyngeal swelling noted; otolaryngology will see the patient this evening.  Awaiting biopsy and cytology results.  Pulmonology and GI recs appreciated.  Ms. Felisa Ziegler is actively working on incentive spirometry while in the room.    Physical Exam:    General:          No acute distress, speaking in full sentences, no use of accessory muscles    Lungs:             Non labored on room air, no tachypnea   CV:                  Regular rate and rhythm with normal S1 and S2    Abdomen:        Soft, nontender, nondistended   Extremities:     R arm wrapped; no LUE edema   Neuro:             Nonfocal, A&O x3    Psych:             Normal affect      Assessment & Plan    Lymphedema/?inflammatory breast cancer   -Significant lymphedema of RUE and R chest wall   -CT findings concerning for inflammatory breast cancer   -Oncology consult, will follow pathology results   -s/p right axillary lymph node biopsy with IR on 6/25, pathologies pending   -Elevation and compression to RUE   -Following lymphedema clinic outpt   Jun/29: No pathology results yet       Dysphagia   -CT with mild thickening of mid and distal esophagus, concerning for esophagitis   -SLP eval-defer management to GI   -GI consult   -Continue Protonix   -Full liquid diet   -EGD unable to be completed (could not advance gastroscope)   -Barium esophagram attempted but patient unable to prone swallow or ingest barium tablet   -GI planning to re-attempt EGD 6/29, NPO at midnight   Jun/29: Patient feels better s/p dilation               - ENT to see d/t noted oropharyngeal swelling       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, given collaterals   -Vascular surgery consult - recommend continue [facility] therapy, elevation/compression of RUE   -AC held for procedures, resume when safe to do so   -6/29 Continue to hold [facility] for EGD tomorrow per GI       Right large pleural effusion   -Pulmonary consult   -s/p R thoracentesis 6/26 with 600 ml pleural fluid removed   -Pleural fluid exudative per light's criteria   -Patient remains stable on RA   Jun/29: Karoline Larios continues to follow               - Awaiting cytology results       History of DVT/PE   -On Eliquis, will hold for now for IR/GI procedures, restart when safe to do so   -6/29 Continue to hold [facility] for EGD tomorrow per GI       Diabetes Mellitus   -Hold oral hypoglycemic   -SSI ACHS   -Remains on full liquid diet       Hypokalemia   Jun/29: 3.0; replace               - f/u a.m. labs       HTN   Jun/29: start low dose ARB       Diet:  DIET ADULT ORAL NUTRITION SUPPLEMENT   DIET NPO   DVT PPx: SCD   Code status: Full Code   Disposition/Expected LOS: > 2 midnights      PULMONARY PN Subjective:     Seen sitting up in the chair - swelling right arm about the same.  Anxious to get biopsy results.  Asked nurse for medication for anxiety last night to help her sleep.     Plan:  (Medical Decision Making)   1. Still awaiting biopsy results of lymph node biopsy right axilla   2. Pleural fluid cytology pending as well. CXR without recurrence of effusion   3. S/p EGD - noted swelling of upper airway - GI has consulted ENT. Random biopsies taken with narrowing of esophagus noted - dilation performed   4. Oncology on standby   5. Reassess blood pressure - hypertensive. No outpatient or current tx   6. Eliquis on hold - restart when all procedures complete   7. Supplementing potassium   Richa Bermudez, NP   Lungs:  Clear, somewhat decreased on R, RA   Heart:  RRR with no Murmur/Rubs/Gallops    Additional Comments:  Cyto from pleural fluid negative. Will repeat CXR in AM to re-evaluate ATX and any evidence for pleural fluid re accumulation. Lymph node biopsy still pending. GI PN S:    Patient says she is still not swallowing anything solid.  She is able to tolerate liquids okay. Luisana Alamo is okay with us trying again with the EGD.    A/P:    1. Dysphagia   2. Abnormal Barium Swallow    - Will re-attempt EGD today.     - Endoscopy and risks explained to the patient.  Risks including reaction to sedation, cardiopulmonary events, infection, bleeding, perforation, requirement for surgery if complications should occur, death were explained to patient who expressed understanding and agreed to proceed with endoscopy. [facility]   ESOPHAGOGASTRODUODENOSCOPY   DATE of PROCEDURE: 6/29/2021   PROCEDURE:  EGD with Savary Dilation of Esophagus (21, 24 and 27 Fr), EGD with biopsies, EGD diagnostic    INDICATIONS: Dysphagia, Abnormal Barium Swallow and Abnormal CT    FINDINGS:   OROPHARYNX: Appears swollen.  I could not get the [id number] down into the esophagus.  Changed the scope to XP and was able to traverse into the esophagus. ESOPHAGUS: The esophagus appears narrowed but I see no inflammation, ulcers, strictures or masses.  I did dilate the esophagus using a 21 Fr, 24 Fr and 27 Fr Savary over a wire.  Re-endoscopy after dilation showed no heme or rents.  Multiple biopsies were taken from the mid and distal esophagus using cold forceps to rule out EoE. STOMACH: The fundus on antegrade and retroflex views is normal. The body, antrum and pylorus is normal.   DUODENUM: The bulb and second portions are normal.       ASSESSMENT:   1. Swelling in Oropharynx   2. Narrowed Esophagus without inflammation, ulcers, focal strictures or masses    PLAN:   1. Follow up pathology   2. Avoid NSAIDs   3. PPI BID   4.  Will have ENT take a look at the oropharynx      ENT CONSULT HPI: Patient seen as a inpatient ENT consultation request for complaint of oral pharyngeal swelling.  She is currently under admission with a history of breast cancer and having several weeks of progressively worsening right upper extremity edema which progressed into her chest shoulders and neck on the right side.  She states that it became so significant over the last several weeks that she was having dysphagia even to solid foods and worsened to the point where she was losing weight and came into the emergency room for work-up. Jose Alejandro Baez was found to have significant edema to the right upper extremity shoulder chest back and neck and a right-sided pleural effusion and she has undergone diuresis and paracentesis with what she states is significant improvement to her symptoms of the neck compared to a couple days ago. Sandoval Urena was some concern of esophagitis on her admission CT imaging and she underwent EGD this morning with dilation and during that procedure there was some concern of noted oral pharyngeal edema.  Therefore we have been consulted for further evaluation.  She states that she is swallowing somewhat better although she has not been given a diet there is no concerns with swallowing her saliva.  She states that she has significantly improved flexibility of her neck and less pressure and tightness.  She denies any shortness of breath or stridor. PROCEDURE: Flexible laryngoscopy   INDICATIONS: Oral pharyngeal edema   DESCRIPTION: After verbal consent was obtained and a timeout was performed, the flexible scope was advanced down one of the patient's nares into the nasopharynx. The nasal cavity and nasopharynx were clear. The scope was then turned distally down towards the oropharynx. The BOT and vallecula were clear and the epiglottis was normal in appearance. Both aryepiglottic folds were clear and there was a normal appearing glottis w/ healthy TVCs. No nodules or polyps and the cords were fully mobile.  There was noted to have fullness to the right posterior lateral oropharyngeal/hypopharyngeal region with some obstruction of the right piriform sinus.  Remaining laryngoscopy examination within normal limits.  This area of concern had no mucosal or submucosal mass type lesions and likely represents edema changes.  Airway widely patent. . The posterior pharyngeal was clear as well.  The scope was then carefully removed. The patient tolerated the procedure well and there were no complications.    ASSESSMENT and PLAN        1.  Oropharyngeal/hypopharyngeal edema   2.  [id number]   3.  Lymphedema of right upper extremity   4.  Breast cancer    Patient has evidence of mild right posterior lateral oropharyngeal/hypopharyngeal edema with some obstruction of the right piriform sinus.  Otherwise completely normal laryngoscopy evaluation with a widely patent airway. The above findings on laryngoscopy are very likely associated with the significant neck edema from her right upper extremity lymphedema secondary to her breast cancer. Patient has significantly improved ability to swallow her saliva and improvement of neck restriction following her diuresis this week. Clarice Sanchez has widely patent airway on laryngoscopy and is likely okay to trial swallowing. If symptoms recur due to swelling in the future she can follow-up as outpatient for repeat evaluation.       VITALS: 98.4, , /77, RR 20, 91% RA    Weight: 79.4kg      LABS:    RBC: 3.19 (L)   HGB: 9.2 (L)   HCT: 27.7 (L)   RDW: 15.9 (H)      PLAN:   SCD         Systemic or Infectious Condition GRG - Care Day 5 (6/28/2021) by Lauren Gardner       Review Status Review Entered   Completed 6/28/2021 16:08      Criteria Review      Care Day: 5 Care Date: 6/28/2021 Level of Care: Inpatient Floor    Guideline Day 3    Level Of Care    (X) * Activity level acceptable    6/28/2021 16:08:07 EDT by Lauren Gardner      Activity as tolerated w/ assist    (X) Floor to discharge    6/28/2021 16:08:07 EDT by Handy Nova [facility]    Clinical Status    (X) * Hemodynamic stability    6/28/2021 16:08:07 EDT by Lauren Gardner      HR 90, /72    (X) * Respiratory status acceptable    6/28/2021 16:08:07 EDT by Lauren Gardner      RR 18, 96% RA    (X) * Neurologic status acceptable    6/28/2021 16:08:07 EDT by Lauren Gardner      Neuro:  Nonfocal, A&O x3    (X) * Temperature status acceptable    6/28/2021 16:08:07 EDT by Lisandro Carter      Tmax: 98.6    (X) * No infection, or status acceptable    (X) * No neutropenia, or status acceptable    ( ) * Special infection or injury situations absent    (X) * Electrolyte status acceptable    ( ) * General Discharge Criteria met    Interventions    ( ) * Intake acceptable    6/28/2021 16:08:07 EDT by Lisandro Carter      Full Liquid Diet then NPO at midnight  LR IV infusion @ 50ml/hr  Protonix 40mg IV q12 hrs    ( ) * No inpatient interventions needed    6/28/2021 16:08:07 EDT by Lisandro Carter      LR IV infusion @ 50ml/hr  Protonix 40mg IV q12 hrs  EGD 6/29    * Milestone   Additional Notes   6/28/2021   LOC -IP-Medical       Attending PN Subjective (06/28/21):    Patient tearful during rounding, she is worried about what is going on and just wants answers/plan to move forward and fix things. Re-assured her that many specialties were coordinating her care to get her answers and a plan as quickly as possible. She would like a  to come to bedside to pray and comfort her in her ifeanyi.       Physical Exam:    General:          No acute distress, speaking in full sentences, no use of accessory muscles    Lungs:             RLL with improved aeration, otherwise breath sounds clear to auscultation   Neck:               R lower neck swelling, no JVD   CV:                  Regular rate and rhythm with normal S1 and S2    Abdomen:        Soft, nontender, nondistended, normoactive bowel sounds    Extremities:     No cyanosis clubbing, RUE edematous with swelling into upper anterior chest   Neuro:             Nonfocal, A&O x3    Psych:             Tearful affect       Assessment & Plan    Lymphedema/?inflammatory breast cancer   -Significant lymphedema of RUE and R chest wall   -CT findings concerning for inflammatory breast cancer   -Oncology consult, will follow pathology results   -s/p right axillary lymph node biopsy with IR on 6/25, pathologies pending   -Elevation and compression to RUE   -Following lymphedema clinic outpaitnet       Dysphagia   -CT with mild thickening of mid and distal esophagus, concerning for esophagitis   -SLP eval-defer management to GI   -GI consult   -Continue Protonix   -Full liquid diet   -EGD unable to be completed (could not advance gastroscope)   -Barium esophagram attempted but patient unable to prone swallow or ingest barium tablet   -GI planning to re-attempt EGD 6/29, NPO at midnight       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, given collaterals   -Vascular surgery consult - recommend continue [facility] therapy, elevation/compression of RUE   -AC held for procedures, resume when safe to do so   -6/29 Continue to hold [facility] for EGD tomorrow per GI       Right large pleural effusion   -Pulmonary consult   -s/p R thoracentesis 6/26 with 600 ml pleural fluid removed   -Pleural fluid exudative per light's criteria   -Patient remains stable on RA       History of DVT/PE   -On Eliquis, will hold for now for IR/GI procedures, restart when safe to do so   -6/29 Continue to hold [facility] for EGD tomorrow per GI       Diabetes Mellitus   -Hold oral hypoglycemic   -SSI ACHS   -Remains on full liquid diet       Hypokalemia, resolved   -K 3.6, continue to monitor       Diet:  DIET ADULT Full Liquid   DVT PPx: SCD's   Code status: Full Code   Disposition/Expected LOS: Pending clinical course      GI PN: Patient had a barium swallow yesterday which showed mild diffuse narrowing in the esophagus without focal strictures or obstruction.      Will plan on repeating EGD attempt tomorrow for further evaluation.    Keep patient NPO after MN. Plan:    - Clear liquids for now.   NPO after midnight for re-attempt at EGD tomorrow 6/29/21 with Dr. Bakari Squires given report of mild diffuse narrowing of esophagus without strictures.     - Continue IV Protonix q12   - Hx DVT/PE: on Eliquis- currently held for IR/GI procedures. - Nearly occluded right internal jugular vein- s/p vascular consult- see note.  no surgical intervention recommended.  Recommend continued systemic anticoagulation. - possible inflammatory Breast cancer on CT- s/p US guided biopsy of right axillary lymph node with IR 6/25.  Pathology pending.  Oncology consulted 6/25 and following.   - Large R pleural effusion on CT- S/p thoracentesis 6/26 with 600 ml pleural fluid removed. - Pancreatic cyst: stable. - Mildly elevated LFTs: pt with evidence of fatty infiltration on CT- consider outpt workup. Pulmonary PN Subjective: On RA.  R thora completed 6/26 with 600 ml removed.  Off O2. Breathing better.  Still with R arm and breast swelling   PLAN:   --follow pleural studies, +lights criteria,  exudative   --Repeat CXR PA/Lat, haziness on right chest may be noted breast/chest swelling   --eliquis on hold   --still having difficulty swallowing anything other than thin liquids   --R axillary biopsy pending, follow results   --oncology onboard and following      Nutrition Recommendations/Plan:    · Advance diet as medically appropriate   · Start Ensure Clear three times per day on CLD: (240 kcal and 8g protein each)      VITALS: 98.6, HR 90, /72, RR 18, 96% RA    Weight: 83.1kg      LABS:    RBC: 3.22 (L)   HGB: 9.3 (L)   HCT: 28.5 (L)   RDW: 15.9 (H)   Chloride: 111 (H)   Anion gap: 6 (L)   GLUCOSE,FAST - POC: 130 (H)   XR CHEST PA LAT: Right lower lobe pneumonia and right pleural effusion worse in the interval.      MEDS:    Nystatin [id number] topical BID       PLAN:    SCD   EGD    Spot check pulse ox PRN          Systemic or Infectious Condition GRG - Care Day 4 (6/27/2021) by Carlos Han       Review Status Review Entered   Completed 6/28/2021 12:36      Criteria Review      Care Day: 4 Care Date: 6/27/2021 Level of Care: Inpatient Floor    Guideline Day 3    Level Of Care    (X) * Activity level acceptable    6/28/2021 12:36:06 EDT by Dylon Ochoa      Activity as tolerated w/ assist    (X) Floor to discharge    6/28/2021 12:36:06 EDT by Dylon Ochoa      Medical Bed    Clinical Status    (X) * Hemodynamic stability    6/28/2021 12:36:06 EDT by Dylon Ochoa      HR 93, /70    (X) * Respiratory status acceptable    6/28/2021 12:36:06 EDT by Dylon Ochoa      RR 18, 96% RA    (X) * Neurologic status acceptable    6/28/2021 12:36:06 EDT by Dylon Ochoa      Neuro:             Nonfocal, A&O x3    (X) * Temperature status acceptable    6/28/2021 12:36:06 EDT by Dylon Ochoa      Temp: 97.7; 98.0; 98.7    (X) * No infection, or status acceptable    (X) * No neutropenia, or status acceptable    ( ) * Special infection or injury situations absent    ( ) * Electrolyte status acceptable    6/28/2021 12:36:06 EDT by Dylon Ochoa      Potassium: 3.3 (L)  Chloride: 110 (H)    ( ) * General Discharge Criteria met    Interventions    ( ) * Intake acceptable    6/28/2021 12:36:06 EDT [facility]    ( ) * No inpatient interventions needed    6/28/2021 12:36:06 EDT by Dylon Ochoa      LR IV infusion @ 50ml/hr  Protonix 40mg IV q12 hrs   Pepcid 20mg IV PRN x1 dose   KCL 20meq IV x1 dose    * Milestone   Additional Notes   6/27/2021   LOC -IP-Medical      Attending PN Subjective (06/27/21):    Patient seen in room after EGD attempt, remains slightly drowsy. Endorses some improvement with chest pressure/breathing after thoracentesis yesterday. Denies chest pain, abdominal pain, NVD.       Physical Exam:    General:          No acute distress, speaking in full sentences, no use of accessory muscles    Lungs:             RLL with very diminished breath sounds, otherwise breath sounds clear to auscultation   Neck:               R lower neck swelling, no JVD   CV:                  Regular rate and rhythm with normal S1 and S2    Abdomen:        Soft, nontender, nondistended, normoactive bowel sounds    Extremities:     No cyanosis clubbing, RUE edematous with swelling into upper anterior chest   Neuro:             Nonfocal, A&O x3    Psych:             Normal affect      Assessment & Plan    Lymphedema/?inflammatory breast cancer   -Significant lymphedema of RUE and R chest wall   -CT findings concerning for inflammatory breast cancer   -Oncology consult, will follow pathology results   -s/p right axillary lymph node biopsy with IR on 6/25, pathologies pending   -Elevation and compression to RUE   -Following lymphedema clinic outpaitnet       Dysphagia   -CT with mild thickening of mid and distal esophagus, concerning for esophagitis   -SLP eval-defer management to GI   -GI consult   -Continue Protonix   -Clear liquid diet    -6/27 EGD unable to be completed (could not advance gastroscope), planning for Barium esophagram today       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 therapy, elevation and compression of RA   -AC (Eliquis) held for procedures, resume when safe to do so       Right large pleural effusion   -Pulmonary consult   -s/p R thoracentesis 6/26 with 600 ml pleural fluid removed   -Pleural fluid exudative per light's criteria   -Patient remains stable on RA       History of DVT/PE   -On Eliquis, will hold for now for IR/GI procedures, restart when safe to do so       Diabetes Mellitus   -Hold oral hypoglycemic   -SSI ACHS   -Remains on clear liquid diet       Hypokalemia   -K 3.3, replete, continue to monitor       Diet:  DIET ADULT Clear Liquid   DVT PPx: SCD's   Code status: Full Code   Disposition/Expected LOS: Pending clinical course      GI PROCEDURE NOTE   Pre-procedure Diagnoses   Dysphagia, unspecified type [R13.10]   Post-procedure Diagnoses   Procedure discontinued [Z53.9]   Procedures   EGD [FED4144 (Custom)]   INDICATION:   1. Dysphagia    POSTPROCEDURE DIAGNOSIS:   1.  Discontinued procedure   FINDINGS:   ESOPHAGUS: Multiple unsuccessful attempts were made to intubate the esophagus with a [id number] as well as pediatric (XP) gastroscope.  The gastroscope would at times advance 1 cm beyond the level of the vocal cords then meet resistance.  Unable to visualize the nature of the anatomical change that resulted in increased resistance to passage of the scope. Procedure discontinued. PLAN:   1. Barium esophogram      PULMONARY PN Subjective: On RA.  R thora completed yesterday with 600 ml removed. Ishaan Dunn unable to do EGD r/t inability to pass scope.  Plan for barium esophogram.     PLAN:   --follow pleural studies, +lights criteria,  exudative   --RN to obtain consent for R thoracentesis    --eliquis on hold since Thursday AM, heparin also on hold since 6/25 (can one of these be restarted? GI held both with ? EGD, but unable to complete procedure)   --GI planning for barium esophogram    --R axillary biopsy pending, follow results   --oncology onboard and following   Lungs: CTA b/l   Heart S1 and S2 audible, no murmers or rubs appreciated   Other     cxr noted and no reaccumulation of fluid and no ptx today   Fluid is exudative based on TP ratio. F/u fluid cutlures   Awaiting pathology of fluid and recent biopsy by IR. VITALS:  98.7, HR 93, /70, RR 18, 96% RA   Weight: 76.9kg      LABS:    RBC: 3.30 (L)   HGB: 9.5 (L)   HCT: 29.1 (L)   RDW: 15.9 (H)   XR CHEST SNGL V: The heart is not enlarged. There is pleural effusion or significant pneumothorax. There is no consolidation. XR BA SWALLOW ESOPHOGRAM: 1. No mechanical esophageal obstruction identified. 2. Patient unable to participate in prone swallowing or to ingest a barium tablet.       MEDS:    LR IV infusion @ 50ml/hr   Protonix 40mg IV q12 hrs    Pepcid 20mg IV PRN x1 dose    KCL 20meq IV x1 dose       PLAN:    Clear liquid diet   Activity as tolerated w/ assist    SCD   Spot check pulse ox PRN    Full liquid diet then NPO at midnight         Systemic or Infectious Condition GRG - Care Day 3 (6/26/2021) by Dylon Ochoa       Review Status Review Entered   Completed 6/28/2021 12:22      Criteria Review      Care Day: 3 Care Date: 6/26/2021 Level of Care: Inpatient Floor    Guideline Day 3    Level Of Care    (X) * Activity level acceptable    6/28/2021 12:22:35 EDT by Dylon Ochoa      Activity as tolerated w/ assist    (X) Floor to discharge    6/28/2021 12:22:35 EDT by Dylon Ochoa      Medical Bed    Clinical Status    ( ) * Hemodynamic stability    6/28/2021 12:22:35 EDT by Dylon Ochoa      ; /77    (X) * Respiratory status acceptable    6/28/2021 12:22:35 EDT by Dylon Ochoa      RR 18, 95% RA    (X) * Neurologic status acceptable    6/28/2021 12:22:35 EDT by Dylon Ochoa      Neuro:             Nonfocal, A&O x3    (X) * Temperature status acceptable    6/28/2021 12:22:35 EDT by Dylon Ochoa      Temp: 97.7; 97.8    (X) * No infection, or status acceptable    6/28/2021 12:22:35 EDT by Dylon Ochoa      Right large pleural effusion:  -6/26 Pulmonary consulted, thoracentesis with 600 ml fluid removed, specimens sent to lab, patient remains stable on RA    (X) * No neutropenia, or status acceptable    ( ) * Special infection or injury situations absent    (X) * Electrolyte status acceptable    ( ) * General Discharge Criteria met    Interventions    ( ) * Intake acceptable    6/28/2021 12:22:35 EDT by Dylon Ochoa      Tolerated broth and pudding yesterday well. ( ) * No inpatient interventions needed    6/28/2021 12:22:35 EDT by Dylon Ochoa      LR IV infusion @ 50ml/hr  Protonix 40mg IV q12 hrs    * Milestone   Additional Notes   6/26/2021   LOC -IP-Medical      Attending PN Subjective (06/26/21)    Patient pleasant, conversational, sitting in chair. Denies chest pain, endorses slight SOB, orthopnea. Tolerated broth and pudding yesterday well.       Physical Exam: General:          No acute distress, speaking in full sentences, no use of accessory muscles    Lungs:             RLL with very diminished breath sounds, otherwise breath sounds clear to auscultation   Neck:               R lower neck swelling, no JVD   CV:                  Regular rate and rhythm with normal S1 and S2    Abdomen:        Soft, nontender, nondistended, normoactive bowel sounds    Extremities:     No cyanosis clubbing, RUE edematous with swelling into upper anterior chest   Neuro:             Nonfocal, A&O x3    Psych:             Normal affect      Assessment & Plan    Lymphedema/?inflammatory breast cancer:   Patient with significant right upper extremity edema to the point having difficulty swallowing   CT findings concerning for inflammatory breast cancer   Oncology consult, appreciate recommendation   IR consult for possible lymph node biopsy for further evaluation   Patient following lymphedema clinic outpatient   6/25/21 IR has seen patient,  US guided biopsy completed today pathology pending; Elevate right arm, apply compression wraps as patient can tolerate; oncology to see        Dysphagia:   CT with mild thickening of mid and distal esophagus, concerning for esophagitis   GI consult   SLP eval   Continue Protonix   Liquid diet for now   6/25/21 SLP has seen patient and has deferred management to GI. Esophageal wall thickening noted on CT, w/o significant reflux symptoms. GI will consider EGD when patient has been off Eliquis x 48 hours.  Full liquid diet   -6/26 NPO at midnight, EGD planned for 6/27       Occlusion of right internal jugular vein:   Looks chronic, given collaterals   Vascular surgery consult   6/25/21 Vascular consulted with recs for continued [facility] [place], operated by [facility] therapy, elevation and compression of RA        Right large pleural effusion:   -6/26 Pulmonary consulted, thoracentesis with 600 ml fluid removed, specimens sent to lab, patient remains stable on RA       History of DVT/PE:   On Eliquis, will hold for now for possible IR intervention   Heparin for DVT prophylaxis   -6/26 Continue to hold [facility], EGD with dilation in AM       Diabetes mellitus   -Hold oral hypoglycemic   -SSI ACHS       Diet:  DIET NPO   DIET NPO    DVT PPx: SCD's   Code status: Full Code   Disposition/Expected LOS: Pending clinical course      PULMONARY PN Subjective:   Remains on RA.  Plans for EGD per GI and thoracentesis per us this AM. Remains NPO. U/S guided biopsy performed per IR yesterday.  Results pending. PLAN:   --plans for R thoracentesis today.  Fluid to be sent for study. --RN to obtain consent for R thoracentesis    --eliquis on hold since Thursday AM   --GI planning for EGD with dilation this AM   --R axillary biopsy pending, follow results   --oncology onboard and following   Lungs : decreased sounds right chest. No wheezing   Heart S1 and S2 audible, no murmers or rubs appreciated   Other     Taper today Right chest      GI PN: NPO after midnight for EGD in AM.  Patient has symptoms of dysphagia and abnormal appearance of esophagus on imaging.  Further recommendations to follow procedure. Subjective:    Patient to undergo thoracentesis today. EGD deferred til tomorrow.     No N/V or abdominal pain. Plan:    Continue to hold Eliquis/heparin. EGD with dilation in AM. Hold Heparin tonight.     CT of possible inflammatory breast cancer. Underwent US guided biopsy of right axillary lymph node with IR yesterday. CT also showed a large right pleural effusion. Pulmonary planning on thoracentesis today. .      Pancreatic cyst stable. Mildly elevated LFTs and evidence of fatty infiltration on CT- consider outpt workup.       US Guided Thoracentesis Procedure Note-UNILATERAL   Pre-procedure Diagnoses   Pleural effusion, right [J90]   Post-procedure Diagnoses   Pleural effusion, right [J90]   Procedure:  Right/Left Thoracentesis with ultrasound guidance    Indication:  Right/Left Pleural effusion Approximately 600 ml of fluid was obtained with ease   The pleural fluid will be sent for :protein, LDH, cytology, cell count, AFB.    Told nurse to monitor incision site. Did have bleeding and stopped but was having marked bleeding ? thick skin ? CA      VITALS: 97.8, , /77, RR 18, 95% RA    Weight: 75.5kg      LABS:    WBC: 4.1 (L)   RBC: 3.46 (L)   HGB: 9.7 (L)   HCT: 31.4 (L)   MCHC: 30.9 (L)   RDW: 16.2 (H)   MONOCYTES: 17 (H)   Chloride: 110 (H)   Albumin: 2.6 (L)   Globulin: 4.8 (H)   A-G Ratio: 0.5 (L)   XR CHEST SNGL V: No visible pneumothorax status post right-sided thoracentesis      MEDS:    LR IV infusion @ 50ml/hr   Protonix 40mg IV q12 hrs       PLAN:    Activity as tolerated w/ assist    SCD   Spot check pulse ox PRN    Full liquid diet then NPO at midnight

## 2021-07-08 NOTE — PROGRESS NOTES
Mercedes Lora  : 1960  Primary: [employer] [place]*  Secondary:  [address]  at [facility]  [address], [address], [place] [address]  [phone]   [phone]        OUTPATIENT PHYSICAL THERAPY: Daily Treatment Note 2021  Visit Count:  11       ICD-10: Treatment Diagnosis: other lymphedema I 89.0  Precautions/Allergies:   Biaxin [clarithromycin] and Cortisone    TREATMENT PLAN:  Effective Dates: 2021 TO 2021 (90 days). Frequency/Duration: 2 times a week for 90 Day(s)       Pre-treatment Symptoms/Complaints:  The patient reports she sees Dr. Joselyn Castaneda today. She reports she hopes she gets answers. Pain: Initial:   0/10 Post Session:  0/10   Medications Last Reviewed:  2021  Updated Objective Findings:  Edema/Girth:  pitting    Left Right    Initial Most Recent Initial Most Recent   Upper  Extremity           Lower  Extremity               TREATMENT:     THERAPEUTIC EXERCISE: ( minutes):  Exercises per grid below to improve mobility. Required minimal verbal cues to promote proper body alignment. Progressed range as indicated. MANUAL THERAPY: ( 44 minutes): Complex decongestive physiotherapy was utilized and necessary because of the patient's restricted joint motion and edema. the patient presented with no bandage in place. Modified MLD performed along with fibrotic techniques to the right upper extremity, right breast and right lateral trunk. A short stretch bandage was applied using stockinette, cotton padding, 6 cm, 8 cm and 2 10 cm short stretch bandages from fingers to axilla. To leave in place until she returns unless pain is present or it slides down. Tissue softer in the upper arm today. Tissue thick along the breast, axilla and lateral trunk. N[employer] PortalAccess Code: [email or web address]  URL: [email or web address]  Date: 2021  Prepared by: Nadia Serna    Exercises  Standing shoulder flexion wall slides - 2 x daily - 7 x weekly - 1 sets - 10 reps - 5 hold  Supine Lower Trunk Rotation - 2 x daily - 7 x weekly - 1 sets - 10 reps - 5 hold      Treatment/Session Summary:    · Response to Treatment:  tolerated the treatment well. Tissue in the upper arm looser today. · Communication/Consultation:  HEP  · Equipment provided today:  None today  · Recommendations/Intent for next treatment session: Next visit will focus on CDT and ROM.     Total Treatment Billable Duration:  44 minutes  PT Patient Time In/Time Out  Time In: 1302  Time Out: 222 Ayden Parker, PT    Future Appointments   Date Time Provider Alec Lobo   7/12/2021 10:00 AM Sharron Cali, PT [facility]   7/14/2021  8:00 AM Bard Dulce Maria Grissom NP [facility] PP PP   7/15/2021 10:00 AM Sharron Cali, PT [facility]   7/15/2021 12:50 PM [address] [place] [address] OUTREACH INS[facility] [address] [place] [address]   7/15/2021  1:30 PM Carol Luna MD [facility]   7/16/2021  0:51 AM SFE CT 16 SLICE UNIT 1 [facility]CT SFE   7/19/2021 10:00 AM Sharron Cali, PT [facility]   7/22/2021  2:15 PM Sharron Cali, PT [facility]   7/22/2021  3:30 PM [address] [place] [address] OUTREACH INSURANCE [facility] GVL [address] [place] [address]   7/22/2021  4:00 PM Carol Luna MD [facility]   7/26/2021 10:00 AM Sharron Cali, PT [facility]   7/29/2021 10:00 AM Sonya Cali, PT [facility]

## 2021-07-12 NOTE — PROGRESS NOTES
Galo Valentin  : 1960  Primary: [employer] [place]  Secondary:  [address]  at [facility]  [address], [address], [place] [address]  [phone]   [phone]        OUTPATIENT PHYSICAL THERAPY: Daily Treatment Note 2021  Visit Count:  12       ICD-10: Treatment Diagnosis: other lymphedema I 89.0  Precautions/Allergies:   Biaxin [clarithromycin] and Cortisone    TREATMENT PLAN:  Effective Dates: 2021 TO 2021 (90 days). Frequency/Duration: 2 times a week for 90 Day(s)       Pre-treatment Symptoms/Complaints:  The patient reports she did not see the doctor the other day. She has an appointment this Thursday. It had been rescheduled. She reports she just wants to turn her head better and use her arm. Pain: Initial:   0/10 Post Session:  0/10   Medications Last Reviewed:  2021  Updated Objective Findings:  Edema/Girth:  pitting    Left Right    Initial Most Recent Initial Most Recent   Upper  Extremity           Lower  Extremity               TREATMENT:     THERAPEUTIC EXERCISE: ( minutes):  Exercises per grid below to improve mobility. Required minimal verbal cues to promote proper body alignment. Progressed range as indicated. MANUAL THERAPY: ( 55 minutes): Complex decongestive physiotherapy was utilized and necessary because of the patient's restricted joint motion and edema. the patient presented with no bandage in place. Modified MLD performed along with fibrotic techniques to the right upper extremity, right breast and right lateral trunk. Skin care performed. A short stretch bandage was applied using stockinette, cotton padding, 6 cm, 8 cm and 2 10 cm short stretch bandages from fingers to axilla. To leave in place until she returns unless pain is present or it slides down. Tissue softer in the breast today. Tissue feels adhered to the lateral trunk.   [employer] PortalAccess Code: [email or web address]  URL: [email or web address]  Date: 06/01/2021  Prepared by: Santiago Garcia    Exercises  Standing shoulder flexion wall slides - 2 x daily - 7 x weekly - 1 sets - 10 reps - 5 hold  Supine Lower Trunk Rotation - 2 x daily - 7 x weekly - 1 sets - 10 reps - 5 hold      Treatment/Session Summary:    · Response to Treatment:  tolerated the treatment well. Breast tissue feels softer today. · Communication/Consultation:  HEP  · Equipment provided today:  None today  · Recommendations/Intent for next treatment session: Next visit will focus on CDT and ROM.     Total Treatment Billable Duration:  55 minutes  PT Patient Time In/Time Out  Time In: 1003  Time Out: [address] Martinez, PT    Future Appointments   Date Time Provider Alec Lobo   7/14/2021  8:00 AM Abida Grissom NP [facility] PP PP   7/15/2021 11:00 AM Sharron Cali, PT [facility]   7/15/2021 12:50 PM [address] [place] [address] OUTREACH INSURANCE [address]   7/15/2021  1:30 PM Dave Davies MD [facility]   7/16/2021  4:78 AM SFE CT 16 SLICE UNIT 1 SFERCT SFE   7/19/2021 10:00 AM Sharron Cali, PT [facility]   7/22/2021  2:15 PM Sharron Cali, PT [facility]   7/26/2021 10:00 AM Sharron Cali, PT [facility]   7/29/2021 10:00 AM Guillaume Cali, PT [facility]

## 2021-07-14 NOTE — H&P (VIEW-ONLY)
[address] [facility], [facility]  [phone]        Name:  Kelli Walter  YOB: 1960  MRN:  [id number]    Office visit  7/14/2021        Chief Complaint   Patient presents with   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 The swelling has progressed into her neck resulting in dysphagia and unintentional weight loss of ~20 lbs. Pt was found to have right pleural effusion. s/p R thoracentesis with 600 ml exudative fluid removed, non malignant cytology.   PMHx includes colon cancer with resection in Oct 2018, DVT, and PE that year as well and has been on Eliquis.  More recently, she underwent right shoulder arthroscopy in November 2020 and then developed significant right upper extremity lymphedema. Evaluation showed no clot. She was referred to a lymphedema specialist for treatment.    She has had right axillary fullness for some time. CT here shows diffuse edematous changes of the rightward aspect of the neck, mediastinum, right axilla, right upper extremity, and right breast. There is diffuse skin thickening and trabecular thickening of the right breast with an overall smaller/contracted appearance of the right breast compared to the left.  This is suspicious for inflammatory breast cancer; highly diminutive appearance of the right internal jugular vein which is nearly occluded; pathologically enlarged right axillary lymph nodes with a lymph conglomerate measuring up to 3.9 cm; wall thickening of the mid and distal esophagus, possible esophagitis; enlarging hypodensity along falciform ligament of the liver most likely focal fatty infiltration although indeterminate; large R pleural effusion; and unchanged pancreatic tail cyst vs cystic neoplasm measuring up to 3.9cm.     IR performed an US guided breast biopsy 6/25, pathology pending.  Vascular surgery consulted for occlusion of R IJ and does not recommend any surgery but continuation of the anticoagulation. THoracentesis on 6/26 removed 600 ml from R - exudative fluid. Pleural fluid sent for routine studies and cytology. No malignant cells found and no growth on cultures. GI performed EGD on 6/29 with swelling noted of oropharynx, random biopsies obtained, ENT consulted. Areatha Pile performed with narrowing noted of esophagus but no ulcers/stricture or mass. Past medical history, past surgical history, family history, social history, and medications were all reviewed with the patient today and updated as necessary.    Problem List  Date Reviewed: 5/20/2021        Codes Class Noted    Mild protein-calorie malnutrition ([facility].) ICD-10-CM: E44.1  ICD-9-CM: 263.1  6/29/2021        Pleural effusion, right ICD-10-CM: J90  ICD-9-CM: 511.9  6/25/2021        Lymphedema ICD-10-CM: I89.0  ICD-9-CM: 457.1  6/24/2021        Hx pulmonary embolism ICD-10-CM: G33.570  ICD-9-CM: V12.55  4/5/2021        Insomnia ICD-10-CM: G47.00  ICD-9-CM: 780.52  4/5/2021        Long term current use of anticoagulant ICD-10-CM: Z79.01  ICD-9-CM: V58.61  4/5/2021        Microcytic anemia ICD-10-CM: D50.9  ICD-9-CM: 280.9  4/5/2021        Malignant neoplasm of colon, unspecified ([facility] ICD-10-CM: C18.9  ICD-9-CM: 153.9  4/5/2021        Pancreatic pseudocyst ICD-10-CM: K86.3  ICD-9-CM: 577.2  4/5/2021        Lymphedema of right arm ICD-10-CM: I89.0  ICD-9-CM: 457.1  3/10/2021        Elbow stiffness, right ICD-10-CM: M25.621  ICD-9-CM: 719.52  3/10/2021        Type 2 diabetes with nephropathy ([facility] ICD-10-CM: E11.21  ICD-9-CM: 250.40, 583.81  9/11/2019        Dysuria ICD-10-CM: R30.0  ICD-9-CM: 788.1  9/9/2019        Iron deficiency anemia ICD-10-CM: D50.9  ICD-9-CM: 280.9  8/27/2019        Hyperlipidemia associated with type 2 diabetes mellitus ([facility].) ICD-10-CM: E11.69, E78.5  ICD-9-CM: 250.80, 272.4  8/27/2019        Primary adenocarcinoma of ascending colon ([facility] ICD-10-CM: C18.2  ICD-9-CM: 153.6  10/9/2018    Overview Signed 10/26/2018 10:47 AM by Gaby Rae MD     S/p R hemicolectomy 10/9/2018   DIAGNOSIS   RIGHT HEMICOLECTOMY: MODERATELY TO POORLY DIFFERENTIATED ADENOCARCINOMA, 4.6 CM IN GREATEST DIMENSION, EXTENDING THROUGH THE MUSCULARIS PROPRIA INTO THE PERICOLONIC ADIPOSE TISSUE. MARGINS UNINVOLVED. 28 BENIGN LYMPH NODES, ALL NEGATIVE FOR METASTATIC CARCINOMA (0/28). Electronically signed out on 10/11/2018 10:24 by Aldo Goldberg. Jose Patel, III,              Malignant neoplasm of ascending colon [facility]) ICD-10-CM: C18.2  ICD-9-CM: 153.6  9/28/2018        Current use of anticoagulant therapy ICD-10-CM: Z79.01  ICD-9-CM: V58.61  9/18/2018        Obesity, morbid ([facility] ICD-10-CM: E66.01  ICD-9-CM: 278.01  4/5/2018        Obesity due to excess calories ICD-10-CM: E66.09  ICD-9-CM: 278.00  3/26/2018        Controlled type 2 diabetes mellitus without complication, with long-term current use of insulin ([facility] ICD-10-CM: E11.9, Z79.4  ICD-9-CM: 250.00, V58.67  3/26/2018        Axillary mass, right ICD-10-CM: R22.31  ICD-9-CM: 782.2  3/26/2018        Pancreatic mass ICD-10-CM: K86.89  ICD-9-CM: 577.8  3/26/2018        Type 2 diabetes mellitus with diabetic neuropathy ([facility] ICD-10-CM: E11.40  ICD-9-CM: 250.60, 357.2  2/21/2018        Lumbar radiculopathy ICD-10-CM: M54.16  ICD-9-CM: 724.4  2/6/2018        Lumbar stenosis with neurogenic claudication ICD-10-CM: Y69.649  ICD-9-CM: 724.03  1/29/2018    Overview Signed 4/5/2021 11:02 AM by Tiffanie Fields     Last Assessment & Plan:   I want her to continue with home exercises. If her pain worsens, down her leg, then I would recommend a repeat epidural. She's been out of work two months. She can return to work with no restrictions. She can lift 65 lbs if needed.  If her pain worsens, we will get another injection. Bilateral low back pain with sciatica ICD-10-CM: M54.40  ICD-9-CM: 724.3  10/23/2017        Environmental allergies ICD-10-CM: Z91.09  ICD-9-CM: V15.09  9/12/2013                  Current Outpatient Medications   Medication Sig    losartan (COZAAR) 25 mg tablet Take 1 Tablet by mouth daily for 30 days. Indications: high blood pressure    nystatin (MYCOSTATIN) powder Apply  to affected area four (4) times daily. Apply to elbow creases and axilla    apixaban (ELIQUIS) 5 mg tablet Take 1 Tab by mouth two (2) times a day.  acetaminophen (TylenoL) 325 mg tablet Take  by mouth every four (4) hours as needed for Pain.  pantoprazole (PROTONIX) 40 mg tablet Take 1 Tablet by mouth two (2) times a day for 30 days. (Patient not taking: Reported on 7/14/2021)     No current facility-administered medications for this visit. Review of Systems   Constitutional: Negative for chills, fever, malaise/fatigue and weight loss. Respiratory: Positive for shortness of breath. Negative for cough, hemoptysis, sputum production and wheezing. Cardiovascular: Negative for chest pain, palpitations and leg swelling. Gastrointestinal: Negative for abdominal pain, constipation, diarrhea, heartburn, nausea and vomiting. Neurological: Negative for dizziness, tremors, seizures, weakness and headaches. PHYSICAL EXAM:    Vitals:    07/14/21 0758   BP: 132/84   Pulse: (!) 105   Temp: 97.3 °F (36.3 °C)   TempSrc: Temporal   Height: 5' 4\" (1.626 m)   Weight: 170 lb (77.1 kg)   SpO2: 96%  Comment: on room air o2     Body mass index is 29.18 kg/m². GENERAL APPEARANCE:  The patient is overweight and in no respiratory distress. HEENT:  PERRL. Conjunctivae unremarkable. Nasal mucosa is without epistaxis, exudate, or polyps. Gums and dentition are unremarkable. There is no oropharyngeal narrowing. TMs are clear.   NECK/LYMPHATIC:  Symmetrical with no elevation of jugular venous pulsation. Trachea midline. No thyroid enlargement. LUNGS:  Normal respiratory effort with symmetrical lung expansion. Breath sounds diminished on right throughout, but clear left. HEART:  There is a tachycardic rate and regular rhythm. No murmur, rub, or gallop. There is no edema in the lower extremities. ABDOMEN:  Soft and non-tender. No hepatosplenomegaly. Bowel sounds are normal.    SKIN:  There are no rashes, cyanosis, jaundice, or ecchymosis present. Mild bruising remains over the prior thoracentesis site on right posterior chest.  EXTREMITIES:  The extremities are unremarkable without clubbing, cyanosis, joint inflammation, degenerative, or ischemic change. MUSCULOSKELETAL:  There is no abnormal tone, muscle atrophy, or abnormal movement present. NEURO:  The patient is alert and oriented to person, place, and time. Memory appears intact and mood is normal.  No gross sensorimotor deficits are present. DIAGNOSTIC TESTS:   PCXR: Results for orders placed during the hospital encounter of 03/26/18    XR CHEST PORT    Narrative  Portable chest x-ray    INDICATION: Post thrombolysis    COMPARISON: Yesterday's exam    FINDINGS: Lower lung volumes but the lungs are clear. No pulmonary edema or  pleural effusion. Heart size is normal. Interval placement of bilateral  pulmonary arterial catheters. Impression  IMPRESSION: No acute abnormality. CXR PA and lateral:  Results for orders placed during the hospital encounter of 06/24/21    XR CHEST PA LAT  Narrative  Chest, 2 views, 6/30/2021    Indication:Follow-up right effusion    Comparison:6/28/2021    There is a persistent small right pleural effusion. Associated basilar opacity  may be compressive atelectasis or small infiltrate. Left lung clear. Impression  Persistent small right pleural effusion with associated atelectasis  or small infiltrate.       CT of chest with contrast:  Results for orders placed during the hospital encounter of 03/19/21    CT CHEST W CONT      Narrative  CT chest with contrast (pulmonary embolism protocol)    History: Shortness of breath, Colorectal carcinoma. Technique: Helically acquired images were obtained from the lung apices to the  domes of the diaphragms reconstructed at 2.5mm intervals after the uneventful  administration of 100 cc's intravenous Isovue-370 in order to evaluate the  pulmonary arteries. Coronal reformatted images were submitted. Radiation dose reduction techniques were used for this study:  Our CT scanners  use one or all of the following: Automated exposure control, adjustment of the  mA and/or kVp according to patient's size, iterative reconstruction. Comparison: 12/06/2019    Findings: There is a small right pleural effusion. There is no pericardial  effusion. The heart and great vessels are unremarkable. There are no filling  defects within the pulmonary arteries to suggest pulmonary emboli. There are no  airspace opacities. There are no pulmonary nodules. There is extensive asymmetric enlargement of the musculature of the right  hemithorax with infiltration of the fascial planes and soft tissues. These  findings have progressed since he previous exam. Right axillary adenopathy  measures up to 3.3 cm in size compared to 2.8 cm. This process extends into the  right breast, axilla and right supraclavicular region. There is also extension  into the right lateral, wall. Imaging    There is a stable cystic abnormality involving the tail of the pancreas  measuring approximately 4 cm. Impression  1. No evidence of pulmonary embolism. 2. Small right pleural effusion. 3. Extensive abnormal appearance of the soft tissues of the right hemithorax of  uncertain etiology with neoplasm not excluded. There are asymmetrically enlarged  right axillary lymph nodes also having increased in size. Exercise oximetry:  Pt's resting, RA sat 96, but HR was 114.   During walk, pt became very weak. Hr jumped to 136, but O2 sats remained stable at 96%    US in office, right side            ASSESSMENT:  (Medical Decision Making)         ICD-10-CM ICD-9-CM    1. Recurrent pleural effusion on right  J90 511.9    2. History of pulmonary embolus (PE)  Z86.711 V12.55    3. History of DVT of lower extremity  Z86.718 V12.51    4. Shortness of breath  R06.02 786.05    5. JASSO (dyspnea on exertion)  R06.00 786.09    6. Lymphedema  I89.0 457.1       Discussed results of pleural fluid cytology as well as GI biopsies with patient. Pt also given results of malignancy in lymph node biopsy after questions. Given that pleural fluid and CT scan negative for pulmonary findings, and patient has had changes in her right breast, most likely breast cancer. Pt feels like she has had increased SOB since previous removal of fluid. Offered repeat thoracentesis to offer relief of dyspnea on exertion and she would like to proceed. Appt with oncology tomorrow. PLAN:  --set up for thoracentesis on Friday; last dose of eliquis was last evening, so Friday. Likely will need close follow up given speed of reaccumulation. --follow up with office in 1 month for reevaluation of pleural effusion. No orders of the defined types were placed in this encounter. Total time spent was 46 minutes. This time includes chart prep, review of tests/procedures, review of other provider's notes, documentation, and counseling patient regarding disease process and medications. Mallory Lefort, NP  Electronically signed    Collaborating physician is Antolin Worley MD    LL    Dictated using voice recognition software.   Proof read but unrecognized errors may exist.

## 2021-07-15 NOTE — PROGRESS NOTES
Pt contacted endoscopy lab and verified arrival time for 1030 for scheduled procedure time of 1130. Pt verbalized understanding.

## 2021-07-15 NOTE — PROGRESS NOTES
Kirk Jackson  : 1960  Primary: [employer]  Secondary:  [address]  at [facility]  [address], [address], [place] [address]  [phone]   [phone]        OUTPATIENT PHYSICAL THERAPY: Daily Treatment Note 7/15/2021  Visit Count:  13       ICD-10: Treatment Diagnosis: other lymphedema I 89.0  Precautions/Allergies:   Biaxin [clarithromycin] and Cortisone    TREATMENT PLAN:  Effective Dates: 2021 TO 2021 (90 days). Frequency/Duration: 2 times a week for 90 Day(s)       Pre-treatment Symptoms/Complaints:  The patient reports she is having her lung drained again. She reports she is ready to get better. Pain: Initial:   0/10 Post Session:  0/10   Medications Last Reviewed:  7/15/2021  Updated Objective Findings:  Edema/Girth:  pitting    Left Right    Initial Most Recent Initial Most Recent   Upper  Extremity           Lower  Extremity               TREATMENT:     THERAPEUTIC EXERCISE: ( minutes):  Exercises per grid below to improve mobility. Required minimal verbal cues to promote proper body alignment. Progressed range as indicated. MANUAL THERAPY: ( 60 minutes): Complex decongestive physiotherapy was utilized and necessary because of the patient's restricted joint motion and edema. the patient presented with no bandage in place. Modified MLD performed along with fibrotic techniques to the right upper extremity, right breast and right lateral trunk. Skin care performed. A short stretch bandage was applied using stockinette, cotton padding, 6 cm, 8 cm and 2 10 cm short stretch bandages from fingers to axilla. To leave in place until she returns unless pain is present or it slides down. Tissue softer in the forearm and elbow region today. She would like to transition into a garment as soon as possible. At this point she has limited shoulder, elbow and wrist mobility.   Fitting with a static garment would be difficult. [employer] PortalAccess Code: [email or web address]  URL: [email or web address]  Date: 06/01/2021  Prepared by: Maren Redd    Exercises  Standing shoulder flexion wall slides - 2 x daily - 7 x weekly - 1 sets - 10 reps - 5 hold  Supine Lower Trunk Rotation - 2 x daily - 7 x weekly - 1 sets - 10 reps - 5 hold      Treatment/Session Summary:    · Response to Treatment:  tolerated the treatment well. Forearm and elbow feel softer today. We will transition to a static garment as soon as she is able. · Communication/Consultation:  HEP  · Equipment provided today:  None today  · Recommendations/Intent for next treatment session: Next visit will focus on CDT and ROM.     Total Treatment Billable Duration:  60 minutes  PT Patient Time In/Time Out  Time In: 1101  Time Out: [address], PT    Future Appointments   Date Time Provider Alec Lobo   7/15/2021 12:50 PM [address] [place] [address] [facility] [address] [place] [address]   7/15/2021  1:30 PM Joel Perales MD [facility]   7/19/2021 10:00 AM Sharron Cali, PT [facility]   7/22/2021  2:15 PM Sharron Cali, PT [facility]   7/26/2021 10:00 AM Sharron Cali, PT [facility]   7/29/2021 10:00 AM Sharron Cali, PT [facility]   8/13/2021  3:20 PM Med Grissom, NP [facility] PP PP

## 2021-07-16 NOTE — PROGRESS NOTES
Pt sat up on side of bed for thoracentesis. Consent obtained. Time out performed. Pts vitals monitored throughout procedure. Bilateral ultrasound done and pic taken of pleural fluid. Only enough fluid to perform thoracentesis on R at this time per md.  ~1100 ml yellow pleural fluid from R.  Pt tolerated procedure well with no adverse rxn. Specimens sent to the lab x 3 including flow cytometry and labeled appropriately. Site dressed appropriately and discharge instructions reviewed with pt.   R Lung sliding done and ultrasound findings reviewed by MD.

## 2021-07-16 NOTE — INTERVAL H&P NOTE
Update History & Physical    The Patient's History and Physical of July 14,    was reviewed with the patient and I examined the patient. There was no change. The surgical site was confirmed by the patient and me. Plan:  The risk, benefits, expected outcome, and alternative to the recommended procedure have been discussed with the patient. Patient understands and wants to proceed with the procedure.     Electronically signed by Anna Carbajal MD on 7/16/2021 at 12:04 PM

## 2021-07-16 NOTE — DISCHARGE INSTRUCTIONS
Thoracentesis: What to Expect at Home  Your Recovery  Thoracentesis (say \"vcna-vo-uty-OLIVER-sis\") is a procedure to remove fluid from the space between the lungs and the chest wall (pleural space). This procedure may also be called a \"chest tap. \" It's normal to have a small amount of fluid in the pleural space. But too much fluid can build up because of problems such as infection, heart failure, or lung cancer. The procedure may have been done to help with shortness of breath and pain caused by the fluid buildup. Or you may have had this procedure so the doctor could test the fluid to find the cause of the buildup. Your chest may be sore where the doctor put the needle or catheter into your skin (the puncture site). This usually gets better after a day or two. You can go back to work or your normal activities as soon as you feel up to it. If a large amount of pleural fluid was removed during the procedure, you will probably be able to breathe more easily. If more pleural fluid collects and needs to be removed, another thoracentesis may be done later. If the doctor sent the fluid to a lab for testing, it may take several days to get the results. The doctor or nurse will discuss the results with you. This care sheet gives you a general idea about how long it will take for you to recover. But each person recovers at a different pace. Follow the steps below to feel better as quickly as possible. How can you care for yourself at home? Activity    · Rest when you feel tired. Getting enough sleep will help you recover.     · Avoid strenuous activities, such as bicycle riding, jogging, weight lifting, or aerobic exercise, until your doctor says it is okay.     · You may shower. Do not take a bath until the puncture site has healed, or until your doctor tells you it is okay.     · Ask your doctor when you can drive again.     · You may need to take 1 or 2 days off from work.  It depends on the type of work you do and how you feel. Diet    · You can eat your normal diet.     · Drink plenty of fluids (unless your doctor tells you not to). Medicines    · Your doctor will tell you if and when you can restart your medicines. He or she will also give you instructions about taking any new medicines.     · If you take aspirin or some other blood thinner, ask your doctor if and when to start taking it again. Make sure that you understand exactly what your doctor wants you to do.     · Be safe with medicines. Take pain medicines exactly as directed. ? If the doctor gave you a prescription medicine for pain, take it as prescribed. ? If you are not taking a prescription pain medicine, ask your doctor if you can take an over-the-counter medicine. ? Do not take two or more pain medicines at the same time unless the doctor told you to. Many pain medicines have acetaminophen, which is Tylenol. Too much acetaminophen (Tylenol) can be harmful.     · If you think your pain medicine is making you sick to your stomach:  ? Take your medicine after meals (unless your doctor has told you not to). ? Ask your doctor for a different pain medicine.     · If your doctor prescribed antibiotics, take them as directed. Do not stop taking them just because you feel better. You need to take the full course of antibiotics. Care of the puncture site    · Wash the area daily with warm, soapy water, and pat it dry. Don't use hydrogen peroxide or alcohol, which may delay healing. You may cover the area with a gauze bandage if it weeps or rubs against clothing. Change the bandage every day.     · Keep the area clean and dry. Follow-up care is a key part of your treatment and safety. Be sure to make and go to all appointments, and call your doctor if you are having problems. It's also a good idea to know your test results and keep a list of the medicines you take. When should you call for help? Call 911 anytime you think you may need emergency care.  For example, call if:    · You passed out (lost consciousness).     · You have severe trouble breathing.     · You have sudden chest pain and shortness of breath, or you cough up blood. Call your doctor now or seek immediate medical care if:    · You have shortness of breath that is new or getting worse.     · You have new or worse pain in your chest, especially when you take a deep breath.     · You are sick to your stomach or cannot keep fluids down.     · You have a fever over 100°F.     · Bright red blood has soaked through the bandage over your puncture site.     · You have signs of infection, such as:  ? Increased pain, swelling, warmth, or redness. ? Red streaks leading from the puncture site. ? Pus draining from the puncture site. ? Swollen lymph nodes in your neck, armpits, or groin. ? A fever.     · You cough up a lot more mucus than normal, or your mucus changes color. Watch closely for changes in your health, and be sure to contact your doctor if you have any problems. Where can you learn more? Go to http://www.gray.com/  Enter Q755 in the search box to learn more about \"Thoracentesis: What to Expect at Home. \"  Current as of: October 26, 2020               Content Version: 12.8  © 2006-2021 [employer]wise, [employer]. Care instructions adapted under license by [employer] (which disclaims liability or warranty for this information). If you have questions about a medical condition or this instruction, always ask your healthcare professional. [place] [address] any warranty or liability for your use of this information. Call [place] at [phone] for any questions or problems that may occur. Resume all medication as before procedure except hold your eliquis until tomorrow. Keep your previously scheduled follow up appointment.

## 2021-07-16 NOTE — PROCEDURES
PROCEDURE:  DIAGNOSTIC THORACENTESIS, THERAPEUTIC THORACENTESIS       PRE-OP DIAGNOSIS:  R PLEURAL EFFUSION    POST-OP DIAGNOSIS:  R PLEURAL EFFUSION    VOLUME REMOVED:    1100cc    ANESTHESIA:    LOCAL ANESTHESIA WITH 1% LIDOCAINE 10 CC TOTAL. CHEST ULTRASOUND FINDINGS:    A Turbo-M, Sonosite ultrasound with a 5-16 mHz probe was used to image the chest and localize the pleural effusion on the right chest.    A large anechoic space was seen on the right. There were areas of possible pleural thickening scattered throughout this area. DESCRIPTION OF PROCEDURE:    After obtaining informed consent and localizing the safest location for thoracentesis, the  8th intercostal space was marked with a blunt, plastic needle cap in the mid scapular line. An [address] AK-0100 Pleral-Seal thoracentesis kit was used to perform the procedure. The skin was cleansed with the supplied chlorhexidine swab and then draped in the usual fashion. Using the previously marked location as a guide, a 22 G 1.5 inch needle was used to inject 1% lidocaine into the skin and subcutaneous tissue, as well as onto the underlying rib and inter-costal muscles. Of note, the skin throughout the candidate areas was very thickened and hard to actually inject any lidocaine into. It took 2 attempts to actually get any return of pleural fluid. Pleural fluid was aspirated to assure proper location and additional lidocaine was injected into the pleural space prior to removing the anesthesia needle. A 3mm incision was then made with the supplied scalpel in the usual fashion to facilitate the insertion of the thoracentesis needle. The needle with an 8 Kinyarwanda thoracentesis catheter was then introduced into the chest through the previously made incision in the usual fashion, the rib localized with the needle, and the catheter then marched over the rib into the pleural space.     After aspirating fluid, the thoracentesis catheter was then placed into the chest using the needle itself as a trocar. The needle was then removed and the catheter was attached to the supplied tubing without complication. 1100 cc of clear, yellow fluid was aspirated and sent for analysis. Fluid was sent for the following tests:      LDH  Total Protein  Glucose  Cell count with differential  Routine culture and Gram stain  Cytology  Flow cytometry    Post procedure US confirmed complete drainage of the effusion and presence of lung sliding, ruling out pneumothorax. (79136)    EBL:     <9SE    COMPLICATIONS:    Difficult to enter the pleural space likely due to a combination of thickened skin/subq tissue as well as possible pleural thickening in this area. Will need to follow up finding of PET scan to see if any signs of malignancy in this area and will again send the fluid for cytology as well as flow.        Shweta Barbosa MD

## 2021-07-19 NOTE — PROGRESS NOTES
Joe Galeano  : 1960  Primary: [employer] In*  Secondary:  [address]  at [facility]  [address], [place] [address]  [phone]   [phone]        OUTPATIENT PHYSICAL THERAPY: Daily Treatment Note 2021  Visit Count:  14       ICD-10: Treatment Diagnosis: other lymphedema I 89.0  Precautions/Allergies:   Biaxin [clarithromycin] and Cortisone    TREATMENT PLAN:  Effective Dates: 2021 TO 2021 (90 days). Frequency/Duration: 2 times a week for 90 Day(s)       Pre-treatment Symptoms/Complaints:  The patient reports she had her lung drained again. She is to have a PET scan next week on Monday. They are sending her biopsy off for further testing. Pain: Initial:   0/10 Post Session:  0/10   Medications Last Reviewed:  2021  Updated Objective Findings:  Edema/Girth:  pitting    Left Right    Initial Most Recent Initial Most Recent   Upper  Extremity           Lower  Extremity               TREATMENT:     THERAPEUTIC EXERCISE: ( minutes):  Exercises per grid below to improve mobility. Required minimal verbal cues to promote proper body alignment. Progressed range as indicated. MANUAL THERAPY: ( 59 minutes): Complex decongestive physiotherapy was utilized and necessary because of the patient's restricted joint motion and edema. the patient presented with no bandage in place. Modified MLD performed along with fibrotic techniques to the right upper extremity, right breast and right lateral trunk. Skin care performed. A short stretch bandage was applied using stockinette, cotton padding, 6 cm, 8 cm and 2 10 cm short stretch bandages from fingers to axilla. To leave in place until she returns unless pain is present or it slides down. Tissue softer in the upper arm region today. Solutionary PortalAccess Code: [email or web address]  URL: [email or web address]  Date: 2021  Prepared by: Alannah Acosta Modzel    Exercises  Standing shoulder flexion wall slides - 2 x daily - 7 x weekly - 1 sets - 10 reps - 5 hold  Supine Lower Trunk Rotation - 2 x daily - 7 x weekly - 1 sets - 10 reps - 5 hold      Treatment/Session Summary:    · Response to Treatment:  tolerated the treatment well. Upper arm feels softer today. We will transition to a static garment as soon as she is able. · Communication/Consultation:  HEP  · Equipment provided today:  None today  · Recommendations/Intent for next treatment session: Next visit will focus on CDT and ROM.     Total Treatment Billable Duration:  59 minutes  PT Patient Time In/Time Out  Time In: 9208  Time Out: [address], PT    Future Appointments   Date Time Provider Alec Lobo   7/22/2021  2:15 PM Jesi Downs, JANINA [facility]   7/26/2021  8:30 AM [address] [place] [address] NM PET/CT INJ RM GCCRMPETG GVL [facility]   7/26/2021 10:00 AM [facility]   7/29/2021 10:00 AM [facility]   7/29/2021 12:50 PM [address] [place] [address] OUTREACH INSURANCE GCCOIG GVL [address] [place] [address]   7/29/2021  1:15 PM Luis Muñoz MD [facility]   8/13/2021  3:20 PM Michael Grissom NP [facility] PP PP

## 2021-07-21 NOTE — PROGRESS NOTES
Please let patient know the fluid removed from her lung does not show any infection or malignant cells. She needs to keep her appt on 8/13 or call if her breathing changes.

## 2021-07-22 NOTE — PROGRESS NOTES
Selam Chowdhury  : 1960  Primary: [employer], In*  Secondary:  [address]  at [facility]  [address], [address], [place] [address]  [phone]   [phone]        OUTPATIENT PHYSICAL THERAPY: Daily Treatment Note 2021  Visit Count:  15       ICD-10: Treatment Diagnosis: other lymphedema I 89.0  Precautions/Allergies:   Biaxin [clarithromycin] and Cortisone    TREATMENT PLAN:  Effective Dates: 2021 TO 2021 (90 days). Frequency/Duration: 2 times a week for 90 Day(s)       Pre-treatment Symptoms/Complaints:  The patient reports she will be glad to get things going and get better. Pain: Initial:   0/10 Post Session:  0/10   Medications Last Reviewed:  2021  Updated Objective Findings:  Edema/Girth:  pitting    Left Right    Initial Most Recent Initial Most Recent   Upper  Extremity     Base 3rd Finger (cm): 18.6  Wrist (cm): 18  Mid Forearm (cm): 30.2  Elbow (cm): 38.8  Axilla (cm): 49.2 (45)     Lower  Extremity               TREATMENT:     THERAPEUTIC EXERCISE: ( minutes):  Exercises per grid below to improve mobility. Required minimal verbal cues to promote proper body alignment. Progressed range as indicated. MANUAL THERAPY: ( 65 minutes): Complex decongestive physiotherapy was utilized and necessary because of the patient's restricted joint motion and edema. the patient presented with no bandage in place. Modified MLD performed along with fibrotic techniques to the right upper extremity, right breast and right lateral trunk. Skin care performed. A short stretch bandage was applied using stockinette, cotton padding, 6 cm, 8 cm and 2 10 cm short stretch bandages from fingers to axilla. Girth assessed. Girth is reduced. Tissue remains softer in the upper arm region and upper forearm today. The patient was quiet. Vatler PortalAccess Code: [email or web address]  URL: [email or web address]  Date: 06/01/2021  Prepared by: Zaire Boop    Exercises  Standing shoulder flexion wall slides - 2 x daily - 7 x weekly - 1 sets - 10 reps - 5 hold  Supine Lower Trunk Rotation - 2 x daily - 7 x weekly - 1 sets - 10 reps - 5 hold      Treatment/Session Summary:    · Response to Treatment:  tolerated the treatment well. Girth assessed and is improved. She has lost 2 inches in girth in the bicep region. She seems discouraged today. She is very quiet. · Communication/Consultation:  HEP  · Equipment provided today:  None today  · Recommendations/Intent for next treatment session: Next visit will focus on CDT and ROM.     Total Treatment Billable Duration:  65 minutes  PT Patient Time In/Time Out  Time In: 6418  Time Out: [address] [facility], PT    Future Appointments   Date Time Provider Alec Lobo   7/26/2021  8:30 AM [facility] NM PET/CT INJ RM GCCRMPETG [facility]   7/26/2021 10:00 AM [facility]   7/29/2021 10:00 AM [facility]   7/29/2021 12:50 PM [facility] OUTREACH INSURANCE GCCOIG [facility]   7/29/2021  1:15 PM Vero Rawls MD [facility]   8/13/2021  3:20 PM Mely Grissom NP [facility] PP PP

## 2021-07-29 NOTE — PROGRESS NOTES
Kirk Jackson  : 1960  Primary: [employer], In*  Secondary:  [address]  at [facility]  [address], [address], [place] [address]  [phone]   [phone]        OUTPATIENT PHYSICAL THERAPY: Daily Treatment Note 2021  Visit Count:  16       ICD-10: Treatment Diagnosis: other lymphedema I 89.0  Precautions/Allergies:   Biaxin [clarithromycin] and Cortisone    TREATMENT PLAN:  Effective Dates: 2021 TO 2021 (90 days). Frequency/Duration: 2 times a week for 90 Day(s)       Pre-treatment Symptoms/Complaints:  The patient reports she is worried about what she will hear at the doctor today. She reports she doesn't understand her PET scan results. Pain: Initial:   0/10 Post Session:  0/10   Medications Last Reviewed:  2021  Updated Objective Findings:  Edema/Girth:  pitting    Left Right    Initial Most Recent Initial Most Recent   Upper  Extremity           Lower  Extremity               TREATMENT:     THERAPEUTIC EXERCISE: ( minutes):  Exercises per grid below to improve mobility. Required minimal verbal cues to promote proper body alignment. Progressed range as indicated. MANUAL THERAPY: ( 68 minutes): Complex decongestive physiotherapy was utilized and necessary because of the patient's restricted joint motion and edema. the patient presented with no bandage in place. Modified MLD performed along with fibrotic techniques to the right upper extremity. Skin care performed. A short stretch bandage was applied using stockinette, cotton padding, 6 cm, 8 cm and 2 10 cm short stretch bandages from fingers to axilla. Tissue remains softer in the upper arm region and upper forearm today. The patient was teary at times. Nextt PortalAccess Code: [email or web address]  URL: [email or web address]  Date: 2021  Prepared by: Starling Bending    Exercises  Standing shoulder flexion wall slides - 2 x daily - 7 x weekly - 1 sets - 10 reps - 5 hold  Supine Lower Trunk Rotation - 2 x daily - 7 x weekly - 1 sets - 10 reps - 5 hold      Treatment/Session Summary:    · Response to Treatment:  tolerated the treatment well. Tissue remains softer. Mobility is significantly limited. · Communication/Consultation:  HEP  · Equipment provided today:  None today  · Recommendations/Intent for next treatment session: Next visit will focus on CDT and ROM.     Total Treatment Billable Duration:  68 minutes  PT Patient Time In/Time Out  Time In: 2620  Time Out: [address], PT    Future Appointments   Date Time Provider Alec Lobo   7/29/2021  1:15 PM Dm Awad MD [facility]   8/2/2021  9:00 AM Zuleyma ROGER, PT [facility]   8/5/2021  1:00 PM Sharron Cali, PT CHANDAPT [facility]   8/13/2021  3:20 PM Danis Grissom, SHAYNA [facility] PP

## 2021-07-29 NOTE — PROGRESS NOTES
7/29/2021  Pt seen today with Dr El Bhardwaj follow up breast cancer. Pt currently doing oncology rehab to [facility], states doing better, size is down. Dr El Bhardwaj reviewed recent scans, treatment options and next steps. Still waiting on Caris results. Will FU with pt once we receive final results to discuss treatment plan, pt aware. Encouraged to call with any questions/concerns. Navigation will continue to follow.

## 2021-08-02 NOTE — PROGRESS NOTES
Tanya Diamond  : 1960  Primary: Digna Mims Generic Commercial  Secondary:  [address] at [facility]  [address], [address], [place] [address]  [phone]   [phone]        OUTPATIENT PHYSICAL THERAPY: Daily Treatment Note 2021  Visit Count:  17       ICD-10: Treatment Diagnosis: other lymphedema I 89.0  Precautions/Allergies:   Biaxin [clarithromycin] and Cortisone    TREATMENT PLAN:  Effective Dates: 2021 TO 2021 (90 days). Frequency/Duration: 2 times a week for 90 Day(s)       Pre-treatment Symptoms/Complaints:  The patient reports she is worried about what she will hear at the doctor today. She reports she doesn't understand her PET scan results. Pain: Initial:   0/10 Post Session:  0/10   Medications Last Reviewed:  2021  Updated Objective Findings:  Edema/Girth:  pitting    Left Right    Initial Most Recent Initial Most Recent   Upper  Extremity           Lower  Extremity               TREATMENT:     THERAPEUTIC EXERCISE: ( minutes):  Exercises per grid below to improve mobility. Required minimal verbal cues to promote proper body alignment. Progressed range as indicated. MANUAL THERAPY: ( 68 minutes): Complex decongestive physiotherapy was utilized and necessary because of the patient's restricted joint motion and edema. the patient presented with no bandage in place. Modified MLD performed along with fibrotic techniques to the right upper extremity. Skin care performed. A short stretch bandage was applied using stockinette, cotton padding, 6 cm, 8 cm and 2 10 cm short stretch bandages from fingers to axilla. Tissue remains softer in the upper arm region and upper forearm today. The patient was teary at times. CardiAQ Valve Technologies PortalAccess Code: [email or web address]  URL: [email or web address]  Date: 2021  Prepared by: Theresa Ortiz    Exercises  Standing shoulder flexion wall slides - 2 x daily - 7 x weekly - 1 sets - 10 reps - 5 hold  Supine Lower Trunk Rotation - 2 x daily - 7 x weekly - 1 sets - 10 reps - 5 hold      Treatment/Session Summary:    · Response to Treatment:  tolerated the treatment well. Tissue remains softer. Mobility is significantly limited. · Communication/Consultation:  HEP  · Equipment provided today:  None today  · Recommendations/Intent for next treatment session: Next visit will focus on CDT and ROM.     Total Treatment Billable Duration:  68 minutes  PT Patient Time In/Time Out  Time In: [address] [facility], PT    Future Appointments   Date Time Provider Alec Izaguirrei   8/2/2021  9:00 AM Keisha Cali, PT [facility]   8/5/2021  1:00 PM Sharron Cali, PT [facility]   8/13/2021  3:20 PM Eduardo Grissom NP [facility] PP PP

## 2021-08-09 NOTE — PROGRESS NOTES
Joel Valenzuela  : 1960  Primary: Aida Hughes Generic Commercial  Secondary:  [address]  at [facility]  [address], [address], [place] [address]  [phone]   [phone]        OUTPATIENT PHYSICAL THERAPY: Daily Treatment Note 2021  Visit Count:  18       ICD-10: Treatment Diagnosis: other lymphedema I 89.0  Precautions/Allergies:   Biaxin [clarithromycin] and Cortisone    TREATMENT PLAN:  Effective Dates: 2021 TO 2021 (90 days). Frequency/Duration: 2 times a week for 90 Day(s)       Pre-treatment Symptoms/Complaints:  The patient reports she sees the oncologist on Monday. Pain: Initial:   0/10 Post Session:  0/10   Medications Last Reviewed:  2021  Updated Objective Findings:  Edema/Girth:  pitting    Left Right    Initial Most Recent Initial Most Recent   Upper  Extremity           Lower  Extremity               TREATMENT:     THERAPEUTIC EXERCISE: ( minutes):  Exercises per grid below to improve mobility. Required minimal verbal cues to promote proper body alignment. Progressed range as indicated. MANUAL THERAPY: ( 58 minutes): Complex decongestive physiotherapy was utilized and necessary because of the patient's restricted joint motion and edema. the patient presented with no bandage in place. Modified MLD performed along with fibrotic techniques to the right upper extremity. Skin care performed. A short stretch bandage was applied using stockinette, cotton padding, 6 cm, 8 cm and 2 10 cm short stretch bandages from fingers to axilla. Tissue feels nolan today in the forearm and elbow region. Luminate PortalAccess Code: [email or web address]  URL: [email or web address]  Date: 2021  Prepared by: Lester Helm    Exercises  Standing shoulder flexion wall slides - 2 x daily - 7 x weekly - 1 sets - 10 reps - 5 hold  Supine Lower Trunk Rotation - 2 x daily - 7 x weekly - 1 sets - 10 reps - 5 hold      Treatment/Session Summary: · Response to Treatment:  tolerated the treatment well. Tissue firmer today. Mobility is significantly limited. Fingers are swollen. We may need to begin bandaging fingers again. · Communication/Consultation:  HEP  · Equipment provided today:  None today  · Recommendations/Intent for next treatment session: Next visit will focus on CDT and ROM.     Total Treatment Billable Duration:  58 minutes  PT Patient Time In/Time Out  Time In: 2860  Time Out: 0945  Lucrezia Cockayne, JANINA    Future Appointments   Date Time Provider Alec Lobo   8/12/2021  9:00 AM Keisha Cali, PT [facility]   8/13/2021  3:20 PM Allan Grissom NP [facility] PP PP   8/16/2021  9:50 AM [facility] OUTREACH INSURANCE [facility]   8/16/2021 10:15 AM Valarie Mccormick MD [facility]   8/17/2021 11:00 AM Tommie Cali, PT [facility]

## 2021-08-10 PROBLEM — C50.911 MALIGNANT NEOPLASM OF RIGHT FEMALE BREAST (HCC): Status: ACTIVE | Noted: 2021-01-01

## 2021-08-12 NOTE — PROGRESS NOTES
Olga Seo  : 1960  Primary: Sulaiman Lowry Generic Commercial  Secondary:  [address]  at [facility]  [address], [place] [address]  [phone]   [phone]        OUTPATIENT PHYSICAL THERAPY: Daily Treatment Note 2021  Visit Count:  19       ICD-10: Treatment Diagnosis: other lymphedema I 89.0  Precautions/Allergies:   Biaxin [clarithromycin] and Cortisone    TREATMENT PLAN:  Effective Dates: 2021 TO 2021 (90 days). Frequency/Duration: 2 times a week for 90 Day(s)       Pre-treatment Symptoms/Complaints:  The patient reports she will be glad when she can get a garment. Pain: Initial:   0/10 Post Session:  0/10   Medications Last Reviewed:  2021  Updated Objective Findings:  Edema/Girth:  pitting    Left Right    Initial Most Recent Initial Most Recent   Upper  Extremity           Lower  Extremity               TREATMENT:     THERAPEUTIC EXERCISE: ( minutes):  Exercises per grid below to improve mobility. Required minimal verbal cues to promote proper body alignment. Progressed range as indicated. MANUAL THERAPY: ( 59 minutes): Complex decongestive physiotherapy was utilized and necessary because of the patient's restricted joint motion and edema. the patient presented with no bandage in place. Modified MLD performed along with fibrotic techniques to the right upper extremity. Skin care performed. A short stretch bandage was applied using stockinette, cotton padding, 6 cm, 8 cm and 2 10 cm short stretch bandages from fingers to axilla. Tissue is softer today. Mobility is still very limited. She continues her HEP. ROM of the entire upper extremity is limited. Edema continues to be significant. hopscout PortalAccess Code: [email or web address]  URL: [email or web address]  Date: 2021  Prepared by: Khai Mujica    Exercises  Standing shoulder flexion wall slides - 2 x daily - 7 x weekly - 1 sets - 10 reps - 5 hold  Supine Lower Trunk Rotation - 2 x daily - 7 x weekly - 1 sets - 10 reps - 5 hold      Treatment/Session Summary:    · Response to Treatment:  tolerated the treatment well. Tissue softer today. Mobility is significantly limited. She will see the oncologist Monday for a treatment regimen. · Communication/Consultation:  HEP  · Equipment provided today:  None today  · Recommendations/Intent for next treatment session: Next visit will focus on CDT and ROM.     Total Treatment Billable Duration:  5 minutes  PT Patient Time In/Time Out  Time In: 3528  Time Out: 2996  Milly Wilkerson, JANINA    Future Appointments   Date Time Provider Alec Lobo   8/13/2021  3:20 PM Renate Laws NP [facility] PP PP   8/16/2021  9:50 AM GCC OUTREACH INSURANCE GCCG [facility]   8/16/2021 10:15 AM Sharmaine Perez MD [facility]   8/17/2021 11:00 AM David Cali, PT SFOORPT [facility]

## 2021-08-16 NOTE — PROGRESS NOTES
8/16/2021 Saw the patient with Dr Rosaline Herring. Today Dr Rosaline Herring reviewed with her the plan of treatment. She is seeing Sharron with PT and has her right arm currently wrapped. Dr Rosaline Herring discussed that the Caris report shows this breast cancer is Estrogen positive 30%, Her 2 negative, MSI and TMB were elevated which show she would benefit from immunotherapy. He discussed that she could benefit from Taxol/Carbo in addition to the immunotherapy. I have messaged financial counselors to begin obtaining approval. Navigation information given to the patient. Will continue to follow.

## 2021-08-17 NOTE — PROGRESS NOTES
I spoke with Ms. Wendy Stafford over the phone regarding submitting the [place] Payment application with [employer] for free drug if her insurance denies the authorization. I went over what information is contained in the application. Ms. Wendy Stafford gave me verbal authorization for me to sign and submit the application to [employer]. Verbal authorization given per Covid protocol. I let her know about the LPOA she would need to sign when she comes in for her chemo ed and financial counseling on 8/27/21. Ms. Wendy Stafford expressed understanding of the above information and all questions were answered to her satisfaction.

## 2021-08-19 NOTE — PROGRESS NOTES
Tammy Casey  : 1960  Primary: [employer], [place]*  Secondary:  [address]  at [address]  [facility], [address], [address]  [phone]   [phone]        OUTPATIENT PHYSICAL THERAPY: Daily Treatment Note 2021  Visit Count:  20       ICD-10: Treatment Diagnosis: other lymphedema I 89.0  Precautions/Allergies:   Biaxin [clarithromycin] and Cortisone    TREATMENT PLAN:  Effective Dates: 2021 TO 2021 (90 days). Frequency/Duration: 2 times a week for 90 Day(s)       Pre-treatment Symptoms/Complaints:  The patient reports she gets a port and starts chemo soon. Pain: Initial:   0/10 Post Session:  0/10   Medications Last Reviewed:  2021  Updated Objective Findings:  Edema/Girth:  pitting    Left Right    Initial Most Recent Initial Most Recent   Upper  Extremity           Lower  Extremity               TREATMENT:     THERAPEUTIC EXERCISE: ( minutes):  Exercises per grid below to improve mobility. Required minimal verbal cues to promote proper body alignment. Progressed range as indicated. MANUAL THERAPY: ( 63 minutes): Complex decongestive physiotherapy was utilized and necessary because of the patient's restricted joint motion and edema. the patient presented with no bandage in place. Modified MLD performed along with fibrotic techniques to the right upper extremity. Skin care performed. A short stretch bandage was applied using stockinette, cotton padding, 6 cm, 8 cm and 2 10 cm short stretch bandages from fingers to axilla. Tissue is softer today. Mobility is still very limited. She continues her HEP. ROM of the entire upper extremity is limited. Edema continues to be significant. She is emotional when discussing her treatment for cancer. [employer] PortalAccess Code: [email or web address]  URL: [email or web address]  Date: 2021  Prepared by: Adan Yeh    Exercises  Standing shoulder flexion wall slides - 2 x daily - 7 x weekly - 1 sets - 10 reps - 5 hold  Supine Lower Trunk Rotation - 2 x daily - 7 x weekly - 1 sets - 10 reps - 5 hold      Treatment/Session Summary:    · Response to Treatment:  tolerated the treatment well. Tissue softer today. Mobility is significantly limited. She will start chemo soon. · Communication/Consultation:  HEP  · Equipment provided today:  None today  · Recommendations/Intent for next treatment session: Next visit will focus on CDT and ROM.     Total Treatment Billable Duration:  63 minutes  PT Patient Time In/Time Out  Time In: 3354  Time Out: 5649  Mariama Styles, PT    Future Appointments   Date Time Provider Alec Lashell   8/24/2021  9:00 AM SFD IR UNIT 2 SFDRSP [facility]   8/27/2021 11:00 AM Mendy Orourke NP [facility]   8/27/2021 12:00 PM [facility] FINANCIAL COUNSELOR [facility]   8/30/2021  8:15 AM LAB CK GCCOPIG [facility]   8/30/2021  9:00 AM Dm Awad MD [facility]   8/30/2021  9:30 AM POD3B GCCOPIG [facility]   8/31/2021  3:00 PM [facility]   9/2/2021 10:00 AM Sharron Cali, PT SFOORPT [facility]   2/11/2022 10:20 AM Danis Grissom NP [facility] PP PP

## 2021-08-24 NOTE — H&P
Department of Interventional Radiology  [phone]    History and Physical    Patient:  Michael Velazco MRN:  [id number]  SSN:  [id number]    YOB: 1960  Age:  59 y.o. Sex:  female      Primary Care Provider:  Miah Yap MD  Referring Physician:  Danae Vaca MD    Subjective:     Chief Complaint: port    History of the Present Illness: The patient is a 59 y.o. female with breast cancer who presents for venous chest port placement. NPO. Past Medical History:   Diagnosis Date    Adverse effect of anesthesia     slow to wake after cancer surgery on colon    Anemia 08/2018    no PO iron at present and no infusions    Cancer of ascending colon ([address] 2018    Environmental allergies 9/12/2013    History of colon cancer 2018    surgery on colon    History of DVT (deep vein thrombosis) 04/2018    right lower ext.  History of EKG 03/19/2021    NSR    Hx of echocardiogram 05/31/2018    EF 60-65%    Kidney stone     Lumbar stenosis 2018    per Dr. Tu Payton note (care every where)     Pulmonary embolism [facility] [address] ~2018    Type 2 diabetes mellitus ([facility] [address]     no current medication- AVG BS:/no s.s of hypoglycemia/Last A1c: 6.6 on 7/10/2020     Past Surgical History:   Procedure Laterality Date    HX CHOLECYSTECTOMY  1980's    HX COLONOSCOPY      HX LIPOMA RESECTION Right 1980's   Rosaline Cordoba right shoulder tendon surgery    HX OTHER SURGICAL  09/27/2018    filter placed to right groin; per patient- has been removed     HX WISDOM TEETH EXTRACTION      KS PART REMOVAL COLON W ANASTOMOSIS          Review of Systems:    Pertinent items are noted in the History of Present Illness. Prior to Admission medications    Medication Sig Start Date End Date Taking?  Authorizing Provider   lisinopriL (PRINIVIL, ZESTRIL) 10 mg tablet TAKE ONE TABLET BY MOUTH ONE TIME DAILY 6/16/21   Provider, Historical   ALPRAZolam (XANAX) 0.25 mg tablet Take 1 Tablet by mouth two (2) times daily as needed for Anxiety. Max Daily Amount: 0.5 mg. Indications: anxious 7/15/21   Venus Perera MD   nystatin (MYCOSTATIN) powder Apply  to affected area four (4) times daily. Apply to elbow creases and axilla 5/26/21   Venus Perera MD   apixaban (ELIQUIS) 5 mg tablet Take 1 Tab by mouth two (2) times a day. 3/20/21   Venus Perera MD   acetaminophen (TylenoL) 325 mg tablet Take  by mouth every four (4) hours as needed for Pain. Provider, Historical        Allergies   Allergen Reactions    Biaxin [Clarithromycin] Rash    Cortisone Other (comments)     NUMBNESS IN THE LEG (SITE OF INJECTION)  Per pt, leg numbness.        Family History   Problem Relation Age of Onset    No Known Problems Mother     Dementia Father     Heart Disease Father     Hypertension Father     Kidney Disease Father      Social History     Tobacco Use    Smoking status: Never Smoker    Smokeless tobacco: Never Used   Substance Use Topics    Alcohol use: No        Objective:       Physical Examination:    Vitals:    08/24/21 0745   BP: (!) 141/66   Pulse: 91   Resp: 16   Temp: 97.3 °F (36.3 °C)   SpO2: 99%   Weight: 73 kg (161 lb)   Height: 5' 4\" (1.626 m)       Pain Assessment  Pain Intensity 1: 0 (08/24/21 0743)        Patient Stated Pain Goal: 0      HEART: regular rate and rhythm  LUNG: clear to auscultation bilaterally  ABDOMEN: normal findings: soft, nontender  EXTREMITIES: warm, RUE edema    Laboratory:     Lab Results   Component Value Date/Time    Sodium 140 08/16/2021 10:15 AM    Sodium 140 07/29/2021 01:18 PM    Potassium 3.4 (L) 08/16/2021 10:15 AM    Potassium 3.9 07/29/2021 01:18 PM    Chloride 108 (H) 08/16/2021 10:15 AM    Chloride 110 (H) 07/29/2021 01:18 PM    CO2 25 08/16/2021 10:15 AM    CO2 25 07/29/2021 01:18 PM    Anion gap 7 08/16/2021 10:15 AM    Anion gap 5 (L) 07/29/2021 01:18 PM    Glucose 130 (H) 08/16/2021 10:15 AM    Glucose 110 (H) 07/29/2021 01:18 PM    BUN 12 08/16/2021 10:15 AM    BUN 13 07/29/2021 01:18 PM    Creatinine 0.80 08/16/2021 10:15 AM    Creatinine 0.70 07/29/2021 01:18 PM    GFR est AA >60 08/16/2021 10:15 AM    GFR est AA >60 07/29/2021 01:18 PM    GFR est non-AA >60 08/16/2021 10:15 AM    GFR est non-AA >60 07/29/2021 01:18 PM    Calcium 9.0 08/16/2021 10:15 AM    Calcium 9.4 07/29/2021 01:18 PM    Magnesium 2.1 07/01/2021 05:22 AM    Magnesium 1.7 (L) 06/30/2021 10:24 AM    Phosphorus 2.6 10/11/2018 04:30 AM    Phosphorus 3.5 10/10/2018 03:51 AM    Albumin 2.8 (L) 08/16/2021 10:15 AM    Albumin 2.9 (L) 07/29/2021 01:18 PM    Protein, total 8.3 (H) 08/16/2021 10:15 AM    Protein, total 8.1 07/29/2021 01:18 PM    Globulin 5.5 (H) 08/16/2021 10:15 AM    Globulin 5.2 (H) 07/29/2021 01:18 PM    A-G Ratio 0.5 (L) 08/16/2021 10:15 AM    A-G Ratio 0.6 (L) 07/29/2021 01:18 PM    ALT (SGPT) 22 08/16/2021 10:15 AM    ALT (SGPT) 25 07/29/2021 01:18 PM     Lab Results   Component Value Date/Time    WBC 5.2 08/16/2021 10:15 AM    WBC 6.3 07/29/2021 01:18 PM    HGB 11.1 (L) 08/16/2021 10:15 AM    HGB 10.3 (L) 07/29/2021 01:18 PM    HCT 34.4 (L) 08/16/2021 10:15 AM    HCT 32.6 (L) 07/29/2021 01:18 PM    PLATELET 009 35/34/0823 10:15 AM    PLATELET 105 87/74/9557 01:18 PM     Lab Results   Component Value Date/Time    aPTT 26.0 11/20/2018 01:09 PM    aPTT 28.1 09/21/2018 09:28 AM    Prothrombin time 12.5 11/20/2018 01:09 PM    Prothrombin time 13.0 09/21/2018 09:28 AM    INR 1.0 11/20/2018 01:09 PM    INR 1.0 09/21/2018 09:28 AM       Assessment:     Breast cancer    [facility] Problems  Date Reviewed: 8/16/2021    None          Plan:     Planned Procedure:  Port placement    Risks, benefits, and alternatives reviewed with patient and she agrees to proceed with the procedure.       Signed By: Malorie Voss PA-C     August 24, 2021

## 2021-08-24 NOTE — ANESTHESIA POSTPROCEDURE EVALUATION
* No procedures listed *.    total IV anesthesia    Anesthesia Post Evaluation        Patient location during evaluation: bedside  Patient participation: complete - patient participated  Level of consciousness: responsive to verbal stimuli  Pain management: satisfactory to patient  Airway patency: patent  Anesthetic complications: no  Cardiovascular status: hemodynamically stable  Respiratory status: spontaneous ventilation  Hydration status: stable    Final Post Anesthesia Temperature Assessment:  Normothermia (36.0-37.5 degrees C)      INITIAL Post-op Vital signs:   Vitals Value Taken Time   /63 08/24/21 1045   Temp     Pulse 82 08/24/21 1045   Resp 16 08/24/21 1039   SpO2 100 % 08/24/21 1045

## 2021-08-24 NOTE — PROCEDURES
Department of Interventional Radiology  [phone]        Interventional Radiology Brief Procedure Note    Patient: Uli Webber MRN: [id number]  SSN: [id number]    YOB: 1960  Age: 59 y.o.   Sex: female      Date of Procedure: 8/24/2021    Pre-Procedure Diagnosis: breast cancer    Post-Procedure Diagnosis: SAME    Procedure(s): Venous Chest Port Placement    Brief Description of Procedure: as above    Performed By: Kin Santillan PA-C     Assistants: None    Anesthesia:TIVS/MAC    Estimated Blood Loss: 20 ml    Specimens:  None    Implants:  Chest Port Placement    Findings: catheter tip in right atrium     Complications: None    Recommendations: ok to use port     Follow Up: prn    Signed By: Kin Santillan PA-C     August 24, 2021

## 2021-08-24 NOTE — DISCHARGE INSTRUCTIONS
[address]  Department of Interventional Radiology  [facility]  [phone] Office  [phone] Fax  Implanted Port Discharge Instructions      General Instructions:   A port is like an implanted IV. They are usually ordered for patients who will be getting chemotherapy, but can also be used as an IV for long term antibiotics, large amounts of fluids, and/or blood products. Your blood can be drawn from your port for labs also. Those patients who do not have good veins find the ports convenient as they can get the IV they need with one stick. The port can be used long term, and the care is easy. The device is under the skin, and once the skin heals, care is minimal. All that is required is the nurse who accesses the port will need to flush it with heparinized saline after each use. Ports are usually placed in the chest wall, usually on the right side. But they can be place in the arms and in the abdomen. Home Care Instructions: If your port is in your arm, do not allow blood pressure or other IVs to be place in that arm. Do not allow bra straps or any clothing to rub the skin over the port. Do not bathe or swim until the skin has healed and if the port is accessed. Once it is healed, and when the port is not accessed, it is okay to bathe and swim. Restrict yourself to light activity for the first 5 days after getting the port put in, after that, resume normal activity slowly. You may resume your normal diet and medications. Follow-Up Instructions: Please see your oncologist, or whatever physician ordered the port as he/she has requested of you. Call If: You should call your Physician and/or the Radiology Nurse if you notice redness, pus, swelling, or pain from the area of your incision. Call if you should develop a fever. The nurses who access your port will know to call your doctor if the port does not seem to be working properly. You need to tell the nurses who use the port if you should have any pain or swelling at the site during an infusion. To Reach Us: If you have any questions about your procedure, please call the Interventional Radiology department at [phone]. After business hours (5pm) and weekends, call the answering service at [phone] and ask for the Radiologist on call to be paged. Si tiene Preguntas acerca del procedimiento, por favor llame al departamento de Radiología Intervencional al [phone]. Después de horas de oficina (5 pm) y los fines de [place], llamar al [place] al [phone] y pregunte por el Radiologo de [place]. Interventional Radiology General Nurse Discharge    After general anesthesia or intravenous sedation, for 24 hours or while taking prescription Narcotics:  · Limit your activities  · Do not drive and operate hazardous machinery  · Do not make important personal or business decisions  · Do  not drink alcoholic beverages  · If you have not urinated within 8 hours after discharge, please contact your surgeon on call. * Please give a list of your current medications to your Primary Care Provider. * Please update this list whenever your medications are discontinued, doses are     changed, or new medications (including over-the-counter products) are added. * Please carry medication information at all times in case of emergency situations. These are general instructions for a healthy lifestyle:    No smoking/ No tobacco products/ Avoid exposure to second hand smoke  Surgeon General's Warning:  Quitting smoking now greatly reduces serious risk to your health.     Obesity, smoking, and sedentary lifestyle greatly increases your risk for illness  A healthy diet, regular physical exercise & weight monitoring are important for maintaining a healthy lifestyle    You may be retaining fluid if you have a history of heart failure or if you experience any of the following symptoms:  Weight gain of 3 pounds or more overnight or 5 pounds in a week, increased swelling in our hands or feet or shortness of breath while lying flat in bed. Please call your doctor as soon as you notice any of these symptoms; do not wait until your next office visit. Recognize signs and symptoms of STROKE:  F-face looks uneven    A-arms unable to move or move unevenly    S-speech slurred or non-existent    T-time-call 911 as soon as signs and symptoms begin-DO NOT go       Back to bed or wait to see if you get better-TIME IS BRAIN.       Patient Signature:  Date: 8/24/2021  Discharging Nurse: Bertrand Nixon RN

## 2021-08-24 NOTE — ANESTHESIA PREPROCEDURE EVALUATION
Relevant Problems   ENDOCRINE   (+) Controlled type 2 diabetes mellitus without complication, with long-term current use of insulin (HCC)   (+) Obesity due to excess calories   (+) Obesity, morbid (HCC)   (+) Type 2 diabetes mellitus with diabetic neuropathy (HCC)   (+) Type 2 diabetes with nephropathy (HCC)      HEMATOLOGY   (+) Iron deficiency anemia   (+) Microcytic anemia      PERSONAL HX & FAMILY HX OF CANCER   (+) Malignant neoplasm of ascending colon (HCC)   (+) Malignant neoplasm of colon, unspecified (HCC)   (+) Malignant neoplasm of right female breast (HCC)   (+) Primary adenocarcinoma of ascending colon (HCC)       Anesthetic History   No history of anesthetic complications            Review of Systems / Medical History  Patient summary reviewed and pertinent labs reviewed    Pulmonary                Comments: Recurrent R pleural effusion - drained twice, most recently 3 weeks ago.   Pt able to lie flat without dyspnea, SpO2 98% on RA   Neuro/Psych   Within defined limits           Cardiovascular    Hypertension              Exercise tolerance: >4 METS  Comments: H/o DVT with PE - Eliquis held x 2d   GI/Hepatic/Renal               Comments: S/p hemicolectomy 2018 Endo/Other    Diabetes (diet-control)    Cancer (Breast CA - inflammatory RUE lymphedema and pleural effusion)     Other Findings              Physical Exam    Airway  Mallampati: II  TM Distance: > 6 cm  Neck ROM: normal range of motion   Mouth opening: Normal     Cardiovascular    Rhythm: regular  Rate: normal         Dental  No notable dental hx       Pulmonary  Breath sounds clear to auscultation               Abdominal         Other Findings            Anesthetic Plan    ASA: 3  Anesthesia type: total IV anesthesia            Anesthetic plan and risks discussed with: Patient

## 2021-08-24 NOTE — PROGRESS NOTES
IR Nurse Pre-Procedure Checklist Part 2          Consent signed: Yes    H&P complete:  Yes    Antibiotics: Yes    Airway/Mallampati Done: Yes    Shaved: Not applicable    Pregnancy Form:Not applicable    Patient Position: Yes    MD Side: Yes     Biopsy Worksheet: Not applicable    Specimen Medium: Not applicable    Patient in IR [address] 2 for procedure. Anesthesia has assumed care of patient for the duration of procedure. Please see anesthesia record for documentation.

## 2021-08-27 NOTE — PROGRESS NOTES
I spoke with Ms. Beti Matute regarding her insurance coverage, potential oral medication authorizations, and assistance organizations. Ms. Beti Matute has [employer] insurance. She has met both her deductible and max OP. Insurance is paying approved major medical claims at 100%. This policy is a COBRA policy. She says that she has already paid her medical premiums through November. I let her know that [employer] has authorized her treatment medications. I talked with Ms. Mcmahan about [address] enrollment. I let her know that we do have CAC counselors that can present her with the [address] options if that was a service she would be interested in. She seemed interested, but wanted to follow back up on that item in November. I went over the hospital financial assistance application with Ms. Mcmahan. E-mailed Stephen Crenshaw requesting a FAP be mailed to Ms. Mcmahan's home address. Home address verified in [address] Next, I spoke with patient regarding potential oral medication authorizations. I told her that if she ever had any problems getting her oral medications filled to give the dedicated [facility] [place], a call. Most of the time, it is simply an authorization that needs to be done with the insurance company. Svetlana number and extension was given to Ms. Mcmahan. Lastly, I gave patient a form with various resource organizations that could assist with specific needs (example:  transportation, lodging, preparing meals, home cleaning)     Patient expressed understanding of the information above and all questions were answered to her satisfaction.

## 2021-08-30 NOTE — PROGRESS NOTES
Patient arrived to port lab for port access and lab draw   [facility] accessed and labs drawn per protocol   *Port remains accessed / *Port flushed and de-accessed  Patient discharged from port lab ambulatory*

## 2021-08-30 NOTE — PROGRESS NOTES
8/30/2021 Saw the patient with Dr Jamila Horner. She does have some nodules under the right breast. She does feel her left breast is bruised from a procedure. She is due for Carbo/Taxol/Keytruda today. We reviewed how to take her nausea medication. Encouraged her to call with questions/concerns. Will continue to follow.

## 2021-08-30 NOTE — PROGRESS NOTES
Arrived to the [facility]. Assessment completed, labs reviewed. D1C1 Keytruda/Taxol/Carbo completed. Patient tolerated well. Any issues or concerns during appointment: Pt noted \"pins and needles\" sensation to ankles and feet, this RN reported this to navigator Key Lorenzo, RN- possible dose reduction on Benadryl for next cycle. Patient aware of next infusion appointment on 09/07/21 @1600. Discharged ambulatory.

## 2021-09-02 NOTE — THERAPY RECERTIFICATION
Verma Cranker  : 1960  Primary: [place] [employer] [place]*  Secondary:  [address]  at [facility]  [address], [address], [place] [address]  [phone]   [phone]          OUTPATIENT PHYSICAL [address] [address]   ICD-10: Treatment Diagnosis: other lymphedema I 89.0  Precautions/Allergies:   Biaxin [clarithromycin] and Cortisone    TREATMENT PLAN:  Effective Dates: 2021 TO 2021 (90 days). Frequency/Duration: 1 times a week for 90 Day(s) MEDICAL/REFERRING DIAGNOSIS:  Acute embolism and thrombosis of right axillary vein [I82. A11]  Personal history of other malignant neoplasm of large intestine [Z85.038]    DATE OF ONSET:   REFERRING PHYSICIAN: Felicia Fritz MD MD Orders: lymphedema  Return MD Appointment:       INITIAL ASSESSMENT:  Ms. Jennie Mendoza presents for an assessment of her lymphedema. She presents with gradual progression of edema beginning in September when she suffered an injury to her shoulder while walking her dog. She reports she had surgery to correct this in November. In April she developed a DVT of the right upper extremity. 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 Angioplasty was performed. She has continued to have increasing edema. She is stage 3 lymphedema at this point. She has lost function in her dominant arm. She is unable to work at this time. She will benefit from lymphedema treatment to reduce the edema and increase her function. 21:  The patient was recently discharged from the hospital.  She was referred to home health, but the agency does not treat upper extremity lymphedema. The  from the hospital was contacted. She reports she will attempt to find another agency. The patient does not feel safe driving due to the limitations in her arm and neck.   Until she can receive home health, she will try to find transportation to outpatient therapy. She continues to have significant edema and limited ROM. She will benefit from CDT. She is waiting on biopsy results of the axilla lymph node which was performed in the hospital.  9/2/21:  The patient returns today having started chemo. She is fatigued. Her posture is kyphotic. She has lost 2 more pounds. She is having a hard time eating. ROM and function are significantly limited in the right upper extremity. Edema remains, but improved. She continues to wear compression. She has a neighbor who helps. She will benefit from continued CDT. As edema is reduced with CDT and chemo, we will address the overall ROM and function of the arm. She has been slow to meet goals due to recent hospital stay and diagnosis of cancer. PROBLEM LIST (Impacting functional limitations):  1. Decreased Strength  2. Decreased Flexibility/Joint Mobility  3. Edema/Girth  4. Decreased Knowledge of Precautions  5. Decreased Skin Integrity/Hygeine  6. Decreased [place] with Home Exercise Program INTERVENTIONS PLANNED: (Treatment may consist of any combination of the following)  1. Decongestion Therapy  2. Home Exercise Program (HEP)  3. Manual Therapy  4. Range of Motion (ROM)  5. Therapeutic Exercise/Strengthening     GOALS: (Goals have been discussed and agreed upon with patient.)  Short-Term Functional Goals: Time Frame: 4 weeks  1. The patient will be independent with skin care within 4 weeks. Met   2. The patient will have knowledge of signs and symptoms of lymphedema and how to manage within 4 weeks. 3. The patient will reduce edema by 2 cm within 4 weeks. Met   4. The patient will be independent with self MLD within 4 weeks. 5. The patient will be independent with a HEP within 4 weeks. Met   6. Discharge Goals: Time Frame: 8 weeks  1. The patient will be fit with a compression garment within 8 weeks.   2. The patient will reduce her edema by 4 cm within 8 weeks. 3. The patient will be independent with self management of lymphedema within 8 weeks. 4.     OUTCOME MEASURE:   Tool Used: Disabilities of the Arm, Shoulder and Hand (DASH) Questionnaire - Quick Version  Score:  Initial: 33/55  Most Recent: X/55 (Date: -- )   Interpretation of Score: The DASH is designed to measure the activities of daily living in person's with upper extremity dysfunction or pain. Each section is scored on a 1-5 scale, 5 representing the greatest disability. The scores of each section are added together for a total score of 55. Tool Used: ECOG Performance Survey Score  Score:  Initial: 1  Most Recent:  1    Interpretation of Score:   0 Fully active, able to carry on all pre-disease performance without restriction   1 Restricted in physically strenuous activity but ambulatory and able to carry out work of a light or sedentary nature, e.g., light house work, office work   2 Ambulatory and capable of all selfcare but unable to carry out any work activities. Up and about more than 50% of waking hours   3 Capable of only limited selfcare, confined to bed or chair more than 50% of waking hours   4 Completely disabled. Cannot carry on any selfcare. Totally confined to bed or chair   5 Dead        Tool Used: Lymphedema Life Impact Scale   Score:  Initial: 71 Most Recent: X (Date: -- )   Interpretation of Score: The Lymphedema Life Impact Scale (LLIS) is a validated instrument that measures the physical, functional, and psychosocial concerns pertinent to patients with extremity lymphedema. The Scale's questionnaire is administered to patients to gauge impairments, activity limitations, and participation restrictions resulting from their lymphedema. MEDICAL NECESSITY:   · Patient is expected to demonstrate progress in strength, range of motion and edema management to reduce risk of cellulitis.   REASON FOR SERVICES/OTHER COMMENTS:  · Patient continues to demonstrate capacity to improve strength, ROM and edema management which will increase independence. Total Duration:  PT Patient Time In/Time Out  Time In: 7752  Time Out: 1100    Rehabilitation Potential For Stated Goals: [address] [facility] therapy, I certify that the treatment plan above will be carried out by a therapist or under their direction. Thank you for this referral,  Sharron Cali, PT          PAIN/SUBJECTIVE:   Initial: Pain Intensity 1: 0 0 Post Session:  0/10   HISTORY:   History of Injury/Illness (Reason for Referral): Injury of the right shoulder in September, corrective surgery in November, surgery for DVT in April, edema continued, stage 3 lymphedema  Past Medical History/Comorbidities:   Ms. Michelle Sr  has a past medical history of Adverse effect of anesthesia, Anemia (08/2018), Cancer of ascending colon ([address] (2018), Environmental allergies (9/12/2013), History of colon cancer (2018), History of DVT (deep vein thrombosis) (04/2018), History of EKG (03/19/2021), echocardiogram (05/31/2018), Kidney stone, Lumbar stenosis (2018), Pulmonary embolism ([address] (~2018), and Type 2 diabetes mellitus ([address] Ms. Michelle Sr  has a past surgical history that includes hx cholecystectomy (1980's); hx lipoma resection (Right, 1980's); pr part removal colon w anastomosis; hx colonoscopy; hx wisdom teeth extraction; hx other surgical (09/27/2018); hx orthopaedic; and ir insert tunl cvc w port over 5 years (8/24/2021). Past Medical History:   Diagnosis Date    Adverse effect of anesthesia     slow to wake after cancer surgery on colon    Anemia 08/2018    no PO iron at present and no infusions    Cancer of ascending colon ([address] 2018    Environmental allergies 9/12/2013    History of colon cancer 2018    surgery on colon    History of DVT (deep vein thrombosis) 04/2018    right lower ext.     History of EKG 03/19/2021    NSR    Hx of echocardiogram 05/31/2018    EF 60-65%    Kidney stone     Lumbar stenosis 2018 per Dr. Gina Hernandez note (care every where)     Pulmonary embolism [facility]) ~2018    Type 2 diabetes mellitus ([facility]     no current medication- AVG BS:/no s.s of hypoglycemia/Last A1c: 6.6 on 7/10/2020     Past Surgical History:   Procedure Laterality Date    HX CHOLECYSTECTOMY  1980's    HX COLONOSCOPY      HX LIPOMA RESECTION Right 1980's   Marilin Webber right shoulder tendon surgery    HX OTHER SURGICAL  09/27/2018    filter placed to right groin; per patient- has been removed     HX WISDOM TEETH EXTRACTION      IR [address] 5 YEARS  8/24/2021    OK PART REMOVAL COLON W ANASTOMOSIS         Social History/Living Environment:     lives alone  Prior Level of Function/Work/Activity:  Works full time, but unable to work presently  Dominant Side:         RIGHT   Ambulatory/Rehab Services H2 Model Falls Risk Assessment   Risk Factors:       No Risk Factors Identified Ability to Rise from Chair:       (1)  Pushes up, successful in one attempt   Falls Prevention Plan:       No modifications necessary   Total: (5 or greater = High Risk): 1   ©2010 [facility] [address] [place] [address] Patent #[id number]. Federal Law prohibits the replication, distribution or use without written permission from [facility] [place]   Current Medications:       Current Outpatient Medications:     busPIRone (BUSPAR) 15 mg tablet, Take 7.5 mg by mouth two (2) times a day., Disp: , Rfl:     ondansetron hcl (Zofran) 8 mg tablet, Take 1 Tablet by mouth every eight (8) hours as needed for Nausea., Disp: 90 Tablet, Rfl: 2    prochlorperazine (Compazine) 10 mg tablet, Take 1 Tablet by mouth every six (6) hours as needed (as needed for nausea and vomiting). , Disp: 90 Tablet, Rfl: 2    lidocaine-prilocaine (EMLA) topical cream, Apply a dab to port site 30-45 minutes prior to Lab or chemo infusion.   Cover with saran wrap., Disp: 30 g, Rfl: 2    lisinopriL (PRINIVIL, ZESTRIL) 10 mg tablet, TAKE ONE TABLET BY MOUTH ONE TIME DAILY, Disp: , Rfl:     ALPRAZolam (XANAX) 0.25 mg tablet, Take 1 Tablet by mouth two (2) times daily as needed for Anxiety. Max Daily Amount: 0.5 mg. Indications: anxious, Disp: 20 Tablet, Rfl: 0    nystatin (MYCOSTATIN) powder, Apply  to affected area four (4) times daily. Apply to elbow creases and axilla, Disp: 1 Bottle, Rfl: 3    apixaban (ELIQUIS) 5 mg tablet, Take 1 Tab by mouth two (2) times a day., Disp: 60 Tab, Rfl: 5    acetaminophen (TylenoL) 325 mg tablet, Take  by mouth every four (4) hours as needed for Pain., Disp: , Rfl:    Date Last Reviewed:  9/2/21   Number of Personal Factors/Comorbidities that affect the Plan of Care: 3+: HIGH COMPLEXITY   EXAMINATION:   Palpation:          Tissue pitting, appears rough in texture, engorged veins in the anterior chest.  Appears bruised. Tissue loose in the upper arm. Muscles feel very hard in the upper trap and lateral trunk. ROM:          Right shoulder flexion 20  Abduction 20  Elbow flexion 80  Extension - 55  Strength:          Minimal active mobility of the shoulder and elbow. Functional Mobility:         Gait/Ambulation:  independent        Transfers:  independent        Bed Mobility:  Independent with moderate difficulty  Skin Integrity:          Skin thin and fragile appearing  Edema/Girth:  pitting    Left Right    Initial Most Recent Initial Most Recent   Upper  Extremity           Lower  Extremity               Body Structures Involved:  1. Joints  2. Muscles  3. lymphatic system Body Functions Affected:  1. Neuromusculoskeletal Activities and Participation Affected:  1. General Tasks and Demands  2.  Mobility   Number of elements (examined above) that affect the Plan of Care: 4+: HIGH COMPLEXITY   CLINICAL PRESENTATION:   Presentation: Evolving clinical presentation with changing clinical characteristics: MODERATE COMPLEXITY   CLINICAL DECISION MAKING:   Use of outcome tool(s) and clinical judgement create a POC that gives a: Questionable prediction of patient's progress: MODERATE COMPLEXITY

## 2021-09-02 NOTE — PROGRESS NOTES
Kylie Mello  : 1960  Primary: [employer] [place]*  Secondary:  [address]  at [facility]  [address], [address], [place] [address]  [phone]   [phone]        OUTPATIENT PHYSICAL THERAPY: Daily Treatment Note 2021  Visit Count:  21       ICD-10: Treatment Diagnosis: other lymphedema I 89.0  Precautions/Allergies:   Biaxin [clarithromycin] and Cortisone    TREATMENT PLAN:  Effective Dates: 2021 TO 2021 (90 days). Frequency/Duration: 1 times a week for 90 Day(s)       Pre-treatment Symptoms/Complaints:  The patient reports she is having drainage in her throat. She reports she has lost 2 more pounds. She reports she will have treatment every week. She reports she is very tired. Pain: Initial: Pain Intensity 1: 0  Post Session:  0/10   Medications Last Reviewed:  2021  Updated Objective Findings:  Edema/Girth:  pitting    Left Right    Initial Most Recent Initial Most Recent   Upper  Extremity           Lower  Extremity               TREATMENT:     THERAPEUTIC EXERCISE: ( minutes):  Exercises per grid below to improve mobility. Required minimal verbal cues to promote proper body alignment. Progressed range as indicated. MANUAL THERAPY: ( 65 minutes): Complex decongestive physiotherapy was utilized and necessary because of the patient's restricted joint motion and edema. the patient presented with no bandage in place. Modified MLD performed along with fibrotic techniques to the right upper extremity. Skin care performed. A short stretch bandage was applied using stockinette, cotton padding, 6 cm, 8 cm and 2 10 cm short stretch bandages from wrist to axilla. Tissue is much softer today. Mobility is still very limited. She continues her HEP. ROM of the entire upper extremity is limited. She is very quiet today. She will wear the bandage to tolerance.   [facility] PortalAccess Code: [email or web address]  URL: [email or web address]  Date: 06/01/2021  Prepared by: Mercedes Morris    Exercises  Standing shoulder flexion wall slides - 2 x daily - 7 x weekly - 1 sets - 10 reps - 5 hold  Supine Lower Trunk Rotation - 2 x daily - 7 x weekly - 1 sets - 10 reps - 5 hold      Treatment/Session Summary:    · Response to Treatment:  tolerated the treatment well. Tissue softer today. She has now started chemo. · Communication/Consultation:  HEP  · Equipment provided today:  None today  · Recommendations/Intent for next treatment session: Next visit will focus on CDT and ROM.     Total Treatment Billable Duration:  65 minutes  PT Patient Time In/Time Out  Time In: 1122  Time Out: [address] [facility], PT    Future Appointments   Date Time Provider Alec Lashell   9/7/2021  3:00 PM LAB CK [facility]   9/7/2021  3:30 PM Horacio Dunn NP [facility]   9/7/2021  4:00 PM POD2B [facility]   9/9/2021 10:00 AM Sharron Cali, PT [facility]   9/13/2021  9:00 AM LAB CK [facility]   9/13/2021  9:30 AM Kadeem Vital MD [facility]   9/13/2021 10:00 AM POD2B [facility] G[facility] GCC   9/16/2021 10:00 AM Sharron Cali, PT [facility]   9/20/2021 11:00 AM LAB CK [facility] GCC   9/20/2021 12:30 PM Simona Ortiz NP [facility]   9/20/2021  2:00 PM POD1A [facility] GCC   9/27/2021  9:30 AM LAB CK [facility] GCC   9/27/2021 10:00 AM Kadeem Vital MD [facility]   9/27/2021 11:00 AM POD3A [facility] G[facility] GCC   10/4/2021  9:00 AM LAB CK [facility] GVL GCC   10/4/2021  9:30 AM Dee Beauchamp NP [facility]   10/4/2021 10:30 AM POD3B [facility] GVL [facility]   10/11/2021  9:30 AM LAB CK [facility]   10/11/2021 10:00 AM Kadeem Vital MD [facility]   10/11/2021 11:00 AM POD2A [facility] G[facility]   2/11/2022 10:20 AM Rajendra Grissom NP [facility] PP PP

## 2021-09-07 NOTE — PROGRESS NOTES
Patient arrived to port lab for port access and lab draw   [address] accessed and labs drawn per protocol   *Port remains accessed   Patient discharged from port lab via [facility]*

## 2021-09-07 NOTE — PROGRESS NOTES
Assume care of patient. NS 1 L infusing. Pt aware of next appt on 9/13/21 at 1000. Treatment was held today.

## 2021-09-07 NOTE — PROGRESS NOTES
9/7/21 saw pt today with Denzel Fagan NP for pre chemo c1d8 carbo/taxol/keytruda. Will hold this week due to low blood counts, but will receive fluids today. She is having diarrhea, using imodium. Will send lomotil to pharmacy. Instructed to use both and alternate. Focus on PO intake. Proceed to infusion for fluids. Follow up in 1 week. Encouraged to call with any concerns. Navigation will continue to follow.

## 2021-09-13 PROBLEM — E87.6 HYPOKALEMIA: Status: ACTIVE | Noted: 2021-01-01

## 2021-09-13 PROBLEM — E86.0 DEHYDRATION: Status: ACTIVE | Noted: 2021-01-01

## 2021-09-13 NOTE — PROGRESS NOTES
9/13/2021 Saw the patient with Dr Vanessa Zurita. She is trying to drink and eats small amounts. She did have some diarrhea last week. We will hold chemo today and give fluids with potassium and magnesium replacement. She will also be seen by Missael Bauer as she continues to lose weight. She does state her diarrhea has improved with alternating the lomotil and imodium. Will continue to follow.

## 2021-09-13 NOTE — PROGRESS NOTES
Patient arrived to port lab for port access and lab draw   [address] accessed and labs drawn per protocol   *Port remains accessed   Patient discharged from port lab via wheel chair*

## 2021-09-13 NOTE — PROGRESS NOTES
Pt. Discharged ambulatory. Tolerated infusions well. No distress noted. To call physician with any problems or concerns. Understanding voiced. To return to Infusions on 9/20/21.

## 2021-09-20 NOTE — PROGRESS NOTES
Patient arrived to port lab for port access and lab draw   [facility] accessed and labs drawn per protocol   Port remains accessed  Patient discharged from port lab ambulatory

## 2021-09-20 NOTE — ADDENDUM NOTE
Encounter addended by: Cristino Burch RPH on: 9/20/2021 1:28 PM   Actions taken: i-John created or edited

## 2021-09-20 NOTE — PROGRESS NOTES
Arrived to the [facility]. Keytruda, Taxol, and Carboplatin completed. Patient tolerated well. Any issues or concerns during appointment: none. Patient aware of next infusion appointment on 9/27/21 at 1100. Patient aware of next lab and [facility] office visit on 9/27/21 at 0930. Discharged in [facility].

## 2021-09-27 NOTE — PROGRESS NOTES
"Call Type: Triage Call    Presenting Problem: \"My name is Angieasmoises (pharmacist at [facility]-in  [place]) and I need to speak to the , about pt's order for  Enoxaparin.\" Dr. Horton-[facility]-was then paged, to call  E[place]i-at: [phone], at: [phone], via smart web.  Triage Note:  Guideline Title: No Guideline - Advice Per Reference (Adult)  Recommended Disposition: Call Provider Immediately  Original Inclination: Wanted to speak with a nurse  Override Disposition:  Intended Action: Call PCP/HCP  Physician Contacted: No  CALL PROVIDER IMMEDIATELY ?  YES  SEE ED IMMEDIATELY ? NO  ACTIVATE  ? NO  Physician Instructions:  Care Advice:  " Pt arrived ambulatory to [facility] with port previously accessed with good blood return. NS 1 L infusing. Pt aware of next appt on 10/4/21 at 1030. Port flushed and de accessed. Pt waiting for ride.

## 2021-10-04 PROBLEM — E83.42 HYPOMAGNESEMIA: Status: ACTIVE | Noted: 2021-01-01

## 2021-10-04 NOTE — PROGRESS NOTES
Patient arrived at [facility] for 1L fluids and Potassium/Mag. Assessment completed. No needs voiced at this time. Patient tolerated infusion well and is aware of next appointment on 10/11/2021 @4950. Patient discharged ambulatory.

## 2021-10-04 NOTE — PROGRESS NOTES
Patient arrived to port lab for port access and lab draw   [address] accessed and labs drawn per protocol   *Port remains accessed for infusion  Patient discharged from port lab ambulatory*

## 2021-10-04 NOTE — PROGRESS NOTES
10/4/21 saw pt today with Tima Washburn NP for follow up breast cancer. PO intake is limited, discussed importance of intake and calories. Fluids today in infusion. Follow up in 1 week for next cycle. Encouraged to call with any concerns. Navigation will continue to follow.

## 2021-10-08 NOTE — PROGRESS NOTES
10/8/2021 Spoke to the patient yesterday and she was having trouble having a bowel movement. She had dulcolax and took this. I spoke to her today and she has had BM and feels much better. We discussed starting andriy colace once a day as maintenance.

## 2021-10-11 NOTE — PROGRESS NOTES
10/11/21 saw pt today with Dr. Bobie Schwab for pre chemo cycle 3 carbo/taxol/keytruda. Will delay 1 week due to low platelets. PO intake has improved and appetite. Denies any issues. Carbo will be dose reduced next week. Provided list of mag rich foods. Follow up in 1 week. Encouraged to call with any concerns. Navigation will continue to follow.

## 2021-10-18 NOTE — PROGRESS NOTES
10/18/21 saw pt today with Cecilia Herr, NP for pre chemo carbo/taxol/keytruda. She is feeling well. PO intake is good. Hgb 7, will arrange 1 unit of blood. Proceed to infusion. Follow up in 3 weeks. Encouraged to call with any concerns. Navigation will continue to follow.

## 2021-10-18 NOTE — PROGRESS NOTES
Arrived to the [facility]. Keytruda, Taxol and Carboplatin infusions completed. Patient tolerated well. Any issues or concerns during appointment: NO.  Patient aware of next infusion appointment on 10/19/21 (date) at  (time). Patient aware of next lab and [facility] office visit on 11/08/21 (date) at 0830 (time). Discharged ambulatory.

## 2021-10-18 NOTE — PROGRESS NOTES
Patient arrived to port lab for port access and lab draw. Port accessed and labs drawn per protocol. Port remains accessed. Patient discharged from port lab ambulatory to Infusion.

## 2021-10-19 NOTE — PROGRESS NOTES
Arrived to the [facility]. 1 unit PRBC completed. Patient tolerated well. Any issues or concerns during appointment: none. VSS. Patient aware of next infusion appointment on 11/8/21 at 1000. Patient aware of next lab and [facility] office visit on 11/8/21 at 0830. Discharged amb.

## 2021-11-08 NOTE — PROGRESS NOTES
Arrived to the [facility]. Keytruda,Taxol, Carboplatin, Mg 2gm, and 1 unit of PRBC completed. Patient tolerated well. Any issues or concerns during appointment :none. Patient aware of next infusion appointment on 11/29/21 at 1330. Patient aware of next lab and [facility] office visit on 11/29/21 at [phone]. Discharged amb.

## 2021-11-08 NOTE — PROGRESS NOTES
Patient arrived to port lab for port access and lab draw   [address] accessed and labs drawn per protocol   *Port remains accessed for infusion  Patient discharged from port lab ambulatory*

## 2021-11-15 NOTE — PROGRESS NOTES
I spoke with Ms. Jaya Yeh over the phone regarding her current insurance and RACHEL plan options. Ms. Jaya Yeh was interested in reviewing her RACHEL options versus her current insurance. I let her know that I would mail out three different RACHEL plan options for her to review.

## 2021-11-29 NOTE — PROGRESS NOTES
Patient arrived to port lab for port access and lab draw   [address] accessed and labs drawn per protocol   *Port remains accessed. Patient discharged from port lab ambulatory. *

## 2021-11-29 NOTE — PROGRESS NOTES
11/29/2021 Saw the patient with Lynsey [place] NP. She is due for [facility]. She will need 2 untis of blood tomorrow. Her recent CT shows good disease response. Will continue to follow.

## 2021-11-29 NOTE — PROGRESS NOTES
Patient arrived at [facility]. Assessment completed. No needs voiced at this time. Patient tolerated chemo well and is aware of next appointment on 11/30/2021 @0900. Patient discharged ambulatory.

## 2021-11-30 NOTE — PROGRESS NOTES
's visit to explore spiritual needs and convey care and concern. The visit was welcomed and I offered spiritual interventions including active listening, affirmation of ifeanyi & emotions, exploration of coping skills & support system, and prayer as requested. Ms. Mcmahan expressed gratitude for the visit. Ms. Malika Guerrero is overjoyed by the report she received on yesterday. She is very hopeful about her future and she is looking forward to future plans with her family.      Mounika 86, Cris 66  Board Certified

## 2021-11-30 NOTE — PROGRESS NOTES
Arrived to the [facility]. 2 units PRBCs completed. Patient tolerated well. Any issues or concerns during appointment: none. Patient aware of next lab and [facility] office visit on 12/20/21 at 1000. Discharged ambulatory.

## 2021-12-20 NOTE — PROGRESS NOTES
Patient arrived to port lab for port access and lab draw   Velinda Leyden accessed and labs drawn per protocol   / *Port flushed and de-accessed  Patient discharged from port lab ambulatory*

## 2021-12-27 NOTE — PROGRESS NOTES
Patient arrived to port lab for port access and lab draw   Sarah [address] accessed and labs drawn per protocol   *Port remains accessed   Patient discharged from port lab ambulatory*

## 2021-12-27 NOTE — PROGRESS NOTES
Arrived to the [facility]. Keytruda and PO potassium completed. Patient tolerated well. Any issues or concerns during appointment: none. Patient aware of next infusion appointment on 1/17/2022 at 2pm  Discharged ambulatory.

## 2021-12-28 NOTE — THERAPY DISCHARGE
Xochilt Lora  : 1960  Primary: [employer] [place]*  Secondary:  [address]  at [facility]  [address], [address], [place] [address]  [phone]   [phone]          OUTPATIENT PHYSICAL THERAPY:Discontinuation Summary 2021   ICD-10: Treatment Diagnosis: other lymphedema I 89.0  Precautions/Allergies:   Biaxin [clarithromycin] and Cortisone    TREATMENT PLAN:  Effective Dates: 2021 TO 2021 (90 days). Frequency/Duration: 1 times a week for 90 Day(s) MEDICAL/REFERRING DIAGNOSIS:  Acute embolism and thrombosis of right axillary vein [I82. A11]  Personal history of other malignant neoplasm of large intestine [Z85.038]    DATE OF ONSET:   REFERRING PHYSICIAN: Mehnaz Wick MD MD Orders: lymphedema  Return MD Appointment:       INITIAL ASSESSMENT:  Ms. Ursula Echavarria presents for an assessment of her lymphedema. She presents with gradual progression of edema beginning in September when she suffered an injury to her shoulder while walking her dog. She reports she had surgery to correct this in November. In April she developed a DVT of the right upper extremity. 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 Angioplasty was performed. She has continued to have increasing edema. She is stage 3 lymphedema at this point. She has lost function in her dominant arm. She is unable to work at this time. She will benefit from lymphedema treatment to reduce the edema and increase her function. 21:  The patient was recently discharged from the hospital.  She was referred to home health, but the agency does not treat upper extremity lymphedema. The  from the hospital was contacted. She reports she will attempt to find another agency. The patient does not feel safe driving due to the limitations in her arm and neck.   Until she can receive home health, she will try to find transportation to outpatient therapy. She continues to have significant edema and limited ROM. She will benefit from CDT. She is waiting on biopsy results of the axilla lymph node which was performed in the hospital.  9/2/21:  The patient returns today having started chemo. She is fatigued. Her posture is kyphotic. She has lost 2 more pounds. She is having a hard time eating. ROM and function are significantly limited in the right upper extremity. Edema remains, but improved. She continues to wear compression. She has a neighbor who helps. She will benefit from continued CDT. As edema is reduced with CDT and chemo, we will address the overall ROM and function of the arm. She has been slow to meet goals due to recent hospital stay and diagnosis of cancer. The patient called to cancel her last scheduled appointments. She did not reschedule. Goals were partially met. PROBLEM LIST (Impacting functional limitations):  1. Decreased Strength  2. Decreased Flexibility/Joint Mobility  3. Edema/Girth  4. Decreased Knowledge of Precautions  5. Decreased Skin Integrity/Hygeine  6. Decreased [place] with Home Exercise Program INTERVENTIONS PLANNED: (Treatment may consist of any combination of the following)  1. Decongestion Therapy  2. Home Exercise Program (HEP)  3. Manual Therapy  4. Range of Motion (ROM)  5. Therapeutic Exercise/Strengthening     GOALS: (Goals have been discussed and agreed upon with patient.)  Short-Term Functional Goals: Time Frame: 4 weeks  1. The patient will be independent with skin care within 4 weeks. Met   2. The patient will have knowledge of signs and symptoms of lymphedema and how to manage within 4 weeks. 3. The patient will reduce edema by 2 cm within 4 weeks. Met   4. The patient will be independent with self MLD within 4 weeks. 5. The patient will be independent with a HEP within 4 weeks. Met   6.    Discharge Goals: Time Frame: 8 weeks  1. The patient will be fit with a compression garment within 8 weeks. 2. The patient will reduce her edema by 4 cm within 8 weeks. 3. The patient will be independent with self management of lymphedema within 8 weeks. 4.     OUTCOME MEASURE:   Tool Used: Disabilities of the Arm, Shoulder and Hand (DASH) Questionnaire - Quick Version  Score:  Initial: 33/55  Most Recent: X/55 (Date: -- )   Interpretation of Score: The DASH is designed to measure the activities of daily living in person's with upper extremity dysfunction or pain. Each section is scored on a 1-5 scale, 5 representing the greatest disability. The scores of each section are added together for a total score of 55. Tool Used: ECOG Performance Survey Score  Score:  Initial: 1  Most Recent:  1    Interpretation of Score:   0 Fully active, able to carry on all pre-disease performance without restriction   1 Restricted in physically strenuous activity but ambulatory and able to carry out work of a light or sedentary nature, e.g., light house work, office work   2 Ambulatory and capable of all selfcare but unable to carry out any work activities. Up and about more than 50% of waking hours   3 Capable of only limited selfcare, confined to bed or chair more than 50% of waking hours   4 Completely disabled. Cannot carry on any selfcare. Totally confined to bed or chair   5 Dead        Tool Used: Lymphedema Life Impact Scale   Score:  Initial: 71 Most Recent: X (Date: -- )   Interpretation of Score: The Lymphedema Life Impact Scale (LLIS) is a validated instrument that measures the physical, functional, and psychosocial concerns pertinent to patients with extremity lymphedema. The Scale's questionnaire is administered to patients to gauge impairments, activity limitations, and participation restrictions resulting from their lymphedema.   Total Duration:  PT Patient Time In/Time Out  Time In: 8476  Time Out: 1100    Rehabilitation Potential For Stated Goals: [address] [facility]'s therapy, I certify that the treatment plan above will be carried out by a therapist or under their direction.   Thank you for this referral,  Davon Varghese, PT

## 2021-12-29 NOTE — PROGRESS NOTES
Received signed [facility] co-pay program patient consents from Ms. Mcmahan. These were faxed to the [facility] at [phone]. Physician's portion of the application has already been received.

## 2022-01-01 ENCOUNTER — HOSPICE ADMISSION (OUTPATIENT)
Dept: HOSPICE | Facility: HOSPICE | Age: 62
End: 2022-01-01

## 2022-01-01 ENCOUNTER — HOSPITAL ENCOUNTER (INPATIENT)
Age: 62
LOS: 7 days | Discharge: NURSING, REHAB OR LONG-TERM CARE | DRG: 374 | End: 2022-04-25
Attending: INTERNAL MEDICINE | Admitting: INTERNAL MEDICINE
Payer: COMMERCIAL

## 2022-01-01 ENCOUNTER — HOSPITAL ENCOUNTER (OUTPATIENT)
Dept: INFUSION THERAPY | Age: 62
Discharge: HOME | End: 2022-03-07
Payer: COMMERCIAL

## 2022-01-01 ENCOUNTER — HOSPITAL ENCOUNTER (OUTPATIENT)
Dept: INFUSION THERAPY | Age: 62
Discharge: HOME | End: 2022-03-28
Payer: COMMERCIAL

## 2022-01-01 ENCOUNTER — ANESTHESIA EVENT (OUTPATIENT)
Dept: ENDOSCOPY | Age: 62
DRG: 374 | End: 2022-01-01
Payer: COMMERCIAL

## 2022-01-01 ENCOUNTER — APPOINTMENT (OUTPATIENT)
Dept: PHYSICAL THERAPY | Age: 62
End: 2022-01-01
Payer: COMMERCIAL

## 2022-01-01 ENCOUNTER — APPOINTMENT (OUTPATIENT)
Dept: GENERAL RADIOLOGY | Age: 62
DRG: 374 | End: 2022-01-01
Attending: ANESTHESIOLOGY
Payer: COMMERCIAL

## 2022-01-01 ENCOUNTER — APPOINTMENT (OUTPATIENT)
Dept: GENERAL RADIOLOGY | Age: 62
DRG: 374 | End: 2022-01-01
Attending: INTERNAL MEDICINE
Payer: COMMERCIAL

## 2022-01-01 ENCOUNTER — HOSPITAL ENCOUNTER (OUTPATIENT)
Dept: INFUSION THERAPY | Age: 62
Discharge: HOME | DRG: 374 | End: 2022-04-18
Payer: COMMERCIAL

## 2022-01-01 ENCOUNTER — HOSPITAL ENCOUNTER (OUTPATIENT)
Dept: INFUSION THERAPY | Age: 62
Discharge: HOME | End: 2022-01-19
Payer: COMMERCIAL

## 2022-01-01 ENCOUNTER — APPOINTMENT (OUTPATIENT)
Dept: ULTRASOUND IMAGING | Age: 62
DRG: 374 | End: 2022-01-01
Attending: INTERNAL MEDICINE
Payer: COMMERCIAL

## 2022-01-01 ENCOUNTER — APPOINTMENT (OUTPATIENT)
Dept: GENERAL RADIOLOGY | Age: 62
DRG: 374 | End: 2022-01-01
Attending: NURSE PRACTITIONER
Payer: COMMERCIAL

## 2022-01-01 ENCOUNTER — HOSPITAL ENCOUNTER (OUTPATIENT)
Dept: PHYSICAL THERAPY | Age: 62
Discharge: HOME | End: 2022-03-07
Payer: COMMERCIAL

## 2022-01-01 ENCOUNTER — HOSPITAL ENCOUNTER (OUTPATIENT)
Dept: PHYSICAL THERAPY | Age: 62
Discharge: HOME | End: 2022-03-10
Payer: COMMERCIAL

## 2022-01-01 ENCOUNTER — HOSPITAL ENCOUNTER (OUTPATIENT)
Dept: INFUSION THERAPY | Age: 62
Discharge: HOME | End: 2022-02-14
Payer: COMMERCIAL

## 2022-01-01 ENCOUNTER — DOCUMENTATION ONLY (OUTPATIENT)
Dept: HEMATOLOGY | Age: 62
End: 2022-01-01

## 2022-01-01 ENCOUNTER — HOSPITAL ENCOUNTER (OUTPATIENT)
Dept: INFUSION THERAPY | Age: 62
End: 2022-01-01

## 2022-01-01 ENCOUNTER — APPOINTMENT (OUTPATIENT)
Dept: MRI IMAGING | Age: 62
DRG: 374 | End: 2022-01-01
Attending: INTERNAL MEDICINE
Payer: COMMERCIAL

## 2022-01-01 ENCOUNTER — ANESTHESIA (OUTPATIENT)
Dept: ENDOSCOPY | Age: 62
DRG: 374 | End: 2022-01-01
Payer: COMMERCIAL

## 2022-01-01 ENCOUNTER — HOSPITAL ENCOUNTER (OUTPATIENT)
Dept: PHYSICAL THERAPY | Age: 62
Discharge: HOME | End: 2022-03-02
Payer: COMMERCIAL

## 2022-01-01 ENCOUNTER — HOSPITAL ENCOUNTER (OUTPATIENT)
Dept: PET IMAGING | Age: 62
Discharge: HOME | End: 2022-03-24
Attending: INTERNAL MEDICINE
Payer: COMMERCIAL

## 2022-01-01 ENCOUNTER — HOSPITAL ENCOUNTER (OUTPATIENT)
Dept: INFUSION THERAPY | Age: 62
Discharge: HOME | End: 2022-04-18
Payer: COMMERCIAL

## 2022-01-01 VITALS
HEIGHT: 64 IN | WEIGHT: 114.7 LBS | TEMPERATURE: 97.6 F | SYSTOLIC BLOOD PRESSURE: 121 MMHG | DIASTOLIC BLOOD PRESSURE: 83 MMHG | BODY MASS INDEX: 19.58 KG/M2 | RESPIRATION RATE: 19 BRPM | OXYGEN SATURATION: 94 % | HEART RATE: 103 BPM

## 2022-01-01 DIAGNOSIS — Z79.899 HIGH RISK MEDICATION USE: ICD-10-CM

## 2022-01-01 DIAGNOSIS — C50.911 MALIGNANT NEOPLASM OF RIGHT BREAST, STAGE 4 (HCC): ICD-10-CM

## 2022-01-01 DIAGNOSIS — C78.6 PERITONEAL CARCINOMATOSIS (HCC): ICD-10-CM

## 2022-01-01 DIAGNOSIS — C50.919 METASTATIC BREAST CANCER (HCC): ICD-10-CM

## 2022-01-01 DIAGNOSIS — E87.6 HYPOKALEMIA: ICD-10-CM

## 2022-01-01 DIAGNOSIS — R59.0 AXILLARY LYMPHADENOPATHY: ICD-10-CM

## 2022-01-01 DIAGNOSIS — R53.81 DEBILITY: ICD-10-CM

## 2022-01-01 DIAGNOSIS — R63.4 WEIGHT LOSS: ICD-10-CM

## 2022-01-01 DIAGNOSIS — C50.911 MALIGNANT NEOPLASM OF RIGHT BREAST IN FEMALE, ESTROGEN RECEPTOR POSITIVE, UNSPECIFIED SITE OF BREAST (HCC): ICD-10-CM

## 2022-01-01 DIAGNOSIS — E43 SEVERE PROTEIN-CALORIE MALNUTRITION (HCC): ICD-10-CM

## 2022-01-01 DIAGNOSIS — E86.0 DEHYDRATION: Primary | ICD-10-CM

## 2022-01-01 DIAGNOSIS — Z17.0 MALIGNANT NEOPLASM OF RIGHT BREAST IN FEMALE, ESTROGEN RECEPTOR POSITIVE, UNSPECIFIED SITE OF BREAST (HCC): ICD-10-CM

## 2022-01-01 DIAGNOSIS — C50.911 MALIGNANT NEOPLASM OF RIGHT FEMALE BREAST, UNSPECIFIED ESTROGEN RECEPTOR STATUS, UNSPECIFIED SITE OF BREAST (HCC): ICD-10-CM

## 2022-01-01 DIAGNOSIS — K83.1 OBSTRUCTIVE JAUNDICE DUE TO MALIGNANT NEOPLASM (HCC): ICD-10-CM

## 2022-01-01 DIAGNOSIS — R62.7 FAILURE TO THRIVE IN ADULT: ICD-10-CM

## 2022-01-01 DIAGNOSIS — C50.911 MALIGNANT NEOPLASM OF RIGHT FEMALE BREAST, UNSPECIFIED ESTROGEN RECEPTOR STATUS, UNSPECIFIED SITE OF BREAST (HCC): Primary | ICD-10-CM

## 2022-01-01 DIAGNOSIS — E86.0 DEHYDRATION: ICD-10-CM

## 2022-01-01 DIAGNOSIS — C80.1 OBSTRUCTIVE JAUNDICE DUE TO MALIGNANT NEOPLASM (HCC): ICD-10-CM

## 2022-01-01 DIAGNOSIS — R13.10 DYSPHAGIA, UNSPECIFIED TYPE: ICD-10-CM

## 2022-01-01 DIAGNOSIS — K83.8 DILATED BILE DUCT: ICD-10-CM

## 2022-01-01 DIAGNOSIS — Z51.5 ENCOUNTER FOR PALLIATIVE CARE: ICD-10-CM

## 2022-01-01 DIAGNOSIS — Z71.89 ACP (ADVANCE CARE PLANNING): ICD-10-CM

## 2022-01-01 LAB
ALBUMIN SERPL-MCNC: 1.6 G/DL (ref 3.2–4.6)
ALBUMIN SERPL-MCNC: 1.7 G/DL (ref 3.2–4.6)
ALBUMIN SERPL-MCNC: 1.8 G/DL (ref 3.2–4.6)
ALBUMIN SERPL-MCNC: 1.8 G/DL (ref 3.2–4.6)
ALBUMIN SERPL-MCNC: 2 G/DL (ref 3.2–4.6)
ALBUMIN SERPL-MCNC: 2.1 G/DL (ref 3.2–4.6)
ALBUMIN SERPL-MCNC: 2.3 G/DL (ref 3.2–4.6)
ALBUMIN SERPL-MCNC: 2.3 G/DL (ref 3.2–4.6)
ALBUMIN SERPL-MCNC: 2.5 G/DL (ref 3.2–4.6)
ALBUMIN SERPL-MCNC: 2.7 G/DL (ref 3.2–4.6)
ALBUMIN SERPL-MCNC: 2.8 G/DL (ref 3.2–4.6)
ALBUMIN SERPL-MCNC: 2.8 G/DL (ref 3.2–4.6)
ALBUMIN SERPL-MCNC: 2.9 G/DL (ref 3.2–4.6)
ALBUMIN/GLOB SERPL: 0.4 {RATIO} (ref 1.2–3.5)
ALBUMIN/GLOB SERPL: 0.5 {RATIO} (ref 1.2–3.5)
ALBUMIN/GLOB SERPL: 0.6 {RATIO} (ref 1.2–3.5)
ALBUMIN/GLOB SERPL: 0.7 {RATIO} (ref 1.2–3.5)
ALBUMIN/GLOB SERPL: 0.8 {RATIO} (ref 1.2–3.5)
ALP SERPL-CCNC: 1034 U/L (ref 50–136)
ALP SERPL-CCNC: 1093 U/L (ref 50–136)
ALP SERPL-CCNC: 1106 U/L (ref 50–136)
ALP SERPL-CCNC: 1126 U/L (ref 50–136)
ALP SERPL-CCNC: 113 U/L (ref 50–136)
ALP SERPL-CCNC: 1178 U/L (ref 50–136)
ALP SERPL-CCNC: 133 U/L (ref 50–136)
ALP SERPL-CCNC: 151 U/L (ref 50–136)
ALP SERPL-CCNC: 235 U/L (ref 50–136)
ALP SERPL-CCNC: 935 U/L (ref 50–136)
ALP SERPL-CCNC: 945 U/L (ref 50–136)
ALP SERPL-CCNC: 960 U/L (ref 50–136)
ALP SERPL-CCNC: 983 U/L (ref 50–136)
ALT SERPL-CCNC: 15 U/L (ref 12–65)
ALT SERPL-CCNC: 17 U/L (ref 12–65)
ALT SERPL-CCNC: 20 U/L (ref 12–65)
ALT SERPL-CCNC: 29 U/L (ref 12–65)
ALT SERPL-CCNC: 36 U/L (ref 12–65)
ALT SERPL-CCNC: 37 U/L (ref 12–65)
ALT SERPL-CCNC: 38 U/L (ref 12–65)
ALT SERPL-CCNC: 43 U/L (ref 12–65)
ALT SERPL-CCNC: 43 U/L (ref 12–65)
ALT SERPL-CCNC: 45 U/L (ref 12–65)
ALT SERPL-CCNC: 49 U/L (ref 12–65)
ALT SERPL-CCNC: 50 U/L (ref 12–65)
ALT SERPL-CCNC: 52 U/L (ref 12–65)
ANION GAP SERPL CALC-SCNC: 4 MMOL/L (ref 7–16)
ANION GAP SERPL CALC-SCNC: 5 MMOL/L (ref 7–16)
ANION GAP SERPL CALC-SCNC: 5 MMOL/L (ref 7–16)
ANION GAP SERPL CALC-SCNC: 6 MMOL/L (ref 7–16)
ANION GAP SERPL CALC-SCNC: 7 MMOL/L (ref 7–16)
ANION GAP SERPL CALC-SCNC: 8 MMOL/L (ref 7–16)
AST SERPL-CCNC: 104 U/L (ref 15–37)
AST SERPL-CCNC: 111 U/L (ref 15–37)
AST SERPL-CCNC: 118 U/L (ref 15–37)
AST SERPL-CCNC: 120 U/L (ref 15–37)
AST SERPL-CCNC: 133 U/L (ref 15–37)
AST SERPL-CCNC: 135 U/L (ref 15–37)
AST SERPL-CCNC: 32 U/L (ref 15–37)
AST SERPL-CCNC: 37 U/L (ref 15–37)
AST SERPL-CCNC: 42 U/L (ref 15–37)
AST SERPL-CCNC: 60 U/L (ref 15–37)
AST SERPL-CCNC: 92 U/L (ref 15–37)
AST SERPL-CCNC: 92 U/L (ref 15–37)
AST SERPL-CCNC: 94 U/L (ref 15–37)
BASOPHILS # BLD: 0 K/UL (ref 0–0.2)
BASOPHILS # BLD: 0.1 K/UL (ref 0–0.2)
BASOPHILS NFR BLD: 0 % (ref 0–2)
BASOPHILS NFR BLD: 1 % (ref 0–2)
BILIRUB SERPL-MCNC: 0.8 MG/DL (ref 0.2–1.1)
BILIRUB SERPL-MCNC: 0.8 MG/DL (ref 0.2–1.1)
BILIRUB SERPL-MCNC: 0.9 MG/DL (ref 0.2–1.1)
BILIRUB SERPL-MCNC: 1.2 MG/DL (ref 0.2–1.1)
BILIRUB SERPL-MCNC: 5.6 MG/DL (ref 0.2–1.1)
BILIRUB SERPL-MCNC: 6 MG/DL (ref 0.2–1.1)
BILIRUB SERPL-MCNC: 6.3 MG/DL (ref 0.2–1.1)
BILIRUB SERPL-MCNC: 6.6 MG/DL (ref 0.2–1.1)
BILIRUB SERPL-MCNC: 6.9 MG/DL (ref 0.2–1.1)
BILIRUB SERPL-MCNC: 7.1 MG/DL (ref 0.2–1.1)
BILIRUB SERPL-MCNC: 7.7 MG/DL (ref 0.2–1.1)
BILIRUB SERPL-MCNC: 9 MG/DL (ref 0.2–1.1)
BILIRUB SERPL-MCNC: 9.7 MG/DL (ref 0.2–1.1)
BUN SERPL-MCNC: 16 MG/DL (ref 8–23)
BUN SERPL-MCNC: 17 MG/DL (ref 8–23)
BUN SERPL-MCNC: 17 MG/DL (ref 8–23)
BUN SERPL-MCNC: 18 MG/DL (ref 8–23)
BUN SERPL-MCNC: 19 MG/DL (ref 8–23)
BUN SERPL-MCNC: 22 MG/DL (ref 8–23)
BUN SERPL-MCNC: 23 MG/DL (ref 8–23)
BUN SERPL-MCNC: 24 MG/DL (ref 8–23)
BUN SERPL-MCNC: 28 MG/DL (ref 8–23)
BUN SERPL-MCNC: 31 MG/DL (ref 8–23)
BUN SERPL-MCNC: 34 MG/DL (ref 8–23)
CALCIUM SERPL-MCNC: 8.3 MG/DL (ref 8.3–10.4)
CALCIUM SERPL-MCNC: 8.3 MG/DL (ref 8.3–10.4)
CALCIUM SERPL-MCNC: 8.4 MG/DL (ref 8.3–10.4)
CALCIUM SERPL-MCNC: 8.4 MG/DL (ref 8.3–10.4)
CALCIUM SERPL-MCNC: 8.5 MG/DL (ref 8.3–10.4)
CALCIUM SERPL-MCNC: 8.5 MG/DL (ref 8.3–10.4)
CALCIUM SERPL-MCNC: 8.9 MG/DL (ref 8.3–10.4)
CALCIUM SERPL-MCNC: 8.9 MG/DL (ref 8.3–10.4)
CALCIUM SERPL-MCNC: 9 MG/DL (ref 8.3–10.4)
CALCIUM SERPL-MCNC: 9 MG/DL (ref 8.3–10.4)
CALCIUM SERPL-MCNC: 9.1 MG/DL (ref 8.3–10.4)
CALCIUM SERPL-MCNC: 9.1 MG/DL (ref 8.3–10.4)
CALCIUM SERPL-MCNC: 9.4 MG/DL (ref 8.3–10.4)
CANCER AG27-29 SERPL-ACNC: 196.6 U/ML (ref 0–38.6)
CANCER AG27-29 SERPL-ACNC: 240.1 U/ML (ref 0–38.6)
CANCER AG27-29 SERPL-ACNC: 240.3 U/ML (ref 0–38.6)
CANCER AG27-29 SERPL-ACNC: 271.6 U/ML (ref 0–38.6)
CANCER AG27-29 SERPL-ACNC: 307.5 U/ML (ref 0–38.6)
CHLORIDE SERPL-SCNC: 105 MMOL/L (ref 98–107)
CHLORIDE SERPL-SCNC: 107 MMOL/L (ref 98–107)
CHLORIDE SERPL-SCNC: 107 MMOL/L (ref 98–107)
CHLORIDE SERPL-SCNC: 108 MMOL/L (ref 98–107)
CHLORIDE SERPL-SCNC: 109 MMOL/L (ref 98–107)
CHLORIDE SERPL-SCNC: 109 MMOL/L (ref 98–107)
CHLORIDE SERPL-SCNC: 110 MMOL/L (ref 98–107)
CHLORIDE SERPL-SCNC: 110 MMOL/L (ref 98–107)
CHLORIDE SERPL-SCNC: 111 MMOL/L (ref 98–107)
CHLORIDE SERPL-SCNC: 112 MMOL/L (ref 98–107)
CHLORIDE SERPL-SCNC: 113 MMOL/L (ref 98–107)
CO2 SERPL-SCNC: 25 MMOL/L (ref 21–32)
CO2 SERPL-SCNC: 26 MMOL/L (ref 21–32)
CO2 SERPL-SCNC: 27 MMOL/L (ref 21–32)
CO2 SERPL-SCNC: 28 MMOL/L (ref 21–32)
CREAT SERPL-MCNC: 0.7 MG/DL (ref 0.6–1)
CREAT SERPL-MCNC: 0.7 MG/DL (ref 0.6–1)
CREAT SERPL-MCNC: 0.8 MG/DL (ref 0.6–1)
CREAT SERPL-MCNC: 0.8 MG/DL (ref 0.6–1)
CREAT SERPL-MCNC: 0.9 MG/DL (ref 0.6–1)
CREAT SERPL-MCNC: 1 MG/DL (ref 0.6–1)
CREAT SERPL-MCNC: 1.1 MG/DL (ref 0.6–1)
DIFFERENTIAL METHOD BLD: ABNORMAL
EOSINOPHIL # BLD: 0 K/UL (ref 0–0.8)
EOSINOPHIL # BLD: 0.1 K/UL (ref 0–0.8)
EOSINOPHIL NFR BLD: 0 % (ref 0.5–7.8)
EOSINOPHIL NFR BLD: 1 % (ref 0.5–7.8)
EOSINOPHIL NFR BLD: 2 % (ref 0.5–7.8)
ERYTHROCYTE [DISTWIDTH] IN BLOOD BY AUTOMATED COUNT: 13 % (ref 11.9–14.6)
ERYTHROCYTE [DISTWIDTH] IN BLOOD BY AUTOMATED COUNT: 13.2 % (ref 11.9–14.6)
ERYTHROCYTE [DISTWIDTH] IN BLOOD BY AUTOMATED COUNT: 14.2 % (ref 11.9–14.6)
ERYTHROCYTE [DISTWIDTH] IN BLOOD BY AUTOMATED COUNT: 14.3 % (ref 11.9–14.6)
ERYTHROCYTE [DISTWIDTH] IN BLOOD BY AUTOMATED COUNT: 14.4 % (ref 11.9–14.6)
ERYTHROCYTE [DISTWIDTH] IN BLOOD BY AUTOMATED COUNT: 14.5 % (ref 11.9–14.6)
ERYTHROCYTE [DISTWIDTH] IN BLOOD BY AUTOMATED COUNT: 14.6 % (ref 11.9–14.6)
ERYTHROCYTE [DISTWIDTH] IN BLOOD BY AUTOMATED COUNT: 14.6 % (ref 11.9–14.6)
ERYTHROCYTE [DISTWIDTH] IN BLOOD BY AUTOMATED COUNT: 14.7 % (ref 11.9–14.6)
ERYTHROCYTE [DISTWIDTH] IN BLOOD BY AUTOMATED COUNT: 14.8 % (ref 11.9–14.6)
ERYTHROCYTE [DISTWIDTH] IN BLOOD BY AUTOMATED COUNT: 15 % (ref 11.9–14.6)
ERYTHROCYTE [DISTWIDTH] IN BLOOD BY AUTOMATED COUNT: 15 % (ref 11.9–14.6)
ERYTHROCYTE [DISTWIDTH] IN BLOOD BY AUTOMATED COUNT: 17.2 % (ref 11.9–14.6)
GLOBULIN SER CALC-MCNC: 3.3 G/DL (ref 2.3–3.5)
GLOBULIN SER CALC-MCNC: 3.5 G/DL (ref 2.3–3.5)
GLOBULIN SER CALC-MCNC: 3.6 G/DL (ref 2.3–3.5)
GLOBULIN SER CALC-MCNC: 3.7 G/DL (ref 2.3–3.5)
GLOBULIN SER CALC-MCNC: 3.7 G/DL (ref 2.3–3.5)
GLOBULIN SER CALC-MCNC: 3.8 G/DL (ref 2.3–3.5)
GLOBULIN SER CALC-MCNC: 3.8 G/DL (ref 2.3–3.5)
GLOBULIN SER CALC-MCNC: 3.9 G/DL (ref 2.3–3.5)
GLOBULIN SER CALC-MCNC: 4 G/DL (ref 2.3–3.5)
GLOBULIN SER CALC-MCNC: 4.1 G/DL (ref 2.3–3.5)
GLOBULIN SER CALC-MCNC: 4.2 G/DL (ref 2.3–3.5)
GLUCOSE BLD STRIP.AUTO-MCNC: 102 MG/DL (ref 65–100)
GLUCOSE BLD STRIP.AUTO-MCNC: 69 MG/DL (ref 65–100)
GLUCOSE BLD STRIP.AUTO-MCNC: 79 MG/DL (ref 65–100)
GLUCOSE BLD STRIP.AUTO-MCNC: 81 MG/DL (ref 65–100)
GLUCOSE BLD STRIP.AUTO-MCNC: 85 MG/DL (ref 65–100)
GLUCOSE SERPL-MCNC: 102 MG/DL (ref 65–100)
GLUCOSE SERPL-MCNC: 111 MG/DL (ref 65–100)
GLUCOSE SERPL-MCNC: 115 MG/DL (ref 65–100)
GLUCOSE SERPL-MCNC: 118 MG/DL (ref 65–100)
GLUCOSE SERPL-MCNC: 119 MG/DL (ref 65–100)
GLUCOSE SERPL-MCNC: 119 MG/DL (ref 65–100)
GLUCOSE SERPL-MCNC: 121 MG/DL (ref 65–100)
GLUCOSE SERPL-MCNC: 122 MG/DL (ref 65–100)
GLUCOSE SERPL-MCNC: 150 MG/DL (ref 65–100)
GLUCOSE SERPL-MCNC: 63 MG/DL (ref 65–100)
GLUCOSE SERPL-MCNC: 67 MG/DL (ref 65–100)
GLUCOSE SERPL-MCNC: 79 MG/DL (ref 65–100)
GLUCOSE SERPL-MCNC: 99 MG/DL (ref 65–100)
HCT VFR BLD AUTO: 21.1 % (ref 35.8–46.3)
HCT VFR BLD AUTO: 22.7 % (ref 35.8–46.3)
HCT VFR BLD AUTO: 23.1 % (ref 35.8–46.3)
HCT VFR BLD AUTO: 23.7 % (ref 35.8–46.3)
HCT VFR BLD AUTO: 23.9 % (ref 35.8–46.3)
HCT VFR BLD AUTO: 24.4 % (ref 35.8–46.3)
HCT VFR BLD AUTO: 24.7 % (ref 35.8–46.3)
HCT VFR BLD AUTO: 25.4 % (ref 35.8–46.3)
HCT VFR BLD AUTO: 25.9 % (ref 35.8–46.3)
HCT VFR BLD AUTO: 27.3 % (ref 35.8–46.3)
HCT VFR BLD AUTO: 27.7 % (ref 35.8–46.3)
HCT VFR BLD AUTO: 27.9 % (ref 35.8–46.3)
HCT VFR BLD AUTO: 29.8 % (ref 35.8–46.3)
HGB BLD-MCNC: 10 G/DL (ref 11.7–15.4)
HGB BLD-MCNC: 7.3 G/DL (ref 11.7–15.4)
HGB BLD-MCNC: 7.6 G/DL (ref 11.7–15.4)
HGB BLD-MCNC: 7.7 G/DL (ref 11.7–15.4)
HGB BLD-MCNC: 7.8 G/DL (ref 11.7–15.4)
HGB BLD-MCNC: 7.8 G/DL (ref 11.7–15.4)
HGB BLD-MCNC: 8.1 G/DL (ref 11.7–15.4)
HGB BLD-MCNC: 8.2 G/DL (ref 11.7–15.4)
HGB BLD-MCNC: 8.4 G/DL (ref 11.7–15.4)
HGB BLD-MCNC: 8.6 G/DL (ref 11.7–15.4)
HGB BLD-MCNC: 9.2 G/DL (ref 11.7–15.4)
HGB BLD-MCNC: 9.4 G/DL (ref 11.7–15.4)
HGB BLD-MCNC: 9.4 G/DL (ref 11.7–15.4)
IMM GRANULOCYTES # BLD AUTO: 0 K/UL (ref 0–0.5)
IMM GRANULOCYTES NFR BLD AUTO: 0 % (ref 0–5)
IMM GRANULOCYTES NFR BLD AUTO: 1 % (ref 0–5)
IMM GRANULOCYTES NFR BLD AUTO: 1 % (ref 0–5)
LYMPHOCYTES # BLD: 0.5 K/UL (ref 0.5–4.6)
LYMPHOCYTES # BLD: 0.6 K/UL (ref 0.5–4.6)
LYMPHOCYTES # BLD: 0.7 K/UL (ref 0.5–4.6)
LYMPHOCYTES # BLD: 0.7 K/UL (ref 0.5–4.6)
LYMPHOCYTES # BLD: 0.9 K/UL (ref 0.5–4.6)
LYMPHOCYTES # BLD: 1.3 K/UL (ref 0.5–4.6)
LYMPHOCYTES # BLD: 1.4 K/UL (ref 0.5–4.6)
LYMPHOCYTES # BLD: 1.4 K/UL (ref 0.5–4.6)
LYMPHOCYTES # BLD: 1.6 K/UL (ref 0.5–4.6)
LYMPHOCYTES NFR BLD: 10 % (ref 13–44)
LYMPHOCYTES NFR BLD: 10 % (ref 13–44)
LYMPHOCYTES NFR BLD: 11 % (ref 13–44)
LYMPHOCYTES NFR BLD: 11 % (ref 13–44)
LYMPHOCYTES NFR BLD: 14 % (ref 13–44)
LYMPHOCYTES NFR BLD: 14 % (ref 13–44)
LYMPHOCYTES NFR BLD: 15 % (ref 13–44)
LYMPHOCYTES NFR BLD: 16 % (ref 13–44)
LYMPHOCYTES NFR BLD: 16 % (ref 13–44)
LYMPHOCYTES NFR BLD: 28 % (ref 13–44)
LYMPHOCYTES NFR BLD: 32 % (ref 13–44)
LYMPHOCYTES NFR BLD: 33 % (ref 13–44)
LYMPHOCYTES NFR BLD: 34 % (ref 13–44)
MAGNESIUM SERPL-MCNC: 1.7 MG/DL (ref 1.8–2.4)
MAGNESIUM SERPL-MCNC: 1.9 MG/DL (ref 1.8–2.4)
MAGNESIUM SERPL-MCNC: 2 MG/DL (ref 1.8–2.4)
MAGNESIUM SERPL-MCNC: 2 MG/DL (ref 1.8–2.4)
MAGNESIUM SERPL-MCNC: 2.1 MG/DL (ref 1.8–2.4)
MCH RBC QN AUTO: 34.8 PG (ref 26.1–32.9)
MCH RBC QN AUTO: 35.2 PG (ref 26.1–32.9)
MCH RBC QN AUTO: 35.6 PG (ref 26.1–32.9)
MCH RBC QN AUTO: 35.7 PG (ref 26.1–32.9)
MCH RBC QN AUTO: 35.8 PG (ref 26.1–32.9)
MCH RBC QN AUTO: 35.9 PG (ref 26.1–32.9)
MCH RBC QN AUTO: 36.1 PG (ref 26.1–32.9)
MCH RBC QN AUTO: 36.5 PG (ref 26.1–32.9)
MCH RBC QN AUTO: 37 PG (ref 26.1–32.9)
MCH RBC QN AUTO: 37.2 PG (ref 26.1–32.9)
MCH RBC QN AUTO: 37.6 PG (ref 26.1–32.9)
MCH RBC QN AUTO: 37.9 PG (ref 26.1–32.9)
MCH RBC QN AUTO: 39 PG (ref 26.1–32.9)
MCHC RBC AUTO-ENTMCNC: 32.6 G/DL (ref 31.4–35)
MCHC RBC AUTO-ENTMCNC: 32.9 G/DL (ref 31.4–35)
MCHC RBC AUTO-ENTMCNC: 33.1 G/DL (ref 31.4–35)
MCHC RBC AUTO-ENTMCNC: 33.2 G/DL (ref 31.4–35)
MCHC RBC AUTO-ENTMCNC: 33.3 G/DL (ref 31.4–35)
MCHC RBC AUTO-ENTMCNC: 33.5 G/DL (ref 31.4–35)
MCHC RBC AUTO-ENTMCNC: 33.6 G/DL (ref 31.4–35)
MCHC RBC AUTO-ENTMCNC: 33.7 G/DL (ref 31.4–35)
MCHC RBC AUTO-ENTMCNC: 34.4 G/DL (ref 31.4–35)
MCHC RBC AUTO-ENTMCNC: 34.6 G/DL (ref 31.4–35)
MCV RBC AUTO: 102.9 FL (ref 79.6–97.8)
MCV RBC AUTO: 105.1 FL (ref 79.6–97.8)
MCV RBC AUTO: 106.7 FL (ref 79.6–97.8)
MCV RBC AUTO: 107.4 FL (ref 79.6–97.8)
MCV RBC AUTO: 108.1 FL (ref 79.6–97.8)
MCV RBC AUTO: 108.8 FL (ref 79.6–97.8)
MCV RBC AUTO: 109.2 FL (ref 79.6–97.8)
MCV RBC AUTO: 109.9 FL (ref 79.6–97.8)
MCV RBC AUTO: 110.4 FL (ref 79.6–97.8)
MCV RBC AUTO: 112.1 FL (ref 79.6–97.8)
MCV RBC AUTO: 112.5 FL (ref 79.6–97.8)
MCV RBC AUTO: 113.1 FL (ref 79.6–97.8)
MCV RBC AUTO: 113.3 FL (ref 79.6–97.8)
MONOCYTES # BLD: 0.3 K/UL (ref 0.1–1.3)
MONOCYTES # BLD: 0.4 K/UL (ref 0.1–1.3)
MONOCYTES # BLD: 0.5 K/UL (ref 0.1–1.3)
MONOCYTES # BLD: 0.6 K/UL (ref 0.1–1.3)
MONOCYTES NFR BLD: 10 % (ref 4–12)
MONOCYTES NFR BLD: 12 % (ref 4–12)
MONOCYTES NFR BLD: 12 % (ref 4–12)
MONOCYTES NFR BLD: 13 % (ref 4–12)
MONOCYTES NFR BLD: 7 % (ref 4–12)
MONOCYTES NFR BLD: 9 % (ref 4–12)
MONOCYTES NFR BLD: 9 % (ref 4–12)
NEUTS SEG # BLD: 2.1 K/UL (ref 1.7–8.2)
NEUTS SEG # BLD: 2.3 K/UL (ref 1.7–8.2)
NEUTS SEG # BLD: 2.4 K/UL (ref 1.7–8.2)
NEUTS SEG # BLD: 2.5 K/UL (ref 1.7–8.2)
NEUTS SEG # BLD: 3 K/UL (ref 1.7–8.2)
NEUTS SEG # BLD: 3.3 K/UL (ref 1.7–8.2)
NEUTS SEG # BLD: 3.4 K/UL (ref 1.7–8.2)
NEUTS SEG # BLD: 3.6 K/UL (ref 1.7–8.2)
NEUTS SEG # BLD: 4 K/UL (ref 1.7–8.2)
NEUTS SEG # BLD: 4.3 K/UL (ref 1.7–8.2)
NEUTS SEG # BLD: 4.3 K/UL (ref 1.7–8.2)
NEUTS SEG # BLD: 4.4 K/UL (ref 1.7–8.2)
NEUTS SEG # BLD: 5.8 K/UL (ref 1.7–8.2)
NEUTS SEG NFR BLD: 52 % (ref 43–78)
NEUTS SEG NFR BLD: 54 % (ref 43–78)
NEUTS SEG NFR BLD: 55 % (ref 43–78)
NEUTS SEG NFR BLD: 61 % (ref 43–78)
NEUTS SEG NFR BLD: 70 % (ref 43–78)
NEUTS SEG NFR BLD: 74 % (ref 43–78)
NEUTS SEG NFR BLD: 75 % (ref 43–78)
NEUTS SEG NFR BLD: 75 % (ref 43–78)
NEUTS SEG NFR BLD: 77 % (ref 43–78)
NEUTS SEG NFR BLD: 78 % (ref 43–78)
NEUTS SEG NFR BLD: 79 % (ref 43–78)
NEUTS SEG NFR BLD: 83 % (ref 43–78)
NEUTS SEG NFR BLD: 83 % (ref 43–78)
NRBC # BLD: 0 K/UL (ref 0–0.2)
PLATELET # BLD AUTO: 172 K/UL (ref 150–450)
PLATELET # BLD AUTO: 172 K/UL (ref 150–450)
PLATELET # BLD AUTO: 178 K/UL (ref 150–450)
PLATELET # BLD AUTO: 179 K/UL (ref 150–450)
PLATELET # BLD AUTO: 180 K/UL (ref 150–450)
PLATELET # BLD AUTO: 189 K/UL (ref 150–450)
PLATELET # BLD AUTO: 195 K/UL (ref 150–450)
PLATELET # BLD AUTO: 198 K/UL (ref 150–450)
PLATELET # BLD AUTO: 200 K/UL (ref 150–450)
PLATELET # BLD AUTO: 210 K/UL (ref 150–450)
PLATELET # BLD AUTO: 217 K/UL (ref 150–450)
PLATELET # BLD AUTO: 218 K/UL (ref 150–450)
PLATELET # BLD AUTO: 220 K/UL (ref 150–450)
PMV BLD AUTO: 10.1 FL (ref 9.4–12.3)
PMV BLD AUTO: 10.1 FL (ref 9.4–12.3)
PMV BLD AUTO: 10.3 FL (ref 9.4–12.3)
PMV BLD AUTO: 10.4 FL (ref 9.4–12.3)
PMV BLD AUTO: 10.5 FL (ref 9.4–12.3)
PMV BLD AUTO: 10.7 FL (ref 9.4–12.3)
PMV BLD AUTO: 9.2 FL (ref 9.4–12.3)
PMV BLD AUTO: 9.4 FL (ref 9.4–12.3)
PMV BLD AUTO: 9.5 FL (ref 9.4–12.3)
POTASSIUM SERPL-SCNC: 3.1 MMOL/L (ref 3.5–5.1)
POTASSIUM SERPL-SCNC: 3.2 MMOL/L (ref 3.5–5.1)
POTASSIUM SERPL-SCNC: 3.3 MMOL/L (ref 3.5–5.1)
POTASSIUM SERPL-SCNC: 3.4 MMOL/L (ref 3.5–5.1)
POTASSIUM SERPL-SCNC: 3.5 MMOL/L (ref 3.5–5.1)
POTASSIUM SERPL-SCNC: 3.5 MMOL/L (ref 3.5–5.1)
POTASSIUM SERPL-SCNC: 3.7 MMOL/L (ref 3.5–5.1)
POTASSIUM SERPL-SCNC: 3.7 MMOL/L (ref 3.5–5.1)
PROT SERPL-MCNC: 5.1 G/DL (ref 6.3–8.2)
PROT SERPL-MCNC: 5.2 G/DL (ref 6.3–8.2)
PROT SERPL-MCNC: 5.3 G/DL (ref 6.3–8.2)
PROT SERPL-MCNC: 5.4 G/DL (ref 6.3–8.2)
PROT SERPL-MCNC: 5.8 G/DL (ref 6.3–8.2)
PROT SERPL-MCNC: 6 G/DL (ref 6.3–8.2)
PROT SERPL-MCNC: 6 G/DL (ref 6.3–8.2)
PROT SERPL-MCNC: 6.2 G/DL (ref 6.3–8.2)
PROT SERPL-MCNC: 6.7 G/DL (ref 6.3–8.2)
PROT SERPL-MCNC: 6.8 G/DL (ref 6.3–8.2)
PROT SERPL-MCNC: 6.8 G/DL (ref 6.3–8.2)
RBC # BLD AUTO: 2.05 M/UL (ref 4.05–5.2)
RBC # BLD AUTO: 2.15 M/UL (ref 4.05–5.2)
RBC # BLD AUTO: 2.16 M/UL (ref 4.05–5.2)
RBC # BLD AUTO: 2.17 M/UL (ref 4.05–5.2)
RBC # BLD AUTO: 2.22 M/UL (ref 4.05–5.2)
RBC # BLD AUTO: 2.24 M/UL (ref 4.05–5.2)
RBC # BLD AUTO: 2.27 M/UL (ref 4.05–5.2)
RBC # BLD AUTO: 2.29 M/UL (ref 4.05–5.2)
RBC # BLD AUTO: 2.35 M/UL (ref 4.05–5.2)
RBC # BLD AUTO: 2.41 M/UL (ref 4.05–5.2)
RBC # BLD AUTO: 2.47 M/UL (ref 4.05–5.2)
RBC # BLD AUTO: 2.48 M/UL (ref 4.05–5.2)
RBC # BLD AUTO: 2.7 M/UL (ref 4.05–5.2)
SERVICE CMNT-IMP: ABNORMAL
SERVICE CMNT-IMP: NORMAL
SODIUM SERPL-SCNC: 139 MMOL/L (ref 136–145)
SODIUM SERPL-SCNC: 140 MMOL/L (ref 136–145)
SODIUM SERPL-SCNC: 141 MMOL/L (ref 136–145)
SODIUM SERPL-SCNC: 141 MMOL/L (ref 136–145)
SODIUM SERPL-SCNC: 143 MMOL/L (ref 136–145)
SODIUM SERPL-SCNC: 143 MMOL/L (ref 136–145)
SODIUM SERPL-SCNC: 144 MMOL/L (ref 136–145)
SODIUM SERPL-SCNC: 145 MMOL/L (ref 136–145)
SODIUM SERPL-SCNC: 145 MMOL/L (ref 136–145)
SODIUM SERPL-SCNC: 146 MMOL/L (ref 136–145)
TSH SERPL DL<=0.005 MIU/L-ACNC: 2.51 UIU/ML (ref 0.36–3)
TSH SERPL DL<=0.005 MIU/L-ACNC: 3.92 UIU/ML (ref 0.36–3)
TSH SERPL DL<=0.005 MIU/L-ACNC: 4.31 UIU/ML (ref 0.36–3)
WBC # BLD AUTO: 3.6 K/UL (ref 4.3–11.1)
WBC # BLD AUTO: 4 K/UL (ref 4.3–11.1)
WBC # BLD AUTO: 4.1 K/UL (ref 4.3–11.1)
WBC # BLD AUTO: 4.1 K/UL (ref 4.3–11.1)
WBC # BLD AUTO: 4.4 K/UL (ref 4.3–11.1)
WBC # BLD AUTO: 4.5 K/UL (ref 4.3–11.1)
WBC # BLD AUTO: 4.7 K/UL (ref 4.3–11.1)
WBC # BLD AUTO: 5 K/UL (ref 4.3–11.1)
WBC # BLD AUTO: 5.2 K/UL (ref 4.3–11.1)
WBC # BLD AUTO: 5.5 K/UL (ref 4.3–11.1)
WBC # BLD AUTO: 5.6 K/UL (ref 4.3–11.1)
WBC # BLD AUTO: 5.8 K/UL (ref 4.3–11.1)
WBC # BLD AUTO: 7 K/UL (ref 4.3–11.1)

## 2022-01-01 PROCEDURE — 84443 ASSAY THYROID STIM HORMONE: CPT

## 2022-01-01 PROCEDURE — 74011250636 HC RX REV CODE- 250/636: Performed by: NURSE PRACTITIONER

## 2022-01-01 PROCEDURE — 85025 COMPLETE CBC W/AUTO DIFF WBC: CPT

## 2022-01-01 PROCEDURE — 74011000258 HC RX REV CODE- 258: Performed by: INTERNAL MEDICINE

## 2022-01-01 PROCEDURE — APPSS60 APP SPLIT SHARED TIME 46-60 MINUTES: Performed by: NURSE PRACTITIONER

## 2022-01-01 PROCEDURE — 83735 ASSAY OF MAGNESIUM: CPT

## 2022-01-01 PROCEDURE — 74011000250 HC RX REV CODE- 250: Performed by: NURSE PRACTITIONER

## 2022-01-01 PROCEDURE — 94760 N-INVAS EAR/PLS OXIMETRY 1: CPT

## 2022-01-01 PROCEDURE — 76060000033 HC ANESTHESIA 1 TO 1.5 HR: Performed by: INTERNAL MEDICINE

## 2022-01-01 PROCEDURE — 99222 1ST HOSP IP/OBS MODERATE 55: CPT | Performed by: NURSE PRACTITIONER

## 2022-01-01 PROCEDURE — 36591 DRAW BLOOD OFF VENOUS DEVICE: CPT

## 2022-01-01 PROCEDURE — 82962 GLUCOSE BLOOD TEST: CPT

## 2022-01-01 PROCEDURE — 36415 COLL VENOUS BLD VENIPUNCTURE: CPT

## 2022-01-01 PROCEDURE — 74011250636 HC RX REV CODE- 250/636: Performed by: INTERNAL MEDICINE

## 2022-01-01 PROCEDURE — 74011000250 HC RX REV CODE- 250: Performed by: ANESTHESIOLOGY

## 2022-01-01 PROCEDURE — 99222 1ST HOSP IP/OBS MODERATE 55: CPT | Performed by: INTERNAL MEDICINE

## 2022-01-01 PROCEDURE — 65270000029 HC RM PRIVATE

## 2022-01-01 PROCEDURE — 99497 ADVNCD CARE PLAN 30 MIN: CPT | Performed by: NURSE PRACTITIONER

## 2022-01-01 PROCEDURE — 2709999900 HC NON-CHARGEABLE SUPPLY: Performed by: INTERNAL MEDICINE

## 2022-01-01 PROCEDURE — 74011000250 HC RX REV CODE- 250: Performed by: INTERNAL MEDICINE

## 2022-01-01 PROCEDURE — 86300 IMMUNOASSAY TUMOR CA 15-3: CPT

## 2022-01-01 PROCEDURE — 96413 CHEMO IV INFUSION 1 HR: CPT

## 2022-01-01 PROCEDURE — 99232 SBSQ HOSP IP/OBS MODERATE 35: CPT | Performed by: INTERNAL MEDICINE

## 2022-01-01 PROCEDURE — A9552 F18 FDG: HCPCS

## 2022-01-01 PROCEDURE — 74183 MRI ABD W/O CNTR FLWD CNTR: CPT

## 2022-01-01 PROCEDURE — 94640 AIRWAY INHALATION TREATMENT: CPT

## 2022-01-01 PROCEDURE — 99498 ADVNCD CARE PLAN ADDL 30 MIN: CPT | Performed by: NURSE PRACTITIONER

## 2022-01-01 PROCEDURE — 77010033678 HC OXYGEN DAILY

## 2022-01-01 PROCEDURE — 76450000000

## 2022-01-01 PROCEDURE — 74011250637 HC RX REV CODE- 250/637: Performed by: INTERNAL MEDICINE

## 2022-01-01 PROCEDURE — 97161 PT EVAL LOW COMPLEX 20 MIN: CPT

## 2022-01-01 PROCEDURE — 97140 MANUAL THERAPY 1/> REGIONS: CPT

## 2022-01-01 PROCEDURE — 0DJ08ZZ INSPECTION OF UPPER INTESTINAL TRACT, VIA NATURAL OR ARTIFICIAL OPENING ENDOSCOPIC: ICD-10-PCS | Performed by: INTERNAL MEDICINE

## 2022-01-01 PROCEDURE — 99231 SBSQ HOSP IP/OBS SF/LOW 25: CPT | Performed by: NURSE PRACTITIONER

## 2022-01-01 PROCEDURE — 80053 COMPREHEN METABOLIC PANEL: CPT

## 2022-01-01 PROCEDURE — 77030007288 HC DEV LOK BILI BSC -A: Performed by: INTERNAL MEDICINE

## 2022-01-01 PROCEDURE — 76705 ECHO EXAM OF ABDOMEN: CPT

## 2022-01-01 PROCEDURE — 77030040361 HC SLV COMPR DVT MDII -B: Performed by: INTERNAL MEDICINE

## 2022-01-01 PROCEDURE — A9576 INJ PROHANCE MULTIPACK: HCPCS | Performed by: INTERNAL MEDICINE

## 2022-01-01 PROCEDURE — 99239 HOSP IP/OBS DSCHRG MGMT >30: CPT | Performed by: INTERNAL MEDICINE

## 2022-01-01 PROCEDURE — 74011000636 HC RX REV CODE- 636: Performed by: INTERNAL MEDICINE

## 2022-01-01 PROCEDURE — 71045 X-RAY EXAM CHEST 1 VIEW: CPT

## 2022-01-01 PROCEDURE — 77030039425 HC BLD LARYNG TRULITE DISP TELE -A: Performed by: NURSE ANESTHETIST, CERTIFIED REGISTERED

## 2022-01-01 PROCEDURE — 76040000026: Performed by: INTERNAL MEDICINE

## 2022-01-01 PROCEDURE — 99233 SBSQ HOSP IP/OBS HIGH 50: CPT | Performed by: NURSE PRACTITIONER

## 2022-01-01 PROCEDURE — 74011000250 HC RX REV CODE- 250

## 2022-01-01 PROCEDURE — 84132 ASSAY OF SERUM POTASSIUM: CPT

## 2022-01-01 PROCEDURE — 74011250636 HC RX REV CODE- 250/636

## 2022-01-01 PROCEDURE — 74220 X-RAY XM ESOPHAGUS 1CNTRST: CPT

## 2022-01-01 PROCEDURE — 74011250636 HC RX REV CODE- 250/636: Performed by: ANESTHESIOLOGY

## 2022-01-01 PROCEDURE — 99211 OFF/OP EST MAY X REQ PHY/QHP: CPT

## 2022-01-01 PROCEDURE — 77030037088 HC TUBE ENDOTRACH ORAL NSL COVD-A: Performed by: NURSE ANESTHETIST, CERTIFIED REGISTERED

## 2022-01-01 PROCEDURE — 96360 HYDRATION IV INFUSION INIT: CPT

## 2022-01-01 PROCEDURE — APPSS180 APP SPLIT SHARED TIME > 60 MINUTES: Performed by: NURSE PRACTITIONER

## 2022-01-01 PROCEDURE — 92610 EVALUATE SWALLOWING FUNCTION: CPT

## 2022-01-01 RX ORDER — SODIUM CHLORIDE 0.9 % (FLUSH) 0.9 %
10 SYRINGE (ML) INJECTION AS NEEDED
Status: ACTIVE | OUTPATIENT
Start: 2022-01-01 | End: 2022-01-01

## 2022-01-01 RX ORDER — NALOXONE HYDROCHLORIDE 0.4 MG/ML
0.1 INJECTION, SOLUTION INTRAMUSCULAR; INTRAVENOUS; SUBCUTANEOUS AS NEEDED
Status: DISCONTINUED | OUTPATIENT
Start: 2022-01-01 | End: 2022-01-01 | Stop reason: HOSPADM

## 2022-01-01 RX ORDER — SODIUM CHLORIDE 0.9 % (FLUSH) 0.9 %
10 SYRINGE (ML) INJECTION
Status: COMPLETED | OUTPATIENT
Start: 2022-01-01 | End: 2022-01-01

## 2022-01-01 RX ORDER — PROPOFOL 10 MG/ML
INJECTION, EMULSION INTRAVENOUS AS NEEDED
Status: DISCONTINUED | OUTPATIENT
Start: 2022-01-01 | End: 2022-01-01 | Stop reason: HOSPADM

## 2022-01-01 RX ORDER — MIDAZOLAM HYDROCHLORIDE 1 MG/ML
2 INJECTION, SOLUTION INTRAMUSCULAR; INTRAVENOUS ONCE
Status: ACTIVE | OUTPATIENT
Start: 2022-01-01 | End: 2022-01-01

## 2022-01-01 RX ORDER — POTASSIUM CHLORIDE 7.45 MG/ML
40 INJECTION INTRAVENOUS ONCE
Status: DISCONTINUED | OUTPATIENT
Start: 2022-01-01 | End: 2022-01-01 | Stop reason: RX

## 2022-01-01 RX ORDER — ROCURONIUM BROMIDE 10 MG/ML
INJECTION, SOLUTION INTRAVENOUS AS NEEDED
Status: DISCONTINUED | OUTPATIENT
Start: 2022-01-01 | End: 2022-01-01 | Stop reason: HOSPADM

## 2022-01-01 RX ORDER — MORPHINE SULFATE 2 MG/ML
1 INJECTION, SOLUTION INTRAMUSCULAR; INTRAVENOUS
Status: DISCONTINUED | OUTPATIENT
Start: 2022-01-01 | End: 2022-01-01 | Stop reason: HOSPADM

## 2022-01-01 RX ORDER — SODIUM CHLORIDE 0.9 % (FLUSH) 0.9 %
10 SYRINGE (ML) INJECTION AS NEEDED
Status: DISCONTINUED | OUTPATIENT
Start: 2022-01-01 | End: 2022-01-01 | Stop reason: HOSPADM

## 2022-01-01 RX ORDER — SODIUM CHLORIDE 0.9 % (FLUSH) 0.9 %
10 SYRINGE (ML) INJECTION
Status: ACTIVE | OUTPATIENT
Start: 2022-01-01 | End: 2022-01-01

## 2022-01-01 RX ORDER — SODIUM CHLORIDE 9 MG/ML
25 INJECTION, SOLUTION INTRAVENOUS CONTINUOUS
Status: DISCONTINUED | OUTPATIENT
Start: 2022-01-01 | End: 2022-01-01 | Stop reason: HOSPADM

## 2022-01-01 RX ORDER — SODIUM CHLORIDE 9 MG/ML
75 INJECTION, SOLUTION INTRAVENOUS CONTINUOUS
Status: DISCONTINUED | OUTPATIENT
Start: 2022-01-01 | End: 2022-01-01 | Stop reason: SDUPTHER

## 2022-01-01 RX ORDER — POTASSIUM CHLORIDE 14.9 MG/ML
20 INJECTION INTRAVENOUS ONCE
Status: COMPLETED | OUTPATIENT
Start: 2022-01-01 | End: 2022-01-01

## 2022-01-01 RX ORDER — SODIUM CHLORIDE, SODIUM LACTATE, POTASSIUM CHLORIDE, CALCIUM CHLORIDE 600; 310; 30; 20 MG/100ML; MG/100ML; MG/100ML; MG/100ML
100 INJECTION, SOLUTION INTRAVENOUS CONTINUOUS
Status: DISCONTINUED | OUTPATIENT
Start: 2022-01-01 | End: 2022-01-01 | Stop reason: HOSPADM

## 2022-01-01 RX ORDER — MAGNESIUM SULFATE HEPTAHYDRATE 40 MG/ML
2 INJECTION, SOLUTION INTRAVENOUS ONCE
Status: COMPLETED | OUTPATIENT
Start: 2022-01-01 | End: 2022-01-01

## 2022-01-01 RX ORDER — HYDROMORPHONE HYDROCHLORIDE 1 MG/ML
0.5 INJECTION, SOLUTION INTRAMUSCULAR; INTRAVENOUS; SUBCUTANEOUS
Status: DISCONTINUED | OUTPATIENT
Start: 2022-01-01 | End: 2022-01-01 | Stop reason: HOSPADM

## 2022-01-01 RX ORDER — POTASSIUM CHLORIDE 29.8 MG/ML
40 INJECTION INTRAVENOUS ONCE
Status: COMPLETED | OUTPATIENT
Start: 2022-01-01 | End: 2022-01-01

## 2022-01-01 RX ORDER — ONDANSETRON 2 MG/ML
4 INJECTION INTRAMUSCULAR; INTRAVENOUS
Status: DISCONTINUED | OUTPATIENT
Start: 2022-01-01 | End: 2022-01-01 | Stop reason: HOSPADM

## 2022-01-01 RX ORDER — CALCIUM GLUCONATE 20 MG/ML
1 INJECTION, SOLUTION INTRAVENOUS ONCE
Status: COMPLETED | OUTPATIENT
Start: 2022-01-01 | End: 2022-01-01

## 2022-01-01 RX ORDER — FENTANYL CITRATE 50 UG/ML
INJECTION, SOLUTION INTRAMUSCULAR; INTRAVENOUS AS NEEDED
Status: DISCONTINUED | OUTPATIENT
Start: 2022-01-01 | End: 2022-01-01 | Stop reason: HOSPADM

## 2022-01-01 RX ORDER — SUCCINYLCHOLINE CHLORIDE 20 MG/ML
INJECTION INTRAMUSCULAR; INTRAVENOUS AS NEEDED
Status: DISCONTINUED | OUTPATIENT
Start: 2022-01-01 | End: 2022-01-01 | Stop reason: HOSPADM

## 2022-01-01 RX ORDER — IPRATROPIUM BROMIDE AND ALBUTEROL SULFATE 2.5; .5 MG/3ML; MG/3ML
3 SOLUTION RESPIRATORY (INHALATION)
Status: DISCONTINUED | OUTPATIENT
Start: 2022-01-01 | End: 2022-01-01 | Stop reason: HOSPADM

## 2022-01-01 RX ORDER — DEXTROSE MONOHYDRATE 50 MG/ML
75 INJECTION, SOLUTION INTRAVENOUS CONTINUOUS
Status: DISCONTINUED | OUTPATIENT
Start: 2022-01-01 | End: 2022-01-01

## 2022-01-01 RX ORDER — POTASSIUM CHLORIDE 14.9 MG/ML
20 INJECTION INTRAVENOUS
Status: COMPLETED | OUTPATIENT
Start: 2022-01-01 | End: 2022-01-01

## 2022-01-01 RX ORDER — OXYCODONE HYDROCHLORIDE 5 MG/1
5 TABLET ORAL
Status: DISCONTINUED | OUTPATIENT
Start: 2022-01-01 | End: 2022-01-01 | Stop reason: HOSPADM

## 2022-01-01 RX ORDER — FLUDEOXYGLUCOSE F18 300 MCI/ML
10 INJECTION INTRAVENOUS ONCE
Status: COMPLETED | OUTPATIENT
Start: 2022-01-01 | End: 2022-01-01

## 2022-01-01 RX ORDER — SODIUM CHLORIDE 9 MG/ML
25 INJECTION, SOLUTION INTRAVENOUS CONTINUOUS
Status: ACTIVE | OUTPATIENT
Start: 2022-01-01 | End: 2022-01-01

## 2022-01-01 RX ORDER — DIPHENHYDRAMINE HYDROCHLORIDE 50 MG/ML
12.5 INJECTION, SOLUTION INTRAMUSCULAR; INTRAVENOUS
Status: DISCONTINUED | OUTPATIENT
Start: 2022-01-01 | End: 2022-01-01 | Stop reason: HOSPADM

## 2022-01-01 RX ORDER — POTASSIUM CHLORIDE 29.8 MG/ML
20 INJECTION INTRAVENOUS ONCE
Status: DISCONTINUED | OUTPATIENT
Start: 2022-01-01 | End: 2022-01-01

## 2022-01-01 RX ORDER — ONDANSETRON 2 MG/ML
INJECTION INTRAMUSCULAR; INTRAVENOUS AS NEEDED
Status: DISCONTINUED | OUTPATIENT
Start: 2022-01-01 | End: 2022-01-01 | Stop reason: HOSPADM

## 2022-01-01 RX ORDER — ENOXAPARIN SODIUM 100 MG/ML
50 INJECTION SUBCUTANEOUS EVERY 12 HOURS
Status: DISPENSED | OUTPATIENT
Start: 2022-01-01 | End: 2022-01-01

## 2022-01-01 RX ORDER — LIDOCAINE HYDROCHLORIDE 20 MG/ML
INJECTION, SOLUTION EPIDURAL; INFILTRATION; INTRACAUDAL; PERINEURAL AS NEEDED
Status: DISCONTINUED | OUTPATIENT
Start: 2022-01-01 | End: 2022-01-01 | Stop reason: HOSPADM

## 2022-01-01 RX ORDER — MIDAZOLAM HYDROCHLORIDE 1 MG/ML
2 INJECTION, SOLUTION INTRAMUSCULAR; INTRAVENOUS
Status: DISCONTINUED | OUTPATIENT
Start: 2022-01-01 | End: 2022-01-01 | Stop reason: HOSPADM

## 2022-01-01 RX ORDER — DEXTROSE MONOHYDRATE AND SODIUM CHLORIDE 5; .45 G/100ML; G/100ML
75 INJECTION, SOLUTION INTRAVENOUS CONTINUOUS
Status: DISCONTINUED | OUTPATIENT
Start: 2022-01-01 | End: 2022-01-01 | Stop reason: HOSPADM

## 2022-01-01 RX ORDER — ENOXAPARIN SODIUM 100 MG/ML
40 INJECTION SUBCUTANEOUS EVERY 24 HOURS
Status: DISCONTINUED | OUTPATIENT
Start: 2022-01-01 | End: 2022-01-01

## 2022-01-01 RX ORDER — OXYCODONE HYDROCHLORIDE 5 MG/1
10 TABLET ORAL
Status: DISCONTINUED | OUTPATIENT
Start: 2022-01-01 | End: 2022-01-01 | Stop reason: HOSPADM

## 2022-01-01 RX ORDER — ALBUTEROL SULFATE 0.83 MG/ML
2.5 SOLUTION RESPIRATORY (INHALATION) AS NEEDED
Status: DISCONTINUED | OUTPATIENT
Start: 2022-01-01 | End: 2022-01-01 | Stop reason: HOSPADM

## 2022-01-01 RX ORDER — FUROSEMIDE 10 MG/ML
20 INJECTION INTRAMUSCULAR; INTRAVENOUS ONCE
Status: COMPLETED | OUTPATIENT
Start: 2022-01-01 | End: 2022-01-01

## 2022-01-01 RX ORDER — LIDOCAINE HYDROCHLORIDE 10 MG/ML
0.1 INJECTION INFILTRATION; PERINEURAL AS NEEDED
Status: DISCONTINUED | OUTPATIENT
Start: 2022-01-01 | End: 2022-01-01 | Stop reason: HOSPADM

## 2022-01-01 RX ORDER — FENTANYL CITRATE 50 UG/ML
100 INJECTION, SOLUTION INTRAMUSCULAR; INTRAVENOUS ONCE
Status: ACTIVE | OUTPATIENT
Start: 2022-01-01 | End: 2022-01-01

## 2022-01-01 RX ORDER — HEPARIN 100 UNIT/ML
300 SYRINGE INTRAVENOUS AS NEEDED
Status: DISCONTINUED | OUTPATIENT
Start: 2022-01-01 | End: 2022-01-01 | Stop reason: HOSPADM

## 2022-01-01 RX ORDER — ONDANSETRON 2 MG/ML
4 INJECTION INTRAMUSCULAR; INTRAVENOUS ONCE
Status: ACTIVE | OUTPATIENT
Start: 2022-01-01 | End: 2022-01-01

## 2022-01-01 RX ADMIN — SODIUM CHLORIDE 25 ML/HR: 9 INJECTION, SOLUTION INTRAVENOUS at 14:28

## 2022-01-01 RX ADMIN — ROCURONIUM BROMIDE 5 MG: 50 INJECTION, SOLUTION INTRAVENOUS at 12:40

## 2022-01-01 RX ADMIN — SODIUM CHLORIDE 200 MG: 900 INJECTION, SOLUTION INTRAVENOUS at 12:23

## 2022-01-01 RX ADMIN — SUCCINYLCHOLINE CHLORIDE 100 MG: 20 INJECTION, SOLUTION INTRAMUSCULAR; INTRAVENOUS at 12:41

## 2022-01-01 RX ADMIN — SODIUM CHLORIDE 200 MG: 9 INJECTION, SOLUTION INTRAVENOUS at 16:15

## 2022-01-01 RX ADMIN — POTASSIUM CHLORIDE 20 MEQ: 14.9 INJECTION, SOLUTION INTRAVENOUS at 08:47

## 2022-01-01 RX ADMIN — HEPARIN 300 UNITS: 100 SYRINGE at 15:57

## 2022-01-01 RX ADMIN — SODIUM CHLORIDE, PRESERVATIVE FREE 10 ML: 5 INJECTION INTRAVENOUS at 12:11

## 2022-01-01 RX ADMIN — SODIUM CHLORIDE 1000 ML: 900 INJECTION, SOLUTION INTRAVENOUS at 11:10

## 2022-01-01 RX ADMIN — MORPHINE SULFATE 1 MG: 2 INJECTION, SOLUTION INTRAMUSCULAR; INTRAVENOUS at 18:10

## 2022-01-01 RX ADMIN — DIATRIZOATE MEGLUMINE AND DIATRIZOATE SODIUM 10 ML: 660; 100 LIQUID ORAL; RECTAL at 12:37

## 2022-01-01 RX ADMIN — SODIUM CHLORIDE 75 ML/HR: 900 INJECTION, SOLUTION INTRAVENOUS at 02:56

## 2022-01-01 RX ADMIN — SODIUM CHLORIDE, PRESERVATIVE FREE 10 ML: 5 INJECTION INTRAVENOUS at 16:00

## 2022-01-01 RX ADMIN — Medication 10 ML: at 12:11

## 2022-01-01 RX ADMIN — Medication 10 ML: at 17:57

## 2022-01-01 RX ADMIN — SODIUM CHLORIDE 25 ML/HR: 9 INJECTION, SOLUTION INTRAVENOUS at 11:20

## 2022-01-01 RX ADMIN — LIDOCAINE HYDROCHLORIDE 60 MG: 20 INJECTION, SOLUTION EPIDURAL; INFILTRATION; INTRACAUDAL; PERINEURAL at 12:40

## 2022-01-01 RX ADMIN — SODIUM CHLORIDE 75 ML/HR: 900 INJECTION, SOLUTION INTRAVENOUS at 02:20

## 2022-01-01 RX ADMIN — GADOTERIDOL 9 ML: 279.3 INJECTION, SOLUTION INTRAVENOUS at 17:58

## 2022-01-01 RX ADMIN — ALBUTEROL SULFATE 2.5 MG: 2.5 SOLUTION RESPIRATORY (INHALATION) at 14:05

## 2022-01-01 RX ADMIN — SODIUM CHLORIDE, PRESERVATIVE FREE 10 ML: 5 INJECTION INTRAVENOUS at 17:00

## 2022-01-01 RX ADMIN — POTASSIUM CHLORIDE 20 MEQ: 14.9 INJECTION, SOLUTION INTRAVENOUS at 05:23

## 2022-01-01 RX ADMIN — MORPHINE SULFATE 1 MG: 2 INJECTION, SOLUTION INTRAMUSCULAR; INTRAVENOUS at 11:15

## 2022-01-01 RX ADMIN — DEXTROSE MONOHYDRATE AND SODIUM CHLORIDE 75 ML/HR: 5; .45 INJECTION, SOLUTION INTRAVENOUS at 18:13

## 2022-01-01 RX ADMIN — CALCIUM GLUCONATE 1000 MG: 20 INJECTION, SOLUTION INTRAVENOUS at 13:09

## 2022-01-01 RX ADMIN — SODIUM CHLORIDE, PRESERVATIVE FREE 10 ML: 5 INJECTION INTRAVENOUS at 11:43

## 2022-01-01 RX ADMIN — SODIUM CHLORIDE 25 ML/HR: 900 INJECTION, SOLUTION INTRAVENOUS at 16:00

## 2022-01-01 RX ADMIN — POTASSIUM CHLORIDE 20 MEQ: 14.9 INJECTION, SOLUTION INTRAVENOUS at 09:33

## 2022-01-01 RX ADMIN — ENOXAPARIN SODIUM 50 MG: 100 INJECTION SUBCUTANEOUS at 18:02

## 2022-01-01 RX ADMIN — POTASSIUM CHLORIDE 20 MEQ: 14.9 INJECTION, SOLUTION INTRAVENOUS at 06:19

## 2022-01-01 RX ADMIN — PHENOL 1 SPRAY: 1.5 LIQUID ORAL at 17:39

## 2022-01-01 RX ADMIN — DEXTROSE MONOHYDRATE AND SODIUM CHLORIDE 75 ML/HR: 5; .45 INJECTION, SOLUTION INTRAVENOUS at 08:08

## 2022-01-01 RX ADMIN — IPRATROPIUM BROMIDE AND ALBUTEROL SULFATE 3 ML: .5; 3 SOLUTION RESPIRATORY (INHALATION) at 14:39

## 2022-01-01 RX ADMIN — SODIUM CHLORIDE 75 ML/HR: 900 INJECTION, SOLUTION INTRAVENOUS at 13:00

## 2022-01-01 RX ADMIN — DEXTROSE MONOHYDRATE AND SODIUM CHLORIDE 75 ML/HR: 5; .45 INJECTION, SOLUTION INTRAVENOUS at 04:52

## 2022-01-01 RX ADMIN — Medication 10 ML: at 12:37

## 2022-01-01 RX ADMIN — Medication 10 ML: at 11:10

## 2022-01-01 RX ADMIN — MAGNESIUM SULFATE HEPTAHYDRATE 2 G: 40 INJECTION, SOLUTION INTRAVENOUS at 12:03

## 2022-01-01 RX ADMIN — PHENYLEPHRINE HYDROCHLORIDE 100 MCG: 10 INJECTION INTRAVENOUS at 12:56

## 2022-01-01 RX ADMIN — PROPOFOL 100 MG: 10 INJECTION, EMULSION INTRAVENOUS at 12:40

## 2022-01-01 RX ADMIN — SODIUM CHLORIDE, PRESERVATIVE FREE 10 ML: 5 INJECTION INTRAVENOUS at 11:20

## 2022-01-01 RX ADMIN — ONDANSETRON 4 MG: 2 INJECTION INTRAMUSCULAR; INTRAVENOUS at 04:46

## 2022-01-01 RX ADMIN — Medication 10 ML: at 10:38

## 2022-01-01 RX ADMIN — MORPHINE SULFATE 1 MG: 2 INJECTION, SOLUTION INTRAMUSCULAR; INTRAVENOUS at 21:08

## 2022-01-01 RX ADMIN — Medication 10 ML: at 10:01

## 2022-01-01 RX ADMIN — SODIUM CHLORIDE 200 MG: 9 INJECTION, SOLUTION INTRAVENOUS at 11:38

## 2022-01-01 RX ADMIN — MORPHINE SULFATE 1 MG: 2 INJECTION, SOLUTION INTRAMUSCULAR; INTRAVENOUS at 22:15

## 2022-01-01 RX ADMIN — POTASSIUM CHLORIDE 20 MEQ: 14.9 INJECTION, SOLUTION INTRAVENOUS at 10:22

## 2022-01-01 RX ADMIN — DEXTROSE MONOHYDRATE AND SODIUM CHLORIDE 75 ML/HR: 5; .45 INJECTION, SOLUTION INTRAVENOUS at 21:20

## 2022-01-01 RX ADMIN — Medication 10 ML: at 14:15

## 2022-01-01 RX ADMIN — FENTANYL CITRATE 25 MCG: 50 INJECTION INTRAMUSCULAR; INTRAVENOUS at 12:37

## 2022-01-01 RX ADMIN — ONDANSETRON 4 MG: 2 INJECTION INTRAMUSCULAR; INTRAVENOUS at 06:09

## 2022-01-01 RX ADMIN — MORPHINE SULFATE 1 MG: 2 INJECTION, SOLUTION INTRAMUSCULAR; INTRAVENOUS at 20:50

## 2022-01-01 RX ADMIN — POTASSIUM CHLORIDE 20 MEQ: 14.9 INJECTION, SOLUTION INTRAVENOUS at 07:37

## 2022-01-01 RX ADMIN — ONDANSETRON 4 MG: 2 INJECTION INTRAMUSCULAR; INTRAVENOUS at 13:15

## 2022-01-01 RX ADMIN — SODIUM CHLORIDE, SODIUM LACTATE, POTASSIUM CHLORIDE, AND CALCIUM CHLORIDE: 600; 310; 30; 20 INJECTION, SOLUTION INTRAVENOUS at 12:30

## 2022-01-01 RX ADMIN — POTASSIUM CHLORIDE 40 MEQ: 400 INJECTION, SOLUTION INTRAVENOUS at 09:13

## 2022-01-01 RX ADMIN — DEXTROSE MONOHYDRATE AND SODIUM CHLORIDE 75 ML/HR: 5; .45 INJECTION, SOLUTION INTRAVENOUS at 21:54

## 2022-01-01 RX ADMIN — MORPHINE SULFATE 1 MG: 2 INJECTION, SOLUTION INTRAMUSCULAR; INTRAVENOUS at 22:20

## 2022-01-01 RX ADMIN — SODIUM CHLORIDE 25 ML/HR: 9 INJECTION, SOLUTION INTRAVENOUS at 11:45

## 2022-01-01 RX ADMIN — IPRATROPIUM BROMIDE AND ALBUTEROL SULFATE 3 ML: .5; 3 SOLUTION RESPIRATORY (INHALATION) at 19:45

## 2022-01-01 RX ADMIN — POTASSIUM CHLORIDE 40 MEQ: 400 INJECTION, SOLUTION INTRAVENOUS at 08:37

## 2022-01-01 RX ADMIN — DEXTROSE MONOHYDRATE 75 ML/HR: 5 INJECTION, SOLUTION INTRAVENOUS at 05:16

## 2022-01-01 RX ADMIN — DEXTROSE MONOHYDRATE AND SODIUM CHLORIDE 75 ML/HR: 5; .45 INJECTION, SOLUTION INTRAVENOUS at 02:35

## 2022-01-01 RX ADMIN — FUROSEMIDE 20 MG: 10 INJECTION, SOLUTION INTRAMUSCULAR; INTRAVENOUS at 12:02

## 2022-01-01 RX ADMIN — IPRATROPIUM BROMIDE AND ALBUTEROL SULFATE 3 ML: .5; 3 SOLUTION RESPIRATORY (INHALATION) at 07:02

## 2022-01-01 RX ADMIN — DEXTROSE MONOHYDRATE AND SODIUM CHLORIDE 75 ML/HR: 5; .45 INJECTION, SOLUTION INTRAVENOUS at 04:46

## 2022-01-01 RX ADMIN — Medication 10 ML: at 10:12

## 2022-01-01 RX ADMIN — Medication 10 ML: at 13:04

## 2022-01-01 RX ADMIN — MORPHINE SULFATE 1 MG: 2 INJECTION, SOLUTION INTRAMUSCULAR; INTRAVENOUS at 06:13

## 2022-01-01 RX ADMIN — ENOXAPARIN SODIUM 50 MG: 100 INJECTION SUBCUTANEOUS at 05:41

## 2022-01-01 RX ADMIN — SODIUM CHLORIDE 200 MG: 9 INJECTION, SOLUTION INTRAVENOUS at 14:44

## 2022-01-01 RX ADMIN — FLUDEOXYGLUCOSE F18 14.1 MILLICURIE: 300 INJECTION INTRAVENOUS at 12:37

## 2022-01-19 NOTE — PROGRESS NOTES
Arrived to the [facility]. Marinanils Laurie completed and tolerated well  Any issues or concerns during appointment: none  Patient given education on infusion process  Informed of next infusion appointment scheduled for 2/14/22 at 1130am  Instructed patient to notify provider for any issues or worrisome symptoms. They verbalized understanding. Discharged ambulatory with self.

## 2022-01-19 NOTE — ADDENDUM NOTE
Encounter addended by: Abrahan Armstrong RPH on: 1/19/2022 2:29 PM   Actions taken: Sam created or edited

## 2022-01-31 NOTE — PROGRESS NOTES
Ms. Josep Fraire was approved for the [facility] co-pay program with [employer]. E/D 1/1/22 - 12/31/22. ID#  [id number]. Approval letter will be scanned into chart.   Co-pay coverage added to infusion WNEDY's

## 2022-02-14 NOTE — PROGRESS NOTES
Arrived to the [facility]. Keytruda infusion completed. Patient tolerated well. Any issues or concerns during appointment: none. Patient aware of next infusion appointment on 3/7 (date) at 11:30 AM (time). Patient instructed to call provider with temperature of 100.4 or greater or nausea/vomiting/ diarrhea or pain not controlled by medications  Discharged ambulatory.

## 2022-02-15 NOTE — PROGRESS NOTES
Patient arrived to port lab for port access and lab draw   Sarah [address] 45 accessed and labs drawn per protocol   *Port remains accessed   Patient discharged from port lab ambulating

## 2022-03-02 NOTE — PROGRESS NOTES
Reynaldo Human  : 1960  Primary: [employer] [place]  Secondary: [place] Patient [address] at [facility]  [address], [address], [place] [address]  [phone]   [phone]        OUTPATIENT PHYSICAL THERAPY: Daily Treatment Note 3/2/2022  Visit Count:  1       ICD-10: Treatment Diagnosis: other lymphedema I 89.0  Precautions/Allergies:   Biaxin [clarithromycin] and Cortisone    TREATMENT PLAN:  Effective Dates: 3/2/22 TO 2022 (90 days). Frequency/Duration: 2 times a week for 90 Day(s)       Pre-treatment Symptoms/Complaints:  The patient reports she is ready to resume therapy. She would like a referral for a pump. Pain: Initial: Pain Intensity 1: 0  /10 Post Session:  0/10   Medications Last Reviewed:  3/2/2022  Updated Objective Findings:  See evaluation note from today  TREATMENT:     MANUAL THERAPY: (31 minutes): Complex decongestive physiotherapy was utilized and necessary because of the patient's edema. referral made for a compression pump. Short stretch bandage applied from MCP to axilla using stockinette, cotton padding, 6 cm, 8 cm and 2 10 cm short stretch bandages. She will wear to tolerance. She will remove if pain is present or the bandage slides down. We will teach self bandaging next visit. [employer]  Treatment/Session Summary:    · Response to Treatment:  tolerated the treatment well.  she will benefit from a compression pump and bandageing. .  · Communication/Consultation:  wear the badnage to tolerance. · Equipment provided today:  None today  · Recommendations/Intent for next treatment session: Next visit will focus on CDT.     Total Treatment Billable Duration:  61 minutes, 30 minutes evaluation, 31 minutes manual therapy  PT Patient Time In/Time Out  Time In: 1029  Time Out: [address], PT    Future Appointments   Date Time Provider Alec Lobo   3/7/2022  8:45 AM Misael Cali, PT [facility]   3/7/2022 10:00 AM LAB [facility]   3/7/2022 10:30 AM Jered Watts MD [facility]   [facility]   3/10/2022 11:00 AM Sharron Cali, PT [facility]   8/11/2022 11:00 AM Jona Grissom NP [facility] PP PP

## 2022-03-02 NOTE — THERAPY EVALUATION
Alejandro Steele  : 1960  Primary: [employer] [place]  Secondary:  [address]  at [facility]  [address], [address], [place]  [phone]   [phone]          [address] Assessment 3/2/2022   ICD-10: Treatment Diagnosis: other lymphedema I 89.0  Precautions/Allergies:   Biaxin [clarithromycin] and Cortisone    TREATMENT PLAN:  Effective Dates: 3/2/22 TO 2022 (90 days). Frequency/Duration: 2 times a week for 90 Day(s) MEDICAL/REFERRING DIAGNOSIS:  Malignant neoplasm of right breast, stage 4 ([facility] [address] [C50.911]    DATE OF ONSET:   REFERRING PHYSICIAN: Percy Ibarra MD MD Orders: lymphedema  Return MD Appointment:  3/7/22     INITIAL ASSESSMENT:  Ms. Saba Holden presents for an assessment of her lymphedema. She reports a gradual progression of edema beginning in  when she suffered an injury to her shoulder while walking her dog. She reports she had surgery to correct this in November. In April she developed a DVT of the right upper extremity. 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 Angioplasty was performed. She continued to have increasing edema. It was found that she had breast cancer and underwent chemo. She received lymphedema therapy in  prior to the breast cancer diagnosis. She returns today having completed chem. She is stage 3 lymphedema at this point. She has lost function in her dominant arm. She lives alone and is limited in self care. She will benefit from lymphedema treatment to reduce the edema and increase her function. She will also benefit from a compression pump. PROBLEM LIST (Impacting functional limitations):  1. Decreased Strength  2. Decreased Flexibility/Joint Mobility  3. Edema/Girth  4. Decreased Knowledge of Precautions  5. Decreased Skin Integrity/Hygeine  6.  Decreased [place] with Home Exercise Program INTERVENTIONS PLANNED: (Treatment may consist of any combination of the following)  1. Decongestion Therapy  2. Home Exercise Program (HEP)  3. Manual Therapy  4. Range of Motion (ROM)  5. Therapeutic Exercise/Strengthening     GOALS: (Goals have been discussed and agreed upon with patient.)  Short-Term Functional Goals: Time Frame: 4 weeks  1. The patient will be independent with skin care within 4 weeks. 2. The patient will have knowledge of signs and symptoms of lymphedema and how to manage within 4 weeks. 3. The patient will reduce edema by 2 cm within 4 weeks. 4. The patient will be independent with self MLD within 4 weeks. 5. The patient will be referred for a compression pump within 4 weeks. 6.   Discharge Goals: Time Frame: 8 weeks  1. The patient will be fit with a compression garment within 8 weeks. 2. The patient will reduce her edema by 4 cm within 8 weeks. 3. The patient will be independent with self management of lymphedema within 8 weeks. 4.     OUTCOME MEASURE:   Tool Used: Disabilities of the Arm, Shoulder and Hand (DASH) Questionnaire - Quick Version  Score:  Initial: 22/55  Most Recent: X/55 (Date: -- )   Interpretation of Score: The DASH is designed to measure the activities of daily living in person's with upper extremity dysfunction or pain. Each section is scored on a 1-5 scale, 5 representing the greatest disability. The scores of each section are added together for a total score of 55. Tool Used: ECOG Performance Survey Score  Score:  Initial: 1  Most Recent:      Interpretation of Score:   0 Fully active, able to carry on all pre-disease performance without restriction   1 Restricted in physically strenuous activity but ambulatory and able to carry out work of a light or sedentary nature, e.g., light house work, office work   2 Ambulatory and capable of all selfcare but unable to carry out any work activities.  Up and about more than 50% of waking hours   3 Capable of only limited selfcare, confined to bed or chair more than 50% of waking hours   4 Completely disabled. Cannot carry on any selfcare. Totally confined to bed or chair   5 Dead        Tool Used: Lymphedema Life Impact Scale   Score:  Initial: 27 Most Recent: X (Date: -- )   Interpretation of Score: The Lymphedema Life Impact Scale (LLIS) is a validated instrument that measures the physical, functional, and psychosocial concerns pertinent to patients with extremity lymphedema. The Scale's questionnaire is administered to patients to gauge impairments, activity limitations, and participation restrictions resulting from their lymphedema. MEDICAL NECESSITY:   · Patient is expected to demonstrate progress in strength, range of motion and edema management to reduce risk of cellulitis. REASON FOR SERVICES/OTHER COMMENTS:  · Patient continues to demonstrate capacity to improve strength, ROM and edema management which will increase independence. Total Duration:  PT Patient Time In/Time Out  Time In: 1029  Time Out: 1130    Rehabilitation Potential For Stated Goals: [address] Mcmahan's therapy, I certify that the treatment plan above will be carried out by a therapist or under their direction. Thank you for this referral,  Sharron Cali, PT          PAIN/SUBJECTIVE:   Initial: Pain Intensity 1: 0 0 Post Session:  0/10   HISTORY:   History of Injury/Illness (Reason for Referral):   Injury of the right shoulder in September, 2020, corrective surgery in November, surgery for DVT in April, edema continued, stage 3 lymphedema, right breast cancer  Past Medical History/Comorbidities:   Ms. Derek Dorman  has a past medical history of Adverse effect of anesthesia, Anemia (08/2018), Cancer of ascending colon ([facility] [address] (2018), Environmental allergies (9/12/2013), History of colon cancer (2018), History of DVT (deep vein thrombosis) (04/2018), History of EKG (03/19/2021), echocardiogram (05/31/2018), Kidney stone, Lumbar stenosis (2018), Pulmonary embolism (HCC) (~2018), and Type 2 diabetes mellitus ([address] Ms. Venita Nino  has a past surgical history that includes hx cholecystectomy (1980's); hx lipoma resection (Right, 1980's); pr part removal colon w anastomosis; hx colonoscopy; hx wisdom teeth extraction; hx other surgical (09/27/2018); hx orthopaedic; and ir insert tunl cvc w port over 5 years (8/24/2021). Past Medical History:   Diagnosis Date    Adverse effect of anesthesia     slow to wake after cancer surgery on colon    Anemia 08/2018    no PO iron at present and no infusions    Cancer of ascending colon ([address] 2018    Environmental allergies 9/12/2013    History of colon cancer 2018    surgery on colon    History of DVT (deep vein thrombosis) 04/2018    right lower ext.     History of EKG 03/19/2021    NSR    Hx of echocardiogram 05/31/2018    EF 60-65%    Kidney stone     Lumbar stenosis 2018    per Dr. Moustapha Li note (care every where)     Pulmonary embolism ([address] ~2018    Type 2 diabetes mellitus ([address]     no current medication- AVG BS:/no s.s of hypoglycemia/Last A1c: 6.6 on 7/10/2020     Past Surgical History:   Procedure Laterality Date    HX CHOLECYSTECTOMY  1980's    HX COLONOSCOPY      HX LIPOMA RESECTION Right 1980's   Henrietta Lobato right shoulder tendon surgery    HX OTHER SURGICAL  09/27/2018    filter placed to right groin; per patient- has been removed     HX WISDOM TEETH EXTRACTION      IR [address] 5 YEARS  8/24/2021    CA PART REMOVAL COLON W ANASTOMOSIS         Social History/Living Environment:     lives alone  Prior Level of Function/Work/Activity:   Currently not working  Dominant Side:         RIGHT   Ambulatory/Rehab Services H2 Model Falls Risk Assessment   Risk Factors:       No Risk Factors Identified Ability to Rise from Chair:       (1)  Pushes up, successful in one attempt   Falls Prevention Plan:       No modifications necessary   Total: (5 or greater = High Risk): 1   ©2010 [facility] [employer] [address] [place] [address] Patent #[id number]. Federal Law prohibits the replication, distribution or use without written permission from [facility] [employer]   Current Medications:       Current Outpatient Medications:     apixaban (ELIQUIS) 5 mg tablet, Take 1 Tablet by mouth two (2) times a day., Disp: 60 Tablet, Rfl: 5    potassium chloride (K-DUR, KLOR-CON M20) 20 mEq tablet, Take 1 Tablet by mouth daily. , Disp: 90 Tablet, Rfl: 0    ondansetron hcl (Zofran) 8 mg tablet, Take 1 Tablet by mouth every eight (8) hours as needed for Nausea., Disp: 90 Tablet, Rfl: 2    lidocaine-prilocaine (EMLA) topical cream, Apply a dab to port site 30-45 minutes prior to Lab or chemo infusion. Cover with saran wrap., Disp: 30 g, Rfl: 5    prochlorperazine (Compazine) 10 mg tablet, Take 1 Tablet by mouth every six (6) hours as needed (as needed for nausea and vomiting). , Disp: 90 Tablet, Rfl: 2    nystatin (MYCOSTATIN) powder, Apply  to affected area four (4) times daily. Apply to elbow creases and axilla, Disp: 1 Bottle, Rfl: 3   Date Last Reviewed:  3/2/22   Number of Personal Factors/Comorbidities that affect the Plan of Care: 3+: HIGH COMPLEXITY   EXAMINATION:   Palpation:          Tissue pitting, appears rough in texture, engorged veins in the anterior chest.  Appears bruised. ROM:          Right shoulder flexion 20  Abduction 20  Elbow flexion 80    Strength:          Minimal active mobility of the shoulder and elbow.   Grossly 4-/5  Functional Mobility:         Gait/Ambulation:  independent        Transfers:  independent        Bed Mobility:  Independent with moderate difficulty  Skin Integrity:          Skin thin and fragile appearing  Edema/Girth:  pitting    Left Right    Initial Most Recent Initial Most Recent   Upper  Extremity Base 3rd Finger (cm): 17.5  Wrist (cm): 15.2  Mid Forearm (cm): 17.3  Elbow (cm): 20.8  Axilla (cm): 20.4 (20.4)   Base 3rd Finger (cm): 18  Wrist (cm): 17.6  Mid Forearm (cm): 31.5  Elbow (cm): 39.2  Axilla (cm): 42.7 (37.2)     Lower  Extremity               Body Structures Involved:  1. Joints  2. Muscles  3. lymphatic system Body Functions Affected:  1. Neuromusculoskeletal Activities and Participation Affected:  1. General Tasks and Demands  2.  Mobility   Number of elements (examined above) that affect the Plan of Care: 4+: HIGH COMPLEXITY   CLINICAL PRESENTATION:   Presentation: Evolving clinical presentation with changing clinical characteristics: MODERATE COMPLEXITY   CLINICAL DECISION MAKING:   Use of outcome tool(s) and clinical judgement create a POC that gives a: Questionable prediction of patient's progress: MODERATE COMPLEXITY

## 2022-03-07 NOTE — PROGRESS NOTES
Saad Rodriguez  : 1960  Primary: Sc [employer], In*  Secondary: Sc Copay Patient [address] at [address]  [facility], [address], [place] [address]  [phone]   [phone]        OUTPATIENT PHYSICAL THERAPY: Daily Treatment Note 3/7/2022  Visit Count:  2       ICD-10: Treatment Diagnosis: other lymphedema I 89.0  Precautions/Allergies:   Biaxin [clarithromycin] and Cortisone    TREATMENT PLAN:  Effective Dates: 3/2/22 TO 2022 (90 days). Frequency/Duration: 2 times a week for 90 Day(s)       Pre-treatment Symptoms/Complaints:  The patient reports she will receive her pump on Wednesday. Pain: Initial: Pain Intensity 1: 0  /10 Post Seslsion:  0/10   Medications Last Reviewed:  3/7/2022  Updated Objective Findings:  As below. TREATMENT:     MANUAL THERAPY: (53 minutes): Complex decongestive physiotherapy was utilized and necessary because of the patient's edema. the patient was instructed in self bandaging. She was allowed to practice with verbal and tactile cues. She has limited mobility in the right upper extremity. She lives alone and must be independent. She was allowed to have her hand un-bandaged. This is a trial.  We utilized stockinette, cotton padding, 8 cm and 2 10 cmll  short stretch bandages. She will need to remove it each day for her time in the pump. We will plan to address her need for a garment after she uses the pump. She may benefit from a Velcro type garment. [employer]  Treatment/Session Summary:    · Response to Treatment:  tolerated the treatment well. She will need to practice in order to be effective with her bandaging. · Communication/Consultation:  wear the badnage to tolerance. · Equipment provided today:  None today  · Recommendations/Intent for next treatment session: Next visit will focus on CDT.     Total Treatment Billable Duration:  53 minutes  PT Patient Time In/Time Out  Time In: 1107  Time Out: [address] [facility], PT    Future Appointments   Date Time Provider Alec Izaguirrei   3/7/2022 10:00 AM LAB CK [facility]   3/7/2022 10:30 AM Tu Santana MD [facility]   [facility]   3/10/2022 11:00 AM Sharron Cali, PT [facility]   8/11/2022 11:00 AM Brandon Grissom, NP [facility] PP PP

## 2022-03-07 NOTE — PROGRESS NOTES
[facility] Keytruda infusion completed. Patient tolerated well. Any issues or concerns during appointment: none. Patient aware of next infusion appointment on 03/28/2022 (date) at  (time). Discharged ambulatory.

## 2022-03-07 NOTE — ADDENDUM NOTE
Encounter addended by: AMY Daigle [facility] on: 3/7/2022 11:47 AM   Actions taken: i-John created or edited

## 2022-03-10 NOTE — PROGRESS NOTES
Roberto Starr  : 1960  Primary: Sc [employer], In*  Secondary: Sc Copay Patient [address] at [facility]  [address], [address], [place] [address]  [phone]   [phone]        OUTPATIENT PHYSICAL THERAPY: Daily Treatment Note 3/10/2022  Visit Count:  3       ICD-10: Treatment Diagnosis: other lymphedema I 89.0  Precautions/Allergies:   Biaxin [clarithromycin] and Cortisone    TREATMENT PLAN:  Effective Dates: 3/2/22 TO 2022 (90 days). Frequency/Duration: 2 times a week for 90 Day(s)       Pre-treatment Symptoms/Complaints:  The patient reports she has been practicing wrapping. She reports she tried the pump yesterday and liked it. She will receive hers within 2 weeks. Pain: Initial: Pain Intensity 1: 0  /10 Post Seslsion:  0/10   Medications Last Reviewed:  3/10/2022  Updated Objective Findings:  As below. TREATMENT:     MANUAL THERAPY: (45 minutes): Complex decongestive physiotherapy was utilized and necessary because of the patient's edema. bandaging was reviewed with the patient. She was allowed to practice with minimal verbal and tactile cues. She will continue on her own with bandaging daily untils she receives her pump. We utilized stockinette, cotton padding, 8 cm and 2 10 cmll  short stretch bandages. We will plan to address her need for a garment after she uses the pump. She may benefit from a Velcro type garment. [employer]  Treatment/Session Summary:    · Response to Treatment:  tolerated the treatment well. She will need to practice in order to be effective with her bandaging. She was able to bandage herself with minimal help. · Communication/Consultation:  wear the badnage to tolerance. · Equipment provided today:  None today  · Recommendations/Intent for next treatment session: Next visit will focus on CDT.     Total Treatment Billable Duration:  45 minutes  PT Patient Time In/Time Out  Time In: 1100  Time Out: [address] [place], PT    Future Appointments   Date Time Provider Alec Lobo   3/25/2022 12:30 PM [facility] NM PET/CT INJ RM GCCRMPETG GVL [facility]   3/28/2022  1:00 PM Sharron Cali, PT [facility]   3/28/2022  2:15 PM LAB CK GCCOPIG G[facility]   3/28/2022  2:45 PM Whit Jackson MD [facility]   3/28/2022  3:30 [facility]   4/18/2022 10:00 AM LAB [facility]   4/18/2022 10:30 AM Whit Jackson MD [facility]   4/18/2022 11:00 AM POD2A GCCOPIG GVL [facility]   8/11/2022 11:00 AM Marie Grissom, SH[facility]NA [facility] PP PP

## 2022-03-28 NOTE — PROGRESS NOTES
Arrived to the [facility]. Keytruda infusion completed. Patient tolerated well. Any issues or concerns during appointment: NO.  Patient aware of next infusion appointment on 04/18/22 (date) at 56 (time). Patient aware of next lab and [facility] office visit on 04/18/22 (date) at [address] (time). Patient instructed to call provider with temperature of 100.4 or greater or nausea/vomiting/ diarrhea or pain not controlled by medications  Discharged ambulatory.

## 2022-03-28 NOTE — ADDENDUM NOTE
Encounter addended by: AMY S[facility]chez D [facility] [place] on: 3/28/2022 3:56 PM   Actions taken: i-Vent created or edited

## 2022-04-04 NOTE — ADDENDUM NOTE
Encounter addended by:  Lamonte Biggs RN on: 4/4/2022 9:52 AM   Actions taken: [facility] administration accepted

## 2022-04-18 PROBLEM — R63.4 WEIGHT LOSS: Status: ACTIVE | Noted: 2022-01-01

## 2022-04-18 PROBLEM — R13.10 DYSPHAGIA: Status: ACTIVE | Noted: 2022-01-01

## 2022-04-18 PROBLEM — R62.7 FAILURE TO THRIVE IN ADULT: Status: ACTIVE | Noted: 2022-01-01

## 2022-04-18 PROBLEM — C50.919 METASTATIC BREAST CANCER (HCC): Status: ACTIVE | Noted: 2022-01-01

## 2022-04-18 NOTE — PROGRESS NOTES
Chart screened by CM for d/c planning. Pt resides in a one-story house and her son Josselyn Cam [phone] and daughter in law reside behind her house. Pt has insurance with pharmacy benefits and a PCP. She is current with OP PT at [facility]. She is scheduled for 2x/week for 90 days - from March 2 - June 2, 2022. Pt had Interim HH in 2021. Pt is fairly independent with ADLs at baseline. Pt presented to the hospital from the [facility] for IP admission r/t Dyspnea and RLE swelling. Pt has Dx of Colon CA, Breast CA, R PE, Peritoneal metastases, Lymphedemna, x of PE, Hx of DVT of LE, Pharyngoesophageal dysphagia, Weight loss, Weakness, Hypomagnesemia, Dehydration, Hypokalemia, Mild protein-calorie malnutrition, Insomnia, DM-II with nephropathy, Dysuria, Iron deficiency anemia, HLD associated with DM-II, and Debility. Nutrition and SLP consults have been ordered. Gastroenterology and Oncology are following. Per most recent GI note dated 4-18-22 pt is scheduled for an esophageal dilation on Wednesday. There have been no PT or OT consults ordered. Anticipated d/c plan is for pt to return home with family support when medically stable. CM will continue to follow and remain available if any needs arise. Care Management Interventions  PCP Verified by CM: Yes Megan Cramer MD)  Mode of Transport at Discharge:  Other (see comment) (Family)  Transition of Care Consult (CM Consult): Discharge Planning  Physical Therapy Consult: No  Occupational Therapy Consult: No  Speech Therapy Consult: Yes  Support Systems: Child(isabela),Other Family Member(s)  Confirm Follow Up Transport: Family  The Patient and/or Patient Representative was Provided with a Choice of Provider and Agrees with the Discharge Plan?: Yes  Name of the Patient Representative Who was Provided with a Choice of Provider and Agrees with the Discharge Plan: Tiffanie Peabody  Freedom of Choice List was Provided with Basic Dialogue Left message for patient to call office to discuss lab results.   that Supports the Patient's Individualized Plan of Care/Goals, Treatment Preferences and Shares the Quality Data Associated with the Providers?: Yes   Resource Information Provided?: No  Discharge Location  Patient Expects to be Discharged to[de-identified] Home with family assistance

## 2022-04-18 NOTE — H&P (VIEW-ONLY)
[facility] Consult Note       Primary GI Physician: Dr. Anastacia Swartz    Referring Provider:  Franklin Huertas NP  Consult Date:  4/18/2022    Admit Date:  4/18/2022    Chief Complaint:  Esophageal Dysphagia    Subjective:     History of Present Illness:  Patient is a 59 y.o. female with PMH including but not limited to Breast Cancer, Peritonal Carcinomatosis, Hepatic flexure Colon Cancer, Pancreatic Pseudocyst s/p EUS with Dr. Gregory Sharpe, DVT, DM II who is seen in consultation at the request of Franklin Huertas and Dr. Jered Antoine for dysphagia. Patient is known to our practice. She was last seen during hospitalization in June 2021 for dysphagia. She underwent EGD with dilation during that admission, outlined below. She had improvement in swallowing. She has now been admitted with return of dysphagia. Palliative care and hospice have also been consulted. Patient reports that she was eating without difficulty until 2 weeks ago. Now, she is having phlegm that builds up in the back of her throat and she has to spit it up. This also happens when she tries to swallow. She is having difficulty with both liquids and solids. She feels food get caught up in the cervical region of the esophagus. Denies any odynophagia. Denies any nausea/vomiting. She reports having a good appetite and \"starving\" but can't swallow. Reports having BM daily. Denies any melena/hematochezia. Denies any abdominal pain. She reports that she has not taken Eliquis in a few days because the pharmacy was out of it. EGD 6/27/21 with Dr. Deep Cummings for dysphagia FINDINGS:  ESOPHAGUS: Multiple unsuccessful attempts were made to intubate the esophagus with a [id number] as well as pediatric (XP) gastroscope.  The gastroscope would at times advance 1 cm beyond the level of the vocal cords then meet resistance.  Unable to visualize the nature of the anatomical change that resulted in increased resistance to passage of the scope.  Procedure discontinued. Estimated blood loss: 0-minimal   Specimens obtained during procedure: none  PLAN: 1. Barium esophogram     Barium esophagram 6/27/21   HISTORY: Dysphasia with incomplete EGD because of narrowed esophagus. TECHNIQUE: The patient ingested effervescent granules followed by thin and thick barium under direct fluoroscopic observation. 1.6 minutes of fluoroscopy time was utilized. 11 spot fluoroscopic images were saved. FINDINGS: There is mild diffuse narrowing of the esophagus without strictures. The gastroesophageal junction was in a normal position. Rapid sequence imaging of the oropharynx demonstrates a normal swallowing motion. 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 The patient was unable to participate in prone swallowing and wished to not ingest the barium tablet because she was concerned about successfully swallowing this capsule. IMPRESSION  1. No mechanical esophageal obstruction identified. 2. Patient unable to participate in prone swallowing or to ingest a barium tablet.     EGD 6/29/21 with Dr. Anthony Birmingham:  OROPHARYNX: Appears swollen.  I could not get the [id number] down into the esophagus.  Changed the scope to XP and was able to traverse into the esophagus. ESOPHAGUS: The esophagus appears narrowed but I see no inflammation, ulcers, strictures or masses.  I did dilate the esophagus using a 21 Fr, 24 Fr and 27 Fr Savary over a wire.  Re-endoscopy after dilation showed no heme or rents.  Multiple biopsies were taken from the mid and distal esophagus using cold forceps to rule out EoE. STOMACH: The fundus on antegrade and retroflex views is normal. The body, antrum and pylorus is normal.  DUODENUM: The bulb and second portions are normal.  ASSESSMENT:  1. Swelling in Oropharynx  2.  Narrowed Esophagus without inflammation, ulcers, focal strictures or masses   DIAGNOSIS   A: \"DISTAL ESOPHAGEAL BIOPSIES\": DETACHED FRAGMENTS OF MINIMALLY HYPERPLASTIC SQUAMOUS EPITHELIUM. B: \"MID ESOPHAGEAL BIOPSIES\": DETACHED FRAGMENTS OF MINIMALLY HYPERPLASTIC SQUAMOUS EPITHELIUM.    Flexible laryngoscopy 6/29/21  INDICATIONS: Oral pharyngeal edema  DESCRIPTION: After verbal consent was obtained and a timeout was performed, the flexible scope was advanced down one of the patient's nares into the nasopharynx. The nasal cavity and nasopharynx were clear. The scope was then turned distally down towards the oropharynx. The BOT and vallecula were clear and the epiglottis was normal in appearance. Both aryepiglottic folds were clear and there was a normal appearing glottis w/ healthy TVCs. No nodules or polyps and the cords were fully mobile.  There was noted to have fullness to the right posterior lateral oropharyngeal/hypopharyngeal region with some obstruction of the right piriform sinus.  Remaining laryngoscopy examination within normal limits.  This area of concern had no mucosal or submucosal mass type lesions and likely represents edema changes.  Airway widely patent. . The posterior pharyngeal was clear as well. The scope was then carefully removed. The patient tolerated the procedure well and there were no complications. PMH:  Past Medical History:   Diagnosis Date    Adverse effect of anesthesia     slow to wake after cancer surgery on colon    Anemia 08/2018    no PO iron at present and no infusions    Cancer of ascending colon ([address] 2018    Environmental allergies 9/12/2013    History of colon cancer 2018    surgery on colon    History of DVT (deep vein thrombosis) 04/2018    right lower ext.     History of EKG 03/19/2021    NSR    Hx of echocardiogram 05/31/2018    EF 60-65%    Kidney stone     Lumbar stenosis 2018    per Dr. Lorrie Mariscal note (care every where)     Pulmonary embolism ([address] ~2018    Type 2 diabetes mellitus ([address]     no current medication- AVG BS:/no s.s of hypoglycemia/Last A1c: 6.6 on 7/10/2020       PSH:  Past Surgical History: Procedure Laterality Date    HX CHOLECYSTECTOMY  1980's    HX COLONOSCOPY      HX LIPOMA RESECTION Right 1980's    HX ORTHOPAEDIC      Dr. Bonnie Bailey right shoulder tendon surgery    HX OTHER SURGICAL  09/27/2018    filter placed to right groin; per patient- has been removed     HX WISDOM TEETH EXTRACTION      IR [address] 5 YEARS  8/24/2021    ID PART REMOVAL COLON W ANASTOMOSIS         Allergies: Allergies   Allergen Reactions    Clarithromycin Rash     Other reaction(s): Other (See Comments)  Unknown (comments)    Cortisone Other (comments)     NUMBNESS IN THE LEG (SITE OF INJECTION)  Per pt, leg numbness. Other reaction(s): Other (See Comments)  NUMBNESS IN THE LEG (SITE OF INJECTION)       Home Medications:  Prior to Admission medications    Medication Sig Start Date End Date Taking? Authorizing Provider   ondansetron hcl (Zofran) 8 mg tablet Take 1 Tablet by mouth every eight (8) hours as needed for Nausea. 3/28/22   Venus Perera MD   nystatin (MYCOSTATIN) powder Apply  to affected area four (4) times daily. Apply to elbow creases and axilla 3/3/22   Venus Perera MD   apixaban [facility]) 5 mg tablet Take 1 Tablet by mouth two (2) times a day. 2/11/22   Antonia Grissom NP   potassium chloride (K-DUR, KLOR-CON M20) 20 mEq tablet Take 1 Tablet by mouth daily. 1/19/22   Venus Perera MD   lidocaine-prilocaine (EMLA) topical cream Apply a dab to port site 30-45 minutes prior to Lab or chemo infusion. Cover with saran wrap. 10/4/21   Yuan Fraser NP   prochlorperazine (Compazine) 10 mg tablet Take 1 Tablet by mouth every six (6) hours as needed (as needed for nausea and vomiting).  8/24/21   Venus Perera MD       Hospital Medications:  Current Facility-Administered Medications   Medication Dose Route Frequency    [Held by provider] apixaban (ELIQUIS) tablet 5 mg  5 mg Oral BID    0.9% sodium chloride infusion  75 mL/hr IntraVENous CONTINUOUS    ondansetron (ZOFRAN) injection 4 mg  4 mg IntraVENous Q4H PRN    prochlorperazine (COMPAZINE) with saline injection 10 mg  10 mg IntraVENous Q6H PRN    morphine injection 1 mg  1 mg IntraVENous Q4H PRN     Facility-Administered Medications Ordered in Other Encounters   Medication Dose Route Frequency    saline peripheral flush soln 10 mL  10 mL InterCATHeter PRN    saline peripheral flush soln 10 mL  10 mL InterCATHeter PRN       Social History:  Social History     Tobacco Use    Smoking status: Never Smoker    Smokeless tobacco: Never Used   Substance Use Topics    Alcohol use: No       Pt denies any history of drug use, blood transfusions, or tattoos. Family History:  Family History   Problem Relation Age of Onset    No Known Problems Mother     Dementia Father     Heart Disease Father     Hypertension Father     Kidney Disease Father        Review of Systems:  A detailed 10 system ROS is obtained, with pertinent positives as listed above. All others are negative. Diet:      Objective:     Physical Exam:  Vitals:  Visit Vitals  /74 (BP 1 Location: Left upper arm, BP Patient Position: Supine)   Pulse 75   Temp 98 °F (36.7 °C)   Resp 16   Ht 5' 4\" (1.626 m)   Wt 48.9 kg (107 lb 12.8 oz)   SpO2 99%   BMI 18.50 kg/m²     Gen:  Pt is alert, cooperative, no acute distress, appears chronically ill. Thin, fragile appearing. Skin:  Extremities and face reveal no rashes. HEENT: Sclerae anicteric. Extra-occular muscles are intact. No oral ulcers. No abnormal pigmentation of the lips. The neck is supple. Cardiovascular: Regular rate and rhythm. No murmurs, gallops, or rubs. Respiratory:  Comfortable breathing with no accessory muscle use. Clear breath sounds anteriorly with no wheezes, rales, or rhonchi. GI:  Abdomen nondistended, soft, and nontender. Normal active bowel sounds. No enlargement of the liver or spleen. No masses palpable. Rectal:  Deferred  Musculoskeletal:  No pitting edema of the lower legs. Right arm with lymphedema noted. Neurological:  Gross memory appears intact. Patient is alert and oriented. Psychiatric:  Mood appears appropriate with judgement intact. Lymphatic:  No cervical or supraclavicular adenopathy. Laboratory:    Recent Labs     04/18/22  0959   WBC 5.5   HGB 9.2*   HCT 27.7*      .1*      K 3.4*      CO2 28   BUN 34*   CREA 1.00   CA 9.4   GLU 99   AP 1,178*   *   ALT 52   TBILI 6.0*   ALB 2.5*   TP 6.7          Assessment:     Active Problems:    Weight loss (4/18/2022)      Dysphagia (4/18/2022)      Failure to thrive in adult (4/18/2022)      Metastatic breast cancer ([facility] [address] (4/18/2022)      Patient is a 59 y.o. female with PMH including but not limited to Breast Cancer, Peritonal Carcinomatosis, Hepatic flexure Colon Cancer, Pancreatic Pseudocyst s/p EUS with Dr. Guerrero Li, Hemicolectomy, DVT, DM II who is seen in consultation at the request of Stephie Marroquin and Dr. Laurel Toure for dysphagia. EGD with dilation in June 2021 improved dysphagia until about 2 weeks ago. Patient reports that she ran out of Eliquis a few days ago so has not taken it in a few days. Plan:     EGD with dilation to be performed on 4/20/2022 with Dr. Bonny Morales for clear liquid diet, for now. Risk of procedure discussed with patient to include risk of infection, bleeding, perforation, sedation reaction, exacerbation of underlying medical conditions, and death. Patient voiced understanding and wishes to plan for procedure when appropriate. Cristino Molina NP    Patient is seen and examined in collaboration with Dr. Linda Botello. Assessment and plan as per Dr. Linda Botello.

## 2022-04-18 NOTE — PROGRESS NOTES
SPEECH PATHOLOGY NOTE:    Speech therapy consult received and appreciated. Patient is known to this service from prior admission in 2021. Modified barium swallow study was completed on 6/25/21. Results as follows: oropharyngeal swallow function that is grossly WNL. No aspiration/penetration with any consistencies. Assessment somewhat limited as patient only took small bites/sips. Min-mild pharyngeal stasis post swallow with pudding, mixed, and solid consistency trials due to minimally reduced pharyngeal constriction; however, clears with spontaneous double swallow. Patient reporting sensation of significant pharyngeal stasis post swallow even when no residuals remained in pharynx. Suspect esophageal component contributing to globus sensation. EGD was completed during the admission. The following results were noted \"EGD 6/27 was incomplete due to narrowed esophagus. Subsequent Barium esophagram was negative for esophageal obstruction but reported mild diffuse narrowing of the esophagus without strictures. She is s/p repeat attempt at EGD 6/29 with esophageal dilation\". Swallow function improved after GI intervention. She is now admitted with c/o severe dysphagia including difficulty clearing secretions, poor oral intake x2 weeks, and significant weight loss. GI consult pending with possible EGD (recommended by Oncology). Will defer speech therapy evaluation pending further workup and input from GI.     Chante Olson MSP, CCC-SLP  Speech Language Pathologist  Acute Rehabilitation Services  Contact: Yoni

## 2022-04-18 NOTE — H&P
BSHO: Admission H&P    Chief Complaint:    Breast cancer  Peritoneal carcinomatosis  Dysphagia  Weight loss  Debilitation    History of Present Illness:  59 y.o. F consulted for PE, pancreatic mass, and right axillary mass. She is a biker and developed dyspnea and RLE swelling and found large PE and DVT, received EKOS followed by Eliquis and good clinical response, imaging also found pancreatic mass and right axillary mass. I reviewed CT personally and discussed her panceatic mass appeared cystic and EUS found no malignant cells, the right axilla exam showed a firm area in the surgical bed scar from a remote lipoma removal and pt reported the scar had been stable for much more than a decade, therefore no further investigation besides annual mammogram and she will discuss the following exams. We also discussed her first time unprovoked PE indicated for anticoagulation and given the severity and her MTHFR homozygous mutation will rec continue Eliquis for long term unless developing bleeding risk. She will continue Eliquis 5mg bid and follow pulmonology, RTC as needed. She developed anemia and colonoscopy found hepatic flexure colon cancer, Dr. Tina Keating performed hemicolectomy on 10/9/18, final pathology showed pT3N0 with 28 negative LN, moderate to poor differentiation. She returned to [facility] on 11/2/18, recovered well from surgery, and we discussed the benefits of adjuvant chemotherapy in stage II (node-negative) disease are less certain, and the use of chemotherapy in this group is variable. She had no clinical risk factor, and only histology risk if moderate to poor differentiation, and she was indecisive and eager to return to work, decided to pursue MSI/oncotype test showed MMR-D indicating low risk, we discussed results showed no strong indication for chemo, discussed risk reduction life style and surveillance, resumed oral iron and follow PCP, RTC in 6 months to follow, removeed IVC filter.     She was worked in on 3/11/19 per pulmonology to discuss stopping Eliquis. We discussed her homozygous MTHFR indicated increased risk of recurrent VTE, nonetheless she has been cancer free, more active and homocysteine level wnl and desired to stop, stopped anticoagulation with understanding of possible recurrent VTE and call as needed. Also schedule annual surveillance for colon cancer restaging CT and colonoscopy, risk reduction life style, ASA after stopping eliquis, continue iron. She developed kidney stone and received lithotripsy in 9/2019, then had LE DVT and started Eliquis with PCP, annual CT showed RUBÉN CR for colon cancer and re-demonstrated the axillary LN, I reviewed CT personally and discussed with her to finish colonoscopy, RTC in 6 months and call as needed. She did not follow plan nor return as instructed till 3/15/21, Dr. Cheryle Kling discussed with me for the RUE swelling persistent after the right shoulder surgery, arranged doppler showed occlusive DVT of right subclavian and innominate vein and I discussed with vascular surgery, change AC to Eliquis and scribed starting pack and instructed to use, consulted IR for potential use of EKOS/thrombolysis, Dr. Rusty Barth performed of the procedure on 4/19/2021 and discussed with me there was no finding of evidence of thrombosis but a long segment of narrowing of the right axillary vein stenosis that he treated with angioplasty balloon, the etiology of the patient's extensive swelling and decreased mobility appeared uncertain and he referred patient to [facility] to further evaluate.   Patient returned to [facility] 5/18/2021 appeared quite confused and upset that the doctor in [facility] had told her that the shoulder surgery somehow caused interruption of the circulation and there is nothing can be done to relieve that, he questioned why the IR venography was done, I explained that she was confused about the shoulder surgery versus the IR procedure, explained by her procedure with me and to remove blood clots and turned out there was no clots to be found, and that the situation appeared were difficult given she has been seen by orthopedist/vascular surgery/IR/hematology/[facility], and the most practical help at this point would be to see lymphedema rehab program to manage symptomatically, continue to follow orthopedics, continue Eliquis based on prior indication and refilled, discussed that the biopsy of right axillary lymph node and she would defer to see if the swelling can improve first, she had missed that the surveillance CT for colon cancer and will reschedule. However he was admitted on 6/24/2021 via emergency room for increased short of breath and CT showed continued worsening of soft tissue edema and right-sided pleural effusion with a differential diagnosis of inflammatory breast cancer, pleural effusion cytology negative, ENT evaluation and EGD/esophageal biopsy for dysphagia were benign, finally proceeded to the right axillary lymph node biopsy reported poorly differentiated carcinoma with IHC staining consistent with breast origin.   I discussed result with patient and nephew and arrange PET staging showed right breast avid mass, left breast mass, peritoneal avid nodule, discussed with pt and cousin this was consistent with stage IV breast cancer, although the extensive chest/arm swelling was not avid and clinically improving suggests not all were cancer infiltration, nonetheless need systemic therapy and discussed options would depend on tumor profile and checked Caris showed ER30% HER2- PD-L1- but MSI high and TMB high, discussed preferred immunotherapy with Keytruda, also discussed the excellent CR rate with carbotaxol Keytruda as demonstrated in recent approval for neoadjuvant breast cancer treatment and the well-established lung cancer data, patient is now motivated to treat for best response, consult financial counselor and approved for James Brennan, discussed toxicities and management, proceed to cycle 1 on 8/30/21, however day 8 and day 15 had to be deferred due to neutropenia, the right arm and chest swelling already significantly improved, suggesting good response, day 8 and day 15 of cycle 2 were held due to neutropenia again, also with weight loss and hypotension, discussed proper nutrition and started Remeron, consulted dietitian, managed to finish 4 cycles of chemo with dose reduction for myelosuppression, still very anemic but myelosuppression improved, followed 3/7/2022, hemoglobin 9.4, continue to work on lymphedema rehab for the right arm and learn to self wrap and use compression hoses, continue Zofran for nausea,  CA2729 trended up and restage with PET scan 3/2022 continue to show good [place] compared to previous PET scan,           discussed option to rechallenge with chemoimmunotherapy versus Arimidex\Ibrance, but would rather continue Keytruda given arms continue to improve and still having clinical benefit, continue monitor CA 27/29. She returned on 4/18/2022 with daughter-in-law, reported symptoms indicating severe dysphagia and not able to clear her secretion, poor oral intake for the past 2 weeks with significant weight loss, very weak. Review of Systems:  Constitutional Weight loss, weak. Denies fever or chills. HEENT Denies trauma, bluring vision, hearing loss, ear pain, nosebleeds, sore throat, neck pain and ear discharge. Skin Denies lesions or rashes. Lungs Denies shortness of breath, cough, sputum production or hemoptysis. Cardiovascular Denies chest pain, palpitations, orthopnea, claudication and leg swelling. Gastrointestinal  nausea. Denies vomiting, bowel changes. Denies bloody or black stools. Denies abdominal pain.  Denies dysuria, frequency or hesitancy of urination   Neuro Denies headaches, visual changes or ataxia. Denies dizziness, tingling, tremors, sensory change, speech change, focal weakness and headaches.      Hematology Denies nasal/gum bleeding, denies easy bruise   Endo Denies heat/cold intolerance, denies diabetes. MSK  right arm swelling. Denies back pain, swollen legs, myalgias and falls. Psychiatric/Behavioral Anxiety. Denies depression and substance abuse. Allergies   Allergen Reactions    Clarithromycin Rash     Other reaction(s): Other (See Comments)  Unknown (comments)    Cortisone Other (comments)     NUMBNESS IN THE LEG (SITE OF INJECTION)  Per pt, leg numbness. Other reaction(s): Other (See Comments)  NUMBNESS IN THE LEG (SITE OF INJECTION)     Past Medical History:   Diagnosis Date    Adverse effect of anesthesia     slow to wake after cancer surgery on colon    Anemia 08/2018    no PO iron at present and no infusions    Cancer of ascending colon ([address] 2018    Environmental allergies 9/12/2013    History of colon cancer 2018    surgery on colon    History of DVT (deep vein thrombosis) 04/2018    right lower ext.     History of EKG 03/19/2021    NSR    Hx of echocardiogram 05/31/2018    EF 60-65%    Kidney stone     Lumbar stenosis 2018    per Dr. Shavon Gaines note (care every where)     Pulmonary embolism [facility] ~2018    Type 2 diabetes mellitus ([facility]     no current medication- AVG BS:/no s.s of hypoglycemia/Last A1c: 6.6 on 7/10/2020     Past Surgical History:   Procedure Laterality Date    HX CHOLECYSTECTOMY  1980's    HX COLONOSCOPY      HX Tana Knights Right 1980's   Fern Daly right shoulder tendon surgery    HX OTHER SURGICAL  09/27/2018    filter placed to right groin; per patient- has been removed     HX WISDOM TEETH EXTRACTION      IR [address] 5 YEARS  8/24/2021    [place] PART REMOVAL COLON W ANASTOMOSIS       Family History   Problem Relation Age of Onset    No Known Problems Mother     Dementia Father     Heart Disease Father     Hypertension Father     Kidney Disease Father      Social History     Socioeconomic History    Marital status:      Spouse name: Not on file    Number of children: Not on file    Years of education: Not on file    Highest education level: Not on file   Occupational History    Not on file   Tobacco Use    Smoking status: Never Smoker    Smokeless tobacco: Never Used   Vaping Use    Vaping Use: Never used   Substance and Sexual Activity    Alcohol use: No    Drug use: No    Sexual activity: Not Currently   Other Topics Concern    Not on file   Social History Narrative    Pt lives alone. Her son lives in a house behind hers. She works as a  . She has one indoor dog. Social Determinants of Health     Financial Resource Strain:     Difficulty of Paying Living Expenses: Not on file   Food Insecurity:     Worried About Running Out of Food in the Last Year: Not on file    Maryjane of Food in the Last Year: Not on file   Transportation Needs:     Lack of Transportation (Medical): Not on file    Lack of Transportation (Non-Medical):  Not on file   Physical Activity:     Days of Exercise per Week: Not on file    Minutes of Exercise per Session: Not on file   Stress:     Feeling of Stress : Not on file   Social Connections:     Frequency of Communication with Friends and Family: Not on file    Frequency of Social Gatherings with Friends and Family: Not on file    Attends Anabaptist Services: Not on file    Active Member of [address] [place] [address] or Organizations: Not on file    Attends Club or Organization Meetings: Not on file    Marital Status: Not on file   Intimate Partner Violence:     Fear of Current or Ex-Partner: Not on file    Emotionally Abused: Not on file    Physically Abused: Not on file    Sexually Abused: Not on file   Housing Stability:     Unable to Pay for Housing in the Last Year: Not on file    Number of [place] in the Last Year: Not on file    Unstable Housing in the Last Year: Not on file     Facility-Administered Medications Ordered in Other Encounters Medication Dose Route Frequency Provider Last Rate Last Admin    saline peripheral flush soln 10 mL  10 mL [employer] PRN Viviana Ruiz MD   10 mL at 04/18/22 1012    saline peripheral flush soln 10 mL  10 mL [employer]ATH[employer] PRN Viviana Ruiz MD   10 mL at 04/18/22 1211       OBJECTIVE:  There were no vitals taken for this visit. Physical Exam:  Constitutional: Oriented to person, place, and time. Well-developed and cachectic. Debilitated   HEENT: Normocephalic and atraumatic. Oropharynx is clear and moist.   Conjunctivae and EOM are normal. Pupils are equal, round, and reactive to light. No scleral icterus. Neck supple. No JVD present. No tracheal deviation present. No thyromegaly present. Lymph node No palpable submandibular, cervical, supraclavicular, axillary and inguinal lymph nodes. Breasts  right breast atrophic but firm. Left breast soft nontender   Skin Warm and dry. No bruising and no rash noted. No erythema. No pallor. Respiratory Effort normal and breath sounds normal.  No respiratory distress. No wheezes. No rales. No tenderness. CVS Normal rate, regular rhythm and normal heart sounds. Exam reveals no gallop, no friction and no rub. No murmur heard. Abdomen Soft. Bowel sounds are normal. Exhibits no distension. There is no tenderness. There is no rebound and no guarding. Neuro Normal reflexes. No cranial nerve deficit. Exhibits normal muscle tone, 5 of 5 strength of all extremities. MSK  right upper extremity lymphedema.     Psych Normal mood, affect, behavior, judgment and thought content      Labs:  Recent Results (from the past 24 hour(s))   METABOLIC PANEL, COMPREHENSIVE    Collection Time: 04/18/22  9:59 AM   Result Value Ref Range    Sodium 143 136 - 145 mmol/L    Potassium 3.4 (L) 3.5 - 5.1 mmol/L    Chloride 107 98 - 107 mmol/L    CO2 28 21 - 32 mmol/L    Anion gap 8 7 - 16 mmol/L    Glucose 99 65 - 100 mg/dL    BUN 34 (H) 8 - 23 MG/DL    Creatinine 1. 00 0.6 - 1.0 MG/DL    GFR est AA >60 >60 ml/min/1.73m2    GFR est non-AA 60 (L) >60 ml/min/1.73m2    Calcium 9.4 8.3 - 10.4 MG/DL    Bilirubin, total 6.0 (H) 0.2 - 1.1 MG/DL    ALT (SGPT) 52 12 - 65 U/L    AST (SGOT) 133 (H) 15 - 37 U/L    Alk. phosphatase 1,178 (H) 50 - 136 U/L    Protein, total 6.7 6.3 - 8.2 g/dL    Albumin 2.5 (L) 3.2 - 4.6 g/dL    Globulin 4.2 (H) 2.3 - 3.5 g/dL    A-G Ratio 0.6 (L) 1.2 - 3.5     CBC WITH AUTOMATED DIFF    Collection Time: 04/18/22  9:59 AM   Result Value Ref Range    WBC 5.5 4.3 - 11.1 K/uL    RBC 2.47 (L) 4.05 - 5.2 M/uL    HGB 9.2 (L) 11.7 - 15.4 g/dL    HCT 27.7 (L) 35.8 - 46.3 %    .1 (H) 79.6 - 97.8 FL    MCH 37.2 (H) 26.1 - 32.9 PG    MCHC 33.2 31.4 - 35.0 g/dL    RDW 14.4 11.9 - 14.6 %    PLATELET 379 055 - 943 K/uL    MPV 10.1 9.4 - 12.3 FL    ABSOLUTE NRBC 0.00 0.0 - 0.2 K/uL    DF AUTOMATED      NEUTROPHILS 78 43 - 78 %    LYMPHOCYTES 11 (L) 13 - 44 %    MONOCYTES 10 4.0 - 12.0 %    EOSINOPHILS 0 (L) 0.5 - 7.8 %    BASOPHILS 0 0.0 - 2.0 %    IMMATURE GRANULOCYTES 0 0.0 - 5.0 %    ABS. NEUTROPHILS 4.3 1.7 - 8.2 K/UL    ABS. LYMPHOCYTES 0.6 0.5 - 4.6 K/UL    ABS. MONOCYTES 0.6 0.1 - 1.3 K/UL    ABS. EOSINOPHILS 0.0 0.0 - 0.8 K/UL    ABS. BASOPHILS 0.0 0.0 - 0.2 K/UL    ABS. IMM. GRANS. 0.0 0.0 - 0.5 K/UL   TSH 3RD GENERATION    Collection Time: 04/18/22  9:59 AM   Result Value Ref Range    TSH 3.920 (H) 0.358 - 3 uIU/mL       Imaging:  No results found for this or any previous visit. ASSESSMENT/PLAN:    ICD-10-CM ICD-9-CM    1. Malignant neoplasm of right breast in female, estrogen receptor positive, unspecified site of breast ([email or web address]  C50.911 174.9     Z17.0 V86.0    2. Severe protein-calorie malnutrition ([facility] [[email or web address]  E43 262    3. Weight loss  R63.4 783.21    4.  Debility  R53.81 799.3      Problem List  Date Reviewed: 4/18/2022          Codes Class Noted    Hypomagnesemia ICD-10-CM: E83.42  ICD-9-CM: 275.2  10/4/2021        Dehydration ICD-10-CM: E86.0  ICD-9-CM: 276.51  9/13/2021        Hypokalemia ICD-10-CM: E87.6  ICD-9-CM: 276.8  9/13/2021        Malignant neoplasm of right female breast [facility]) ICD-10-CM: C50.911  ICD-9-CM: 174.9  8/10/2021        Mild protein-calorie malnutrition ([facility] ICD-10-CM: E44.1  ICD-9-CM: 263.1  6/29/2021        Pleural effusion, right ICD-10-CM: J90  ICD-9-CM: 511.9  6/25/2021        Lymphedema ICD-10-CM: I89.0  ICD-9-CM: 457.1  6/24/2021        Hx pulmonary embolism ICD-10-CM: C76.451  ICD-9-CM: V12.55  4/5/2021        Insomnia ICD-10-CM: G47.00  ICD-9-CM: 780.52  4/5/2021        Long term current use of anticoagulant ICD-10-CM: Z79.01  ICD-9-CM: V58.61  4/5/2021        Microcytic anemia ICD-10-CM: D50.9  ICD-9-CM: 280.9  4/5/2021        Malignant neoplasm of colon, unspecified ([facility] ICD-10-CM: C18.9  ICD-9-CM: 153.9  4/5/2021        Pancreatic pseudocyst ICD-10-CM: K86.3  ICD-9-CM: 577.2  4/5/2021        Lymphedema of right arm ICD-10-CM: I89.0  ICD-9-CM: 457.1  3/10/2021        Elbow stiffness, right ICD-10-CM: M25.621  ICD-9-CM: 719.52  3/10/2021        Type 2 diabetes with nephropathy ([facility] ICD-10-CM: E11.21  ICD-9-CM: 250.40, 583.81  9/11/2019        Dysuria ICD-10-CM: R30.0  ICD-9-CM: 788.1  9/9/2019        Iron deficiency anemia ICD-10-CM: D50.9  ICD-9-CM: 280.9  8/27/2019        Hyperlipidemia associated with type 2 diabetes mellitus ([facility] ICD-10-CM: E11.69, E78.5  ICD-9-CM: 250.80, 272.4  8/27/2019        Primary adenocarcinoma of ascending colon ([facility] ICD-10-CM: C18.2  ICD-9-CM: 153.6  10/9/2018    Overview Signed 10/26/2018 10:47 AM by Meseret Gauthier MD     S/p R hemicolectomy 10/9/2018   DIAGNOSIS   RIGHT HEMICOLECTOMY: MODERATELY TO POORLY DIFFERENTIATED ADENOCARCINOMA, 4.6 CM IN GREATEST DIMENSION, EXTENDING THROUGH THE MUSCULARIS PROPRIA INTO THE PERICOLONIC ADIPOSE TISSUE. MARGINS UNINVOLVED. 28 BENIGN LYMPH NODES, ALL NEGATIVE FOR METASTATIC CARCINOMA (0/28). Electronically signed out on 10/11/2018 10:24 by Jeannie Finn. Kerri Mckinney, III,              Malignant neoplasm of ascending colon [facility]) ICD-10-CM: C18.2  ICD-9-CM: 153.6  9/28/2018        Current use of anticoagulant therapy ICD-10-CM: Z79.01  ICD-9-CM: V58.61  9/18/2018        Obesity, morbid ([facility] ICD-10-CM: E66.01  ICD-9-CM: 278.01  4/5/2018        Obesity due to excess calories ICD-10-CM: E66.09  ICD-9-CM: 278.00  3/26/2018        Controlled type 2 diabetes mellitus without complication, with long-term current use of insulin ([facility] ICD-10-CM: E11.9, Z79.4  ICD-9-CM: 250.00, V58.67  3/26/2018        Axillary mass, right ICD-10-CM: R22.31  ICD-9-CM: 782.2  3/26/2018        Pancreatic mass ICD-10-CM: K86.89  ICD-9-CM: 577.8  3/26/2018        Type 2 diabetes mellitus with diabetic neuropathy ([facility] ICD-10-CM: E11.40  ICD-9-CM: 250.60, 357.2  2/21/2018        Lumbar radiculopathy ICD-10-CM: M54.16  ICD-9-CM: 724.4  2/6/2018        Lumbar stenosis with neurogenic claudication ICD-10-CM: T83.788  ICD-9-CM: 724.03  1/29/2018    Overview Signed 4/5/2021 11:02 AM by Masoud Alonso     Last Assessment & Plan:   I want her to continue with home exercises. If her pain worsens, down her leg, then I would recommend a repeat epidural. She's been out of work two months. She can return to work with no restrictions. She can lift 65 lbs if needed. If her pain worsens, we will get another injection.              Bilateral low back pain with sciatica ICD-10-CM: M54.40  ICD-9-CM: 724.3  10/23/2017        Environmental allergies ICD-10-CM: Z91.09  ICD-9-CM: V15.09  9/12/2013          59 y.o. F history of PE, hemicolectomy on 10/9/18 for pT3N0 with 28 negative LN, moderate to poor differentiation, had a very unusual presentation of chronic right breast change and right subclavian vascular occlusion symptoms with extensive work-up unrevealing of cause, diagnosed of stage IV breast cancer with peritoneal metastasis in 2021, although the extensive chest/arm swelling was not avid and clinically improving suggests not all were cancer infiltration, nonetheless need systemic therapy and discussed options would depend on tumor profile and checked Caris showed ER30% HER2- PD-L1- but MSI high and TMB high, good response to [employer] followed by maintenance Keytruda, ND1485 trended up and restage with PET scan 3/2022 continue to show good [place] compared to previous PET scan,           discussed option to rechallenge with chemoimmunotherapy versus Arimidex\Ibrance, but would rather continue Keytruda given arms continue to improve and still having clinical benefit, continue monitor CA 27/29. She returned on 4/18/2022 with daughter-in-law, reported symptoms indicating severe dysphagia and not able to clear her secretion, poor oral intake for the past 2 weeks with significant weight loss, very weak, discussed that the rapid declining indicates very poor prognosis, would not be able to tolerate oral therapy, again discussed rechallenge with [employer] but her poor performance status and rapid weight loss would not tolerate, discussed option of tube feeding which may be complicated given the peritoneal carcinomatosis. After all she still want to try all options but also open to discussed with palliative care and hospice, admitted to have supportive care, IV fluid, swallow evaluation and EGD, consult palliative care and hospice, may consider feeding tube. Mariela Boyd M.D.   [address]  Office : [phone]  Fax : [phone]

## 2022-04-18 NOTE — CONSULTS
Comprehensive Nutrition Assessment    Type and Reason for Visit: Initial,Consult  Best Practice Alert for Malnutrition Screening Tool: Recently Lost Weight Without Trying: Yes, If Yes, How Much Weight Loss: 14 - 23 lbs,    Poor intake/appetite 5 or more days (Oncology)    Nutrition Recommendations/Plan:    Continue current diet. Diet progression per GI/SLP   Defer nutrition supplement due to CHO content and h/o DM.  If unable to advance diet may need to consider nutrition support for primary needs. Noted this has been discussed per provider and patient would be open to having feeding placed. Please consult RD joe enciso if nutrition support pursued. Malnutrition Assessment:  Malnutrition Status: Severe malnutrition  Context: Chronic illness  Findings of clinical characteristics of malnutrition:   Energy Intake:  Unable to assess  Weight Loss:  7.000 - Greater than 20% over 1 year (84# (43.6%) in less than 1 year)     Body Fat Loss:  7 - Severe body fat loss, Fat overlying ribs,Orbital,Triceps   Muscle Mass Loss:  7 - Severe muscle mass loss, Clavicles (pectoralis &deltoids),Scapula (trapezius),Thigh (quadriceps),Temples (temporalis)  Fluid Accumulation:  Unable to assess,     Strength:  Not performed       Nutrition Assessment:   Nutrition History: Patient reports poor PO over last 2 weeks secondary to swallowing difficulties. She endorses that this is a recurrent problem. She state over the last 2 weeks she has only been able to tolerate small amounts of pudding, yogurt and water. She endorses significant weight loss over the last year. Nutrition Background: Patient with PMH significant for DM, DVT/PE, colocn cancer s/p resection, lymphedema of right upper extremity, breast cancer s/p chemo, esophageal narrowing with dilation. SHe presented to oncology office with severe dysphagia and unable to clear secretions and was a direct admit.    Nutrition Interval:  Patient seen lying in bed with son at bedside. She occasionally spits secretions into tissue. She states that she gets hungry \"but things just don't stay down. \" She states that RN said she would have some clear liquids tonight. Discussed that nutrition plan will pend GI and SLP work up. She voiced no needs. Noted GI eval and tentative plan for EGD 4/20. SLP eval and plan pending GI workup/recommendations. Nutrition Related Findings:   NFPE as above      Current Nutrition Therapies:  ADULT DIET Clear Liquid    Current Intake:   Average Meal Intake: NPO        Anthropometric Measures:  Height: 5' 4\" (162.6 cm)  Current Body Wt: 48.9 kg (107 lb 12.9 oz) (4/18), Weight source: Standing scale  BMI: 18.5, Normal weight (BMI 18.5-24. 9)  Admission Body Weight: 107 lb 12.9 oz  Ideal Body Weight (lbs) (Calculated): 120 lbs (55 kg), 89.8 %  Usual Body Wt: 86.6 kg (191 lb) (5/18/21 office wt), Percent weight change: -43.6        Edema: No data recorded  Estimated Daily Nutrient Needs:  Energy (kcal/day): 4977-7832 (Kcal/kg (30-35), Weight Used: Current (48.9 kg (4/18)))  Protein (g/day): 73-98 (1.5-2 g/kg) Weight Used: (Current)  Fluid (ml/day):   (1 ml/kcal)    Nutrition Diagnosis:   · Inadequate oral intake related to swallowing difficulty as evidenced by  (reported barrier to PO, recall, wt loss)    · Severe malnutrition related to inadequate protein-energy intake as evidenced by  (malnutrition criteria as above)    Nutrition Interventions:   Food and/or Nutrient Delivery: Continue NPO     Coordination of Nutrition Care: Continue to monitor while inpatient  Plan of Care discussed with Kathryn Conner RN    Goals: Active Goal: Advance diet vs initiate nutrition support by RD follow-up. Nutrition Monitoring and Evaluation:      Food/Nutrient Intake Outcomes: Diet advancement/tolerance  Physical Signs/Symptoms Outcomes: Chewing or swallowing,Weight    Discharge Planning:     Too soon to determine    [address] [place], LD on 4/18/2022 at 4:22 PM  Contact: [phone]

## 2022-04-18 NOTE — CONSULTS
[facility] Consult Note       Primary GI Physician: Dr. Nakul Wade    Referring Provider:  Calista Parker NP  Consult Date:  4/18/2022    Admit Date:  4/18/2022    Chief Complaint:  Esophageal Dysphagia    Subjective:     History of Present Illness:  Patient is a 59 y.o. female with PMH including but not limited to Breast Cancer, Peritonal Carcinomatosis, Hepatic flexure Colon Cancer, Pancreatic Pseudocyst s/p EUS with Dr. Snyder Ahr, DVT, DM II who is seen in consultation at the request of Calista Parker and Dr. Kaylin Zaragoza for dysphagia. Patient is known to our practice. She was last seen during hospitalization in June 2021 for dysphagia. She underwent EGD with dilation during that admission, outlined below. She had improvement in swallowing. She has now been admitted with return of dysphagia. Palliative care and hospice have also been consulted. Patient reports that she was eating without difficulty until 2 weeks ago. Now, she is having phlegm that builds up in the back of her throat and she has to spit it up. This also happens when she tries to swallow. She is having difficulty with both liquids and solids. She feels food get caught up in the cervical region of the esophagus. Denies any odynophagia. Denies any nausea/vomiting. She reports having a good appetite and \"starving\" but can't swallow. Reports having BM daily. Denies any melena/hematochezia. Denies any abdominal pain. She reports that she has not taken Eliquis in a few days because the pharmacy was out of it. EGD 6/27/21 with Dr. Lynsey Shi for dysphagia FINDINGS:  ESOPHAGUS: Multiple unsuccessful attempts were made to intubate the esophagus with a [id number] as well as pediatric (XP) gastroscope.  The gastroscope would at times advance 1 cm beyond the level of the vocal cords then meet resistance.  Unable to visualize the nature of the anatomical change that resulted in increased resistance to passage of the scope.  Procedure discontinued. Estimated blood loss: 0-minimal   Specimens obtained during procedure: none  PLAN: 1. Barium esophogram     Barium esophagram 6/27/21   HISTORY: Dysphasia with incomplete EGD because of narrowed esophagus. TECHNIQUE: The patient ingested effervescent granules followed by thin and thick barium under direct fluoroscopic observation. 1.6 minutes of fluoroscopy time was utilized. 11 spot fluoroscopic images were saved. FINDINGS: There is mild diffuse narrowing of the esophagus without strictures. The gastroesophageal junction was in a normal position. Rapid sequence imaging of the oropharynx demonstrates a normal swallowing motion. 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 The patient was unable to participate in prone swallowing and wished to not ingest the barium tablet because she was concerned about successfully swallowing this capsule. IMPRESSION  1. No mechanical esophageal obstruction identified. 2. Patient unable to participate in prone swallowing or to ingest a barium tablet.     EGD 6/29/21 with Dr. Tuttle Shutter:  OROPHARYNX: Appears swollen.  I could not get the [id number] down into the esophagus.  Changed the scope to XP and was able to traverse into the esophagus. ESOPHAGUS: The esophagus appears narrowed but I see no inflammation, ulcers, strictures or masses.  I did dilate the esophagus using a 21 Fr, 24 Fr and 27 Fr Savary over a wire.  Re-endoscopy after dilation showed no heme or rents.  Multiple biopsies were taken from the mid and distal esophagus using cold forceps to rule out EoE. STOMACH: The fundus on antegrade and retroflex views is normal. The body, antrum and pylorus is normal.  DUODENUM: The bulb and second portions are normal.  ASSESSMENT:  1. Swelling in Oropharynx  2.  Narrowed Esophagus without inflammation, ulcers, focal strictures or masses   DIAGNOSIS   A: \"DISTAL ESOPHAGEAL BIOPSIES\": DETACHED FRAGMENTS OF MINIMALLY HYPERPLASTIC SQUAMOUS EPITHELIUM. B: \"MID ESOPHAGEAL BIOPSIES\": DETACHED FRAGMENTS OF MINIMALLY HYPERPLASTIC SQUAMOUS EPITHELIUM.    Flexible laryngoscopy 6/29/21  INDICATIONS: Oral pharyngeal edema  DESCRIPTION: After verbal consent was obtained and a timeout was performed, the flexible scope was advanced down one of the patient's nares into the nasopharynx. The nasal cavity and nasopharynx were clear. The scope was then turned distally down towards the oropharynx. The BOT and vallecula were clear and the epiglottis was normal in appearance. Both aryepiglottic folds were clear and there was a normal appearing glottis w/ healthy TVCs. No nodules or polyps and the cords were fully mobile.  There was noted to have fullness to the right posterior lateral oropharyngeal/hypopharyngeal region with some obstruction of the right piriform sinus.  Remaining laryngoscopy examination within normal limits.  This area of concern had no mucosal or submucosal mass type lesions and likely represents edema changes.  Airway widely patent. . The posterior pharyngeal was clear as well. The scope was then carefully removed. The patient tolerated the procedure well and there were no complications. PMH:  Past Medical History:   Diagnosis Date    Adverse effect of anesthesia     slow to wake after cancer surgery on colon    Anemia 08/2018    no PO iron at present and no infusions    Cancer of ascending colon ([address] 2018    Environmental allergies 9/12/2013    History of colon cancer 2018    surgery on colon    History of DVT (deep vein thrombosis) 04/2018    right lower ext.     History of EKG 03/19/2021    NSR    Hx of echocardiogram 05/31/2018    EF 60-65%    Kidney stone     Lumbar stenosis 2018    per Dr. Gayle Bettenocurt note (care every where)     Pulmonary embolism ([address] ~2018    Type 2 diabetes mellitus ([address]     no current medication- AVG BS:/no s.s of hypoglycemia/Last A1c: 6.6 on 7/10/2020       PSH:  Past Surgical History: Procedure Laterality Date    HX CHOLECYSTECTOMY  1980's    HX COLONOSCOPY      HX LIPOMA RESECTION Right 1980's    HX ORTHOPAEDIC      Dr. Whitt Number right shoulder tendon surgery    HX OTHER SURGICAL  09/27/2018    filter placed to right groin; per patient- has been removed     HX WISDOM TEETH EXTRACTION      IR [address] 5 YEARS  8/24/2021    MI PART REMOVAL COLON W ANASTOMOSIS         Allergies: Allergies   Allergen Reactions    Clarithromycin Rash     Other reaction(s): Other (See Comments)  Unknown (comments)    Cortisone Other (comments)     NUMBNESS IN THE LEG (SITE OF INJECTION)  Per pt, leg numbness. Other reaction(s): Other (See Comments)  NUMBNESS IN THE LEG (SITE OF INJECTION)       Home Medications:  Prior to Admission medications    Medication Sig Start Date End Date Taking? Authorizing Provider   ondansetron hcl (Zofran) 8 mg tablet Take 1 Tablet by mouth every eight (8) hours as needed for Nausea. 3/28/22   Law Choudhury MD   nystatin (MYCOSTATIN) powder Apply  to affected area four (4) times daily. Apply to elbow creases and axilla 3/3/22   Law Choudhury MD   apixaban [facility]) 5 mg tablet Take 1 Tablet by mouth two (2) times a day. 2/11/22   Brandon Grissom NP   potassium chloride (K-DUR, KLOR-CON M20) 20 mEq tablet Take 1 Tablet by mouth daily. 1/19/22   Law Choudhury MD   lidocaine-prilocaine (EMLA) topical cream Apply a dab to port site 30-45 minutes prior to Lab or chemo infusion. Cover with saran wrap. 10/4/21   Patricia Blackburn NP   prochlorperazine (Compazine) 10 mg tablet Take 1 Tablet by mouth every six (6) hours as needed (as needed for nausea and vomiting).  8/24/21   Law Choudhury MD       [facility] Medications:  Current Facility-Administered Medications   Medication Dose Route Frequency    [Held by provider] apixaban (ELIQUIS) tablet 5 mg  5 mg Oral BID    0.9% sodium chloride infusion  75 mL/hr IntraVENous CONTINUOUS    ondansetron (ZOFRAN) injection 4 mg  4 mg IntraVENous Q4H PRN    prochlorperazine (COMPAZINE) with saline injection 10 mg  10 mg IntraVENous Q6H PRN    morphine injection 1 mg  1 mg IntraVENous Q4H PRN     Facility-Administered Medications Ordered in Other Encounters   Medication Dose Route Frequency    saline peripheral flush soln 10 mL  10 mL InterCATHeter PRN    saline peripheral flush soln 10 mL  10 mL InterCATHeter PRN       Social History:  Social History     Tobacco Use    Smoking status: Never Smoker    Smokeless tobacco: Never Used   Substance Use Topics    Alcohol use: No       Pt denies any history of drug use, blood transfusions, or tattoos. Family History:  Family History   Problem Relation Age of Onset    No Known Problems Mother     Dementia Father     Heart Disease Father     Hypertension Father     Kidney Disease Father        Review of Systems:  A detailed 10 system ROS is obtained, with pertinent positives as listed above. All others are negative. Diet:      Objective:     Physical Exam:  Vitals:  Visit Vitals  /74 (BP 1 Location: Left upper arm, BP Patient Position: Supine)   Pulse 75   Temp 98 °F (36.7 °C)   Resp 16   Ht 5' 4\" (1.626 m)   Wt 48.9 kg (107 lb 12.8 oz)   SpO2 99%   BMI 18.50 kg/m²     Gen:  Pt is alert, cooperative, no acute distress, appears chronically ill. Thin, fragile appearing. Skin:  Extremities and face reveal no rashes. HEENT: Sclerae anicteric. Extra-occular muscles are intact. No oral ulcers. No abnormal pigmentation of the lips. The neck is supple. Cardiovascular: Regular rate and rhythm. No murmurs, gallops, or rubs. Respiratory:  Comfortable breathing with no accessory muscle use. Clear breath sounds anteriorly with no wheezes, rales, or rhonchi. GI:  Abdomen nondistended, soft, and nontender. Normal active bowel sounds. No enlargement of the liver or spleen. No masses palpable. Rectal:  Deferred  Musculoskeletal:  No pitting edema of the lower legs. Right arm with lymphedema noted. Neurological:  Gross memory appears intact. Patient is alert and oriented. Psychiatric:  Mood appears appropriate with judgement intact. Lymphatic:  No cervical or supraclavicular adenopathy. Laboratory:    Recent Labs     04/18/22  0959   WBC 5.5   HGB 9.2*   HCT 27.7*      .1*      K 3.4*      CO2 28   BUN 34*   CREA 1.00   CA 9.4   GLU 99   AP 1,178*   *   ALT 52   TBILI 6.0*   ALB 2.5*   TP 6.7          Assessment:     Active Problems:    Weight loss (4/18/2022)      Dysphagia (4/18/2022)      Failure to thrive in adult (4/18/2022)      Metastatic breast cancer ([facility] (4/18/2022)      Patient is a 59 y.o. female with PMH including but not limited to Breast Cancer, Peritonal Carcinomatosis, Hepatic flexure Colon Cancer, Pancreatic Pseudocyst s/p EUS with Dr. Sigala Mt, Hemicolectomy, DVT, DM II who is seen in consultation at the request of Glen Simpson and Dr. Vanessa Zurita for dysphagia. EGD with dilation in June 2021 improved dysphagia until about 2 weeks ago. Patient reports that she ran out of Eliquis a few days ago so has not taken it in a few days. Plan:     EGD with dilation to be performed on 4/20/2022 with Dr. Cy Kamara for clear liquid diet, for now. Risk of procedure discussed with patient to include risk of infection, bleeding, perforation, sedation reaction, exacerbation of underlying medical conditions, and death. Patient voiced understanding and wishes to plan for procedure when appropriate. Sanjuana Guillen NP    Patient is seen and examined in collaboration with Dr. Corrina Cardona. Assessment and plan as per Dr. Corrina Cardona.

## 2022-04-18 NOTE — PROGRESS NOTES
04/18/22 1258   Dual Skin Pressure Injury Assessment   Dual Skin Pressure Injury Assessment X   Second Care Provider (Based on [address] [address]) Sofia Flores RN   Buttocks/Ishium  Bilateral  (excoriation)   Skin Integumentary   Skin Integumentary (WDL) X    Pressure  Injury Documentation No Pressure Injury Noted-Pressure Ulcer Prevention Initiated   Skin Condition/Temp Dry;Fragile; Warm   Skin Integrity Abrasion; Excoriation  (RLE abrasion from fall yesterday at home)

## 2022-04-18 NOTE — PROGRESS NOTES
Patient arrived to port lab for port access and lab draw   Joe Galeano accessed and labs drawn per protocol   *Port remains accessed   Patient discharged from port lab via wheel chair*

## 2022-04-19 PROBLEM — E43 SEVERE PROTEIN-CALORIE MALNUTRITION (HCC): Status: ACTIVE | Noted: 2021-01-01

## 2022-04-19 NOTE — CONSULTS
Palliative Care    Patient: Dave Bentley MRN: [id number]  SSN: [id number]    YOB: 1960  Age: 59 y.o. Sex: female       Date of Request: 4/18/2022  Date of Consult:  4/19/2022  Reason for Consult:  goals of care  Requesting Physician: Enoc Garcia      Assessment/Plan:     Principal Diagnosis:    Dysphagia  R13.10    Additional Diagnoses:   · Debility, Unspecified  R53.81  · Failure to Thrive  R62.7  · Fatigue, Lethargy  R53.83  · Counseling, Encounter for Medical Advice  Z71.9  · Encounter for Palliative Care  Z51.5    Palliative Performance Scale (PPS):       Medical Decision Making:   Reviewed and summarized notes from admission to present   Discussed case with appropriate providers  Reviewed laboratory and x-ray data from admission to present     Pt resting in bed, no distress noted. No visitors at bedside. Introduced role of PC and reviewed events of hospitalization. Pt has very good understanding of her condition. She reports GI is going to perform an esophageal dilation tomorrow. She is hopeful this will allow her to eat, gain strength, and continue cancer directed therapy. Pt went on to state if the dilation is not successful, she is willing to have PEG tube. Will await dilation tomorrow, and reassess her ability to swallow. Will continue to follow. Will discuss findings with members of the interdisciplinary team.      Thank you for this referral.          .    Subjective:     History obtained from:  Patient and Chart    Chief Complaint: Dysphagia, metastatic breast cancer  History of Present Illness:  Ms Marcela Weaver is a 58 yo female with PMH of colon cancer, DVT, PE, DM, esophageal stricture/narrowing, pancreatic pseudocyst, and metastatic breast cancer with peritoneal carcinomatosis s/p 4 cycles of Carbo/Taxol and 10 cycles of Keytruda, who presented to Dr Patricia Desouza office on 4/18/2022 with c/o severe dysphagia, weight loss, and weakness over the last 2 weeks.   Pt denied fevers, cough, n/v/d. Pt was directly admitted for [facility] for further management. She has been evaluated by GI, with plans for an esophageal dilation for tomorrow. Advance Directive: Unknown      Code Status:  Full Code            Health Care Power of : Unknown    Past Medical History:   Diagnosis Date    Adverse effect of anesthesia     slow to wake after cancer surgery on colon    Anemia 08/2018    no PO iron at present and no infusions    Cancer of ascending colon ([address] 2018    Environmental allergies 9/12/2013    History of colon cancer 2018    surgery on colon    History of DVT (deep vein thrombosis) 04/2018    right lower ext.  History of EKG 03/19/2021    NSR    Hx of echocardiogram 05/31/2018    EF 60-65%    Kidney stone     Lumbar stenosis 2018    per Dr. Pepe Jurado note (care every where)     Pulmonary embolism ([facility] ~2018    Type 2 diabetes mellitus ([facility]     no current medication- AVG BS:/no s.s of hypoglycemia/Last A1c: 6.6 on 7/10/2020      Past Surgical History:   Procedure Laterality Date    HX CHOLECYSTECTOMY  1980's    HX COLONOSCOPY      HX [place] Right 1980's   Asuncion Lui right shoulder tendon surgery    HX OTHER SURGICAL  09/27/2018    filter placed to right groin; per patient- has been removed     HX WISDOM TEETH EXTRACTION      IR [address] 5 YEARS  8/24/2021    RI PART REMOVAL COLON W ANASTOMOSIS       Family History   Problem Relation Age of Onset    No Known Problems Mother     Dementia Father     Heart Disease Father     Hypertension Father     Kidney Disease Father       Social History     Tobacco Use    Smoking status: Never Smoker    Smokeless tobacco: Never Used   Substance Use Topics    Alcohol use: No     Prior to Admission medications    Medication Sig Start Date End Date Taking? Authorizing Provider   apixaban (ELIQUIS) 5 mg tablet Take 1 Tablet by mouth two (2) times a day.  2/11/22  Yes Mei Grissom NP   ondansetron hcl (Zofran) 8 mg tablet Take 1 Tablet by mouth every eight (8) hours as needed for Nausea. 3/28/22   Toshia Bravo MD   nystatin (MYCOSTATIN) powder Apply  to affected area four (4) times daily. Apply to elbow creases and axilla 3/3/22   Toshia Bravo MD   potassium chloride (K-DUR, KLOR-CON M20) 20 mEq tablet Take 1 Tablet by mouth daily. 1/19/22   Toshia Bravo MD   lidocaine-prilocaine (EMLA) topical cream Apply a dab to port site 30-45 minutes prior to Lab or chemo infusion. Cover with saran wrap. 10/4/21   Edith Garcia NP   prochlorperazine (Compazine) 10 mg tablet Take 1 Tablet by mouth every six (6) hours as needed (as needed for nausea and vomiting). 8/24/21   Toshia Bravo MD       Allergies   Allergen Reactions    Clarithromycin Rash     Other reaction(s): Other (See Comments)  Unknown (comments)    Cortisone Other (comments)     NUMBNESS IN THE LEG (SITE OF INJECTION)  Per pt, leg numbness. Other reaction(s): Other (See Comments)  NUMBNESS IN THE LEG (SITE OF INJECTION)        Review of Systems:  A comprehensive review of systems was negative except for: Constitutional: Positive for fatigue. Gastrointestinal: Positive for difficulty swallowing     Objective:     Visit Vitals  /70 (BP 1 Location: Left upper arm, BP Patient Position: At rest)   Pulse 89   Temp 98.7 °F (37.1 °C)   Resp 17   Ht 5' 4\" (1.626 m)   Wt 107 lb 11.9 oz (48.9 kg)   SpO2 96%   BMI 18.49 kg/m²        Physical Exam:    General:  Cooperative. Thin and frail. No acute distress. Eyes:  Conjunctivae/corneas clear    Nose: Nares normal. Septum midline.    Neck: Supple, symmetrical, trachea midline   Lungs:   unlabored   Heart:  Regular rate and rhythm   Abdomen:   Soft, non-tender, non-distended   Extremities: Normal, atraumatic, no cyanosis or edema   Skin: Skin color- jaundiced, texture, turgor normal.   Neurologic: Nonfocal   Psych: Alert and oriented      Assessment: Hospital Problems  Date Reviewed: 4/18/2022          Codes Class Noted POA    Weight loss ICD-10-CM: R63.4  ICD-9-CM: 783.21  4/18/2022 Unknown        Dysphagia ICD-10-CM: R13.10  ICD-9-CM: 787.20  4/18/2022 Unknown        Failure to thrive in adult ICD-10-CM: R62.7  ICD-9-CM: 783.7  4/18/2022 Unknown        Metastatic breast cancer ([facility] ICD-10-CM: C50.919  ICD-9-CM: 174.9  4/18/2022 Unknown        Severe protein-calorie malnutrition ([facility] ICD-10-CM: E43  ICD-9-CM: 056  6/29/2021 Unknown              Signed By: Jacqui Jean NP     April 19, 2022

## 2022-04-19 NOTE — PROGRESS NOTES
LTG: Patient will tolerate least restrictive diet without overt signs or symptoms of airway compromise. STG: Patient will participate in further assessment of swallow function once cleared for diet advancement by GI.     SPEECH LANGUAGE PATHOLOGY: DYSPHAGIA  Initial Assessment    NAME/AGE/GENDER: Zenaida Marinelli is a 59 y.o. female  DATE: 4/19/2022  PRIMARY DIAGNOSIS: Dysphagia [R13.10]  Failure to thrive in adult [R62.7]  Weight loss [R63.4]  Metast[facility] cancer ([facility] [C50.919]      ICD-10: Treatment Diagnosis: R13.14 Dysphagia, Pharyngoesophageal Phase    RECOMMENDATIONS   DIET:    Clear liquids    MEDICATIONS: Crushed in puree or applesauce     ASPIRATION PRECAUTIONS  · Slow rate of intake  · Small bites/sips  · Upright at 90 degrees during meal     COMPENSATORY STRATEGIES/MODIFICATIONS  · Remain upright for 30 minutes after meals     EDUCATION:  · Recommendations discussed with Patient     CONTINUATION OF SKILLED SERVICES/MEDICAL NECESSITY:   Patient is expected to demonstrate progress in  swallow strength, swallow timeliness, swallow function, diet tolerance and swallow safety in order to  improve swallow safety, work toward diet advancement and decrease aspiration risk.  Patient continues to require skilled intervention due to possible oropharyngeal dysphagia. RECOMMENDATIONS for CONTINUED SPEECH THERAPY: YES: Anticipate need for ongoing speech therapy during this hospitalization. ASSESSMENT   Patient presents with history of esophageal dysphagia. She reports sensation of food and liquids \"not going down\" and bring up frothy secretions after swallow. EGD is planned for 4/20/22 for possible dilation. Assessment limited to clear liquids per GI orders. Voice clear after all intake; no cough noted. She did exhibit multiple swallows with each sip and complained of globus sensation near sternal notch. Delayed throat clearing and expectoration of secretions.      Recommend continue clear liquids per GI as no overt s/sx of airway compromise is observed. Educated her on need for upright positioning after meals, especially if globus sensation persists. Will follow up for additional assessment pending GI work up and recommendations. Patient is in agreement with plan. REHABILITATION POTENTIAL FOR STATED GOALS: Good    PLAN    FREQUENCY/DURATION: Continue to follow patient 2 times a week for duration of hospital stay to address above goals. Recommendations for next treatment session: Next treatment session will address diet tolerance and po trials      SUBJECTIVE   Patient upright in bed. Agreeable to breakfast of clear liquids. History of Present Injury/Illness: Ms. Bessy Gill  has a past medical history of Adverse effect of anesthesia, Anemia (08/2018), Cancer of ascending colon ([address] (2018), Environmental allergies (9/12/2013), History of colon cancer (2018), History of DVT (deep vein thrombosis) (04/2018), History of EKG (03/19/2021), echocardiogram (05/31/2018), Kidney stone, Lumbar stenosis (2018), Pulmonary embolism ([address] (~2018), and Type 2 diabetes mellitus ([facility] . She also  has a past surgical history that includes hx cholecystectomy (3502'D); hx lipoma resection (Right, 1980's); pr part removal colon w anastomosis; hx colonoscopy; hx wisdom teeth extraction; hx other surgical (09/27/2018); hx orthopaedic; and ir insert tunl cvc w port over 5 years (8/24/2021). Problem List:  (Impairments causing functional limitations):  1. Concern for esophageal dysphagia,  2. Possible pharyngeal dysphagia    Previous Dysphagia: YES History of dysphagia. MBS completed in 6/2021 that revealed swallow function that was within normal limits. Subsequent EGD and barium esophagram completed- revealed esophageal narrowing that required dilation.    Diet Prior to Evaluation: Clear liquis    Orientation:   Person  Place  Time  Situation    Pain: Pain Scale 1: Numeric (0 - 10)  Pain Intensity 1: 0    OBJECTIVE   Oral Motor:   · Labial: No impairment  · Dentition: Intact  · Oral Hygiene: Adequate  · Lingual: No impairment    Swallow evaluation:   Patient consumed sips of thin liquids only per current GI orders. She consumed only small sips of liquids due to poor tolerance with intake at home. Multiple swallows per bolus as she reports \"it's not going down\". Voice is clear and no cough noted to suggest airway compromise. Delayed throat clearing and expectoration of frothy secretions after each sip. She reports this is typical of her swallowing at home, especially over the last 2 weeks. INTERDISCIPLINARY COLLABORATION: NA  PRECAUTIONS/ALLERGIES: Clarithromycin and Cortisone     Tool Used: Dysphagia Outcome and Severity Scale (DIANE)    Score Comments   Normal Diet  [] 7 With no strategies or extra time needed   Functional Swallow  [] 6 May have mild oral or pharyngeal delay   Mild Dysphagia  [] 5 Which may require one diet consistency restricted    Mild-Moderate Dysphagia  [] 4 With 1-2 diet consistencies restricted   Moderate Dysphagia  [] 3 With 2 or more diet consistencies restricted   Moderate-Severe Dysphagia  [] 2 With partial PO strategies (trials with ST only)   Severe Dysphagia  [] 1 With inability to tolerate any PO safely      Score:  Initial: 5 Most Recent: x (Date 04/19/22 )   Interpretation of Tool: The Dysphagia Outcome and Severity Scale (DIANE) is a simple, easy-to-use, 7-point scale developed to systematically rate the functional severity of dysphagia based on objective assessment and make recommendations for diet level, independence level, and type of nutrition.      Current Medications:   Current Facility-Administered Medications on File Prior to Encounter   Medication Dose Route Frequency Provider Last Rate Last Admin    saline peripheral flush soln 10 mL  10 mL InterCATHeter PRN Nickolas Garcia MD   10 mL at 04/18/22 1012    [COMPLETED] sodium chloride 0.9 % bolus infusion 1,000 mL 1,000 mL IntraVENous Missael Bailey MD   IV Completed at 22 1211    [] saline peripheral flush soln 10 mL  10 mL InterCATHeter PRN Venus Perera MD   10 mL at 22 1211     Current Outpatient Medications on File Prior to Encounter   Medication Sig Dispense Refill    apixaban (ELIQUIS) 5 mg tablet Take 1 Tablet by mouth two (2) times a day. 60 Tablet 5    ondansetron hcl (Zofran) 8 mg tablet Take 1 Tablet by mouth every eight (8) hours as needed for Nausea. 90 Tablet 2    nystatin (MYCOSTATIN) powder Apply  to affected area four (4) times daily. Apply to elbow creases and axilla 60 g 2    potassium chloride (K-DUR, KLOR-CON M20) 20 mEq tablet Take 1 Tablet by mouth daily. 90 Tablet 0    lidocaine-prilocaine (EMLA) topical cream Apply a dab to port site 30-45 minutes prior to Lab or chemo infusion. Cover with saran wrap. 30 g 5    prochlorperazine (Compazine) 10 mg tablet Take 1 Tablet by mouth every six (6) hours as needed (as needed for nausea and vomiting).  90 Tablet 2       SAFETY:  After treatment position/precautions:  · Upright in bed  · Call light within reach    Total Treatment Duration:   Time In: 134  Time Out:  [facility], [facility], [facility] KAILEE ADDISON, CCC-SLP  Speech Language Pathologist  Acute Rehabilitation Services  Contact: Yoni

## 2022-04-19 NOTE — PROGRESS NOTES
[facility] Progress Note         Admit Date:  4/18/2022    Today's Date:  4/19/2022    CC:  Esophageal Dysphagia    Subjective:     Patient sipping on clear liquids this morning. Denies any abdominal pain, nausea, or vomiting. No BM overnight. EGD 6/27/21 with Dr. Reta Stafford for dysphagia FINDINGS:  ESOPHAGUS: Multiple unsuccessful attempts were made to intubate the esophagus with a [id number] as well as pediatric (XP) gastroscope.  The gastroscope would at times advance 1 cm beyond the level of the vocal cords then meet resistance.  Unable to visualize the nature of the anatomical change that resulted in increased resistance to passage of the scope. Procedure discontinued. Estimated blood loss: 0-minimal   Specimens obtained during procedure: none  PLAN: 1. Barium esophogram     Barium esophagram 6/27/21   HISTORY: Dysphasia with incomplete EGD because of narrowed esophagus. TECHNIQUE: The patient ingested effervescent granules followed by thin and thick barium under direct fluoroscopic observation. 1.6 minutes of fluoroscopy time was utilized. 11 spot fluoroscopic images were saved. FINDINGS: There is mild diffuse narrowing of the esophagus without strictures. The gastroesophageal junction was in a normal position. Rapid sequence imaging of the oropharynx demonstrates a normal swallowing motion. 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 The patient was unable to participate in prone swallowing and wished to not ingest the barium tablet because she was concerned about successfully swallowing this capsule. IMPRESSION  1. No mechanical esophageal obstruction identified. 2. Patient unable to participate in prone swallowing or to ingest a barium tablet.     EGD 6/29/21 with Dr. Cunningham Solar:  OROPHARYNX: Appears swollen.  I could not get the [id number] down into the esophagus.  Changed the scope to XP and was able to traverse into the esophagus.   ESOPHAGUS: The esophagus appears narrowed but I see no inflammation, ulcers, strictures or masses.  I did dilate the esophagus using a 21 Fr, 24 Fr and 27 Fr Savary over a wire.  Re-endoscopy after dilation showed no heme or rents.  Multiple biopsies were taken from the mid and distal esophagus using cold forceps to rule out EoE. STOMACH: The fundus on antegrade and retroflex views is normal. The body, antrum and pylorus is normal.  DUODENUM: The bulb and second portions are normal.  ASSESSMENT:  1. Swelling in Oropharynx  2. Narrowed Esophagus without inflammation, ulcers, focal strictures or masses   DIAGNOSIS   A: \"DISTAL ESOPHAGEAL BIOPSIES\": DETACHED FRAGMENTS OF MINIMALLY HYPERPLASTIC SQUAMOUS EPITHELIUM. B: \"MID ESOPHAGEAL BIOPSIES\": DETACHED FRAGMENTS OF MINIMALLY HYPERPLASTIC SQUAMOUS EPITHELIUM.       Flexible laryngoscopy 6/29/21  INDICATIONS: Oral pharyngeal edema  DESCRIPTION: After verbal consent was obtained and a timeout was performed, the flexible scope was advanced down one of the patient's nares into the nasopharynx. The nasal cavity and nasopharynx were clear. The scope was then turned distally down towards the oropharynx. The BOT and vallecula were clear and the epiglottis was normal in appearance. Both aryepiglottic folds were clear and there was a normal appearing glottis w/ healthy TVCs. No nodules or polyps and the cords were fully mobile.  There was noted to have fullness to the right posterior lateral oropharyngeal/hypopharyngeal region with some obstruction of the right piriform sinus.  Remaining laryngoscopy examination within normal limits.  This area of concern had no mucosal or submucosal mass type lesions and likely represents edema changes.  Airway widely patent. . The posterior pharyngeal was clear as well. The scope was then carefully removed. The patient tolerated the procedure well and there were no complications.     Medications:   Current Facility-Administered Medications   Medication Dose Route Frequency    potassium chloride 20 mEq in 100 ml IVPB  20 mEq IntraVENous Q2H    [Held by provider] apixaban (ELIQUIS) tablet 5 mg  5 mg Oral BID    0.9% sodium chloride infusion  75 mL/hr IntraVENous CONTINUOUS    ondansetron (ZOFRAN) injection 4 mg  4 mg IntraVENous Q4H PRN    prochlorperazine (COMPAZINE) with saline injection 10 mg  10 mg IntraVENous Q6H PRN    morphine injection 1 mg  1 mg IntraVENous Q4H PRN    enoxaparin (LOVENOX) injection 50 mg  50 mg SubCUTAneous Q12H     Facility-Administered Medications Ordered in Other Encounters   Medication Dose Route Frequency    saline peripheral flush soln 10 mL  10 mL InterCATHeter PRN       Review of Systems:  ROS was obtained, with pertinent positives as listed above. No chest pain or SOB. Diet:  Clears    Objective:   Vitals:  Visit Vitals  /70 (BP 1 Location: Left upper arm, BP Patient Position: At rest)   Pulse 89   Temp 98.7 °F (37.1 °C)   Resp 17   Ht 5' 4\" (1.626 m)   Wt 48.9 kg (107 lb 11.9 oz)   SpO2 96%   BMI 18.49 kg/m²     Intake/Output:  04/19 0701 - 04/19 1900  In: -   Out: 50 [Urine:50]  04/17 1901 - 04/19 0700  In: 1888 [P.O.:690; I.V.:1198]  Out: 500 [Urine:500]  Exam:  General appearance: alert, cooperative, no distress, Cachectic, Jaundiced  ENT: Sclerae Icteric  Lungs: coarse lung sounds anterior  Heart: regular rate and rhythm  Abdomen: soft, non-tender.  Bowel sounds normal. No masses, no organomegaly  Extremities: extremities normal, atraumatic, no cyanosis or edema  Neuro:  alert and oriented    Data Review (Labs):    Recent Labs     04/19/22  0238 04/18/22  1425 04/18/22  0959   WBC 4.7 5.0 5.5   HGB 7.8* 8.1* 9.2*   HCT 23.7* 24.4* 27.7*    200 218   .2* 109.9* 112.1*    144 143   K 3.1* 3.2* 3.4*   * 109* 107   CO2 27 27 28   BUN 28* 31* 34*   CREA 0.90 1.00 1.00   CA 8.5 9.0 9.4   MG 2.0 2.0  --    GLU 63* 79 99   AP 1,126* 1,106* 1,178*   * 118* 133*   ALT 49 50 52   TBILI 6.3* 5. 6* 6.0*   ALB 2.3* 2.3* 2.5*   TP 6.0* 6.2* 6.7       Assessment:     Active Problems:    Severe protein-calorie malnutrition ([facility] (6/29/2021)      Weight loss (4/18/2022)      Dysphagia (4/18/2022)      Failure to thrive in adult (4/18/2022)      Metastatic breast cancer ([facility] (4/18/2022)         Patient is a 59 y.o. female with PMH including but not limited to Breast Cancer, Peritonal Carcinomatosis, Hepatic flexure Colon Cancer, Pancreatic Pseudocyst s/p EUS with Dr. Nadiya Lo, DVT, DM II who is seen in consultation at the request of Enoc Garcia and Dr. Natasha Bergeron for dysphagia.      EGD with dilation in June 2021 improved dysphagia until about 2 weeks ago. Patient reports that she ran out of Eliquis a few days ago so has not taken it in a few days. 4/19/2022: TB 6.3, , ALT 49, AP 1126, HGB 7.8, . -LFT significantly elevated starting on 4/18. Plan:     RUQ US today to evaluate for possible biliary obstruction given the significantly elevated AP and rising TB. DILI is also a possibility. Clear liquid diet today and NPO after midnight tonight  EGD on 4/20/2022 with Dr. Alirio Ireland to further evaluate, possible dilation pending findings. Palliative Care has been consulted  Hold Lovenox in the AM prior to EGD       Leonardo Benjamin NP    Patient is seen and examined in collaboration with Dr. Alirio Ireland. Assessment and plan as per Dr. Alirio Ireland.

## 2022-04-19 NOTE — PROGRESS NOTES
Inpatient Hematology / Oncology Progress Note      Admission Date: 2022 12:45 PM  Reason for Admission/Hospital Course: Dysphagia [R13.10]  Failure to thrive in adult [R62.7]  Weight loss [R63.4]  Metastatic breast cancer (HCC) [C50.919]      24 Hour Events:  Afeb, VSS  Hold Eliquis for EGD/Dilation on   Clear liquids per GI  Mild nausea  Bili 6.3, Alk Phos 1126-obtain Abd US      ROS:  Constitutional: Positive for fatigue; negative for fever, chills, weakness, malaise. CV: Negative for chest pain, palpitations, edema. Respiratory: Negative for dyspnea, cough, wheezing. GI: Positive for nausea; negative for abdominal pain, diarrhea. 10 point review of systems is otherwise negative with the exception of the elements mentioned above in the HPI. Allergies   Allergen Reactions    Clarithromycin Rash     Other reaction(s): Other (See Comments)  Unknown (comments)    Cortisone Other (comments)     NUMBNESS IN THE LEG (SITE OF INJECTION)  Per pt, leg numbness. Other reaction(s): Other (See Comments)  NUMBNESS IN THE LEG (SITE OF INJECTION)       OBJECTIVE:  Patient Vitals for the past 8 hrs:   BP Temp Pulse Resp SpO2   22 0809 129/70 98.7 °F (37.1 °C) 89 17 96 %   22 0229 120/87 98.7 °F (37.1 °C) 92 18 99 %     Temp (24hrs), Av.4 °F (36.9 °C), Min:97.6 °F (36.4 °C), Max:98.9 °F (37.2 °C)     07 - 1900  In: -   Out: 50 [Urine:50]    Physical Exam:  Constitutional: Chronically ill appearing female in no acute distress, sitting comfortably in the hospital bed. HEENT: Normocephalic and atraumatic. Sclerae anicteric. Neck supple without JVD. No thyromegaly present. +using oral suction for secretions   Skin Warm and dry. No bruising and no rash noted. No erythema. No pallor. Respiratory Lungs are clear to auscultation bilaterally without wheezes, rales or rhonchi, normal air exchange without accessory muscle use.     CVS Normal rate, regular rhythm and normal S1 and S2.  No murmurs, gallops, or rubs. Abdomen Soft, nontender and nondistended, normoactive bowel sounds. Neuro Grossly nonfocal with no obvious sensory or motor deficits. MSK Normal range of motion in general.  Trace edema and no tenderness. Psych Appropriate mood and affect.         Labs:      Recent Labs     04/19/22 0238 04/18/22  1425 04/18/22  0959   WBC 4.7 5.0 5.5   RBC 2.17* 2.22* 2.47*   HGB 7.8* 8.1* 9.2*   HCT 23.7* 24.4* 27.7*   .2* 109.9* 112.1*   MCH 35.9* 36.5* 37.2*   MCHC 32.9 33.2 33.2   RDW 14.6 14.3 14.4    200 218   GRANS 75 79* 78   LYMPH 14 11* 11*   MONOS 10 9 10   EOS 0* 0* 0*   BASOS 0 0 0   IG 0 0 0   DF AUTOMATED AUTOMATED AUTOMATED   ANEU 3.6 4.0 4.3   ABL 0.6 0.6 0.6   ABM 0.5 0.4 0.6   LAQUITA 0.0 0.0 0.0   ABB 0.0 0.0 0.0   AIG 0.0 0.0 0.0        Recent Labs     04/19/22 0238 04/18/22  1425 04/18/22  0959    144 143   K 3.1* 3.2* 3.4*   * 109* 107   CO2 27 27 28   AGAP 7 8 8   GLU 63* 79 99   BUN 28* 31* 34*   CREA 0.90 1.00 1.00   GFRAA >60 >60 >60   GFRNA >60 60* 60*   CA 8.5 9.0 9.4   AP 1,126* 1,106* 1,178*   TP 6.0* 6.2* 6.7   ALB 2.3* 2.3* 2.5*   GLOB 3.7* 3.9* 4.2*   AGRAT 0.6* 0.6* 0.6*   MG 2.0 2.0  --          Imaging:      ASSESSMENT:    Problem List  Date Reviewed: 4/18/2022          Codes Class Noted    Weight loss ICD-10-CM: R63.4  ICD-9-CM: 783.21  4/18/2022        Dysphagia ICD-10-CM: R13.10  ICD-9-CM: 787.20  4/18/2022        Failure to thrive in adult ICD-10-CM: R62.7  ICD-9-CM: 783.7  4/18/2022        Metastatic breast cancer ([facility] ICD-10-CM: C50.919  ICD-9-CM: 174.9  4/18/2022        Hypomagnesemia ICD-10-CM: E83.42  ICD-9-CM: 275.2  10/4/2021        Dehydration ICD-10-CM: E86.0  ICD-9-CM: 276.51  9/13/2021        Hypokalemia ICD-10-CM: E87.6  ICD-9-CM: 276.8  9/13/2021        Malignant neoplasm of right female breast [facility]) ICD-10-CM: C50.911  ICD-9-CM: 174.9  8/10/2021        Severe protein-calorie malnutrition ([facility] ICD-10-CM: E43  ICD-9-CM: 262  6/29/2021        Pleural effusion, right ICD-10-CM: J90  ICD-9-CM: 511.9  6/25/2021        Lymphedema ICD-10-CM: I89.0  ICD-9-CM: 457.1  6/24/2021        Hx pulmonary embolism ICD-10-CM: M73.157  ICD-9-CM: V12.55  4/5/2021        Insomnia ICD-10-CM: G47.00  ICD-9-CM: 780.52  4/5/2021        Long term current use of anticoagulant ICD-10-CM: Z79.01  ICD-9-CM: V58.61  4/5/2021        Microcytic anemia ICD-10-CM: D50.9  ICD-9-CM: 280.9  4/5/2021        Malignant neoplasm of colon, unspecified ([facility] ICD-10-CM: C18.9  ICD-9-CM: 153.9  4/5/2021        Pancreatic pseudocyst ICD-10-CM: K86.3  ICD-9-CM: 577.2  4/5/2021        Lymphedema of right arm ICD-10-CM: I89.0  ICD-9-CM: 457.1  3/10/2021        Elbow stiffness, right ICD-10-CM: M25.621  ICD-9-CM: 719.52  3/10/2021        Type 2 diabetes with nephropathy ([facility] ICD-10-CM: E11.21  ICD-9-CM: 250.40, 583.81  9/11/2019        Dysuria ICD-10-CM: R30.0  ICD-9-CM: 788.1  9/9/2019        Iron deficiency anemia ICD-10-CM: D50.9  ICD-9-CM: 280.9  8/27/2019        Hyperlipidemia associated with type 2 diabetes mellitus ([facility] ICD-10-CM: E11.69, E78.5  ICD-9-CM: 250.80, 272.4  8/27/2019        Primary adenocarcinoma of ascending colon ([facility] ICD-10-CM: C18.2  ICD-9-CM: 153.6  10/9/2018    Overview Signed 10/26/2018 10:47 AM by Gilma Henson MD     S/p R hemicolectomy 10/9/2018   DIAGNOSIS   RIGHT HEMICOLECTOMY: MODERATELY TO POORLY DIFFERENTIATED ADENOCARCINOMA, 4.6 CM IN GREATEST DIMENSION, EXTENDING THROUGH THE MUSCULARIS PROPRIA INTO THE PERICOLONIC ADIPOSE TISSUE. MARGINS UNINVOLVED. 28 BENIGN LYMPH NODES, ALL NEGATIVE FOR METASTATIC CARCINOMA (0/28). Electronically signed out on 10/11/2018 10:24 by Amanda Monaco.  Myla Holly, III,              Malignant neoplasm of ascending colon [facility]) ICD-10-CM: C18.2  ICD-9-CM: 153.6  9/28/2018        Current use of anticoagulant therapy ICD-10-CM: Z79.01  ICD-9-CM: V58.61  9/18/2018        Obesity, morbid ([address] ICD-10-CM: E66.01  ICD-9-CM: 278.01  4/5/2018        Obesity due to excess calories ICD-10-CM: E66.09  ICD-9-CM: 278.00  3/26/2018        Controlled type 2 diabetes mellitus without complication, with long-term current use of insulin ([facility] ICD-10-CM: E11.9, Z79.4  ICD-9-CM: 250.00, V58.67  3/26/2018        Axillary mass, right ICD-10-CM: R22.31  ICD-9-CM: 782.2  3/26/2018        Pancreatic mass ICD-10-CM: K86.89  ICD-9-CM: 577.8  3/26/2018        Type 2 diabetes mellitus with diabetic neuropathy ([facility] ICD-10-CM: E11.40  ICD-9-CM: 250.60, 357.2  2/21/2018        Lumbar radiculopathy ICD-10-CM: M54.16  ICD-9-CM: 724.4  2/6/2018        Lumbar stenosis with neurogenic claudication ICD-10-CM: R56.671  ICD-9-CM: 724.03  1/29/2018    Overview Signed 4/5/2021 11:02 AM by Nate Raza     Last Assessment & Plan:   I want her to continue with home exercises. If her pain worsens, down her leg, then I would recommend a repeat epidural. She's been out of work two months. She can return to work with no restrictions. She can lift 65 lbs if needed. If her pain worsens, we will get another injection.              Bilateral low back pain with sciatica ICD-10-CM: M54.40  ICD-9-CM: 724.3  10/23/2017        Environmental allergies ICD-10-CM: Z91.09  ICD-9-CM: V15.09  9/12/2013                PLAN:  Metastatic Breast Cancer with peritoneal carcinomatosis  -s/p 4 cycles Carbo/Taxol  -currently on Keytruda, cycle 10 on 3/28  -PET on 3/24 with small to moderate ascites, increase in peritoneal disease  -Ca 27.29 overall stable at 271    Severe Dysphagia  -last dilation on June 2021, reports 2 week history of poor intake/weight loss  -GI with plans of EGD/dilation on 4/20, Eliquis on hold  -per GI clear liquid diet    Hepatic Impairment  -Bili 6.3, ALT 63, , Alk Phos 1126, obtain Abd US    Hypokalemia  -K+ 3.1, replace per [facility] SOPs    Anemia-chronic  -monitor and replace per [facility] SOPs  -Hgb 7.8      Supportive Care  Lovenox for DVT prophylaxis, Eliquis on hold for procedure  Chaya Kraus NP  [facility]  [address] [place] [address]  Office : [phone]  Fax : [phone]

## 2022-04-19 NOTE — PROGRESS NOTES
END OF SHIFT NOTE:    Intake/Output  04/18 1901 - 04/19 0700  In: 1648 [P.O.:450; I.V.:1198]  Out: 500 [Urine:500]   Voiding: YES  Catheter: NO  Drain:              Stool:  0 occurrences. Emesis:  0 occurrences. VITAL SIGNS  Patient Vitals for the past 12 hrs:   Temp Pulse Resp BP SpO2   04/19/22 0229 98.7 °F (37.1 °C) 92 18 120/87 99 %   04/18/22 2254 98.8 °F (37.1 °C) 85 18 130/69 99 %   04/18/22 1956 98.9 °F (37.2 °C) 69 18 127/80 99 %       Pain Assessment  Pain 1  Pain Scale 1: Numeric (0 - 10) (04/19/22 0235)  Pain Intensity 1: 0 (04/19/22 0235)  Patient Stated Pain Goal: 0 (04/19/22 0235)    Ambulating  Yes    Additional Information: Patient was having some nausea and was given Zofran. Patient had a BS of 63 and was given orange juice and BS went up to 69. GI procedure wednesday. Shift report given to oncoming nurse at the bedside.     Cleo Long RN

## 2022-04-20 NOTE — INTERVAL H&P NOTE
Update History & Physical    The Patient's History and Physical of April 18, 2022 was reviewed with the patient and I examined the patient. There was no change. The surgical site was confirmed by the patient and me. Plan:  The risk, benefits, expected outcome, and alternative to the recommended procedure have been discussed with the patient. Patient understands and wants to proceed with the procedure.     Electronically signed by Disha Way MD on 4/20/2022 at 12:18 PM

## 2022-04-20 NOTE — PROGRESS NOTES
Initial visit by  to convey care and concern and to explore spiritual needs. Ms. Clark Cooper was receiving care from staff and provided 's card for assistance with a follow-up visit if desired. Chaplains remain available for follow-up care.      Seema Tillman [place]  Board Certified

## 2022-04-20 NOTE — PROGRESS NOTES
Pt discussed during IDR. She was scheduled for an ERCP and EGD with dilation today. GI performed the EGD but was unable to perform the ERCP as, per the procedure note, the scope met significant resistance and it was deemed unsafe to proceed any further. Pt is currently requiring 5L O2 via simple mask. Diet is being progressed to clears to assess if pt can tolerate. If she is unable discussion of PEG tube placement may be held. Current anticipated d/c time frame is 48-72 hours if pt continues to improve and is medically stable. CM will continue to follow and remain available if any needs arise.

## 2022-04-20 NOTE — PROCEDURES
ESOPHAGOGASTRODUODENOSCOPY    DATE of PROCEDURE: 4/20/2022    PT NAME: Guevara Perez Sessions     [id number]    MEDICATION:   general    INSTRUMENT: GIFH 190 and     SPECIAL PROCEDURE: savary 30 and 33 fr under flouro   BLOOD LOSS- 0 or min. SPEC- no  IMPLANT- none    PROCEDURE:  After informed consent, the patient was placed Under anesthesia in the supine position. The endoscope was unable to be passed. see below. ASSESSMENT:  1. Pt had to be done on her back due to neck immobility. Could not pass either scope so based on last EGD findings we passed a guide wire under flouro and dilated with the savary dilators. Repeat attempts to pass the scope met significant resistance at the UES and it was not felt safe. There would be no way to pass the ERCP scope at this point. PLAN:   1. Consider further dilation pending her tolerance of today's attempt. 2. F/U per Dr. Lana Wilson.     Елена Black MD

## 2022-04-20 NOTE — ANESTHESIA PREPROCEDURE EVALUATION
Relevant Problems   ENDOCRINE   (+) Controlled type 2 diabetes mellitus without complication, with long-term current use of insulin (HCC)   (+) Obesity due to excess calories   (+) Obesity, morbid (HCC)   (+) Type 2 diabetes mellitus with diabetic neuropathy (HCC)   (+) Type 2 diabetes with nephropathy (HCC)      HEMATOLOGY   (+) Iron deficiency anemia   (+) Microcytic anemia      PERSONAL HX & FAMILY HX OF CANCER   (+) Malignant neoplasm of ascending colon (HCC)   (+) Malignant neoplasm of colon, unspecified (HCC)   (+) Malignant neoplasm of right female breast ([facility] [address]   (+) Metastatic breast cancer (HCC)   (+) Primary adenocarcinoma of ascending colon (HCC)       Anesthetic History   No history of anesthetic complications            Review of Systems / Medical History  Patient summary reviewed and pertinent labs reviewed    Pulmonary          Smoker      Comments: Recurrent R pleural effusion - drained twice, most recently 3 weeks ago.   Pt able to lie flat without dyspnea, SpO2 98% on RA   Neuro/Psych   Within defined limits           Cardiovascular    Hypertension              Exercise tolerance: >4 METS  Comments: H/o DVT with PE - Eliquis held x 2d   GI/Hepatic/Renal               Comments: S/p hemicolectomy 2018 Endo/Other    Diabetes (diet-control)    Cancer (Breast CA - inflammatory RUE lymphedema and pleural effusion)     Other Findings              Physical Exam    Airway  Mallampati: II  TM Distance: > 6 cm  Neck ROM: normal range of motion   Mouth opening: Normal     Cardiovascular    Rhythm: regular  Rate: normal         Dental  No notable dental hx       Pulmonary  Breath sounds clear to auscultation               Abdominal         Other Findings            Anesthetic Plan    ASA: 3  Anesthesia type: general            Anesthetic plan and risks discussed with: Patient

## 2022-04-20 NOTE — PROGRESS NOTES
TRANSFER - IN REPORT:    Verbal report received from [facility] (name) on José Manuel Edouard  being received from  (unit) for ordered procedure      Report consisted of patients Situation, Background, Assessment and   Recommendations(SBAR). Information from the following report(s) SBAR was reviewed with the receiving nurse. Opportunity for questions and clarification was provided. Assessment completed upon patients arrival to unit and care assumed.

## 2022-04-20 NOTE — PERIOP NOTES
TRANSFER - OUT REPORT:    Verbal report given to 5th floor nurse on Al  being transferred to  for routine post - op       Report consisted of patients Situation, Background, Assessment and   Recommendations(SBAR). Information from the following report(s) OR Summary, Procedure Summary, Intake/Output and MAR was reviewed with the receiving nurse. Lines:   Venous Access Device port 08/24/21 Upper chest (subclavicular area), left (Active)   Central Line Being Utilized No 04/20/22 1413   Criteria for Appropriate Use Irritant/vesicant medication 04/20/22 1101   Site Assessment Clean, dry, & intact 04/20/22 1413   Date of Last Dressing Change 04/19/22 04/20/22 0349   Dressing Status Clean, dry, & intact 04/20/22 1413   Dressing Type Disk with Chlorhexadine gluconate (CHG); Transparent 04/20/22 1101   Action Taken Other (comment) 04/20/22 1101   Date Accessed (Medial Site) 04/19/22 04/20/22 0349   Access Time (Medial Site) 0605 04/19/22 0605   Access Needle Size (Site #1) 20 G 04/19/22 0605   Access Needle Length (Medial Site) 0.75 inches 04/19/22 0605   Positive Blood Return (Medial Site) Yes 04/20/22 0349   Action Taken (Medial Site) Blood drawn;Flushed; Infusing 04/20/22 0349   Date Needle Changed (Medial Site) 04/19/22 04/20/22 0349   Alcohol Cap Used Yes 04/20/22 0349        Opportunity for questions and clarification was provided. Patient transported with:   O2 @ 5 liters    VTE prophylaxis orders have been written for Al. Patient and family given floor number and nurses name. Family updated re: pt status after security code verified.

## 2022-04-20 NOTE — PROGRESS NOTES
Inpatient Hematology / Oncology Progress Note      Admission Date: 2022 12:45 PM  Reason for Admission/Hospital Course: Dysphagia [R13.10]  Failure to thrive in adult [R62.7]  Weight loss [R63.4]  Metastatic breast cancer (HCC) [C50.919]      24 Hour Events:  Afebrile, VSS  Abd US with increased pancreatic and bile duct dilatation  Bili up to 6.9  GI performing EGD and ERCP today      ROS:  Constitutional: Positive for fatigue; negative for fever, chills. CV: Negative for chest pain, palpitations, edema. Respiratory: Negative for dyspnea, cough, wheezing. GI: Positive for nausea; negative for abdominal pain, diarrhea. 10 point review of systems is otherwise negative with the exception of the elements mentioned above in the HPI. Allergies   Allergen Reactions    Clarithromycin Rash     Other reaction(s): Other (See Comments)  Unknown (comments)    Cortisone Other (comments)     NUMBNESS IN THE LEG (SITE OF INJECTION)  Per pt, leg numbness. Other reaction(s): Other (See Comments)  NUMBNESS IN THE LEG (SITE OF INJECTION)       OBJECTIVE:  Patient Vitals for the past 8 hrs:   BP Temp Pulse Resp SpO2   22 0754 126/72 98.7 °F (37.1 °C) 88 17 93 %   22 0310 132/70 98.4 °F (36.9 °C) 72 18 95 %     Temp (24hrs), Av.8 °F (37.1 °C), Min:98.3 °F (36.8 °C), Max:99.5 °F (37.5 °C)    No intake/output data recorded. Physical Exam:  Constitutional: Chronically ill appearing female in no acute distress, slying comfortably in the hospital bed. HEENT: Normocephalic and atraumatic. Sclerae anicteric. Neck supple without JVD. No thyromegaly present. +using oral suction for secretions   Skin Warm and dry. No bruising and no rash noted. No erythema. No pallor. Respiratory Lungs are clear to auscultation bilaterally without wheezes, rales or rhonchi, normal air exchange without accessory muscle use. CVS Normal rate, regular rhythm and normal S1 and S2. No murmurs, gallops, or rubs. Abdomen Soft, nontender and nondistended, normoactive bowel sounds. Neuro Grossly nonfocal with no obvious sensory or motor deficits. MSK Normal range of motion in general.  Trace BLE edema, RUE edema and no tenderness. Psych Appropriate mood and affect. Labs:      Recent Labs     04/20/22  0255 04/19/22  0238 04/18/22  1425   WBC 5.8 4.7 5.0   RBC 2.35* 2.17* 2.22*   HGB 8.4* 7.8* 8.1*   HCT 25.4* 23.7* 24.4*   .1* 109.2* 109.9*   MCH 35.7* 35.9* 36.5*   MCHC 33.1 32.9 33.2   RDW 14.6 14.6 14.3    210 200   GRANS 77 75 79*   LYMPH 15 14 11*   MONOS 7 10 9   EOS 0* 0* 0*   BASOS 0 0 0   IG 0 0 0   DF AUTOMATED AUTOMATED AUTOMATED   ANEU 4.4 3.6 4.0   ABL 0.9 0.6 0.6   ABM 0.4 0.5 0.4   LAQUITA 0.0 0.0 0.0   ABB 0.0 0.0 0.0   AIG 0.0 0.0 0.0        Recent Labs     04/20/22  0255 04/19/22  0238 04/18/22  1425   * 145 144   K 3.5 3.1* 3.2*   * 111* 109*   CO2 26 27 27   AGAP 7 7 8   GLU 67 63* 79   BUN 24* 28* 31*   CREA 0.90 0.90 1.00   GFRAA >60 >60 >60   GFRNA >60 >60 60*   CA 9.0 8.5 9.0   AP 1,093* 1,126* 1,106*   TP 6.0* 6.0* 6.2*   ALB 2.1* 2.3* 2.3*   GLOB 3.9* 3.7* 3.9*   AGRAT 0.5* 0.6* 0.6*   MG 2.1 2.0 2.0         Imaging:  US ABD LTD [[id number]] Collected: 04/19/22 1431   Order Status: Completed Updated: 04/19/22 1438   Narrative:     RIGHT UPPER QUADRANT  ULTRASOUND     INDICATION: Abnormal liver function, history of breast cancer     COMPARISON: CT dated 11/26/2021     Transabdominal imaging of the upper abdomen was performed. FINDINGS:   -LIVER: 12.4 cm.  Normal echogenicity.  No masses. -BILE DUCTS: The common bile duct is dilated, 14 mm.  There is also mild hepatic   bile duct dilatation. -GALLBLADDER: Absent. -PANCREAS: There is a 4 cm cyst near the tail the pancreas, stable compared with   the prior CT.  There is increased pancreatic duct distention, 5 mm in diameter. -RIGHT KIDNEY: 11.0 cm.  There is moderate-severe hydronephrosis.    -ABDOMINAL AORTA AND IVC: Normal in size. -ASCITES: Minimal perihepatic ascites. Impression:     1.  Increased pancreatic and bile duct dilatation. 2.  Right hydronephrosis.             ASSESSMENT:    Problem List  Date Reviewed: 4/18/2022          Codes Class Noted    Weight loss ICD-10-CM: R63.4  ICD-9-CM: 783.21  4/18/2022        Dysphagia ICD-10-CM: R13.10  ICD-9-CM: 787.20  4/18/2022        Failure to thrive in adult ICD-10-CM: R62.7  ICD-9-CM: 783.7  4/18/2022        Metastatic breast cancer ([facility] ICD-10-CM: C50.919  ICD-9-CM: 174.9  4/18/2022        Hypomagnesemia ICD-10-CM: E83.42  ICD-9-CM: 275.2  10/4/2021        Dehydration ICD-10-CM: E86.0  ICD-9-CM: 276.51  9/13/2021        Hypokalemia ICD-10-CM: E87.6  ICD-9-CM: 276.8  9/13/2021        Malignant neoplasm of right female breast [facility]) ICD-10-CM: C50.911  ICD-9-CM: 174.9  8/10/2021        Severe protein-calorie malnutrition ([facility] ICD-10-CM: E43  ICD-9-CM: 262  6/29/2021        Pleural effusion, right ICD-10-CM: J90  ICD-9-CM: 511.9  6/25/2021        Lymphedema ICD-10-CM: I89.0  ICD-9-CM: 457.1  6/24/2021        Hx pulmonary embolism ICD-10-CM: L41.435  ICD-9-CM: V12.55  4/5/2021        Insomnia ICD-10-CM: G47.00  ICD-9-CM: 780.52  4/5/2021        Long term current use of anticoagulant ICD-10-CM: Z79.01  ICD-9-CM: V58.61  4/5/2021        Microcytic anemia ICD-10-CM: D50.9  ICD-9-CM: 280.9  4/5/2021        Malignant neoplasm of colon, unspecified ([facility] ICD-10-CM: C18.9  ICD-9-CM: 153.9  4/5/2021        Pancreatic pseudocyst ICD-10-CM: K86.3  ICD-9-CM: 577.2  4/5/2021        Lymphedema of right arm ICD-10-CM: I89.0  ICD-9-CM: 457.1  3/10/2021        Elbow stiffness, right ICD-10-CM: M25.621  ICD-9-CM: 719.52  3/10/2021        Type 2 diabetes with nephropathy ([facility] ICD-10-CM: E11.21  ICD-9-CM: 250.40, 583.81  9/11/2019        Dysuria ICD-10-CM: R30.0  ICD-9-CM: 788.1  9/9/2019        Iron deficiency anemia ICD-10-CM: D50.9  ICD-9-CM: 280.9  8/27/2019 Hyperlipidemia associated with type 2 diabetes mellitus ([facility] ICD-10-CM: E11.69, E78.5  ICD-9-CM: 250.80, 272.4  8/27/2019        Primary adenocarcinoma of ascending colon ([facility] ICD-10-CM: C18.2  ICD-9-CM: 153.6  10/9/2018    Overview Signed 10/26/2018 10:47 AM by Aniyah Victor MD     S/p R hemicolectomy 10/9/2018   DIAGNOSIS   RIGHT HEMICOLECTOMY: MODERATELY TO POORLY DIFFERENTIATED ADENOCARCINOMA, 4.6 CM IN GREATEST DIMENSION, EXTENDING THROUGH THE MUSCULARIS PROPRIA INTO THE PERICOLONIC ADIPOSE TISSUE. MARGINS UNINVOLVED. 28 BENIGN LYMPH NODES, ALL NEGATIVE FOR METASTATIC CARCINOMA (0/28). Electronically signed out on 10/11/2018 10:24 by Natacha Jennings. Tong Jonas, III,              Malignant neoplasm of ascending colon [facility]) ICD-10-CM: C18.2  ICD-9-CM: 153.6  9/28/2018        Current use of anticoagulant therapy ICD-10-CM: Z79.01  ICD-9-CM: V58.61  9/18/2018        Obesity, morbid ([facility] ICD-10-CM: E66.01  ICD-9-CM: 278.01  4/5/2018        Obesity due to excess calories ICD-10-CM: E66.09  ICD-9-CM: 278.00  3/26/2018        Controlled type 2 diabetes mellitus without complication, with long-term current use of insulin ([facility] ICD-10-CM: E11.9, Z79.4  ICD-9-CM: 250.00, V58.67  3/26/2018        Axillary mass, right ICD-10-CM: R22.31  ICD-9-CM: 782.2  3/26/2018        Pancreatic mass ICD-10-CM: K86.89  ICD-9-CM: 577.8  3/26/2018        Type 2 diabetes mellitus with diabetic neuropathy ([facility] ICD-10-CM: E11.40  ICD-9-CM: 250.60, 357.2  2/21/2018        Lumbar radiculopathy ICD-10-CM: M54.16  ICD-9-CM: 724.4  2/6/2018        Lumbar stenosis with neurogenic claudication ICD-10-CM: L52.941  ICD-9-CM: 724.03  1/29/2018    Overview Signed 4/5/2021 11:02 AM by Marianne Alvarez     Last Assessment & Plan:   I want her to continue with home exercises. If her pain worsens, down her leg, then I would recommend a repeat epidural. She's been out of work two months. She can return to work with no restrictions.  She can lift 65 lbs if needed. If her pain worsens, we will get another injection. Bilateral low back pain with sciatica ICD-10-CM: M54.40  ICD-9-CM: 724.3  10/23/2017        Environmental allergies ICD-10-CM: Z91.09  ICD-9-CM: V15.09  9/12/2013                PLAN:  Metastatic Breast Cancer with peritoneal carcinomatosis  -s/p 4 cycles Carbo/Taxol  -currently on Keytruda, cycle 10 on 3/28  -PET on 3/24 with small to moderate ascites, increase in peritoneal disease  -Ca 27.29 overall stable at 271    Severe Dysphagia  -last dilation on June 2021, reports 2 week history of poor intake/weight loss  -GI with plans of EGD/dilation on 4/20, Eliquis on hold  -per GI clear liquid diet  4/20 EGD/dilation today    Hyperbilirubinemia / Transaminitis  -Bili 6.3, ALT 63, , Alk Phos 1126, obtain Abd US  4/20 Bili up to 6.9. Abd US with increased pancreatic and bile duct dilatation. ERCP today. Anemia-chronic  -monitor and replace per [facility] SOPs    Hx PE  - on Eliquis, held for hepatic dysfunction and procedures    R hydronephrosis  4/20 Abd US with R hydronephrosis. Seen previously on PET in March. Kidney fx wnl, con't to monitor. Continue home meds  [facility] SOPs  SCDs for DVT ppx (Eliquis held)    Disposition:  TBD pending clinical course. EGD/ERCP today.         Alessia Tam NP  [facility]  [address] [place] [address]  Office : [phone]  Fax : [phone]

## 2022-04-20 NOTE — PROGRESS NOTES
[facility] Progress Note         Admit Date:  4/18/2022    Today's Date:  4/20/2022    CC:  dysphagia    Subjective:     Patient feels ok. Still complains of dysphagia    Medications:   Current Facility-Administered Medications   Medication Dose Route Frequency    dextrose 5 % - 0.45% NaCl infusion  75 mL/hr IntraVENous CONTINUOUS    [Held by provider] apixaban (ELIQUIS) tablet 5 mg  5 mg Oral BID    ondansetron (ZOFRAN) injection 4 mg  4 mg IntraVENous Q4H PRN    prochlorperazine (COMPAZINE) with saline injection 10 mg  10 mg IntraVENous Q6H PRN    morphine injection 1 mg  1 mg IntraVENous Q4H PRN       Review of Systems:  ROS was obtained, with pertinent positives as listed above. No chest pain or SOB. Diet:      Objective:   Vitals:  Visit Vitals  /72 (BP 1 Location: Left upper arm, BP Patient Position: Lying)   Pulse 88   Temp 98.7 °F (37.1 °C)   Resp 17   Ht 5' 4\" (1.626 m)   Wt 49.9 kg (110 lb 1.6 oz)   SpO2 93%   BMI 18.90 kg/m²     Intake/Output:  No intake/output data recorded. 04/18 1901 - 04/20 0700  In: 9389 [P.O.:450;  I.V.:2561]  Out: 725 [Urine:725]  Exam:  General appearance: alert, cooperative, no distress  Lungs: clear to auscultation bilaterally anteriorly  Neuro:  alert and oriented    Data Review (Labs):    Recent Labs     04/20/22  0255 04/19/22  0238 04/18/22  1425 04/18/22  0959   WBC 5.8 4.7 5.0 5.5   HGB 8.4* 7.8* 8.1* 9.2*   HCT 25.4* 23.7* 24.4* 27.7*    210 200 218   .1* 109.2* 109.9* 112.1*   * 145 144 143   K 3.5 3.1* 3.2* 3.4*   * 111* 109* 107   CO2 26 27 27 28   BUN 24* 28* 31* 34*   CREA 0.90 0.90 1.00 1.00   CA 9.0 8.5 9.0 9.4   MG 2.1 2.0 2.0  --    GLU 67 63* 79 99   AP 1,093* 1,126* 1,106* 1,178*   * 135* 118* 133*   ALT 45 49 50 52   TBILI 6.9* 6.3* 5.6* 6.0*   ALB 2.1* 2.3* 2.3* 2.5*   TP 6.0* 6.0* 6.2* 6.7       Assessment:     Active Problems:    Severe protein-calorie malnutrition ([facility] (6/29/2021) Weight loss (4/18/2022)      Dysphagia (4/18/2022) EGD planned for today      Failure to thrive in adult (4/18/2022)      Metastatic breast cancer ([address] (4/18/2022)    Cholestasis-although labs look more likely intrahepatic cholestasis, the U/S shows 14 mm CBD and mildly dilated PD, suggesting obstruction at [facility] or ampulla. Recent PET does not clearly demonstrate lesion at this level, although there is widespread abdominal disease. CT 11/21 did not show pancreatic lesion in head. The cyst in the tail is fairly large but stable, not a cause for obstruction, and not a particular concern in light of otherwise widely metastatic cancer. ERCP necessary to evaluate and treat. Technique, benefits, and risks discussed. She has agreed. Plan:     Discussed with Dr. Gloria Echavarria. He will do combined procedures today.

## 2022-04-20 NOTE — PROGRESS NOTES
Comprehensive Nutrition Assessment    Type and Reason for Visit: Reassess  Best Practice Alert for Malnutrition Screening Tool: Recently Lost Weight Without Trying: Yes, If Yes, How Much Weight Loss: 14 - 23 lbs,    Poor intake/appetite 5 or more days (Oncology)    Nutrition Recommendations/Plan:   Meals and Snacks:  Continue current diet. Diet progression per GI  Nutrition Supplement Therapy:   Medical food supplement therapy:  Continue Ensure Clear three times per day (this provides 240 kcal and 8 grams protein per bottle)   If unable to advance diet may need to consider nutrition support for primary needs. Noted this has been discussed per provider and patient would be open to having feeding placed. Please consult ARTURO enciso if nutrition support pursued. Malnutrition Assessment:  Malnutrition Status: Severe malnutrition  Context: Chronic illness  Findings of clinical characteristics of malnutrition:   Energy Intake:  Unable to assess  Weight Loss:  7.000 - Greater than 20% over 1 year (84# (43.6%) in less than 1 year)     Body Fat Loss:  7 - Severe body fat loss, Fat overlying ribs,Orbital,Triceps   Muscle Mass Loss:  7 - Severe muscle mass loss, Clavicles (pectoralis &deltoids),Scapula (trapezius),Thigh (quadriceps),Temples (temporalis)  Fluid Accumulation:  Unable to assess,     Strength:  Not performed       Nutrition Assessment:   Nutrition History: Patient reports poor PO over last 2 weeks secondary to swallowing difficulties. She endorses that this is a recurrent problem. She state over the last 2 weeks she has only been able to tolerate small amounts of pudding, yogurt and water. She endorses significant weight loss over the last year. Nutrition Background: Patient with PMH significant for DM, DVT/PE, colocn cancer s/p resection, lymphedema of right upper extremity, breast cancer s/p chemo, esophageal narrowing with dilation.  SHe presented to oncology office with severe dysphagia and unable to clear secretions and was a direct admit. Nutrition Interval:  Patient off the floor x3 for procedure. Procedure complete at third attempt but patient still not to room. Noted EGD today with dilation but unable to do ERCP as \"There would be no way to pass the ERCP scope at this point. \" Daughter in room on third attempt and she asks \"so is the feeding tube back on the table. \" Explained that feeding tube placement would be patient's decision. She voiced understanding. Noted in [address] note 4/19 and again patient has expressed that she willing to have feeding tube placed. Lab Results   Component Value Date/Time    Glucose 67 04/20/2022 02:55 AM    Glucose (POC) 85 04/20/2022 07:50 AM    Glucose (POC) 79 04/20/2022 05:46 AM    Glucose (POC) 81 04/19/2022 07:55 AM    Glucose (POC) 69 04/19/2022 06:15 AM    Glucose (POC) 102 (H) 03/24/2022 12:45 PM    Glucose (POC) 114 (H) 08/24/2021 07:52 AM   Glucose appropriate to continue Ensure Clear    Nutrition Related Findings:   NFPE as above      Current Nutrition Therapies:  ADULT ORAL NUTRITION SUPPLEMENT Breakfast, Lunch, Dinner; Clear Liquid  ADULT DIET Clear Liquid    Current Intake:   Average Meal Intake: NPO        Anthropometric Measures:  Height: 5' 4\" (162.6 cm)  Current Body Wt: 48.9 kg (107 lb 12.9 oz) (4/18), Weight source: Standing scale  BMI: 18.5, Normal weight (BMI 18.5-24. 9)  Admission Body Weight: 107 lb 12.9 oz  Ideal Body Weight (lbs) (Calculated): 120 lbs (55 kg), 89.8 %  Usual Body Wt: 86.6 kg (191 lb) (5/18/21 office wt), Percent weight change: -43.6        Edema: RUE: 3+ (4/20/2022  8:46 AM)    Estimated Daily Nutrient Needs:  Energy (kcal/day): 1763-4135 (Kcal/kg (30-35), Weight Used: Current (48.9 kg (4/18)))  Protein (g/day): 73-98 (1.5-2 g/kg) Weight Used: (Current)  Fluid (ml/day):   (1 ml/kcal)    Nutrition Diagnosis:   · Inadequate oral intake related to swallowing difficulty as evidenced by  (reported barrier to PO, recall, wt loss)    · Severe malnutrition related to inadequate protein-energy intake as evidenced by  (malnutrition criteria as above)    Nutrition Interventions:   Food and/or Nutrient Delivery: Continue current diet     Coordination of Nutrition Care: Continue to monitor while inpatient  Plan of Care discussed with Justin Damico RN    Goals:   Previous Goal Met: No progress toward goal(s)  Active Goal: Advance diet vs initiate nutrition support by RD follow-up. Nutrition Monitoring and Evaluation:      Food/Nutrient Intake Outcomes:  (? nutrition support)  Physical Signs/Symptoms Outcomes: Chewing or swallowing,Weight    Discharge Planning:     Too soon to determine    [address] [place], LD on 4/20/2022 at 4:22 PM  Contact: [phone]

## 2022-04-20 NOTE — PROGRESS NOTES
Blood glucose 67 on AM labs  On-call oncology NP notified & order for D5 @ 75/hr received  Continous NS dcd  Pt to have EGD today  Has been NPO since midnight    [facility]    Patient Vitals for the past 12 hrs:   Temp Pulse Resp BP SpO2   04/20/22 0310 98.4 °F (36.9 °C) 72 18 132/70 95 %   04/19/22 2244 98.9 °F (37.2 °C) 83 18 133/79 96 %   04/19/22 1951 99.5 °F (37.5 °C) 85 17 135/75 96 %

## 2022-04-20 NOTE — ANESTHESIA POSTPROCEDURE EVALUATION
Procedure(s):  ESOPHAGOGASTRODUODENOSCOPY (EGD)/ 18 Room 511  ESOPHAGEAL DILATION. general    Anesthesia Post Evaluation      Multimodal analgesia: multimodal analgesia used between 6 hours prior to anesthesia start to PACU discharge  Patient location during evaluation: bedside  Patient participation: complete - patient participated  Level of consciousness: awake and awake and alert  Pain management: adequate  Airway patency: patent  Anesthetic complications: no  Cardiovascular status: acceptable and stable  Respiratory status: acceptable and nasal cannula  Hydration status: acceptable  Comments: Thick copious secretions in PACU. CXR no active process. Breathing treatment given and sent to floor with humidified high flow oxygen.   Post anesthesia nausea and vomiting:  none  Final Post Anesthesia Temperature Assessment:  Normothermia (36.0-37.5 degrees C)      INITIAL Post-op Vital signs:   Vitals Value Taken Time   /57 04/20/22 1458   Temp 36.4 °C (97.6 °F) 04/20/22 1413   Pulse 90 04/20/22 1503   Resp 16 04/20/22 1503   SpO2 94 % 04/20/22 1503

## 2022-04-21 NOTE — PROGRESS NOTES
Pt discussed during IDR. There is a possibility that EGD with dilation will be repeated in a few days. Pt is not a candidate for a PEG tube or PTC drain d/t Dx of peritoneal carcinomatosis. Per progress notes pt is attempting to sip liquids but does not feel as if they are passing through her esophagus. Per Sung Jackson NP it is expected that pt will remain IP through the weekend. CM will continue to follow and remain available if any needs arise.

## 2022-04-21 NOTE — PROGRESS NOTES
[facility] Progress Note         Admit Date:  4/18/2022    Today's Date:  4/21/2022    CC:  Dysphagia    Subjective:     Patient is sitting in bed and sipping on broth. Tells me that she does not feel like it is going down. However, not regurgitating. Denies any nausea/vomiting. States, \"I want to get better\". She is suctioning her mouth this morning and complains of \"phlegm\" in the back of her throat. Portable chest x-ray 4/20/2022     CLINICAL INDICATION: Postoperative evaluation, concern for aspiration     FINDINGS: Single AP view of the chest compared to a similar exam dated 6/30/2021  shows the lungs to be expanded and clear. No pleural effusion or pneumothorax. The cardiac silhouette and mediastinum are unremarkable. A left-sided chest port  is in place.     IMPRESSION  No acute cardiopulmonary abnormality.    ----------------------------------------------------------------  ESOPHAGOGASTRODUODENOSCOPY     DATE of PROCEDURE: 4/20/2022     PT NAME: Zach Rouse     [id number]     MEDICATION:   general     INSTRUMENT: GIFH 190 and      SPECIAL PROCEDURE: savary 30 and 33 fr under flouro   BLOOD LOSS- 0 or min. SPEC- no  IMPLANT- none     PROCEDURE:  After informed consent, the patient was placed Under anesthesia in the supine position. The endoscope was unable to be passed. see below.     ASSESSMENT:  1. Pt had to be done on her back due to neck immobility. Could not pass either scope so based on last EGD findings we passed a guide wire under flouro and dilated with the savary dilators. Repeat attempts to pass the scope met significant resistance at the UES and it was not felt safe. There would be no way to pass the ERCP scope at this point.     PLAN:   1. Consider further dilation pending her tolerance of today's attempt.   2. F/U per Dr. Belinda Clark MD       Medications:   Current Facility-Administered Medications   Medication Dose Route Frequency    dextrose 5 % - 0.45% NaCl infusion  75 mL/hr IntraVENous CONTINUOUS    phenol throat spray (CHLORASEPTIC) 1 Spray  1 Spray Oral PRN    [Held by provider] apixaban (ELIQUIS) tablet 5 mg  5 mg Oral BID    ondansetron (ZOFRAN) injection 4 mg  4 mg IntraVENous Q4H PRN    prochlorperazine (COMPAZINE) with saline injection 10 mg  10 mg IntraVENous Q6H PRN    morphine injection 1 mg  1 mg IntraVENous Q4H PRN       Review of Systems:  ROS was obtained, with pertinent positives as listed above. No chest pain or SOB. Diet:      Objective:   Vitals:  Visit Vitals  /78 (BP 1 Location: Left upper arm, BP Patient Position: At rest;Semi fowlers)   Pulse 92   Temp 97.8 °F (36.6 °C)   Resp 16   Ht 5' 4\" (1.626 m)   Wt 49.9 kg (110 lb)   SpO2 92%   BMI 18.88 kg/m²     Intake/Output:  04/21 0701 - 04/21 1900  In: -   Out: 100 [Urine:100]  04/19 1901 - 04/21 0700  In: 2246 [I.V.:2246]  Out: 425 [Urine:425]  Exam:  General appearance: alert, cooperative, suctioning mouth   Lungs: clear to auscultation bilaterally anteriorly  Heart: regular rate and rhythm  Abdomen: soft, non-tender.  Bowel sounds normal. No masses, no organomegaly  Extremities: extremities normal, atraumatic, no cyanosis or edema  Neuro:  alert and oriented    Data Review (Labs):    Recent Labs     04/21/22  0247 04/20/22  0255 04/19/22  0238 04/18/22  1425 04/18/22  0959   WBC 7.0 5.8 4.7 5.0 5.5   HGB 8.2* 8.4* 7.8* 8.1* 9.2*   HCT 24.7* 25.4* 23.7* 24.4* 27.7*    217 210 200 218   .8* 108.1* 109.2* 109.9* 112.1*    146* 145 144 143   K 3.2* 3.5 3.1* 3.2* 3.4*   * 113* 111* 109* 107   CO2 27 26 27 27 28   BUN 22 24* 28* 31* 34*   CREA 0.90 0.90 0.90 1.00 1.00   CA 8.5 9.0 8.5 9.0 9.4   MG 1.9 2.1 2.0 2.0  --    * 67 63* 79 99   AP 1,034* 1,093* 1,126* 1,106* 1,178*   * 120* 135* 118* 133*   ALT 43 45 49 50 52   TBILI 6.6* 6.9* 6.3* 5.6* 6.0*   ALB 2.0* 2.1* 2.3* 2.3* 2.5*   TP 5.8* 6.0* 6.0* 6.2* 6.7 Assessment:     Active Problems:    Severe protein-calorie malnutrition ([facility] (6/29/2021)      Weight loss (4/18/2022)      Dysphagia (4/18/2022)      Failure to thrive in adult (4/18/2022)      Metastatic breast cancer ([facility] (4/18/2022)    Patient is P 60 y. o. female with PMH including but not limited to Breast Cancer, Peritonal Carcinomatosis, Hepatic flexure Colon Cancer, Pancreatic Pseudocyst s/p EUS with Dr. Keeley Garcia, Hemicolectomy, DVT, DM Rocio Wells is seen in consultation at the request of Sophia Chacon and Dr. Remington Oroczo dysphagia.      EGD with dilation in June 2021 improved dysphagia until about 2 weeks ago. Patient reports that she ran out of Eliquis a few days ago so has not taken it in a few days.     4/19/2022: TB 6.3, , ALT 49, AP 1126, HGB 7.8, . -LFT significantly elevated starting on 4/18. US: 4/19: Increased pancreatic and bile duct dilatation. Right Hydronephrosis. ERCP could not be completed due to narrow esophagus. EGD was difficult. Dilation was performed and outlined above. 44/21/2022: TB 6.6, , ALT 43, AP 1034, K 3.2, WBC 7, HGB 8.2. Plan:   MRCP to further evaluate as ERCP could not be performed due to narrow esophagus. Therefore, EUS also is not an option as the scope would not be able to traverse the esophagus. In addition, PTC is not an option due to peritoneal carcinomatosis. Regarding the dysphagia, we could attempt repeat EGD with Dilation under fluoro but would ideally like to wait a few days in between dilations. Barium Esophagram is another option. For now, clear liquids as tolerated. Additionally, she is not a candidate for PEG placement due to peritoneal carcinomatosis. Nilsa Tao NP    Patient is seen and examined in collaboration with Dr. Philip Reed. Assessment and plan as per Dr. Philip Reed. I have seen and examined the patient, and I have directed and agreed to the plan as described unless specified below.     This was the second time that EGD has failed. Dilation did not help her. Some of this appears to be fixed cervical spine, but the entire etiology is not clear. I will order barium esophagram as next step. EGD, ERCP, and EUS are not options unless the scope can reach the stomach. MRCP has limitations but can at least help evaluate for CBD stone or stricture.     Lillian Cox MD

## 2022-04-21 NOTE — PROGRESS NOTES
Inpatient Hematology / Oncology Progress Note      Admission Date: 2022 12:45 PM  Reason for Admission/Hospital Course: Dysphagia [R13.10]  Failure to thrive in adult [R62.7]  Weight loss [R63.4]  Metastatic breast cancer (HCC) [C50.919]      24 Hour Events:  Afebrile, VSS  S/p EGD yesterday, was difficult, but able to pass guidewire under flouro and dilate  Possible repeat dilation in a few days  Unable to perform ERCP d/t narrow esophagus  Plans for MRCP  Not a candidate for PEG    Transfusions: None  Replacements: K+    ROS:  Constitutional: Positive for fatigue; negative for fever, chills. CV: Negative for chest pain, palpitations, edema. Respiratory: Negative for dyspnea, cough, wheezing. GI: Positive for nausea, dysphagia; negative for abdominal pain, diarrhea. 10 point review of systems is otherwise negative with the exception of the elements mentioned above in the HPI. Allergies   Allergen Reactions    Clarithromycin Rash     Other reaction(s): Other (See Comments)  Unknown (comments)    Cortisone Other (comments)     NUMBNESS IN THE LEG (SITE OF INJECTION)  Per pt, leg numbness. Other reaction(s): Other (See Comments)  NUMBNESS IN THE LEG (SITE OF INJECTION)       OBJECTIVE:  Patient Vitals for the past 8 hrs:   BP Temp Pulse Resp SpO2   22 0800 135/78 97.8 °F (36.6 °C) 92 16 92 %   22 0222 (!) 150/87 99.1 °F (37.3 °C) 93 17 97 %     Temp (24hrs), Av.3 °F (36.8 °C), Min:97.6 °F (36.4 °C), Max:99.8 °F (37.7 °C)     0701 -  1900  In: -   Out: 100 [Urine:100]    Physical Exam:  Constitutional: Chronically ill appearing female in no acute distress, sitting comfortably in the hospital bed. HEENT: Normocephalic and atraumatic. Sclerae anicteric. Neck supple without JVD. No thyromegaly present. +using oral suction for secretions   Skin Warm and dry. No bruising and no rash noted. No erythema. No pallor.     Respiratory Lungs are clear to auscultation bilaterally without wheezes, rales or rhonchi, normal air exchange without accessory muscle use. CVS Normal rate, regular rhythm and normal S1 and S2. No murmurs, gallops, or rubs. Abdomen Soft, nontender and nondistended, normoactive bowel sounds. Neuro Grossly nonfocal with no obvious sensory or motor deficits. MSK Normal range of motion in general.  Trace BLE edema, RUE edema and no tenderness. Psych Appropriate mood and affect. Labs:      Recent Labs     04/21/22  0247 04/20/22  0255 04/19/22  0238   WBC 7.0 5.8 4.7   RBC 2.27* 2.35* 2.17*   HGB 8.2* 8.4* 7.8*   HCT 24.7* 25.4* 23.7*   .8* 108.1* 109.2*   MCH 36.1* 35.7* 35.9*   MCHC 33.2 33.1 32.9   RDW 14.7* 14.6 14.6    217 210   GRANS 83* 77 75   LYMPH 10* 15 14   MONOS 7 7 10   EOS 0* 0* 0*   BASOS 0 0 0   IG 1 0 0   DF AUTOMATED AUTOMATED AUTOMATED   ANEU 5.8 4.4 3.6   ABL 0.7 0.9 0.6   ABM 0.5 0.4 0.5   LAQUITA 0.0 0.0 0.0   ABB 0.0 0.0 0.0   AIG 0.0 0.0 0.0        Recent Labs     04/21/22 0247 04/20/22  0255 04/19/22  0238    146* 145   K 3.2* 3.5 3.1*   * 113* 111*   CO2 27 26 27   AGAP 6* 7 7   * 67 63*   BUN 22 24* 28*   CREA 0.90 0.90 0.90   GFRAA >60 >60 >60   GFRNA >60 >60 >60   CA 8.5 9.0 8.5   AP 1,034* 1,093* 1,126*   TP 5.8* 6.0* 6.0*   ALB 2.0* 2.1* 2.3*   GLOB 3.8* 3.9* 3.7*   AGRAT 0.5* 0.5* 0.6*   MG 1.9 2.1 2.0         Imaging:  US ABD LTD [[id number]] Collected: 04/19/22 1431   Order Status: Completed Updated: 04/19/22 1438   Narrative:     RIGHT UPPER QUADRANT  ULTRASOUND     INDICATION: Abnormal liver function, history of breast cancer     COMPARISON: CT dated 11/26/2021     Transabdominal imaging of the upper abdomen was performed. FINDINGS:   -LIVER: 12.4 cm.  Normal echogenicity.  No masses. -BILE DUCTS: The common bile duct is dilated, 14 mm.  There is also mild hepatic   bile duct dilatation. -GALLBLADDER: Absent.    -PANCREAS: There is a 4 cm cyst near the tail the pancreas, stable compared with   the prior CT.  There is increased pancreatic duct distention, 5 mm in diameter. -RIGHT KIDNEY: 11.0 cm.  There is moderate-severe hydronephrosis. -ABDOMINAL AORTA AND IVC: Normal in size. -ASCITES: Minimal perihepatic ascites. Impression:     1.  Increased pancreatic and bile duct dilatation. 2.  Right hydronephrosis.             ASSESSMENT:    Problem List  Date Reviewed: 4/18/2022          Codes Class Noted    Weight loss ICD-10-CM: R63.4  ICD-9-CM: 783.21  4/18/2022        Dysphagia ICD-10-CM: R13.10  ICD-9-CM: 787.20  4/18/2022        Failure to thrive in adult ICD-10-CM: R62.7  ICD-9-CM: 783.7  4/18/2022        Metastatic breast cancer ([facility] ICD-10-CM: C50.919  ICD-9-CM: 174.9  4/18/2022        Hypomagnesemia ICD-10-CM: E83.42  ICD-9-CM: 275.2  10/4/2021        Dehydration ICD-10-CM: E86.0  ICD-9-CM: 276.51  9/13/2021        Hypokalemia ICD-10-CM: E87.6  ICD-9-CM: 276.8  9/13/2021        Malignant neoplasm of right female breast [facility]) ICD-10-CM: C50.911  ICD-9-CM: 174.9  8/10/2021        Severe protein-calorie malnutrition ([facility] ICD-10-CM: E43  ICD-9-CM: 262  6/29/2021        Pleural effusion, right ICD-10-CM: J90  ICD-9-CM: 511.9  6/25/2021        Lymphedema ICD-10-CM: I89.0  ICD-9-CM: 457.1  6/24/2021        Hx pulmonary embolism ICD-10-CM: H81.781  ICD-9-CM: V12.55  4/5/2021        Insomnia ICD-10-CM: G47.00  ICD-9-CM: 780.52  4/5/2021        Long term current use of anticoagulant ICD-10-CM: Z79.01  ICD-9-CM: V58.61  4/5/2021        Microcytic anemia ICD-10-CM: D50.9  ICD-9-CM: 280.9  4/5/2021        Malignant neoplasm of colon, unspecified ([facility] ICD-10-CM: C18.9  ICD-9-CM: 153.9  4/5/2021        Pancreatic pseudocyst ICD-10-CM: K86.3  ICD-9-CM: 577.2  4/5/2021        Lymphedema of right arm ICD-10-CM: I89.0  ICD-9-CM: 457.1  3/10/2021        Elbow stiffness, right ICD-10-CM: M25.621  ICD-9-CM: 719.52  3/10/2021        Type 2 diabetes with nephropathy ([facility] ICD-10-CM: E11.21  ICD-9-CM: 250.40, 583.81  9/11/2019        Dysuria ICD-10-CM: R30.0  ICD-9-CM: 788.1  9/9/2019        Iron deficiency anemia ICD-10-CM: D50.9  ICD-9-CM: 280.9  8/27/2019        Hyperlipidemia associated with type 2 diabetes mellitus ([facility] ICD-10-CM: E11.69, E78.5  ICD-9-CM: 250.80, 272.4  8/27/2019        Primary adenocarcinoma of ascending colon ([facility] ICD-10-CM: C18.2  ICD-9-CM: 153.6  10/9/2018    Overview Signed 10/26/2018 10:47 AM by Lizet Law MD     S/p R hemicolectomy 10/9/2018   DIAGNOSIS   RIGHT HEMICOLECTOMY: MODERATELY TO POORLY DIFFERENTIATED ADENOCARCINOMA, 4.6 CM IN GREATEST DIMENSION, EXTENDING THROUGH THE MUSCULARIS PROPRIA INTO THE PERICOLONIC ADIPOSE TISSUE. MARGINS UNINVOLVED. 28 BENIGN LYMPH NODES, ALL NEGATIVE FOR METASTATIC CARCINOMA (0/28). Electronically signed out on 10/11/2018 10:24 by Vonnie Hernandes.  Nadeem Frausto, III,              Malignant neoplasm of ascending colon [facility]) ICD-10-CM: C18.2  ICD-9-CM: 153.6  9/28/2018        Current use of anticoagulant therapy ICD-10-CM: Z79.01  ICD-9-CM: V58.61  9/18/2018        Obesity, morbid ([facility] ICD-10-CM: E66.01  ICD-9-CM: 278.01  4/5/2018        Obesity due to excess calories ICD-10-CM: E66.09  ICD-9-CM: 278.00  3/26/2018        Controlled type 2 diabetes mellitus without complication, with long-term current use of insulin ([facility] ICD-10-CM: E11.9, Z79.4  ICD-9-CM: 250.00, V58.67  3/26/2018        Axillary mass, right ICD-10-CM: R22.31  ICD-9-CM: 782.2  3/26/2018        Pancreatic mass ICD-10-CM: K86.89  ICD-9-CM: 577.8  3/26/2018        Type 2 diabetes mellitus with diabetic neuropathy ([facility] ICD-10-CM: E11.40  ICD-9-CM: 250.60, 357.2  2/21/2018        Lumbar radiculopathy ICD-10-CM: M54.16  ICD-9-CM: 724.4  2/6/2018        Lumbar stenosis with neurogenic claudication ICD-10-CM: O42.355  ICD-9-CM: 724.03  1/29/2018    Overview Signed 4/5/2021 11:02 AM by Zachary William     Last Assessment & Plan:   I want her to continue with home exercises. If her pain worsens, down her leg, then I would recommend a repeat epidural. She's been out of work two months. She can return to work with no restrictions. She can lift 65 lbs if needed. If her pain worsens, we will get another injection. Bilateral low back pain with sciatica ICD-10-CM: M54.40  ICD-9-CM: 724.3  10/23/2017        Environmental allergies ICD-10-CM: Z91.09  ICD-9-CM: V15.09  9/12/2013                PLAN:  Metastatic Breast Cancer with peritoneal carcinomatosis  -s/p 4 cycles Carbo/Taxol  -currently on Keytruda, cycle 10 on 3/28  -PET on 3/24 with small to moderate ascites, increase in peritoneal disease  -Ca 27.29 overall stable at 271    Severe Dysphagia  -last dilation on June 2021, reports 2 week history of poor intake/weight loss  -GI with plans of EGD/dilation on 4/20, Eliquis on hold  -per GI clear liquid diet  4/20 EGD/dilation today  4/21 s/p EGD with dilation yesterday, was difficult to perform but able to pass guidewire under fluoro and dilate with savary dilators. Plans for possible repeat EGD with dilation under fluoro in a few days. Barium esophagram is another option. She is not a candidate for PEG d/t peritoneal carcinomatosis. Hyperbilirubinemia / Transaminitis  -Bili 6.3, ALT 63, , Alk Phos 1126, obtain Abd US  4/20 Bili up to 6.9. Abd US with increased pancreatic and bile duct dilatation. ERCP today. 4/21 Bili 6.6.  GI unable to complete ERCP d/t narrow esophagus. EUS is not an option as scope would not be able to traverse the esophagus. PTC is not an option d/t peritoneal carcinomatosis. MRCP pending. Anemia-chronic  -monitor and replace per [facility] SOPs    Hx PE  - on Eliquis, held for hepatic dysfunction and procedures    R hydronephrosis  4/20 Abd US with R hydronephrosis. Seen previously on PET in March. Kidney fx wnl, con't to monitor.     Continue home meds  [facility] SOPs  SCDs for DVT ppx (Eliquis held)     Disposition:  TBD pending clinical course. Possible repeat EGD with dilation in a few days. MRCP pending.         Cranford Hodgkins, NP  [facility]  [address] [place] [address], [address] [place] [address]  Office : [phone]  Fax : [phone]

## 2022-04-21 NOTE — PROGRESS NOTES
SPEECH PATHOLOGY NOTE:    Patient s/p EGD with GI. Per report, \"Could not pass either scope so based on last EGD findings we passed a guide wire under flouro and dilated with the savary dilators. Repeat attempts to pass the scope met significant resistance at the UES and it was not felt safe. There would be no way to pass the ERCP scope at this point\". GI continues to recommend clear liquids while continuing to address esophageal dysphagia. Speech therapy to sign off as dysphagia is esophageal in nature and is being addressed by GI. If new concerns regarding oropharyngeal dysphagia arise at later time once diet is advanced, please consider re-consult to speech therapy. Thank you for the opportunity to participate in her care.    TAMIA Murphy, CCC-SLP  Speech Language Pathologist  Acute Rehabilitation Services  Contact: Yoni

## 2022-04-22 NOTE — PROGRESS NOTES
END OF SHIFT NOTE:    Intake/Output  04/22 0701 - 04/22 1900  In: 219 [P.O.:712]  Out: -    Voiding: YES  Catheter: NO  Drain:              Stool:  0 occurrences. Emesis:  0 occurrences. VITAL SIGNS  Patient Vitals for the past 12 hrs:   Temp Pulse Resp BP SpO2   04/22/22 1605 97.9 °F (36.6 °C) 82 18 118/74 97 %   04/22/22 1117 98.9 °F (37.2 °C) 99 18 120/82 98 %   04/22/22 0700 98.8 °F (37.1 °C) (!) 102 16 124/81 96 %       Pain Assessment  Pain 1  Pain Scale 1: Numeric (0 - 10) (04/22/22 1317)  Pain Intensity 1: 0 (04/22/22 1317)  Patient Stated Pain Goal: 0 (04/22/22 1317)  Pain Reassessment 1: Patient resting w/respiratory rate greater than 10 (04/22/22 1317)  Pain Onset 1: pta (04/21/22 2220)  Pain Location 1: Flank (04/22/22 1117)  Pain Orientation 1: Right (04/21/22 2220)  Pain Description 1: Dull (04/22/22 1117)  Pain Intervention(s) 1: Back rub;Emotional support (04/22/22 1057)    Ambulating  No    Additional Information: vss complain of pain x's 1 gave medication as ordered in mar pt resting lowest position call light in reach     Shift report given to oncoming nurse at the bedside.     Hao Hansen, RN

## 2022-04-22 NOTE — PROGRESS NOTES
Uneventful shift  PRN morphine 1mg IV given x's 1 for pt complaint of pain in R arm  KCl 40meq IV per perfecto protocol for K 3.2  RN spoke w/ dtr to answer questions regarding plan    Shift report given to nash Emerson RN

## 2022-04-22 NOTE — PROGRESS NOTES
Palliative Care Progress Note    Patient: Kylie Mello MRN: [id number]  SSN: [id number]    YOB: 1960  Age: 59 y.o. Sex: female       Assessment/Plan:     Chief Complaint/Interval History: Pt has opted for hospice     Principal Diagnosis:    · Dysphagia  R13.10    Additional Diagnoses:   · Advance Care Planning Counseling Z71.89  · Encounter for Palliative Care  Z51.5    Palliative Performance Scale (PPS)       Medical Decision Making:   Reviewed and summarized notes from last palliative care visit to present. Discussed case with appropriate providers: CM Cat  Reviewed lab and X-ray data from last palliative care visit to present. The pt was resting in bed, no visitors present. She denied pain, N/V, SOB, and other discomforts. Ms Bam Canchola learned from Oncology today that they have no further treatment options to offer. The pt said, \"I'm not going to make it\". Emotional support given. Spoke to , who will visit patient. The pt is hoping that Dr Lata Ziegler will be able to offer an alternative means of nutrition. Ms Bam Canchola expressed her understanding that nutrition would not affect her cancer, but would give her a little more time. Ms Bam Canchola is very interested in hospice care. She's not sure whether she can arrange for an adequate number of caregivers for home hospice. NEAL Lambert will sort through the options and costs with her. I did not address code status today, but expect Ms Bam Canchola will be open to changing status to DNR. Will discuss findings with members of the interdisciplinary team.         More than 50% of this 15 minute visit was spent counseling and coordination of care as outlined above. In addition to the E&M described above, a 30--minute ACP meeting was spent discussing the pt's wishes concerning end of life care. Subjective:     Review of Systems:  A comprehensive review of systems was negative except as noted above.      Objective:     Visit Vitals  /82 (BP 1 Location: Left upper arm, BP Patient Position: Sitting)   Pulse 99   Temp 98.9 °F (37.2 °C)   Resp 18   Ht 5' 4\" (1.626 m)   Wt 111 lb 6.4 oz (50.5 kg)   SpO2 98%   BMI 19.12 kg/m²       Physical Exam:    General:  Cooperative. No acute distress. Eyes:  Conjunctivae/corneas clear    Nose: Nares normal. Septum midline. Neck: Supple, symmetrical, trachea midline, no JVD   Lungs:   Clear to auscultation bilaterally, unlabored   Heart:  Regular rate and rhythm, no murmur    Abdomen:   Soft, non-tender, non-distended   Extremities: Normal, atraumatic, no cyanosis or edema   Skin: Skin color, texture, turgor normal. No rash or lesions.    Neurologic: Nonfocal   Psych: Alert and oriented     Signed By: Tarun Batres NP     April 22, 2022

## 2022-04-22 NOTE — PROGRESS NOTES
Comprehensive Nutrition Assessment    Type and Reason for Visit: Reassess  Best Practice Alert for Malnutrition Screening Tool: Recently Lost Weight Without Trying: Yes, If Yes, How Much Weight Loss: 14 - 23 lbs,    Poor intake/appetite 5 or more days (Oncology)    Nutrition Recommendations/Plan:   Meals and Snacks:  Continue current diet. Diet progression per GI  Nutrition Supplement Therapy:   Medical food supplement therapy:  Continue Ensure Clear three times per day (this provides 240 kcal and 8 grams protein per bottle)   With PEG placement not being possible per GI, only other option would be TPN, however question appropriateness with progressive cancer and oncology recommending hospice. Malnutrition Assessment:  Malnutrition Status: Severe malnutrition  Context: Chronic illness  Findings of clinical characteristics of malnutrition:   Energy Intake:  Unable to assess  Weight Loss:  7.000 - Greater than 20% over 1 year (84# (43.6%) in less than 1 year)     Body Fat Loss:  7 - Severe body fat loss, Fat overlying ribs,Orbital,Triceps   Muscle Mass Loss:  7 - Severe muscle mass loss, Clavicles (pectoralis &deltoids),Scapula (trapezius),Thigh (quadriceps),Temples (temporalis)  Fluid Accumulation:  Unable to assess,     Strength:  Not performed       Nutrition Assessment:   Nutrition History: Patient reports poor PO over last 2 weeks secondary to swallowing difficulties. She endorses that this is a recurrent problem. She state over the last 2 weeks she has only been able to tolerate small amounts of pudding, yogurt and water. She endorses significant weight loss over the last year. Nutrition Background: Patient with PMH significant for DM, DVT/PE, colocn cancer s/p resection, lymphedema of right upper extremity, breast cancer s/p chemo, esophageal narrowing with dilation. SHe presented to oncology office with severe dysphagia and unable to clear secretions and was a direct admit.    Nutrition Interval:  MRCP: 4/21: 1.  Suspected linitis plastica of the distal stomach and duodenum. 2.  Masslike structure located between the duodenum and head of the pancreas  causing severe severe biliary and pancreatic ductal obstruction suspicious for  either direct tumor invasion from the suspected infiltrative duodenal metastasis  versus serosal metastasis. 3.  Moderate right and mild left hydronephrosis. 4.  Multicentric enhancing tumor in the right chest wall. Focus of enhancement  in the sternum possibly corresponding with elevated uptake on PET/CT and  suspicious for bone metastasis. Per GI \"She is not a candidate for PEG placement due to peritoneal carcinomatosis and findings on MRCP. \"  Patient seen lying in bed. She states that she has not really been tolerating even clear liquid diet. She states she is suctioning essentially all PO with yonker. Visitor at bedside states that she likes to dip her mouth swab in the lemon italian ice. Explained that trays will continue come and take PO as tolerated. Palliative care and hospice consulted. Lab Results   Component Value Date/Time    Glucose 111 (H) 04/22/2022 02:59 AM    Glucose (POC) 85 04/20/2022 07:50 AM    Glucose (POC) 79 04/20/2022 05:46 AM    Glucose (POC) 81 04/19/2022 07:55 AM    Glucose (POC) 69 04/19/2022 06:15 AM    Glucose (POC) 102 (H) 03/24/2022 12:45 PM    Glucose (POC) 114 (H) 08/24/2021 07:52 AM   Glucose appropriate to continue Ensure Clear    Nutrition Related Findings:   NFPE as above. 4/20: EGD today with dilation but unable to do ERCP as \"There would be no way to pass the ERCP scope at this point. \"       Current Nutrition Therapies:  ADULT ORAL NUTRITION SUPPLEMENT Breakfast, Lunch, Dinner; Clear Liquid  ADULT DIET Clear Liquid    Current Intake:   Average Meal Intake: 1-25%        Anthropometric Measures:  Height: 5' 4\" (162.6 cm)  Current Body Wt: 48.9 kg (107 lb 12.9 oz) (4/18), Weight source: Standing scale  BMI: 18.5, Normal weight (BMI 18.5-24. 9)  Admission Body Weight: 107 lb 12.9 oz  Ideal Body Weight (lbs) (Calculated): 120 lbs (55 kg), 89.8 %  Usual Body Wt: 86.6 kg (191 lb) (5/18/21 office wt), Percent weight change: -43.6        Edema: RUE: 2+; Pitting (4/22/2022  8:50 AM)    Estimated Daily Nutrient Needs:  Energy (kcal/day): 9520-8003 (Kcal/kg (30-35), Weight Used: Current (48.9 kg (4/18)))  Protein (g/day): 73-98 (1.5-2 g/kg) Weight Used: (Current)  Fluid (ml/day):   (1 ml/kcal)    Nutrition Diagnosis:   · Inadequate oral intake related to swallowing difficulty as evidenced by NPO or clear liquid status due to medical condition (reported barrier, EGD with stricture, wt loss)    · Severe malnutrition related to inadequate protein-energy intake as evidenced by  (malnutrition criteria as above)    Nutrition Interventions:   Food and/or Nutrient Delivery: Continue current diet,Continue oral nutrition supplement     Coordination of Nutrition Care: Continue to monitor while inpatient  Plan of Care discussed with [facility], RN    Goals:   Previous Goal Met: No progress toward goal(s)  Active Goal: Advance diet vs initiate nutrition support by RD follow-up. Nutrition Monitoring and Evaluation:      Food/Nutrient Intake Outcomes: Food and nutrient intake  Physical Signs/Symptoms Outcomes: Chewing or swallowing,Meal time behavior,Weight    Discharge Planning:     Too soon to determine    [address], LD on 4/22/2022 at 4:22 PM  Contact: [phone]

## 2022-04-22 NOTE — PROGRESS NOTES
[facility] Progress Note         Admit Date:  4/18/2022    Today's Date:  4/22/2022    CC:  Dysphagia    Subjective:     Patient is sitting in bed and attempting to sip on some broth. She continues to require oral suctioning. Tells me that he is hopeful that someone will be able to help her. Medications:   Current Facility-Administered Medications   Medication Dose Route Frequency    dextrose 5 % - 0.45% NaCl infusion  75 mL/hr IntraVENous CONTINUOUS    phenol throat spray (CHLORASEPTIC) 1 Spray  1 Spray Oral PRN    [Held by provider] apixaban (ELIQUIS) tablet 5 mg  5 mg Oral BID    ondansetron (ZOFRAN) injection 4 mg  4 mg IntraVENous Q4H PRN    prochlorperazine (COMPAZINE) with saline injection 10 mg  10 mg IntraVENous Q6H PRN    morphine injection 1 mg  1 mg IntraVENous Q4H PRN       Review of Systems:  ROS was obtained, with pertinent positives as listed above. No chest pain or SOB. Diet:      Objective:   Vitals:  Visit Vitals  /82 (BP 1 Location: Left upper arm, BP Patient Position: Sitting)   Pulse 99   Temp 98.9 °F (37.2 °C)   Resp 18   Ht 5' 4\" (1.626 m)   Wt 50.5 kg (111 lb 6.4 oz)   SpO2 98%   BMI 19.12 kg/m²     Intake/Output:  No intake/output data recorded. 04/20 1901 - 04/22 0700  In: 9796 [P.O.:236; I.V.:1463]  Out: 600 [Urine:600]  Exam:  General appearance: alert, thin, appears chronically ill  Lungs: clear to auscultation bilaterally anteriorly  Heart: regular rate and rhythm  Abdomen: soft, non-tender.  Bowel sounds normal. No masses, no organomegaly  Extremities: extremities normal, atraumatic, no cyanosis or edema  Neuro:  alert and oriented    Data Review (Labs):    Recent Labs     04/22/22  0259 04/21/22  0247 04/20/22  0255   WBC 5.2 7.0 5.8   HGB 7.8* 8.2* 8.4*   HCT 23.9* 24.7* 25.4*    198 217   .7* 108.8* 108.1*    145 146*   K 3.2* 3.2* 3.5   * 112* 113*   CO2 27 27 26   BUN 18 22 24*   CREA 0.90 0.90 0.90   CA 8.4 8.5 9.0 MG 1.9 1.9 2.1   * 119* 67   * 1,034* 1,093*   * 111* 120*   ALT 43 43 45   TBILI 7.1* 6.6* 6.9*   ALB 1.8* 2.0* 2.1*   TP 5.3* 5.8* 6.0*       Assessment:     Active Problems:    Severe protein-calorie malnutrition ([address] (6/29/2021)      Weight loss (4/18/2022)      Dysphagia (4/18/2022)      Failure to thrive in adult (4/18/2022)      Metastatic breast cancer ([facility] [address] (4/18/2022)        Patient is B 77 y. o. female with PMH including but not limited to Breast Cancer, Peritonal Carcinomatosis, Hepatic flexure Colon Cancer, Pancreatic Pseudocyst s/p EUS with Dr. My Diaz, Hemicolectomy, DVT, DM Gricelda Ackerman is seen in consultation at the request of Romero Moss and Dr. Mary Alice Kaufman dysphagia.      EGD with dilation in June 2021 improved dysphagia until about 2 weeks ago. Patient reports that she ran out of Eliquis a few days ago so has not taken it in a few days.     4/19/2022: TB 6.3, , ALT 49, AP 1126, HGB 7.8, . -LFT significantly elevated starting on 4/18.     US: 4/19: Increased pancreatic and bile duct dilatation. Right Hydronephrosis.      ERCP could not be completed due to narrow esophagus. EGD was difficult. Dilation was performed and outlined above.      MRCP: 4/21: 1. Suspected linitis plastica of the distal stomach and duodenum. 2. Masslike structure located between the duodenum and head of the pancreas  causing severe severe biliary and pancreatic ductal obstruction suspicious for  either direct tumor invasion from the suspected infiltrative duodenal metastasis  versus serosal metastasis. 3.  Moderate right and mild left hydronephrosis. 4.  Multicentric enhancing tumor in the right chest wall. Focus of enhancement  in the sternum possibly corresponding with elevated uptake on PET/CT and  suspicious for bone metastasis.     4/21/2022: TB 6.6, , ALT 43, AP 1034, K 3.2, WBC 7, HGB 8.2.   4/22/2022:  TB 7.1, , ALT 43,   Plan:     Continue with clear liquid diet as tolerated. Per Speech- no Oropharyngeal dysphagia is present. Was not able to swallow enough barium for Barium Esophagram.   ENT consulted for further evaluation    She is not a candidate for PEG placement due to peritoneal carcinomatosis and findings on MRCP. Agree with Palliative care consult. Flaco Rajput NP    Patient is seen and examined in collaboration with Dr. Marbella Singh. Assessment and plan as per Dr. Marbella Singh.

## 2022-04-22 NOTE — PROGRESS NOTES
Iker Stock, NP with palliative care met with NEAL to discuss her conversation today with pt. Pt stated that she wants hospice care but there are social issues such as pt residing alone, not having 24/7 caregivers, and not having the finances to afford 24/7 caregivers. Raissa ordered a hospice consult. NEAL met with pt, her niece, and aunt who are visiting from out of state, at the bedside to discuss d/c planning. Pt voiced concern that she \"knows she cannot care for herself and wants someone to be there all of the time. \"  The family asked repeatedly about a hospice house. NEAL explained that it is an option, however Medicare has very stringent criteria that a pt must meet before being accepted. Currently pt's 2 options for hospice care are the [facility] (requested by family) or placement at a SNF. Another issue is that pt's only insurance is a private plan. There is no secondary. NEAL spoke with Samantha Ferguson RN with [facility] who is reviewing pt's chart and having the hospice business office check what benefits pt has with her insurance plan. CM notified pt's family that Chau Hines will be coming to speak with pt soon today. CM did not notify pt as the  was at the bedside praying with pt. Pt is currently still a Full Code. Code status and comfort measures need to be addressed with pt. CM will continue to follow and remain available if any needs arise.

## 2022-04-22 NOTE — PROGRESS NOTES
completed follow up visit. Patient was referred to  by palliative care after receiving bad news. Patient expressed shock and grief, related to her prognosis. Patient uses her ifeanyi to cope appropriately.  provided pastoral presence, prayer and empathetic listening.   Signed by  Janie Menon M.Div.

## 2022-04-22 NOTE — PROGRESS NOTES
Inpatient Hematology / Oncology Progress Note      Admission Date: 2022 12:45 PM  Reason for Admission/Hospital Course: Dysphagia [R13.10]  Failure to thrive in adult [R62.7]  Weight loss [R63.4]  Metastatic breast cancer (HCC) [C50.919]      24 Hour Events:  Afebrile, VSS  Possible repeat EGD with dilation in a few days  Tbili up to 7.1  Unable to perform ERCP d/t narrow esophagus  MRCP with suspected linitis plastica with masslike structure at duodenum/pancreatic head and possible bone mets at sternum  S/p barium esophagram this AM, pt not able to swallow enough barium  Not a candidate for PEG or PTC drain    Transfusions: None  Replacements: K+    ROS:  Constitutional: Positive for fatigue; negative for fever, chills. CV: Negative for chest pain, palpitations, edema. Respiratory: Negative for dyspnea, cough, wheezing. GI: Positive for nausea, dysphagia; negative for abdominal pain, diarrhea. 10 point review of systems is otherwise negative with the exception of the elements mentioned above in the HPI. Allergies   Allergen Reactions    Clarithromycin Rash     Other reaction(s): Other (See Comments)  Unknown (comments)    Cortisone Other (comments)     NUMBNESS IN THE LEG (SITE OF INJECTION)  Per pt, leg numbness. Other reaction(s): Other (See Comments)  NUMBNESS IN THE LEG (SITE OF INJECTION)       OBJECTIVE:  Patient Vitals for the past 8 hrs:   BP Temp Pulse Resp SpO2   22 0326 118/84 97.9 °F (36.6 °C) 84 18 96 %     Temp (24hrs), Av.8 °F (36.6 °C), Min:97.3 °F (36.3 °C), Max:98.3 °F (36.8 °C)    No intake/output data recorded. Physical Exam:  Constitutional: Chronically ill appearing female in no acute distress, sitting comfortably in the hospital bed. HEENT: Normocephalic and atraumatic. Sclerae icteric. Neck supple without JVD. No thyromegaly present. +using oral suction for secretions   Skin Warm and dry. No bruising and no rash noted. No erythema. No pallor.  Jaundiced Respiratory Lungs are clear to auscultation bilaterally without wheezes, rales or rhonchi, normal air exchange without accessory muscle use. CVS Normal rate, regular rhythm and normal S1 and S2. No murmurs, gallops, or rubs. Abdomen Soft, nontender and nondistended, normoactive bowel sounds. Neuro Grossly nonfocal with no obvious sensory or motor deficits. MSK Normal range of motion in general.  Trace BLE edema, RUE edema and no tenderness. Psych Appropriate mood and affect. Labs:      Recent Labs     04/22/22  0259 04/21/22  0247 04/20/22  0255   WBC 5.2 7.0 5.8   RBC 2.24* 2.27* 2.35*   HGB 7.8* 8.2* 8.4*   HCT 23.9* 24.7* 25.4*   .7* 108.8* 108.1*   MCH 34.8* 36.1* 35.7*   MCHC 32.6 33.2 33.1   RDW 14.5 14.7* 14.6    198 217   GRANS 83* 83* 77   LYMPH 10* 10* 15   MONOS 7 7 7   EOS 0* 0* 0*   BASOS 0 0 0   IG 0 1 0   DF AUTOMATED AUTOMATED AUTOMATED   ANEU 4.3 5.8 4.4   ABL 0.5 0.7 0.9   ABM 0.3 0.5 0.4   LAQUITA 0.0 0.0 0.0   ABB 0.0 0.0 0.0   AIG 0.0 0.0 0.0        Recent Labs     04/22/22  0259 04/21/22  0247 04/20/22  0255    145 146*   K 3.2* 3.2* 3.5   * 112* 113*   CO2 27 27 26   AGAP 6* 6* 7   * 119* 67   BUN 18 22 24*   CREA 0.90 0.90 0.90   GFRAA >60 >60 >60   GFRNA >60 >60 >60   CA 8.4 8.5 9.0   * 1,034* 1,093*   TP 5.3* 5.8* 6.0*   ALB 1.8* 2.0* 2.1*   GLOB 3.5 3.8* 3.9*   AGRAT 0.5* 0.5* 0.5*   MG 1.9 1.9 2.1         Imaging:  XR BA SWALLOW ESOPHOGRAM [[id number]] Collected: 04/22/22 0932   Order Status: Completed Updated: 04/22/22 0937   Narrative:     Barium swallow esophagram     CLINICAL INDICATION: Inability to swallow, inability to pass an EGD scope     PROCEDURE: The patient was only able to tolerate a three quarters semiupright   standing position. The patient was given thin barium contrast orally. COMPARISON: No prior.      FINDINGS: The patient was only able to pull a very small amount of thin liquid   into her mouth and had difficulty swallowing that very small volume of thin   liquid barium. Diagnostic imaging was not able to be performed. The patient   could not tolerate any further diagnostic imaging. Impression:     Nondiagnostic evaluation of the esophagus as the patient could not   swallow enough barium to visualize the esophagus. Total fluoroscopy time: 1.0 minutes; zero fluoroscopic spot image saved. MRI ABD W WO CONT [[id number]] Collected: 04/22/22 0809   Order Status: Completed Updated: 04/22/22 0916   Narrative:       EXAM: MRI ABDOMEN WITHOUT AND WITH IV CONTRAST     Indication: Dilated pancreatic duct and common bile duct. Comparison: PET/CT 3/24/2022, CT chest, abdomen, and pelvis 11/26/2021. Technique:  Multiplanar, multisequence MR imaging was performed of the abdomen   prior to and following gadolinium contrast. 9 mL Prohance contrast material was   administrated intravenously for the examination. This study was acquired   following the IV administration of contrast material, given the patient's   indications for the examination. If IV contrast material had not been   administered, the likely of detecting abnormalities relevant to the patient's   condition would have been substantially decreased. Three dimensional MRCP was   performed using maximum intensity projection reconstruction. FINDINGS:   Evaluation partially limit secondary to lack of MRCP images available. MRCP   imaging was attempted but were inadequate due to difficulties related to patient   breathing. Visualized Lungs: Bilateral effusions. Liver: Normal in size and morphology.  No focal lesions. Gallbladder/biliary: Severe intra and extrahepatic biliary dilatation. Pancreas: The below described mass located between the duodenum and head of the   pancreas. There is dilation of the main pancreatic duct the 7 mm. Spleen: Normal.     Adrenals: Normal.     Kidneys: Moderate right and mild left hydronephrosis. Gastrointestinal: Diffusely thickened appearance of the stomach from the gastric   body through the pylorus. The duodenum also appears thickened. There is a   concerning masslike structure medially to the first and second portions of the   duodenum at the head of the pancreas measuring approximately 3.1 cm (series 6,   image 18; series 4, image 13). Peritoneum/retroperitoneum: Free fluid in the pelvis. Lymph nodes: Normal.     Bones/Soft tissues: Significant lymphedema partially imaged throughout the right   chest wall and breast.     The left breast appears to be surgically absent. Multicentric enhancing tumor throughout the right breast. There is also focus of   enhancement in the sternum corresponding with elevated FDG uptake on comparison   PET/CT. Impression:     1.  Suspected linitis plastica of the distal stomach and duodenum. 2.  Masslike structure located between the duodenum and head of the pancreas   causing severe severe biliary and pancreatic ductal obstruction suspicious for   either direct tumor invasion from the suspected infiltrative duodenal metastasis   versus serosal metastasis. 3.  Moderate right and mild left hydronephrosis. 4.  Multicentric enhancing tumor in the right chest wall. Focus of enhancement   in the sternum possibly corresponding with elevated uptake on PET/CT and   suspicious for bone metastasis. MRI ABD WO CONT [[id number]]    [facility] [[id number]] Collected: 04/20/22 1855   Order Status: Completed Updated: 04/20/22 1858   Narrative:     Administrative Report     Fluoroscopy was provided in the operating room. Images may have been saved as a   reference for the surgical procedure. The operative report can be viewed in   [address] [facility] and/or retrieved by the Medical Records department.     Impression:       A Radiologist has not reviewed images associated with this exam.   XR CHEST PORT [[id number]] Collected: 04/20/22 1417   Order Status: Completed Updated: 04/20/22 1420   Narrative:     Portable chest x-ray     CLINICAL INDICATION: Postoperative evaluation, concern for aspiration     FINDINGS: Single AP view of the chest compared to a similar exam dated 6/30/2021   shows the lungs to be expanded and clear. No pleural effusion or pneumothorax. The cardiac silhouette and mediastinum are unremarkable. A left-sided chest port   is in place. Impression:     No acute cardiopulmonary abnormality. XR [facility] [[id number]] Updated: 04/20/22 1256   Order Status: [address] [[id number]] Collected: 04/19/22 1431   Order Status: Completed Updated: 04/19/22 1438   Narrative:     RIGHT UPPER QUADRANT  ULTRASOUND     INDICATION: Abnormal liver function, history of breast cancer     COMPARISON: CT dated 11/26/2021     Transabdominal imaging of the upper abdomen was performed. FINDINGS:   -LIVER: 12.4 cm.  Normal echogenicity.  No masses. -BILE DUCTS: The common bile duct is dilated, 14 mm.  There is also mild hepatic   bile duct dilatation. -GALLBLADDER: Absent. -PANCREAS: There is a 4 cm cyst near the tail the pancreas, stable compared with   the prior CT.  There is increased pancreatic duct distention, 5 mm in diameter. -RIGHT KIDNEY: 11.0 cm.  There is moderate-severe hydronephrosis. -ABDOMINAL AORTA AND IVC: Normal in size. -ASCITES: Minimal perihepatic ascites. Impression:     1.  Increased pancreatic and bile duct dilatation. 2.  Right hydronephrosis.           ASSESSMENT:    Problem List  Date Reviewed: 4/18/2022          Codes Class Noted    Weight loss ICD-10-CM: R63.4  ICD-9-CM: 783.21  4/18/2022        Dysphagia ICD-10-CM: R13.10  ICD-9-CM: 787.20  4/18/2022        Failure to thrive in adult ICD-10-CM: R62.7  ICD-9-CM: 783.7  4/18/2022        [facility] ([facility] ICD-10-CM: C50.919  ICD-9-CM: 174.9  4/18/2022        Hypomagnesemia ICD-10-CM: K82.17  ICD-9-CM: 275.2 10/4/2021        Dehydration ICD-10-CM: E86.0  ICD-9-CM: 276.51  9/13/2021        Hypokalemia ICD-10-CM: E87.6  ICD-9-CM: 276.8  9/13/2021        Malignant neoplasm of right female breast [facility]) ICD-10-CM: C50.911  ICD-9-CM: 174.9  8/10/2021        Severe protein-calorie malnutrition ([facility] ICD-10-CM: E43  ICD-9-CM: 262  6/29/2021        Pleural effusion, right ICD-10-CM: J90  ICD-9-CM: 511.9  6/25/2021        Lymphedema ICD-10-CM: I89.0  ICD-9-CM: 457.1  6/24/2021        Hx pulmonary embolism ICD-10-CM: P64.054  ICD-9-CM: V12.55  4/5/2021        Insomnia ICD-10-CM: G47.00  ICD-9-CM: 780.52  4/5/2021        Long term current use of anticoagulant ICD-10-CM: Z79.01  ICD-9-CM: V58.61  4/5/2021        Microcytic anemia ICD-10-CM: D50.9  ICD-9-CM: 280.9  4/5/2021        Malignant neoplasm of colon, unspecified ([facility] ICD-10-CM: C18.9  ICD-9-CM: 153.9  4/5/2021        Pancreatic pseudocyst ICD-10-CM: K86.3  ICD-9-CM: 577.2  4/5/2021        Lymphedema of right arm ICD-10-CM: I89.0  ICD-9-CM: 457.1  3/10/2021        Elbow stiffness, right ICD-10-CM: M25.621  ICD-9-CM: 719.52  3/10/2021        Type 2 diabetes with nephropathy ([facility] ICD-10-CM: E11.21  ICD-9-CM: 250.40, 583.81  9/11/2019        Dysuria ICD-10-CM: R30.0  ICD-9-CM: 788.1  9/9/2019        Iron deficiency anemia ICD-10-CM: D50.9  ICD-9-CM: 280.9  8/27/2019        Hyperlipidemia associated with type 2 diabetes mellitus ([facility] ICD-10-CM: E11.69, E78.5  ICD-9-CM: 250.80, 272.4  8/27/2019        Primary adenocarcinoma of ascending colon ([facility].) ICD-10-CM: C18.2  ICD-9-CM: 153.6  10/9/2018    Overview Signed 10/26/2018 10:47 AM by Shiela Maria MD     S/p R hemicolectomy 10/9/2018   DIAGNOSIS   RIGHT HEMICOLECTOMY: MODERATELY TO POORLY DIFFERENTIATED ADENOCARCINOMA, 4.6 CM IN GREATEST DIMENSION, EXTENDING THROUGH THE MUSCULARIS PROPRIA INTO THE PERICOLONIC ADIPOSE TISSUE. MARGINS UNINVOLVED. 28 BENIGN LYMPH NODES, ALL NEGATIVE FOR METASTATIC CARCINOMA (0/28). Electronically signed out on 10/11/2018 10:24 by Tamra Peralta. Tu Scruggs, III,              Malignant neoplasm of ascending colon [facility]) ICD-10-CM: C18.2  ICD-9-CM: 153.6  9/28/2018        Current use of anticoagulant therapy ICD-10-CM: Z79.01  ICD-9-CM: V58.61  9/18/2018        Obesity, morbid ([facility] ICD-10-CM: E66.01  ICD-9-CM: 278.01  4/5/2018        Obesity due to excess calories ICD-10-CM: E66.09  ICD-9-CM: 278.00  3/26/2018        Controlled type 2 diabetes mellitus without complication, with long-term current use of insulin ([facility] ICD-10-CM: E11.9, Z79.4  ICD-9-CM: 250.00, V58.67  3/26/2018        Axillary mass, right ICD-10-CM: R22.31  ICD-9-CM: 782.2  3/26/2018        Pancreatic mass ICD-10-CM: K86.89  ICD-9-CM: 577.8  3/26/2018        Type 2 diabetes mellitus with diabetic neuropathy ([facility] ICD-10-CM: E11.40  ICD-9-CM: 250.60, 357.2  2/21/2018        Lumbar radiculopathy ICD-10-CM: M54.16  ICD-9-CM: 724.4  2/6/2018        Lumbar stenosis with neurogenic claudication ICD-10-CM: R85.977  ICD-9-CM: 724.03  1/29/2018    Overview Signed 4/5/2021 11:02 AM by Massimo Mantilla     Last Assessment & Plan:   I want her to continue with home exercises. If her pain worsens, down her leg, then I would recommend a repeat epidural. She's been out of work two months. She can return to work with no restrictions. She can lift 65 lbs if needed. If her pain worsens, we will get another injection.              Bilateral low back pain with sciatica ICD-10-CM: M54.40  ICD-9-CM: 724.3  10/23/2017        Environmental allergies ICD-10-CM: Z91.09  ICD-9-CM: V15.09  9/12/2013                PLAN:  Metastatic Breast Cancer with peritoneal carcinomatosis  -s/p 4 cycles Carbo/Taxol  -currently on Keytruda, cycle 10 on 3/28  -PET on 3/24 with small to moderate ascites, increase in peritoneal disease  -Ca 27.29 overall stable at 271  4/22 MRCP with suspected linitis plastica of distal stomach and duodenum; masslike structure located b/t the duodenum and head of the pancreas causing severe biliary and pancreatic ductal obstruction suspicious for tumor invasion; and multicentric enhancing tumor in R chest wall, suspicion for bone mets at sternum. Asking palliative care to see pt today. Hospice would be appropriate. Severe Dysphagia  -last dilation on June 2021, reports 2 week history of poor intake/weight loss  -GI with plans of EGD/dilation on 4/20, Eliquis on hold  -per GI clear liquid diet  4/20 EGD/dilation today  4/21 s/p EGD with dilation yesterday, was difficult to perform but able to pass guidewire under fluoro and dilate with savary dilators. Plans for possible repeat EGD with dilation under fluoro in a few days. Barium esophagram is another option. She is not a candidate for PEG d/t peritoneal carcinomatosis. 4/22 s/p barium esophagram this AM, pt not able to swallow enough barium    Hyperbilirubinemia / Transaminitis  -Bili 6.3, ALT 63, , Alk Phos 1126, obtain Abd US  4/20 Bili up to 6.9. Abd US with increased pancreatic and bile duct dilatation. ERCP today. 4/21 Bili 6.6.  GI unable to complete ERCP d/t narrow esophagus. EUS is not an option as scope would not be able to traverse the esophagus. PTC is not an option d/t peritoneal carcinomatosis. MRCP pending. 4/22 Tbili up to 7.1. MRCP with suspected linitis plastica of distal stomach and duodenum; masslike structure located b/t the duodenum and head of the pancreas causing severe biliary and pancreatic ductal obstruction suspicious for tumor invasion; and multicentric enhancing tumor in R chest wall, suspicion for bone mets at sternum. Anemia-chronic  -monitor and replace per [facility] SOPs    Hx PE  - on Eliquis, held for hepatic dysfunction and procedures    R hydronephrosis  4/20 Abd US with R hydronephrosis. Seen previously on PET in March. Kidney fx wnl, con't to monitor. 4/22 MRCP with moderate R and mild L hydronephrosis. Kidney fx remains wnl.     Continue home meds  [facility] SOPs  SCDs for DVT ppx (Eliquis held)     Disposition:  TBD pending clinical course. Asking palliative care to come see pt today. Hospice would be appropriate.         Izzy Araiza NP  [facility]  [address] [place] [address]  Office : [phone]  Fax : [phone]

## 2022-04-22 NOTE — HOSPICE
[facility]    Lengthy meeting with patient and 2 female relatives at bedside. Explained hospice philosophy of care in great detail. Explained GIP criteria for hospice house admission. At this time patient has not made any decisions to de-escalate her care and focus on comfort. She has been given a lot of bad news today and needs time to process it and talk with her son [facility] when he gets back into town. Offered to have weekend liaison follow up with her tomorrow and she accepted. At this point her biggest concern is where she can be cared for since she can't take care of herself at home. Offered emotional support and [facility] availability to assist as we can. Updated Cat CM.     Thank you for this referral,  Valentina Reddy RN, BSN  [facility] liaison  [phone]

## 2022-04-22 NOTE — PROGRESS NOTES
Problem: Nutrition Deficit  Goal: *Optimize nutritional status  Outcome: Progressing Towards Goal     Problem: Falls - Risk of  Goal: *Absence of Falls  Description: Document Skylar Jonathan Fall Risk and appropriate interventions in the flowsheet. Outcome: Progressing Towards Goal  Note: Fall Risk Interventions:  Mobility Interventions: Communicate number of staff needed for ambulation/transfer         Medication Interventions: Evaluate medications/consider consulting pharmacy    Elimination Interventions: Call light in reach    History of Falls Interventions: Door open when patient unattended         Problem: Falls - Risk of  Goal: *Absence of Falls  Description: Document Skylar Jonathan Fall Risk and appropriate interventions in the flowsheet.   Outcome: Progressing Towards Goal  Note: Fall Risk Interventions:  Mobility Interventions: Communicate number of staff needed for ambulation/transfer         Medication Interventions: Evaluate medications/consider consulting pharmacy    Elimination Interventions: Call light in reach    History of Falls Interventions: Door open when patient unattended

## 2022-04-23 NOTE — PROGRESS NOTES
Inpatient Hematology / Oncology Progress Note      Admission Date: 2022 12:45 PM  Reason for Admission/Hospital Course: Dysphagia [R13.10]  Failure to thrive in adult [R62.7]  Weight loss [R63.4]  Metastatic breast cancer (HCC) [C50.919]      24 Hour Events:  Afebrile, VSS  No clinical changes  Tbili up to 7.7  Spoke with hospice yesterday however is overwhelmed and wants to discuss with her son prior to making any decisions. She does not have anyone to care for her at home. Transfusions: None  Replacements: K+    ROS:  Constitutional: Positive for fatigue; negative for fever, chills. CV: Negative for chest pain, palpitations, edema. Respiratory: Negative for dyspnea, cough, wheezing. GI: Positive for nausea, dysphagia; negative for abdominal pain, diarrhea. 10 point review of systems is otherwise negative with the exception of the elements mentioned above in the HPI. Allergies   Allergen Reactions    Clarithromycin Rash     Other reaction(s): Other (See Comments)  Unknown (comments)    Cortisone Other (comments)     NUMBNESS IN THE LEG (SITE OF INJECTION)  Per pt, leg numbness. Other reaction(s): Other (See Comments)  NUMBNESS IN THE LEG (SITE OF INJECTION)       OBJECTIVE:  Patient Vitals for the past 8 hrs:   BP Temp Pulse Resp SpO2   22 1123 128/75 97.8 °F (36.6 °C) 81 19 97 %   22 0733 133/72 98.2 °F (36.8 °C) 80 19 96 %     Temp (24hrs), Av.9 °F (36.6 °C), Min:97.6 °F (36.4 °C), Max:98.2 °F (36.8 °C)    No intake/output data recorded. Physical Exam:  Constitutional: Chronically ill appearing female in no acute distress, sitting comfortably in the hospital bed. HEENT: Normocephalic and atraumatic. Sclerae icteric. Neck supple without JVD. No thyromegaly present. Skin Warm and dry. No bruising and no rash noted. No erythema. No pallor.  Jaundiced   Respiratory Lungs are clear to auscultation bilaterally without wheezes, rales or rhonchi, normal air exchange without accessory muscle use. CVS Normal rate, regular rhythm and normal S1 and S2. No murmurs, gallops, or rubs. Abdomen Soft, nontender and nondistended, normoactive bowel sounds. Neuro Grossly nonfocal with no obvious sensory or motor deficits. MSK Normal range of motion in general.  Trace BLE edema, RUE edema and no tenderness. Psych Appropriate mood and affect. Labs:      Recent Labs     04/23/22  0453 04/22/22  0259 04/21/22  0247   WBC 4.5 5.2 7.0   RBC 2.15* 2.24* 2.27*   HGB 7.7* 7.8* 8.2*   HCT 23.1* 23.9* 24.7*   .4* 106.7* 108.8*   MCH 35.8* 34.8* 36.1*   MCHC 33.3 32.6 33.2   RDW 14.8* 14.5 14.7*    189 198   GRANS 75 83* 83*   LYMPH 14 10* 10*   MONOS 10 7 7   EOS 1 0* 0*   BASOS 0 0 0   IG 0 0 1   DF AUTOMATED AUTOMATED AUTOMATED   ANEU 3.3 4.3 5.8   ABL 0.6 0.5 0.7   ABM 0.4 0.3 0.5   LAQUITA 0.0 0.0 0.0   ABB 0.0 0.0 0.0   AIG 0.0 0.0 0.0        Recent Labs     04/23/22 0453 04/22/22  1402 04/22/22  0259 04/21/22  0247 04/21/22  0247     --  143  --  145   K 3.5 3.4* 3.2*   < > 3.2*   *  --  110*  --  112*   CO2 27  --  27  --  27   AGAP 4*  --  6*  --  6*   *  --  111*  --  119*   BUN 17  --  18  --  22   CREA 0.90  --  0.90  --  0.90   GFRAA >60  --  >60  --  >60   GFRNA >60  --  >60  --  >60   CA 8.3  --  8.4  --  8.5   *  --  960*  --  1,034*   TP 5.1*  --  5.3*  --  5.8*   ALB 1.8*  --  1.8*  --  2.0*   GLOB 3.3  --  3.5  --  3.8*   AGRAT 0.5*  --  0.5*  --  0.5*   MG 1.9  --  1.9  --  1.9    < > = values in this interval not displayed. Imaging:  XR BA SWALLOW ESOPHOGRAM [[id number]] Collected: 04/22/22 0932   Order Status: Completed Updated: 04/22/22 0937   Narrative:     Barium swallow esophagram     CLINICAL INDICATION: Inability to swallow, inability to pass an EGD scope     PROCEDURE: The patient was only able to tolerate a three quarters semiupright   standing position. The patient was given thin barium contrast orally. COMPARISON: No prior. FINDINGS: The patient was only able to pull a very small amount of thin liquid   into her mouth and had difficulty swallowing that very small volume of thin   liquid barium. Diagnostic imaging was not able to be performed. The patient   could not tolerate any further diagnostic imaging. Impression:     Nondiagnostic evaluation of the esophagus as the patient could not   swallow enough barium to visualize the esophagus. Total fluoroscopy time: 1.0 minutes; zero fluoroscopic spot image saved. MRI ABD W WO CONT [[id number]] Collected: 04/22/22 0809   Order Status: Completed Updated: 04/22/22 0916   Narrative:       EXAM: MRI ABDOMEN WITHOUT AND WITH IV CONTRAST     Indication: Dilated pancreatic duct and common bile duct. Comparison: PET/CT 3/24/2022, CT chest, abdomen, and pelvis 11/26/2021. Technique:  Multiplanar, multisequence MR imaging was performed of the abdomen   prior to and following gadolinium contrast. 9 mL Prohance contrast material was   administrated intravenously for the examination. This study was acquired   following the IV administration of contrast material, given the patient's   indications for the examination. If IV contrast material had not been   administered, the likely of detecting abnormalities relevant to the patient's   condition would have been substantially decreased. Three dimensional MRCP was   performed using maximum intensity projection reconstruction. FINDINGS:   Evaluation partially limit secondary to lack of MRCP images available. MRCP   imaging was attempted but were inadequate due to difficulties related to patient   breathing. Visualized Lungs: Bilateral effusions. Liver: Normal in size and morphology.  No focal lesions. Gallbladder/biliary: Severe intra and extrahepatic biliary dilatation. Pancreas: The below described mass located between the duodenum and head of the   pancreas.  There is dilation of the main pancreatic duct the 7 mm. Spleen: Normal.     Adrenals: Normal.     Kidneys: Moderate right and mild left hydronephrosis. Gastrointestinal: Diffusely thickened appearance of the stomach from the gastric   body through the pylorus. The duodenum also appears thickened. There is a   concerning masslike structure medially to the first and second portions of the   duodenum at the head of the pancreas measuring approximately 3.1 cm (series 6,   image 18; series 4, image 13). Peritoneum/retroperitoneum: Free fluid in the pelvis. Lymph nodes: Normal.     Bones/Soft tissues: Significant lymphedema partially imaged throughout the right   chest wall and breast.     The left breast appears to be surgically absent. Multicentric enhancing tumor throughout the right breast. There is also focus of   enhancement in the sternum corresponding with elevated FDG uptake on comparison   PET/CT. Impression:     1.  Suspected linitis plastica of the distal stomach and duodenum. 2.  Masslike structure located between the duodenum and head of the pancreas   causing severe severe biliary and pancreatic ductal obstruction suspicious for   either direct tumor invasion from the suspected infiltrative duodenal metastasis   versus serosal metastasis. 3.  Moderate right and mild left hydronephrosis. 4.  Multicentric enhancing tumor in the right chest wall. Focus of enhancement   in the sternum possibly corresponding with elevated uptake on PET/CT and   suspicious for bone metastasis. MRI ABD WO CONT [[id number]]    [facility] [place] [facility] [[id number]] Collected: 04/20/22 1855   Order Status: Completed Updated: 04/20/22 1858   Narrative:     Administrative Report     Fluoroscopy was provided in the operating room. Images may have been saved as a   reference for the surgical procedure. The operative report can be viewed in   [address] [place] [address] and/or retrieved by the Medical Records department. Impression:       A Radiologist has not reviewed images associated with this exam.   XR CHEST PORT [[id number]] Collected: 04/20/22 1417   Order Status: Completed Updated: 04/20/22 1420   Narrative:     Portable chest x-ray     CLINICAL INDICATION: Postoperative evaluation, concern for aspiration     FINDINGS: Single AP view of the chest compared to a similar exam dated 6/30/2021   shows the lungs to be expanded and clear. No pleural effusion or pneumothorax. The cardiac silhouette and mediastinum are unremarkable. A left-sided chest port   is in place. Impression:     No acute cardiopulmonary abnormality. XR [facility] [[id number]] Updated: 04/20/22 1256   Order Status: [address] [[id number]] Collected: 04/19/22 1431   Order Status: Completed Updated: 04/19/22 1438   Narrative:     RIGHT UPPER QUADRANT  ULTRASOUND     INDICATION: Abnormal liver function, history of breast cancer     COMPARISON: CT dated 11/26/2021     Transabdominal imaging of the upper abdomen was performed. FINDINGS:   -LIVER: 12.4 cm.  Normal echogenicity.  No masses. -BILE DUCTS: The common bile duct is dilated, 14 mm.  There is also mild hepatic   bile duct dilatation. -GALLBLADDER: Absent. -PANCREAS: There is a 4 cm cyst near the tail the pancreas, stable compared with   the prior CT.  There is increased pancreatic duct distention, 5 mm in diameter. -RIGHT KIDNEY: 11.0 cm.  There is moderate-severe hydronephrosis. -ABDOMINAL AORTA AND IVC: Normal in size. -ASCITES: Minimal perihepatic ascites. Impression:     1.  Increased pancreatic and bile duct dilatation. 2.  Right hydronephrosis.           ASSESSMENT:    Problem List  Date Reviewed: 4/18/2022          Codes Class Noted    Weight loss ICD-10-CM: R63.4  ICD-9-CM: 783.21  4/18/2022        Dysphagia ICD-10-CM: R13.10  ICD-9-CM: 787.20  4/18/2022        Failure to thrive in adult ICD-10-CM: R62.7  ICD-9-CM: 783.7  4/18/2022        Metastatic breast cancer ([facility] ICD-10-CM: C50.919  ICD-9-CM: 174.9  4/18/2022        Hypomagnesemia ICD-10-CM: E83.42  ICD-9-CM: 275.2  10/4/2021        Dehydration ICD-10-CM: E86.0  ICD-9-CM: 276.51  9/13/2021        Hypokalemia ICD-10-CM: E87.6  ICD-9-CM: 276.8  9/13/2021        Malignant neoplasm of right female breast [facility]) ICD-10-CM: C50.911  ICD-9-CM: 174.9  8/10/2021        Severe protein-calorie malnutrition ([facility] ICD-10-CM: E43  ICD-9-CM: 262  6/29/2021        Pleural effusion, right ICD-10-CM: J90  ICD-9-CM: 511.9  6/25/2021        Lymphedema ICD-10-CM: I89.0  ICD-9-CM: 457.1  6/24/2021        Hx pulmonary embolism ICD-10-CM: T99.701  ICD-9-CM: V12.55  4/5/2021        Insomnia ICD-10-CM: G47.00  ICD-9-CM: 780.52  4/5/2021        Long term current use of anticoagulant ICD-10-CM: Z79.01  ICD-9-CM: V58.61  4/5/2021        Microcytic anemia ICD-10-CM: D50.9  ICD-9-CM: 280.9  4/5/2021        Malignant neoplasm of colon, unspecified ([facility] ICD-10-CM: C18.9  ICD-9-CM: 153.9  4/5/2021        Pancreatic pseudocyst ICD-10-CM: K86.3  ICD-9-CM: 577.2  4/5/2021        Lymphedema of right arm ICD-10-CM: I89.0  ICD-9-CM: 457.1  3/10/2021        Elbow stiffness, right ICD-10-CM: M25.621  ICD-9-CM: 719.52  3/10/2021        Type 2 diabetes with nephropathy ([facility] ICD-10-CM: E11.21  ICD-9-CM: 250.40, 583.81  9/11/2019        Dysuria ICD-10-CM: R30.0  ICD-9-CM: 788.1  9/9/2019        Iron deficiency anemia ICD-10-CM: D50.9  ICD-9-CM: 280.9  8/27/2019        Hyperlipidemia associated with type 2 diabetes mellitus ([facility] ICD-10-CM: E11.69, E78.5  ICD-9-CM: 250.80, 272.4  8/27/2019        Primary adenocarcinoma of ascending colon ([facility] ICD-10-CM: C18.2  ICD-9-CM: 153.6  10/9/2018    Overview Signed 10/26/2018 10:47 AM by Trey Almeida MD     S/p R hemicolectomy 10/9/2018   DIAGNOSIS   RIGHT HEMICOLECTOMY: MODERATELY TO POORLY DIFFERENTIATED ADENOCARCINOMA, 4.6 CM IN GREATEST DIMENSION, EXTENDING THROUGH THE MUSCULARIS PROPRIA INTO THE PERICOLONIC ADIPOSE TISSUE. MARGINS UNINVOLVED. 28 BENIGN LYMPH NODES, ALL NEGATIVE FOR METASTATIC CARCINOMA (0/28). Electronically signed out on 10/11/2018 10:24 by Victoria Vazquez. Rachid Branden, III,              Malignant neoplasm of ascending colon [facility]) ICD-10-CM: C18.2  ICD-9-CM: 153.6  9/28/2018        Current use of anticoagulant therapy ICD-10-CM: Z79.01  ICD-9-CM: V58.61  9/18/2018        Obesity, morbid ([facility] ICD-10-CM: E66.01  ICD-9-CM: 278.01  4/5/2018        Obesity due to excess calories ICD-10-CM: E66.09  ICD-9-CM: 278.00  3/26/2018        Controlled type 2 diabetes mellitus without complication, with long-term current use of insulin ([facility] ICD-10-CM: E11.9, Z79.4  ICD-9-CM: 250.00, V58.67  3/26/2018        Axillary mass, right ICD-10-CM: R22.31  ICD-9-CM: 782.2  3/26/2018        Pancreatic mass ICD-10-CM: K86.89  ICD-9-CM: 577.8  3/26/2018        Type 2 diabetes mellitus with diabetic neuropathy ([facility] ICD-10-CM: E11.40  ICD-9-CM: 250.60, 357.2  2/21/2018        Lumbar radiculopathy ICD-10-CM: M54.16  ICD-9-CM: 724.4  2/6/2018        Lumbar stenosis with neurogenic claudication ICD-10-CM: I93.670  ICD-9-CM: 724.03  1/29/2018    Overview Signed 4/5/2021 11:02 AM by Samia Dang     Last Assessment & Plan:   I want her to continue with home exercises. If her pain worsens, down her leg, then I would recommend a repeat epidural. She's been out of work two months. She can return to work with no restrictions. She can lift 65 lbs if needed. If her pain worsens, we will get another injection.              Bilateral low back pain with sciatica ICD-10-CM: M54.40  ICD-9-CM: 724.3  10/23/2017        Environmental allergies ICD-10-CM: Z91.09  ICD-9-CM: V15.09  9/12/2013                PLAN:  Metastatic Breast Cancer with peritoneal carcinomatosis  -s/p 4 cycles Carbo/Taxol  -currently on Keytruda, cycle 10 on 3/28  -PET on 3/24 with small to moderate ascites, increase in peritoneal disease  -Ca 27.29 overall stable at 271  4/22 MRCP with suspected linitis plastica of distal stomach and duodenum; masslike structure located b/t the duodenum and head of the pancreas causing severe biliary and pancreatic ductal obstruction suspicious for tumor invasion; and multicentric enhancing tumor in R chest wall, suspicion for bone mets at sternum. Asking palliative care to see pt today. Hospice would be appropriate. Severe Dysphagia  -last dilation on June 2021, reports 2 week history of poor intake/weight loss  -GI with plans of EGD/dilation on 4/20, Eliquis on hold  -per GI clear liquid diet  4/20 EGD/dilation today  4/21 s/p EGD with dilation yesterday, was difficult to perform but able to pass guidewire under fluoro and dilate with savary dilators. Plans for possible repeat EGD with dilation under fluoro in a few days. Barium esophagram is another option. She is not a candidate for PEG d/t peritoneal carcinomatosis. 4/22 s/p barium esophagram this AM, pt not able to swallow enough barium    Hyperbilirubinemia / Transaminitis  -Bili 6.3, ALT 63, , Alk Phos 1126, obtain Abd US  4/20 Bili up to 6.9. Abd US with increased pancreatic and bile duct dilatation. ERCP today. 4/21 Bili 6.6.  GI unable to complete ERCP d/t narrow esophagus. EUS is not an option as scope would not be able to traverse the esophagus. PTC is not an option d/t peritoneal carcinomatosis. MRCP pending. 4/22 Tbili up to 7.1. MRCP with suspected linitis plastica of distal stomach and duodenum; masslike structure located b/t the duodenum and head of the pancreas causing severe biliary and pancreatic ductal obstruction suspicious for tumor invasion; and multicentric enhancing tumor in R chest wall, suspicion for bone mets at sternum. 4/23 Bili 7.7    Anemia-chronic  -monitor and replace per [facility] SOPs    Hx PE  - on Eliquis, held for hepatic dysfunction and procedures    R hydronephrosis  4/20 Abd US with R hydronephrosis.   Seen previously on PET in March.  Kidney fx wnl, con't to monitor. 4/22 MRCP with moderate R and mild L hydronephrosis. Kidney fx remains wnl. Continue home meds  [facility] SOPs  SCDs for DVT ppx (Eliquis held)     Disposition:  TBD pending clinical course. Patient is overwhelmed with current status and need to make care decisions, desires to wait and discuss with son, Laurel Conway. She desires to remain inpatient, however it was discussed that in the long term that is not appropriate and that we would help her make a plan to be cared for outside of the hospital either at home with caregivers or if she is a candidate for hospice house.           Jose Orr, NP  [facility]  [address] [facility] [place], [address] [place] [address]  Office : [phone]  Fax : [phone]

## 2022-04-23 NOTE — HOSPICE
Hospice liaison follow up with patient and son at bedside. Review hospice levels of care focusing on routine home care and In patient unit. Provided time for patient and son to ask questions. Patient states the main focus at this time is to find somewhere that can help her with daily living. Patient talked about going to a facility for rehabilitation. Patient reinforced she needs a caregiver in the home setting and her son works full time. Patient states she will not be able to finalize plans over the weekend. Patient agrees to follow up on Monday 4-25-22 with hospice liaison.

## 2022-04-23 NOTE — PROGRESS NOTES
Problem: Falls - Risk of  Goal: *Absence of Falls  Description: Document Rosie Locket Fall Risk and appropriate interventions in the flowsheet. Outcome: Progressing Towards Goal  Note: Fall Risk Interventions:  Mobility Interventions: Communicate number of staff needed for ambulation/transfer         Medication Interventions: Evaluate medications/consider consulting pharmacy,Patient to call before getting OOB    Elimination Interventions: Call light in reach    History of Falls Interventions: Door open when patient unattended         Problem: Pressure Injury - Risk of  Goal: *Prevention of pressure injury  Description: Document Lito Scale and appropriate interventions in the flowsheet. Outcome: Progressing Towards Goal  Note: Pressure Injury Interventions: Activity Interventions: Increase time out of bed    Mobility Interventions: Assess need for specialty bed    Nutrition Interventions: Document food/fluid/supplement intake    Friction and Shear Interventions: Apply protective barrier, creams and emollients                Problem: Pressure Injury - Risk of  Goal: *Prevention of pressure injury  Description: Document Lito Scale and appropriate interventions in the flowsheet. Outcome: Progressing Towards Goal  Note: Pressure Injury Interventions:             Activity Interventions: Increase time out of bed    Mobility Interventions: Assess need for specialty bed    Nutrition Interventions: Document food/fluid/supplement intake    Friction and Shear Interventions: Apply protective barrier, creams and emollients

## 2022-04-24 NOTE — PROGRESS NOTES
Pt felt SOB with back and chest pain  o2 sat 87 on RA o2 placed on at 2L   sats came up 96%.     States \"feels a little better\"

## 2022-04-24 NOTE — PROGRESS NOTES
Received pt from RN (Lisa Ulrich) in stable condition. Pt in bed resting quietly. Resp even & unlabored on 2L NC; no acute signs of distress noted. Bed low & locked; call light in reach; no needs voiced.

## 2022-04-24 NOTE — PROGRESS NOTES
Inpatient Hematology / Oncology Progress Note      Admission Date: 2022 12:45 PM  Reason for Admission/Hospital Course: Dysphagia [R13.10]  Failure to thrive in adult [R62.7]  Weight loss [R63.4]  Metastatic breast cancer (HCC) [C50.919]      24 Hour Events:  Afebrile, VSS  C/o SOB  Tbili up to 9.0      Transfusions: None  Replacements: K+    ROS:  Constitutional: Positive for fatigue; negative for fever, chills. CV: Negative for chest pain, palpitations, edema. Respiratory: +dyspnea Negative for  cough, wheezing. GI: Positive for nausea, dysphagia; negative for abdominal pain, diarrhea. 10 point review of systems is otherwise negative with the exception of the elements mentioned above in the HPI. Allergies   Allergen Reactions    Clarithromycin Rash     Other reaction(s): Other (See Comments)  Unknown (comments)    Cortisone Other (comments)     NUMBNESS IN THE LEG (SITE OF INJECTION)  Per pt, leg numbness. Other reaction(s): Other (See Comments)  NUMBNESS IN THE LEG (SITE OF INJECTION)       OBJECTIVE:  Patient Vitals for the past 8 hrs:   BP Temp Pulse Resp SpO2   22 0725 126/71 98.8 °F (37.1 °C) (!) 114 19 99 %   22 0325 125/72 98 °F (36.7 °C) 82 18 96 %     Temp (24hrs), Av °F (36.7 °C), Min:97.7 °F (36.5 °C), Max:98.8 °F (37.1 °C)     0701 -  1900  In: -   Out: 200 [Urine:200]    Physical Exam:  Constitutional: Chronically ill appearing female in no acute distress, sitting comfortably in the hospital bed. HEENT: Normocephalic and atraumatic. Sclerae icteric. Neck supple without JVD. No thyromegaly present. Skin Warm and dry. No bruising and no rash noted. No erythema. No pallor. Jaundiced   Respiratory Lungs sounds are tight with some scattered wheezes, normal air exchange without accessory muscle use. CVS Normal rate, regular rhythm and normal S1 and S2. No murmurs, gallops, or rubs.    Abdomen Soft, nontender and nondistended, normoactive bowel sounds. Neuro Grossly nonfocal with no obvious sensory or motor deficits. MSK Normal range of motion in general.  Trace BLE edema, RUE edema and no tenderness. Psych Appropriate mood and affect. Labs:      Recent Labs     04/24/22 0420 04/23/22 0453 04/22/22  0259   WBC 4.1* 4.5 5.2   RBC 2.16* 2.15* 2.24*   HGB 7.6* 7.7* 7.8*   HCT 22.7* 23.1* 23.9*   .1* 107.4* 106.7*   MCH 35.2* 35.8* 34.8*   MCHC 33.5 33.3 32.6   RDW 15.0* 14.8* 14.5    180 189   GRANS 74 75 83*   LYMPH 16 14 10*   MONOS 9 10 7   EOS 1 1 0*   BASOS 0 0 0   IG 1 0 0   DF AUTOMATED AUTOMATED AUTOMATED   ANEU 3.0 3.3 4.3   ABL 0.7 0.6 0.5   ABM 0.4 0.4 0.3   LAQUITA 0.0 0.0 0.0   ABB 0.0 0.0 0.0   AIG 0.0 0.0 0.0        Recent Labs     04/24/22 0420 04/23/22 0453 04/22/22  1402 04/22/22  0259 04/22/22  0259    140  --   --  143   K 3.2* 3.5 3.4*   < > 3.2*   * 109*  --   --  110*   CO2 26 27  --   --  27   AGAP 7 4*  --   --  6*   * 118*  --   --  111*   BUN 18 17  --   --  18   CREA 0.80 0.90  --   --  0.90   GFRAA >60 >60  --   --  >60   GFRNA >60 >60  --   --  >60   CA 8.3 8.3  --   --  8.4   * 983*  --   --  960*   TP 5.4* 5.1*  --   --  5.3*   ALB 1.7* 1.8*  --   --  1.8*   GLOB 3.7* 3.3  --   --  3.5   AGRAT 0.5* 0.5*  --   --  0.5*   MG 1.7* 1.9  --   --  1.9    < > = values in this interval not displayed. Imaging:  XR BA SWALLOW ESOPHOGRAM [[id number]] Collected: 04/22/22 0932   Order Status: Completed Updated: 04/22/22 0937   Narrative:     Barium swallow esophagram     CLINICAL INDICATION: Inability to swallow, inability to pass an EGD scope     PROCEDURE: The patient was only able to tolerate a three quarters semiupright   standing position. The patient was given thin barium contrast orally. COMPARISON: No prior.      FINDINGS: The patient was only able to pull a very small amount of thin liquid   into her mouth and had difficulty swallowing that very small volume of thin liquid barium. Diagnostic imaging was not able to be performed. The patient   could not tolerate any further diagnostic imaging. Impression:     Nondiagnostic evaluation of the esophagus as the patient could not   swallow enough barium to visualize the esophagus. Total fluoroscopy time: 1.0 minutes; zero fluoroscopic spot image saved. MRI ABD W WO CONT [[id number]] Collected: 04/22/22 0809   Order Status: Completed Updated: 04/22/22 0916   Narrative:       EXAM: MRI ABDOMEN WITHOUT AND WITH IV CONTRAST     Indication: Dilated pancreatic duct and common bile duct. Comparison: PET/CT 3/24/2022, CT chest, abdomen, and pelvis 11/26/2021. Technique:  Multiplanar, multisequence MR imaging was performed of the abdomen   prior to and following gadolinium contrast. 9 mL Prohance contrast material was   administrated intravenously for the examination. This study was acquired   following the IV administration of contrast material, given the patient's   indications for the examination. If IV contrast material had not been   administered, the likely of detecting abnormalities relevant to the patient's   condition would have been substantially decreased. Three dimensional MRCP was   performed using maximum intensity projection reconstruction. FINDINGS:   Evaluation partially limit secondary to lack of MRCP images available. MRCP   imaging was attempted but were inadequate due to difficulties related to patient   breathing. Visualized Lungs: Bilateral effusions. Liver: Normal in size and morphology.  No focal lesions. Gallbladder/biliary: Severe intra and extrahepatic biliary dilatation. Pancreas: The below described mass located between the duodenum and head of the   pancreas. There is dilation of the main pancreatic duct the 7 mm. Spleen: Normal.     Adrenals: Normal.     Kidneys: Moderate right and mild left hydronephrosis.      Gastrointestinal: Diffusely thickened appearance of the stomach from the gastric   body through the pylorus. The duodenum also appears thickened. There is a   concerning masslike structure medially to the first and second portions of the   duodenum at the head of the pancreas measuring approximately 3.1 cm (series 6,   image 18; series 4, image 13). Peritoneum/retroperitoneum: Free fluid in the pelvis. Lymph nodes: Normal.     Bones/Soft tissues: Significant lymphedema partially imaged throughout the right   chest wall and breast.     The left breast appears to be surgically absent. Multicentric enhancing tumor throughout the right breast. There is also focus of   enhancement in the sternum corresponding with elevated FDG uptake on comparison   PET/CT. Impression:     1.  Suspected linitis plastica of the distal stomach and duodenum. 2.  Masslike structure located between the duodenum and head of the pancreas   causing severe severe biliary and pancreatic ductal obstruction suspicious for   either direct tumor invasion from the suspected infiltrative duodenal metastasis   versus serosal metastasis. 3.  Moderate right and mild left hydronephrosis. 4.  Multicentric enhancing tumor in the right chest wall. Focus of enhancement   in the sternum possibly corresponding with elevated uptake on PET/CT and   suspicious for bone metastasis. MRI ABD WO CONT [[id number]]    [facility] [[id number]] Collected: 04/20/22 1855   Order Status: Completed Updated: 04/20/22 1858   Narrative:     Administrative Report     Fluoroscopy was provided in the operating room. Images may have been saved as a   reference for the surgical procedure. The operative report can be viewed in   [address] [place] [address] and/or retrieved by the Medical Records department.     Impression:       A Radiologist has not reviewed images associated with this exam.   XR CHEST PORT [[id number]] Collected: 04/20/22 1417   Order Status: Completed Updated: 04/20/22 1420 Narrative:     Portable chest x-ray     CLINICAL INDICATION: Postoperative evaluation, concern for aspiration     FINDINGS: Single AP view of the chest compared to a similar exam dated 6/30/2021   shows the lungs to be expanded and clear. No pleural effusion or pneumothorax. The cardiac silhouette and mediastinum are unremarkable. A left-sided chest port   is in place. Impression:     No acute cardiopulmonary abnormality. XR [facility] [[id number]] Updated: 04/20/22 1256   Order Status: [facility] [[id number]] Collected: 04/19/22 1431   Order Status: Completed Updated: 04/19/22 1438   Narrative:     RIGHT UPPER QUADRANT  ULTRASOUND     INDICATION: Abnormal liver function, history of breast cancer     COMPARISON: CT dated 11/26/2021     Transabdominal imaging of the upper abdomen was performed. FINDINGS:   -LIVER: 12.4 cm.  Normal echogenicity.  No masses. -BILE DUCTS: The common bile duct is dilated, 14 mm.  There is also mild hepatic   bile duct dilatation. -GALLBLADDER: Absent. -PANCREAS: There is a 4 cm cyst near the tail the pancreas, stable compared with   the prior CT.  There is increased pancreatic duct distention, 5 mm in diameter. -RIGHT KIDNEY: 11.0 cm.  There is moderate-severe hydronephrosis. -ABDOMINAL AORTA AND IVC: Normal in size. -ASCITES: Minimal perihepatic ascites. Impression:     1.  Increased pancreatic and bile duct dilatation. 2.  Right hydronephrosis.           ASSESSMENT:    Problem List  Date Reviewed: 4/18/2022          Codes Class Noted    Weight loss ICD-10-CM: R63.4  ICD-9-CM: 783.21  4/18/2022        Dysphagia ICD-10-CM: R13.10  ICD-9-CM: 787.20  4/18/2022        Failure to thrive in adult ICD-10-CM: R62.7  ICD-9-CM: 783.7  4/18/2022        Metastatic breast cancer ([facility] ICD-10-CM: C50.919  ICD-9-CM: 174.9  4/18/2022        Hypomagnesemia ICD-10-CM: B72.92  ICD-9-CM: 275.2  10/4/2021        Dehydration ICD-10-CM: E86.0  ICD-9-CM: 276.51  9/13/2021        Hypokalemia ICD-10-CM: E87.6  ICD-9-CM: 276.8  9/13/2021        Malignant neoplasm of right female breast [facility]) ICD-10-CM: C50.911  ICD-9-CM: 174.9  8/10/2021        Severe protein-calorie malnutrition ([facility] ICD-10-CM: E43  ICD-9-CM: 262  6/29/2021        Pleural effusion, right ICD-10-CM: J90  ICD-9-CM: 511.9  6/25/2021        Lymphedema ICD-10-CM: I89.0  ICD-9-CM: 457.1  6/24/2021        Hx pulmonary embolism ICD-10-CM: A47.940  ICD-9-CM: V12.55  4/5/2021        Insomnia ICD-10-CM: G47.00  ICD-9-CM: 780.52  4/5/2021        Long term current use of anticoagulant ICD-10-CM: Z79.01  ICD-9-CM: V58.61  4/5/2021        Microcytic anemia ICD-10-CM: D50.9  ICD-9-CM: 280.9  4/5/2021        Malignant neoplasm of colon, unspecified ([facility] ICD-10-CM: C18.9  ICD-9-CM: 153.9  4/5/2021        Pancreatic pseudocyst ICD-10-CM: K86.3  ICD-9-CM: 577.2  4/5/2021        Lymphedema of right arm ICD-10-CM: I89.0  ICD-9-CM: 457.1  3/10/2021        Elbow stiffness, right ICD-10-CM: M25.621  ICD-9-CM: 719.52  3/10/2021        Type 2 diabetes with nephropathy ([facility] ICD-10-CM: E11.21  ICD-9-CM: 250.40, 583.81  9/11/2019        Dysuria ICD-10-CM: R30.0  ICD-9-CM: 788.1  9/9/2019        Iron deficiency anemia ICD-10-CM: D50.9  ICD-9-CM: 280.9  8/27/2019        Hyperlipidemia associated with type 2 diabetes mellitus ([facility] ICD-10-CM: E11.69, E78.5  ICD-9-CM: 250.80, 272.4  8/27/2019        Primary adenocarcinoma of ascending colon ([facility] ICD-10-CM: C18.2  ICD-9-CM: 153.6  10/9/2018    Overview Signed 10/26/2018 10:47 AM by Carmella Lemos MD     S/p R hemicolectomy 10/9/2018   DIAGNOSIS   RIGHT HEMICOLECTOMY: MODERATELY TO POORLY DIFFERENTIATED ADENOCARCINOMA, 4.6 CM IN GREATEST DIMENSION, EXTENDING THROUGH THE MUSCULARIS PROPRIA INTO THE PERICOLONIC ADIPOSE TISSUE. MARGINS UNINVOLVED. 28 BENIGN LYMPH NODES, ALL NEGATIVE FOR METASTATIC CARCINOMA (0/28). Electronically signed out on 10/11/2018 10:24 by Roshni Reece. Emmy Reddy, III,              Malignant neoplasm of ascending colon [facility]) ICD-10-CM: C18.2  ICD-9-CM: 153.6  9/28/2018        Current use of anticoagulant therapy ICD-10-CM: Z79.01  ICD-9-CM: V58.61  9/18/2018        Obesity, morbid ([facility] ICD-10-CM: E66.01  ICD-9-CM: 278.01  4/5/2018        Obesity due to excess calories ICD-10-CM: E66.09  ICD-9-CM: 278.00  3/26/2018        Controlled type 2 diabetes mellitus without complication, with long-term current use of insulin ([facility] ICD-10-CM: E11.9, Z79.4  ICD-9-CM: 250.00, V58.67  3/26/2018        Axillary mass, right ICD-10-CM: R22.31  ICD-9-CM: 782.2  3/26/2018        Pancreatic mass ICD-10-CM: K86.89  ICD-9-CM: 577.8  3/26/2018        Type 2 diabetes mellitus with diabetic neuropathy ([facility] ICD-10-CM: E11.40  ICD-9-CM: 250.60, 357.2  2/21/2018        Lumbar radiculopathy ICD-10-CM: M54.16  ICD-9-CM: 724.4  2/6/2018        Lumbar stenosis with neurogenic claudication ICD-10-CM: Y96.200  ICD-9-CM: 724.03  1/29/2018    Overview Signed 4/5/2021 11:02 AM by Winston Pena     Last Assessment & Plan:   I want her to continue with home exercises. If her pain worsens, down her leg, then I would recommend a repeat epidural. She's been out of work two months. She can return to work with no restrictions. She can lift 65 lbs if needed. If her pain worsens, we will get another injection.              Bilateral low back pain with sciatica ICD-10-CM: M54.40  ICD-9-CM: 724.3  10/23/2017        Environmental allergies ICD-10-CM: Z91.09  ICD-9-CM: V15.09  9/12/2013                PLAN:  Metastatic Breast Cancer with peritoneal carcinomatosis  -s/p 4 cycles Carbo/Taxol  -currently on Keytruda, cycle 10 on 3/28  -PET on 3/24 with small to moderate ascites, increase in peritoneal disease  -Ca 27.29 overall stable at 271  4/22 MRCP with suspected linitis plastica of distal stomach and duodenum; masslike structure located b/t the duodenum and head of the pancreas causing severe biliary and pancreatic ductal obstruction suspicious for tumor invasion; and multicentric enhancing tumor in R chest wall, suspicion for bone mets at sternum. Asking palliative care to see pt today. Hospice would be appropriate. Severe Dysphagia  -last dilation on June 2021, reports 2 week history of poor intake/weight loss  -GI with plans of EGD/dilation on 4/20, Eliquis on hold  -per GI clear liquid diet  4/20 EGD/dilation today  4/21 s/p EGD with dilation yesterday, was difficult to perform but able to pass guidewire under fluoro and dilate with savary dilators. Plans for possible repeat EGD with dilation under fluoro in a few days. Barium esophagram is another option. She is not a candidate for PEG d/t peritoneal carcinomatosis. 4/22 s/p barium esophagram this AM, pt not able to swallow enough barium    Hyperbilirubinemia / Transaminitis  -Bili 6.3, ALT 63, , Alk Phos 1126, obtain Abd US  4/20 Bili up to 6.9. Abd US with increased pancreatic and bile duct dilatation. ERCP today. 4/21 Bili 6.6.  GI unable to complete ERCP d/t narrow esophagus. EUS is not an option as scope would not be able to traverse the esophagus. PTC is not an option d/t peritoneal carcinomatosis. MRCP pending. 4/22 Tbili up to 7.1. MRCP with suspected linitis plastica of distal stomach and duodenum; masslike structure located b/t the duodenum and head of the pancreas causing severe biliary and pancreatic ductal obstruction suspicious for tumor invasion; and multicentric enhancing tumor in R chest wall, suspicion for bone mets at sternum. 4/23 Bili 7.7  4/24 Bili 9.0    Anemia-chronic  -monitor and replace per [facility] SOPs    Hx PE  - on Eliquis, held for hepatic dysfunction and procedures    R hydronephrosis  4/20 Abd US with R hydronephrosis. Seen previously on PET in March. Kidney fx wnl, con't to monitor. 4/22 MRCP with moderate R and mild L hydronephrosis. Kidney fx remains wnl.     SOB  4/24 CXR with no acute changes, will try lasix 20 mg and start duo-nebs with RT    Continue home meds  [facility] SOPs  SCDs for DVT ppx (Eliquis held)     Disposition:  TBD pending clinical course. Patient is overwhelmed with current status and need to make care decisions, desires to wait and discuss with son, Michael Pandya. She desires to remain inpatient, however it was discussed that in the long term that is not appropriate and that we would help her make a plan to be cared for outside of the hospital either at home with caregivers or if she is a candidate for hospice house. 4/24: Hospice met with son and pt yesterday, no decisions made yet. It seems that neither of them have realistic goals giving her end stage disease and clinical decline as there was mention of going to rehab to get stronger.            Devon Draper NP  [facility]  [address] [place] [address]  Office : [phone]  Fax : [phone]

## 2022-04-24 NOTE — PROGRESS NOTES
Problem: Nutrition Deficit  Goal: *Optimize nutritional status  Outcome: Not Progressing Towards Goal  Note: Pt has had very poor oral intake. Problem: Patient Education: Go to Patient Education Activity  Goal: Patient/Family Education  Outcome: Progressing Towards Goal     Problem: Falls - Risk of  Goal: *Absence of Falls  Description: Document Montserrat Damico Fall Risk and appropriate interventions in the flowsheet. Outcome: Progressing Towards Goal  Note: Fall Risk Interventions:  Mobility Interventions: Patient to call before getting OOB,PT Consult for mobility concerns         Medication Interventions: Patient to call before getting OOB,Teach patient to arise slowly    Elimination Interventions: Call light in reach,Patient to call for help with toileting needs,Toileting schedule/hourly rounds    History of Falls Interventions: Investigate reason for fall         Problem: Pressure Injury - Risk of  Goal: *Prevention of pressure injury  Description: Document Lito Scale and appropriate interventions in the flowsheet. Outcome: Progressing Towards Goal  Note: Pressure Injury Interventions:             Activity Interventions: Increase time out of bed,Pressure redistribution bed/mattress(bed type)    Mobility Interventions: HOB 30 degrees or less,Pressure redistribution bed/mattress (bed type),PT/OT evaluation    Nutrition Interventions: Document food/fluid/supplement intake,Offer support with meals,snacks and hydration    Friction and Shear Interventions: HOB 30 degrees or less,Lift sheet                Problem: Patient Education: Go to Patient Education Activity  Goal: Patient/Family Education  Outcome: Progressing Towards Goal

## 2022-04-25 NOTE — PROGRESS NOTES
Received pt from [facility]) in stable condition. Pt in bed resting quietly. Resp even & unlabored on 2L NC; no acute signs of distress noted. Bed low & locked; call light in reach; no needs voiced.

## 2022-04-25 NOTE — PROGRESS NOTES
Jm Vu, NP with palliative care spoke with pt then with CM regarding goals of care. Pt is now a DNR. She is not yet ready to stop all IVF. Pt would like to be d/c to the [facility] ([address] [facility]) in [address] [place] [address] if they will accept her. CM called and spoke to Yady Listen at the hospice house. CM is faxing requested records. Amrit Bowers called and spoke to pt's nurse to discuss pt's current status. Amrit Bowers called CM back to state that the hospice house will accept pt and can likely accept her today. CM will remain available to coordinate d/c care. Left message. Letter sent.      Norma Swain MA

## 2022-04-25 NOTE — PROGRESS NOTES
Inpatient Hematology / Oncology Progress Note      Admission Date: 2022 12:45 PM  Reason for Admission/Hospital Course: Dysphagia [R13.10]  Failure to thrive in adult [R62.7]  Weight loss [R63.4]  Metastatic breast cancer (HCC) [C50.919]      24 Hour Events:  Afebrile, VSS  Tbili up to 9.7  No family at bedside      ROS:  Constitutional: Positive for fatigue; negative for fever, chills. CV: Negative for chest pain, palpitations, edema. Respiratory: +dyspnea Negative for  cough, wheezing. GI: Positive for nausea, dysphagia; negative for abdominal pain, diarrhea. 10 point review of systems is otherwise negative with the exception of the elements mentioned above in the HPI. Allergies   Allergen Reactions    Clarithromycin Rash     Other reaction(s): Other (See Comments)  Unknown (comments)    Cortisone Other (comments)     NUMBNESS IN THE LEG (SITE OF INJECTION)  Per pt, leg numbness. Other reaction(s): Other (See Comments)  NUMBNESS IN THE LEG (SITE OF INJECTION)       OBJECTIVE:  Patient Vitals for the past 8 hrs:   BP Temp Pulse Resp SpO2   22 0729 130/77 97.6 °F (36.4 °C) 79 17 96 %   22 0703     93 %   22 0314 128/68 97.4 °F (36.3 °C) 69 17 99 %     Temp (24hrs), Av.8 °F (36.6 °C), Min:97.3 °F (36.3 °C), Max:98.8 °F (37.1 °C)    No intake/output data recorded. Physical Exam:  Constitutional: Chronically ill appearing female in no acute distress, sitting comfortably in the hospital bed. HEENT: Normocephalic and atraumatic. Sclerae icteric. Neck supple without JVD. No thyromegaly present. Skin Warm and dry. No bruising and no rash noted. No erythema. No pallor. Jaundiced   Respiratory Lungs sounds are tight with some scattered wheezes, normal air exchange without accessory muscle use. CVS Normal rate, regular rhythm and normal S1 and S2. No murmurs, gallops, or rubs. Abdomen Soft, nontender and nondistended, normoactive bowel sounds.      Neuro Grossly nonfocal with no obvious sensory or motor deficits. MSK Normal range of motion in general.  Trace BLE edema, RUE edema and no tenderness. Psych Appropriate mood and affect. Labs:      Recent Labs     04/25/22 0256 04/24/22  0420 04/23/22  0453   WBC 3.6* 4.1* 4.5   RBC 2.05* 2.16* 2.15*   HGB 7.3* 7.6* 7.7*   HCT 21.1* 22.7* 23.1*   .9* 105.1* 107.4*   MCH 35.6* 35.2* 35.8*   MCHC 34.6 33.5 33.3   RDW 15.0* 15.0* 14.8*    178 180   GRANS 70 74 75   LYMPH 16 16 14   MONOS 13* 9 10   EOS 1 1 1   BASOS 0 0 0   IG 0 1 0   DF AUTOMATED AUTOMATED AUTOMATED   ANEU 2.5 3.0 3.3   ABL 0.6 0.7 0.6   ABM 0.5 0.4 0.4   LAQUITA 0.0 0.0 0.0   ABB 0.0 0.0 0.0   AIG 0.0 0.0 0.0        Recent Labs     04/25/22 0256 04/24/22 0420 04/23/22  0453    141 140   K 3.2* 3.2* 3.5    108* 109*   CO2 27 26 27   AGAP 7 7 4*   * 122* 118*   BUN 19 18 17   CREA 1.00 0.80 0.90   GFRAA >60 >60 >60   GFRNA 60* >60 >60   CA 8.4 8.3 8.3   * 945* 983*   TP 5.2* 5.4* 5.1*   ALB 1.6* 1.7* 1.8*   GLOB 3.6* 3.7* 3.3   AGRAT 0.4* 0.5* 0.5*   MG 1.9 1.7* 1.9         Imaging:  XR BA SWALLOW ESOPHOGRAM [[id number]] Collected: 04/22/22 0932   Order Status: Completed Updated: 04/22/22 0937   Narrative:     Barium swallow esophagram     CLINICAL INDICATION: Inability to swallow, inability to pass an EGD scope     PROCEDURE: The patient was only able to tolerate a three quarters semiupright   standing position. The patient was given thin barium contrast orally. COMPARISON: No prior. FINDINGS: The patient was only able to pull a very small amount of thin liquid   into her mouth and had difficulty swallowing that very small volume of thin   liquid barium. Diagnostic imaging was not able to be performed. The patient   could not tolerate any further diagnostic imaging. Impression:     Nondiagnostic evaluation of the esophagus as the patient could not   swallow enough barium to visualize the esophagus. Total fluoroscopy time: 1.0 minutes; zero fluoroscopic spot image saved. MRI ABD W WO CONT [[id number]] Collected: 04/22/22 0809   Order Status: Completed Updated: 04/22/22 0916   Narrative:       EXAM: MRI ABDOMEN WITHOUT AND WITH IV CONTRAST     Indication: Dilated pancreatic duct and common bile duct. Comparison: PET/CT 3/24/2022, CT chest, abdomen, and pelvis 11/26/2021. Technique:  Multiplanar, multisequence MR imaging was performed of the abdomen   prior to and following gadolinium contrast. 9 mL Prohance contrast material was   administrated intravenously for the examination. This study was acquired   following the IV administration of contrast material, given the patient's   indications for the examination. If IV contrast material had not been   administered, the likely of detecting abnormalities relevant to the patient's   condition would have been substantially decreased. Three dimensional MRCP was   performed using maximum intensity projection reconstruction. FINDINGS:   Evaluation partially limit secondary to lack of MRCP images available. MRCP   imaging was attempted but were inadequate due to difficulties related to patient   breathing. Visualized Lungs: Bilateral effusions. Liver: Normal in size and morphology.  No focal lesions. Gallbladder/biliary: Severe intra and extrahepatic biliary dilatation. Pancreas: The below described mass located between the duodenum and head of the   pancreas. There is dilation of the main pancreatic duct the 7 mm. Spleen: Normal.     Adrenals: Normal.     Kidneys: Moderate right and mild left hydronephrosis. Gastrointestinal: Diffusely thickened appearance of the stomach from the gastric   body through the pylorus. The duodenum also appears thickened.  There is a   concerning masslike structure medially to the first and second portions of the   duodenum at the head of the pancreas measuring approximately 3.1 cm (series 6,   image 18; series 4, image 13). Peritoneum/retroperitoneum: Free fluid in the pelvis. Lymph nodes: Normal.     Bones/Soft tissues: Significant lymphedema partially imaged throughout the right   chest wall and breast.     The left breast appears to be surgically absent. Multicentric enhancing tumor throughout the right breast. There is also focus of   enhancement in the sternum corresponding with elevated FDG uptake on comparison   PET/CT. Impression:     1.  Suspected linitis plastica of the distal stomach and duodenum. 2.  Masslike structure located between the duodenum and head of the pancreas   causing severe severe biliary and pancreatic ductal obstruction suspicious for   either direct tumor invasion from the suspected infiltrative duodenal metastasis   versus serosal metastasis. 3.  Moderate right and mild left hydronephrosis. 4.  Multicentric enhancing tumor in the right chest wall. Focus of enhancement   in the sternum possibly corresponding with elevated uptake on PET/CT and   suspicious for bone metastasis. MRI ABD WO CONT [[id number]]    [facility] [[id number]] Collected: 04/20/22 1855   Order Status: Completed Updated: 04/20/22 1858   Narrative:     Administrative Report     Fluoroscopy was provided in the operating room. Images may have been saved as a   reference for the surgical procedure. The operative report can be viewed in   [facility] and/or retrieved by the Medical Records department. Impression:       A Radiologist has not reviewed images associated with this exam.   XR CHEST PORT [[id number]] Collected: 04/20/22 1417   Order Status: Completed Updated: 04/20/22 1420   Narrative:     Portable chest x-ray     CLINICAL INDICATION: Postoperative evaluation, concern for aspiration     FINDINGS: Single AP view of the chest compared to a similar exam dated 6/30/2021   shows the lungs to be expanded and clear.  No pleural effusion or pneumothorax. The cardiac silhouette and mediastinum are unremarkable. A left-sided chest port   is in place. Impression:     No acute cardiopulmonary abnormality. XR [facility] COMBINED [[id number]] Updated: 04/20/22 1256   Order Status: [address] [[id number]] Collected: 04/19/22 1431   Order Status: Completed Updated: 04/19/22 1438   Narrative:     RIGHT UPPER QUADRANT  ULTRASOUND     INDICATION: Abnormal liver function, history of breast cancer     COMPARISON: CT dated 11/26/2021     Transabdominal imaging of the upper abdomen was performed. FINDINGS:   -LIVER: 12.4 cm.  Normal echogenicity.  No masses. -BILE DUCTS: The common bile duct is dilated, 14 mm.  There is also mild hepatic   bile duct dilatation. -GALLBLADDER: Absent. -PANCREAS: There is a 4 cm cyst near the tail the pancreas, stable compared with   the prior CT.  There is increased pancreatic duct distention, 5 mm in diameter. -RIGHT KIDNEY: 11.0 cm.  There is moderate-severe hydronephrosis. -ABDOMINAL AORTA AND IVC: Normal in size. -ASCITES: Minimal perihepatic ascites. Impression:     1.  Increased pancreatic and bile duct dilatation. 2.  Right hydronephrosis.           ASSESSMENT:    Problem List  Date Reviewed: 4/18/2022          Codes Class Noted    Weight loss ICD-10-CM: R63.4  ICD-9-CM: 783.21  4/18/2022        Dysphagia ICD-10-CM: R13.10  ICD-9-CM: 787.20  4/18/2022        Failure to thrive in adult ICD-10-CM: R62.7  ICD-9-CM: 783.7  4/18/2022        Metastatic breast cancer ([facility] [address] ICD-10-CM: C50.919  ICD-9-CM: 174.9  4/18/2022        Hypomagnesemia ICD-10-CM: E83.42  ICD-9-CM: 275.2  10/4/2021        Dehydration ICD-10-CM: E86.0  ICD-9-CM: 276.51  9/13/2021        Hypokalemia ICD-10-CM: E87.6  ICD-9-CM: 276.8  9/13/2021        Malignant neoplasm of right female breast [facility]) ICD-10-CM: C50.911  ICD-9-CM: 174.9  8/10/2021        Severe protein[facility]calorie malnutrition ([facility] ICD-10-CM: E43  ICD-9-CM: 109 6/29/2021        Pleural effusion, right ICD-10-CM: J90  ICD-9-CM: 511.9  6/25/2021        Lymphedema ICD-10-CM: I89.0  ICD-9-CM: 457.1  6/24/2021        Hx pulmonary embolism ICD-10-CM: H54.583  ICD-9-CM: V12.55  4/5/2021        Insomnia ICD-10-CM: G47.00  ICD-9-CM: 780.52  4/5/2021        Long term current use of anticoagulant ICD-10-CM: Z79.01  ICD-9-CM: V58.61  4/5/2021        Microcytic anemia ICD-10-CM: D50.9  ICD-9-CM: 280.9  4/5/2021        Malignant neoplasm of colon, unspecified ([address] ICD-10-CM: C18.9  ICD-9-CM: 153.9  4/5/2021        Pancreatic pseudocyst ICD-10-CM: K86.3  ICD-9-CM: 577.2  4/5/2021        Lymphedema of right arm ICD-10-CM: I89.0  ICD-9-CM: 457.1  3/10/2021        Elbow stiffness, right ICD-10-CM: M25.621  ICD-9-CM: 719.52  3/10/2021        Type 2 diabetes with nephropathy ([address] ICD-10-CM: E11.21  ICD-9-CM: 250.40, 583.81  9/11/2019        Dysuria ICD-10-CM: R30.0  ICD-9-CM: 788.1  9/9/2019        Iron deficiency anemia ICD-10-CM: D50.9  ICD-9-CM: 280.9  8/27/2019        Hyperlipidemia associated with type 2 diabetes mellitus ([address] ICD-10-CM: E11.69, E78.5  ICD-9-CM: 250.80, 272.4  8/27/2019        Primary adenocarcinoma of ascending colon ([address] [facility] ICD-10-CM: C18.2  ICD-9-CM: 153.6  10/9/2018    Overview Signed 10/26/2018 10:47 AM by Leigha Phelps MD     S/p R hemicolectomy 10/9/2018   DIAGNOSIS   RIGHT HEMICOLECTOMY: MODERATELY TO POORLY DIFFERENTIATED ADENOCARCINOMA, 4.6 CM IN GREATEST DIMENSION, EXTENDING THROUGH THE MUSCULARIS PROPRIA INTO THE PERICOLONIC ADIPOSE TISSUE. MARGINS UNINVOLVED. 28 BENIGN LYMPH NODES, ALL NEGATIVE FOR METASTATIC CARCINOMA (0/28). Electronically signed out on 10/11/2018 10:24 by Kirkland Daiana.  Sandra Doll, III,              Malignant neoplasm of ascending colon [facility]) ICD-10-CM: C18.2  ICD-9-CM: 153.6  9/28/2018        Current use of anticoagulant therapy ICD-10-CM: Z79.01  ICD-9-CM: V58.61  9/18/2018        Obesity, morbid ([address] ICD-10-CM: E66.01  ICD-9-CM: 278.01  4/5/2018        Obesity due to excess calories ICD-10-CM: E66.09  ICD-9-CM: 278.00  3/26/2018        Controlled type 2 diabetes mellitus without complication, with long-term current use of insulin (HCC) ICD-10-CM: E11.9, Z79.4  ICD-9-CM: 250.00, V58.67  3/26/2018        Axillary mass, right ICD-10-CM: R22.31  ICD-9-CM: 782.2  3/26/2018        Pancreatic mass ICD-10-CM: K86.89  ICD-9-CM: 577.8  3/26/2018        Type 2 diabetes mellitus with diabetic neuropathy ([facility] ICD-10-CM: E11.40  ICD-9-CM: 250.60, 357.2  2/21/2018        Lumbar radiculopathy ICD-10-CM: M54.16  ICD-9-CM: 724.4  2/6/2018        Lumbar stenosis with neurogenic claudication ICD-10-CM: X58.841  ICD-9-CM: 724.03  1/29/2018    Overview Signed 4/5/2021 11:02 AM by Allen Enciso     Last Assessment & Plan:   I want her to continue with home exercises. If her pain worsens, down her leg, then I would recommend a repeat epidural. She's been out of work two months. She can return to work with no restrictions. She can lift 65 lbs if needed. If her pain worsens, we will get another injection. Bilateral low back pain with sciatica ICD-10-CM: M54.40  ICD-9-CM: 724.3  10/23/2017        Environmental allergies ICD-10-CM: Z91.09  ICD-9-CM: V15.09  9/12/2013                PLAN:  Metastatic Breast Cancer with peritoneal carcinomatosis  -s/p 4 cycles Carbo/Taxol  -currently on Keytruda, cycle 10 on 3/28  -PET on 3/24 with small to moderate ascites, increase in peritoneal disease  -Ca 27.29 overall stable at 271  4/22 MRCP with suspected linitis plastica of distal stomach and duodenum; masslike structure located b/t the duodenum and head of the pancreas causing severe biliary and pancreatic ductal obstruction suspicious for tumor invasion; and multicentric enhancing tumor in R chest wall, suspicion for bone mets at sternum. Asking palliative care to see pt today. Hospice would be appropriate.     Severe Dysphagia  -last dilation on June 2021, reports 2 week history of poor intake/weight loss  -GI with plans of EGD/dilation on 4/20, Eliquis on hold  -per GI clear liquid diet  4/20 EGD/dilation today  4/21 s/p EGD with dilation yesterday, was difficult to perform but able to pass guidewire under fluoro and dilate with savary dilators. Plans for possible repeat EGD with dilation under fluoro in a few days. Barium esophagram is another option. She is not a candidate for PEG d/t peritoneal carcinomatosis. 4/22 s/p barium esophagram this AM, pt not able to swallow enough barium    Hyperbilirubinemia / Transaminitis  -Bili 6.3, ALT 63, , Alk Phos 1126, obtain Abd US  4/20 Bili up to 6.9. Abd US with increased pancreatic and bile duct dilatation. ERCP today. 4/21 Bili 6.6.  GI unable to complete ERCP d/t narrow esophagus. EUS is not an option as scope would not be able to traverse the esophagus. PTC is not an option d/t peritoneal carcinomatosis. MRCP pending. 4/22 Tbili up to 7.1. MRCP with suspected linitis plastica of distal stomach and duodenum; masslike structure located b/t the duodenum and head of the pancreas causing severe biliary and pancreatic ductal obstruction suspicious for tumor invasion; and multicentric enhancing tumor in R chest wall, suspicion for bone mets at sternum. 4/23 Bili 7.7  4/24 Bili 9.0    Anemia-chronic  -monitor and replace per [facility] SOPs    Hx PE  - on Eliquis, held for hepatic dysfunction and procedures    R hydronephrosis  4/20 Abd US with R hydronephrosis. Seen previously on PET in March. Kidney fx wnl, con't to monitor. 4/22 MRCP with moderate R and mild L hydronephrosis. Kidney fx remains wnl. SOB  4/24 CXR with no acute changes, will try lasix 20 mg and start duo-nebs with RT    Continue home meds  [facility] SOPs  SCDs for DVT ppx (Eliquis held)     Disposition:  TBD pending clinical course.   Patient is overwhelmed with current status and need to make care decisions, desires to wait and discuss with son, Brita Severance. She desires to remain inpatient, however it was discussed that in the long term that is not appropriate and that we would help her make a plan to be cared for outside of the hospital either at home with caregivers or if she is a candidate for hospice house. 4/24: Hospice met with son and pt yesterday, no decisions made yet. It seems that neither of them have realistic goals giving her end stage disease and clinical decline as there was mention of going to rehab to get stronger. 4/25: Discharge plans still pending.         Murali Joseph NP  [address] [place] [address] Hematology and Oncology  [address] [place] [address]  Office : [phone]  Fax : [phone]

## 2022-04-25 NOTE — HOSPICE
[facility]    4421 Liaison to bedside for follow up. Patient awake this morning watching television. Her color is worse this morning than Friday. I asked if she has given further thought to her current care and transitioning to a focus on being comfortable. Pointed out her full code status and reinforced that she does not have treatment options as far as her cancer goes. Thus it would certainly be reasonable if she wanted to stop IV fluids, lab draws, electrolyte replacement etc but she has to make that choice. She stated to me \"I'm not there yet. \"  Offered our continued support and availability. Updated Cat Keaton DE JESUS.     Thank you for this referral,  Radha Carbone, RN, BSN  [facility] liaison  [phone]

## 2022-04-25 NOTE — PROGRESS NOTES
TRANSFER - OUT REPORT:    Verbal report given to amprice (name) on Eric Stevens  being transferred to [facility] (unit) for routine progression of care       Report consisted of patients Situation, Background, Assessment and   Recommendations(SBAR). Information from the following report(s) SBAR, Kardex, Intake/Output, MAR and Recent Results was reviewed with the receiving nurse. Opportunity for questions and clarification was provided.       Patient transported with:  2L NC

## 2022-04-25 NOTE — PROGRESS NOTES
Problem: Nutrition Deficit  Goal: *Optimize nutritional status  4/25/2022 1753 by Suly Heaton RN  Outcome: Resolved/Met  4/25/2022 1056 by Suly Heaton RN  Outcome: Not Progressing Towards Goal  Note: Pt only taking sips of clear; no substantial nutrients. Problem: Patient Education: Go to Patient Education Activity  Goal: Patient/Family Education  4/25/2022 1753 by Suly Heaton RN  Outcome: Resolved/Met  4/25/2022 1056 by Suly Heaton RN  Outcome: Progressing Towards Goal     Problem: Falls - Risk of  Goal: *Absence of Falls  Description: Document Vinny Lee Fall Risk and appropriate interventions in the flowsheet. 4/25/2022 1753 by Suly Heaton RN  Outcome: Resolved/Met  4/25/2022 1056 by Suly Heaton RN  Outcome: Progressing Towards Goal  Note: Fall Risk Interventions:  Mobility Interventions: Bed/chair exit alarm,OT consult for ADLs,Patient to call before getting OOB,PT Consult for mobility concerns         Medication Interventions: Patient to call before getting OOB,Teach patient to arise slowly    Elimination Interventions: Call light in reach,Patient to call for help with toileting needs,Toileting schedule/hourly rounds    History of Falls Interventions: Bed/chair exit alarm,Investigate reason for fall         Problem: Patient Education: Go to Patient Education Activity  Goal: Patient/Family Education  4/25/2022 1753 by Suly Heaton RN  Outcome: Resolved/Met  4/25/2022 1056 by Suly Heaton RN  Outcome: Progressing Towards Goal     Problem: Dysphagia (Adult)  Goal: *Speech Goal: (INSERT TEXT)  Outcome: Resolved/Met     Problem: Patient Education: Go to Patient Education Activity  Goal: Patient/Family Education  Outcome: Resolved/Met     Problem: Pressure Injury - Risk of  Goal: *Prevention of pressure injury  Description: Document Lito Scale and appropriate interventions in the flowsheet.   4/25/2022 1753 by Suly Heaton RN  Outcome: Resolved/Met  4/25/2022 1056 by Damien Moreno RN  Outcome: Progressing Towards Goal  Note: Pressure Injury Interventions:             Activity Interventions: Increase time out of bed,Pressure redistribution bed/mattress(bed type),PT/OT evaluation    Mobility Interventions: HOB 30 degrees or less,Pressure redistribution bed/mattress (bed type)    Nutrition Interventions: Document food/fluid/supplement intake,Offer support with meals,snacks and hydration    Friction and Shear Interventions: Foam dressings/transparent film/skin sealants,HOB 30 degrees or less,Lift sheet                Problem: Patient Education: Go to Patient Education Activity  Goal: Patient/Family Education  4/25/2022 1753 by Damien Moreno RN  Outcome: Resolved/Met  4/25/2022 1056 by Damien Moreno RN  Outcome: Progressing Towards Goal

## 2022-04-25 NOTE — PROGRESS NOTES
Palliative Care Progress Note    Patient: Kelli Walter MRN: [id number]  SSN: [id number]    YOB: 1960  Age: 59 y.o. Sex: female       Assessment/Plan:     Chief Complaint/Interval History: Over the weekend, the patient was uncertain about hospice, but has now feels that hospice would be her best choice. Principal Diagnosis:    · Dysphagia  R13.10    Additional Diagnoses:   · Advance Care Planning Counseling Z71.89  · Encounter for Palliative Care  Z51.5    Palliative Performance Scale (PPS)       Medical Decision Making:   Reviewed and summarized notes from last palliative care visit to present. Discussed case with appropriate providers: CM Cat  Reviewed lab and X-ray data from last palliative care visit to present. The pt was resting in bed, no visitors present. She denied pain, N/V, SOB, and other discomforts. Ms Nadeem Palacios explained that she would like to remain in the hospital for as long as possible so that she could continue to have electrolytes replaced, IV fluids, and labs drawn as needed. She explained that she wanted this so that she could live as long as possible before the cancer and malnutrition bring her life to an end. She wondered if a SNF might offer a hospital level of care, but I explained that it would not. I had a good phone conversation with Candi Becerra, the pt's ex HARINI, who is a nurse and a person Ms Nadeem Palacios trusts. Candi Becerra had tried over the weekend to explain to the pt why a SNF didn't give the sort of care she is looking for. Candi Becerra agreed that hospice care would be most appropriate. We agreed that [facility], followed by [facility], would give the pt the attentive care she needs as well as adequate opportunities to talk with staff about her feelings and fears. When I went back to speak with Ms Nadeem Palacios, I followed Tanya's suggestion to lead with our phone conversation and Tanya's recommendation of a hospice house.   Ms Bam Canchola was accepting of Tanya's recommendation. The pt also agreed to my changing her code status to DNR. CM Cat will make a referral to [facility]. Will discuss findings with members of the interdisciplinary team.         More than 50% of this 35 minute visit was spent counseling and coordination of care as outlined above. In addition to the E&M described above, a 60--minute ACP meeting was spent discussing the pt's wishes concerning end of life care. Subjective:     Review of Systems:  A comprehensive review of systems was negative except as noted above. Objective:     Visit Vitals  /67 (BP 1 Location: Left upper arm, BP Patient Position: Lying left side)   Pulse 73   Temp 97.7 °F (36.5 °C)   Resp 19   Ht 5' 4\" (1.626 m)   Wt 114 lb 11.2 oz (52 kg)   SpO2 100%   BMI 19.69 kg/m²       Physical Exam:    General:  Cooperative. No acute distress. Eyes:  Conjunctivae/corneas clear    Nose: Nares normal. Septum midline. Neck: Supple, symmetrical, trachea midline, no JVD   Lungs:   Clear to auscultation bilaterally, unlabored   Heart:  Regular rate and rhythm, no murmur    Abdomen:   Soft, non-tender, non-distended   Extremities: Normal, atraumatic, no cyanosis or edema   Skin: Skin color, texture, turgor normal. No rash or lesions.    Neurologic: Nonfocal   Psych: Alert and oriented     Signed By: Eren Gonzalez NP     April 25, 2022

## 2022-04-25 NOTE — DISCHARGE SUMMARY
[facility] Hematology & Oncology: Inpatient Hematology / Oncology Discharge Summary Note    Patient ID:  Maddi Hagen  [id number]  59 y.o.  1960    Admit Date: 4/18/2022    Discharge Date: 4/25/2022    Admission Diagnoses: Dysphagia [R13.10]  Failure to thrive in adult [R62.7]  Weight loss [R63.4]  Metastatic breast cancer ([facility] [C50.919]    Discharge Diagnoses:  Principal Diagnosis: <principal problem not specified>  Active Problems:    Severe protein-calorie malnutrition ([facility] (6/29/2021)      Weight loss (4/18/2022)      Dysphagia (4/18/2022)      Failure to thrive in adult (4/18/2022)      Metastatic breast cancer ([facility] (4/18/2022)        Hospital Course:  Ms. Eloisa Orellana is a patient of Dr. Kaylin Zaragoza with metastatic breast with peritoneal carcinomatosis sp 4 cycles of carbo/taxol/Keytrudua. PET on 3/24/2022 with progression. She returned on 4/18/2022 to [address] [place] [address] with daughter-in-law, reported symptoms indicating severe dysphagia and not able to clear her secretion, poor oral intake for the past 2 weeks with significant weight loss, very weak, discussed that the rapid declining indicates very poor prognosis, would not be able to tolerate oral therapy, again discussed rechallenge with [employer] but her poor performance status and rapid weight loss would not tolerate, discussed option of tube feeding which may be complicated given the peritoneal carcinomatosis. After all she still want to try all options but also open to discussed with palliative care and hospice. She was admitted to have supportive care, IV fluid, swallow evaluation and EGD, consult palliative care and hospice, may consider feeding tube.  She had MRCP with suspected linitis plastica of distal stomach and duodenum; masslike structure located b/t the duodenum and head of the pancreas causing severe biliary and pancreatic ductal obstruction suspicious for tumor invasion; and multicentric enhancing tumor in R chest wall, suspicion for bone mets at sternum. She was found to not be a candidate for PEG. Her bili has continued to increase. She ultimately made decision to go home to [facility] in [facility].        Details of hospital stay:    Metastatic Breast Cancer with peritoneal carcinomatosis  -s/p 4 cycles Carbo/Taxol  -currently on Keytruda, cycle 10 on 3/28  -PET on 3/24 with small to moderate ascites, increase in peritoneal disease  -Ca 27.29 overall stable at 271  4/22 MRCP with suspected linitis plastica of distal stomach and duodenum; masslike structure located b/t the duodenum and head of the pancreas causing severe biliary and pancreatic ductal obstruction suspicious for tumor invasion; and multicentric enhancing tumor in R chest wall, suspicion for bone mets at sternum. Asking palliative care to see pt today. Hospice would be appropriate.     Severe Dysphagia  -last dilation on June 2021, reports 2 week history of poor intake/weight loss  -GI with plans of EGD/dilation on 4/20, Eliquis on hold  -per GI clear liquid diet  4/20 EGD/dilation today  4/21 s/p EGD with dilation yesterday, was difficult to perform but able to pass guidewire under fluoro and dilate with savary dilators. Plans for possible repeat EGD with dilation under fluoro in a few days. Barium esophagram is another option. She is not a candidate for PEG d/t peritoneal carcinomatosis. 4/22 s/p barium esophagram this AM, pt not able to swallow enough barium     Hyperbilirubinemia / Transaminitis  -Bili 6.3, ALT 63, , Alk Phos 1126, obtain Abd US  4/20 Bili up to 6.9. Abd US with increased pancreatic and bile duct dilatation. ERCP today. 4/21 Bili 6.6.  GI unable to complete ERCP d/t narrow esophagus. EUS is not an option as scope would not be able to traverse the esophagus. PTC is not an option d/t peritoneal carcinomatosis. MRCP pending. 4/22 Tbili up to 7.1.   MRCP with suspected linitis plastica of distal stomach and duodenum; masslike structure located b/t the duodenum and head of the pancreas causing severe biliary and pancreatic ductal obstruction suspicious for tumor invasion; and multicentric enhancing tumor in R chest wall, suspicion for bone mets at sternum. 4/23 Bili 7.7  4/24 Bili 9.0     Anemia-chronic  -monitor and replace per [facility] SOPs     Hx PE  - on Eliquis, held for hepatic dysfunction and procedures     R hydronephrosis  4/20 Abd US with R hydronephrosis. Seen previously on PET in March. Kidney fx wnl, con't to monitor. 4/22 MRCP with moderate R and mild L hydronephrosis. Kidney fx remains wnl.     SOB  4/24 CXR with no acute changes, will try lasix 20 mg and start duo-nebs with RT     Continue home meds  [facility] SOPs  SCDs for DVT ppx (Eliquis held)     Disposition:  TBD pending clinical course. Patient is overwhelmed with current status and need to make care decisions, desires to wait and discuss with son, Nick Garcia. She desires to remain inpatient, however it was discussed that in the long term that is not appropriate and that we would help her make a plan to be cared for outside of the hospital either at home with caregivers or if she is a candidate for hospice house. 4/24: Hospice met with son and pt yesterday, no decisions made yet. It seems that neither of them have realistic goals giving her end stage disease and clinical decline as there was mention of going to rehab to get stronger. 4/25: Discharge plans still pending.       Consults:  IP CONSULT TO PALLIATIVE CARE - PROVIDER  IP CONSULT TO GASTROENTEROLOGY    Pertinent Diagnostic Studies:   Labs:    Recent Labs     04/25/22  0256 04/24/22  0420 04/23/22  0453   WBC 3.6* 4.1* 4.5   HGB 7.3* 7.6* 7.7*    178 180   ANEU 2.5 3.0 3.3      Recent Labs     04/25/22  0256 04/24/22  0420 04/23/22  0453    141 140   K 3.2* 3.2* 3.5    108* 109*   CO2 27 26 27   * 122* 118*   BUN 19 18 17   CREA 1.00 0.80 0.90   CA 8.4 8.3 8.3   * 945* 983*   TP 5.2* 5.4* 5.1* ALB 1.6* 1.7* 1.8*   MG 1.9 1.7* 1.9       OBJECTIVE:  Patient Vitals for the past 8 hrs:   BP Temp Pulse Resp SpO2   22 1422 121/83 97.6 °F (36.4 °C) (!) 103 19 96 %   22 1117 129/67 97.7 °F (36.5 °C) 73 19 100 %   22 0729 130/77 97.6 °F (36.4 °C) 79 17 96 %   22 0703     93 %     Temp (24hrs), Av.6 °F (36.4 °C), Min:97.3 °F (36.3 °C), Max:98.1 °F (36.7 °C)     07 -  1900  In: 0   Out: 200 [Urine:200]    Physical Exam:  Constitutional: Chronically ill appearing female in no acute distress, sitting comfortably in the hospital bed. HEENT: Normocephalic and atraumatic. Sclerae icteric. Neck supple without JVD. No thyromegaly present. Skin Warm and dry. No bruising and no rash noted. No erythema. No pallor. Jaundiced   Respiratory Lungs sounds are tight with some scattered wheezes, normal air exchange without accessory muscle use. CVS Normal rate, regular rhythm and normal S1 and S2. No murmurs, gallops, or rubs. Abdomen Soft, nontender and nondistended, normoactive bowel sounds. Neuro Grossly nonfocal with no obvious sensory or motor deficits. MSK Normal range of motion in general.  Trace BLE edema, RUE edema and no tenderness. Psych Appropriate mood and affect.           ASSESSMENT:    Active Problems:    Severe protein-calorie malnutrition ([address] (2021)      Weight loss (2022)      Dysphagia (2022)      Failure to thrive in adult (2022)      Metastatic breast cancer ([address] (2022)        DISPOSITION:  Discharge to hospice house. I spent 41 minutes on the day of discharge in discharge planning and coordination of care for this patient. Nita Dear, NP    [facility] Hematology & Oncology  [address] [facility] [address]  Office : [phone]  Fax : [phone]         Attending Addendum:  I have personally performed a face to face diagnostic evaluation on this patient.  I have reviewed and agree with the care plan as documented by Christiane Yañez N.P. My findings are as follows: She has metastatic breast cancer, appears weak, heart rate regular without murmurs, abdomen is firm, bowel sounds are sluggish, we will discharge her to hospice.               Yenifer Lugo MD      [facility] Hematology/Oncology  [address] [place] [address]  Office : [phone]  Fax : [phone]

## 2022-04-25 NOTE — PROGRESS NOTES
Pt to d/c today to [facility] in [facility] as this was pt's first choice for hospice house. Report: [id number]. Room number will be assigned upon arrival.  Transport is scheduled via [employer] for 1700 - they stated this is their first available. Transport DNR signed. No other supportive care needs identified. Pt agrees with d/c plan. Milestones met. Care Management Interventions  PCP Verified by CM: Yes Eleonora Montiel MD)  Mode of Transport at Discharge: BLS ([facility])  Transition of Care Consult (CM Consult): Discharge Planning,Other ([facility])  Discharge Durable Medical Equipment: No  Physical Therapy Consult: No  Occupational Therapy Consult: No  Speech Therapy Consult: Yes  Support Systems: Child(isabela),Other Family Member(s)  Confirm Follow Up Transport: Other (see comment) [facility])  The Plan for Transition of Care is Related to the Following Treatment Goals : Pt to obtain care to become medically stable and to d/c to hospice house for end of life care.   The Patient and/or Patient Representative was Provided with a Choice of Provider and Agrees with the Discharge Plan?: Yes  Name of the Patient Representative Who was Provided with a Choice of Provider and Agrees with the Discharge Plan: Allen Argueta  Freedom of Choice List was Provided with Basic Dialogue that Supports the Patient's Individualized Plan of Care/Goals, Treatment Preferences and Shares the Quality Data Associated with the Providers?: Yes  The [employer] Information Provided?: No  Discharge Location  Patient Expects to be Discharged to[de-identified] Other: ([facility])

## 2022-04-25 NOTE — PROGRESS NOTES
Problem: Nutrition Deficit  Goal: *Optimize nutritional status  Outcome: Not Progressing Towards Goal  Note: Pt only taking sips of clear; no substantial nutrients. Problem: Patient Education: Go to Patient Education Activity  Goal: Patient/Family Education  Outcome: Progressing Towards Goal     Problem: Falls - Risk of  Goal: *Absence of Falls  Description: Document Joann Barney Fall Risk and appropriate interventions in the flowsheet. Outcome: Progressing Towards Goal  Note: Fall Risk Interventions:  Mobility Interventions: Bed/chair exit alarm,OT consult for ADLs,Patient to call before getting OOB,PT Consult for mobility concerns         Medication Interventions: Patient to call before getting OOB,Teach patient to arise slowly    Elimination Interventions: Call light in reach,Patient to call for help with toileting needs,Toileting schedule/hourly rounds    History of Falls Interventions: Bed/chair exit alarm,Investigate reason for fall         Problem: Patient Education: Go to Patient Education Activity  Goal: Patient/Family Education  Outcome: Progressing Towards Goal     Problem: Pressure Injury - Risk of  Goal: *Prevention of pressure injury  Description: Document Lito Scale and appropriate interventions in the flowsheet. Outcome: Progressing Towards Goal  Note: Pressure Injury Interventions:             Activity Interventions: Increase time out of bed,Pressure redistribution bed/mattress(bed type),PT/OT evaluation    Mobility Interventions: HOB 30 degrees or less,Pressure redistribution bed/mattress (bed type)    Nutrition Interventions: Document food/fluid/supplement intake,Offer support with meals,snacks and hydration    Friction and Shear Interventions: Foam dressings/transparent film/skin sealants,HOB 30 degrees or less,Lift sheet                Problem: Patient Education: Go to Patient Education Activity  Goal: Patient/Family Education  Outcome: Progressing Towards Goal

## 2022-05-09 ENCOUNTER — HOSPITAL ENCOUNTER (OUTPATIENT)
Dept: INFUSION THERAPY | Age: 62
End: 2022-05-09

## 2022-05-19 NOTE — THERAPY DISCHARGE
Roberto Starr  : 1960  Primary: [employer] [place]*  Secondary:  [employer]  at [facility]  [address], [address]  [phone]   [phone]          OUTPATIENT PHYSICAL THERAPY:Discontinuation Summary 3/10/2022   ICD-10: Treatment Diagnosis: other lymphedema I 89.0  Precautions/Allergies:   Clarithromycin and Cortisone    TREATMENT PLAN:  Effective Dates: 3/2/22 TO 2022 (90 days). Frequency/Duration: 2 times a week for 90 Day(s) MEDICAL/REFERRING DIAGNOSIS:  Malignant neoplasm of unspecified site of right female breast [C50.911]    DATE OF ONSET:   REFERRING PHYSICIAN: Unique Bales MD MD Orders: lymphedema  Return MD Appointment:  3/7/22     INITIAL ASSESSMENT:  Ms. Josep Fraire presents for an assessment of her lymphedema. She reports a gradual progression of edema beginning in  when she suffered an injury to her shoulder while walking her dog. She reports she had surgery to correct this in November. In April she developed a DVT of the right upper extremity. 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 Angioplasty was performed. She continued to have increasing edema. It was found that she had breast cancer and underwent chemo. She received lymphedema therapy in  prior to the breast cancer diagnosis. She returns today having completed chem. She is stage 3 lymphedema at this point. She has lost function in her dominant arm. She lives alone and is limited in self care. She will benefit from lymphedema treatment to reduce the edema and increase her function. She will also benefit from a compression pump. 22:  The patient attended a total of 3 visits. She was independent with bandaging. She was receiving a compression pump. She was to return for garment fitting after using the pump for a period of time. She did not return for further visits after her last visit of 3/10/22. She was discontinued from therapy. Goals unmet. PROBLEM LIST (Impacting functional limitations):  1. Decreased Strength  2. Decreased Flexibility/Joint Mobility  3. Edema/Girth  4. Decreased Knowledge of Precautions  5. Decreased Skin Integrity/Hygeine  6. Decreased [place] with Home Exercise Program INTERVENTIONS PLANNED: (Treatment may consist of any combination of the following)  1. Decongestion Therapy  2. Home Exercise Program (HEP)  3. Manual Therapy  4. Range of Motion (ROM)  5. Therapeutic Exercise/Strengthening     GOALS: (Goals have been discussed and agreed upon with patient.)  Short-Term Functional Goals: Time Frame: 4 weeks  1. The patient will be independent with skin care within 4 weeks. Met   2. The patient will have knowledge of signs and symptoms of lymphedema and how to manage within 4 weeks. Met   3. The patient will reduce edema by 2 cm within 4 weeks. 4. The patient will be independent with self MLD within 4 weeks. 5. The patient will be referred for a compression pump within 4 weeks. Met   6. Discharge Goals: Time Frame: 8 weeks  1. The patient will be fit with a compression garment within 8 weeks. 2. The patient will reduce her edema by 4 cm within 8 weeks. 3. The patient will be independent with self management of lymphedema within 8 weeks. 4.     OUTCOME MEASURE:   Tool Used: Disabilities of the Arm, Shoulder and Hand (DASH) Questionnaire - Quick Version  Score:  Initial: 22/55  Most Recent: X/55 (Date: -- )   Interpretation of Score: The DASH is designed to measure the activities of daily living in person's with upper extremity dysfunction or pain. Each section is scored on a 1-5 scale, 5 representing the greatest disability. The scores of each section are added together for a total score of 55.         Tool Used: ECOG Performance Survey Score  Score:  Initial: 1  Most Recent:      Interpretation of Score:   0 Fully active, able to carry on all pre-disease performance without restriction   1 Restricted in physically strenuous activity but ambulatory and able to carry out work of a light or sedentary nature, e.g., light house work, office work   2 Ambulatory and capable of all selfcare but unable to carry out any work activities. Up and about more than 50% of waking hours   3 Capable of only limited selfcare, confined to bed or chair more than 50% of waking hours   4 Completely disabled. Cannot carry on any selfcare. Totally confined to bed or chair   5 Dead        Tool Used: Lymphedema Life Impact Scale   Score:  Initial: 27 Most Recent: X (Date: -- )   Interpretation of Score: The Lymphedema Life Impact Scale (LLIS) is a validated instrument that measures the physical, functional, and psychosocial concerns pertinent to patients with extremity lymphedema. The Scale's questionnaire is administered to patients to gauge impairments, activity limitations, and participation restrictions resulting from their lymphedema. Total Duration:  PT Patient Time In/Time Out  Time In: 1100  Time Out: 1145    Rehabilitation Potential For Stated Goals: [address] Mcmahan's therapy, I certify that the treatment plan above will be carried out by a therapist or under their direction.   Thank you for this referral,  Fer Green, PT

## 2022-06-13 NOTE — ADDENDUM NOTE
Encounter addended by: AMY Burnett [facility] on: 9/20/2021 12:38 PM   Actions taken: i-Vent created or edited Admission

## 2022-12-01 NOTE — ACP (ADVANCE CARE PLANNING)
Advance care planninginitial encounter      Face to face time - 15 minutes face-to-face time spent discussion the care       Patient is able to make his/her own decisions:  [X] Yes  [ ] NO    Names of POA/surrogate decision maker when patient is altered  :Son Mina Gao    Other members present in the meeting : niece    Patient / surrogate decision-maker ultimately chose. ..     [X] Full code- full aggressive medical and surgical interventions, including intubations, resuscitations, pressors, artificial tube feeding  [ ] DO NOT RESUSCITATE -okay to intubate,  and other aggressive medical and surgical intervention   [ ] Not resuscitate, DO NOT INTUBATE, but okay for other aggressive medical and surgical intervention   [ ] DNR/DNI, hospice, comfort focus care to maximize patient's quality of life  [ ] DNR/DNI, strict comfort care ONLY Number Of Stages: 1

## 2023-04-12 NOTE — PROGRESS NOTES
Comprehensive Nutrition Assessment    Type and Reason for Visit: Reassess  Best Practice Alert for Malnutrition Screening Tool: Recently Lost Weight Without Trying: Yes, If Yes, How Much Weight Loss: 14 - 23 lbs, Eating Poorly Due to Decreased Appetite: No (dysphagia)    Nutrition Recommendations/Plan:   Meals and Snacks:  Continue current diet. Nutrition Supplement Therapy:   Medical food supplement therapy:  Initiate Glucerna Shake twice per day (this provides 220 kcal and 10 grams protein per bottle)    Discontinue Ensure Clear     Malnutrition Assessment:  Malnutrition Status: Mild malnutrition  Context: Acute illness  Findings of clinical characteristics of malnutrition:   Energy Intake:  Mild decrease in energy intake (specify) (progressive decline in PO secondary to dysphagia)  Weight Loss:  7.00 - Greater than 7.5% over 3 months (27# (13.3%))     Body Fat Loss:  No significant body fat loss,     Muscle Mass Loss:  No significant muscle mass loss,    Fluid Accumulation:  No significant fluid accumulation (noted lymphedema),     Strength:  Not performed    Nutrition Assessment:   Nutrition History: Patient reports progressive worsening dysphagia to solids over about the last month and a half. She states that just prior to admission she was unable to tolerate even water. She states that she has lost 17# in last month and a half. Nutrition Background: Patient with PMH significant for DM, DVT/PE, colocn cancer s/p resection, lymphedema of right upper extremity. She presented with worseing swelling of RUE, difficulty swallowing, and weight loss. CT was suspicious for inflammatory breast cancer, nearly occluded right IJ, enlarged lymph nodes, pleural effusions, wall thickening of mid and distal esophagus. SLP found no evidence of dysphagia. EGD 6/27 unsuccessful, pt pending barium esphogram. Pt s/p EGD with dilation 6/29. Pt cleared for regular diet per SLP 6/30.    Daily Update:  Pt seen in chair, Dr. Edmund Farias at bedside. Verbal order of repeat EKG.      Carolina Connell RN  04/11/23 8111 excited about pending discharge. Pt reports significantly improved swallowing function, with only a mild sore throat 6/30. Pt reports variable PO intake following EGD and dilation 6/29, with ~50% intake. This AM, pt reported 100% intake at breakfast, and questioned RD about changing ONS to glucerna following CLD diet advancement. RD advised pt to continue to take small bites of soft foods to continue progression of swallowing function. Nutrition Related Findings:   No physical signs of wasting.       Current Nutrition Therapies:  ADULT ORAL NUTRITION SUPPLEMENT Breakfast, Lunch, Dinner; Clear Liquid  ADULT DIET Dysphagia - Minced & Moist    Current Intake:   Average Meal Intake: 51-75% (100% of breakfast this AM. ) Average Supplement Intake: 1-25% (Pt requests glucerna over Ensure Clear)      Anthropometric Measures:  Height: 5' 4\" (162.6 cm)  Current Body Wt: 81.2 kg (179 lb 0.2 oz) (6/30), Weight source: Bed scale  BMI: 30.7, Obese class 1 (BMI 30.0-34.9)     Ideal Body Weight (lbs) (Calculated): 120 lbs (55 kg), 149.2 %  Usual Body Wt: 91.6 kg (202 lb) (3/15), Percent weight change: -13.3          Edema: Generalized: Trace (7/1/2021  7:33 AM)  LLE: Trace (7/1/2021  7:33 AM)  RLE: Trace (7/1/2021  7:33 AM)  RUE: 3+;Pitting;Non-pitting (some what pitting through arm) (7/1/2021  7:33 AM)     Estimated Daily Nutrient Needs:  Energy (kcal/day): 1997-8203 (Kcal/kg (20-25), Weight Used: Current (79.4 kg))  Protein (g/day): 79-99 (20% kcal) Weight Used: (Current)  Fluid (ml/day):   (1 ml/kcal)    Nutrition Diagnosis:   · Inadequate oral intake related to  (sore throat) as evidenced by  (s/p EGD w/ dilation and variable intake)       Nutrition Interventions:   Food and/or Nutrient Delivery: Continue current diet, Modify oral nutrition supplement     Coordination of Nutrition Care: Continue to monitor while inpatient      Goals:   Previous Goal Met: Goal(s) achieved  Active Goal: Meet >75% of estimated needs through PO intake by next RD follow up.     Nutrition Monitoring and Evaluation:      Food/Nutrient Intake Outcomes: Food and nutrient intake, Supplement intake  Physical Signs/Symptoms Outcomes: Chewing or swallowing    Discharge Planning:    Continue oral nutrition supplement    Richard Bassett, [address] [place] [address], [phone] [phone]

## 2023-12-04 NOTE — PROGRESS NOTES
Hepatology Consult Note    Referring provider: Aaareferral Self  PCP: Patel Loyd MD    Chief complaint: abnormal liver enzymes     HPI:  Artemio Colon Sr. is a 89 y.o. male with CAD, PPM placement, JAEL, who was referred to Hepatology Clinic for abnormal liver enzymes. Accompanied by wife, Yaneth. Denies prior history of liver disease.     He notes intermittent RUQ pain as well as chalky stool ~2 months ago, and then several lighter stools than normal since then. Last a couple of weeks ago.     Regarding risk factors for liver disease, the patient reports heavy history of EtOH use in the past. Denies illicit drug use, blood transfusions, prior tattoos. MASLD risk factors: overweight/obesity with heaviest being 209lb --> 191lb with keto diet. Denies history of DM, HTN, HLD, JAEL. Denies family history of liver disease or liver cancer.     Over the counter meds include Vit B, Cod Liver Oil. He takes hydrocodone-acetaminophen 10-325mg ~3 tablets a day.     The patient denies signs/symptoms of decompensated liver disease, including no recent abdominal distension, encephalopathy, jaundice, gross GI bleeding. The patient denies prior evaluation by hepatologist, liver biopsy, paracentesis.    Denies itching, jaundice, unintentional weight loss. Denies family history of pancreatic cancer.     Reports cholecystectomy in 2015 for large cholelithiasis.     Past Medical History:   Diagnosis Date    AV block     BPH (benign prostatic hyperplasia)     Cancer     Chronic rhinitis     Coronary artery disease     Erectile dysfunction     Glaucoma     Kidney cysts     Neuropathy 8/22/2017    JAEL (obstructive sleep apnea)     Pacemaker     Symptomatic bradycardia     s/p pacemaker       Past Surgical History:   Procedure Laterality Date    ADENOIDECTOMY  1939    unsure    CATARACT EXTRACTION W/  INTRAOCULAR LENS IMPLANT Bilateral 2012    done in [place]    CHOLECYSTECTOMY      CLOSURE OF LEFT ATRIAL APPENDAGE USING DEVICE  PHYSICAL THERAPY: Initial Assessment, Discharge, Treatment Day: Day of Assessment, AM 10/11/2018  INPATIENT: Hospital Day: 3  Payor: [employer] / Plan: EMILIANO Gallegos. / Product Type: Commerical /      NAME/AGE/GENDER: Tracie Reed is a 59 y.o. female   PRIMARY DIAGNOSIS: Cancer of ascending colon ([facility] [address] [C18.2] Primary adenocarcinoma of ascending colon ([address] Primary adenocarcinoma of ascending colon ([address]  Procedure(s) (LRB):  LAPAROSCOPIC RIGHT KENN COLECTOMY (Right)  2 Days Post-Op  ICD-10: Treatment Diagnosis:    · Difficulty in walking, Not elsewhere classified (R26.2)   Precaution/Allergies:  Biaxin [clarithromycin] and Cortisone      ASSESSMENT:     Ms. Poly Nielsen is a 59year old WF s/p right kenn colectomy secondary to cancer of the ascending colon. She presents today sitting up in the bedside chair agreeable to have therapy. She reports that she is feeling better and more stable with her mobility compared to yesterday and that she was able to ambulate with supervision last night. She reports she lives alone in a 1 level home with 4 steps with rails to enter. She states her mom and son live beside her and she has been assisting in her mom's care. She reports she has been functioning independently without an assistive device prior to this admission. She states she does have r/walker at home for use if ever needed. Her BLE AROM is WFLs. She is independent with sit to stand with steady standing balance without an assistive device. She ambulated with supervision 200' without an assistive device with a steady and controlled gait. She managed forward/backward/side stepping and turning without loss of balance. Ms. Poly Nielsen appears to be functioning at her baseline and has no skilled PT need at this time. She was pleasant and cooperative during therapy.       This section established at most recent assessment   PROBLEM LIST (Impairments causing functional N/A 12/3/2020    Procedure: Left atrial appendage closure device;  Surgeon: Tawanda Adler MD;  Location: [facility] EP LAB;  Service: Cardiology;  Laterality: N/A;  AF, GAIL, [employer], BSci, Gen, OH/MB, 3 Prep    COLECTOMY      cecum    COSMETIC SURGERY  1967    rhinoplasty    EYE SURGERY      FRACTURE SURGERY  wrist    1939    HERNIA REPAIR      INSERT / REPLACE / REMOVE PACEMAKER  2012    changed    PROSTATE SURGERY      TONSILLECTOMY  1941    VASECTOMY  1975       Family History   Problem Relation Age of Onset    Arthritis Father     Cancer Father     Kidney disease Father     Cancer Mother     Hearing loss Mother     Mental illness Mother     Alcohol abuse Brother     Cancer Brother     COPD Brother     Mental illness Brother     Cancer Paternal Uncle     Depression Brother     Early death Sister         accidental    Heart disease Brother     Mental illness Sister     Amblyopia Neg Hx     Blindness Neg Hx     Cataracts Neg Hx     Glaucoma Neg Hx     Macular degeneration Neg Hx     Retinal detachment Neg Hx     Strabismus Neg Hx        Social History     Tobacco Use    Smoking status: Never    Smokeless tobacco: Never   Substance Use Topics    Alcohol use: Not Currently     Alcohol/week: 1.0 standard drink of alcohol     Types: 1 Glasses of wine per week     Comment: weekly with meal    Drug use: No       Current Outpatient Medications   Medication Sig Dispense Refill    aspirin 81 MG Chew Take 81 mg by mouth once daily.      diclofenac sodium (VOLTAREN) 1 % Gel Use to affected joints up to 4 times a day 100 g 4    fentaNYL (DURAGESIC) 25 mcg/hr Place 1 patch onto the skin every 72 hours.      hydrocodone-acetaminophen 10-325mg (NORCO)  mg Tab Take by mouth every 4 (four) hours as needed for Pain.      oxyCODONE-acetaminophen (LYNOX) 7.5-300 mg per tablet Take 1 tablet by mouth every 4 (four) hours as needed for Pain.      timolol maleate 0.5% (TIMOPTIC) 0.5 % Drop timolol 0.5 % eye drops   INSTILL 1  limitations):  1. No skilled PT need with mobility at this time   INTERVENTIONS PLANNED: (Benefits and precautions of physical therapy have been discussed with the patient.)  1. No skilled PT need with mobility at this time     TREATMENT PLAN: Frequency/Duration  :N/A  Rehabilitation Potential For Stated Goals: N/A     RECOMMENDED REHABILITATION/EQUIPMENT: (at time of discharge pending progress): Due to the probability of continued deficits (see above) this patient will not likely need continued skilled physical therapy after discharge. Equipment:    None at this time              HISTORY:   History of Present Injury/Illness (Reason for Referral):  Zoë Baez is a 59 y.o. female who returns the office for continued planning for her newly diagnosed right-sided colon cancer. Since her last visit, she was evaluated by pulmonary who would like to keep her anticoagulated. She will have her Eliquis held an appropriate time preoperatively and maintained on a Lovenox bridge until the day prior to surgery. She was also set up for vena cava filter which was placed on 9/27. She did well with this procedure. Review of her CT scan with radiology confirmed my finding of the mass being visible on CT though not mentioned in the report from [facility]. The mass is in the mid a sending colon. This will be amenable to right hemicolectomy. She does have a history of open cholecystectomy via right subcostal incision. Her right leg swelling has almost completely resolved    Past Medical History/Comorbidities:   Ms. Maria Luz Correa  has a past medical history of Anemia (08/2018); Cancer of ascending colon ([address] (2018); Diabetes ([address] Environmental allergies (9/12/2013); and Thromboembolus ([address] (04/2018). Ms. Maria Luz Correa  has a past surgical history that includes hx cholecystectomy (1980's); hx other surgical (09/27/2018); and hx lipoma resection (Right, 1980's).   Social History/Living Environment:   Home Environment: Private "DROP INTO AFFECTED EYE(S) BY OPHTHALMIC ROUTE 2 TIMES PER DAY       No current facility-administered medications for this visit.       Review of patient's allergies indicates:   Allergen Reactions    Ketamine Hallucinations     Had dysphoric reaction.     Penicillins Hives            Vitals:    12/04/23 1539   BP: (!) 143/70   Pulse: 60   Resp: 18   Temp: 97 °F (36.1 °C)   TempSrc: Oral   SpO2: (!) 94%   Weight: 86.8 kg (191 lb 5.8 oz)   Height: 5' 10" (1.778 m)   Body mass index is 27.46 kg/m².    Physical Exam  Constitutional:       General: He is not in acute distress.  Eyes:      General: No scleral icterus.  Cardiovascular:      Rate and Rhythm: Normal rate.   Pulmonary:      Effort: Pulmonary effort is normal.   Abdominal:      General: There is no distension.      Palpations: Abdomen is soft. There is no fluid wave, hepatomegaly or splenomegaly.      Tenderness: There is no abdominal tenderness.   Musculoskeletal:      Right lower leg: No edema.      Left lower leg: No edema.   Skin:     General: Skin is warm.      Comments: No spider angiomas. No palmar erythema. No leukonychia.    Neurological:      General: No focal deficit present.      Mental Status: He is alert and oriented to person, place, and time.   Psychiatric:         Behavior: Behavior is cooperative.         Thought Content: Thought content normal.         LABS: I personally reviewed pertinent laboratory findings.    Lab Results   Component Value Date    WBC 7.45 10/23/2023    HGB 15.2 10/23/2023    HCT 47.3 10/23/2023    MCV 98 10/23/2023     10/23/2023       Lab Results   Component Value Date     10/23/2023    K 4.3 10/23/2023     10/23/2023    CO2 24 10/23/2023    BUN 20 10/23/2023    CREATININE 1.1 10/23/2023    CALCIUM 9.7 10/23/2023    ANIONGAP 10 10/23/2023    ESTGFRAFRICA >60.0 05/06/2021    EGFRNONAA >60.0 05/06/2021       Lab Results   Component Value Date    ALT 43 12/04/2023    AST 54 (H) 12/04/2023     (H) " residence  # Steps to Enter: 4  Rails to Enter: Yes  Hand Rails : Bilateral  One/Two Story Residence: One story  Living Alone: Yes  Support Systems: Child(isabela), Family member(s)  Patient Expects to be Discharged to[de-identified] Private residence  Current DME Used/Available at Home: Walker, rolling (She has a r/walker she used when she had LBP)  Prior Level of Function/Work/Activity:  Patient reports she has been functioning independently without an assistive device prior to admission and she is still driving and working. Number of Personal Factors/Comorbidities that affect the Plan of Care: 1-2: MODERATE COMPLEXITY   EXAMINATION:   Most Recent Physical Functioning:   Gross Assessment:  AROM: Within functional limits  PROM: Within functional limits  Strength: Within functional limits  Coordination: Within functional limits  Tone: Normal  Sensation: Intact               Posture:  Posture (WDL): Within defined limits  Balance:  Sitting: Intact  Standing: Intact Bed Mobility:  Patient up in bedside chair at the start of care. Wheelchair Mobility:     Transfers:  Sit to Stand: Independent  Stand to Sit: Independent  Bed to Chair: Independent  Gait:     Distance (ft): 200 Feet (ft)  Assistive Device: Other (comment) (none`)  Ambulation - Level of Assistance: Supervision; Independent      Body Structures Involved:  1. Digestive Structures Body Functions Affected:  1. Digestive Activities and Participation Affected:  1. None   Number of elements that affect the Plan of Care: 1-2: LOW COMPLEXITY   CLINICAL PRESENTATION:   Presentation: Stable and uncomplicated: LOW COMPLEXITY   CLINICAL DECISION MAKING:   [employer] AM-PAC 6 Clicks   Basic Mobility Inpatient Short Form  How much difficulty does the patient currently have. .. Unable A Lot A Little None   1. Turning over in bed (including adjusting bedclothes, sheets and blankets)? [] 1   [] 2   [] 3   [x] 4   2.   Sitting down on and standing up from a chair with arms ( "01/25/2023    ALKPHOS 134 12/04/2023    BILITOT 0.9 12/04/2023       Lab Results   Component Value Date    HEPAIGM Negative 09/26/2019    HEPBIGM Negative 09/26/2019    HEPBCAB Non-reactive 01/25/2023    HEPCAB Non-reactive 01/25/2023       No results found for: "AURELIA", "MITOAB", "SMOOTHMUSCAB", "IGG", "CERULOPLSM"     Computed MELD 3.0 unavailable. Necessary lab results were not found in the last year.  Computed MELD-Na unavailable. Necessary lab results were not found in the last year.       IMAGING: I personally reviewed imaging studies.    US abdomen limited 1/29/2023  FINDINGS:  Pancreas: Poorly visualized, limiting evaluation.     Liver: Normal in size measuring 14.5 cm. Homogeneous echotexture.  No focal hepatic lesions.     Gallbladder: The gallbladder is surgically absent.     Biliary system: The common duct is dilated, measuring 19 mm, similar compared to CT 2019.  Diffuse intrahepatic ductal dilatation.     Spleen: Normal in size and echotexture, measuring 9.2 x 2.9 cm.     6 mm nonobstructive left renal stone.  Bilateral simple renal cysts.     No sonographic abnormality seen area of reported discomfort in the left lower quadrant noting limited assessment.    Assessment:  Artemio Colon . is a 89 y.o. male with cholecystectomy, CAD, PPM placement, JAEL, who was referred to Hepatology Clinic for abnormal liver enzymes. Per chart review, liver enzymes have been intermittently elevated in a mixed/cholestatic pattern dating back to 2020. Bilirubin has been intermittently elevated. Albumin and platelet count are normal. On ultrasound (Jan 2023), the liver is normal in size and echotexture, sangita extra- and intrahepatic biliary dilation. Suspect biliary etiology to patient's presentation. Will plan to complete chronic liver disease workup, and repeat ultrasound of the abdomen.     1. Abnormal liver enzymes    2. Liver disease, unspecified    3. Dilation of biliary tract    4. History of cholecystectomy  " e.g., wheelchair, bedside commode, etc.)   [] 1   [] 2   [] 3   [x] 4   3. Moving from lying on back to sitting on the side of the bed? [] 1   [] 2   [] 3   [x] 4   How much help from another person does the patient currently need. .. Total A Lot A Little None   4. Moving to and from a bed to a chair (including a wheelchair)? [] 1   [] 2   [] 3   [x] 4   5. Need to walk in hospital room? [] 1   [] 2   [] 3   [x] 4   6. Climbing 3-5 steps with a railing? [] 1   [] 2   [] 3   [x] 4   © 2007, Trustees of [facility] [place] [facility] [employer], under license to [employer]. All rights reserved      Score:  Initial: 24 Most Recent: X (Date: -- )    Interpretation of Tool:  Represents activities that are increasingly more difficult (i.e. Bed mobility, Transfers, Gait). Score 24 23 22-20 19-15 14-10 9-7 6     Modifier CH CI CJ CK CL CM CN      ? Mobility - Walking and Moving Around:     - CURRENT STATUS: CH - 0% impaired, limited or restricted    - GOAL STATUS: CH - 0% impaired, limited or restricted    - D/C STATUS:  CH - 0% impaired, limited or restricted  Payor: [employer] / Plan: [place] [employer] / Product Type: Commerical /      Medical Necessity:     · No skilled PT need for mobility at this time. Reason for Services/Other Comments:  · Patient has been observed to independent and safe with her mobility before, during or after an intervention. Use of outcome tool(s) and clinical judgement create a POC that gives a: Clear prediction of patient's progress: LOW COMPLEXITY            TREATMENT:   (In addition to Assessment/Re-Assessment sessions the following treatments were rendered)   Pre-treatment Symptoms/Complaints:  Patient reports that yesterday she was unsteady with her ambulation and the MDs think it was medication related.    Pain: Initial: 0/10     Post Session:  0/10     Assessment/Reassessment only, no treatment provided today    Braces/Orthotics/Lines/Etc:   · O2     Recommendations:  - LFTs, INR  - AURELIA, ASMA, AMA, immunoglobulins   - A1AT phenotype   - Iron, ferritin, TIBC   - PETH   - US abdomen complete   - Pt unable to have MRCP due to PPM  - Consider UDCA    Return to clinic in 3 months.    I have sent communication to the referring physician and/or primary care provider.    Rebeca Pereyra MD  Staff Physician  Hepatology and Liver Transplant  [facility]  [facility]       Device: Room air  Treatment/Session Assessment:    · Response to Treatment:  Patient participated and tolerated therapy well. · Interdisciplinary Collaboration:   o Physical Therapist  o Registered Nurse  · After treatment position/precautions:   o Up in chair  o Bed/Chair-wheels locked  o Call light within reach  o RN notified   · Compliance with Program/Exercises: N/A  · Recommendations/Intent for next treatment session: N/A.   Total Treatment Duration:  PT Patient Time In/Time Out  Time In: 0937  Time Out: 364 [facility]

## 2024-02-14 NOTE — PROGRESS NOTES
IR Nurse Pre-Procedure Checklist Part 2          Consent signed: Yes    H&P complete:  Yes    Antibiotics: Not applicable    Airway/Mallampati Done: Yes    Shaved: Yes    Pregnancy Form:Not applicable    Patient Position: Yes    MD Side: Yes     Biopsy Worksheet: Not applicable    Specimen Medium: Not applicable <--- Click to Launch ICDx for PreOp, PostOp and Procedure

## (undated) DEVICE — (D)PREP SKN CHLRAPRP APPL 26ML -- CONVERT TO ITEM 371833

## (undated) DEVICE — SYR 5ML 1/5 GRAD LL NSAF LF --

## (undated) DEVICE — DEVICE LCK BILI RAP EXCHG OLPS --

## (undated) DEVICE — GARMENT,MEDLINE,DVT,INT,CALF,MED, GEN2: Brand: MEDLINE

## (undated) DEVICE — VISUALIZATION SYSTEM: Brand: CLEARIFY

## (undated) DEVICE — NDL FLTR TIP 5 MIC 18GX1.5IN --

## (undated) DEVICE — KENDALL RADIOLUCENT FOAM MONITORING ELECTRODE RECTANGULAR SHAPE: Brand: KENDALL

## (undated) DEVICE — SUTURE PDS II SZ 0 L60IN ABSRB VLT L65MM TP-1 1/2 CIR Z991G

## (undated) DEVICE — BLOCK BITE AD 60FR W/ VELC STRP ADDRESSES MOST PT AND

## (undated) DEVICE — STERILE POLYISOPRENE POWDER-FREE SURGICAL GLOVES: Brand: PROTEXIS

## (undated) DEVICE — REM POLYHESIVE ADULT PATIENT RETURN ELECTRODE: Brand: VALLEYLAB

## (undated) DEVICE — BANDAGE COBAN 6 IN WND 6INX5YD FOAM

## (undated) DEVICE — LAP CHOLE: Brand: MEDLINE INDUSTRIES, INC.

## (undated) DEVICE — CONTAINER PREFIL FRMLN 40ML --

## (undated) DEVICE — STAPLER INT L75MM CUT LN L73MM STPL LN L77MM BLU B FRM 8

## (undated) DEVICE — SYR 3ML LL TIP 1/10ML GRAD --

## (undated) DEVICE — SUTURE PERMAHAND SZ 2-0 L12X18IN NONABSORBABLE BLK SILK A185H

## (undated) DEVICE — SET IRRIG DST FLX M CONN

## (undated) DEVICE — CANNULA NSL ORAL AD FOR CAPNOFLEX CO2 O2 AIRLFE

## (undated) DEVICE — SOFT SILICONE HYDROCELLULAR FOAM DRESSING WITH LOCK AWAY LAYER: Brand: ALLEVYN LIFE XL 21X21 CTN10

## (undated) DEVICE — STAPLER INT L60MM STD TISS BLU 7/8 FIRING W/ 3.5MM 21 TI

## (undated) DEVICE — 3M™ IOBAN™ 2 ANTIMICROBIAL INCISE DRAPE 6650EZ: Brand: IOBAN™ 2

## (undated) DEVICE — WEREWOLF FLOW 90 COBLATION WAND: Brand: COBLATION

## (undated) DEVICE — UNIVERSAL FIXATION CANNULA: Brand: VERSAONE

## (undated) DEVICE — APPLICATOR FBR TIP L6IN COT TIP WOOD SHFT SWAB 2000 PER CA

## (undated) DEVICE — GEL MEDC ULTRASOUND 5L -- REPLACED BY 326862

## (undated) DEVICE — MOUTHPIECE ENDOSCP 20X27MM --

## (undated) DEVICE — SPONGE LAP 18X18IN STRL -- 5/PK

## (undated) DEVICE — [THREADED CANNULA.  DO NOT RESTERILIZE,  DO NOT USE IF PACKAGE IS DAMAGED]: Brand: DRI-LOK

## (undated) DEVICE — AIR/WATER CLEANING ADAPTER FOR OLYMPUS® GI ENDOSCOPE: Brand: BULLDOG®

## (undated) DEVICE — LOGICUT SCISSOR LENGTH 320MM: Brand: LOGI - LAPAROSCOPIC INSTRUMENT SYSTEM

## (undated) DEVICE — MEDI-VAC YANKAUER SUCTION HANDLE W/BULBOUS TIP: Brand: CARDINAL HEALTH

## (undated) DEVICE — VALVE SUCTION AIR H2O SET ORCA POD + DISP

## (undated) DEVICE — AMD ANTIMICROBIAL GAUZE SPONGES 8 PLY USP TYPE VII: Brand: CURITY

## (undated) DEVICE — SYR 50ML LR LCK 1ML GRAD NSAF --

## (undated) DEVICE — TRAY CATH 16F URIN MTR LTX -- CONVERT TO ITEM 363111

## (undated) DEVICE — BLLN KT O RING ENDOSCP US --

## (undated) DEVICE — BLADELESS OPTICAL TROCAR WITH FIXATION CANNULA: Brand: VERSAPORT

## (undated) DEVICE — BW-412T DISP COMBO CLEANING BRUSH: Brand: SINGLE USE COMBINATION CLEANING BRUSH

## (undated) DEVICE — RESTRAINT UNIVERSAL HEAD DISP --

## (undated) DEVICE — [HIGH FLOW INSUFFLATOR,  DO NOT USE IF PACKAGE IS DAMAGED,  KEEP DRY,  KEEP AWAY FROM SUNLIGHT,  PROTECT FROM HEAT AND RADIOACTIVE SOURCES.]: Brand: PNEUMOSURE

## (undated) DEVICE — SUTURE SZ 0 27IN 5/8 CIR UR-6  TAPER PT VIOLET ABSRB VICRYL J603H

## (undated) DEVICE — ACCESS PLATFORM FOR MINIMALLY INVASIVE SURGERY.: Brand: GELPORT® LAPAROSCOPIC  SYSTEM

## (undated) DEVICE — GAUZE,SPONGE,4"X4",12PLY,WOVEN,NS,LF: Brand: MEDLINE

## (undated) DEVICE — SUTURE MCRYL SZ 3-0 L27IN ABSRB UD L19MM PS-2 3/8 CIR PRIM Y427H

## (undated) DEVICE — 6 ML SYRINGE LUER-LOCK TIP: Brand: MONOJECT

## (undated) DEVICE — DRSG AQUACEL SURG 3.5X6IN -- CONVERT TO ITEM 369227

## (undated) DEVICE — SEALER ENDOSCP L37CM NANO COAT BLNT TIP LAP DIV

## (undated) DEVICE — SYR 10ML LUER LOK 1/5ML GRAD --

## (undated) DEVICE — MEDI-VAC NON-CONDUCTIVE SUCTION TUBING: Brand: CARDINAL HEALTH

## (undated) DEVICE — [AGGRESSIVE 6-FLUTE BARREL BUR, ARTHROSCOPIC SHAVER BLADE,  DO NOT RESTERILIZE,  DO NOT USE IF PACKAGE IS DAMAGED,  KEEP DRY,  KEEP AWAY FROM SUNLIGHT]: Brand: FORMULA

## (undated) DEVICE — SOLUTION IRRIG 3000ML 0.9% SOD CHL FLX CONT 0797208] ICU MEDICAL INC]

## (undated) DEVICE — BLUNT TROCAR WITH THREADED ANCHOR: Brand: VERSAONE

## (undated) DEVICE — CANNULA NSL AD CO2 SAMP FOR DASH 3000 4000 MON LO FLO

## (undated) DEVICE — KIT THORCENT 8FR L5IN POLYUR W/ 18/22/25GA NDL 3 W STPCOCK

## (undated) DEVICE — SET IRRIG W 96IN TBNG 4 LN FLX BG

## (undated) DEVICE — AIRLIFE™ OXYGEN TUBING 7 FEET (2.1 M) CRUSH RESISTANT OXYGEN TUBING, VINYL TIPPED: Brand: AIRLIFE™

## (undated) DEVICE — ADULT SPO2 SENSOR: Brand: NELLCOR

## (undated) DEVICE — NDL PRT INJ NSAF BLNT 18GX1.5 --

## (undated) DEVICE — BLOCK BITE 60FR W/ DENT RIM SCRIP ONLY

## (undated) DEVICE — GARMENT,MEDLINE,DVT,SEQUENTIAL,CALF,MD: Brand: MEDLINE

## (undated) DEVICE — SHLDR ARTHO LEE: Brand: MEDLINE INDUSTRIES, INC.

## (undated) DEVICE — 48" PROBE COVER W/GEL, ULTRASOUND, STERILE: Brand: SITE-RITE

## (undated) DEVICE — STRIP,CLOSURE,WOUND,MEDI-STRIP,1/2X4: Brand: MEDLINE

## (undated) DEVICE — TUBING, SUCTION, 3/16" X 6', STRAIGHT: Brand: MEDLINE

## (undated) DEVICE — INTENDED FOR TISSUE SEPARATION, AND OTHER PROCEDURES THAT REQUIRE A SHARP SURGICAL BLADE TO PUNCTURE OR CUT.: Brand: BARD-PARKER ® STAINLESS STEEL BLADES

## (undated) DEVICE — 3M™ TEGADERM™ TRANSPARENT FILM DRESSING FRAME STYLE, 1624W, 2-3/8 IN X 2-3/4 IN (6 CM X 7 CM), 100/CT 4CT/CASE: Brand: 3M™ TEGADERM™

## (undated) DEVICE — DRAPE TWL SURG 16X26IN BLU ORB04] ALLCARE INC]

## (undated) DEVICE — NEEDLE HYPO 18GA L1.5IN PNK S STL HUB POLYPR SHLD REG BVL

## (undated) DEVICE — 2000CC GUARDIAN II: Brand: GUARDIAN

## (undated) DEVICE — CURVED, LARGE JAW, OPEN SEALER/DIVIDER NANO-COATED: Brand: LIGASURE IMPACT

## (undated) DEVICE — DRAPE,SHOULDER,BEACH CHAIR,STERILE: Brand: MEDLINE

## (undated) DEVICE — SUTURE VCRL SZ 2-0 L27IN ABSRB UD L26MM SH 1/2 CIR J417H

## (undated) DEVICE — RELOAD STPL L75MM OPN H3.8MM CLS 1.5MM WIRE DIA0.2MM REG

## (undated) DEVICE — SLING ARM AD ULT

## (undated) DEVICE — SUTURE PERMAHAND SZ 3-0 L18IN NONABSORBABLE BLK L26MM SH C013D

## (undated) DEVICE — NEEDLE HYPO 21GA L1.5IN INTRAMUSCULAR S STL LATCH BVL UP

## (undated) DEVICE — KENDALL SCD EXPRESS SLEEVES, KNEE LENGTH, MEDIUM: Brand: KENDALL SCD

## (undated) DEVICE — NDL SPNE QNCKE 18GX3.5IN LF --

## (undated) DEVICE — MASTISOL ADHESIVE LIQ 2/3ML

## (undated) DEVICE — CONNECTOR TBNG OD5-7MM O2 END DISP

## (undated) DEVICE — [RESECTOR CUTTER, ARTHROSCOPIC SHAVER BLADE,  DO NOT RESTERILIZE,  DO NOT USE IF PACKAGE IS DAMAGED,  KEEP DRY,  KEEP AWAY FROM SUNLIGHT]: Brand: FORMULA

## (undated) DEVICE — Device

## (undated) DEVICE — PREP SKN CHLRAPRP APL 26ML STR --

## (undated) DEVICE — SINGLE PORT MANIFOLD: Brand: NEPTUNE 2

## (undated) DEVICE — DUAL LUMEN STOMACH TUBE: Brand: SALEM SUMP

## (undated) DEVICE — SLIM BODY SKIN STAPLER: Brand: APPOSE ULC

## (undated) DEVICE — STOCKINETTE ORTH W9XL36IN COT 2 PLY HLLW FOR HANDLING LMB

## (undated) DEVICE — 2, DISPOSABLE SUCTION/IRRIGATOR WITHOUT DISPOSABLE TIP: Brand: STRYKEFLOW

## (undated) DEVICE — BUTTON SWITCH PENCIL BLADE ELECTRODE, HOLSTER: Brand: EDGE

## (undated) DEVICE — FORCEPS BX PED L160CM JAW DIA1.8MM WRK CHN 2MM W/ NDL DISP